# Patient Record
Sex: FEMALE | Race: BLACK OR AFRICAN AMERICAN | NOT HISPANIC OR LATINO | Employment: OTHER | URBAN - METROPOLITAN AREA
[De-identification: names, ages, dates, MRNs, and addresses within clinical notes are randomized per-mention and may not be internally consistent; named-entity substitution may affect disease eponyms.]

---

## 2017-01-03 ENCOUNTER — OFFICE VISIT (OUTPATIENT)
Dept: FAMILY MEDICINE | Facility: CLINIC | Age: 72
End: 2017-01-03
Payer: MEDICARE

## 2017-01-03 VITALS
TEMPERATURE: 99 F | HEART RATE: 96 BPM | SYSTOLIC BLOOD PRESSURE: 138 MMHG | WEIGHT: 213.88 LBS | HEIGHT: 60 IN | BODY MASS INDEX: 41.99 KG/M2 | DIASTOLIC BLOOD PRESSURE: 74 MMHG

## 2017-01-03 DIAGNOSIS — I10 ESSENTIAL HYPERTENSION: ICD-10-CM

## 2017-01-03 DIAGNOSIS — G89.29 CHRONIC BILATERAL BACK PAIN, UNSPECIFIED BACK LOCATION: ICD-10-CM

## 2017-01-03 DIAGNOSIS — E66.01 MORBID OBESITY WITH BMI OF 40.0-44.9, ADULT: ICD-10-CM

## 2017-01-03 DIAGNOSIS — Z76.0 MEDICATION REFILL: ICD-10-CM

## 2017-01-03 DIAGNOSIS — L30.9 DERMATITIS: Primary | ICD-10-CM

## 2017-01-03 DIAGNOSIS — M54.9 CHRONIC BILATERAL BACK PAIN, UNSPECIFIED BACK LOCATION: ICD-10-CM

## 2017-01-03 PROCEDURE — 99214 OFFICE O/P EST MOD 30 MIN: CPT | Mod: S$GLB,,, | Performed by: NURSE PRACTITIONER

## 2017-01-03 PROCEDURE — 99999 PR PBB SHADOW E&M-EST. PATIENT-LVL V: CPT | Mod: PBBFAC,,, | Performed by: NURSE PRACTITIONER

## 2017-01-03 PROCEDURE — 1125F AMNT PAIN NOTED PAIN PRSNT: CPT | Mod: S$GLB,,, | Performed by: NURSE PRACTITIONER

## 2017-01-03 PROCEDURE — 1159F MED LIST DOCD IN RCRD: CPT | Mod: S$GLB,,, | Performed by: NURSE PRACTITIONER

## 2017-01-03 PROCEDURE — 3078F DIAST BP <80 MM HG: CPT | Mod: S$GLB,,, | Performed by: NURSE PRACTITIONER

## 2017-01-03 PROCEDURE — 1157F ADVNC CARE PLAN IN RCRD: CPT | Mod: S$GLB,,, | Performed by: NURSE PRACTITIONER

## 2017-01-03 PROCEDURE — 99499 UNLISTED E&M SERVICE: CPT | Mod: S$GLB,,, | Performed by: NURSE PRACTITIONER

## 2017-01-03 PROCEDURE — 3075F SYST BP GE 130 - 139MM HG: CPT | Mod: S$GLB,,, | Performed by: NURSE PRACTITIONER

## 2017-01-03 PROCEDURE — 1160F RVW MEDS BY RX/DR IN RCRD: CPT | Mod: S$GLB,,, | Performed by: NURSE PRACTITIONER

## 2017-01-03 RX ORDER — HYDROCHLOROTHIAZIDE 25 MG/1
25 TABLET ORAL DAILY
Qty: 90 TABLET | Refills: 3 | Status: SHIPPED | OUTPATIENT
Start: 2017-01-03 | End: 2017-03-13 | Stop reason: SDUPTHER

## 2017-01-03 RX ORDER — CLOBETASOL PROPIONATE 0.5 MG/G
OINTMENT TOPICAL 2 TIMES DAILY
Qty: 60 G | Refills: 1 | Status: SHIPPED | OUTPATIENT
Start: 2017-01-03 | End: 2017-03-13 | Stop reason: SDUPTHER

## 2017-01-03 RX ORDER — AMLODIPINE BESYLATE 10 MG/1
TABLET ORAL
Qty: 90 TABLET | Refills: 1 | Status: SHIPPED | OUTPATIENT
Start: 2017-01-03 | End: 2017-03-13 | Stop reason: SDUPTHER

## 2017-01-03 RX ORDER — LOSARTAN POTASSIUM 100 MG/1
TABLET ORAL
Qty: 90 TABLET | Refills: 1 | Status: SHIPPED | OUTPATIENT
Start: 2017-01-03 | End: 2017-03-13 | Stop reason: SDUPTHER

## 2017-01-03 NOTE — PROGRESS NOTES
Subjective:       Patient ID: Nilsa Curiel is a 71 y.o. female.    Chief Complaint: Rash (hands and chest)    HPI Comments: Also still has chronic low back pain, does not radiate.  Ok with sitting 0/10 pain but can get as bad as 8/10 with prolonged standing/ bending.  No b/b issues.  No paraesthesia      Rash   This is a chronic problem. The affected locations include the chest, right arm, left arm, left elbow, right elbow, right hand and left hand. The rash is characterized by dryness, itchiness and scaling. She was exposed to nothing. Pertinent negatives include no anorexia, congestion, cough, diarrhea, eye pain, facial edema, fatigue, fever, joint pain, nail changes, rhinorrhea, shortness of breath, sore throat or vomiting. Past treatments include topical steroids. The treatment provided mild relief. Her past medical history is significant for allergies. There is no history of asthma, eczema or varicella.     Past Medical History   Diagnosis Date    Atopic dermatitis     GERD (gastroesophageal reflux disease)     Hyperlipidemia     Hypertension     Morbid obesity with BMI of 40.0-44.9, adult 2016     Past Surgical History   Procedure Laterality Date     section       x3    Tubal ligation       Social History     Social History Narrative    Walking some     Family History   Problem Relation Age of Onset    Hypertension Mother     Dementia Mother     Diabetes Mother     Other Father      sepsis    Diabetes Sister     Hypertension Sister     Hyperlipidemia Sister     Diabetes Brother     Heart disease Brother     Cataracts Brother     No Known Problems Daughter     Hypertension Son     Hypertension Sister     Cancer Sister      pancreatic     Kidney disease Sister     Gout Sister     Heart disease Sister      premature    No Known Problems Daughter     Glaucoma Neg Hx     Retinal detachment Neg Hx     Macular degeneration Neg Hx     Strabismus Neg Hx     Amblyopia Neg Hx   "   Blindness Neg Hx      Vitals:    01/03/17 0900 01/03/17 0914   BP: (!) 156/82 138/74   Pulse: 96    Temp: 98.5 °F (36.9 °C)    Weight: 97 kg (213 lb 13.5 oz)    Height: 4' 11.5" (1.511 m)    PainSc:   2      Outpatient Encounter Prescriptions as of 1/3/2017   Medication Sig Dispense Refill    amlodipine (NORVASC) 10 MG tablet TAKE 1 TABLET(10 MG) BY MOUTH EVERY DAY 90 tablet 1    ascorbic acid (VITAMIN C) 100 MG tablet Take 100 mg by mouth once daily.      fexofenadine (ALLEGRA) 180 MG tablet Take 1 tablet (180 mg total) by mouth once daily. 90 tablet 3    fluticasone (FLONASE) 50 mcg/actuation nasal spray SHAKE LIQUID AND USE 1 SPRAY IN EACH NOSTRIL TWICE DAILY 3 Bottle 3    hydrochlorothiazide (HYDRODIURIL) 25 MG tablet Take 1 tablet (25 mg total) by mouth once daily. 90 tablet 3    KRILL OIL ORAL Take by mouth.      LACTOBACILLUS COMBO NO.6 (PROBIOTIC COMPLEX ORAL) Take by mouth.      losartan (COZAAR) 100 MG tablet TAKE 1 TABLET(100 MG) BY MOUTH EVERY DAY 90 tablet 1    MULTIVIT-MIN/FA/CA CARB/VIT K (WOMEN'S 50+ DAILY FORMULA ORAL) Take by mouth.      omega-3 fatty acids 300 mg Cap Take by mouth.      omeprazole (PRILOSEC) 20 MG capsule Take 1 capsule (20 mg total) by mouth once daily. (Patient taking differently: Take 20 mg by mouth as needed. ) 90 capsule 3    pravastatin (PRAVACHOL) 40 MG tablet TAKE 1 TABLET BY MOUTH ONCE DAILY 90 tablet 2    ranitidine (ZANTAC) 150 MG tablet Take 1 tablet (150 mg total) by mouth 2 (two) times daily. (Patient taking differently: Take 150 mg by mouth as needed. ) 180 tablet 3    tizanidine (ZANAFLEX) 4 MG tablet TAKE 1 TABLET BY MOUTH EVERY 6 HOURS AS NEEDED 60 tablet 1    [DISCONTINUED] amlodipine (NORVASC) 10 MG tablet TAKE 1 TABLET(10 MG) BY MOUTH EVERY DAY 90 tablet 1    [DISCONTINUED] betamethasone valerate 0.1% (VALISONE) 0.1 % Crea Apply topically 2 (two) times daily. 45 g 6    [DISCONTINUED] hydrochlorothiazide (HYDRODIURIL) 25 MG tablet TAKE 1 " "TABLET BY MOUTH ONCE DAILY (Patient taking differently: TAKE 1 TABLET every other day) 90 tablet 5    [DISCONTINUED] losartan (COZAAR) 100 MG tablet TAKE 1 TABLET(100 MG) BY MOUTH EVERY DAY 90 tablet 1    clobetasol 0.05% (TEMOVATE) 0.05 % Oint Apply topically 2 (two) times daily. Thin layer twice a day for 2 weeks to rash then only as needed if your rash flairs up. 60 g 1    naproxen (NAPROSYN) 375 MG tablet Take 1 tablet (375 mg total) by mouth 2 (two) times daily as needed. Take with meals. 90 tablet 3     No facility-administered encounter medications on file as of 1/3/2017.      Wt Readings from Last 3 Encounters:   01/03/17 97 kg (213 lb 13.5 oz)   12/01/16 96 kg (211 lb 10.3 oz)   08/16/16 97.7 kg (215 lb 6.2 oz)     Last 3 sets of Vitals    Vitals - 1 value per visit 12/1/2016 12/15/2016 1/3/2017   SYSTOLIC 144 - 138   DIASTOLIC 90 - 74   PULSE 96 - 96   TEMPERATURE 98.7 - 98.5   Weight (lb) 211.64 - 213.85   HEIGHT 5' 0" - 4' 11.5"   BODY MASS INDEX 41.33 - 42.47   VISIT REPORT - - -   Pain Score  2 0 2     No results found for: CBC  Review of Systems   Constitutional: Negative for chills, fatigue, fever and unexpected weight change.   HENT: Negative for congestion, postnasal drip, rhinorrhea, sore throat, tinnitus, trouble swallowing and voice change.    Eyes: Negative for pain and redness.   Respiratory: Negative for cough and shortness of breath.    Cardiovascular: Negative for chest pain.   Gastrointestinal: Negative for anorexia, diarrhea and vomiting.   Musculoskeletal: Positive for back pain. Negative for joint pain, joint swelling, myalgias, neck pain and neck stiffness.   Skin: Positive for rash. Negative for nail changes.   Neurological: Negative for dizziness, light-headedness and headaches.   Hematological: Negative for adenopathy. Does not bruise/bleed easily.   Psychiatric/Behavioral: Negative for sleep disturbance.       Objective:      Physical Exam   Constitutional: She appears " well-developed and well-nourished. No distress.   HENT:   Head: Normocephalic and atraumatic.   Eyes: Conjunctivae are normal. No scleral icterus.   Pulmonary/Chest: Effort normal.   Abdominal: Soft.   Musculoskeletal:        Lumbar back: Normal. She exhibits normal range of motion, no tenderness, no bony tenderness and no swelling.   Pt able to heel/ toe walk  Pt able to bend to touch toes with no increased pain. Pt able to hyperextend back with minimal pain  Pt able to bend left/ right with minimal pain  5/5 motor strength BLE  Patellar DTR 2+ bilaterally     Skin: Skin is warm, dry and intact. Rash noted. Rash is maculopapular. She is not diaphoretic. No pallor.        Psychiatric: She has a normal mood and affect. Her behavior is normal. Judgment and thought content normal.       Assessment:       1. Dermatitis    2. Chronic bilateral back pain, unspecified back location    3. Essential hypertension    4. Medication refill        Plan:         Pt to call if rash not improving and will send a referral to dermatology  Pt to reschedule for an annual, D/W pt that her annual is a wellness visit.    BP meds refilled  Referral to [facility] program as pt still having back pain, discussed using Tylenol for back pain before using Naproxen    Nilsa was seen today for rash.    Diagnoses and all orders for this visit:    Dermatitis  -     clobetasol 0.05% (TEMOVATE) 0.05 % Oint; Apply topically 2 (two) times daily. Thin layer twice a day for 2 weeks to rash then only as needed if your rash flairs up.    Chronic bilateral back pain, unspecified back location  -     Ambulatory consult to [facility]    Essential hypertension  -     losartan (COZAAR) 100 MG tablet; TAKE 1 TABLET(100 MG) BY MOUTH EVERY DAY  -     amlodipine (NORVASC) 10 MG tablet; TAKE 1 TABLET(10 MG) BY MOUTH EVERY DAY  -     hydrochlorothiazide (HYDRODIURIL) 25 MG tablet; Take 1 tablet (25 mg total) by mouth once daily.    Medication refill  -      losartan (COZAAR) 100 MG tablet; TAKE 1 TABLET(100 MG) BY MOUTH EVERY DAY  -     amlodipine (NORVASC) 10 MG tablet; TAKE 1 TABLET(10 MG) BY MOUTH EVERY DAY  -     hydrochlorothiazide (HYDRODIURIL) 25 MG tablet; Take 1 tablet (25 mg total) by mouth once daily.      Patient Instructions   Use Ceravae CREAM to skin after shower.  Keep skin wet after shower and apply the cream    Start using Temovate ointment as directed.  If no improvement in your rash just call and we can send you to the Dermatologist    Call with any questions    Increase your water intake as discussed

## 2017-01-03 NOTE — PATIENT INSTRUCTIONS
Use Ceravae CREAM to skin after shower.  Keep skin wet after shower and apply the cream    Start using Temovate ointment as directed.  If no improvement in your rash just call and we can send you to the Dermatologist    Call with any questions    Increase your water intake as discussed

## 2017-01-03 NOTE — MR AVS SNAPSHOT
[facility]  [address] [facility], [address]  [facility] [address]  Phone: [phone]  Fax: [phone]                  Nilsa Curiel   1/3/2017 9:00 AM   Office Visit    Description:  Female : 1945   Provider:  PATRICK Ngo   Department:  [facility]           Reason for Visit     Rash           Diagnoses this Visit        Comments    Dermatitis    -  Primary     Chronic bilateral back pain, unspecified back location                To Do List           Goals (5 Years of Data)     None      Follow-Up and Disposition     Return if symptoms worsen or fail to improve.       These Medications        Disp Refills Start End    clobetasol 0.05% (TEMOVATE) 0.05 % Oint 60 g 1 1/3/2017 2017    Apply topically 2 (two) times daily. Thin layer twice a day for 2 weeks to rash then only as needed if your rash flairs up. - Topical    Pharmacy: [facility] [address] [place] [address] VIVIAN GILMORE AT [facility] Vivian Garcia Ph #: [phone]         [facility] On Call     [facility] On Call Nurse Care Line -  Assistance  Registered nurses in the [facility] On Call Center provide clinical advisement, health education, appointment booking, and other advisory services.  Call for this free service at [phone].             Medications           Message regarding Medications     Verify the changes and/or additions to your medication regime listed below are the same as discussed with your clinician today.  If any of these changes or additions are incorrect, please notify your healthcare provider.        START taking these NEW medications        Refills    clobetasol 0.05% (TEMOVATE) 0.05 % Oint 1    Sig: Apply topically 2 (two) times daily. Thin layer twice a day for 2 weeks to rash then only as needed if your rash flairs up.    Class: Normal    Route: Topical      STOP taking these medications     betamethasone valerate 0.1% (VALISONE) 0.1 % Crea  Apply topically 2 (two) times daily.           Verify that the below list of medications is an accurate representation of the medications you are currently taking.  If none reported, the list may be blank. If incorrect, please contact your healthcare provider. Carry this list with you in case of emergency.           Current Medications     amlodipine (NORVASC) 10 MG tablet TAKE 1 TABLET(10 MG) BY MOUTH EVERY DAY    ascorbic acid (VITAMIN C) 100 MG tablet Take 100 mg by mouth once daily.    fexofenadine (ALLEGRA) 180 MG tablet Take 1 tablet (180 mg total) by mouth once daily.    fluticasone (FLONASE) 50 mcg/actuation nasal spray SHAKE LIQUID AND USE 1 SPRAY IN EACH NOSTRIL TWICE DAILY    hydrochlorothiazide (HYDRODIURIL) 25 MG tablet TAKE 1 TABLET BY MOUTH ONCE DAILY    KRILL OIL ORAL Take by mouth.    LACTOBACILLUS COMBO NO.6 (PROBIOTIC COMPLEX ORAL) Take by mouth.    losartan (COZAAR) 100 MG tablet TAKE 1 TABLET(100 MG) BY MOUTH EVERY DAY    MULTIVIT-MIN/FA/CA CARB/VIT K (WOMEN'S 50+ DAILY FORMULA ORAL) Take by mouth.    omega-3 fatty acids 300 mg Cap Take by mouth.    omeprazole (PRILOSEC) 20 MG capsule Take 1 capsule (20 mg total) by mouth once daily.    pravastatin (PRAVACHOL) 40 MG tablet TAKE 1 TABLET BY MOUTH ONCE DAILY    ranitidine (ZANTAC) 150 MG tablet Take 1 tablet (150 mg total) by mouth 2 (two) times daily.    tizanidine (ZANAFLEX) 4 MG tablet TAKE 1 TABLET BY MOUTH EVERY 6 HOURS AS NEEDED    clobetasol 0.05% (TEMOVATE) 0.05 % Oint Apply topically 2 (two) times daily. Thin layer twice a day for 2 weeks to rash then only as needed if your rash flairs up.    naproxen (NAPROSYN) 375 MG tablet Take 1 tablet (375 mg total) by mouth 2 (two) times daily as needed. Take with meals.           Clinical Reference Information           Vital Signs - Last Recorded  Most recent update: 1/3/2017  9:17 AM by PATRICK Ngo    BP Pulse Temp Ht Wt BMI    138/74 (BP Location: Left arm, Patient Position:  "Sitting, BP Method: Manual) 96 98.5 °F (36.9 °C) 4' 11.5" (1.511 m) 97 kg (213 lb 13.5 oz) 42.47 kg/m2      Blood Pressure          Most Recent Value    BP  138/74      Allergies as of 1/3/2017     No Known Allergies      Immunizations Administered on Date of Encounter - 1/3/2017     None      Orders Placed During Today's Visit      Normal Orders This Visit    Ambulatory consult to [facility]       Instructions    Use Ceravae CREAM to skin after shower.  Keep skin wet after shower and apply the cream    Start using Temovate ointment as directed.  If no improvement in your rash just call and we can send you to the Dermatologist    Call with any questions    Increase your water intake as discussed         "

## 2017-02-22 ENCOUNTER — OFFICE VISIT (OUTPATIENT)
Dept: FAMILY MEDICINE | Facility: CLINIC | Age: 72
End: 2017-02-22
Payer: MEDICARE

## 2017-02-22 ENCOUNTER — LAB VISIT (OUTPATIENT)
Dept: LAB | Facility: HOSPITAL | Age: 72
End: 2017-02-22
Attending: FAMILY MEDICINE
Payer: MEDICARE

## 2017-02-22 VITALS
HEART RATE: 78 BPM | DIASTOLIC BLOOD PRESSURE: 72 MMHG | BODY MASS INDEX: 41.99 KG/M2 | HEIGHT: 60 IN | SYSTOLIC BLOOD PRESSURE: 128 MMHG | TEMPERATURE: 98 F | WEIGHT: 213.88 LBS

## 2017-02-22 DIAGNOSIS — M54.9 BACK PAIN, UNSPECIFIED BACK LOCATION, UNSPECIFIED BACK PAIN LATERALITY, UNSPECIFIED CHRONICITY: ICD-10-CM

## 2017-02-22 DIAGNOSIS — R20.2 PARESTHESIA OF SKIN: ICD-10-CM

## 2017-02-22 DIAGNOSIS — L30.9 DERMATITIS: Primary | ICD-10-CM

## 2017-02-22 DIAGNOSIS — D64.9 ANEMIA, UNSPECIFIED TYPE: ICD-10-CM

## 2017-02-22 DIAGNOSIS — L30.9 DERMATITIS: ICD-10-CM

## 2017-02-22 LAB
ALBUMIN SERPL BCP-MCNC: 3.8 G/DL
ALP SERPL-CCNC: 106 U/L
ALT SERPL W/O P-5'-P-CCNC: 16 U/L
ANION GAP SERPL CALC-SCNC: 11 MMOL/L
AST SERPL-CCNC: 19 U/L
BASOPHILS # BLD AUTO: 0.03 K/UL
BASOPHILS NFR BLD: 0.5 %
BILIRUB SERPL-MCNC: 0.4 MG/DL
BUN SERPL-MCNC: 12 MG/DL
C3 SERPL-MCNC: 179 MG/DL
C4 SERPL-MCNC: 56 MG/DL
CALCIUM SERPL-MCNC: 10.7 MG/DL
CHLORIDE SERPL-SCNC: 106 MMOL/L
CO2 SERPL-SCNC: 25 MMOL/L
CREAT SERPL-MCNC: 0.9 MG/DL
DIFFERENTIAL METHOD: ABNORMAL
EOSINOPHIL # BLD AUTO: 0.3 K/UL
EOSINOPHIL NFR BLD: 4.4 %
ERYTHROCYTE [DISTWIDTH] IN BLOOD BY AUTOMATED COUNT: 13.8 %
EST. GFR  (AFRICAN AMERICAN): >60 ML/MIN/1.73 M^2
EST. GFR  (NON AFRICAN AMERICAN): >60 ML/MIN/1.73 M^2
GLUCOSE SERPL-MCNC: 96 MG/DL
HCT VFR BLD AUTO: 36.3 %
HGB BLD-MCNC: 11.2 G/DL
IRON SERPL-MCNC: 67 UG/DL
LYMPHOCYTES # BLD AUTO: 2.4 K/UL
LYMPHOCYTES NFR BLD: 41.3 %
MCH RBC QN AUTO: 24.5 PG
MCHC RBC AUTO-ENTMCNC: 30.9 %
MCV RBC AUTO: 79 FL
MONOCYTES # BLD AUTO: 0.4 K/UL
MONOCYTES NFR BLD: 6.7 %
NEUTROPHILS # BLD AUTO: 2.7 K/UL
NEUTROPHILS NFR BLD: 46.9 %
PATH REV BLD -IMP: NORMAL
PATH REV BLD -IMP: NORMAL
PLATELET # BLD AUTO: 361 K/UL
PMV BLD AUTO: 9.9 FL
POTASSIUM SERPL-SCNC: 4 MMOL/L
PROT SERPL-MCNC: 7.8 G/DL
RBC # BLD AUTO: 4.58 M/UL
RETICS/RBC NFR AUTO: 1.9 %
SATURATED IRON: 17 %
SODIUM SERPL-SCNC: 142 MMOL/L
TOTAL IRON BINDING CAPACITY: 401 UG/DL
TRANSFERRIN SERPL-MCNC: 271 MG/DL
VIT B12 SERPL-MCNC: 1370 PG/ML
WBC # BLD AUTO: 5.69 K/UL

## 2017-02-22 PROCEDURE — 86160 COMPLEMENT ANTIGEN: CPT | Mod: 59

## 2017-02-22 PROCEDURE — 83540 ASSAY OF IRON: CPT

## 2017-02-22 PROCEDURE — 1157F ADVNC CARE PLAN IN RCRD: CPT | Mod: S$GLB,,, | Performed by: NURSE PRACTITIONER

## 2017-02-22 PROCEDURE — 1125F AMNT PAIN NOTED PAIN PRSNT: CPT | Mod: S$GLB,,, | Performed by: NURSE PRACTITIONER

## 2017-02-22 PROCEDURE — 86160 COMPLEMENT ANTIGEN: CPT

## 2017-02-22 PROCEDURE — 85025 COMPLETE CBC W/AUTO DIFF WBC: CPT

## 2017-02-22 PROCEDURE — 99213 OFFICE O/P EST LOW 20 MIN: CPT | Mod: S$GLB,,, | Performed by: NURSE PRACTITIONER

## 2017-02-22 PROCEDURE — 82607 VITAMIN B-12: CPT

## 2017-02-22 PROCEDURE — 85045 AUTOMATED RETICULOCYTE COUNT: CPT

## 2017-02-22 PROCEDURE — 3078F DIAST BP <80 MM HG: CPT | Mod: S$GLB,,, | Performed by: NURSE PRACTITIONER

## 2017-02-22 PROCEDURE — 99999 PR PBB SHADOW E&M-EST. PATIENT-LVL V: CPT | Mod: PBBFAC,,, | Performed by: NURSE PRACTITIONER

## 2017-02-22 PROCEDURE — 84207 ASSAY OF VITAMIN B-6: CPT

## 2017-02-22 PROCEDURE — 3074F SYST BP LT 130 MM HG: CPT | Mod: S$GLB,,, | Performed by: NURSE PRACTITIONER

## 2017-02-22 PROCEDURE — 36415 COLL VENOUS BLD VENIPUNCTURE: CPT | Mod: PO

## 2017-02-22 PROCEDURE — 86038 ANTINUCLEAR ANTIBODIES: CPT

## 2017-02-22 PROCEDURE — 80053 COMPREHEN METABOLIC PANEL: CPT

## 2017-02-22 PROCEDURE — 1159F MED LIST DOCD IN RCRD: CPT | Mod: S$GLB,,, | Performed by: NURSE PRACTITIONER

## 2017-02-22 PROCEDURE — 85060 BLOOD SMEAR INTERPRETATION: CPT | Mod: ,,, | Performed by: PATHOLOGY

## 2017-02-22 PROCEDURE — 1160F RVW MEDS BY RX/DR IN RCRD: CPT | Mod: S$GLB,,, | Performed by: NURSE PRACTITIONER

## 2017-02-22 NOTE — PROGRESS NOTES
Subjective:       Patient ID: Nilsa Curiel is a 71 y.o. female.    Chief Complaint: Rash (bilateral hands and chest)    HPI Comments: Pt also c/o her chronic back pain, always there no b/b issues.  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 The current episode started in the past 7 days. The problem has been waxing and waning since onset. The affected locations include the chest, left hand and right hand. Rash characteristics: plaques. She was exposed to nothing. Associated symptoms include joint pain. Pertinent negatives include no anorexia, congestion, cough, diarrhea, eye pain, facial edema, fatigue, fever, nail changes, rhinorrhea, shortness of breath, sore throat or vomiting. Past treatments include topical steroids. The treatment provided significant relief. Her past medical history is significant for eczema.     Past Medical History   Diagnosis Date    Atopic dermatitis     GERD (gastroesophageal reflux disease)     Hyperlipidemia     Hypertension     Morbid obesity with BMI of 40.0-44.9, adult 2016     Past Surgical History   Procedure Laterality Date     section       x3    Tubal ligation       Social History     Social History Narrative    Walking some     Family History   Problem Relation Age of Onset    Hypertension Mother     Dementia Mother     Diabetes Mother     Other Father      sepsis    Diabetes Sister     Hypertension Sister     Hyperlipidemia Sister     Diabetes Brother     Heart disease Brother     Cataracts Brother     No Known Problems Daughter     Hypertension Son     Hypertension Sister     Cancer Sister      pancreatic     Kidney disease Sister     Gout Sister     Heart disease Sister      premature    No Known Problems Daughter     Glaucoma Neg Hx     Retinal detachment Neg Hx     Macular degeneration Neg Hx     Strabismus Neg Hx     Amblyopia Neg Hx     Blindness Neg Hx      Vitals:    17  "0842   BP: 128/72   Pulse: 78   Temp: 97.9 °F (36.6 °C)   Weight: 97 kg (213 lb 13.5 oz)   Height: 4' 11.5" (1.511 m)   PainSc:   2     Outpatient Encounter Prescriptions as of 2/22/2017   Medication Sig Dispense Refill    amlodipine (NORVASC) 10 MG tablet TAKE 1 TABLET(10 MG) BY MOUTH EVERY DAY 90 tablet 1    ascorbic acid (VITAMIN C) 100 MG tablet Take 100 mg by mouth once daily.      clobetasol 0.05% (TEMOVATE) 0.05 % Oint Apply topically 2 (two) times daily. Thin layer twice a day for 2 weeks to rash then only as needed if your rash flairs up. 60 g 1    fexofenadine (ALLEGRA) 180 MG tablet Take 1 tablet (180 mg total) by mouth once daily. 90 tablet 3    fluticasone (FLONASE) 50 mcg/actuation nasal spray SHAKE LIQUID AND USE 1 SPRAY IN EACH NOSTRIL TWICE DAILY 3 Bottle 3    hydrochlorothiazide (HYDRODIURIL) 25 MG tablet Take 1 tablet (25 mg total) by mouth once daily. 90 tablet 3    KRILL OIL ORAL Take by mouth.      LACTOBACILLUS COMBO NO.6 (PROBIOTIC COMPLEX ORAL) Take by mouth.      losartan (COZAAR) 100 MG tablet TAKE 1 TABLET(100 MG) BY MOUTH EVERY DAY 90 tablet 1    MULTIVIT-MIN/FA/CA CARB/VIT K (WOMEN'S 50+ DAILY FORMULA ORAL) Take by mouth.      omega-3 fatty acids 300 mg Cap Take by mouth.      omeprazole (PRILOSEC) 20 MG capsule Take 1 capsule (20 mg total) by mouth once daily. (Patient taking differently: Take 20 mg by mouth as needed. ) 90 capsule 3    pravastatin (PRAVACHOL) 40 MG tablet TAKE 1 TABLET BY MOUTH ONCE DAILY 90 tablet 2    ranitidine (ZANTAC) 150 MG tablet Take 1 tablet (150 mg total) by mouth 2 (two) times daily. (Patient taking differently: Take 150 mg by mouth as needed. ) 180 tablet 3    naproxen (NAPROSYN) 375 MG tablet Take 1 tablet (375 mg total) by mouth 2 (two) times daily as needed. Take with meals. 90 tablet 3    [DISCONTINUED] tizanidine (ZANAFLEX) 4 MG tablet TAKE 1 TABLET BY MOUTH EVERY 6 HOURS AS NEEDED 60 tablet 1     No facility-administered encounter " "medications on file as of 2/22/2017.      Wt Readings from Last 3 Encounters:   02/22/17 97 kg (213 lb 13.5 oz)   01/03/17 97 kg (213 lb 13.5 oz)   12/01/16 96 kg (211 lb 10.3 oz)     Last 3 sets of Vitals    Vitals - 1 value per visit 12/15/2016 1/3/2017 2/22/2017   SYSTOLIC - 138 128   DIASTOLIC - 74 72   PULSE - 96 78   TEMPERATURE - 98.5 97.9   Weight (lb) - 213.85 213.85   HEIGHT - 4' 11.5" 4' 11.5"   BODY MASS INDEX - 42.47 42.47   VISIT REPORT - - -   Pain Score  0 2 2     No results found for: CBC  Review of Systems   Constitutional: Negative for fatigue and fever.   HENT: Negative for congestion, rhinorrhea and sore throat.    Eyes: Negative for pain.   Respiratory: Negative for cough and shortness of breath.    Gastrointestinal: Negative for anorexia, diarrhea and vomiting.   Musculoskeletal: Positive for back pain and joint pain.   Skin: Positive for rash. Negative for nail changes.       Objective:      Physical Exam   Constitutional: She is oriented to person, place, and time. She appears well-developed and well-nourished. No distress.   Eyes: Pupils are equal, round, and reactive to light.   Pulmonary/Chest: Effort normal.   Musculoskeletal:   Ambulatory with steady gait, no gross abnormalities to back     Neurological: She is alert and oriented to person, place, and time.   Skin: Rash noted. She is not diaphoretic.   Raised plaques noted to dorsal surfaces of hands and on chest     Psychiatric: She has a normal mood and affect. Her behavior is normal. Judgment and thought content normal.   Nursing note and vitals reviewed.      Assessment:       1. Dermatitis    2. Anemia, unspecified type    3. Paresthesia of skin     4. Back pain, unspecified back location, unspecified back pain laterality, unspecified chronicity        Plan:       Will check labs , referral to healthy [facility]por pt's chronic back pain  Discussed weight management, pt has lost some weight, eating salads 3 days a week.  Has lost 4 lbs " past 2 weeks.    Nilsa was seen today for rash.    Diagnoses and all orders for this visit:    Dermatitis  -     Reticulocytes; Future  -     Iron and TIBC; Future  -     DARNELL; Future  -     C3 complement; Future  -     C4 complement; Future  -     Vitamin B12; Future  -     VITAMIN B6; Future  -     Pathologist Interpretation Differential; Future  -     CBC auto differential; Future  -     Comprehensive metabolic panel; Future    Anemia, unspecified type  -     Reticulocytes; Future  -     Iron and TIBC; Future  -     DARNELL; Future  -     C3 complement; Future  -     C4 complement; Future  -     Vitamin B12; Future  -     VITAMIN B6; Future  -     Pathologist Interpretation Differential; Future  -     CBC auto differential; Future  -     Comprehensive metabolic panel; Future    Paresthesia of skin   -     Vitamin B12; Future  -     Pathologist Interpretation Differential; Future  -     CBC auto differential; Future  -     Comprehensive metabolic panel; Future    Back pain, unspecified back location, unspecified back pain laterality, unspecified chronicity  -     Ambulatory consult to [facility]  -     Pathologist Interpretation Differential; Future      Patient Instructions   Labs today, I will call you about your results    Resume your steroid cream/ ointment to rash    [facility] for your back pain

## 2017-02-22 NOTE — PATIENT INSTRUCTIONS
Labs today, I will call you about your results    Resume your steroid cream/ ointment to rash    [facility] for your back pain

## 2017-02-22 NOTE — MR AVS SNAPSHOT
[facility]  [address] [facility], [address]  [facility] [address]  Phone: [phone]  Fax: [phone]                  Nilsa Curiel   2017 8:40 AM   Office Visit    Description:  Female : 1945   Provider:  MICHELINE NgoC   Department:  [facility]           Reason for Visit     Rash           Diagnoses this Visit        Comments    Dermatitis    -  Primary     Anemia, unspecified type         Paresthesia of skin         Back pain, unspecified back location, unspecified back pain laterality, unspecified chronicity                To Do List           Goals (5 Years of Data)     None      [facility]     [facility] Care [facility] -  Assistance  Registered nurses in the [facility] provide clinical advisement, health education, appointment booking, and other advisory services.  Call for this free service at [phone].             Medications           Message regarding Medications     Verify the changes and/or additions to your medication regime listed below are the same as discussed with your clinician today.  If any of these changes or additions are incorrect, please notify your healthcare provider.        STOP taking these medications     tizanidine (ZANAFLEX) 4 MG tablet TAKE 1 TABLET BY MOUTH EVERY 6 HOURS AS NEEDED           Verify that the below list of medications is an accurate representation of the medications you are currently taking.  If none reported, the list may be blank. If incorrect, please contact your healthcare provider. Carry this list with you in case of emergency.           Current Medications     amlodipine (NORVASC) 10 MG tablet TAKE 1 TABLET(10 MG) BY MOUTH EVERY DAY    ascorbic acid (VITAMIN C) 100 MG tablet Take 100 mg by mouth once daily.    clobetasol 0.05% (TEMOVATE) 0.05 % Oint Apply topically 2 (two) times daily. Thin layer twice a day for 2 weeks to rash then only as needed if your  "rash flairs up.    fexofenadine (ALLEGRA) 180 MG tablet Take 1 tablet (180 mg total) by mouth once daily.    fluticasone (FLONASE) 50 mcg/actuation nasal spray SHAKE LIQUID AND USE 1 SPRAY IN EACH NOSTRIL TWICE DAILY    hydrochlorothiazide (HYDRODIURIL) 25 MG tablet Take 1 tablet (25 mg total) by mouth once daily.    KRILL OIL ORAL Take by mouth.    LACTOBACILLUS COMBO NO.6 (PROBIOTIC COMPLEX ORAL) Take by mouth.    losartan (COZAAR) 100 MG tablet TAKE 1 TABLET(100 MG) BY MOUTH EVERY DAY    MULTIVIT-MIN/FA/CA CARB/VIT K (WOMEN'S 50+ DAILY FORMULA ORAL) Take by mouth.    omega-3 fatty acids 300 mg Cap Take by mouth.    omeprazole (PRILOSEC) 20 MG capsule Take 1 capsule (20 mg total) by mouth once daily.    pravastatin (PRAVACHOL) 40 MG tablet TAKE 1 TABLET BY MOUTH ONCE DAILY    ranitidine (ZANTAC) 150 MG tablet Take 1 tablet (150 mg total) by mouth 2 (two) times daily.    naproxen (NAPROSYN) 375 MG tablet Take 1 tablet (375 mg total) by mouth 2 (two) times daily as needed. Take with meals.           Clinical Reference Information           Your Vitals Were     BP Pulse Temp Height Weight BMI    128/72 78 97.9 °F (36.6 °C) 4' 11.5" (1.511 m) 97 kg (213 lb 13.5 oz) 42.47 kg/m2      Blood Pressure          Most Recent Value    BP  128/72      Allergies as of 2/22/2017     No Known Allergies      Immunizations Administered on Date of Encounter - 2/22/2017     None      Orders Placed During Today's Visit      Normal Orders This Visit    Ambulatory consult to [facility]     Future Labs/Procedures Expected by Expires    DARNELL  2/22/2017 4/23/2018    C3 complement  2/22/2017 4/23/2018    C4 complement  2/22/2017 4/23/2018    CBC auto differential  2/22/2017 4/23/2018    Comprehensive metabolic panel  2/22/2017 4/23/2018    Iron and TIBC  2/22/2017 4/23/2018    Pathologist Interpretation Differential  2/22/2017 4/23/2018    Reticulocytes  2/22/2017 4/23/2018    Vitamin B12  2/22/2017 4/23/2018    VITAMIN B6  " 2/22/2017 4/23/2018      Instructions    Labs today, I will call you about your results    Resume your steroid cream/ ointment to rash    [facility] Back Program for your back pain           Language Assistance Services     ATTENTION: Language assistance services are available, free of charge. Please call [phone].      ATENCIÓN: Si habla corie, tiene a blanc disposición servicios gratuitos de asistencia lingüística. Llame al [phone].     CHÚ Ý: N?u b?n nói Ti?ng Vi?t, có các d?ch v? h? tr? ngôn ng? mi?n phí dành cho b?n. G?i s? [phone].         [facility] complies with applicable Federal civil rights laws and does not discriminate on the basis of race, color, national origin, age, disability, or sex.

## 2017-02-23 LAB — ANA SER QL IF: NORMAL

## 2017-02-24 ENCOUNTER — TELEPHONE (OUTPATIENT)
Dept: FAMILY MEDICINE | Facility: CLINIC | Age: 72
End: 2017-02-24

## 2017-02-24 DIAGNOSIS — M25.50 ARTHRALGIA, UNSPECIFIED JOINT: ICD-10-CM

## 2017-02-24 DIAGNOSIS — R79.89 ELEVATED PLATELET COUNT: Primary | ICD-10-CM

## 2017-02-27 LAB — PYRIDOXAL SERPL-MCNC: 16 UG/L (ref 5–50)

## 2017-03-07 ENCOUNTER — TELEPHONE (OUTPATIENT)
Dept: HEMATOLOGY/ONCOLOGY | Facility: CLINIC | Age: 72
End: 2017-03-07

## 2017-03-13 ENCOUNTER — OFFICE VISIT (OUTPATIENT)
Dept: FAMILY MEDICINE | Facility: CLINIC | Age: 72
End: 2017-03-13
Payer: MEDICARE

## 2017-03-13 VITALS
TEMPERATURE: 99 F | HEIGHT: 60 IN | BODY MASS INDEX: 41.72 KG/M2 | DIASTOLIC BLOOD PRESSURE: 64 MMHG | SYSTOLIC BLOOD PRESSURE: 128 MMHG | WEIGHT: 212.5 LBS

## 2017-03-13 DIAGNOSIS — I10 ESSENTIAL HYPERTENSION: ICD-10-CM

## 2017-03-13 DIAGNOSIS — L30.9 DERMATITIS: Primary | ICD-10-CM

## 2017-03-13 PROCEDURE — 3078F DIAST BP <80 MM HG: CPT | Mod: S$GLB,,, | Performed by: INTERNAL MEDICINE

## 2017-03-13 PROCEDURE — 99214 OFFICE O/P EST MOD 30 MIN: CPT | Mod: S$GLB,,, | Performed by: INTERNAL MEDICINE

## 2017-03-13 PROCEDURE — 1160F RVW MEDS BY RX/DR IN RCRD: CPT | Mod: S$GLB,,, | Performed by: INTERNAL MEDICINE

## 2017-03-13 PROCEDURE — 99999 PR PBB SHADOW E&M-EST. PATIENT-LVL III: CPT | Mod: PBBFAC,,, | Performed by: INTERNAL MEDICINE

## 2017-03-13 PROCEDURE — 3074F SYST BP LT 130 MM HG: CPT | Mod: S$GLB,,, | Performed by: INTERNAL MEDICINE

## 2017-03-13 PROCEDURE — 99499 UNLISTED E&M SERVICE: CPT | Mod: S$GLB,,, | Performed by: INTERNAL MEDICINE

## 2017-03-13 PROCEDURE — 1157F ADVNC CARE PLAN IN RCRD: CPT | Mod: S$GLB,,, | Performed by: INTERNAL MEDICINE

## 2017-03-13 PROCEDURE — 1125F AMNT PAIN NOTED PAIN PRSNT: CPT | Mod: S$GLB,,, | Performed by: INTERNAL MEDICINE

## 2017-03-13 PROCEDURE — 1159F MED LIST DOCD IN RCRD: CPT | Mod: S$GLB,,, | Performed by: INTERNAL MEDICINE

## 2017-03-13 RX ORDER — HYDROCHLOROTHIAZIDE 25 MG/1
25 TABLET ORAL DAILY
Qty: 90 TABLET | Refills: 0 | Status: SHIPPED | OUTPATIENT
Start: 2017-03-13 | End: 2018-07-25 | Stop reason: SDUPTHER

## 2017-03-13 RX ORDER — BETAMETHASONE VALERATE 1.2 MG/G
CREAM TOPICAL 2 TIMES DAILY
COMMUNITY
End: 2017-03-13 | Stop reason: SDUPTHER

## 2017-03-13 RX ORDER — AMLODIPINE BESYLATE 10 MG/1
TABLET ORAL
Qty: 90 TABLET | Refills: 0 | Status: SHIPPED | OUTPATIENT
Start: 2017-03-13 | End: 2017-06-18 | Stop reason: SDUPTHER

## 2017-03-13 RX ORDER — LOSARTAN POTASSIUM 100 MG/1
TABLET ORAL
Qty: 90 TABLET | Refills: 0 | Status: SHIPPED | OUTPATIENT
Start: 2017-03-13 | End: 2017-06-18 | Stop reason: SDUPTHER

## 2017-03-13 RX ORDER — BETAMETHASONE VALERATE 1.2 MG/G
CREAM TOPICAL
Qty: 45 G | Refills: 1 | Status: SHIPPED | OUTPATIENT
Start: 2017-03-13 | End: 2017-04-10 | Stop reason: SDUPTHER

## 2017-03-13 NOTE — MR AVS SNAPSHOT
[facility]  [address], [address]  [facility] [address]  Phone: [phone]  Fax: [phone]                  Nilsa Curiel   3/13/2017 8:40 AM   Office Visit    Description:  Female : 1945   Provider:  Alexa Harvey MD   Department:  [facility]           Reason for Visit     Rash           Diagnoses this Visit        Comments    Dermatitis    -  Primary     Essential hypertension                To Do List           Future Appointments        Provider Department Dept Phone    3/16/2017 9:20 AM Gallito Hardin MD [facility] [phone]      Goals (5 Years of Data)     None      Follow-Up and Disposition     Return for annual exam or sooner as needed.       These Medications        Disp Refills Start End    betamethasone valerate 0.1% (VALISONE) 0.1 % Crea 45 g 1 3/13/2017     Apply to affected areas of skin twice daily as needed.  Avoid face and groin.    Pharmacy: [facility] [place] [id number] AT [facility] Ph #: [phone]       amlodipine (NORVASC) 10 MG tablet 90 tablet 0 3/13/2017     TAKE 1 TABLET(10 MG) BY MOUTH EVERY DAY    Pharmacy: [place] [id number] AT [facility] Ph #: [phone]       hydrochlorothiazide (HYDRODIURIL) 25 MG tablet 90 tablet 0 3/13/2017     Take 1 tablet (25 mg total) by mouth once daily. - Oral    Pharmacy: [place] [id number] AT [facility] Ph #: [phone]       Notes to Pharmacy: **Patient requests 90 days supply**    losartan (COZAAR) 100 MG tablet 90 tablet 0 3/13/2017     TAKE 1 TABLET(100 MG) BY MOUTH EVERY DAY    Pharmacy: [place] [id number] AT [facility] Ph #: [phone]         [facility]     [facility] Care [facility] -  Assistance  Registered nurses in the  [facility] provide clinical advisement, health education, appointment booking, and other advisory services.  Call for this free service at [phone].             Medications           Message regarding Medications     Verify the changes and/or additions to your medication regime listed below are the same as discussed with your clinician today.  If any of these changes or additions are incorrect, please notify your healthcare provider.        START taking these NEW medications        Refills    betamethasone valerate 0.1% (VALISONE) 0.1 % Crea 1    Sig: Apply to affected areas of skin twice daily as needed.  Avoid face and groin.    Class: Normal      STOP taking these medications     clobetasol 0.05% (TEMOVATE) 0.05 % Oint Apply topically 2 (two) times daily. Thin layer twice a day for 2 weeks to rash then only as needed if your rash flairs up.           Verify that the below list of medications is an accurate representation of the medications you are currently taking.  If none reported, the list may be blank. If incorrect, please contact your healthcare provider. Carry this list with you in case of emergency.           Current Medications     amlodipine (NORVASC) 10 MG tablet TAKE 1 TABLET(10 MG) BY MOUTH EVERY DAY    ascorbic acid (VITAMIN C) 100 MG tablet Take 100 mg by mouth once daily.    betamethasone valerate 0.1% (VALISONE) 0.1 % Crea Apply to affected areas of skin twice daily as needed.  Avoid face and groin.    fexofenadine (ALLEGRA) 180 MG tablet Take 1 tablet (180 mg total) by mouth once daily.    fluticasone (FLONASE) 50 mcg/actuation nasal spray SHAKE LIQUID AND USE 1 SPRAY IN EACH NOSTRIL TWICE DAILY    hydrochlorothiazide (HYDRODIURIL) 25 MG tablet Take 1 tablet (25 mg total) by mouth once daily.    KRILL OIL ORAL Take by mouth.    LACTOBACILLUS COMBO NO.6 (PROBIOTIC COMPLEX ORAL) Take by mouth.    losartan (COZAAR) 100 MG tablet TAKE 1 TABLET(100 MG) BY MOUTH EVERY DAY     "MULTIVIT-MIN/FA/CA CARB/VIT K (WOMEN'S 50+ DAILY FORMULA ORAL) Take by mouth.    naproxen (NAPROSYN) 375 MG tablet Take 1 tablet (375 mg total) by mouth 2 (two) times daily as needed. Take with meals.    omega-3 fatty acids 300 mg Cap Take by mouth.    omeprazole (PRILOSEC) 20 MG capsule Take 1 capsule (20 mg total) by mouth once daily.    pravastatin (PRAVACHOL) 40 MG tablet TAKE 1 TABLET BY MOUTH ONCE DAILY    ranitidine (ZANTAC) 150 MG tablet Take 1 tablet (150 mg total) by mouth 2 (two) times daily.           Clinical Reference Information           Your Vitals Were     BP Temp Height Weight BMI    128/64 (BP Location: Right arm, Patient Position: Sitting, BP Method: Manual) 98.8 °F (37.1 °C) (Oral) 4' 11.75" (1.518 m) 96.4 kg (212 lb 8.4 oz) 41.85 kg/m2      Blood Pressure          Most Recent Value    BP  128/64      Allergies as of 3/13/2017     No Known Allergies      Immunizations Administered on Date of Encounter - 3/13/2017     None      Orders Placed During Today's Visit      Normal Orders This Visit    Ambulatory referral to Dermatology       Language Assistance Services     ATTENTION: Language assistance services are available, free of charge. Please call [phone].      ATENCIÓN: Si tory fontenot, tiene a blanc disposición servicios gratuitos de asistencia lingüística. Llame al [phone].     [facility] Ý: N?u b?n nói Ti?ng Vi?t, có các d?ch v? h? tr? ngôn ng? mi?n phí dành cho b?n. G?i s? [phone].         [facility] complies with applicable Federal civil rights laws and does not discriminate on the basis of race, color, national origin, age, disability, or sex.        "

## 2017-03-30 DIAGNOSIS — K21.9 GASTROESOPHAGEAL REFLUX DISEASE WITHOUT ESOPHAGITIS: ICD-10-CM

## 2017-03-30 RX ORDER — OMEPRAZOLE 20 MG/1
CAPSULE, DELAYED RELEASE ORAL
Qty: 90 CAPSULE | Refills: 0 | OUTPATIENT
Start: 2017-03-30

## 2017-04-10 ENCOUNTER — LAB VISIT (OUTPATIENT)
Dept: LAB | Facility: HOSPITAL | Age: 72
End: 2017-04-10
Attending: NURSE PRACTITIONER
Payer: MEDICARE

## 2017-04-10 ENCOUNTER — OFFICE VISIT (OUTPATIENT)
Dept: FAMILY MEDICINE | Facility: CLINIC | Age: 72
End: 2017-04-10
Payer: MEDICARE

## 2017-04-10 VITALS
TEMPERATURE: 99 F | HEIGHT: 59 IN | WEIGHT: 213.63 LBS | BODY MASS INDEX: 43.07 KG/M2 | SYSTOLIC BLOOD PRESSURE: 120 MMHG | DIASTOLIC BLOOD PRESSURE: 60 MMHG | HEART RATE: 84 BPM

## 2017-04-10 DIAGNOSIS — R73.01 IMPAIRED FASTING GLUCOSE: ICD-10-CM

## 2017-04-10 DIAGNOSIS — E66.01 MORBID OBESITY, UNSPECIFIED OBESITY TYPE: ICD-10-CM

## 2017-04-10 DIAGNOSIS — I10 ESSENTIAL HYPERTENSION: ICD-10-CM

## 2017-04-10 DIAGNOSIS — Z12.11 SCREENING FOR COLON CANCER: ICD-10-CM

## 2017-04-10 DIAGNOSIS — Z00.00 ANNUAL PHYSICAL EXAM: Primary | ICD-10-CM

## 2017-04-10 DIAGNOSIS — Z12.31 ENCOUNTER FOR SCREENING MAMMOGRAM FOR BREAST CANCER: ICD-10-CM

## 2017-04-10 DIAGNOSIS — L30.9 DERMATITIS: ICD-10-CM

## 2017-04-10 DIAGNOSIS — Z23 NEED FOR 23-POLYVALENT PNEUMOCOCCAL POLYSACCHARIDE VACCINE: ICD-10-CM

## 2017-04-10 LAB
ALBUMIN SERPL BCP-MCNC: 3.7 G/DL
ALP SERPL-CCNC: 103 U/L
ALT SERPL W/O P-5'-P-CCNC: 17 U/L
ANION GAP SERPL CALC-SCNC: 10 MMOL/L
AST SERPL-CCNC: 19 U/L
BILIRUB SERPL-MCNC: 0.4 MG/DL
BUN SERPL-MCNC: 15 MG/DL
CALCIUM SERPL-MCNC: 10.7 MG/DL
CHLORIDE SERPL-SCNC: 107 MMOL/L
CHOLEST/HDLC SERPL: 3.9 {RATIO}
CO2 SERPL-SCNC: 25 MMOL/L
CREAT SERPL-MCNC: 1 MG/DL
EST. GFR  (AFRICAN AMERICAN): >60 ML/MIN/1.73 M^2
EST. GFR  (NON AFRICAN AMERICAN): 57.6 ML/MIN/1.73 M^2
GLUCOSE SERPL-MCNC: 92 MG/DL
HDL/CHOLESTEROL RATIO: 25.7 %
HDLC SERPL-MCNC: 175 MG/DL
HDLC SERPL-MCNC: 45 MG/DL
LDLC SERPL CALC-MCNC: 104.2 MG/DL
NONHDLC SERPL-MCNC: 130 MG/DL
POTASSIUM SERPL-SCNC: 4 MMOL/L
PROT SERPL-MCNC: 7.8 G/DL
SODIUM SERPL-SCNC: 142 MMOL/L
TRIGL SERPL-MCNC: 129 MG/DL

## 2017-04-10 PROCEDURE — 1157F ADVNC CARE PLAN IN RCRD: CPT | Mod: S$GLB,,, | Performed by: NURSE PRACTITIONER

## 2017-04-10 PROCEDURE — 83036 HEMOGLOBIN GLYCOSYLATED A1C: CPT

## 2017-04-10 PROCEDURE — 80053 COMPREHEN METABOLIC PANEL: CPT

## 2017-04-10 PROCEDURE — 99214 OFFICE O/P EST MOD 30 MIN: CPT | Mod: S$GLB,,, | Performed by: NURSE PRACTITIONER

## 2017-04-10 PROCEDURE — 99499 UNLISTED E&M SERVICE: CPT | Mod: S$GLB,,, | Performed by: NURSE PRACTITIONER

## 2017-04-10 PROCEDURE — G0009 ADMIN PNEUMOCOCCAL VACCINE: HCPCS | Mod: S$GLB,,, | Performed by: NURSE PRACTITIONER

## 2017-04-10 PROCEDURE — 80061 LIPID PANEL: CPT

## 2017-04-10 PROCEDURE — 99999 PR PBB SHADOW E&M-EST. PATIENT-LVL IV: CPT | Mod: PBBFAC,,, | Performed by: NURSE PRACTITIONER

## 2017-04-10 PROCEDURE — 36415 COLL VENOUS BLD VENIPUNCTURE: CPT | Mod: PO

## 2017-04-10 PROCEDURE — 1159F MED LIST DOCD IN RCRD: CPT | Mod: S$GLB,,, | Performed by: NURSE PRACTITIONER

## 2017-04-10 PROCEDURE — 3078F DIAST BP <80 MM HG: CPT | Mod: S$GLB,,, | Performed by: NURSE PRACTITIONER

## 2017-04-10 PROCEDURE — 90732 PPSV23 VACC 2 YRS+ SUBQ/IM: CPT | Mod: S$GLB,,, | Performed by: NURSE PRACTITIONER

## 2017-04-10 PROCEDURE — 3074F SYST BP LT 130 MM HG: CPT | Mod: S$GLB,,, | Performed by: NURSE PRACTITIONER

## 2017-04-10 PROCEDURE — 1160F RVW MEDS BY RX/DR IN RCRD: CPT | Mod: S$GLB,,, | Performed by: NURSE PRACTITIONER

## 2017-04-10 RX ORDER — BETAMETHASONE VALERATE 1.2 MG/G
CREAM TOPICAL
Qty: 45 G | Refills: 1 | Status: SHIPPED | OUTPATIENT
Start: 2017-04-10 | End: 2017-05-08 | Stop reason: ALTCHOICE

## 2017-04-10 NOTE — PROGRESS NOTES
"Subjective:       Patient ID: Nilsa Curiel is a 71 y.o. female.    Chief Complaint: Annual Exam    HPI Comments: Pt here for annual exam and medical management of her chronic conditions  Pt has not seen Rheumatology or Heme yet  Pt just returned from [place] to help her sister who had surgery  Pt c/o body odor under right arm only for a while  No drainage or skin rash      Past Medical History:   Diagnosis Date    Atopic dermatitis     GERD (gastroesophageal reflux disease)     Hyperlipidemia     Hypertension     Morbid obesity with BMI of 40.0-44.9, adult 2016     Past Surgical History:   Procedure Laterality Date     SECTION      x3    TUBAL LIGATION       Social History     Social History Narrative    Walking some     Family History   Problem Relation Age of Onset    Hypertension Mother     Dementia Mother     Diabetes Mother     Other Father      sepsis    Diabetes Sister     Hypertension Sister     Hyperlipidemia Sister     Diabetes Brother     Heart disease Brother     Cataracts Brother     No Known Problems Daughter     Hypertension Son     Hypertension Sister     Cancer Sister      pancreatic     Kidney disease Sister     Gout Sister     Heart disease Sister      premature    No Known Problems Daughter     Glaucoma Neg Hx     Retinal detachment Neg Hx     Macular degeneration Neg Hx     Strabismus Neg Hx     Amblyopia Neg Hx     Blindness Neg Hx      Vitals:    04/10/17 0854   BP: 120/60   Pulse: 84   Temp: 99.2 °F (37.3 °C)   Weight: 96.9 kg (213 lb 10 oz)   Height: 4' 11" (1.499 m)     Outpatient Encounter Prescriptions as of 4/10/2017   Medication Sig Dispense Refill    amlodipine (NORVASC) 10 MG tablet TAKE 1 TABLET(10 MG) BY MOUTH EVERY DAY 90 tablet 0    ascorbic acid (VITAMIN C) 100 MG tablet Take 100 mg by mouth once daily.      betamethasone valerate 0.1% (VALISONE) 0.1 % Crea Apply to affected areas of skin twice daily as needed.  Avoid face and " "groin. 45 g 1    fexofenadine (ALLEGRA) 180 MG tablet Take 1 tablet (180 mg total) by mouth once daily. 90 tablet 3    fluticasone (FLONASE) 50 mcg/actuation nasal spray SHAKE LIQUID AND USE 1 SPRAY IN EACH NOSTRIL TWICE DAILY 3 Bottle 3    hydrochlorothiazide (HYDRODIURIL) 25 MG tablet Take 1 tablet (25 mg total) by mouth once daily. 90 tablet 0    KRILL OIL ORAL Take by mouth.      LACTOBACILLUS COMBO NO.6 (PROBIOTIC COMPLEX ORAL) Take by mouth.      losartan (COZAAR) 100 MG tablet TAKE 1 TABLET(100 MG) BY MOUTH EVERY DAY 90 tablet 0    MULTIVIT-MIN/FA/CA CARB/VIT K (WOMEN'S 50+ DAILY FORMULA ORAL) Take by mouth.      naproxen (NAPROSYN) 375 MG tablet Take 1 tablet (375 mg total) by mouth 2 (two) times daily as needed. Take with meals. 90 tablet 3    omega-3 fatty acids 300 mg Cap Take by mouth.      omeprazole (PRILOSEC) 20 MG capsule Take 1 capsule (20 mg total) by mouth once daily. (Patient taking differently: Take 20 mg by mouth as needed. ) 90 capsule 3    pravastatin (PRAVACHOL) 40 MG tablet TAKE 1 TABLET BY MOUTH ONCE DAILY 90 tablet 2    ranitidine (ZANTAC) 150 MG tablet Take 1 tablet (150 mg total) by mouth 2 (two) times daily. (Patient taking differently: Take 150 mg by mouth as needed. ) 180 tablet 3    [DISCONTINUED] betamethasone valerate 0.1% (VALISONE) 0.1 % Crea Apply to affected areas of skin twice daily as needed.  Avoid face and groin. 45 g 1     No facility-administered encounter medications on file as of 4/10/2017.      Wt Readings from Last 3 Encounters:   04/10/17 96.9 kg (213 lb 10 oz)   03/13/17 96.4 kg (212 lb 8.4 oz)   02/22/17 97 kg (213 lb 13.5 oz)     Last 3 sets of Vitals    Vitals - 1 value per visit 2/22/2017 3/13/2017 4/10/2017   SYSTOLIC 128 128 120   DIASTOLIC 72 64 60   PULSE 78 - 84   TEMPERATURE 97.9 98.8 99.2   Weight (lb) 213.85 212.52 213.63   Weight (kg) 97 96.4 96.9   HEIGHT 4' 11.5" 4' 11.75" 4' 11"   BODY MASS INDEX 42.47 41.85 43.15   VISIT REPORT - - - "   Pain Score  2 4 -     No results found for: CBC  Review of Systems   Constitutional: Negative for chills, diaphoresis, fatigue, fever and unexpected weight change.   Respiratory: Negative for cough and shortness of breath.    Cardiovascular: Negative for chest pain.   Hematological: Negative for adenopathy.   Psychiatric/Behavioral: Negative for sleep disturbance.       Objective:      Physical Exam   Constitutional: She is oriented to person, place, and time. She appears well-developed and well-nourished. No distress.   HENT:   Head: Normocephalic and atraumatic.   Right Ear: External ear normal.   Left Ear: External ear normal.   Nose: Nose normal.   Mouth/Throat: Oropharynx is clear and moist. No oropharyngeal exudate.   Eyes: Conjunctivae and EOM are normal. Pupils are equal, round, and reactive to light. Right eye exhibits no discharge. No scleral icterus.   rani allergic shiners noted     Neck: Normal range of motion. Neck supple. No JVD present. No thyromegaly present.   Cardiovascular: Normal rate, regular rhythm, normal heart sounds and intact distal pulses.    No murmur heard.  Pulmonary/Chest: Effort normal and breath sounds normal. No stridor. No respiratory distress. She has no wheezes.   Abdominal: Soft. Bowel sounds are normal. She exhibits no distension and no mass. There is no tenderness.   Musculoskeletal: Normal range of motion. She exhibits no edema or tenderness.   Lymphadenopathy:     She has no cervical adenopathy.   Neurological: She is alert and oriented to person, place, and time. She has normal reflexes. No cranial nerve deficit. She exhibits normal muscle tone.   Skin: Skin is warm and dry. Rash noted. She is not diaphoretic. No erythema.   Macular eruption noted to chest, hands this is not new     Psychiatric: She has a normal mood and affect. Her behavior is normal. Judgment and thought content normal.   Nursing note and vitals reviewed.        The 10-year ASCVD risk score ([place] MAGDALENO Jr,  et al., 2013) is: 8.2%    Values used to calculate the score:      Age: 71 years      Sex: Female      Is Non- : Yes      Diabetic: No      Tobacco smoker: No      Systolic Blood Pressure: 120 mmHg      Is BP treated: Yes      HDL Cholesterol: 43 mg/dL      Total Cholesterol: 162 mg/dL     Assessment:       1. Annual physical exam    2. Encounter for screening mammogram for breast cancer    3. Need for 23-polyvalent pneumococcal polysaccharide vaccine    4. Dermatitis    5. Essential hypertension    6. Morbid obesity, unspecified obesity type    7. Screening for colon cancer    8. Impaired fasting glucose         Plan:       Pt to get fasting labs today, will call with results    Advanced directives packet given  Patient Counseling:  --Nutrition: Stressed importance of moderation in sodium/caffeine intake, saturated fat and cholesterol, caloric balance, sufficient intake of fresh fruits, vegetables, fiber, calcium, iron, and 1 mg of folate supplement per day (for females capable of pregnancy).  --Exercise: Stressed the importance of regular exercise.   --Substance Abuse: Discussed cessation/primary prevention of tobacco, alcohol, or other drug use; driving or other dangerous activities under the influence; availability of treatment for abuse.   --Sexuality: Discussed sexually transmitted diseases, partner selection, use of condoms, avoidance of unintended pregnancy and contraceptive alternatives.   --Injury prevention: Discussed safety belts, safety helmets, smoke detector.  --Dental health: Discussed importance of regular tooth brushing, flossing, and dental visits.  --Immunizations reviewed.    Pt due for Mammo in August Doris was seen today for annual exam.    Diagnoses and all orders for this visit:    Annual physical exam    Encounter for screening mammogram for breast cancer  -     Mammo Digital Screening Bilateral With CAD; Future    Need for 23-polyvalent pneumococcal polysaccharide  vaccine  -     Pneumococcal Polysaccharide Vaccine (23 Valent) (SQ/IM)    Dermatitis  -     betamethasone valerate 0.1% (VALISONE) 0.1 % Crea; Apply to affected areas of skin twice daily as needed.  Avoid face and groin.    Essential hypertension  -     MICROALBUMIN / CREATININE RATIO URINE  -     Lipid panel; Future  -     Comprehensive metabolic panel; Future    Morbid obesity, unspecified obesity type  -     Lipid panel; Future  -     Hemoglobin A1c; Future  -     Comprehensive metabolic panel; Future    Screening for colon cancer  -     Occult Blood Stool, CA Screen; Future    Impaired fasting glucose   -     Hemoglobin A1c; Future      Patient Instructions       Prevention Guidelines, Women Ages 65 and Older  Screening tests and vaccines are an important part of managing your health. Health counseling is essential, too. Below are guidelines for these, for women ages 65 and older. Talk with your healthcare provider to make sure youre up to date on what you need.  Screening Who needs it How often   Type 2 diabetes or prediabetes All adults beginning at age 45 and adults without symptoms at any age who are overweight or obese and have 1 or more additional risk factors for diabetes At least every 3 years   Alcohol misuse All women in this age group At routine exams   Blood pressure All women in this age group Every 2 years if your blood pressure is less than 120/80 mm Hg; yearly if your systolic blood pressure is 120 to 139 mm Hg, or your diastolic blood pressure reading is 80 to 89 mm Hg   Breast cancer All women in this age group Yearly mammogram and clinical breast exam1   Cervical cancer Only women who had abnormal screening results before age 65 Talk with your healthcare provider   Chlamydia Women at increased risk for infection At routine exams   Colorectal cancer All women in this age group1 Flexible sigmoidoscopy every 5 years, or colonoscopy every 10 years, or double-contrast barium enema every 5 years;  yearly fecal occult blood test or fecal immunochemical test; or a stool DNA test as often as your healthcare provider advises; talk with your healthcare provider about which tests are best for you   Depression All women in this age group At routine exams   Gonorrhea Sexually active women at increased risk for infection At routine exams   Hepatitis C Anyone at increased risk; 1 time for those born between 1945 and 1965 At routine exams   High cholesterol or triglycerides All women in this age group who are at risk for coronary artery disease At least every 5 years   HIV Women at increased risk for infection - talk with your healthcare provider At routine exams   Lung cancer Adults age 55 to 80 who have smoked Yearly screening in smokers with 30 pack-year history of smoking or who quit within 15 years   Obesity All women in this age group At routine exams   Osteoporosis All women in this age group Bone density test at age 65, then follow-up as advised by your healthcare provider   Syphilis Women at increased risk for infection - talk with your healthcare provider At routine exams   Thyroid-Stimulating Hormone (TSH) All women in this age group Every 5 years   Tuberculosis Women at increased risk for infection - talk with your healthcare provider Ask your healthcare provider   Vision All women in this age group Every 1 to 2 years; if you have a chronic health condition, ask your healthcare provider if you need exams more often   Vaccine Who needs it How often   Chickenpox (varicella) All women in this age group who have no record of this infection or vaccine 2 doses; second dose should be given at least 4 weeks after the first dose   Hepatitis A Women at increased risk for infection - talk with your healthcare provider 2 doses given 6 months apart   Hepatitis B Women at increased risk for infection - talk with your healthcare provider 3 doses over 6 months; second dose should be given 1 month after the first dose; the  third dose should be given at least 2 months after the second dose and at least 4 months after the first dose   Haemophilus influenza Type B (HIB) Women at increased risk for infection - talk with your healthcare provider 1 to 3 doses   Influenza (flu) All women in this age group Once a year   Pneumococcal conjugate vaccine (PCV13) and pneumococcal polysaccharide vaccine (PPSV23) All women in this age group 1 dose of each vaccine   Tetanus/diphtheria/pertussis (Td/Tdap) booster All women in this age group Td every 10 years, or a one-time dose of Tdap instead of a Td booster after age 18, then Td every 10 years   Zoster All women in this age group 1 dose   Counseling Who needs it How often   Diet and exercise Women who are overweight or obese When diagnosed, and then at routine exams   Fall prevention (exercise and vitamin D supplements) All women in this age group At routine exams   Sexually transmitted infection prevention Women at increased risk for infection - talk with your healthcare provider At routine exams   Use of daily aspirin Women ages 55 and up in this age group who are at risk for cardiovascular health problems such as stroke When your risk is known   Use of tobacco and the health effects it can cause All women in this age group Every exam   1[employer]  Date Last Reviewed: 8/9/2015  © 4525-3132 [employer] [address] [place] [address], [place] [address]. All rights reserved. This information is not intended as a substitute for professional medical care. Always follow your healthcare professional's instructions.      Follow up with Rheumatology and Hematology

## 2017-04-10 NOTE — PATIENT INSTRUCTIONS
Prevention Guidelines, Women Ages 65 and Older  Screening tests and vaccines are an important part of managing your health. Health counseling is essential, too. Below are guidelines for these, for women ages 65 and older. Talk with your healthcare provider to make sure youre up to date on what you need.  Screening Who needs it How often   Type 2 diabetes or prediabetes All adults beginning at age 45 and adults without symptoms at any age who are overweight or obese and have 1 or more additional risk factors for diabetes At least every 3 years   Alcohol misuse All women in this age group At routine exams   Blood pressure All women in this age group Every 2 years if your blood pressure is less than 120/80 mm Hg; yearly if your systolic blood pressure is 120 to 139 mm Hg, or your diastolic blood pressure reading is 80 to 89 mm Hg   Breast cancer All women in this age group Yearly mammogram and clinical breast exam1   Cervical cancer Only women who had abnormal screening results before age 65 Talk with your healthcare provider   Chlamydia Women at increased risk for infection At routine exams   Colorectal cancer All women in this age group1 Flexible sigmoidoscopy every 5 years, or colonoscopy every 10 years, or double-contrast barium enema every 5 years; yearly fecal occult blood test or fecal immunochemical test; or a stool DNA test as often as your healthcare provider advises; talk with your healthcare provider about which tests are best for you   Depression All women in this age group At routine exams   Gonorrhea Sexually active women at increased risk for infection At routine exams   Hepatitis C Anyone at increased risk; 1 time for those born between 1945 and 1965 At routine exams   High cholesterol or triglycerides All women in this age group who are at risk for coronary artery disease At least every 5 years   HIV Women at increased risk for infection - talk with your healthcare provider At routine exams   Lung  cancer Adults age 55 to 80 who have smoked Yearly screening in smokers with 30 pack-year history of smoking or who quit within 15 years   Obesity All women in this age group At routine exams   Osteoporosis All women in this age group Bone density test at age 65, then follow-up as advised by your healthcare provider   Syphilis Women at increased risk for infection - talk with your healthcare provider At routine exams   Thyroid-Stimulating Hormone (TSH) All women in this age group Every 5 years   Tuberculosis Women at increased risk for infection - talk with your healthcare provider Ask your healthcare provider   Vision All women in this age group Every 1 to 2 years; if you have a chronic health condition, ask your healthcare provider if you need exams more often   Vaccine Who needs it How often   Chickenpox (varicella) All women in this age group who have no record of this infection or vaccine 2 doses; second dose should be given at least 4 weeks after the first dose   Hepatitis A Women at increased risk for infection - talk with your healthcare provider 2 doses given 6 months apart   Hepatitis B Women at increased risk for infection - talk with your healthcare provider 3 doses over 6 months; second dose should be given 1 month after the first dose; the third dose should be given at least 2 months after the second dose and at least 4 months after the first dose   Haemophilus influenza Type B (HIB) Women at increased risk for infection - talk with your healthcare provider 1 to 3 doses   Influenza (flu) All women in this age group Once a year   Pneumococcal conjugate vaccine (PCV13) and pneumococcal polysaccharide vaccine (PPSV23) All women in this age group 1 dose of each vaccine   Tetanus/diphtheria/pertussis (Td/Tdap) booster All women in this age group Td every 10 years, or a one-time dose of Tdap instead of a Td booster after age 18, then Td every 10 years   Zoster All women in this age group 1 dose   Counseling  Who needs it How often   Diet and exercise Women who are overweight or obese When diagnosed, and then at routine exams   Fall prevention (exercise and vitamin D supplements) All women in this age group At routine exams   Sexually transmitted infection prevention Women at increased risk for infection - talk with your healthcare provider At routine exams   Use of daily aspirin Women ages 55 and up in this age group who are at risk for cardiovascular health problems such as stroke When your risk is known   Use of tobacco and the health effects it can cause All women in this age group Every exam   1American Cancer Society  Date Last Reviewed: 8/9/2015  © 6333-7300 [employer]. [address] [place] [address]. All rights reserved. This information is not intended as a substitute for professional medical care. Always follow your healthcare professional's instructions.      Follow up with Rheumatology and Hematology

## 2017-04-10 NOTE — MR AVS SNAPSHOT
[facility]  [address] [facility], [address]  [facility] [address]  Phone: [phone]  Fax: [phone]                  Nilas Curiel   4/10/2017 9:00 AM   Office Visit    Description:  Female : 1945   Provider:  PATRICK Ngo   Department:  [facility]           Reason for Visit     Annual Exam           Diagnoses this Visit        Comments    Encounter for screening mammogram for breast cancer    -  Primary     Need for 23-polyvalent pneumococcal polysaccharide vaccine         Dermatitis         Essential hypertension         Morbid obesity, unspecified obesity type         Screening for colon cancer                To Do List           Future Appointments        Provider Department Dept Phone    2017 10:00 AM Annemarie Palafox MD [facility] - Dermatology [phone]      Goals (5 Years of Data)     None       These Medications        Disp Refills Start End    betamethasone valerate 0.1% (VALISONE) 0.1 % Crea 45 g 1 4/10/2017     Apply to affected areas of skin twice daily as needed.  Avoid face and groin.    Pharmacy: [facility] "[employer] [address] [place] [address] AT [facility] Addy Monteson Ph #: [phone]         [facility]     [facility] Nurse Care Line -  Assistance  Unless otherwise directed by your provider, please contact [facility], our nurse care line that is available for  assistance.     Registered nurses in the [facility] On Call Center provide: appointment scheduling, clinical advisement, health education, and other advisory services.  Call: [phone] (toll free)               Medications           Message regarding Medications     Verify the changes and/or additions to your medication regime listed below are the same as discussed with your clinician today.  If any of these changes or additions are incorrect, please notify your healthcare provider.             Verify that the below list  "of medications is an accurate representation of the medications you are currently taking.  If none reported, the list may be blank. If incorrect, please contact your healthcare provider. Carry this list with you in case of emergency.           Current Medications     amlodipine (NORVASC) 10 MG tablet TAKE 1 TABLET(10 MG) BY MOUTH EVERY DAY    ascorbic acid (VITAMIN C) 100 MG tablet Take 100 mg by mouth once daily.    betamethasone valerate 0.1% (VALISONE) 0.1 % Crea Apply to affected areas of skin twice daily as needed.  Avoid face and groin.    fexofenadine (ALLEGRA) 180 MG tablet Take 1 tablet (180 mg total) by mouth once daily.    fluticasone (FLONASE) 50 mcg/actuation nasal spray SHAKE LIQUID AND USE 1 SPRAY IN EACH NOSTRIL TWICE DAILY    hydrochlorothiazide (HYDRODIURIL) 25 MG tablet Take 1 tablet (25 mg total) by mouth once daily.    KRILL OIL ORAL Take by mouth.    LACTOBACILLUS COMBO NO.6 (PROBIOTIC COMPLEX ORAL) Take by mouth.    losartan (COZAAR) 100 MG tablet TAKE 1 TABLET(100 MG) BY MOUTH EVERY DAY    MULTIVIT-MIN/FA/CA CARB/VIT K (WOMEN'S 50+ DAILY FORMULA ORAL) Take by mouth.    naproxen (NAPROSYN) 375 MG tablet Take 1 tablet (375 mg total) by mouth 2 (two) times daily as needed. Take with meals.    omega-3 fatty acids 300 mg Cap Take by mouth.    omeprazole (PRILOSEC) 20 MG capsule Take 1 capsule (20 mg total) by mouth once daily.    pravastatin (PRAVACHOL) 40 MG tablet TAKE 1 TABLET BY MOUTH ONCE DAILY    ranitidine (ZANTAC) 150 MG tablet Take 1 tablet (150 mg total) by mouth 2 (two) times daily.           Clinical Reference Information           Your Vitals Were     BP Pulse Temp Height Weight BMI    120/60 (BP Location: Right arm) 84 99.2 °F (37.3 °C) 4' 11" (1.499 m) 96.9 kg (213 lb 10 oz) 43.15 kg/m2      Blood Pressure          Most Recent Value    BP  120/60      Allergies as of 4/10/2017     No Known Allergies      Immunizations Administered on Date of Encounter - 4/10/2017     Name Date Dose " VIS Date Route    Pneumococcal Polysaccharide - 23 Valent  Incomplete 0.5 mL 4/24/2015 Intramuscular      Orders Placed During Today's Visit      Normal Orders This Visit    MICROALBUMIN / CREATININE RATIO URINE     Pneumococcal Polysaccharide Vaccine (23 Valent) (SQ/IM)     Future Labs/Procedures Expected by Expires    Mammo Digital Screening Bilateral With CAD  4/10/2017 6/10/2018    Occult Blood Stool, CA Screen  4/10/2017 4/10/2018      Instructions      Prevention Guidelines, Women Ages 65 and Older  Screening tests and vaccines are an important part of managing your health. Health counseling is essential, too. Below are guidelines for these, for women ages 65 and older. Talk with your healthcare provider to make sure youre up to date on what you need.  Screening Who needs it How often   Type 2 diabetes or prediabetes All adults beginning at age 45 and adults without symptoms at any age who are overweight or obese and have 1 or more additional risk factors for diabetes At least every 3 years   Alcohol misuse All women in this age group At routine exams   Blood pressure All women in this age group Every 2 years if your blood pressure is less than 120/80 mm Hg; yearly if your systolic blood pressure is 120 to 139 mm Hg, or your diastolic blood pressure reading is 80 to 89 mm Hg   Breast cancer All women in this age group Yearly mammogram and clinical breast exam1   Cervical cancer Only women who had abnormal screening results before age 65 Talk with your healthcare provider   Chlamydia Women at increased risk for infection At routine exams   Colorectal cancer All women in this age group1 Flexible sigmoidoscopy every 5 years, or colonoscopy every 10 years, or double-contrast barium enema every 5 years; yearly fecal occult blood test or fecal immunochemical test; or a stool DNA test as often as your healthcare provider advises; talk with your healthcare provider about which tests are best for you   Depression All  women in this age group At routine exams   Gonorrhea Sexually active women at increased risk for infection At routine exams   Hepatitis C Anyone at increased risk; 1 time for those born between 1945 and 1965 At routine exams   High cholesterol or triglycerides All women in this age group who are at risk for coronary artery disease At least every 5 years   HIV Women at increased risk for infection - talk with your healthcare provider At routine exams   Lung cancer Adults age 55 to 80 who have smoked Yearly screening in smokers with 30 pack-year history of smoking or who quit within 15 years   Obesity All women in this age group At routine exams   Osteoporosis All women in this age group Bone density test at age 65, then follow-up as advised by your healthcare provider   Syphilis Women at increased risk for infection - talk with your healthcare provider At routine exams   Thyroid-Stimulating Hormone (TSH) All women in this age group Every 5 years   Tuberculosis Women at increased risk for infection - talk with your healthcare provider Ask your healthcare provider   Vision All women in this age group Every 1 to 2 years; if you have a chronic health condition, ask your healthcare provider if you need exams more often   Vaccine Who needs it How often   Chickenpox (varicella) All women in this age group who have no record of this infection or vaccine 2 doses; second dose should be given at least 4 weeks after the first dose   Hepatitis A Women at increased risk for infection - talk with your healthcare provider 2 doses given 6 months apart   Hepatitis B Women at increased risk for infection - talk with your healthcare provider 3 doses over 6 months; second dose should be given 1 month after the first dose; the third dose should be given at least 2 months after the second dose and at least 4 months after the first dose   Haemophilus influenza Type B (HIB) Women at increased risk for infection - talk with your healthcare  provider 1 to 3 doses   Influenza (flu) All women in this age group Once a year   Pneumococcal conjugate vaccine (PCV13) and pneumococcal polysaccharide vaccine (PPSV23) All women in this age group 1 dose of each vaccine   Tetanus/diphtheria/pertussis (Td/Tdap) booster All women in this age group Td every 10 years, or a one-time dose of Tdap instead of a Td booster after age 18, then Td every 10 years   Zoster All women in this age group 1 dose   Counseling Who needs it How often   Diet and exercise Women who are overweight or obese When diagnosed, and then at routine exams   Fall prevention (exercise and vitamin D supplements) All women in this age group At routine exams   Sexually transmitted infection prevention Women at increased risk for infection - talk with your healthcare provider At routine exams   Use of daily aspirin Women ages 55 and up in this age group who are at risk for cardiovascular health problems such as stroke When your risk is known   Use of tobacco and the health effects it can cause All women in this age group Every exam   1American Cancer Society  Date Last Reviewed: 8/9/2015  © 8934-7929 [employer]. [address] [place] [address]. All rights reserved. This information is not intended as a substitute for professional medical care. Always follow your healthcare professional's instructions.      Follow up with Rheumatology and Hematology           Language Assistance Services     ATTENTION: Language assistance services are available, free of charge. Please call [phone].      ATENCIÓN: Si habla jorgeañol, tiene a blanc disposición servicios gratuitos de asistencia lingüística. Llame al [phone].     CHÚ Ý: N?u b?n nói Ti?ng Vi?t, có các d?ch v? h? tr? ngôn ng? mi?n phí dành cho b?n. G?i s? [phone].         [facility] complies with applicable Federal civil rights laws and does not discriminate on the basis of race, color, national origin,  age, disability, or sex.

## 2017-04-10 NOTE — PROGRESS NOTES
Pneumococcal 23 vaccine given as ordered to right deltoid. Two patient identifiers used, allergies were reviewed,  & vaccine verified.  Pt tolerated injection well; no swelling, redness, or bruising noted at injection site. Pt advised to remain in clinic 15 mins following injection for observation, verbalized understanding.

## 2017-04-11 LAB
ESTIMATED AVG GLUCOSE: 114 MG/DL
HBA1C MFR BLD HPLC: 5.6 %

## 2017-05-08 ENCOUNTER — INITIAL CONSULT (OUTPATIENT)
Dept: DERMATOLOGY | Facility: CLINIC | Age: 72
End: 2017-05-08
Payer: MEDICARE

## 2017-05-08 DIAGNOSIS — D18.01 CHERRY ANGIOMA: ICD-10-CM

## 2017-05-08 DIAGNOSIS — L30.4 INTERTRIGO: ICD-10-CM

## 2017-05-08 DIAGNOSIS — L20.9 ATOPIC DERMATITIS, UNSPECIFIED TYPE: Primary | ICD-10-CM

## 2017-05-08 PROCEDURE — 1159F MED LIST DOCD IN RCRD: CPT | Mod: S$GLB,,, | Performed by: DERMATOLOGY

## 2017-05-08 PROCEDURE — 99202 OFFICE O/P NEW SF 15 MIN: CPT | Mod: S$GLB,,, | Performed by: DERMATOLOGY

## 2017-05-08 PROCEDURE — 1160F RVW MEDS BY RX/DR IN RCRD: CPT | Mod: S$GLB,,, | Performed by: DERMATOLOGY

## 2017-05-08 PROCEDURE — 99999 PR PBB SHADOW E&M-EST. PATIENT-LVL III: CPT | Mod: PBBFAC,,, | Performed by: DERMATOLOGY

## 2017-05-08 RX ORDER — KETOCONAZOLE 20 MG/G
CREAM TOPICAL 2 TIMES DAILY
Qty: 30 G | Refills: 3 | Status: SHIPPED | OUTPATIENT
Start: 2017-05-08 | End: 2018-01-30 | Stop reason: SDUPTHER

## 2017-05-08 RX ORDER — CLOBETASOL PROPIONATE 0.5 MG/G
CREAM TOPICAL 2 TIMES DAILY
Qty: 60 G | Refills: 3 | Status: SHIPPED | OUTPATIENT
Start: 2017-05-08 | End: 2017-05-18

## 2017-05-08 NOTE — PROGRESS NOTES
Subjective:       Patient ID:  Nilsa Curiel is a 71 y.o. female who presents for   Chief Complaint   Patient presents with    Rash     hands & arms, x 2 yrs, itchy & burns, tx betamethasone cream     Rash  - Initial  Affected locations: left arm, right arm, left hand and right hand  Duration: 2 years  Signs / symptoms: itching and burning  Exacerbated by: soap or detergent.  Treatments tried: betamethasone cream.  Improvement on treatment: mild    Patient with history of atopic dermatitis since childhood presenting with 2 years of chronic dry skin and itch affecting the arms and dorsal hands. She has been using betamethasone 0.1% cream on a daily basis, with some help. She moisturizes with aveeno lotion daily. Wears gloves when washing dishes. Patient also complaining of pruritus and soreness of the right axilla and lower abdomen. She has been sweating more in these area recently. Is not applying any topicals to axilla or abdomen.     Past Medical History:   Diagnosis Date    Atopic dermatitis     GERD (gastroesophageal reflux disease)     Hyperlipidemia     Hypertension     Morbid obesity with BMI of 40.0-44.9, adult 4/1/2016     Review of Systems   Constitutional: Negative for fever, chills, fatigue and malaise.   Skin: Positive for rash and activity-related sunscreen use. Negative for daily sunscreen use and recent sunburn.   Hematologic/Lymphatic: Does not bruise/bleed easily.        Objective:    Physical Exam   Constitutional: She appears well-developed and well-nourished. No distress.   Neurological: She is alert and oriented to person, place, and time. She is not disoriented.   Psychiatric: She has a normal mood and affect.   Skin:   Areas Examined (abnormalities noted in diagram):   Head / Face Inspection Performed  Neck Inspection Performed  Chest / Axilla Inspection Performed  Abdomen Inspection Performed  Back Inspection Performed  RUE Inspected  LUE Inspection Performed                  Diagram  Legend     Erythematous scaling macule/papule c/w actinic keratosis       Vascular papule c/w angioma      Pigmented verrucoid papule/plaque c/w seborrheic keratosis      Yellow umbilicated papule c/w sebaceous hyperplasia      Irregularly shaped tan macule c/w lentigo     1-2 mm smooth white papules consistent with Milia      Movable subcutaneous cyst with punctum c/w epidermal inclusion cyst      Subcutaneous movable cyst c/w pilar cyst      Firm pink to brown papule c/w dermatofibroma      Pedunculated fleshy papule(s) c/w skin tag(s)      Evenly pigmented macule c/w junctional nevus     Mildly variegated pigmented, slightly irregular-bordered macule c/w mildly atypical nevus      Flesh colored to evenly pigmented papule c/w intradermal nevus       Pink pearly papule/plaque c/w basal cell carcinoma      Erythematous hyperkeratotic cursted plaque c/w SCC      Surgical scar with no sign of skin cancer recurrence      Open and closed comedones      Inflammatory papules and pustules      Verrucoid papule consistent consistent with wart     Erythematous eczematous patches and plaques     Dystrophic onycholytic nail with subungual debris c/w onychomycosis     Umbilicated papule    Erythematous-base heme-crusted tan verrucoid plaque consistent with inflamed seborrheic keratosis     Erythematous Silvery Scaling Plaque c/w Psoriasis     See annotation      Assessment / Plan:        Atopic dermatitis, unspecified type  -     clobetasol (TEMOVATE) 0.05 % cream; Apply topically 2 (two) times daily.  Dispense: 60 g; Refill: 3  -recommend frequent use of moisturizer     Intertrigo  -     ketoconazole (NIZORAL) 2 % cream; Apply topically 2 (two) times daily. Apply to axilla and abdominal creases  Dispense: 30 g; Refill: 3  -recommend Zeasorb powder daily to axilla and pannus     Cherry angioma  -reassure benign nature            Return in about 2 months (around 7/8/2017).

## 2017-05-08 NOTE — LETTER
May 8, 2017      Alexa Harvey MD  [address] [facility] [facility] [address]           [facility] - Dermatology  [address]  [place] [address]  Phone: [phone]  Fax: [phone]          Patient: Nilsa Curiel   MR Number: [id number]   YOB: 1945   Date of Visit: 5/8/2017       Dear Dr. Alexa Harvey:    Thank you for referring Nilsa Curiel to me for evaluation. Attached you will find relevant portions of my assessment and plan of care.    If you have questions, please do not hesitate to call me. I look forward to following Nilsa Curiel along with you.    Sincerely,    Annemarie Palafox MD    Enclosure  CC:  No Recipients    If you would like to receive this communication electronically, please contact [email or web address] or [phone] to request more information on [employer] access.    For providers and/or their staff who would like to refer a patient to [facility], please contact us through our one-stop-shop provider referral line, [facility], at [phone].    If you feel you have received this communication in error or would no longer like to receive these types of communications, please e-mail [email or web address]

## 2017-05-08 NOTE — MR AVS SNAPSHOT
[facility] - Dermatology  [address] [address]  Phone: [phone]  Fax: [phone]                  Nilsa Curiel   2017 10:00 AM   Initial consult    Description:  Female : 1945   Provider:  Annemarie Palafox MD   Department:  [facility] - Dermatology           Reason for Visit     Rash           Diagnoses this Visit        Comments    Atopic dermatitis, unspecified type    -  Primary     Intertrigo         Cherry angioma                To Do List           Future Appointments        Provider Department Dept Phone    2017 10:10 AM MD [facility] Dermatology [phone]    2017 9:15 AM [facility] MAMMO2 SCREEN [facility] [phone]      Goals (5 Years of Data)     None      Follow-Up and Disposition     Return in about 2 months (around 2017).      [facility]     [facility] Nurse Care Line -  Assistance  Unless otherwise directed by your provider, please contact [facility], our nurse care line that is available for  assistance.     Registered nurses in the [facility] provide: appointment scheduling, clinical advisement, health education, and other advisory services.  Call: [phone] (toll free)               Medications           Message regarding Medications     Verify the changes and/or additions to your medication regime listed below are the same as discussed with your clinician today.  If any of these changes or additions are incorrect, please notify your healthcare provider.             Verify that the below list of medications is an accurate representation of the medications you are currently taking.  If none reported, the list may be blank. If incorrect, please contact your healthcare provider. Carry this list with you in case of emergency.           Current Medications     amlodipine (NORVASC) 10 MG tablet TAKE 1 TABLET(10 MG) BY MOUTH EVERY DAY    ascorbic acid (VITAMIN C) 100 MG  tablet Take 100 mg by mouth once daily.    betamethasone valerate 0.1% (VALISONE) 0.1 % Crea Apply to affected areas of skin twice daily as needed.  Avoid face and groin.    fexofenadine (ALLEGRA) 180 MG tablet Take 1 tablet (180 mg total) by mouth once daily.    fluticasone (FLONASE) 50 mcg/actuation nasal spray SHAKE LIQUID AND USE 1 SPRAY IN EACH NOSTRIL TWICE DAILY    hydrochlorothiazide (HYDRODIURIL) 25 MG tablet Take 1 tablet (25 mg total) by mouth once daily.    KRILL OIL ORAL Take by mouth.    LACTOBACILLUS COMBO NO.6 (PROBIOTIC COMPLEX ORAL) Take by mouth.    losartan (COZAAR) 100 MG tablet TAKE 1 TABLET(100 MG) BY MOUTH EVERY DAY    MULTIVIT-MIN/FA/CA CARB/VIT K (WOMEN'S 50+ DAILY FORMULA ORAL) Take by mouth.    naproxen (NAPROSYN) 375 MG tablet Take 1 tablet (375 mg total) by mouth 2 (two) times daily as needed. Take with meals.    omega-3 fatty acids 300 mg Cap Take by mouth.    omeprazole (PRILOSEC) 20 MG capsule Take 1 capsule (20 mg total) by mouth once daily.    pravastatin (PRAVACHOL) 40 MG tablet TAKE 1 TABLET BY MOUTH ONCE DAILY    ranitidine (ZANTAC) 150 MG tablet Take 1 tablet (150 mg total) by mouth 2 (two) times daily.           Clinical Reference Information           Allergies as of 5/8/2017     No Known Allergies      Immunizations Administered on Date of Encounter - 5/8/2017     None      Language Assistance Services     ATTENTION: Language assistance services are available, free of charge. Please call [phone].      ATENCIÓN: Si habla corie, tiene a blanc disposición servicios gratuitos de asistencia lingüística. Llame al [phone].     [facility] Ý: N?u b?n nói Ti?ng Vi?t, có các d?ch v? h? tr? ngôn ng? mi?n phí dành cho b?n. G?i s? [phone].         [facility] complies with applicable Federal civil rights laws and does not discriminate on the basis of race, color, national origin, age, disability, or sex.

## 2017-05-16 ENCOUNTER — LAB VISIT (OUTPATIENT)
Dept: LAB | Facility: HOSPITAL | Age: 72
End: 2017-05-16
Attending: FAMILY MEDICINE
Payer: MEDICARE

## 2017-05-16 DIAGNOSIS — I10 ESSENTIAL HYPERTENSION: ICD-10-CM

## 2017-05-16 LAB
CREAT UR-MCNC: 87 MG/DL
MICROALBUMIN UR DL<=1MG/L-MCNC: 11 UG/ML
MICROALBUMIN/CREATININE RATIO: 12.6 UG/MG

## 2017-05-16 PROCEDURE — 82570 ASSAY OF URINE CREATININE: CPT

## 2017-06-18 DIAGNOSIS — I10 ESSENTIAL HYPERTENSION: ICD-10-CM

## 2017-06-19 RX ORDER — AMLODIPINE BESYLATE 10 MG/1
TABLET ORAL
Qty: 90 TABLET | Refills: 1 | Status: SHIPPED | OUTPATIENT
Start: 2017-06-19 | End: 2018-07-25 | Stop reason: SDUPTHER

## 2017-06-19 RX ORDER — LOSARTAN POTASSIUM 100 MG/1
TABLET ORAL
Qty: 90 TABLET | Refills: 1 | Status: SHIPPED | OUTPATIENT
Start: 2017-06-19 | End: 2018-07-25 | Stop reason: SDUPTHER

## 2017-08-07 ENCOUNTER — OFFICE VISIT (OUTPATIENT)
Dept: RHEUMATOLOGY | Facility: CLINIC | Age: 72
End: 2017-08-07
Payer: MEDICARE

## 2017-08-07 ENCOUNTER — PATIENT MESSAGE (OUTPATIENT)
Dept: RHEUMATOLOGY | Facility: CLINIC | Age: 72
End: 2017-08-07

## 2017-08-07 VITALS
RESPIRATION RATE: 18 BRPM | DIASTOLIC BLOOD PRESSURE: 88 MMHG | BODY MASS INDEX: 42.35 KG/M2 | SYSTOLIC BLOOD PRESSURE: 152 MMHG | HEART RATE: 103 BPM | WEIGHT: 215.69 LBS | HEIGHT: 60 IN

## 2017-08-07 DIAGNOSIS — R21 RASH: Primary | ICD-10-CM

## 2017-08-07 PROCEDURE — 1159F MED LIST DOCD IN RCRD: CPT | Mod: S$GLB,,, | Performed by: INTERNAL MEDICINE

## 2017-08-07 PROCEDURE — 99203 OFFICE O/P NEW LOW 30 MIN: CPT | Mod: S$GLB,,, | Performed by: INTERNAL MEDICINE

## 2017-08-07 PROCEDURE — 1125F AMNT PAIN NOTED PAIN PRSNT: CPT | Mod: S$GLB,,, | Performed by: INTERNAL MEDICINE

## 2017-08-07 PROCEDURE — 99499 UNLISTED E&M SERVICE: CPT | Mod: S$GLB,,, | Performed by: INTERNAL MEDICINE

## 2017-08-07 PROCEDURE — 99999 PR PBB SHADOW E&M-EST. PATIENT-LVL IV: CPT | Mod: PBBFAC,,, | Performed by: INTERNAL MEDICINE

## 2017-08-07 PROCEDURE — 99214 OFFICE O/P EST MOD 30 MIN: CPT | Mod: PBBFAC | Performed by: INTERNAL MEDICINE

## 2017-08-07 RX ORDER — CLOBETASOL PROPIONATE 0.5 MG/G
CREAM TOPICAL
Refills: 3 | COMMUNITY
Start: 2017-06-30 | End: 2017-09-01 | Stop reason: SDUPTHER

## 2017-08-07 RX ORDER — CLOBETASOL PROPIONATE 0.5 MG/G
CREAM TOPICAL
COMMUNITY
Start: 2017-06-30 | End: 2018-01-30 | Stop reason: SDUPTHER

## 2017-08-07 RX ORDER — BETAMETHASONE VALERATE 1.2 MG/G
OINTMENT TOPICAL
COMMUNITY
Start: 2017-06-29 | End: 2018-07-25 | Stop reason: SDUPTHER

## 2017-08-09 ENCOUNTER — TELEPHONE (OUTPATIENT)
Dept: RHEUMATOLOGY | Facility: CLINIC | Age: 72
End: 2017-08-09

## 2017-08-09 DIAGNOSIS — M25.50 POLYARTHRALGIA: Primary | ICD-10-CM

## 2017-08-10 ENCOUNTER — TELEPHONE (OUTPATIENT)
Dept: RHEUMATOLOGY | Facility: CLINIC | Age: 72
End: 2017-08-10

## 2017-08-21 ENCOUNTER — HOSPITAL ENCOUNTER (OUTPATIENT)
Dept: RADIOLOGY | Facility: HOSPITAL | Age: 72
Discharge: HOME | End: 2017-08-21
Attending: NURSE PRACTITIONER
Payer: MEDICARE

## 2017-08-21 VITALS — WEIGHT: 152 LBS | HEIGHT: 60 IN | BODY MASS INDEX: 29.84 KG/M2

## 2017-08-21 DIAGNOSIS — Z12.31 ENCOUNTER FOR SCREENING MAMMOGRAM FOR BREAST CANCER: ICD-10-CM

## 2017-08-21 PROCEDURE — 77063 BREAST TOMOSYNTHESIS BI: CPT | Mod: 26,,, | Performed by: RADIOLOGY

## 2017-08-21 PROCEDURE — 77067 SCR MAMMO BI INCL CAD: CPT | Mod: TC

## 2017-08-21 PROCEDURE — 77067 SCR MAMMO BI INCL CAD: CPT | Mod: 26,,, | Performed by: RADIOLOGY

## 2017-08-24 ENCOUNTER — LAB VISIT (OUTPATIENT)
Dept: LAB | Facility: HOSPITAL | Age: 72
End: 2017-08-24
Attending: INTERNAL MEDICINE
Payer: MEDICARE

## 2017-08-24 ENCOUNTER — PATIENT MESSAGE (OUTPATIENT)
Dept: RHEUMATOLOGY | Facility: CLINIC | Age: 72
End: 2017-08-24

## 2017-08-24 DIAGNOSIS — M25.50 POLYARTHRALGIA: ICD-10-CM

## 2017-08-24 LAB
CRP SERPL-MCNC: 12.4 MG/L
ERYTHROCYTE [SEDIMENTATION RATE] IN BLOOD BY WESTERGREN METHOD: 26 MM/HR

## 2017-08-24 PROCEDURE — 86140 C-REACTIVE PROTEIN: CPT

## 2017-08-24 PROCEDURE — 85651 RBC SED RATE NONAUTOMATED: CPT

## 2017-08-24 PROCEDURE — 36415 COLL VENOUS BLD VENIPUNCTURE: CPT | Mod: PO

## 2017-09-01 ENCOUNTER — OFFICE VISIT (OUTPATIENT)
Dept: FAMILY MEDICINE | Facility: CLINIC | Age: 72
End: 2017-09-01
Payer: MEDICARE

## 2017-09-01 VITALS
BODY MASS INDEX: 41.55 KG/M2 | HEART RATE: 84 BPM | DIASTOLIC BLOOD PRESSURE: 72 MMHG | SYSTOLIC BLOOD PRESSURE: 146 MMHG | WEIGHT: 211.63 LBS | TEMPERATURE: 99 F | HEIGHT: 60 IN

## 2017-09-01 DIAGNOSIS — D64.9 ANEMIA, UNSPECIFIED TYPE: ICD-10-CM

## 2017-09-01 DIAGNOSIS — R10.11 RUQ ABDOMINAL PAIN: Primary | ICD-10-CM

## 2017-09-01 PROCEDURE — 1126F AMNT PAIN NOTED NONE PRSNT: CPT | Mod: S$GLB,,, | Performed by: NURSE PRACTITIONER

## 2017-09-01 PROCEDURE — 99214 OFFICE O/P EST MOD 30 MIN: CPT | Mod: S$GLB,,, | Performed by: NURSE PRACTITIONER

## 2017-09-01 PROCEDURE — 3078F DIAST BP <80 MM HG: CPT | Mod: S$GLB,,, | Performed by: NURSE PRACTITIONER

## 2017-09-01 PROCEDURE — 1159F MED LIST DOCD IN RCRD: CPT | Mod: S$GLB,,, | Performed by: NURSE PRACTITIONER

## 2017-09-01 PROCEDURE — 3008F BODY MASS INDEX DOCD: CPT | Mod: S$GLB,,, | Performed by: NURSE PRACTITIONER

## 2017-09-01 PROCEDURE — 3077F SYST BP >= 140 MM HG: CPT | Mod: S$GLB,,, | Performed by: NURSE PRACTITIONER

## 2017-09-01 PROCEDURE — 99999 PR PBB SHADOW E&M-EST. PATIENT-LVL V: CPT | Mod: PBBFAC,,, | Performed by: NURSE PRACTITIONER

## 2017-09-01 NOTE — PATIENT INSTRUCTIONS
See Mindy up front to schedule ultrasound and appointment with  blood specialist    Follow up with the Dermatologist

## 2017-09-01 NOTE — PROGRESS NOTES
Subjective:       Patient ID: Nilsa Curiel is a 71 y.o. female.    Chief Complaint: Rash (bilateral hands and chest)    Pt here with RUQ abd pain and her rash is still present.  Pt has seen Derm and Rheumatology.  Missed her Heme appt   RUQ pt states that she has a ' fat lump' diagnosed years ago but it is getting bigger.  PT states 2 days ago she was lifting items and had sharp pain so she made the appointment  Pain not present at this time        Rash   This is a recurrent problem. The current episode started more than 1 year ago. The problem has been waxing and waning since onset. The affected locations include theleft hand. The rash is characterized by itchiness. She was exposed to nothing. Associated symptoms include joint pain. Pertinent negatives include no cough, fatigue, fever or shortness of breath. Past treatments include analgesics, anti-itch cream, antibiotic cream and topical steroids. The treatment provided mild relief. There is no history of allergies, asthma, eczema or varicella.     Past Medical History:   Diagnosis Date    Atopic dermatitis     GERD (gastroesophageal reflux disease)     Hyperlipidemia     Hypertension     Morbid obesity with BMI of 40.0-44.9, adult 2016     Past Surgical History:   Procedure Laterality Date     SECTION      x3    TUBAL LIGATION       Social History     Social History Narrative    Walking some     Family History   Problem Relation Age of Onset    Hypertension Mother     Dementia Mother     Diabetes Mother     Other Father      sepsis    Diabetes Sister     Hypertension Sister     Hyperlipidemia Sister     Diabetes Brother     Heart disease Brother     Cataracts Brother     No Known Problems Daughter     Hypertension Son     Hypertension Sister     Cancer Sister      pancreatic     Kidney disease Sister     Gout Sister     Heart disease Sister      premature    No Known Problems Daughter     Glaucoma Neg Hx     Retinal  detachment Neg Hx     Macular degeneration Neg Hx     Strabismus Neg Hx     Amblyopia Neg Hx     Blindness Neg Hx      Vitals:    09/01/17 0821   BP: (!) 146/72   Pulse: 84   Temp: 98.8 °F (37.1 °C)   Weight: 96 kg (211 lb 10.3 oz)   Height: 5' (1.524 m)   PainSc: 0-No pain     Outpatient Encounter Prescriptions as of 9/1/2017   Medication Sig Dispense Refill    amlodipine (NORVASC) 10 MG tablet TAKE 1 TABLET(10 MG) BY MOUTH EVERY DAY 90 tablet 1    ascorbic acid (VITAMIN C) 100 MG tablet Take 100 mg by mouth once daily.      betamethasone valerate 0.1% (VALISONE) 0.1 % Oint       clobetasol (TEMOVATE) 0.05 % cream       fexofenadine (ALLEGRA) 180 MG tablet Take 1 tablet (180 mg total) by mouth once daily. 90 tablet 3    fluticasone (FLONASE) 50 mcg/actuation nasal spray SHAKE LIQUID AND USE 1 SPRAY IN EACH NOSTRIL TWICE DAILY 3 Bottle 3    hydrochlorothiazide (HYDRODIURIL) 25 MG tablet Take 1 tablet (25 mg total) by mouth once daily. 90 tablet 0    ketoconazole (NIZORAL) 2 % cream Apply topically 2 (two) times daily. Apply to axilla and abdominal creases 30 g 3    KRILL OIL ORAL Take by mouth.      LACTOBACILLUS COMBO NO.6 (PROBIOTIC COMPLEX ORAL) Take by mouth.      losartan (COZAAR) 100 MG tablet TAKE 1 TABLET(100 MG) BY MOUTH EVERY DAY 90 tablet 1    MULTIVIT-MIN/FA/CA CARB/VIT K (WOMEN'S 50+ DAILY FORMULA ORAL) Take by mouth.      naproxen (NAPROSYN) 375 MG tablet Take 1 tablet (375 mg total) by mouth 2 (two) times daily as needed. Take with meals. 90 tablet 3    omega-3 fatty acids 300 mg Cap Take by mouth.      omeprazole (PRILOSEC) 20 MG capsule Take 1 capsule (20 mg total) by mouth once daily. (Patient taking differently: Take 20 mg by mouth as needed. ) 90 capsule 3    pravastatin (PRAVACHOL) 40 MG tablet TAKE 1 TABLET BY MOUTH ONCE DAILY 90 tablet 2    ranitidine (ZANTAC) 150 MG tablet Take 1 tablet (150 mg total) by mouth 2 (two) times daily. (Patient taking differently: Take 150 mg  "by mouth as needed. ) 180 tablet 3    [DISCONTINUED] clobetasol (TEMOVATE) 0.05 % cream APPLY TOPICALLY BID  3     No facility-administered encounter medications on file as of 9/1/2017.      Wt Readings from Last 3 Encounters:   09/01/17 96 kg (211 lb 10.3 oz)   08/21/17 68.9 kg (152 lb)   08/07/17 97.8 kg (215 lb 11.2 oz)     Last 3 sets of Vitals    Vitals - 1 value per visit 8/7/2017 8/21/2017 9/1/2017   SYSTOLIC 152 - 146   DIASTOLIC 88 - 72   PULSE 103 - 84   TEMPERATURE - - 98.8   RESPIRATIONS 18 - -   Weight (lb) 215.7 152 211.64   Weight (kg) 97.841 68.947 96   HEIGHT 5' 0" 5' 0" 5' 0"   BODY MASS INDEX 42.13 29.69 41.33   VISIT REPORT - - -   Pain Score  5 - 0   Some recent data might be hidden     No results found for: CBC  Review of Systems   Constitutional: Negative for appetite change, chills, diaphoresis, fatigue and fever.   HENT: Negative for trouble swallowing.    Respiratory: Negative for cough and shortness of breath.    Cardiovascular: Negative for chest pain.   Gastrointestinal: Positive for abdominal pain.   Musculoskeletal: Positive for joint pain. Negative for arthralgias, myalgias, neck pain and neck stiffness.   Skin: Positive for rash.   Psychiatric/Behavioral: Negative for sleep disturbance.       Objective:      Physical Exam   Constitutional: She is oriented to person, place, and time. She appears well-developed and well-nourished. No distress.   HENT:   Head: Normocephalic and atraumatic.   Eyes: Conjunctivae are normal. Pupils are equal, round, and reactive to light. No scleral icterus.   Neck: Normal range of motion. No JVD present.   Cardiovascular: Normal rate, regular rhythm and normal heart sounds.    Pulmonary/Chest: Effort normal and breath sounds normal. No stridor. No respiratory distress.   Abdominal: Soft. Bowel sounds are normal. There is no rebound and no CVA tenderness.       Neurological: She is alert and oriented to person, place, and time.   Skin: Skin is warm and " dry. Capillary refill takes less than 2 seconds. Rash noted. She is not diaphoretic.   Area of plaques noted to hands   Psychiatric: She has a normal mood and affect. Her behavior is normal. Judgment and thought content normal.   Nursing note and vitals reviewed.      Assessment:       1. RUQ abdominal pain    2. Anemia, unspecified type        Plan:       Rash somewhat better.  Pt has not followed up with Derm yet.  Didn't show for Heme appt .  She had gone out of town and forgotten    Nilsa was seen today for rash.    Diagnoses and all orders for this visit:    RUQ abdominal pain  -     US Abdomen Limited; Future    Anemia, unspecified type  -     Ambulatory referral to Hematology / Oncology      Patient Instructions   See Mindy up front to schedule ultrasound and appointment with  blood specialist    Follow up with the Dermatologist

## 2017-09-13 ENCOUNTER — TELEPHONE (OUTPATIENT)
Dept: HEMATOLOGY/ONCOLOGY | Facility: CLINIC | Age: 72
End: 2017-09-13

## 2017-09-14 NOTE — PROGRESS NOTES
CC:  anemia    HPI:  Ms. Curiel is a 70 yo woman with HTN, HLD, GERD, hyperparathyroidism who presents today for further evaluation of anemia. On review of her records, her Hb has been ranging between 103 and 11.4 since , with an MCV of 78-80. On 17, vitamin B12 was 1370, iron 67, TIBC 401, iron saturation 17%, retic 0.9%. On 17, stool occult blood was negative. On 17, WBC 6.54, Hb 11.2, Hct 35.9, MCV 78, plt count 384. On 17, ESR 26, CRP elevated at 12.4, SPEP negative for M protein, Hep B/C serologies are negative. CMP normal. She presents today for further workup. Ms. Curiel feels well. She does have chronic back pain and left hip pain, but denies other complaints. No fever, chills, malaise, nausea, vomiting, epistaxis, hematemesis, blood in stool, melena. No signs of bleeding. No weight loss or night sweats. Family history negative for hematologic disorders.     Past Medical History:   Diagnosis Date    Atopic dermatitis     GERD (gastroesophageal reflux disease)     Hyperlipidemia     Hypertension     Morbid obesity with BMI of 40.0-44.9, adult 2016       Family History   Problem Relation Age of Onset    Hypertension Mother     Dementia Mother     Diabetes Mother     Other Father      sepsis    Diabetes Sister     Hypertension Sister     Hyperlipidemia Sister     Diabetes Brother     Heart disease Brother     Cataracts Brother     No Known Problems Daughter     Hypertension Son     Hypertension Sister     Cancer Sister      pancreatic     Kidney disease Sister     Gout Sister     Heart disease Sister      premature    No Known Problems Daughter     Glaucoma Neg Hx     Retinal detachment Neg Hx     Macular degeneration Neg Hx     Strabismus Neg Hx     Amblyopia Neg Hx     Blindness Neg Hx        Past Surgical History:   Procedure Laterality Date     SECTION      x3    TUBAL LIGATION         Social History     Social History    Marital status:       Spouse name: N/A    Number of children: N/A    Years of education: N/A     Occupational History    retired medical records       Social History Main Topics    Smoking status: Never Smoker    Smokeless tobacco: Never Used    Alcohol use No    Drug use: No    Sexual activity: Not Currently     Partners: Male     Other Topics Concern    Not on file     Social History Narrative    Walking some       Review of Systems   Constitutional: Negative for activity change, appetite change, chills, diaphoresis, fatigue, fever and unexpected weight change.   HENT: Negative for facial swelling, hearing loss, mouth sores, tinnitus, trouble swallowing and voice change.    Eyes: Negative for pain and visual disturbance.   Respiratory: Negative for cough, choking, chest tightness, shortness of breath, wheezing and stridor.    Cardiovascular: Negative for chest pain, palpitations and leg swelling.   Gastrointestinal: Negative for abdominal distention, abdominal pain, anal bleeding, blood in stool, constipation, diarrhea, nausea and vomiting.   Endocrine: Negative for polydipsia, polyphagia and polyuria.   Genitourinary: Negative for difficulty urinating, dysuria, flank pain, frequency, hematuria and vaginal bleeding.   Musculoskeletal: Positive for arthralgias and back pain. Negative for joint swelling, myalgias, neck pain and neck stiffness.   Skin: Positive for rash. Negative for color change and pallor.   Neurological: Negative for dizziness, tremors, seizures, syncope, facial asymmetry, speech difficulty, weakness, numbness and headaches.   Hematological: Negative for adenopathy. Does not bruise/bleed easily.   Psychiatric/Behavioral: Negative for agitation, confusion and dysphoric mood. The patient is not nervous/anxious.        Objective:  Physical Exam   Constitutional: She is oriented to person, place, and time. She appears well-developed and well-nourished. No distress.   HENT:   Head: Normocephalic and  atraumatic.   Mouth/Throat: Oropharynx is clear and moist. No oropharyngeal exudate.   Eyes: Conjunctivae and EOM are normal. Pupils are equal, round, and reactive to light. No scleral icterus.   Neck: Normal range of motion. Neck supple. No JVD present. No thyromegaly present.   Cardiovascular: Normal rate, regular rhythm, normal heart sounds and intact distal pulses.  Exam reveals no gallop and no friction rub.    No murmur heard.  Pulmonary/Chest: Effort normal and breath sounds normal. No respiratory distress. She has no wheezes. She has no rales.   Abdominal: Soft. Bowel sounds are normal. She exhibits no distension and no mass. There is no hepatosplenomegaly. There is no tenderness. No hernia.   Musculoskeletal: Normal range of motion. She exhibits no edema, tenderness or deformity.   Lymphadenopathy:     She has no cervical adenopathy.   Neurological: She is alert and oriented to person, place, and time. No cranial nerve deficit. She exhibits normal muscle tone.   Skin: Skin is warm and dry. No rash noted. She is not diaphoretic. No erythema. No pallor.   Psychiatric: She has a normal mood and affect. Her behavior is normal. Judgment and thought content normal.       Labs:  I reviewed all the labs from previous. Hb has been ranging between 103 and 11.4 since 2015, with an MCV of 78-80. On 2/2/17, vitamin B12 was 1370, iron 67, TIBC 401, iron saturation 17%, retic 0.9%. On 5/16/17, stool occult blood was negative. On 8/7/17, WBC 6.54, Hb 11.2, Hct 35.9, MCV 78, plt count 384. On 8/24/17, ESR 26, CRP elevated at 12.4, SPEP negative for M protein, Hep B/C serologies are negative. CMP normal.       Assessment and Plan:  1. Anemia, unspecified type    2. Essential hypertension    3. Hyperparathyroidism    4. Morbid obesity with BMI of 40.0-44.9, adult    5. Nutritional anemia         Ms. Curiel is a 72 yo  woman with HTN, HLD, GERD, hyperparathyroidism, chronic anemia, who presents today for further  evaluation. She has a mild anemia which is mostly microcytic.  Her ESR/CRP is mildly elevated. SPEP negative for M protein. Will check CBC, Hb analysis, alpha globulin gene, retic, LDH, hapto, iron/TIBC, ferritin, vit B12, folic acid. Will look at the peripheral smear. She had a negative stool occult blood back in May. I discussed with her that anemia can be from underlying stress (anemia of chronic inflammation), nutrient deficiency, or destruction (hemolytic anemia). Some patients with thalassemia trait can have a mild anemia without any symptoms. Severe hyperparathyroidism can be associated with anemia, but her calcium level is normal and she has never had a bone fracture before. Doubt this is the cause. If the lab results are unrevealing, she may have anemia from underlying inflammation, and no intervention (transfusion) is needed for now since her anemia is very mild, and the treatment is to treat the underlying disease. WBC and plt counts are normal. No need for bone marrow biopsy at this point. Return to clinic in 6 months for monitoring. All questions were answered in the clinic. Ms. Curiel understands and agrees with the plan.

## 2017-09-15 ENCOUNTER — INITIAL CONSULT (OUTPATIENT)
Dept: HEMATOLOGY/ONCOLOGY | Facility: CLINIC | Age: 72
End: 2017-09-15
Payer: MEDICARE

## 2017-09-15 ENCOUNTER — HOSPITAL ENCOUNTER (OUTPATIENT)
Dept: RADIOLOGY | Facility: HOSPITAL | Age: 72
Discharge: HOME | End: 2017-09-15
Attending: NURSE PRACTITIONER
Payer: MEDICARE

## 2017-09-15 ENCOUNTER — TELEPHONE (OUTPATIENT)
Dept: HEMATOLOGY/ONCOLOGY | Facility: CLINIC | Age: 72
End: 2017-09-15

## 2017-09-15 VITALS
BODY MASS INDEX: 40.38 KG/M2 | HEIGHT: 61 IN | TEMPERATURE: 99 F | SYSTOLIC BLOOD PRESSURE: 146 MMHG | DIASTOLIC BLOOD PRESSURE: 85 MMHG | RESPIRATION RATE: 20 BRPM | WEIGHT: 213.88 LBS | OXYGEN SATURATION: 95 % | HEART RATE: 96 BPM

## 2017-09-15 DIAGNOSIS — D53.9 NUTRITIONAL ANEMIA: ICD-10-CM

## 2017-09-15 DIAGNOSIS — R10.11 RUQ ABDOMINAL PAIN: ICD-10-CM

## 2017-09-15 DIAGNOSIS — D64.9 ANEMIA, UNSPECIFIED TYPE: Primary | ICD-10-CM

## 2017-09-15 DIAGNOSIS — E21.3 HYPERPARATHYROIDISM: ICD-10-CM

## 2017-09-15 DIAGNOSIS — E66.01 MORBID OBESITY WITH BMI OF 40.0-44.9, ADULT: ICD-10-CM

## 2017-09-15 DIAGNOSIS — I10 ESSENTIAL HYPERTENSION: ICD-10-CM

## 2017-09-15 PROCEDURE — 99204 OFFICE O/P NEW MOD 45 MIN: CPT | Mod: S$GLB,,, | Performed by: INTERNAL MEDICINE

## 2017-09-15 PROCEDURE — 1125F AMNT PAIN NOTED PAIN PRSNT: CPT | Mod: S$GLB,,, | Performed by: INTERNAL MEDICINE

## 2017-09-15 PROCEDURE — 76705 ECHO EXAM OF ABDOMEN: CPT | Mod: 26,,, | Performed by: RADIOLOGY

## 2017-09-15 PROCEDURE — 1159F MED LIST DOCD IN RCRD: CPT | Mod: S$GLB,,, | Performed by: INTERNAL MEDICINE

## 2017-09-15 PROCEDURE — 99499 UNLISTED E&M SERVICE: CPT | Mod: S$GLB,,, | Performed by: INTERNAL MEDICINE

## 2017-09-15 PROCEDURE — 3008F BODY MASS INDEX DOCD: CPT | Mod: S$GLB,,, | Performed by: INTERNAL MEDICINE

## 2017-09-15 PROCEDURE — 3077F SYST BP >= 140 MM HG: CPT | Mod: S$GLB,,, | Performed by: INTERNAL MEDICINE

## 2017-09-15 PROCEDURE — 76705 ECHO EXAM OF ABDOMEN: CPT | Mod: TC

## 2017-09-15 PROCEDURE — 3079F DIAST BP 80-89 MM HG: CPT | Mod: S$GLB,,, | Performed by: INTERNAL MEDICINE

## 2017-09-15 PROCEDURE — 99999 PR PBB SHADOW E&M-EST. PATIENT-LVL III: CPT | Mod: PBBFAC,,, | Performed by: INTERNAL MEDICINE

## 2017-09-15 NOTE — TELEPHONE ENCOUNTER
Called pt.   Pt unavailable.   Left VM---callback number provided.       ----- Message from Mateo Rader sent at 9/15/2017 12:15 PM CDT -----  Dr. Carter Canada ask that you bring Mrs. Curiel back today because all the labs was not link to the appt. Please

## 2017-09-15 NOTE — LETTER
September 15, 2017      Mindy Broussard, FNP-C  [address] [facility]  [address]  [facility] [address]           [facility]  [address]  [place] [address]  Phone: [phone]          Patient: Nilsa Curiel   MR Number: [id number]   YOB: 1945   Date of Visit: 9/15/2017       Dear Mindy Broussard:    Thank you for referring Nilsa Curiel to me for evaluation. Attached you will find relevant portions of my assessment and plan of care.    If you have questions, please do not hesitate to call me. I look forward to following Nilsa Curiel along with you.    Sincerely,    Katherine Machado MD    Enclosure  CC:  No Recipients    If you would like to receive this communication electronically, please contact [email or web address] or [phone] to request more information on [facility] Link access.    For providers and/or their staff who would like to refer a patient to [facility], please contact us through our one-stop-shop provider referral line, [facility] , at [phone].    If you feel you have received this communication in error or would no longer like to receive these types of communications, please e-mail [email or web address]

## 2017-09-18 ENCOUNTER — TELEPHONE (OUTPATIENT)
Dept: HEMATOLOGY/ONCOLOGY | Facility: CLINIC | Age: 72
End: 2017-09-18

## 2017-09-18 NOTE — TELEPHONE ENCOUNTER
Called and spoke with Ms Veena. Patient seen by Dr Machado on 9/15/17, lab work ordered. Not all requested testing done. Patient rescheduled to have labs redrawn at the [place] lab on 9/26/17. Patient scheduled for an appt on that day and patient requested her lab appt to be also on that day. Ok'ed by Dr Machado . Patient scheduled.

## 2017-09-18 NOTE — TELEPHONE ENCOUNTER
----- Message from Barron Robert MA sent at 9/18/2017 10:50 AM CDT -----  Dr Machado informed me this morning, not all labs were done. I called and spoke with Ms Curiel this morning and we schedule her to have labs drawn on the 25th at [facility].  ----- Message -----  From: Marilynn Leigh  Sent: 9/18/2017   8:19 AM  To: Barron Robert MA    Labs were linked to appt on Friday.   Left  for pt to callback to get her re-drawn as somehow labs didn't process.   FYI in case pt calls back.   Thanks     ----- Message -----  From: Katherine Machado MD  Sent: 9/15/2017   6:39 PM  To: Marilynn Subramanian,     All of the lab orders I put in on this patient got cancelled. Can you help me find out what happened? She needs to come back for the labwork. Thanks.

## 2017-09-26 ENCOUNTER — TELEPHONE (OUTPATIENT)
Dept: FAMILY MEDICINE | Facility: CLINIC | Age: 72
End: 2017-09-26

## 2017-09-26 ENCOUNTER — HOSPITAL ENCOUNTER (OUTPATIENT)
Dept: RADIOLOGY | Facility: HOSPITAL | Age: 72
Discharge: HOME | End: 2017-09-26
Attending: NURSE PRACTITIONER
Payer: MEDICARE

## 2017-09-26 ENCOUNTER — OFFICE VISIT (OUTPATIENT)
Dept: FAMILY MEDICINE | Facility: CLINIC | Age: 72
End: 2017-09-26
Payer: MEDICARE

## 2017-09-26 VITALS
TEMPERATURE: 99 F | WEIGHT: 213.88 LBS | HEIGHT: 60 IN | DIASTOLIC BLOOD PRESSURE: 68 MMHG | HEART RATE: 96 BPM | SYSTOLIC BLOOD PRESSURE: 132 MMHG | BODY MASS INDEX: 41.99 KG/M2

## 2017-09-26 DIAGNOSIS — M25.552 PAIN OF BOTH HIP JOINTS: ICD-10-CM

## 2017-09-26 DIAGNOSIS — M54.50 CHRONIC BILATERAL LOW BACK PAIN WITHOUT SCIATICA: ICD-10-CM

## 2017-09-26 DIAGNOSIS — Z23 NEED FOR PROPHYLACTIC VACCINATION AND INOCULATION AGAINST INFLUENZA: Primary | ICD-10-CM

## 2017-09-26 DIAGNOSIS — M25.551 PAIN OF BOTH HIP JOINTS: ICD-10-CM

## 2017-09-26 DIAGNOSIS — K21.9 GASTROESOPHAGEAL REFLUX DISEASE WITHOUT ESOPHAGITIS: ICD-10-CM

## 2017-09-26 DIAGNOSIS — M15.9 OSTEOARTHRITIS OF MULTIPLE JOINTS, UNSPECIFIED OSTEOARTHRITIS TYPE: Primary | ICD-10-CM

## 2017-09-26 DIAGNOSIS — G89.29 CHRONIC BILATERAL LOW BACK PAIN WITHOUT SCIATICA: ICD-10-CM

## 2017-09-26 DIAGNOSIS — M15.9 OSTEOARTHRITIS OF MULTIPLE JOINTS, UNSPECIFIED OSTEOARTHRITIS TYPE: ICD-10-CM

## 2017-09-26 DIAGNOSIS — Z13.5 SCREENING FOR EYE CONDITION: ICD-10-CM

## 2017-09-26 DIAGNOSIS — R82.90 ABNORMAL FINDING IN URINE: ICD-10-CM

## 2017-09-26 DIAGNOSIS — E66.01 MORBID OBESITY DUE TO EXCESS CALORIES: ICD-10-CM

## 2017-09-26 LAB
BILIRUB UR QL STRIP: NEGATIVE
CLARITY UR REFRACT.AUTO: CLEAR
COLOR UR AUTO: NORMAL
GLUCOSE UR QL STRIP: NEGATIVE
HGB UR QL STRIP: NEGATIVE
KETONES UR QL STRIP: NEGATIVE
LEUKOCYTE ESTERASE UR QL STRIP: NEGATIVE
NITRITE UR QL STRIP: NEGATIVE
PH UR STRIP: 8 [PH] (ref 5–8)
PROT UR QL STRIP: NEGATIVE
SP GR UR STRIP: 1.01 (ref 1–1.03)
URN SPEC COLLECT METH UR: NORMAL
UROBILINOGEN UR STRIP-ACNC: NEGATIVE EU/DL

## 2017-09-26 PROCEDURE — 1159F MED LIST DOCD IN RCRD: CPT | Mod: S$GLB,,, | Performed by: NURSE PRACTITIONER

## 2017-09-26 PROCEDURE — 99214 OFFICE O/P EST MOD 30 MIN: CPT | Mod: S$GLB,,, | Performed by: NURSE PRACTITIONER

## 2017-09-26 PROCEDURE — 3078F DIAST BP <80 MM HG: CPT | Mod: S$GLB,,, | Performed by: NURSE PRACTITIONER

## 2017-09-26 PROCEDURE — 81003 URINALYSIS AUTO W/O SCOPE: CPT

## 2017-09-26 PROCEDURE — 73521 X-RAY EXAM HIPS BI 2 VIEWS: CPT | Mod: 26,,, | Performed by: RADIOLOGY

## 2017-09-26 PROCEDURE — 3075F SYST BP GE 130 - 139MM HG: CPT | Mod: S$GLB,,, | Performed by: NURSE PRACTITIONER

## 2017-09-26 PROCEDURE — 3008F BODY MASS INDEX DOCD: CPT | Mod: S$GLB,,, | Performed by: NURSE PRACTITIONER

## 2017-09-26 PROCEDURE — 90662 IIV NO PRSV INCREASED AG IM: CPT | Mod: S$GLB,,, | Performed by: NURSE PRACTITIONER

## 2017-09-26 PROCEDURE — 87086 URINE CULTURE/COLONY COUNT: CPT

## 2017-09-26 PROCEDURE — 72100 X-RAY EXAM L-S SPINE 2/3 VWS: CPT | Mod: TC,PO

## 2017-09-26 PROCEDURE — 99499 UNLISTED E&M SERVICE: CPT | Mod: S$GLB,,, | Performed by: NURSE PRACTITIONER

## 2017-09-26 PROCEDURE — 99999 PR PBB SHADOW E&M-EST. PATIENT-LVL V: CPT | Mod: PBBFAC,,, | Performed by: NURSE PRACTITIONER

## 2017-09-26 PROCEDURE — 1125F AMNT PAIN NOTED PAIN PRSNT: CPT | Mod: S$GLB,,, | Performed by: NURSE PRACTITIONER

## 2017-09-26 PROCEDURE — 72100 X-RAY EXAM L-S SPINE 2/3 VWS: CPT | Mod: 26,,, | Performed by: RADIOLOGY

## 2017-09-26 PROCEDURE — G0008 ADMIN INFLUENZA VIRUS VAC: HCPCS | Mod: S$GLB,,, | Performed by: NURSE PRACTITIONER

## 2017-09-26 PROCEDURE — 73521 X-RAY EXAM HIPS BI 2 VIEWS: CPT | Mod: TC,PO

## 2017-09-26 RX ORDER — MELOXICAM 7.5 MG/1
7.5 TABLET ORAL DAILY
Qty: 30 TABLET | Refills: 0 | Status: SHIPPED | OUTPATIENT
Start: 2017-09-26 | End: 2017-09-26 | Stop reason: SDUPTHER

## 2017-09-26 RX ORDER — MELOXICAM 7.5 MG/1
TABLET ORAL
Qty: 90 TABLET | Refills: 0 | Status: SHIPPED | OUTPATIENT
Start: 2017-09-26 | End: 2018-01-30

## 2017-09-26 NOTE — PROGRESS NOTES
Two patient identifiers verified.  Allergies reviewed. Flu vaccine  IM administered to right deltoid per NP order.  Patient tolerated injection well; no redness, bleeding, or bruising noted to injection site.  Patient instructed to remain in clinic setting for 15 minutes.  Verbalizes understanding.

## 2017-09-26 NOTE — PROGRESS NOTES
Subjective:       Patient ID: Nilsa Curiel is a 71 y.o. female.    Chief Complaint: Rash and Back Pain    Pt here with low back pain and  hip pain bilaterally.  Sx started 2 weeks ago after doing some house cleaning.  No b/b incontinence, no numbness or tingling down legs.  Pt states pain low backa nd radiates around to both hips.  Some pain down lateral left leg  Needs RF on meds for heartburn  Asking for referral to dentist and optometrist  Pt states that her rash is actually better so she has no concerns about it today.        Rash   Pertinent negatives include no congestion, cough, fatigue, fever or shortness of breath.   Back Pain   Pertinent negatives include no chest pain or fever.     Past Medical History:   Diagnosis Date    Atopic dermatitis     GERD (gastroesophageal reflux disease)     Hyperlipidemia     Hypertension     Morbid obesity with BMI of 40.0-44.9, adult 2016     Past Surgical History:   Procedure Laterality Date     SECTION      x3    TUBAL LIGATION       Social History     Social History Narrative    Walking some     Family History   Problem Relation Age of Onset    Hypertension Mother     Dementia Mother     Diabetes Mother     Other Father      sepsis    Diabetes Sister     Hypertension Sister     Hyperlipidemia Sister     Diabetes Brother     Cataracts Brother     Stroke Brother     No Known Problems Daughter     Hypertension Son     Hypertension Sister     Cancer Sister      pancreatic     Kidney disease Sister     Heart disease Sister      premature    Gout Sister     No Known Problems Daughter     No Known Problems Brother     No Known Problems Brother     Glaucoma Neg Hx     Retinal detachment Neg Hx     Macular degeneration Neg Hx     Strabismus Neg Hx     Amblyopia Neg Hx     Blindness Neg Hx      Vitals:    17 0923   BP: 132/68   Pulse: 96   Temp: 98.6 °F (37 °C)   Weight: 97 kg (213 lb 13.5 oz)   Height: 5' (1.524 m)   PainSc:   5      Outpatient Encounter Prescriptions as of 9/26/2017   Medication Sig Dispense Refill    amlodipine (NORVASC) 10 MG tablet TAKE 1 TABLET(10 MG) BY MOUTH EVERY DAY 90 tablet 1    ascorbic acid (VITAMIN C) 100 MG tablet Take 100 mg by mouth once daily.      betamethasone valerate 0.1% (VALISONE) 0.1 % Oint       clobetasol (TEMOVATE) 0.05 % cream       fexofenadine (ALLEGRA) 180 MG tablet Take 1 tablet (180 mg total) by mouth once daily. 90 tablet 3    fluticasone (FLONASE) 50 mcg/actuation nasal spray SHAKE LIQUID AND USE 1 SPRAY IN EACH NOSTRIL TWICE DAILY 3 Bottle 3    hydrochlorothiazide (HYDRODIURIL) 25 MG tablet Take 1 tablet (25 mg total) by mouth once daily. 90 tablet 0    ketoconazole (NIZORAL) 2 % cream Apply topically 2 (two) times daily. Apply to axilla and abdominal creases 30 g 3    KRILL OIL ORAL Take by mouth.      lactobacillus combo no.6 (PROBIOTIC COMPLEX) 4 billion cell Tab Take 1 tablet by mouth once daily. 90 tablet 3    losartan (COZAAR) 100 MG tablet TAKE 1 TABLET(100 MG) BY MOUTH EVERY DAY 90 tablet 1    MULTIVIT-MIN/FA/CA CARB/VIT K (WOMEN'S 50+ DAILY FORMULA ORAL) Take by mouth.      omega-3 fatty acids 300 mg Cap Take by mouth.      omeprazole (PRILOSEC) 20 MG capsule Take 1 capsule (20 mg total) by mouth once daily. (Patient taking differently: Take 20 mg by mouth as needed. ) 90 capsule 3    pravastatin (PRAVACHOL) 40 MG tablet TAKE 1 TABLET BY MOUTH ONCE DAILY 90 tablet 2    ranitidine (ZANTAC) 150 MG tablet Take 1 tablet (150 mg total) by mouth as needed for Heartburn. 180 tablet 2    [DISCONTINUED] LACTOBACILLUS COMBO NO.6 (PROBIOTIC COMPLEX ORAL) Take by mouth.      [DISCONTINUED] ranitidine (ZANTAC) 150 MG tablet Take 1 tablet (150 mg total) by mouth 2 (two) times daily. (Patient taking differently: Take 150 mg by mouth as needed. ) 180 tablet 3    naproxen (NAPROSYN) 375 MG tablet Take 1 tablet (375 mg total) by mouth 2 (two) times daily as needed. Take with  "meals. 90 tablet 3     No facility-administered encounter medications on file as of 9/26/2017.      Wt Readings from Last 3 Encounters:   09/26/17 97 kg (213 lb 13.5 oz)   09/15/17 97 kg (213 lb 13.5 oz)   09/01/17 96 kg (211 lb 10.3 oz)     Last 3 sets of Vitals    Vitals - 1 value per visit 9/1/2017 9/15/2017 9/26/2017   SYSTOLIC 146 146 132   DIASTOLIC 72 85 68   PULSE 84 96 96   TEMPERATURE 98.8 99.1 98.6   RESPIRATIONS - 20 -   SPO2 - 95 -   Weight (lb) 211.64 213.85 213.85   Weight (kg) 96 97 97   HEIGHT 5' 0" 5' 1.024" 5' 0"   BODY MASS INDEX 41.33 40.37 41.76   VISIT REPORT - - -   Pain Score  0 4 5   Some recent data might be hidden     No results found for: CBC  Review of Systems   Constitutional: Negative for appetite change, chills, diaphoresis, fatigue and fever.   HENT: Negative for congestion.    Respiratory: Negative for cough and shortness of breath.    Cardiovascular: Negative for chest pain.   Musculoskeletal: Positive for arthralgias and back pain. Negative for gait problem and joint swelling.   Skin: Positive for rash.       Objective:      Physical Exam   Constitutional: She is oriented to person, place, and time. She appears well-developed and well-nourished. No distress.   HENT:   Head: Normocephalic and atraumatic.   Eyes: Conjunctivae are normal. Pupils are equal, round, and reactive to light.   Neck: Normal range of motion.   Cardiovascular: Normal rate and regular rhythm.    Murmur heard.   Systolic murmur is present with a grade of 2/6   Pulmonary/Chest: Effort normal and breath sounds normal. No stridor. No respiratory distress.   Abdominal: Soft.   Musculoskeletal:        Right hip: She exhibits tenderness. She exhibits no bony tenderness, no swelling and no crepitus.        Left hip: She exhibits tenderness. She exhibits no bony tenderness, no swelling and no crepitus.        Lumbar back: Normal. She exhibits normal range of motion, no tenderness and no spasm.   DTR 2+ patellar " bilateral  rani hip TTP lateral hip joint    Pt able to bend to touch shins, minimal pain increase with lateral flexion to right and left sides  Some increase with hyperext of back  No sensory deficits in lumbar dermatones     Neurological: She is alert and oriented to person, place, and time.   Skin: Skin is warm and dry. Capillary refill takes less than 2 seconds. No rash noted. She is not diaphoretic. No erythema. No pallor.   Psychiatric: She has a normal mood and affect. Her behavior is normal. Judgment and thought content normal.   Nursing note and vitals reviewed.      Assessment:       1. Need for prophylactic vaccination and inoculation against influenza    2. Pain of both hip joints    3. Chronic bilateral low back pain without sciatica    4. Gastroesophageal reflux disease without esophagitis    5. Screening for eye condition    6. Abnormal finding in urine         Plan:       Nilsa was seen today for rash and back pain.    Diagnoses and all orders for this visit:    Need for prophylactic vaccination and inoculation against influenza  -     Flu Vaccine - High Dose (PF) (65+)    Pain of both hip joints  -     X-Ray Hips Bilateral 2 View Incl AP Pelvis; Future  -     Ambulatory Referral to Physical/Occupational Therapy    Chronic bilateral low back pain without sciatica  -     X-Ray Lumbar Spine AP And Lateral; Future  -     Ambulatory Referral to Physical/Occupational Therapy  -     Urinalysis  -     Urine culture    Gastroesophageal reflux disease without esophagitis  -     ranitidine (ZANTAC) 150 MG tablet; Take 1 tablet (150 mg total) by mouth as needed for Heartburn.  -     lactobacillus combo no.6 (PROBIOTIC COMPLEX) 4 billion cell Tab; Take 1 tablet by mouth once daily.    Screening for eye condition  -     Ambulatory referral to Optometry    Abnormal finding in urine   -     Urine culture      Patient Instructions   xrays today    Take your Naproxen twice a day for 7-10 days for your pain, ice as  discussed    Physical therapy will call you to set up your appointment    I will let you know about your xrays and urine test          Schedule your eye exam with Mindy

## 2017-09-26 NOTE — TELEPHONE ENCOUNTER
Called pt informed of xray results  Mobic to try  Pt aware not to take Mobic and Naprosyn at the same time  PT pending

## 2017-09-26 NOTE — PATIENT INSTRUCTIONS
xrays today    Take your Naproxen twice a day for 7-10 days for your pain, ice as discussed    Physical therapy will call you to set up your appointment    I will let you know about your xrays and urine test

## 2017-09-27 DIAGNOSIS — K21.9 GASTROESOPHAGEAL REFLUX DISEASE WITHOUT ESOPHAGITIS: ICD-10-CM

## 2017-09-27 LAB — BACTERIA UR CULT: NO GROWTH

## 2017-09-27 RX ORDER — OMEPRAZOLE 20 MG/1
CAPSULE, DELAYED RELEASE ORAL
Qty: 90 CAPSULE | Refills: 0 | Status: SHIPPED | OUTPATIENT
Start: 2017-09-27 | End: 2017-12-26 | Stop reason: SDUPTHER

## 2017-09-27 NOTE — TELEPHONE ENCOUNTER
Spoke with patient, made her aware that as per NP Bobo her urine test was normal . She verbalized understanding.

## 2017-09-27 NOTE — TELEPHONE ENCOUNTER
----- Message from OUMOU Ngo-SUZI sent at 9/27/2017  7:55 AM CDT -----  Please call pt and tell her that her urine from the lab was normal  THANK YOU !!

## 2017-10-10 ENCOUNTER — CLINICAL SUPPORT (OUTPATIENT)
Dept: REHABILITATION | Facility: HOSPITAL | Age: 72
End: 2017-10-10
Attending: NURSE PRACTITIONER
Payer: MEDICARE

## 2017-10-10 DIAGNOSIS — M25.551 BILATERAL HIP PAIN: ICD-10-CM

## 2017-10-10 DIAGNOSIS — R53.1 DECREASED STRENGTH: ICD-10-CM

## 2017-10-10 DIAGNOSIS — M25.552 BILATERAL HIP PAIN: ICD-10-CM

## 2017-10-10 DIAGNOSIS — M54.50 ACUTE BILATERAL LOW BACK PAIN WITHOUT SCIATICA: Primary | ICD-10-CM

## 2017-10-10 DIAGNOSIS — Z74.09 IMPAIRED MOBILITY: ICD-10-CM

## 2017-10-10 PROCEDURE — 97162 PT EVAL MOD COMPLEX 30 MIN: CPT | Mod: PN

## 2017-10-10 PROCEDURE — 97110 THERAPEUTIC EXERCISES: CPT | Mod: PN

## 2017-10-10 PROCEDURE — G8979 MOBILITY GOAL STATUS: HCPCS | Mod: CJ,PN

## 2017-10-10 PROCEDURE — G8978 MOBILITY CURRENT STATUS: HCPCS | Mod: CK,PN

## 2017-10-10 NOTE — PLAN OF CARE
TIME RECORD    Date: 10/10/2017    Start Time: 1007  Stop Time:  1058    PROCEDURES:    TIMED  Procedure Min.   TE 12'                     UNTIMED  Procedure Min.   IE 39'         Total Timed Minutes:  51'  Total Timed Units:  1  Total Untimed Units:  1  Charges Billed/# of units:  2 (MCE-1, TE-1)    OUTPATIENT PHYSICAL THERAPY   PATIENT EVALUATION  Onset Date: ~4 months ago, worsened about ~1 month ago  Primary Diagnosis:   1. Acute bilateral low back pain without sciatica     2. Bilateral hip pain     3. Decreased strength     4. Impaired mobility       Treatment Diagnosis: Low back pain, hip pain, decreased strength, decreased lumbar ROM, decreased flexibility.   Past Medical History:   Diagnosis Date    Acute bilateral low back pain without sciatica 10/10/2017    Atopic dermatitis     GERD (gastroesophageal reflux disease)     Hyperlipidemia     Hypertension     Morbid obesity with BMI of 40.0-44.9, adult 4/1/2016     Precautions: Standard, Heart murmur reported by pt  Prior Therapy: Never  Medications: Nilsa Curiel has a current medication list which includes the following prescription(s): amlodipine, ascorbic acid (vitamin c), betamethasone valerate 0.1%, clobetasol, fexofenadine, fluticasone, hydrochlorothiazide, ketoconazole, krill oil, lactobacillus combo no.6, losartan, meloxicam, mv-mn/folic acid/calcium/vit k, naproxen, omega-3 fatty acids, omeprazole, pravastatin, and ranitidine.  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 One month ago pt reports feeling this more intense pain at the end of the day after doing several hours of general cleaning and mopping, and since has traveled from low back to back of hips to groin area B. Low back pain and B hip pain are equivalent and increase/decrease with the same activities. Pain increases with standing and walking. Bending and lifting may increase pain but pt reports avoiding these activities. Pt reports medication including Naproxen and Meloxicam. Sitting also decreases pain. X-Ray performed, pt reports arthritis in low back and hips. Pt also reports laying on L side increases pain since X-rays were performed. No issues of incontinence to report.    Pt also reports numbness in the thumb and index finger B in the mornings. After a couple of hours it goes away, but at times it returns towards the end of the say when its relaxed too long. When pt shakes her hands the numbness goes away.     Pain:  Location: B low back and hips  Description: Sticking sensation, nagging pain  Pain Scale: 2/10 at best 2/10 now  6/10 at worst    Objective     Posture: Sitting: slouched posture, rounded shoulders, forward head  Standing: increased lumbar lordosis, forward trunk   Palpation: TTP to B lumbar paraspinals; B QL, piriformis, glute med/min, TFL, L>R  Sensation: Intact B LE  Range of Motion/Strength:     Lumbar AROM: Pain/Dysfunction with Movement:   Flexion 80 deg, slight ache in L low back   Extension 15 deg, concordant pain in L low back radiating to groin; pt also reports stretching in arm making index finger and thumb tingle   Right side bending 20 deg, pulling sensation in L low back   Left side bending 15 deg, pressure sensation in L low back radiating to thigh   Right rotation 75% slight ache in L low back   Left rotation 75% slight ahce in L low back     LE MMTs  Hip flexion: 4+/5 R; 4/5 L, L lateral hip and adductor pain.  Hip extension:4-/5 B, pain in midline of low back B  Hip abudction:  4-/5 B  Knee flexion: 5/5 R; 4+/5 L, cramp in L calf   Knee extension: 5/5 R; 4+/5 L  Ankle DF: 5/5 B    Flexibility: 9090 HS length: -10 deg knee ext R, -25 deg knee ext L  Gait: no AD  Analysis: Increased stance width, forward trunk lean  Bed Mobility:Independent  Transfers: Independent    Special Tests: Slump (-) B for LBP  SLR (-) B for LBP  Scour (-) on R, (+) on L for slight groin pain  FADIR (+) on R for slight groin pain, (+) on L for increased groin pain and posterior hip pain/pulling  MARCELINA (-) on R, (+) on L for increased groin pain    Other: Poor TA contraction B    Functional Limitation Reports: G codes  Tool: FOTO Lumbar Spine SURVEY  Category: Mobility ()  Limitation: 53%  Current: CK = at least 40% but < 60% impaired, limited or restricted  Goal: CJ = at least 20% but < 40% impaired, limited or restricted     Functional Limitation Reports: G codes  Tool: FOTO Hip SURVEY  Category: Mobility ()  Limitation: 55%  Current: CK = at least 40% but < 60% impaired, limited or restricted  Goal: CK = at least 40% but < 60% impaired, limited or restricted    TREATMENT     Time In: 1007  Time Out: 1058    PT Evaluation Completed? Yes  Discussed Plan of Care with patient: Yes    Nilsa received 12' of therapeutic exercise & instruction including: HS stretch, TA activation, seated trunk flexion stretch, and scapular depression/retraction    Written Home Exercises Provided: see above  Nilsa demo good understanding of the education provided. Patient demo good return demo of skill of exercises.      Assessment       Initial Assessment (Pertinent finding, problem list and factors affecting outcome): Pt is a 70 yo female presenting to PT with pain, decreased lumbar ROM, decreased core LE strength, decreased flexibility, poor posture, and functional deficits with standing and walking. Impairments possibly related to a combination of lumbar joint dysfunction and possible hip joint dysfunction. Pt would benefit  "from skilled PT to address limitations and increase functional mobility.    History  Co-morbidities and personal factors that may impact the plan of care Examination  Body Structures and Functions, activity limitations and participation restrictions that may impact the plan of care Clinical Presentation   Decision Making/ Complexity Score   Co-morbidities:   HTN  Obesity  Hyperparathyroidism   Anemia      Personal Factors:     high Body Regions: trunk, back, LE    Body Systems: musculoskeletal - posture, ROM, strength; neuromuscular - balance, gait    Activity limitations: Standing and walking    Participation Restrictions: none    high     FOTO Lumbar Spine Survey: 55% limitation  FOTO Hip Survey: 53% limitation     moderate   moderate     Rehab Potiential: good  Barriers to Rehab: transportation  Short Term Goals (4 Weeks): 11/10/17  1. Pt will be compliant with HEP to supplement PT in decreasing pain with functional mobility.  2. Pt will perform and hold TA contraction for 10x10" in dynamic sitting activities to demonstrate improved core strength  3. Pt will improve impaired lumbar ROM by 10 deg in all planes to improve functional mobility.  4. Pt will improve impaired LE MMTs to >/=4/5 to improve strength for functional tasks.  5. Pt will improve L 9090 HS length by >/=10 deg to improve flexibility for normal movement patterns.   Long Term Goals (8 Weeks): 12/10/17  1. Pt will improve FOTO Lumbar Spine score to </= 37% limited to decrease perceived limitation with maintaining/changing body position  2. Pt will improve B 9090 HS length to </= 5 deg knee extension to improve flexibility for normal movement patterns.   3. Pt will perform and hold TA contraction for 10x10" in dynamic standing activities to demonstrate improved core strength  4. Pt will improve impaired LE MMTs to >/=4+/5 to improve strength for functional tasks.  5. Pt will report no pain during standing activities to promote functional QOL.  6. Pt " will report no pain during lumbar extension ROM to promote functional mobility.  7. Pt will improve FOTO Hip score to </= 47% limited to decrease perceived limitation with maintaining/changing body position    Plan     Certification Period: 10/10/17 to 12/10/17  Recommended Treatment Plan: 2 times per week for 8 weeks: Gait Training, Manual Therapy, Moist Heat/ Ice, Neuromuscular Re-ed, Patient Education, Therapeutic Activites and Therapeutic Exercise  Other Recommendations: modalities prn, ASTYM prn, kinesiotape prn, Functional Dry Needling prn     Therapist: Hali Orozco, PT    I CERTIFY THE NEED FOR THESE SERVICES FURNISHED UNDER THIS PLAN OF TREATMENT AND WHILE UNDER MY CARE    Physician's comments: ________________________________________________________________________________________________________________________________________________      Physician's Name: ___________________________________

## 2017-10-12 ENCOUNTER — OFFICE VISIT (OUTPATIENT)
Dept: OPTOMETRY | Facility: CLINIC | Age: 72
End: 2017-10-12
Payer: MEDICARE

## 2017-10-12 DIAGNOSIS — H25.13 NS (NUCLEAR SCLEROSIS), BILATERAL: ICD-10-CM

## 2017-10-12 DIAGNOSIS — H26.9 CORTICAL CATARACT OF BOTH EYES: ICD-10-CM

## 2017-10-12 DIAGNOSIS — I10 ESSENTIAL HYPERTENSION: Primary | ICD-10-CM

## 2017-10-12 DIAGNOSIS — H52.4 PRESBYOPIA: ICD-10-CM

## 2017-10-12 PROCEDURE — 99999 PR PBB SHADOW E&M-EST. PATIENT-LVL II: CPT | Mod: PBBFAC,,, | Performed by: OPTOMETRIST

## 2017-10-12 PROCEDURE — 92014 COMPRE OPH EXAM EST PT 1/>: CPT | Mod: S$GLB,,, | Performed by: OPTOMETRIST

## 2017-10-12 PROCEDURE — 99499 UNLISTED E&M SERVICE: CPT | Mod: S$GLB,,, | Performed by: OPTOMETRIST

## 2017-10-12 PROCEDURE — 92015 DETERMINE REFRACTIVE STATE: CPT | Mod: S$GLB,,, | Performed by: OPTOMETRIST

## 2017-10-12 NOTE — PROGRESS NOTES
HPI     Patient's age: 71 y.o.    Approximate date of last eye examination:  12/15/16  Name of last eye doctor seen: Dr. Morris    Pt states that she is here for yearly eye exam, vision is getting little   more dimmer now.    Wears glasses? Readers      Wears CLs?:  no            Headaches?  no  Eye pain/discomfort?  no                                                                                     Flashes?  no  Floaters?  yes  Diplopia/Double vision?  no    Patient's Ocular History:         Any eye surgeries? no         Family history of eye disease?  no    Significant patient medical history:         1. Diabetes?  no       If yes, IDDM or NIDDM? no   2. HBP?  yes                 ! OTC eyedrops currently using:  none   ! Prescription eye meds currently using:  none                                                      Last edited by Kely Hauser MA on 10/12/2017 10:48 AM. (History)            Assessment /Plan     For exam results, see Encounter Report.    Essential hypertension   No retinopathy, monitor yearly    Cortical cataract of both eyes  NS (nuclear sclerosis), bilateral   Visually significant, consult for cataract eval when pt ready    Presbyopia   Ok to continue using readers PRN    RTC 1 year

## 2017-10-12 NOTE — LETTER
October 12, 2017      Mindy Broussard, FNP-C  [address] [facility]  [address]  [facility] [address]           [facility]  [address]  [facility] [address]  Phone: [phone]  Fax: [phone]          Patient: Nilsa Curiel   MR Number: [id number]   YOB: 1945   Date of Visit: 10/12/2017       Dear Mindy Broussard:    Thank you for referring Nilsa Curiel to me for evaluation. Attached you will find relevant portions of my assessment and plan of care.    If you have questions, please do not hesitate to call me. I look forward to following Nilsa Curiel along with you.    Sincerely,    Mallika Staton, OD    Enclosure  CC:  No Recipients    If you would like to receive this communication electronically, please contact [email or web address] or [phone] to request more information on locr Link access.    For providers and/or their staff who would like to refer a patient to [facility], please contact us through our one-stop-shop provider referral line, [facility] [place], at [phone].    If you feel you have received this communication in error or would no longer like to receive these types of communications, please e-mail [email or web address]

## 2017-10-17 ENCOUNTER — CLINICAL SUPPORT (OUTPATIENT)
Dept: REHABILITATION | Facility: HOSPITAL | Age: 72
End: 2017-10-17
Attending: NURSE PRACTITIONER
Payer: MEDICARE

## 2017-10-17 DIAGNOSIS — Z74.09 IMPAIRED MOBILITY: ICD-10-CM

## 2017-10-17 DIAGNOSIS — M25.551 BILATERAL HIP PAIN: ICD-10-CM

## 2017-10-17 DIAGNOSIS — R53.1 DECREASED STRENGTH: ICD-10-CM

## 2017-10-17 DIAGNOSIS — M25.552 BILATERAL HIP PAIN: ICD-10-CM

## 2017-10-17 DIAGNOSIS — M54.50 ACUTE BILATERAL LOW BACK PAIN WITHOUT SCIATICA: ICD-10-CM

## 2017-10-17 PROCEDURE — 97140 MANUAL THERAPY 1/> REGIONS: CPT | Mod: PN

## 2017-10-17 PROCEDURE — 97110 THERAPEUTIC EXERCISES: CPT | Mod: PN

## 2017-10-17 NOTE — PROGRESS NOTES
"TIME RECORD    Date:  10/17/2017    Start Time:  1005  Stop Time:  1102    PROCEDURES:    TIMED  Procedure Min.   MT 24'   TE 33'                 UNTIMED  Procedure Min.             Total Timed Minutes:  57'  Total Timed Units:  4  Total Untimed Units:  0  Charges Billed/# of units:  4 (MT-2, TE-2)      Progress/Current Status    Subjective:     Patient ID: Nilsa Curiel is a 71 y.o. female.  Diagnosis:   1. Acute bilateral low back pain without sciatica     2. Bilateral hip pain     3. Decreased strength     4. Impaired mobility       Pain: 3 /10 midline low back, 0/10 pain after PT    Pt reports improvement in pain since initial eval. Continued pain with standing and walking--favors flexion. Pt reports not performing HEP as much as she should have.     Objective:     MT x 24' in prone including STM to B lumbar paraspinals, QL, glute med/min, piriformis, and ITB/TFL. TE 1:1 with PT x 33' per log to increase core and LE strength, improve posture, and increase LE flexibility.     *Grade 1 spondylolisthesis reported on X-ray: avoid extension based TE*    Date 10/17/17   Visit 2   POC 12/10/17  Exp  12/31/2017    Gcode 2/10   FOTO 2/5   Cap visit  Cap total 123.68  231.26   MHP    MT 24'   Stretches HS w/ strap 3x30"   Piriformis 3x30"  Next/soon  Gastroc on fitter  Seated trunk flexion  Lenny stretch   Supine:    TAs 5"x15   LTR 5"x10 B on SB   DKC x20 SB   March    Bug    Bridge    SLR    Prone:    Glute sets  5"x10   Butt winks 5"x10 B   Hip ext x10 B   Alt UE Quadruped next?   Alt LE    Alt LE/UE    Seated:    TAs March    LAQs    Lats Next?   Rows Next?   UBE    Leg Press      Initials CC     Assessment:     Improvement of pain at end of session. Difficulty isolating glute involvement during prone glute exercises, difficulty hip extension exercise in general.  Pt reported pulling sensation in front of hip with hip ext, possibly attempt hip flexor stretch next. Pt performed all other exercises well without " "increased low back pain.    Patient Education/Response:     Cont HEP including HS stretch, TA activation, seated trunk flexion stretch, and scapular depression/retraction.     Plans and Goals:     Cont POC, progress as tolerated. Avoid extension based exercise.     Short Term Goals (4 Weeks): 11/10/17  1. Pt will be compliant with HEP to supplement PT in decreasing pain with functional mobility.  2. Pt will perform and hold TA contraction for 10x10" in dynamic sitting activities to demonstrate improved core strength  3. Pt will improve impaired lumbar ROM by 10 deg in all planes to improve functional mobility.  4. Pt will improve impaired LE MMTs to >/=4/5 to improve strength for functional tasks.  5. Pt will improve L 9090 HS length by >/=10 deg to improve flexibility for normal movement patterns.   Long Term Goals (8 Weeks): 12/10/17  1. Pt will improve FOTO Lumbar Spine score to </= 37% limited to decrease perceived limitation with maintaining/changing body position  2. Pt will improve B 9090 HS length to </= 5 deg knee extension to improve flexibility for normal movement patterns.   3. Pt will perform and hold TA contraction for 10x10" in dynamic standing activities to demonstrate improved core strength  4. Pt will improve impaired LE MMTs to >/=4+/5 to improve strength for functional tasks.  5. Pt will report no pain during standing activities to promote functional QOL.  6. Pt will report no pain during lumbar extension ROM to promote functional mobility.  7. Pt will improve FOTO Hip score to </= 47% limited to decrease perceived limitation with maintaining/changing body position      "

## 2017-10-24 ENCOUNTER — OFFICE VISIT (OUTPATIENT)
Dept: INTERNAL MEDICINE | Facility: CLINIC | Age: 72
End: 2017-10-24
Payer: MEDICARE

## 2017-10-24 VITALS
HEART RATE: 60 BPM | WEIGHT: 218.69 LBS | BODY MASS INDEX: 42.94 KG/M2 | SYSTOLIC BLOOD PRESSURE: 116 MMHG | HEIGHT: 60 IN | DIASTOLIC BLOOD PRESSURE: 70 MMHG

## 2017-10-24 DIAGNOSIS — I77.9 BILATERAL CAROTID ARTERY DISEASE: ICD-10-CM

## 2017-10-24 DIAGNOSIS — E66.01 MORBID OBESITY WITH BMI OF 40.0-44.9, ADULT: ICD-10-CM

## 2017-10-24 DIAGNOSIS — Z00.00 ENCOUNTER FOR PREVENTIVE HEALTH EXAMINATION: Primary | ICD-10-CM

## 2017-10-24 DIAGNOSIS — L20.9 ATOPIC DERMATITIS, UNSPECIFIED TYPE: ICD-10-CM

## 2017-10-24 DIAGNOSIS — K21.9 GASTROESOPHAGEAL REFLUX DISEASE WITHOUT ESOPHAGITIS: ICD-10-CM

## 2017-10-24 DIAGNOSIS — I51.89 DIASTOLIC DYSFUNCTION WITHOUT HEART FAILURE: ICD-10-CM

## 2017-10-24 DIAGNOSIS — E21.3 HYPERPARATHYROIDISM: ICD-10-CM

## 2017-10-24 DIAGNOSIS — M54.50 ACUTE BILATERAL LOW BACK PAIN WITHOUT SCIATICA: ICD-10-CM

## 2017-10-24 DIAGNOSIS — M25.552 BILATERAL HIP PAIN: ICD-10-CM

## 2017-10-24 DIAGNOSIS — I10 ESSENTIAL HYPERTENSION: ICD-10-CM

## 2017-10-24 DIAGNOSIS — Z74.09 IMPAIRED MOBILITY: ICD-10-CM

## 2017-10-24 DIAGNOSIS — E78.5 HYPERLIPIDEMIA, UNSPECIFIED HYPERLIPIDEMIA TYPE: ICD-10-CM

## 2017-10-24 DIAGNOSIS — M25.551 BILATERAL HIP PAIN: ICD-10-CM

## 2017-10-24 PROCEDURE — 99999 PR PBB SHADOW E&M-EST. PATIENT-LVL V: CPT | Mod: PBBFAC,,, | Performed by: NURSE PRACTITIONER

## 2017-10-24 PROCEDURE — 99499 UNLISTED E&M SERVICE: CPT | Mod: S$GLB,,, | Performed by: NURSE PRACTITIONER

## 2017-10-24 PROCEDURE — G0439 PPPS, SUBSEQ VISIT: HCPCS | Mod: S$GLB,,, | Performed by: NURSE PRACTITIONER

## 2017-10-24 NOTE — PATIENT INSTRUCTIONS
Counseling and Referral of Other Preventative  (Italic type indicates deductible and co-insurance are waived)    Patient Name: Nilsa Curiel  Today's Date: 10/24/2017      SERVICE LIMITATIONS RECOMMENDATION    Vaccines    · Pneumococcal (once after 65)    · Influenza (annually)    · Hepatitis B (if medium/high risk)    · Prevnar 13      Hepatitis B medium/high risk factors:       - End-stage renal disease       - Hemophiliacs who received Factor VII or         IX concentrates       - Clients of institutions for the mentally             retarded       - Persons who live in the same house as          a HepB carrier       - Homosexual men       - Illicit injectable drug abusers     Pneumococcal: Done, no repeat necessary     Influenza: Done, repeat in one year     Hepatitis B: N/A     Prevnar 13: Done, no repeat necessary    Mammogram (biennial age 50-74)  Annually (age 40 or over)  Done this year, repeat every year    Pap (up to age 70 and after 70 if unknown history or abnormal study last 10 years)    N/A     The USPSTF recommends against screening for cervical cancer in women older than age 65 years who have had adequate prior screening and are not otherwise at high risk for cervical cancer.      Colorectal cancer screening (to age 75)    · Fecal occult blood test (annual)  · Flexible sigmoidoscopy (5y)  · Screening colonoscopy (10y)  · Barium enema   Last done 12/28/2015, recommend to repeat every 10  years    Diabetes self-management training (no USPSTF recommendations)  Requires referral by treating physician for patient with diabetes or renal disease. 10 hours of initial DSMT sessions of no less than 30 minutes each in a continuous 12-month period. 2 hours of follow-up DSMT in subsequent years.  N/A    Bone mass measurements (age 65 & older, biennial)  Requires diagnosis related to osteoporosis or estrogen deficiency. Biennial benefit unless patient has history of long-term glucocorticoid  Last done 7/16/2015,  recommend to repeat every 4  years    Glaucoma screening (no USPSTF recommendation)  Diabetes mellitus, family history   , age 50 or over    American, age 65 or over  Done this year, repeat every year    Medical nutrition therapy for diabetes or renal disease (no recommended schedule)  Requires referral by treating physician for patient with diabetes or renal disease or kidney transplant within the past 3 years.  Can be provided in same year as diabetes self-management training (DSMT), and CMS recommends medical nutrition therapy take place after DSMT. Up to 3 hours for initial year and 2 hours in subsequent years.  N/A    Cardiovascular screening blood tests (every 5 years)  · Fasting lipid panel  Order as a panel if possible  Done this year, repeat every year    Diabetes screening tests (at least every 3 years, Medicare covers annually or at 6-month intervals for prediabetic patients)  · Fasting blood sugar (FBS) or glucose tolerance test (GTT)  Patient must be diagnosed with one of the following:       - Hypertension       - Dyslipidemia       - Obesity (BMI 30kg/m2)       - Previous elevated impaired FBS or GTT       ... or any two of the following:       - Overweight (BMI 25 but <30)       - Family history of diabetes       - Age 65 or older       - History of gestational diabetes or birth of baby weighing more than 9 pounds  Done this year, repeat every year    HIV screening (annually for increased risk patients)  · HIV-1 and HIV-2 by EIA, or JOE, rapid antibody test or oral mucosa transudate  Patients must be at increased risk for HIV infection per USPSTF guidelines or pregnant. Tests covered annually for patient at increased risk or as requested by the patient. Pregnant patients may receive up to 3 tests during pregnancy.  Risks discussed, screening is not recommended    Smoking cessation counseling (up to 8 sessions per year)  Patients must be asymptomatic of tobacco-related  conditions to receive as a preventative service.  Non-smoker    Subsequent annual wellness visit  At least 12 months since last AWV  Return in one year     The following information is provided to all patients.  This information is to help you find resources for any of the problems found today that may be affecting your health:                Living healthy guide: www.[facility].[place].[facility]      Understanding Diabetes: www.diabetes.org      Eating healthy: www.cdc.gov/healthyweight      CDC home safety checklist: www.cdc.gov/steadi/patient.html      Agency on Aging: www.goea.[place].[facility]      Alcoholics anonymous (AA): www.aa.org      Physical Activity: www.mariela.nih.gov/um9lpxv      Tobacco use: www.quitwithusla.org

## 2017-10-24 NOTE — PROGRESS NOTES
Nilsa Curiel presented for a  Medicare AWV and comprehensive Health Risk Assessment today. The following components were reviewed and updated:    · Medical history  · Family History  · Social history  · Allergies and Current Medications  · Health Risk Assessment  · Health Maintenance  · Care Team     ** See Completed Assessments for Annual Wellness Visit within the encounter summary.**       The following assessments were completed:  · Living Situation  · CAGE  · Depression Screening  · Timed Get Up and Go  · Whisper Test  · Cognitive Function Screening  · Nutrition Screening  · ADL Screening  · PAQ Screening    Vitals:    10/24/17 0915   BP: 116/70   Pulse: 60   Weight: 99.2 kg (218 lb 11.1 oz)   Height: 5' (1.524 m)     Body mass index is 42.71 kg/m².  Physical Exam   Constitutional: She is oriented to person, place, and time. She appears well-developed.   obese   HENT:   Head: Normocephalic and atraumatic.   Nose: Nose normal.   Eyes: Conjunctivae and EOM are normal.   Neck: Normal range of motion. Neck supple.   Cardiovascular: Normal rate, regular rhythm and intact distal pulses.    Murmur heard.  Pulmonary/Chest: Effort normal and breath sounds normal.   Musculoskeletal: Normal range of motion.   Neurological: She is alert and oriented to person, place, and time.   Skin: Skin is warm and dry.   Atopic dermatitis   Psychiatric: She has a normal mood and affect. Her behavior is normal. Judgment and thought content normal.   Nursing note and vitals reviewed.        Diagnoses and health risks identified today and associated recommendations/orders:    1. Encounter for preventive health examination  Assessment performed. Health maintenance updated. Chart review completed.  -Patient reports having tetanus vaccine. Will check records.    2. Morbid obesity with BMI of 40.0-44.9, adult  Chronic. Discussed exercise. Currently active in PT. Followed by PCP.    3. Hyperparathyroidism  Chronic. Stable. Followed by PCP.    4.  Diastolic dysfunction without heart failure  Chronic. Stable with current regimen. Followed by Cardiology.    5. Hyperlipidemia, unspecified hyperlipidemia type  Chronic. Stable on current regimen. Followed by PCP.    6. Essential hypertension  Chronic. Stable on current regimen. Followed by PCP.    7. Acute bilateral low back pain without sciatica  Pain not at goal. Followed by Outpatient rehab.    8. Bilateral hip pain  Pain not at goal. Followed by Outpatient rehab.    9. Gastroesophageal reflux disease without esophagitis  Chronic. Stable on current regimen. Followed by PCP.    10. Impaired mobility  Pain not at goal. Followed by Outpatient rehab.    11. Atopic dermatitis, unspecified type  Chronic. Stable on current regimen. Followed by PCP.    12. Bilateral carotid artery disease  1.  1 - 39% stenosis of the right internal carotid artery.  2.  1 - 39% stenosis of the left internal carotid artery.  Chronic. Stable with blood pressure control and statin therapy. Followed by Cardiology.      Provided Nilsa with a 5-10 year written screening schedule and personal prevention plan. Recommendations were developed using the USPSTF age appropriate recommendations. Education, counseling, and referrals were provided as needed. After Visit Summary printed and given to patient which includes a list of additional screenings\tests needed.    Return for follow up with Primary Care Provider as instructed, ;sooner if problems, HRA in 1 year.    OUMOU Pitts

## 2017-10-31 ENCOUNTER — CLINICAL SUPPORT (OUTPATIENT)
Dept: REHABILITATION | Facility: HOSPITAL | Age: 72
End: 2017-10-31
Attending: NURSE PRACTITIONER
Payer: MEDICARE

## 2017-10-31 DIAGNOSIS — M54.50 ACUTE BILATERAL LOW BACK PAIN WITHOUT SCIATICA: ICD-10-CM

## 2017-10-31 DIAGNOSIS — M25.551 BILATERAL HIP PAIN: ICD-10-CM

## 2017-10-31 DIAGNOSIS — Z74.09 IMPAIRED MOBILITY: ICD-10-CM

## 2017-10-31 DIAGNOSIS — R53.1 DECREASED STRENGTH: ICD-10-CM

## 2017-10-31 DIAGNOSIS — M25.552 BILATERAL HIP PAIN: ICD-10-CM

## 2017-10-31 PROCEDURE — 97110 THERAPEUTIC EXERCISES: CPT | Mod: PN

## 2017-10-31 PROCEDURE — 97140 MANUAL THERAPY 1/> REGIONS: CPT | Mod: PN

## 2017-10-31 NOTE — PROGRESS NOTES
"TIME RECORD    Date:  10/31/2017    Start Time: 11***  Stop Time: ***    PROCEDURES:    TIMED  Procedure Min.   MT ***'   TE ***'                 UNTIMED  Procedure Min.             Total Timed Minutes:  ***'  Total Timed Units:  ***  Total Untimed Units:  ***  Charges Billed/# of units:  *** (MT-***, TE-***)      Progress/Current Status    Subjective:     Patient ID: Nilsa Curiel is a 71 y.o. female.  Diagnosis:   No diagnosis found.    Pain: ***/10 midline low back, ***/10 pain after PT    Pt reports improvement in pain since initial eval. Continued pain with standing and walking--favors flexion. Pt reports not performing HEP as much as she should have.     Objective:     MT x ***' in prone including STM to B lumbar paraspinals, QL, glute med/min, piriformis, and ITB/TFL. TE 1:1 with PT x ***' f/b TE supervised x ***' per log. Added *** to increase core and LE strength, improve posture, and increase LE flexibility.     *Grade 1 spondylolisthesis reported on X-ray: avoid extension based TE*    Date 10/31/17 10/17/17   Visit 3 2   POC 12/10/17  Exp  12/31/2017     Gcode 3/10 2/10   FOTO 3/5 2/5   Cap visit  Cap total *** 123.68  231.26   MHP     MT ***' 24'   Stretches  HS w/ strap 3x30"   Piriformis 3x30"  Next/soon  Gastroc on fitter  Seated trunk flexion  Lenny stretch   Supine:     TAs  5"x15   LTR  5"x10 B on SB   DKC  x20 SB   March     Bug     Bridge     SLR     Prone:     Glute sets   5"x10   Butt winks  5"x10 B   Hip ext  x10 B   Alt UE  Quadruped next?   Alt LE     Alt LE/UE     Seated:     TAs March     LAQs     Lats  Next?   Rows  Next?   UBE     Leg Press       Initials CC CC     Assessment:     Improvement of pain at end of session. Difficulty isolating glute involvement during prone glute exercises, difficulty hip extension exercise in general.  Pt reported pulling sensation in front of hip with hip ext, possibly attempt hip flexor stretch next. Pt performed all other exercises well without " "increased low back pain.    Patient Education/Response:     Cont HEP including HS stretch, TA activation, seated trunk flexion stretch, and scapular depression/retraction.     Plans and Goals:     Cont POC, progress as tolerated. Avoid extension based exercise.     Short Term Goals (4 Weeks): 11/10/17  1. Pt will be compliant with HEP to supplement PT in decreasing pain with functional mobility.  2. Pt will perform and hold TA contraction for 10x10" in dynamic sitting activities to demonstrate improved core strength  3. Pt will improve impaired lumbar ROM by 10 deg in all planes to improve functional mobility.  4. Pt will improve impaired LE MMTs to >/=4/5 to improve strength for functional tasks.  5. Pt will improve L 9090 HS length by >/=10 deg to improve flexibility for normal movement patterns.   Long Term Goals (8 Weeks): 12/10/17  1. Pt will improve FOTO Lumbar Spine score to </= 37% limited to decrease perceived limitation with maintaining/changing body position  2. Pt will improve B 9090 HS length to </= 5 deg knee extension to improve flexibility for normal movement patterns.   3. Pt will perform and hold TA contraction for 10x10" in dynamic standing activities to demonstrate improved core strength  4. Pt will improve impaired LE MMTs to >/=4+/5 to improve strength for functional tasks.  5. Pt will report no pain during standing activities to promote functional QOL.  6. Pt will report no pain during lumbar extension ROM to promote functional mobility.  7. Pt will improve FOTO Hip score to </= 47% limited to decrease perceived limitation with maintaining/changing body position      "

## 2017-10-31 NOTE — PROGRESS NOTES
"TIME RECORD    Date:  10/31/2017    Start Time:  1008  Stop Time:  1108    PROCEDURES:    TIMED  Procedure Min.   TE 40   MT 20                 UNTIMED  Procedure Min.             Total Timed Minutes: 60  Total Timed Units:  4  Total Untimed Units:  0  Charges Billed/# of units:  4 (MT-1, TE-3)      Progress/Current Status    Subjective:     Patient ID: Nilsa Curiel is a 71 y.o. female.  Diagnosis:   1. Acute bilateral low back pain without sciatica     2. Bilateral hip pain     3. Decreased strength     4. Impaired mobility       Pain: 4-5 /10 midline low back      Objective:     Patient performed therex with review as outlined per log x 40 minutes.   MT x 20' in prone over 2 pillows including STM to B lumbar paraspinals, QL, glute med/min, piriformis, and ITB/TFL.     *Grade 1 spondylolisthesis reported on X-ray: avoid extension based TE*    Date 10/31/17 10/17/17   Visit 3 2   POC 12/10/17  Exp  12/31/2017     Gcode 3/10 2/10   FOTO 3/5 2/5   Cap visit  Cap total 125.80  357.06 123.68  231.26   MHP     MT 20 24'   Stretches HS w/ strap 3x30"   Piriformis 3x30"  Lenny stretch 30"x2   gastroc str fitter  Next HS w/ strap 3x30"   Piriformis 3x30"  Next/soon  Gastroc on fitter  Seated trunk flexion  Lenny stretch   Supine:     TAs 5"x15 5"x15   LTR 5"x10 on SB 5"x10 B on SB   DKC x20 SB x20 SB   March     Bug     Bridge     SLR     Prone:     Glute sets  5"x15 5"x10   Butt winks 5"x10 B 5"x10 B   Hip ext 2x10 B  x10 B   Alt UE Quadruped next Quadruped next?   Alt LE     Alt LE/UE     Seated:     Trunk flex W/SB  2'  fwd    TAs     March     LAQs     Lats Next? Next?   Rows Next? Next?   UBE     Leg Press       Initials CS  1/6 CC     Assessment:   Patient reported no pain at end of session.    Patient Education/Response:     Re printed HEP of HS stretch, TA activation, seated trunk flexion stretch, and scapular depression/retraction and added piriformis stretch and glut sets with instruction to perform on " "non-therapy days.     Plans and Goals:     Cont POC, progress as tolerated. Avoid extension based exercise.     Short Term Goals (4 Weeks): 11/10/17  1. Pt will be compliant with HEP to supplement PT in decreasing pain with functional mobility.  2. Pt will perform and hold TA contraction for 10x10" in dynamic sitting activities to demonstrate improved core strength  3. Pt will improve impaired lumbar ROM by 10 deg in all planes to improve functional mobility.  4. Pt will improve impaired LE MMTs to >/=4/5 to improve strength for functional tasks.  5. Pt will improve L 9090 HS length by >/=10 deg to improve flexibility for normal movement patterns.   Long Term Goals (8 Weeks): 12/10/17  1. Pt will improve FOTO Lumbar Spine score to </= 37% limited to decrease perceived limitation with maintaining/changing body position  2. Pt will improve B 9090 HS length to </= 5 deg knee extension to improve flexibility for normal movement patterns.   3. Pt will perform and hold TA contraction for 10x10" in dynamic standing activities to demonstrate improved core strength  4. Pt will improve impaired LE MMTs to >/=4+/5 to improve strength for functional tasks.  5. Pt will report no pain during standing activities to promote functional QOL.  6. Pt will report no pain during lumbar extension ROM to promote functional mobility.  7. Pt will improve FOTO Hip score to </= 47% limited to decrease perceived limitation with maintaining/changing body position      "

## 2017-11-07 ENCOUNTER — CLINICAL SUPPORT (OUTPATIENT)
Dept: REHABILITATION | Facility: HOSPITAL | Age: 72
End: 2017-11-07
Attending: NURSE PRACTITIONER
Payer: MEDICARE

## 2017-11-07 DIAGNOSIS — Z74.09 IMPAIRED MOBILITY: ICD-10-CM

## 2017-11-07 DIAGNOSIS — R53.1 DECREASED STRENGTH: ICD-10-CM

## 2017-11-07 DIAGNOSIS — M25.552 BILATERAL HIP PAIN: ICD-10-CM

## 2017-11-07 DIAGNOSIS — M54.50 ACUTE BILATERAL LOW BACK PAIN WITHOUT SCIATICA: ICD-10-CM

## 2017-11-07 DIAGNOSIS — M25.551 BILATERAL HIP PAIN: ICD-10-CM

## 2017-11-07 PROCEDURE — 97110 THERAPEUTIC EXERCISES: CPT | Mod: PN

## 2017-11-07 NOTE — PROGRESS NOTES
"TIME RECORD    Date:  11/07/2017    Start Time:  11:10  Stop Time:  12: 10    PROCEDURES:    TIMED  Procedure Min.   TE 30   MT    TE sup 30             UNTIMED  Procedure Min.             Total Timed Minutes: 60  Total Timed Units:  4  Total Untimed Units:  0  Charges Billed/# of units:  2 , (TE-2)      Progress/Current Status    Subjective:     Patient ID: Nilsa Curiel is a 71 y.o. female.  Diagnosis:   1. Acute bilateral low back pain without sciatica     2. Bilateral hip pain     3. Decreased strength     4. Impaired mobility       Pain: 0 /10 midline low back; Reports HEP compliance.      Objective:   Arrived one hour late but was accommodated  Patient performed therex with review as outlined per log x 30 minutes.   MT x 20' in prone over 2 pillows including STM to B lumbar paraspinals, QL, glute med/min, piriformis, and ITB/TFL. B (deferred) Additional 30 minutes supervised    *Grade 1 spondylolisthesis reported on X-ray: avoid extension based TE*    Date 11/7/17 10/31/17 10/17/17   Visit 4 3 2   POC 12/10/17  Exp  12/31/2017      Gcode 4/10 3/10 2/10   FOTO 4/5 3/5 2/5   Cap visit  Cap total 63.96  421.76 125.80  357.06 123.68  231.26   MHP      MT  20 24'   Stretches: HSS w/strap  Piriformis  Lenny quad  Gastroc on fitter 3 x 30" B  3 x 30" B  3 x 30" B  3 x 30" B  3 x 30" B HS w/ strap 3x30"   Piriformis 3x30"  Lenny stretch 30"x2   gastroc str fitter  Next HS w/ strap 3x30"   Piriformis 3x30"  Next/soon  Gastroc on fitter  Seated trunk flexion  Lenny stretch   Supine:      TAs/pelvic floor 5" x15 5"x15 5"x15   LTR 2" on SB 5"x10 on SB 5"x10 B on SB   DKC 2" on SB x20 SB x20 SB   March      Bug      Bridge      SLR      Prone:      Glute sets  5"x15 5"x15 5"x10   Butt winks 5" x15 5"x10 B 5"x10 B   Hip ext 2x10 B 2x10 B  x10 B   Alt UE  Quadruped next Quadruped next?   Alt LE      Alt LE/UE      Seated:      Trunk flex oot W/SB  2'  fwd    TAs      March      LAQs      Lats oot Next? Next?   Rows oot " "Next? Next?   UBE      Leg Press        Initials MB 2/6 CS  1/6 CC     Assessment:   Patient reported no pain at end of session. Tolerated progression of treatment well. MT deferred due to no pain.    Patient Education/Response:     Re printed HEP of HS stretch, TA activation, seated trunk flexion stretch, and scapular depression/retraction and added piriformis stretch and glut sets with instruction to perform on non-therapy days.     Plans and Goals:     Cont POC, progress as tolerated. Avoid extension based exercise.     Short Term Goals (4 Weeks): 11/10/17  1. Pt will be compliant with HEP to supplement PT in decreasing pain with functional mobility.  2. Pt will perform and hold TA contraction for 10x10" in dynamic sitting activities to demonstrate improved core strength  3. Pt will improve impaired lumbar ROM by 10 deg in all planes to improve functional mobility.  4. Pt will improve impaired LE MMTs to >/=4/5 to improve strength for functional tasks.  5. Pt will improve L 9090 HS length by >/=10 deg to improve flexibility for normal movement patterns.   Long Term Goals (8 Weeks): 12/10/17  1. Pt will improve FOTO Lumbar Spine score to </= 37% limited to decrease perceived limitation with maintaining/changing body position  2. Pt will improve B 9090 HS length to </= 5 deg knee extension to improve flexibility for normal movement patterns.   3. Pt will perform and hold TA contraction for 10x10" in dynamic standing activities to demonstrate improved core strength  4. Pt will improve impaired LE MMTs to >/=4+/5 to improve strength for functional tasks.  5. Pt will report no pain during standing activities to promote functional QOL.  6. Pt will report no pain during lumbar extension ROM to promote functional mobility.  7. Pt will improve FOTO Hip score to </= 47% limited to decrease perceived limitation with maintaining/changing body position      "

## 2017-11-09 ENCOUNTER — TELEPHONE (OUTPATIENT)
Dept: REHABILITATION | Facility: HOSPITAL | Age: 72
End: 2017-11-09

## 2017-11-20 ENCOUNTER — OFFICE VISIT (OUTPATIENT)
Dept: FAMILY MEDICINE | Facility: CLINIC | Age: 72
End: 2017-11-20
Payer: MEDICARE

## 2017-11-20 VITALS
TEMPERATURE: 99 F | BODY MASS INDEX: 41.21 KG/M2 | HEART RATE: 92 BPM | HEIGHT: 61 IN | WEIGHT: 218.25 LBS | DIASTOLIC BLOOD PRESSURE: 80 MMHG | SYSTOLIC BLOOD PRESSURE: 122 MMHG

## 2017-11-20 DIAGNOSIS — L20.9 ATOPIC DERMATITIS, UNSPECIFIED TYPE: ICD-10-CM

## 2017-11-20 DIAGNOSIS — K52.9 GASTROENTERITIS: Primary | ICD-10-CM

## 2017-11-20 DIAGNOSIS — R82.90 CLOUDY URINE: ICD-10-CM

## 2017-11-20 LAB
BILIRUB SERPL-MCNC: NORMAL MG/DL
BLOOD URINE, POC: NORMAL
COLOR, POC UA: YELLOW
GLUCOSE UR QL STRIP: NORMAL
KETONES UR QL STRIP: NORMAL
LEUKOCYTE ESTERASE URINE, POC: NORMAL
NITRITE, POC UA: NORMAL
PH, POC UA: 7
PROTEIN, POC: NORMAL
SPECIFIC GRAVITY, POC UA: 1.01
UROBILINOGEN, POC UA: NORMAL

## 2017-11-20 PROCEDURE — 99214 OFFICE O/P EST MOD 30 MIN: CPT | Mod: 25,S$GLB,, | Performed by: INTERNAL MEDICINE

## 2017-11-20 PROCEDURE — 99999 PR PBB SHADOW E&M-EST. PATIENT-LVL III: CPT | Mod: PBBFAC,,, | Performed by: INTERNAL MEDICINE

## 2017-11-20 PROCEDURE — 81002 URINALYSIS NONAUTO W/O SCOPE: CPT | Mod: S$GLB,,, | Performed by: INTERNAL MEDICINE

## 2017-11-20 PROCEDURE — 99499 UNLISTED E&M SERVICE: CPT | Mod: S$GLB,,, | Performed by: INTERNAL MEDICINE

## 2017-11-20 NOTE — PATIENT INSTRUCTIONS
Drink plenty of water and fluids  Avoid dairy and high fiber meals until your stomach symptoms get better  Increase Omeprazole to twice daily until your stomach symptoms get better then go back to taking it once daily      Viral Gastroenteritis (Adult)    Gastroenteritis is commonly called the stomach flu. It is most often caused by a virus that affects the stomach and intestinal tract and usually lasts from 2 to 7 days. Common viruses causing gastroenteritis include norovirus, rotavirus, and hepatitis A. Non-viral causes of gastroenteritis include bacteria, parasites, and toxins.  The danger from repeated vomiting or diarrhea is dehydration. This is the loss of too much fluid from the body. When this occurs, body fluids must be replaced. Antibiotics do not help with this illness because it is usually viral.Simple home treatment will be helpful.  Symptoms of viral gastroenteritis may include:  · Watery, loose stools  · Stomach pain or abdominal cramps  · Fever and chills  · Nausea and vomiting  · Loss of bowel control  · Headache  Home care  Gastroenteritis is transmitted by contact with the stool or vomit of an infected person. This can occur from person to person or from contact with a contaminated surface.  Follow these guidelines when caring for yourself at home:  · If symptoms are severe, rest at home for the next 24 hours or until you are feeling better.  · Wash your hands with soap and water or use alcohol-based  to prevent the spread of infection. Wash your hands after touching anyone who is sick.  · Wash your hands or use alcohol-based  after using the toilet and before meals. Clean the toilet after each use.  Remember these tips when preparing food:  · People with diarrhea should not prepare or serve food to others. When preparing foods, wash your hands before and after.  · Wash your hands after using cutting boards, countertops, knives, or utensils that have been in contact with raw  food.  · Keep uncooked meats away from cooked and ready-to-eat foods.  Medicine  You may use acetaminophen or NSAID medicines like ibuprofen or naproxen to control fever unless another medicine was given. If you have chronic liver or kidney disease, talk with your healthcare provider before using these medicines. Also talk with your provider if you've had a stomach ulcer or gastrointestinal bleeding. Don't give aspirin to anyone under 18 years of age who is ill with a fever. It may cause severe liver damage. Don't use NSAIDS is you are already taking one for another condition (like arthritis) or are on aspirin (such as for heart disease or after a stroke).  If medicine for vomiting or diarrhea are prescribed, take these only as directed. Do not take over-the-counter medicines for vomiting or diarrhea unless instructed by your healthcare provider.  Diet  Follow these guidelines for food:  · Water and liquids are important so you don't get dehydrated. Drink a small amount at a time or suck on ice chips if you are vomiting.  · If you eat, avoid fatty, greasy, spicy, or fried foods.  · Don't eat dairy if you have diarrhea. This can make diarrhea worse.  · Avoid tobacco, alcohol, and caffeine which may worsen symptoms.  During the first 24 hours (the first full day), follow the diet below:  · Beverages. Sports drinks, soft drinks without caffeine, ginger ale, mineral water (plain or flavored), decaffeinated tea and coffee. If you are very dehydrated, sports drinks aren't a good choice. They have too much sugar and not enough electrolytes. In this case, commercially available products called oral rehydration solutions, are best.  · Soups. Eat clear broth, consommé, and bouillon.  · Desserts. Eat gelatin, popsicles, and fruit juice bars.  During the next 24 hours (the second day), you may add the following to the above:  · Hot cereal, plain toast, bread, rolls, and crackers  · Plain noodles, rice, mashed potatoes, chicken  noodle or rice soup  · Unsweetened canned fruit (avoid pineapple), bananas  · Limit fat intake to less than 15 grams per day. Do this by avoiding margarine, butter, oils, mayonnaise, sauces, gravies, fried foods, peanut butter, meat, poultry, and fish.  · Limit fiber and avoid raw or cooked vegetables, fresh fruits (except bananas), and bran cereals.  · Limit caffeine and chocolate. Don't use spices or seasonings other than salt.  · Limit dairy products.  · Avoid alcohol.  During the next 24 hours:  · Gradually resume a normal diet as you feel better and your symptoms improve.  · If at any time it starts getting worse again, go back to clear liquids until you feel better.  Follow-up care  Follow up with your healthcare provider, or as advised. Call your provider if you don't get better within 24 hours or if diarrhea lasts more than a week. Also follow up if you are unable to keep down liquids and get dehydrated. If a stool (diarrhea) sample was taken, call as directed for the results.  Call 911  Call 911 if any of these occur:  · Trouble breathing  · Chest pain  · Confused  · Severe drowsiness or trouble awakening  · Fainting or loss of consciousness  · Rapid heart rate  · Seizure  · Stiff neck  When to seek medical advice  Call your healthcare provider right away if any of these occur:  · Abdominal pain that gets worse  · Continued vomiting (unable to keep liquids down)  · Frequent diarrhea (more than 5 times a day)  · Blood in vomit or stool (black or red color)  · Dark urine, reduced urine output, or extreme thirst  · Weakness or dizziness  · Drowsiness  · Fever of 100.4°F (38°C) oral or higher that does not get better with fever medicine  · New rash  Date Last Reviewed: 1/3/2016  © 7017-0881 [employer]. [address] [place] [address], [place] [address]. All rights reserved. This information is not intended as a substitute for professional medical care. Always follow your healthcare professional's  instructions.

## 2017-12-19 ENCOUNTER — DOCUMENTATION ONLY (OUTPATIENT)
Dept: REHABILITATION | Facility: HOSPITAL | Age: 72
End: 2017-12-19

## 2017-12-19 DIAGNOSIS — M54.50 ACUTE BILATERAL LOW BACK PAIN WITHOUT SCIATICA: ICD-10-CM

## 2017-12-19 DIAGNOSIS — R53.1 DECREASED STRENGTH: ICD-10-CM

## 2017-12-19 DIAGNOSIS — M25.552 BILATERAL HIP PAIN: ICD-10-CM

## 2017-12-19 DIAGNOSIS — M25.551 BILATERAL HIP PAIN: ICD-10-CM

## 2017-12-19 DIAGNOSIS — Z74.09 IMPAIRED MOBILITY: ICD-10-CM

## 2017-12-19 NOTE — PROGRESS NOTES
Pt was evaluated on 10/10 and was seen 4 times for PT. Pt has not attended PT since 11/7/17. Pt was given HEP. Current status is unknown. Pt to be d/c'd at this time.

## 2017-12-26 DIAGNOSIS — K21.9 GASTROESOPHAGEAL REFLUX DISEASE WITHOUT ESOPHAGITIS: ICD-10-CM

## 2017-12-26 RX ORDER — OMEPRAZOLE 20 MG/1
CAPSULE, DELAYED RELEASE ORAL
Qty: 90 CAPSULE | Refills: 0 | Status: SHIPPED | OUTPATIENT
Start: 2017-12-26 | End: 2018-03-31 | Stop reason: SDUPTHER

## 2018-01-30 ENCOUNTER — OFFICE VISIT (OUTPATIENT)
Dept: FAMILY MEDICINE | Facility: CLINIC | Age: 73
End: 2018-01-30
Payer: MEDICARE

## 2018-01-30 VITALS
HEART RATE: 92 BPM | HEIGHT: 61 IN | TEMPERATURE: 99 F | BODY MASS INDEX: 41.96 KG/M2 | WEIGHT: 222.25 LBS | DIASTOLIC BLOOD PRESSURE: 62 MMHG | SYSTOLIC BLOOD PRESSURE: 118 MMHG

## 2018-01-30 DIAGNOSIS — E78.5 HYPERLIPIDEMIA, UNSPECIFIED HYPERLIPIDEMIA TYPE: ICD-10-CM

## 2018-01-30 DIAGNOSIS — L30.4 INTERTRIGO: ICD-10-CM

## 2018-01-30 DIAGNOSIS — L20.84 INTRINSIC ATOPIC DERMATITIS: Primary | ICD-10-CM

## 2018-01-30 DIAGNOSIS — G62.9 NEUROPATHY: ICD-10-CM

## 2018-01-30 PROCEDURE — 1159F MED LIST DOCD IN RCRD: CPT | Mod: S$GLB,,, | Performed by: FAMILY MEDICINE

## 2018-01-30 PROCEDURE — 99214 OFFICE O/P EST MOD 30 MIN: CPT | Mod: S$GLB,,, | Performed by: FAMILY MEDICINE

## 2018-01-30 PROCEDURE — 1125F AMNT PAIN NOTED PAIN PRSNT: CPT | Mod: S$GLB,,, | Performed by: FAMILY MEDICINE

## 2018-01-30 PROCEDURE — 99999 PR PBB SHADOW E&M-EST. PATIENT-LVL IV: CPT | Mod: PBBFAC,,, | Performed by: FAMILY MEDICINE

## 2018-01-30 PROCEDURE — 3008F BODY MASS INDEX DOCD: CPT | Mod: S$GLB,,, | Performed by: FAMILY MEDICINE

## 2018-01-30 PROCEDURE — 99499 UNLISTED E&M SERVICE: CPT | Mod: S$GLB,,, | Performed by: FAMILY MEDICINE

## 2018-01-30 RX ORDER — BETAMETHASONE VALERATE 1.2 MG/G
OINTMENT TOPICAL
Status: CANCELLED | OUTPATIENT
Start: 2018-01-30

## 2018-01-30 RX ORDER — PREDNISONE 10 MG/1
TABLET ORAL
Qty: 48 TABLET | Refills: 0 | Status: SHIPPED | OUTPATIENT
Start: 2018-01-30 | End: 2018-03-08 | Stop reason: SDUPTHER

## 2018-01-30 RX ORDER — PRAVASTATIN SODIUM 40 MG/1
40 TABLET ORAL DAILY
Qty: 90 TABLET | Refills: 2 | Status: SHIPPED | OUTPATIENT
Start: 2018-01-30 | End: 2018-07-25 | Stop reason: SDUPTHER

## 2018-01-30 RX ORDER — KETOCONAZOLE 20 MG/G
CREAM TOPICAL 2 TIMES DAILY
Qty: 30 G | Refills: 3 | Status: SHIPPED | OUTPATIENT
Start: 2018-01-30 | End: 2019-03-29 | Stop reason: SDUPTHER

## 2018-01-30 RX ORDER — CLOBETASOL PROPIONATE 0.5 MG/G
CREAM TOPICAL 2 TIMES DAILY
Qty: 60 G | Refills: 1 | Status: SHIPPED | OUTPATIENT
Start: 2018-01-30 | End: 2018-11-06

## 2018-01-30 NOTE — PROGRESS NOTES
Subjective:     Patient ID: Nilsa Curiel is a 72 y.o. female.    Chief Complaint: Rash (Hands / Fingers / Arms / Back of Neck - x1 week spreading Severe Itching )    Rash   This is a recurrent problem. The current episode started more than 1 year ago. The problem has been waxing and waning since onset. The affected locations include theleft arm. The rash is characterized by itchiness. She was exposed to nothing. Associated symptoms include fatigue. Pertinent negatives include no anorexia, congestion, cough, diarrhea, eye pain, facial edema, fever, joint pain, nail changes, rhinorrhea, shortness of breath, sore throat or vomiting. Past treatments include analgesics, anti-itch cream, antibiotic cream, cold compress and topical steroids. The treatment provided mild relief. There is no history of allergies, asthma, eczema or varicella.    rash for years off and on but worse in the last 10 days- she has been using her creams   Her skin is very itchy    Also complaining of intermittent numbness of the index and thumb fingers off and on for months   Review of Systems   Constitutional: Positive for fatigue. Negative for fever.   HENT: Negative for congestion, rhinorrhea and sore throat.    Eyes: Negative for pain.   Respiratory: Negative for cough and shortness of breath.    Gastrointestinal: Negative for anorexia, diarrhea and vomiting.   Musculoskeletal: Negative for joint pain.   Skin: Positive for rash. Negative for nail changes.       Objective:      Physical Exam   Constitutional: She is oriented to person, place, and time. She appears well-developed and well-nourished.   Neurological: She is alert and oriented to person, place, and time.   Negative tinnels or phalens of wrists.   Nursing note and vitals reviewed.    severe dry excoriated skin on arms , neck, chest and dry, lichenified skin of face as well  Assessment:     Nilsa was seen today for rash.    Diagnoses and all orders for this visit:    Intrinsic atopic  dermatitis  -     clobetasol (TEMOVATE) 0.05 % cream; Apply topically 2 (two) times daily.  -     predniSONE (DELTASONE) 10 MG tablet; 6  tablets for 4 days, then 4 tablets for 4  days, 2 tablets for 4 days   follow with dermatology  Hyperlipidemia, unspecified hyperlipidemia type  -     pravastatin (PRAVACHOL) 40 MG tablet; Take 1 tablet (40 mg total) by mouth once daily.    Intertrigo  -     ketoconazole (NIZORAL) 2 % cream; Apply topically 2 (two) times daily. Apply to axilla and abdominal creases    Neuropathy    Other orders  -     Cancel: betamethasone valerate 0.1% (VALISONE) 0.1 % Oint;     rtc for pe. If neuropathy gets worse call and I will order an emg

## 2018-03-08 DIAGNOSIS — L20.84 INTRINSIC ATOPIC DERMATITIS: ICD-10-CM

## 2018-03-08 RX ORDER — PREDNISONE 10 MG/1
TABLET ORAL
Qty: 48 TABLET | Refills: 0 | Status: SHIPPED | OUTPATIENT
Start: 2018-03-08 | End: 2018-03-27 | Stop reason: SDUPTHER

## 2018-03-22 ENCOUNTER — PATIENT MESSAGE (OUTPATIENT)
Dept: FAMILY MEDICINE | Facility: CLINIC | Age: 73
End: 2018-03-22

## 2018-03-22 ENCOUNTER — OFFICE VISIT (OUTPATIENT)
Dept: URGENT CARE | Facility: CLINIC | Age: 73
End: 2018-03-22
Payer: MEDICARE

## 2018-03-22 VITALS
TEMPERATURE: 98 F | HEIGHT: 60 IN | SYSTOLIC BLOOD PRESSURE: 176 MMHG | WEIGHT: 222 LBS | HEART RATE: 106 BPM | OXYGEN SATURATION: 97 % | DIASTOLIC BLOOD PRESSURE: 94 MMHG | BODY MASS INDEX: 43.59 KG/M2 | RESPIRATION RATE: 18 BRPM

## 2018-03-22 DIAGNOSIS — S46.911A MUSCLE STRAIN OF RIGHT UPPER EXTREMITY, INITIAL ENCOUNTER: ICD-10-CM

## 2018-03-22 DIAGNOSIS — S16.1XXA STRAIN OF CERVICAL PORTION OF RIGHT TRAPEZIUS MUSCLE: Primary | ICD-10-CM

## 2018-03-22 DIAGNOSIS — H92.01 OTALGIA, RIGHT EAR: ICD-10-CM

## 2018-03-22 PROCEDURE — 3080F DIAST BP >= 90 MM HG: CPT | Mod: CPTII,S$GLB,, | Performed by: EMERGENCY MEDICINE

## 2018-03-22 PROCEDURE — 99214 OFFICE O/P EST MOD 30 MIN: CPT | Mod: 25,S$GLB,, | Performed by: EMERGENCY MEDICINE

## 2018-03-22 PROCEDURE — 96372 THER/PROPH/DIAG INJ SC/IM: CPT | Mod: S$GLB,,, | Performed by: EMERGENCY MEDICINE

## 2018-03-22 PROCEDURE — 3077F SYST BP >= 140 MM HG: CPT | Mod: CPTII,S$GLB,, | Performed by: EMERGENCY MEDICINE

## 2018-03-22 RX ORDER — METHOCARBAMOL 500 MG/1
1000 TABLET, FILM COATED ORAL 3 TIMES DAILY
Qty: 60 TABLET | Refills: 0 | Status: SHIPPED | OUTPATIENT
Start: 2018-03-22 | End: 2018-04-01

## 2018-03-22 RX ORDER — NAPROXEN 500 MG/1
500 TABLET ORAL 2 TIMES DAILY WITH MEALS
Qty: 20 TABLET | Refills: 0 | Status: SHIPPED | OUTPATIENT
Start: 2018-03-22 | End: 2018-04-24

## 2018-03-22 RX ORDER — BETAMETHASONE SODIUM PHOSPHATE AND BETAMETHASONE ACETATE 3; 3 MG/ML; MG/ML
6 INJECTION, SUSPENSION INTRA-ARTICULAR; INTRALESIONAL; INTRAMUSCULAR; SOFT TISSUE
Status: COMPLETED | OUTPATIENT
Start: 2018-03-22 | End: 2018-03-22

## 2018-03-22 RX ADMIN — BETAMETHASONE SODIUM PHOSPHATE AND BETAMETHASONE ACETATE 6 MG: 3; 3 INJECTION, SUSPENSION INTRA-ARTICULAR; INTRALESIONAL; INTRAMUSCULAR; SOFT TISSUE at 02:03

## 2018-03-22 NOTE — PATIENT INSTRUCTIONS
Neck Sprain or Strain  A sudden force that causes turning or bending of the neck can cause sprain or strain. An example would be the force from a car accident. This can stretch or tear muscles called a strain. It can also stretch or tear ligaments called a sprain. Either of these can cause neck pain. Sometimes neck pain occurs after a simple awkward movement. In either case, muscle spasm is commonly present and contributes to the pain.     Unless you had a forceful physical injury (for example, a car accident or fall), X-rays are usually not ordered for the initial evaluation of neck pain. If pain continues and dose not respond to medical treatment, X-rays and other tests may be performed at a later time.  Home care  · You may feel more soreness and spasm the first few days after the injury. Rest until symptoms begin to improve.  · When lying down, use a comfortable pillow or a rolled towel that supports the head and keeps the spine in a neutral position. The position of the head should not be tilted forward or backward.  · Apply an ice pack over the injured area for 15 to 20 minutes every 3 to 6 hours. You should do this for the first 24 to 48 hours. You can make an ice pack by filling a plastic bag that seals at the top with ice cubes and then wrapping it with a thin towel. After 48 hours, apply heat (warm shower or warm bath) for 15 to 20 minutes several times a day, or alternate ice and heat.  · You may use over-the-counter pain medicine to control pain, unless another pain medicine was prescribed. If you have chronic liver or kidney disease or ever had a stomach ulcer or GI bleeding, talk with your healthcare provider before using these medicines.  · If a soft cervical collar was prescribed, it should be worn only for periods of increased pain. It should not be worn for more than 3 hours a day, or for a period longer than 1 to 2 weeks.  Follow-up care  Follow up with your healthcare provider as directed.  Physical therapy may be needed.  Sometimes fractures dont show up on the first X-ray. Bruises and sprains can sometimes hurt as much as a fracture. These injuries can take time to heal completely. If your symptoms dont improve or they get worse, talk with your healthcare provider. You may need a repeat X-ray or other tests. If X-rays were taken, you will be told of any new findings that may affect your care.  Call 911  Call 911 if you have:  · Neck swelling, difficulty or painful swallowing  · Difficulty breathing  · Chest pain  When to seek medical advice  Call your healthcare provider right away if any of these occur:  · Pain becomes worse or spreads into your arms  · Weakness or numbness in one or both arms  Date Last Reviewed: 11/19/2015 © 2000-2017 [employer]. [address] [place] [address], [place] [address]. All rights reserved. This information is not intended as a substitute for professional medical care. Always follow your healthcare professional's instructions.        Treating Strains and Sprains  Strains and sprains happen when muscles or other soft tissues near your bones stretch or tear. These injuries can cause bruising, swelling, and pain. To ease your discomfort and speed the healing of your strain or sprain, follow the tips below. Remember, a strain or sprain can take 6 to 8 weeks to heal.     Important Note: Do not give aspirin to children or teens without discussing it with your healthcare provider first.        Ice first, heat later  · Use ice for the first 24 to 48 hours after injury. Ice helps prevent swelling and reduce pain. Ice the injury for no more than 20 minutes at a time and allow at least  20 minutes between icing sessions.  · Apply heat after the first 72 hours, once the swelling has gone down. Heat relaxes muscles and increases blood flow. Soak the injured area in warm water or use a heating pad set on low for no more than 15 minutes at a time.  Wrap and elevate  · Wrap an  injured limb firmly with an elastic bandage. This provides support and helps prevent swelling. Dont wear an elastic bandage overnight. Watch for tingling, numbness, or increased pain, and remove the bandage immediately if any of these occurs.  · Elevate the injured area to help reduce swelling and throbbing. Its best to raise an injured limb above the level of your heart.     Medicines  · Over-the-counter medicines such as acetaminophen or ibuprofen can help reduce pain. Some also help reduce swelling.  · Take medicine only as directed.  · Rest the area even if medicines are controlling the pain.  Rest  · Rest the injured area by not using it for 24 hours.  · When youre ready, return slowly to your normal activities. Rest the injured area often.  · Dont use or walk on an injured limb if it hurts.  Date Last Reviewed: 9/3/2015  © 0123-8090 [employer]. [address] [place] [address], [place] [address]. All rights reserved. This information is not intended as a substitute for professional medical care. Always follow your healthcare professional's instructions.      REST AND ICE SORE AREAS  OK TO ALSO ALTERNATE HEAT AS WELL  1 CC CELESTONE GIVEN IN CLINIC  NAPROXEN RX TWICE DAILY FOR PAIN/INFLAMMATION  ROBAXIN RX FOR MUSCLE RELAXANT  FOLLOW UP WITH ORTHOPEDIST OR YOUR PCP IF NOT RELIEVED OR MUCH IMPROVED IN 1-2 WEEKS

## 2018-03-22 NOTE — PROGRESS NOTES
Subjective:       Patient ID: Nilsa Curiel is a 72 y.o. female.    Vitals:    03/22/18 1400   BP: (!) 176/94   Pulse: 106   Resp: 18   Temp: 97.7 °F (36.5 °C)       Chief Complaint: Otalgia (2 days RT ) and Back Pain (UPPER RT )    PT USED A HAIR PIN TO DIG IN HER EAR ON Monday, THIS HAS NOT BEEN DRAINING, NO FEVERS, NO SINUS NOR URI SYMPTOMS. NO RASH OF THE FACE NOR EAR NOR NECK NOR SHOULDER. SHE IS HERE FOR THAT BUT MORE SO THE ACHES AND SORE MUSCLE LIKE PAIN TO THE RIGHT LATERAL NECK, RIGHT TRAPEZIUS AREA AND ACTUALLY REPORTS TO ARM AS WELL. SHE STATES THEY ARE TAKING UP THE CARPET AND REPLACING IT WITH TILES AND SHE HAS BEEN LIFTING LOTS OF FURNOTRE AND SUPPLIES AND SHE HAS BEEN RIGHT THERE WITH THEM LIFTING FURNITURE,/ETC AND AFTERWARDS THAT IS WHEN HER SYMPTOMS BEGAN. NO DIRECT TRAUMA. NO WEAKNESS, NO NUMBNESS, NO TINGLING.      Otalgia    There is pain in the right ear. This is a new problem. The current episode started today. The problem has been gradually worsening. There has been no fever. The pain is at a severity of 5/10. The pain is moderate. Associated symptoms include headaches and neck pain (RT SIDE). Pertinent negatives include no abdominal pain, coughing or sore throat. She has tried nothing for the symptoms. The treatment provided moderate relief. There is no history of hearing loss or a tympanostomy tube.     Review of Systems   Constitution: Negative for chills, fever and malaise/fatigue.   HENT: Negative for congestion, ear pain, hoarse voice and sore throat.    Eyes: Negative for discharge and redness.   Cardiovascular: Negative for chest pain, dyspnea on exertion and leg swelling.   Respiratory: Negative for cough, shortness of breath, sputum production and wheezing.    Musculoskeletal: Positive for back pain and neck pain (RT SIDE). Negative for myalgias.   Gastrointestinal: Negative for abdominal pain and nausea.   Neurological: Positive for headaches.       Objective:      Physical Exam    Constitutional: She is oriented to person, place, and time. She appears well-developed and well-nourished. She is cooperative.  Non-toxic appearance. She does not appear ill. No distress.   HENT:   Head: Normocephalic and atraumatic.   Right Ear: Hearing, tympanic membrane and ear canal normal.   Left Ear: Hearing, tympanic membrane, external ear and ear canal normal.   Nose: Nose normal. No mucosal edema, rhinorrhea or nasal deformity. No epistaxis. Right sinus exhibits no maxillary sinus tenderness and no frontal sinus tenderness. Left sinus exhibits no maxillary sinus tenderness and no frontal sinus tenderness.   Mouth/Throat: Uvula is midline, oropharynx is clear and moist and mucous membranes are normal. No trismus in the jaw. Normal dentition. No uvula swelling. No posterior oropharyngeal erythema.   RIGHT EAC, MINIMAL IRRITATION WITHOUT EXUDATE, NO EDEMA, MINIMAL ERYTHEMA, NO FLUID NO DRAINAGE, NO FOREIGN BODY   Eyes: Conjunctivae and lids are normal. Right eye exhibits no discharge. Left eye exhibits no discharge. No scleral icterus.   Sclera clear bilat   Neck: Trachea normal, normal range of motion, full passive range of motion without pain and phonation normal. Neck supple.   Cardiovascular: Normal rate, regular rhythm, normal heart sounds, intact distal pulses and normal pulses.    Pulmonary/Chest: Effort normal and breath sounds normal. No respiratory distress.   Abdominal: Soft. Normal appearance and bowel sounds are normal. She exhibits no pulsatile midline mass and no mass. There is no tenderness.   Musculoskeletal: Normal range of motion. She exhibits tenderness (TTP OF THE RIGHT UPPER BACK AND RIGHT CERVICAL PARASPINOUS MUSCLES, AND POSTERIOR DELTOIDS. ROM NORMAL HOWEVER MILD MUSCULAR SPASM. NO RASH NOR VESICLES NOTED LIKE SHINGLES.). She exhibits no edema or deformity.   Neurological: She is alert and oriented to person, place, and time. She exhibits normal muscle tone.   Skin: Skin is warm, dry  and intact. She is not diaphoretic. No pallor.   Psychiatric: She has a normal mood and affect. Her speech is normal and behavior is normal. Cognition and memory are normal.   Nursing note and vitals reviewed.      Assessment:       1. Strain of cervical portion of right trapezius muscle    2. Muscle strain of right upper extremity, initial encounter    3. Otalgia, right ear        Plan:       Nilsa was seen today for otalgia and back pain.    Diagnoses and all orders for this visit:    Strain of cervical portion of right trapezius muscle    Muscle strain of right upper extremity, initial encounter    Otalgia, right ear  Comments:  irritant from dameon pin, no infection    Other orders  -     betamethasone acetate-betamethasone sodium phosphate injection 6 mg; Inject 1 mL (6 mg total) into the muscle one time.  -     naproxen (NAPROSYN) 500 MG tablet; Take 1 tablet (500 mg total) by mouth 2 (two) times daily with meals.  -     methocarbamol (ROBAXIN) 500 MG Tab; Take 2 tablets (1,000 mg total) by mouth 3 (three) times daily.          Patient Instructions       Neck Sprain or Strain  A sudden force that causes turning or bending of the neck can cause sprain or strain. An example would be the force from a car accident. This can stretch or tear muscles called a strain. It can also stretch or tear ligaments called a sprain. Either of these can cause neck pain. Sometimes neck pain occurs after a simple awkward movement. In either case, muscle spasm is commonly present and contributes to the pain.     Unless you had a forceful physical injury (for example, a car accident or fall), X-rays are usually not ordered for the initial evaluation of neck pain. If pain continues and dose not respond to medical treatment, X-rays and other tests may be performed at a later time.  Home care  · You may feel more soreness and spasm the first few days after the injury. Rest until symptoms begin to improve.  · When lying down, use a  comfortable pillow or a rolled towel that supports the head and keeps the spine in a neutral position. The position of the head should not be tilted forward or backward.  · Apply an ice pack over the injured area for 15 to 20 minutes every 3 to 6 hours. You should do this for the first 24 to 48 hours. You can make an ice pack by filling a plastic bag that seals at the top with ice cubes and then wrapping it with a thin towel. After 48 hours, apply heat (warm shower or warm bath) for 15 to 20 minutes several times a day, or alternate ice and heat.  · You may use over-the-counter pain medicine to control pain, unless another pain medicine was prescribed. If you have chronic liver or kidney disease or ever had a stomach ulcer or GI bleeding, talk with your healthcare provider before using these medicines.  · If a soft cervical collar was prescribed, it should be worn only for periods of increased pain. It should not be worn for more than 3 hours a day, or for a period longer than 1 to 2 weeks.  Follow-up care  Follow up with your healthcare provider as directed. Physical therapy may be needed.  Sometimes fractures dont show up on the first X-ray. Bruises and sprains can sometimes hurt as much as a fracture. These injuries can take time to heal completely. If your symptoms dont improve or they get worse, talk with your healthcare provider. You may need a repeat X-ray or other tests. If X-rays were taken, you will be told of any new findings that may affect your care.  Call 911  Call 911 if you have:  · Neck swelling, difficulty or painful swallowing  · Difficulty breathing  · Chest pain  When to seek medical advice  Call your healthcare provider right away if any of these occur:  · Pain becomes worse or spreads into your arms  · Weakness or numbness in one or both arms  Date Last Reviewed: 11/19/2015  © 7192-6949 [employer]. [address] [place] [address], [place] [address]. All rights reserved. This  information is not intended as a substitute for professional medical care. Always follow your healthcare professional's instructions.        Treating Strains and Sprains  Strains and sprains happen when muscles or other soft tissues near your bones stretch or tear. These injuries can cause bruising, swelling, and pain. To ease your discomfort and speed the healing of your strain or sprain, follow the tips below. Remember, a strain or sprain can take 6 to 8 weeks to heal.     Important Note: Do not give aspirin to children or teens without discussing it with your healthcare provider first.        Ice first, heat later  · Use ice for the first 24 to 48 hours after injury. Ice helps prevent swelling and reduce pain. Ice the injury for no more than 20 minutes at a time and allow at least  20 minutes between icing sessions.  · Apply heat after the first 72 hours, once the swelling has gone down. Heat relaxes muscles and increases blood flow. Soak the injured area in warm water or use a heating pad set on low for no more than 15 minutes at a time.  Wrap and elevate  · Wrap an injured limb firmly with an elastic bandage. This provides support and helps prevent swelling. Dont wear an elastic bandage overnight. Watch for tingling, numbness, or increased pain, and remove the bandage immediately if any of these occurs.  · Elevate the injured area to help reduce swelling and throbbing. Its best to raise an injured limb above the level of your heart.     Medicines  · Over-the-counter medicines such as acetaminophen or ibuprofen can help reduce pain. Some also help reduce swelling.  · Take medicine only as directed.  · Rest the area even if medicines are controlling the pain.  Rest  · Rest the injured area by not using it for 24 hours.  · When youre ready, return slowly to your normal activities. Rest the injured area often.  · Dont use or walk on an injured limb if it hurts.  Date Last Reviewed: 9/3/2015  © 2261-8676 [employer]. [address] [place] [address], [place] [address]. All rights reserved. This information is not intended as a substitute for professional medical care. Always follow your healthcare professional's instructions.      REST AND ICE SORE AREAS  OK TO ALSO ALTERNATE HEAT AS WELL  1 CC CELESTONE GIVEN IN CLINIC  NAPROXEN RX TWICE DAILY FOR PAIN/INFLAMMATION  ROBAXIN RX FOR MUSCLE RELAXANT  FOLLOW UP WITH ORTHOPEDIST OR YOUR PCP IF NOT RELIEVED OR MUCH IMPROVED IN 1-2 WEEKS

## 2018-03-27 ENCOUNTER — HOSPITAL ENCOUNTER (OUTPATIENT)
Dept: RADIOLOGY | Facility: HOSPITAL | Age: 73
Discharge: HOME | End: 2018-03-27
Attending: FAMILY MEDICINE
Payer: MEDICARE

## 2018-03-27 ENCOUNTER — OFFICE VISIT (OUTPATIENT)
Dept: FAMILY MEDICINE | Facility: CLINIC | Age: 73
End: 2018-03-27
Payer: MEDICARE

## 2018-03-27 VITALS
HEART RATE: 88 BPM | WEIGHT: 220 LBS | HEIGHT: 61 IN | DIASTOLIC BLOOD PRESSURE: 78 MMHG | BODY MASS INDEX: 41.54 KG/M2 | TEMPERATURE: 98 F | SYSTOLIC BLOOD PRESSURE: 148 MMHG

## 2018-03-27 DIAGNOSIS — I10 HYPERTENSION, UNSPECIFIED TYPE: ICD-10-CM

## 2018-03-27 DIAGNOSIS — M54.2 NECK PAIN, CHRONIC: ICD-10-CM

## 2018-03-27 DIAGNOSIS — M25.511 CHRONIC RIGHT SHOULDER PAIN: ICD-10-CM

## 2018-03-27 DIAGNOSIS — G89.29 NECK PAIN, CHRONIC: Primary | ICD-10-CM

## 2018-03-27 DIAGNOSIS — G89.29 NECK PAIN, CHRONIC: ICD-10-CM

## 2018-03-27 DIAGNOSIS — E78.5 HYPERLIPIDEMIA, UNSPECIFIED HYPERLIPIDEMIA TYPE: ICD-10-CM

## 2018-03-27 DIAGNOSIS — G89.29 CHRONIC RIGHT SHOULDER PAIN: ICD-10-CM

## 2018-03-27 DIAGNOSIS — M54.2 NECK PAIN, CHRONIC: Primary | ICD-10-CM

## 2018-03-27 DIAGNOSIS — R73.09 ELEVATED GLUCOSE: ICD-10-CM

## 2018-03-27 PROBLEM — M54.50 ACUTE BILATERAL LOW BACK PAIN WITHOUT SCIATICA: Status: RESOLVED | Noted: 2017-10-10 | Resolved: 2018-03-27

## 2018-03-27 PROBLEM — M25.552 BILATERAL HIP PAIN: Status: RESOLVED | Noted: 2017-10-10 | Resolved: 2018-03-27

## 2018-03-27 PROBLEM — M25.551 BILATERAL HIP PAIN: Status: RESOLVED | Noted: 2017-10-10 | Resolved: 2018-03-27

## 2018-03-27 PROCEDURE — 99214 OFFICE O/P EST MOD 30 MIN: CPT | Mod: S$GLB,,, | Performed by: FAMILY MEDICINE

## 2018-03-27 PROCEDURE — 99999 PR PBB SHADOW E&M-EST. PATIENT-LVL III: CPT | Mod: PBBFAC,,, | Performed by: FAMILY MEDICINE

## 2018-03-27 PROCEDURE — 73030 X-RAY EXAM OF SHOULDER: CPT | Mod: 26,RT,, | Performed by: RADIOLOGY

## 2018-03-27 PROCEDURE — 72040 X-RAY EXAM NECK SPINE 2-3 VW: CPT | Mod: TC,PO

## 2018-03-27 PROCEDURE — 73030 X-RAY EXAM OF SHOULDER: CPT | Mod: TC,PO,RT

## 2018-03-27 PROCEDURE — 99499 UNLISTED E&M SERVICE: CPT | Mod: S$GLB,,, | Performed by: FAMILY MEDICINE

## 2018-03-27 PROCEDURE — 72040 X-RAY EXAM NECK SPINE 2-3 VW: CPT | Mod: 26,,, | Performed by: RADIOLOGY

## 2018-03-27 PROCEDURE — 3078F DIAST BP <80 MM HG: CPT | Mod: CPTII,S$GLB,, | Performed by: FAMILY MEDICINE

## 2018-03-27 PROCEDURE — 3077F SYST BP >= 140 MM HG: CPT | Mod: CPTII,S$GLB,, | Performed by: FAMILY MEDICINE

## 2018-03-27 RX ORDER — PREDNISONE 10 MG/1
TABLET ORAL
Qty: 40 TABLET | Refills: 0 | Status: SHIPPED | OUTPATIENT
Start: 2018-03-27 | End: 2018-04-24

## 2018-03-27 NOTE — PROGRESS NOTES
Subjective:     Patient ID: Nilsa Curiel is a 72 y.o. female.    Chief Complaint: Neck Pain (after moving boxes in home ) and Back Pain (upper back pain after moving boxes in home )    HPI she was lifting some heavy objects last week and strained her right side of her neck- the pain is going the right side of her neck and is radiating down the right  Arm to the middle of the upper arm and also down the middle of her upper back. She was seen un UC last week. And was diagnosed with a strain and given a steroid shot and some naproxen and robaxin. She didn't find the naproxen was helping much -she instead was taking the muscle relaxers. She felt it was muscle relaxer was helping more in the beginning.    she is having some numbness in the thumb and index fingers of both hands for months and that's not new. Mostly in the day times.    Review of Systems  per HPI  Objective:      Physical Exam   Constitutional: She is oriented to person, place, and time. She appears well-developed and well-nourished.   Musculoskeletal: She exhibits edema, tenderness and deformity.   Acute spasm and tension and tenderness of right c spine from C2 -C7 with acute tenderness of paravertebral muscles on rt to shoulder and down to rt mid humerus.   Positive empty can test on right. And pain with shoulder abduction and internal rotation and adduction. Also pain on palpation of ac joint     Neurological: She is alert and oriented to person, place, and time. She displays abnormal reflex. A sensory deficit (decreased sensation to light touch of both thumb and index fingers bilaterally) is present. No cranial nerve deficit. She exhibits abnormal muscle tone (spasm of rt sided c-spine). Coordination normal.   Nursing note reviewed.      Assessment:     Nilsa was seen today for neck pain and back pain.    Diagnoses and all orders for this visit:    Neck pain, chronic  -     predniSONE (DELTASONE) 10 MG tablet; 4 tablets for 4 days, 3 tablets for 4  days, 2 tablets for 4 days, then 1 tablet for 4 days.  -     X-Ray Cervical Spine AP And Lateral; Future    Chronic right shoulder pain  -     predniSONE (DELTASONE) 10 MG tablet; 4 tablets for 4 days, 3 tablets for 4 days, 2 tablets for 4 days, then 1 tablet for 4 days.  -     X-ray Shoulder 2 or More Views Right; Future    Hypertension, unspecified type  -     CBC auto differential; Future  -     TSH; Future    Hyperlipidemia, unspecified hyperlipidemia type  -     Comprehensive metabolic panel; Future  -     Lipid panel; Future    Elevated glucose  -     Hemoglobin A1c; Future    if pain or neuropathy in fingers not better win a week or so with prednisone and naproxen  Then she is to let me know and I will order an emg and then  MRI of c spine

## 2018-03-28 ENCOUNTER — PATIENT MESSAGE (OUTPATIENT)
Dept: FAMILY MEDICINE | Facility: CLINIC | Age: 73
End: 2018-03-28

## 2018-03-28 DIAGNOSIS — E83.52 HYPERCALCEMIA: Primary | ICD-10-CM

## 2018-03-31 DIAGNOSIS — K21.9 GASTROESOPHAGEAL REFLUX DISEASE WITHOUT ESOPHAGITIS: ICD-10-CM

## 2018-04-02 RX ORDER — OMEPRAZOLE 20 MG/1
CAPSULE, DELAYED RELEASE ORAL
Qty: 90 CAPSULE | Refills: 1 | Status: SHIPPED | OUTPATIENT
Start: 2018-04-02 | End: 2018-04-24

## 2018-04-03 ENCOUNTER — PATIENT MESSAGE (OUTPATIENT)
Dept: FAMILY MEDICINE | Facility: CLINIC | Age: 73
End: 2018-04-03

## 2018-04-11 ENCOUNTER — PATIENT MESSAGE (OUTPATIENT)
Dept: FAMILY MEDICINE | Facility: CLINIC | Age: 73
End: 2018-04-11

## 2018-04-11 ENCOUNTER — LAB VISIT (OUTPATIENT)
Dept: LAB | Facility: HOSPITAL | Age: 73
End: 2018-04-11
Attending: FAMILY MEDICINE
Payer: MEDICARE

## 2018-04-11 DIAGNOSIS — E83.52 HYPERCALCEMIA: ICD-10-CM

## 2018-04-11 LAB
CALCIUM SERPL-MCNC: 11 MG/DL
PTH-INTACT SERPL-MCNC: 81 PG/ML

## 2018-04-11 PROCEDURE — 36415 COLL VENOUS BLD VENIPUNCTURE: CPT | Mod: PO

## 2018-04-11 PROCEDURE — 82310 ASSAY OF CALCIUM: CPT

## 2018-04-11 PROCEDURE — 83970 ASSAY OF PARATHORMONE: CPT

## 2018-04-24 ENCOUNTER — OFFICE VISIT (OUTPATIENT)
Dept: FAMILY MEDICINE | Facility: CLINIC | Age: 73
End: 2018-04-24
Payer: MEDICARE

## 2018-04-24 VITALS
BODY MASS INDEX: 42.16 KG/M2 | HEIGHT: 61 IN | WEIGHT: 223.31 LBS | DIASTOLIC BLOOD PRESSURE: 60 MMHG | TEMPERATURE: 98 F | SYSTOLIC BLOOD PRESSURE: 138 MMHG | HEART RATE: 104 BPM

## 2018-04-24 DIAGNOSIS — G62.9 NEUROPATHY: ICD-10-CM

## 2018-04-24 DIAGNOSIS — K21.9 GASTROESOPHAGEAL REFLUX DISEASE WITHOUT ESOPHAGITIS: ICD-10-CM

## 2018-04-24 DIAGNOSIS — I77.9 BILATERAL CAROTID ARTERY DISEASE: ICD-10-CM

## 2018-04-24 DIAGNOSIS — G56.03 BILATERAL CARPAL TUNNEL SYNDROME: ICD-10-CM

## 2018-04-24 DIAGNOSIS — Z12.11 SPECIAL SCREENING FOR MALIGNANT NEOPLASMS, COLON: ICD-10-CM

## 2018-04-24 DIAGNOSIS — Z00.00 ROUTINE GENERAL MEDICAL EXAMINATION AT A HEALTH CARE FACILITY: Primary | ICD-10-CM

## 2018-04-24 DIAGNOSIS — R73.01 ABNORMAL FASTING GLUCOSE: ICD-10-CM

## 2018-04-24 DIAGNOSIS — Z74.09 IMPAIRED MOBILITY: ICD-10-CM

## 2018-04-24 DIAGNOSIS — I51.89 DIASTOLIC DYSFUNCTION WITHOUT HEART FAILURE: ICD-10-CM

## 2018-04-24 DIAGNOSIS — R53.1 DECREASED STRENGTH: ICD-10-CM

## 2018-04-24 DIAGNOSIS — E78.5 HYPERLIPIDEMIA, UNSPECIFIED HYPERLIPIDEMIA TYPE: ICD-10-CM

## 2018-04-24 DIAGNOSIS — I10 ESSENTIAL HYPERTENSION: ICD-10-CM

## 2018-04-24 DIAGNOSIS — D64.9 ANEMIA, UNSPECIFIED TYPE: ICD-10-CM

## 2018-04-24 DIAGNOSIS — E66.01 MORBID OBESITY WITH BMI OF 40.0-44.9, ADULT: ICD-10-CM

## 2018-04-24 DIAGNOSIS — I51.7 LEFT VENTRICULAR HYPERTROPHY: ICD-10-CM

## 2018-04-24 DIAGNOSIS — E21.3 HYPERPARATHYROIDISM: ICD-10-CM

## 2018-04-24 LAB
CREAT UR-MCNC: 29 MG/DL
MICROALBUMIN UR DL<=1MG/L-MCNC: <2.5 UG/ML
MICROALBUMIN/CREATININE RATIO: NORMAL UG/MG

## 2018-04-24 PROCEDURE — 3075F SYST BP GE 130 - 139MM HG: CPT | Mod: CPTII,S$GLB,, | Performed by: FAMILY MEDICINE

## 2018-04-24 PROCEDURE — 3078F DIAST BP <80 MM HG: CPT | Mod: CPTII,S$GLB,, | Performed by: FAMILY MEDICINE

## 2018-04-24 PROCEDURE — 82043 UR ALBUMIN QUANTITATIVE: CPT

## 2018-04-24 PROCEDURE — 99397 PER PM REEVAL EST PAT 65+ YR: CPT | Mod: S$GLB,,, | Performed by: FAMILY MEDICINE

## 2018-04-24 PROCEDURE — 99499 UNLISTED E&M SERVICE: CPT | Mod: S$GLB,,, | Performed by: FAMILY MEDICINE

## 2018-04-24 PROCEDURE — 99999 PR PBB SHADOW E&M-EST. PATIENT-LVL IV: CPT | Mod: PBBFAC,,, | Performed by: FAMILY MEDICINE

## 2018-04-24 NOTE — PROGRESS NOTES
Subjective:     Patient ID: Nilsa Curiel is a 72 y.o. female.    Chief Complaint: Annual Exam    HPI  Review of Systems   Constitutional: Negative for appetite change, fatigue and fever.   HENT: Negative for hearing loss and sore throat.    Eyes: Negative.    Respiratory: Negative for cough, chest tightness, shortness of breath and wheezing.    Cardiovascular: Negative for chest pain, palpitations and leg swelling.   Gastrointestinal: Negative for abdominal pain, blood in stool, constipation, diarrhea, nausea and vomiting.   Endocrine: Negative.    Genitourinary: Negative for difficulty urinating, dysuria, frequency, hematuria, menstrual problem, pelvic pain and urgency.   Musculoskeletal: Positive for back pain (low back-chronic- not into legs) and neck pain (intermittent).        Occasional leg cramps   Skin: Negative for pallor and rash.   Allergic/Immunologic: Negative.    Neurological: Negative for dizziness, syncope, light-headedness and headaches.        Numbness in fingers bilaterally     Hematological: Negative for adenopathy.   Psychiatric/Behavioral: Negative.  Negative for dysphoric mood and sleep disturbance.       Objective:      Physical Exam   Constitutional: She is oriented to person, place, and time. She appears well-developed and well-nourished.   HENT:   Head: Normocephalic and atraumatic.   Right Ear: External ear normal.   Left Ear: External ear normal.   Nose: Nose normal.   Mouth/Throat: Oropharynx is clear and moist.   Eyes: Conjunctivae and EOM are normal. Pupils are equal, round, and reactive to light.   Neck: Normal range of motion. Neck supple. No JVD present. No thyromegaly present.   Cardiovascular: Normal rate, regular rhythm and intact distal pulses.    No murmur heard.  Pulses:       Dorsalis pedis pulses are 2+ on the right side, and 2+ on the left side.        Posterior tibial pulses are 2+ on the right side, and 2+ on the left side.   Gr 3/6 ELDER at left superior sternal border    Pulmonary/Chest: Effort normal and breath sounds normal.   Abdominal: Soft. Bowel sounds are normal. She exhibits no mass. There is no tenderness.   Musculoskeletal: Normal range of motion. She exhibits no edema.        Right foot: There is normal range of motion and no deformity.        Left foot: There is normal range of motion and no deformity.   Feet:   Right Foot:   Protective Sensation: 10 sites tested. 10 sites sensed.   Skin Integrity: Negative for skin breakdown or erythema.   Left Foot:   Protective Sensation: 10 sites tested. 10 sites sensed.   Skin Integrity: Negative for skin breakdown or erythema.   Lymphadenopathy:     She has no cervical adenopathy.   Neurological: She is alert and oriented to person, place, and time. She has normal reflexes.   Skin: Skin is warm and dry.   Heavy acanthosis nigricans and dry skin   Psychiatric: She has a normal mood and affect. Her behavior is normal. Judgment and thought content normal.   Vitals reviewed.      Assessment:     Nilsa was seen today for annual exam.    Diagnoses and all orders for this visit:    Routine general medical examination at a health care facility    Essential hypertension  -     Microalbumin/creatinine urine ratio    Hyperlipidemia, unspecified hyperlipidemia type    Left ventricular hypertrophy    Diastolic dysfunction without heart failure  -     2D Echo w/ Color Flow Doppler; Future    Bilateral carotid artery disease    Morbid obesity with BMI of 40.0-44.9, adult  Discussed gastric sleeve. She is going to attend the info lectures at [facility]     Hyperparathyroidism  -     Ambulatory referral to Endocrinology    Abnormal fasting glucose  Continuing current management.  Encourage low carb diet and regular exercise    Neuropathy  -     EMG W/ ULTRASOUND AND NERVE CONDUCTION TEST 2 Extremities; Future    Bilateral carpal tunnel syndrome   -     EMG W/ ULTRASOUND AND NERVE CONDUCTION TEST 2 Extremities; Future    Special screening for  malignant neoplasms, colon  -     Fecal Immunochemical Test (iFOBT); Future    Anemia, unspecified type  Reviewed hematology consult in chart-probably musltifactoral    Mild aortic sclerosis  -     2D Echo w/ Color Flow Doppler; Future    Decreased strength    Gastroesophageal reflux disease without esophagitis    Impaired mobility  Encourage exercise  recheck 6 months

## 2018-04-25 ENCOUNTER — PATIENT MESSAGE (OUTPATIENT)
Dept: ENDOCRINOLOGY | Facility: CLINIC | Age: 73
End: 2018-04-25

## 2018-04-25 ENCOUNTER — OFFICE VISIT (OUTPATIENT)
Dept: ENDOCRINOLOGY | Facility: CLINIC | Age: 73
End: 2018-04-25
Payer: MEDICARE

## 2018-04-25 VITALS
BODY MASS INDEX: 42.21 KG/M2 | DIASTOLIC BLOOD PRESSURE: 80 MMHG | HEART RATE: 100 BPM | HEIGHT: 61 IN | RESPIRATION RATE: 20 BRPM | SYSTOLIC BLOOD PRESSURE: 162 MMHG | WEIGHT: 223.56 LBS

## 2018-04-25 DIAGNOSIS — E21.3 HYPERPARATHYROIDISM: ICD-10-CM

## 2018-04-25 DIAGNOSIS — E83.52 HYPERCALCEMIA: Primary | ICD-10-CM

## 2018-04-25 PROCEDURE — 3077F SYST BP >= 140 MM HG: CPT | Mod: CPTII,S$GLB,, | Performed by: INTERNAL MEDICINE

## 2018-04-25 PROCEDURE — 99204 OFFICE O/P NEW MOD 45 MIN: CPT | Mod: S$GLB,,, | Performed by: INTERNAL MEDICINE

## 2018-04-25 PROCEDURE — 99999 PR PBB SHADOW E&M-EST. PATIENT-LVL III: CPT | Mod: PBBFAC,,, | Performed by: INTERNAL MEDICINE

## 2018-04-25 PROCEDURE — 99499 UNLISTED E&M SERVICE: CPT | Mod: S$GLB,,, | Performed by: INTERNAL MEDICINE

## 2018-04-25 PROCEDURE — 3079F DIAST BP 80-89 MM HG: CPT | Mod: CPTII,S$GLB,, | Performed by: INTERNAL MEDICINE

## 2018-04-25 NOTE — PATIENT INSTRUCTIONS
Today we are checking your vitamin D, parathyroid hormone and calcium levels. If your vitamin D levels are normal, we will proceed with a urine test. It is a 24 hr collection, we will give you instructions.

## 2018-04-25 NOTE — PROGRESS NOTES
Subjective:     Patient ID: Nilsa Curiel is a 72 y.o. female.    Chief Complaint: Consult    HPI:   Ms. Curiel is a 72 y.o. female who is here for a consult visit for evaluation hypercalcemia and hyperparathyroidism.   Review of labs demonstrates hypercalcemia dating back to 2015.   Previous care at [facility], she is unsure of hypercalcemia prior to 2015. Denies family history of hypercalcemia.     Reports fatigue. Taking prednisone 5 mg for rash over her hand, last dose was one week ago. Takes intermittently for rash, this is for atopic dermatitis. (over the past year, started taking one daily for a few weeks, but had not taken it in several years).     Denies falls or fractures. Denies kidney stones.   Bone density was normal three years ago.     Supplements:  MVI  Stopped calcium supplements  Denies vitamin D supplementation.     Denies known family history of calcium disorders.     Review of Systems   Constitutional: Negative for chills and fever.   HENT: Negative for congestion and sinus pressure.    Eyes: Negative for visual disturbance.   Respiratory: Negative for chest tightness and shortness of breath.    Cardiovascular: Negative for chest pain, palpitations and leg swelling.   Gastrointestinal: Negative for abdominal pain and vomiting.   Genitourinary: Negative for dysuria.   Musculoskeletal: Negative for arthralgias.   Skin: Negative for rash.   Neurological: Negative for weakness.   Hematological: Does not bruise/bleed easily.   Psychiatric/Behavioral: Negative for sleep disturbance.     Objective:     Physical Exam   Constitutional: She is oriented to person, place, and time. She appears well-developed and well-nourished. No distress.   Eyes: Conjunctivae and EOM are normal. Pupils are equal, round, and reactive to light. No scleral icterus.   Neck: Normal range of motion. Neck supple. No thyromegaly present.   Cardiovascular: Normal rate and intact distal pulses.    Pulmonary/Chest: Effort  "normal and breath sounds normal.   Neurological: She is alert and oriented to person, place, and time. She has normal reflexes.   No tremor.   Skin: Skin is warm and dry.   Psychiatric: She has a normal mood and affect.       Vitals:    04/25/18 1010   BP: (!) 162/80   Pulse: 100   Resp: 20   Weight: 101.4 kg (223 lb 8.7 oz)   Height: 5' 1" (1.549 m)     Results for CB CURIEL (MRN [id number]) as of 4/25/2018 10:57   Ref. Range 4/10/2017 09:59 8/7/2017 11:10 8/24/2017 07:58 3/27/2018 12:00 4/11/2018 10:56   eGFR if non African American Latest Ref Range: >60 mL/min/1.73 m^2 57.6 (A) >60.0  57.2 (A)    eGFR if African American Latest Ref Range: >60 mL/min/1.73 m^2 >60.0 >60.0  >60.0    Glucose Latest Ref Range: 70 - 110 mg/dL 92 100  96    Calcium Latest Ref Range: 8.7 - 10.5 mg/dL 10.7 (H) 10.4  11.3 (H) 11.0 (H)   Results for CB CURIEL (MRN [id number]) as of 4/25/2018 10:57   Ref. Range 12/29/2016 08:10 4/10/2017 09:59 3/27/2018 12:00 4/11/2018 10:56   PTH Latest Ref Range: 9.0 - 77.0 pg/mL 107.0 (H)   81.0 (H)   7/2015:  Normal BMD of lumbar spine and hip  RECOMMENDATIONS:  1. Adequate Calcium and Vitamin D.  2. Repeat BMD in 4 years.  Assessment/Plan:       Ms. Curiel is a 72 year old woman with a history of intermittent steroid use who comes to clinic for hypercalcemia in the setting of elevated parathyroid hormone and normal kidney function. Unknown vitamin D status.   Normal BMD in 2015, consider repeat in 2019.   No more steroids for now, although has a chronic condition which necessitates intermittent use of steroids.     1. Hypercalcemia    - Renal function panel; Future  - PTH, intact; Future  - VITAMIN D; Future  - Calcium, Timed Urine; Future  - Creatinine, urine, timed 24 Hours; Future    2. Hyperparathyroidism    - Renal function panel; Future  - PTH, intact; Future  - VITAMIN D; Future  - Calcium, Timed Urine; Future  - Creatinine, urine, timed 24 Hours; Future        "

## 2018-04-25 NOTE — LETTER
April 25, 2018      Aleshai Gimenez, DO  [address] [facility]  [address]  [facility] [address]           [facility] - Endo/Diab/Metab  [address]  [facility] [address]  Phone: [phone]  Fax: [phone]          Patient: Nilsa Curiel   MR Number: [id number]   YOB: 1945   Date of Visit: 4/25/2018       Dear Dr. Aleshia Gimenez:    Thank you for referring Nilsa Curiel to me for evaluation. Attached you will find relevant portions of my assessment and plan of care.    If you have questions, please do not hesitate to call me. I look forward to following Nilsa Curiel along with you.    Sincerely,    Roberta Rodriguez MD    Enclosure  CC:  No Recipients    If you would like to receive this communication electronically, please contact [email or web address] or [phone] to request more information on [employer]clip Link access.    For providers and/or their staff who would like to refer a patient to [facility], please contact us through our one-stop-shop provider referral line, [facility], at [phone].    If you feel you have received this communication in error or would no longer like to receive these types of communications, please e-mail [email or web address]

## 2018-04-25 NOTE — PROGRESS NOTES
FitKit was given to patient on 04/24/2018 10:30 AM     Instructions reviewed on the FitKit & patient verbalized understanding.

## 2018-04-25 NOTE — TELEPHONE ENCOUNTER
Results for orders placed or performed in visit on 04/25/18   Renal function panel   Result Value Ref Range    Glucose 102 70 - 110 mg/dL    Sodium 144 136 - 145 mmol/L    Potassium 4.1 3.5 - 5.1 mmol/L    Chloride 109 95 - 110 mmol/L    CO2 26 23 - 29 mmol/L    BUN, Bld 13 8 - 23 mg/dL    Calcium 11.1 (H) 8.7 - 10.5 mg/dL    Creatinine 0.8 0.5 - 1.4 mg/dL    Albumin 3.7 3.5 - 5.2 g/dL    Phosphorus 2.5 (L) 2.7 - 4.5 mg/dL    eGFR if African American >60.0 >60 mL/min/1.73 m^2    eGFR if non African American >60.0 >60 mL/min/1.73 m^2    Anion Gap 9 8 - 16 mmol/L   PTH, intact   Result Value Ref Range    PTH, Intact 121.0 (H) 9.0 - 77.0 pg/mL   VITAMIN D   Result Value Ref Range    Vit D, 25-Hydroxy 37 30 - 96 ng/mL       biochemically suspicious for primary hyperparathyroidism.   Needs 24 hr urine   Consideration for surgery given calcium levels.

## 2018-05-08 ENCOUNTER — CLINICAL SUPPORT (OUTPATIENT)
Dept: CARDIOLOGY | Facility: CLINIC | Age: 73
End: 2018-05-08
Attending: FAMILY MEDICINE
Payer: MEDICARE

## 2018-05-08 DIAGNOSIS — I51.89 DIASTOLIC DYSFUNCTION WITHOUT HEART FAILURE: ICD-10-CM

## 2018-05-08 DIAGNOSIS — I51.89 DEPRESSED RIGHT VENTRICULAR SYSTOLIC FUNCTION: ICD-10-CM

## 2018-05-08 DIAGNOSIS — R93.1 ABNORMAL ECHOCARDIOGRAM: Primary | ICD-10-CM

## 2018-05-08 DIAGNOSIS — I51.89 RIGHT VENTRICULAR DIASTOLIC DYSFUNCTION: ICD-10-CM

## 2018-05-08 LAB
DIASTOLIC DYSFUNCTION: YES
ESTIMATED PA SYSTOLIC PRESSURE: 35.95
RETIRED EF AND QEF - SEE NOTES: 73 (ref 55–65)
TRICUSPID VALVE REGURGITATION: ABNORMAL

## 2018-05-08 PROCEDURE — 93306 TTE W/DOPPLER COMPLETE: CPT | Mod: S$GLB,,, | Performed by: INTERNAL MEDICINE

## 2018-05-11 ENCOUNTER — LAB VISIT (OUTPATIENT)
Dept: LAB | Facility: HOSPITAL | Age: 73
End: 2018-05-11
Attending: INTERNAL MEDICINE
Payer: MEDICARE

## 2018-05-11 DIAGNOSIS — E83.52 HYPERCALCEMIA: ICD-10-CM

## 2018-05-11 DIAGNOSIS — E21.3 HYPERPARATHYROIDISM: ICD-10-CM

## 2018-05-11 LAB
CALCIUM 24H UR-MRATE: 6 MG/HR
CALCIUM UR-MCNC: 5.7 MG/DL
CALCIUM URINE (MG/SPEC): 137 MG/SPEC
CREAT 24H UR-MRATE: 42 MG/HR
CREAT UR-MCNC: 42 MG/DL
CREATININE, URINE (MG/SPEC): 1008 MG/SPEC
URINE COLLECTION DURATION: 24 HR
URINE COLLECTION DURATION: 24 HR
URINE VOLUME: 2400 ML
URINE VOLUME: 2400 ML

## 2018-05-11 PROCEDURE — 82340 ASSAY OF CALCIUM IN URINE: CPT

## 2018-05-11 PROCEDURE — 82570 ASSAY OF URINE CREATININE: CPT

## 2018-05-20 DIAGNOSIS — I10 ESSENTIAL HYPERTENSION: ICD-10-CM

## 2018-05-20 DIAGNOSIS — Z76.0 MEDICATION REFILL: ICD-10-CM

## 2018-05-21 RX ORDER — AMLODIPINE BESYLATE 10 MG/1
TABLET ORAL
Qty: 90 TABLET | Refills: 0 | Status: SHIPPED | OUTPATIENT
Start: 2018-05-21 | End: 2018-06-29 | Stop reason: SDUPTHER

## 2018-05-21 RX ORDER — LOSARTAN POTASSIUM 100 MG/1
TABLET ORAL
Qty: 90 TABLET | Refills: 0 | Status: SHIPPED | OUTPATIENT
Start: 2018-05-21 | End: 2018-06-29 | Stop reason: SDUPTHER

## 2018-05-22 ENCOUNTER — PES CALL (OUTPATIENT)
Dept: ADMINISTRATIVE | Facility: CLINIC | Age: 73
End: 2018-05-22

## 2018-05-29 ENCOUNTER — PROCEDURE VISIT (OUTPATIENT)
Dept: NEUROLOGY | Facility: CLINIC | Age: 73
End: 2018-05-29
Payer: MEDICARE

## 2018-05-29 DIAGNOSIS — G56.03 BILATERAL CARPAL TUNNEL SYNDROME: ICD-10-CM

## 2018-05-29 DIAGNOSIS — G62.9 NEUROPATHY: ICD-10-CM

## 2018-05-29 PROCEDURE — 95886 MUSC TEST DONE W/N TEST COMP: CPT | Mod: S$GLB,,, | Performed by: NEUROMUSCULOSKELETAL MEDICINE & OMM

## 2018-05-29 PROCEDURE — 95910 NRV CNDJ TEST 7-8 STUDIES: CPT | Mod: S$GLB,,, | Performed by: NEUROMUSCULOSKELETAL MEDICINE & OMM

## 2018-05-29 NOTE — PROCEDURES
Procedures     EMG Needle exam performed by me.  Nerve conduction studies were also performed by me without the assistance of the technician

## 2018-06-29 ENCOUNTER — OFFICE VISIT (OUTPATIENT)
Dept: FAMILY MEDICINE | Facility: CLINIC | Age: 73
End: 2018-06-29
Payer: MEDICARE

## 2018-06-29 VITALS
SYSTOLIC BLOOD PRESSURE: 134 MMHG | BODY MASS INDEX: 40.75 KG/M2 | HEART RATE: 101 BPM | DIASTOLIC BLOOD PRESSURE: 68 MMHG | TEMPERATURE: 98 F | HEIGHT: 61 IN | WEIGHT: 215.81 LBS

## 2018-06-29 DIAGNOSIS — E21.3 HYPERPARATHYROIDISM: ICD-10-CM

## 2018-06-29 DIAGNOSIS — Z74.09 IMPAIRED MOBILITY: ICD-10-CM

## 2018-06-29 DIAGNOSIS — E66.01 MORBID OBESITY WITH BMI OF 40.0-44.9, ADULT: Primary | ICD-10-CM

## 2018-06-29 DIAGNOSIS — M47.812 CERVICAL ARTHRITIS: ICD-10-CM

## 2018-06-29 DIAGNOSIS — M16.10 HIP ARTHRITIS: ICD-10-CM

## 2018-06-29 DIAGNOSIS — I51.89 DIASTOLIC DYSFUNCTION WITHOUT HEART FAILURE: ICD-10-CM

## 2018-06-29 DIAGNOSIS — I10 ESSENTIAL HYPERTENSION: ICD-10-CM

## 2018-06-29 DIAGNOSIS — G56.03 BILATERAL CARPAL TUNNEL SYNDROME: ICD-10-CM

## 2018-06-29 DIAGNOSIS — R53.1 DECREASED STRENGTH: ICD-10-CM

## 2018-06-29 DIAGNOSIS — M51.36 DEGENERATIVE DISC DISEASE, LUMBAR: ICD-10-CM

## 2018-06-29 DIAGNOSIS — E78.5 HYPERLIPIDEMIA, UNSPECIFIED HYPERLIPIDEMIA TYPE: ICD-10-CM

## 2018-06-29 DIAGNOSIS — D64.9 ANEMIA, UNSPECIFIED TYPE: ICD-10-CM

## 2018-06-29 PROCEDURE — 3075F SYST BP GE 130 - 139MM HG: CPT | Mod: CPTII,S$GLB,, | Performed by: FAMILY MEDICINE

## 2018-06-29 PROCEDURE — 3078F DIAST BP <80 MM HG: CPT | Mod: CPTII,S$GLB,, | Performed by: FAMILY MEDICINE

## 2018-06-29 PROCEDURE — 99999 PR PBB SHADOW E&M-EST. PATIENT-LVL IV: CPT | Mod: PBBFAC,,, | Performed by: FAMILY MEDICINE

## 2018-06-29 PROCEDURE — 99214 OFFICE O/P EST MOD 30 MIN: CPT | Mod: S$GLB,,, | Performed by: FAMILY MEDICINE

## 2018-06-29 PROCEDURE — 99499 UNLISTED E&M SERVICE: CPT | Mod: HCNC,S$GLB,, | Performed by: FAMILY MEDICINE

## 2018-06-29 RX ORDER — GABAPENTIN 300 MG/1
CAPSULE ORAL
Qty: 60 CAPSULE | Refills: 5 | Status: SHIPPED | OUTPATIENT
Start: 2018-06-29 | End: 2020-06-17 | Stop reason: SDUPTHER

## 2018-06-29 NOTE — PROGRESS NOTES
"Subjective:     Patient ID: Nilsa Curiel is a 72 y.o. female.    Chief Complaint: Results (follow up) and Groin Pain rt side    HPI   This pleasant 72 y.o. Black or  female  presents for management  of their medical problems including   Patient Active Problem List   Diagnosis    Hypertension    Hyperlipidemia    GERD (gastroesophageal reflux disease)    Atopic dermatitis    Abnormal fasting glucose    Morbid obesity with BMI of 40.0-44.9, adult    Left ventricular hypertrophy    Diastolic dysfunction without heart failure    Hyperparathyroidism    Decreased strength    Impaired mobility    Bilateral carotid artery disease    Anemia    Hypercalcemia   she didn't hear back about her EMG results that she had back in May. She has known neck pains and also presented with pain and  tinglin in both her hands and so we sent her for emg studies. Unfortunately we didnt get those back in the" in box" so I had to apologize for not call her with those results.    She is also bothered with low back pain and some occasional achey pains down her legs and. She did go for some PT in the past which seemed to be helping but she has transportation issues and want able to make those appointments regularly and so she stopped going. We discussed how important it is to continue to try and be physically active. We also discussed her obestiy and that getting her weight off would help all these things. She is interested in gastric sleeve surgery. She is going to try and attand the education class at the [facility] in a few weeks.   Review of Systems  low back pain , radicular pain in both hips -worse on rt.   Tingling and weakness in both hands - rt worse than left.   No marnie pain or SOB  Objective:      Physical Exam   Constitutional: She appears well-developed and well-nourished.   HENT:   Head: Normocephalic and atraumatic.   Nose: Nose normal.   Mouth/Throat: Oropharynx is clear and moist.   Crowded airway-  "   Nursing note and vitals reviewed.      Assessment:     Nilsa was seen today for results and groin pain.    Diagnoses and all orders for this visit:    Morbid obesity with BMI of 40.0-44.9, adult  -     Ambulatory Referral to Bariatric Surgery ([facility])    Bilateral carpal tunnel syndrome  -     gabapentin (NEURONTIN) 300 MG capsule; Take 1 by mouth daily for 3 days, then twice daily for 3 days,  Literature on CTS - what it is , how to treat, ice, stretch, splinting, rest. and injection, surgery if need be, If not better we can refer to orthopedics.   Degenerative disc disease, lumbar  Try and do exercises to maintain strength    Cervical arthritis  Try and do exercises to maintain strength    Hip arthritis  Try and do exercises to maintain strength    Essential hypertension  Continuing current management.  Hyperlipidemia, unspecified hyperlipidemia type  Continuing current management.  Diastolic dysfunction without heart failure  Continuing current management.  Anemia, unspecified type  Continuing current management.  Hyperparathyroidism  Continuing current management.  Decreased strength  Try and do exercises to maintain strength    Impaired mobility  Try and do exercises to maintain strength

## 2018-06-29 NOTE — PATIENT INSTRUCTIONS
Understanding Carpal Tunnel Syndrome    The carpal tunnel is a narrow space inside the wrist. It is ringed by bone and a band of tough tissue called the transverse carpal ligament. A major nerve called the median nerve runs from the forearm into the hand through the carpal tunnel. Tendons also run through the carpal tunnel.  With carpal tunnel syndrome, the tendons or nearby tissues within the carpal tunnel may swell or thicken. Or the transverse carpal ligament may harden and shorten. This narrows the space in the carpal tunnel and puts pressure on the median nerve. This pressure leads to tingling and numbness of the hand and wrist. In time, the condition can make even simple tasks hard to do.  What causes carpal tunnel syndrome?  Doctors arent entirely clear why the condition occurs. Certain things may make a person more likely to have it. These include:  · Being female  · Being pregnant  · Being overweight  · Having diabetes or rheumatoid arthritis  Symptoms of carpal tunnel syndrome  Symptoms often come and go. At first, symptoms may occur mainly at night. Later, they may be noticed during the day as well. They may get worse with activities such as driving, reading, typing, or holding a phone. Symptoms can include:  · Tingling and numbness in the hand or wrist  · Sharp pain that shoots up the arm or down to the fingers  · Hand stiffness or cramping, especially in the morning  · Trouble making a fist  · Hand weakness and clumsiness  Treatment for carpal tunnel syndrome  Certain treatments help reduce the pressure on the median nerve and relieve symptoms. Choices for treatment may include one or more of the following:  · Wrist splint. This involves wearing a special brace on the wrist and hand. The splint holds the wrist straight, in a neutral position. This helps keep the carpal tunnel as open as possible.  · Cortisone shots. Cortisone is a medicine that helps reduce swelling. It is injected directly into the  wrist. It helps shrink tissues inside the carpal tunnel. This relieves symptoms for a time.  · Pain medicines. You may take over-the-counter or prescription medicines to help reduce swelling and relieve symptoms.  · Surgery. If the condition doesnt respond to other treatments and doesnt go away on its own, you may need surgery. During surgery, the surgeon cuts the transverse carpal ligament to relieve pressure on the median nerve.     When to call your healthcare provider  Call your healthcare provider right away if you have any of these:  · Fever of 100.4°F (38°C) or higher, or as directed  · Symptoms that dont get better, or get worse  · New symptoms   Date Last Reviewed: 3/10/2016  © 6778-9113 [employer]. [address] [place] [address], [place] [address]. All rights reserved. This information is not intended as a substitute for professional medical care. Always follow your healthcare professional's instructions.        Carpal Tunnel Syndrome Prevention Tips  Some repetitive hand activities put you at higher risk for carpal tunnel syndrome (CTS). But you can reduce your risk. Learn how to change the way you use your hands. Below are tips for at home and on the job. Be sure to also follow the hand and wrist safety policies at your workplace.      Keep your wrist in a neutral (straight) position when exercising.      Keep your wrist in neutral  Keep a neutral (straight) wrist position as often as you can. Dont use your wrist in a bent (flexed) position for long periods of time. This includes extended or twisted positions.  Watch your   Dont just use your thumb and index finger to grasp or lift. This can put stress on your wrist. When you can, use your whole hand and all its fingers to grasp an object.  Minimize repetition  Dont move your arms or hands or hold an object in the same way for long periods of time. Even simple, light tasks can cause injury this way. Instead, alternate tasks or switch  hands.  Rest your hands  Give your hands a break from time to time with a rest. Even a few minutes once an hour can help.  Reduce speed and force  Slow down the speed in which you do a forceful, repetitive motion. This gives your wrist time to recover from the effort. Use power tools to help reduce the force.  Strengthen the muscles  Weak muscles may lead to a poor wrist or arm position. Exercises will make your hand and arm muscles stronger. This can help you keep a better position.  Date Last Reviewed: 9/11/2015 © 2000-2017 [employer]. [address] [place] [address]. All rights reserved. This information is not intended as a substitute for professional medical care. Always follow your healthcare professional's instructions.        ¿Qué es el síndrome del túnel carpiano?    El túnel carpiano es un espacio estrecho en el interior de la jacob. Está rodeado por hueso y jorge guillaume de tejido resistente llamado ligamento carpiano transverso. Un nervio importante llamado nervio mediano va del antebrazo a la mano a través del túnel carpiano. También pasan tendones por el túnel carpiano.  Cuando hay síndrome del túnel carpiano, los tendones o los tejidos cercanos dentro del túnel carpiano pueden inflamarse o engrosarse. O puede que el ligamento carpiano transverso se endurezca y se acorte. [place] reduce el espacio dentro del túnel carpiano y ejerce presión sobre el nervio mediano. Esta presión produce cosquilleo y entumecimiento de la mano y la jacob. Con el tiempo, esta afección puede dificultarle realizar incluso las tareas simples.  ¿Cuáles son las causas del síndrome del túnel carpiano?  Los médicos no saben con certeza por qué se produce esta afección. Hay ciertas cosas que pueden aumentar las probabilidades de que jorge persona tenga ashley síndrome. Incluyen:  · Ser inés  · Estar embarazada  · Tener sobrepeso  · Tener diabetes o artritis reumatoide  Síntomas del síndrome del riddhi da silva  Los  síntomas suelen aparecer y desaparecer. Al principio, los síntomas pueden aparecer principalmente por la noche. Luego, puede que los note también kamilah el día. Pueden empeorar con actividades tales bekah conducir, tipear o sostener un teléfono. Los síntomas pueden incluir lo siguiente:  · Cosquilleo y entumecimiento en la mano o la jacob  · Dolor bibi que se dispara hacia el brazo o hacia los dedos  · Rigidez o calambres en la mano, en especial por la mañana  · Dificultad para cerrar la mano en un puño  · Debilidad y torpeza en la mano  Tratamiento del síndrome del túnel rekha  Ciertos tratamientos pueden ayudar a reducir la presión sobre el nervio mediano y aliviar los síntomas. Las opciones de tratamiento pueden incluir jorge o varias de las siguientes cosas:  · Férula en la jacob. Implica usar jorge muñequera especial que cubre la jacob y la mano. La férula sostiene la jacob en forma recta, en posición neutra. Eso ayuda a que el túnel carpiano se mantenga lo más abierto posible.  · Inyecciones de cortisona. La cortisona es un medicamento que ayuda a reducir la inflamación. Se inyecta directamente en la jacob. Ayuda a contraer los tejidos dentro del túnel carpiano. Widener neymar los síntomas por algún tiempo.  · Medicamentos analgésicos (calmantes). Puede german medicamentos recetados o de venta juarez para ayudarle a reducir la inflamación y aliviar los síntomas.  · Cirugía. Si la afección no responde maryjane a otros tratamientos y no se va por sí guzman, puede necesitar cirugía. Kamilah la cirugía, el cirujano corta el ligamento carpiano transverso para aliviar la presión sobre el nervio mediano.  Cuándo llamar a blanc proveedor de atención médica  Llame a blanc proveedor de atención médica de inmediato si nota alguno de los siguientes síntomas:  · Fiebre de 100.4º F (38º C) o superior, o según le indiquen  · Los síntomas no mejoran, o empeoran  · Síntomas nuevos   Date Last Reviewed: 3/10/2016  © 0025-2663 [employer]. [address] [place] [address], [place] [address]. Todos los derechos reservados. Esta información no pretende sustituir la atención médica profesional. Sólo blanc médico puede diagnosticar y tratar un problema de vinny.        Carpal Tunnel Release Surgery  Surgery may be done if your carpal tunnel syndrome (CTS) symptoms become severe. Or, you may have surgery if no other treatment brings relief. There are 2 types of CTS procedures. You will be told about the one you will have. Youll also be instructed how to prepare for it.      The goals of surgery  Two types of surgery--open and endoscopic--are used to treat CTS.  · With open surgery, your surgeon makes one incision in your palm. Standard surgical tools are used.  · With endoscopic surgery, one or two small incisions may be made in your hand. A scope (with a very small camera attached) and tools are inserted under the carpal ligament. The surgeon then operates while watching images on a video screen. No matter which one you have, the goal remains the same: Your surgeon will relieve pressure on the median nerve. To do this, the transverse carpal ligament is cut (released).  After surgery  If youve had carpal tunnel surgery, you will spend a few hours resting before you go home. The nerve sensation and circulation in your hand will be checked at this time. For the safest healing, keep the following in mind.  · Keep your hand raised above heart level. This will help reduce swelling.  · Limit hand and wrist use as instructed. A wrist brace may be required.  · Take any pain medication as directed.  · Do hand exercises as directed by your surgeon or therapist.  When to call the surgeon  Call your surgeon if you notice any of the following:  · White or pale-blue hand or nails (If you pinch your skin or nail and the color doesnt return)  · Pain that is not relieved by prescribed medicine  · Loss of sensation or excess swelling in hand or fingers  · Fever over  100.4°F (38°C)   Date Last Reviewed: 9/11/2015  © 3107-3135 [employer]. [address] [place] [address], [place] [address]. All rights reserved. This information is not intended as a substitute for professional medical care. Always follow your healthcare professional's instructions.      Bariatric class [id number]- [id number] Tuesday July 17th [facility]  Second floor  [place] [facility] [address] [address]

## 2018-07-17 ENCOUNTER — PATIENT MESSAGE (OUTPATIENT)
Dept: ENDOCRINOLOGY | Facility: CLINIC | Age: 73
End: 2018-07-17

## 2018-07-17 NOTE — TELEPHONE ENCOUNTER
Hypercalcemia (11), hyperparathyroidism and normal vitamin D  Normal kidney function  Urine calcium 137 mg/specimen  Normal bone density    PLAN:  1) Encourage fluid intake  2) discuss surgery versus medical treatment  3) msg sent

## 2018-07-25 ENCOUNTER — OFFICE VISIT (OUTPATIENT)
Dept: FAMILY MEDICINE | Facility: CLINIC | Age: 73
End: 2018-07-25
Payer: MEDICARE

## 2018-07-25 VITALS
TEMPERATURE: 99 F | BODY MASS INDEX: 40.29 KG/M2 | WEIGHT: 213.38 LBS | HEART RATE: 103 BPM | DIASTOLIC BLOOD PRESSURE: 68 MMHG | SYSTOLIC BLOOD PRESSURE: 130 MMHG | HEIGHT: 61 IN

## 2018-07-25 DIAGNOSIS — G47.33 OSA (OBSTRUCTIVE SLEEP APNEA): ICD-10-CM

## 2018-07-25 DIAGNOSIS — M43.17 SPONDYLOLISTHESIS OF LUMBOSACRAL REGION: Primary | ICD-10-CM

## 2018-07-25 DIAGNOSIS — I10 ESSENTIAL HYPERTENSION: ICD-10-CM

## 2018-07-25 DIAGNOSIS — L20.84 INTRINSIC ATOPIC DERMATITIS: ICD-10-CM

## 2018-07-25 DIAGNOSIS — E78.5 HYPERLIPIDEMIA, UNSPECIFIED HYPERLIPIDEMIA TYPE: ICD-10-CM

## 2018-07-25 DIAGNOSIS — M48.062 NEUROGENIC CLAUDICATION DUE TO LUMBAR SPINAL STENOSIS: ICD-10-CM

## 2018-07-25 PROCEDURE — 99999 PR PBB SHADOW E&M-EST. PATIENT-LVL III: CPT | Mod: PBBFAC,,, | Performed by: FAMILY MEDICINE

## 2018-07-25 PROCEDURE — 99214 OFFICE O/P EST MOD 30 MIN: CPT | Mod: S$GLB,,, | Performed by: FAMILY MEDICINE

## 2018-07-25 PROCEDURE — 3075F SYST BP GE 130 - 139MM HG: CPT | Mod: CPTII,S$GLB,, | Performed by: FAMILY MEDICINE

## 2018-07-25 PROCEDURE — 3078F DIAST BP <80 MM HG: CPT | Mod: CPTII,S$GLB,, | Performed by: FAMILY MEDICINE

## 2018-07-25 PROCEDURE — 99499 UNLISTED E&M SERVICE: CPT | Mod: S$GLB,,, | Performed by: FAMILY MEDICINE

## 2018-07-25 RX ORDER — BETAMETHASONE VALERATE 1.2 MG/G
OINTMENT TOPICAL 2 TIMES DAILY PRN
Qty: 45 G | Refills: 2 | Status: SHIPPED | OUTPATIENT
Start: 2018-07-25 | End: 2018-11-06

## 2018-07-25 RX ORDER — AMLODIPINE BESYLATE 10 MG/1
TABLET ORAL
Qty: 90 TABLET | Refills: 2 | Status: SHIPPED | OUTPATIENT
Start: 2018-07-25 | End: 2019-07-14 | Stop reason: SDUPTHER

## 2018-07-25 RX ORDER — PRAVASTATIN SODIUM 40 MG/1
40 TABLET ORAL DAILY
Qty: 90 TABLET | Refills: 2 | Status: SHIPPED | OUTPATIENT
Start: 2018-07-25 | End: 2021-12-29

## 2018-07-25 RX ORDER — LOSARTAN POTASSIUM 100 MG/1
50 TABLET ORAL DAILY
Qty: 90 TABLET | Refills: 2 | Status: SHIPPED | OUTPATIENT
Start: 2018-07-25 | End: 2018-11-01

## 2018-07-25 RX ORDER — HYDROCHLOROTHIAZIDE 25 MG/1
25 TABLET ORAL DAILY PRN
Qty: 90 TABLET | Refills: 2 | Status: SHIPPED | OUTPATIENT
Start: 2018-07-25 | End: 2018-11-06

## 2018-07-25 NOTE — PROGRESS NOTES
Subjective:     Patient ID: Nilsa Curiel is a 72 y.o. female.    Chief Complaint: Rash (hands); Back Pain; and Leg Pain    HPI she has severe atopic dermatitis and she has been using those and still she is having severe itching of the back of her hands, and also her arms. -for the past 2 weeks     She is also complaining of low back pain- if she is leaning on a basket she is ok but if not she gets pain -nagging low back pain - sometimes on right and sometimes on left. The pain often radiates down into her legs  She awakes at times gasping for breath  Review of Systems   Constitutional: Positive for fatigue.        Snoring   Skin: Positive for rash (long hx of AD -flair recently -off steroids).       Objective:      Physical Exam   Constitutional: She is oriented to person, place, and time. She appears well-developed and well-nourished.   HENT:   Head: Normocephalic and atraumatic.   Right Ear: External ear normal.   Left Ear: External ear normal.   Nose: Nose normal.   Crowded airway with high riding tongue   Eyes: Conjunctivae and EOM are normal. Pupils are equal, round, and reactive to light.   Neck: Normal range of motion. Neck supple. No JVD present. No thyromegaly present.   Cardiovascular: Normal rate, regular rhythm, normal heart sounds and intact distal pulses.    No murmur heard.  Pulmonary/Chest: Effort normal and breath sounds normal.   Abdominal: Soft. Bowel sounds are normal. She exhibits no mass. There is no tenderness.   Musculoskeletal: She exhibits no edema (trace).   Lymphadenopathy:     She has no cervical adenopathy.   Neurological: She is alert and oriented to person, place, and time. She has normal reflexes.   Positive SLR - worse on left than rt   Skin: Skin is warm and dry.   Psychiatric: She has a normal mood and affect. Her behavior is normal. Judgment and thought content normal.   Vitals reviewed.      Assessment:     Nilsa was seen today for rash, back pain and leg pain.    Diagnoses and  all orders for this visit:    Spondylolisthesis of lumbosacral region  -     MRI Lumbar Spine Without Contrast; Future    Neurogenic claudication due to lumbar spinal stenosis  -     MRI Lumbar Spine Without Contrast; Future    Essential hypertension  -     amLODIPine (NORVASC) 10 MG tablet; TAKE 1 TABLET(10 MG) BY MOUTH EVERY DAY  -     hydroCHLOROthiazide (HYDRODIURIL) 25 MG tablet; Take 1 tablet (25 mg total) by mouth daily as needed.  -     losartan (COZAAR) 100 MG tablet; Take 0.5 tablets (50 mg total) by mouth once daily.    Hyperlipidemia, unspecified hyperlipidemia type  -     pravastatin (PRAVACHOL) 40 MG tablet; Take 1 tablet (40 mg total) by mouth once daily.    MONE (obstructive sleep apnea)  Obstructive sleep apnea (MONE),    with persistent symptoms of disruptive snoring, history of witnessed apneic pauses, un-refreshing frequent disrupted sleep, morning headaches,  and excessive daytime sleepiness, with exam findings of a crowded oral airway, with medical comorbidities of HTN, DM2.     Intrinsic atopic dermatitis  -     betamethasone valerate 0.1% (VALISONE) 0.1 % Oint; Apply topically 2 (two) times daily as needed.

## 2018-08-07 ENCOUNTER — TELEPHONE (OUTPATIENT)
Dept: SLEEP MEDICINE | Facility: OTHER | Age: 73
End: 2018-08-07

## 2018-08-08 ENCOUNTER — HOSPITAL ENCOUNTER (OUTPATIENT)
Dept: RADIOLOGY | Facility: HOSPITAL | Age: 73
Discharge: HOME | End: 2018-08-08
Attending: FAMILY MEDICINE
Payer: MEDICARE

## 2018-08-08 DIAGNOSIS — M48.062 NEUROGENIC CLAUDICATION DUE TO LUMBAR SPINAL STENOSIS: ICD-10-CM

## 2018-08-08 DIAGNOSIS — M43.17 SPONDYLOLISTHESIS OF LUMBOSACRAL REGION: ICD-10-CM

## 2018-08-08 PROCEDURE — 72148 MRI LUMBAR SPINE W/O DYE: CPT | Mod: TC

## 2018-08-08 PROCEDURE — 72148 MRI LUMBAR SPINE W/O DYE: CPT | Mod: 26,,, | Performed by: RADIOLOGY

## 2018-08-09 DIAGNOSIS — M48.07 SPINAL STENOSIS OF LUMBOSACRAL REGION: Primary | ICD-10-CM

## 2018-08-24 DIAGNOSIS — Z76.0 MEDICATION REFILL: ICD-10-CM

## 2018-08-24 DIAGNOSIS — I10 ESSENTIAL HYPERTENSION: ICD-10-CM

## 2018-08-24 RX ORDER — LOSARTAN POTASSIUM 100 MG/1
TABLET ORAL
Qty: 90 TABLET | Refills: 0 | Status: CANCELLED | OUTPATIENT
Start: 2018-08-24

## 2018-08-27 RX ORDER — LOSARTAN POTASSIUM 100 MG/1
TABLET ORAL
Qty: 90 TABLET | Refills: 0 | OUTPATIENT
Start: 2018-08-27

## 2018-08-31 ENCOUNTER — OFFICE VISIT (OUTPATIENT)
Dept: DERMATOLOGY | Facility: CLINIC | Age: 73
End: 2018-08-31
Payer: MEDICARE

## 2018-08-31 DIAGNOSIS — L20.9 ATOPIC DERMATITIS, UNSPECIFIED TYPE: Primary | ICD-10-CM

## 2018-08-31 PROCEDURE — 99214 OFFICE O/P EST MOD 30 MIN: CPT | Mod: S$GLB,,, | Performed by: DERMATOLOGY

## 2018-08-31 PROCEDURE — 99999 PR PBB SHADOW E&M-EST. PATIENT-LVL II: CPT | Mod: PBBFAC,,, | Performed by: DERMATOLOGY

## 2018-08-31 RX ORDER — TRIAMCINOLONE ACETONIDE 1 MG/G
CREAM TOPICAL
Qty: 454 G | Refills: 3 | Status: SHIPPED | OUTPATIENT
Start: 2018-08-31 | End: 2019-10-16 | Stop reason: SDUPTHER

## 2018-08-31 NOTE — PATIENT INSTRUCTIONS
Discussed good skin care and a brochure was reviewed and provided.  Recommend once daily bath using Cetaphil soap    Recommend rock salt baths daily and discussed protocol -- add 2 cups of rock salt to tub of hot water then add cold water to make temperature of tub water lukewarm. Soak for 10 - 15 minutes.    Apply cerave cream after bathing and 2 times per day.   Apply O'Nelson's hand cream every hour and every hand washing

## 2018-08-31 NOTE — PROGRESS NOTES
Subjective:       Patient ID:  Nilsa Curiel is a 72 y.o. female who presents for   Chief Complaint   Patient presents with    Rash     Uses aveeno lotion to skin qday and showers lukewarm with dove soap qoday      Rash  - Follow-up  Diagnosis: atopic dermatitis (since childhood)  Symptom course: worsening  Currently using: last seen by МАРИНА 5/17 - using various steroid creams and oints.  Signs / symptoms: itching and dryness        Review of Systems   Constitutional: Negative for fever.   HENT: Positive for postnasal drip (in distant past). Negative for congestion and rhinorrhea.    Eyes: Positive for eye watering (occ). Negative for itching.   Skin: Positive for itching, rash and dry skin.   Allergic/Immunologic: Negative for environmental allergies.        Objective:    Physical Exam   Constitutional: She appears well-developed and well-nourished. She is obese.  No distress.   Neurological: She is alert and oriented to person, place, and time. She is not disoriented.   Psychiatric: She has a normal mood and affect.   Skin:   Areas Examined (abnormalities noted in diagram):   Scalp / Hair Palpated and Inspected  Head / Face Inspection Performed  Neck Inspection Performed  Chest / Axilla Inspection Performed  Abdomen Inspection Performed  Genitals / Buttocks / Groin Inspection Performed  Back Inspection Performed  RUE Inspected  LUE Inspection Performed  RLE Inspected  LLE Inspection Performed  Nails and Digits Inspection Performed                  Diagram Legend     Erythematous scaling macule/papule c/w actinic keratosis       Vascular papule c/w angioma      Pigmented verrucoid papule/plaque c/w seborrheic keratosis      Yellow umbilicated papule c/w sebaceous hyperplasia      Irregularly shaped tan macule c/w lentigo     1-2 mm smooth white papules consistent with Milia      Movable subcutaneous cyst with punctum c/w epidermal inclusion cyst      Subcutaneous movable cyst c/w pilar cyst      Firm pink to  brown papule c/w dermatofibroma      Pedunculated fleshy papule(s) c/w skin tag(s)      Evenly pigmented macule c/w junctional nevus     Mildly variegated pigmented, slightly irregular-bordered macule c/w mildly atypical nevus      Flesh colored to evenly pigmented papule c/w intradermal nevus       Pink pearly papule/plaque c/w basal cell carcinoma      Erythematous hyperkeratotic cursted plaque c/w SCC      Surgical scar with no sign of skin cancer recurrence      Open and closed comedones      Inflammatory papules and pustules      Verrucoid papule consistent consistent with wart     Erythematous eczematous patches and plaques     Dystrophic onycholytic nail with subungual debris c/w onychomycosis     Umbilicated papule    Erythematous-base heme-crusted tan verrucoid plaque consistent with inflamed seborrheic keratosis     Erythematous Silvery Scaling Plaque c/w Psoriasis     See annotation      Assessment / Plan:        Atopic dermatitis, unspecified type  -     triamcinolone acetonide 0.1% (KENALOG) 0.1 % cream; AAA chest and hands bid  Dispense: 454 g; Refill: 3    Discussed good skin care and a brochure was reviewed and provided.  Recommend once daily bath using Cetaphil soap    Recommend rock salt baths daily and discussed protocol -- add 2 cups of rock salt to tub of hot water then add cold water to make temperature of tub water lukewarm. Soak for 10 - 15 minutes.    Apply cerave cream after bathing and 2 times per day.   Apply O'Nelson's hand cream every hour and every hand washing                   Follow-up in about 2 months (around 10/31/2018) for with Derm Nurse Practioner (GP).

## 2018-09-04 DIAGNOSIS — I10 ESSENTIAL HYPERTENSION: ICD-10-CM

## 2018-09-04 RX ORDER — LOSARTAN POTASSIUM 100 MG/1
50 TABLET ORAL DAILY
Qty: 90 TABLET | Refills: 2 | Status: CANCELLED | OUTPATIENT
Start: 2018-09-04

## 2018-09-13 ENCOUNTER — TELEPHONE (OUTPATIENT)
Dept: SLEEP MEDICINE | Facility: OTHER | Age: 73
End: 2018-09-13

## 2018-09-14 ENCOUNTER — TELEPHONE (OUTPATIENT)
Dept: OPHTHALMOLOGY | Facility: CLINIC | Age: 73
End: 2018-09-14

## 2018-09-17 ENCOUNTER — TELEPHONE (OUTPATIENT)
Dept: PAIN MEDICINE | Facility: CLINIC | Age: 73
End: 2018-09-17

## 2018-09-17 NOTE — TELEPHONE ENCOUNTER
Placed call to patient about upcoming appointment with Dr. Russo. Discussed the role of IPM,Patient indicated understanding of what to expect at appointment. No further discussion had.

## 2018-09-18 ENCOUNTER — TELEPHONE (OUTPATIENT)
Dept: SLEEP MEDICINE | Facility: OTHER | Age: 73
End: 2018-09-18

## 2018-09-19 ENCOUNTER — HOSPITAL ENCOUNTER (OUTPATIENT)
Dept: SLEEP MEDICINE | Facility: OTHER | Age: 73
Discharge: HOME | End: 2018-09-19
Attending: FAMILY MEDICINE
Payer: MEDICARE

## 2018-09-19 DIAGNOSIS — G47.33 OSA (OBSTRUCTIVE SLEEP APNEA): ICD-10-CM

## 2018-09-19 PROCEDURE — 95800 SLP STDY UNATTENDED: CPT

## 2018-09-19 PROCEDURE — 95806 SLEEP STUDY UNATT&RESP EFFT: CPT | Mod: 26,,, | Performed by: PSYCHIATRY & NEUROLOGY

## 2018-09-20 ENCOUNTER — OFFICE VISIT (OUTPATIENT)
Dept: PAIN MEDICINE | Facility: CLINIC | Age: 73
End: 2018-09-20
Attending: ANESTHESIOLOGY
Payer: MEDICARE

## 2018-09-20 VITALS
WEIGHT: 212.38 LBS | SYSTOLIC BLOOD PRESSURE: 136 MMHG | DIASTOLIC BLOOD PRESSURE: 72 MMHG | HEART RATE: 88 BPM | HEIGHT: 61 IN | BODY MASS INDEX: 40.1 KG/M2

## 2018-09-20 DIAGNOSIS — M43.16 SPONDYLOLISTHESIS OF LUMBAR REGION: ICD-10-CM

## 2018-09-20 DIAGNOSIS — M48.061 DEGENERATIVE LUMBAR SPINAL STENOSIS: ICD-10-CM

## 2018-09-20 DIAGNOSIS — M51.36 DDD (DEGENERATIVE DISC DISEASE), LUMBAR: Primary | ICD-10-CM

## 2018-09-20 DIAGNOSIS — M47.816 LUMBAR SPONDYLOSIS: ICD-10-CM

## 2018-09-20 DIAGNOSIS — E66.9 OBESITY WITH SLEEP APNEA: ICD-10-CM

## 2018-09-20 DIAGNOSIS — G47.30 OBESITY WITH SLEEP APNEA: ICD-10-CM

## 2018-09-20 PROCEDURE — 99499 UNLISTED E&M SERVICE: CPT | Mod: HCNC,S$GLB,, | Performed by: ANESTHESIOLOGY

## 2018-09-20 PROCEDURE — 99999 PR PBB SHADOW E&M-EST. PATIENT-LVL III: CPT | Mod: PBBFAC,,, | Performed by: ANESTHESIOLOGY

## 2018-09-20 PROCEDURE — 1101F PT FALLS ASSESS-DOCD LE1/YR: CPT | Mod: CPTII,,, | Performed by: ANESTHESIOLOGY

## 2018-09-20 PROCEDURE — 3075F SYST BP GE 130 - 139MM HG: CPT | Mod: CPTII,,, | Performed by: ANESTHESIOLOGY

## 2018-09-20 PROCEDURE — 99213 OFFICE O/P EST LOW 20 MIN: CPT | Mod: PBBFAC,PN | Performed by: ANESTHESIOLOGY

## 2018-09-20 PROCEDURE — 99204 OFFICE O/P NEW MOD 45 MIN: CPT | Mod: S$PBB,,, | Performed by: ANESTHESIOLOGY

## 2018-09-20 PROCEDURE — 3078F DIAST BP <80 MM HG: CPT | Mod: CPTII,,, | Performed by: ANESTHESIOLOGY

## 2018-09-20 NOTE — LETTER
September 20, 2018      Aleshia Gimenez, DO  [address] [facility]  [address]  [place] [address]           [facility]  [address], [facility] [address]  [facility] [address]  Phone: [phone]  Fax: [phone]          Patient: Nilsa Curiel   MR Number: [id number]   YOB: 1945   Date of Visit: 9/20/2018       Dear Dr. Aleshia Gimenez:    Thank you for referring Nilsa Curiel to me for evaluation. Attached you will find relevant portions of my assessment and plan of care.    If you have questions, please do not hesitate to call me. I look forward to following Nilsa Curiel along with you.    Sincerely,    Fabian Russo III, MD    Enclosure  CC:  No Recipients    If you would like to receive this communication electronically, please contact [email or web address] or [phone] to request more information on [employer] access.    For providers and/or their staff who would like to refer a patient to [facility], please contact us through our one-stop-shop provider referral line, [facility], at [phone].    If you feel you have received this communication in error or would no longer like to receive these types of communications, please e-mail [email or web address]

## 2018-09-20 NOTE — PROGRESS NOTES
Chronic Pain - New Consult    Referring Physician: Aleshia Gimenez DO      Chief Complaint   Patient presents with    Back Pain     lower back        SUBJECTIVE:    Nilsa Curiel presents to the clinic for the evaluation of low back pain. The pain started >1 year ago and symptoms have been worsening. The pain is located in the bilateral low back area and radiates into the buttocks. She denies radicular pain at this time although the pain has radiated down the legs in the past. The back pain is described as aching, shooting, stabbing, throbbing and tight band and is rated as 9/10. The pain is rated with a score of  2/10 on the BEST day and a score of 9/10 on the WORST day.  Symptoms interfere with daily activity. The pain is exacerbated by standing, walking and getting out of bed/chair.  The pain is mitigated by medication (Tylenol and Gabapentin). She reports improvement of symptoms when bending forward at the waist. She tried physical therapy last year with no improvement.  She reports spending 1 hours per day reclining. The patient reports 8 hours of uninterrupted sleep per night.    Patient denies bowel/bladder incontinence, significant motor weakness and loss of sensation.    Physical Therapy/Home Exercise: yes      Pain Disability Index Review:  Last 3 PDI Scores 9/20/2018   Pain Disability Index (PDI) 31       Pain Medications:    - Tylenol 325 mg QHS as needed  - Adjuvant Medications: Neurontin (Gabapentin) 300 mg as needed     report: Reviewed    Pain Procedures: None    Imaging:     MRI LUMBAR SPINE WITHOUT CONTRAST (8/8/2018):    FINDINGS:  Alignment: Grade 1 spondylolisthesis of L4 on L5.    Vertebrae: Relative preservation of vertebral body height with mild degenerative signal changes.    Discs: Disc height loss and degenerative signal changes most prominent at L3-L4 and L4-L5.    Cord: Normal.  Conus terminates at L2-L3.    Degenerative findings:    T12-L1: Mild facet arthropathy without significant  spinal canal or neural foraminal stenosis.    L1-L2: Broad-based disc bulge with superimposed right paracentral/foraminal disc protrusion, ligamentum flavum thickening, and facet arthropathy resulting in mild spinal canal stenosis, moderate right neural foraminal stenosis, and mild left neural foraminal stenosis.    L2-L3: Broad-based disc bulge, ligamentum flavum thickening, and facet arthropathy resulting and mild spinal canal stenosis and mild bilateral neural foraminal stenosis.    L3-L4: Broad-based disc bulge, ligamentum flavum thickening, and facet arthropathy resulting in severe spinal canal stenosis and mild bilateral neural foraminal stenosis.    L4-L5: Broad-based disc bulge, ligamentum flavum thickening, and facet arthropathy resulting in moderate spinal canal stenosis and moderate bilateral neural foraminal stenosis.    L5-S1: Broad-based disc bulge, ligamentum flavum thickening, and facet arthropathy without significant spinal canal stenosis. Mild bilateral neural foraminal stenosis.    Paraspinal muscles & soft tissues: Unremarkable.      Lumbar X-ray (2017):    Mild DJD.  Grade one L4/L5 spondylolisthesis.  The disc spaces are narrowed between L3 and S1 vertebral segments.  No fracture or dislocation.  No bone destruction identified    Past Medical History:   Diagnosis Date    Acute bilateral low back pain without sciatica 10/10/2017    Anemia 2018    Atopic dermatitis     GERD (gastroesophageal reflux disease)     Hyperlipidemia     Hypertension     Morbid obesity with BMI of 40.0-44.9, adult 2016    MONE (obstructive sleep apnea) 2018     Past Surgical History:   Procedure Laterality Date     SECTION      x3    TUBAL LIGATION       Social History     Socioeconomic History    Marital status: Other     Spouse name: Not on file    Number of children: Not on file    Years of education: Not on file    Highest education level: Not on file   Social Needs     Financial resource strain: Not on file    Food insecurity - worry: Not on file    Food insecurity - inability: Not on file    Transportation needs - medical: Not on file    Transportation needs - non-medical: Not on file   Occupational History    Occupation: retired medical records    Tobacco Use    Smoking status: Never Smoker    Smokeless tobacco: Never Used   Substance and Sexual Activity    Alcohol use: No     Alcohol/week: 0.0 oz    Drug use: No    Sexual activity: No     Partners: Male   Other Topics Concern    Not on file   Social History Narrative    , lives with 1 daughter(Toreal), Walking some     Family History   Problem Relation Age of Onset    Hypertension Mother     Dementia Mother     Diabetes Mother     Other Father         sepsis    Diabetes Sister     Hypertension Sister     Hyperlipidemia Sister     Diabetes Brother     Cataracts Brother     Stroke Brother     No Known Problems Daughter     Hypertension Son     Hypertension Sister     Cancer Sister         pancreatic     Kidney disease Sister     Heart disease Sister         premature    Gout Sister     Kidney disease Sister     No Known Problems Daughter     No Known Problems Brother     Other Brother         murdered    Glaucoma Neg Hx     Retinal detachment Neg Hx     Macular degeneration Neg Hx     Strabismus Neg Hx     Amblyopia Neg Hx     Blindness Neg Hx        Review of patient's allergies indicates:  No Known Allergies    Current Outpatient Medications   Medication Sig    amLODIPine (NORVASC) 10 MG tablet TAKE 1 TABLET(10 MG) BY MOUTH EVERY DAY    betamethasone valerate 0.1% (VALISONE) 0.1 % Oint Apply topically 2 (two) times daily as needed.    clobetasol (TEMOVATE) 0.05 % cream Apply topically 2 (two) times daily.    fluticasone (FLONASE) 50 mcg/actuation nasal spray SHAKE LIQUID AND USE 1 SPRAY IN EACH NOSTRIL TWICE DAILY (Patient taking differently: SHAKE LIQUID AND USE 1 SPRAY IN  EACH NOSTRIL TWICE DAILY PRN)    gabapentin (NEURONTIN) 300 MG capsule Take 1 by mouth daily for 3 days, then twice daily for 3 days,    hydroCHLOROthiazide (HYDRODIURIL) 25 MG tablet Take 1 tablet (25 mg total) by mouth daily as needed.    ketoconazole (NIZORAL) 2 % cream Apply topically 2 (two) times daily. Apply to axilla and abdominal creases    KRILL OIL ORAL Take by mouth.    lactobacillus combo no.6 (PROBIOTIC COMPLEX) 4 billion cell Tab Take 1 tablet by mouth once daily.    losartan (COZAAR) 100 MG tablet Take 0.5 tablets (50 mg total) by mouth once daily.    MULTIVIT-MIN/FA/CA CARB/VIT K (WOMEN'S 50+ DAILY FORMULA ORAL) Take by mouth.    omega-3 fatty acids 300 mg Cap Take by mouth.    pravastatin (PRAVACHOL) 40 MG tablet Take 1 tablet (40 mg total) by mouth once daily.    ranitidine (ZANTAC) 150 MG tablet Take 1 tablet (150 mg total) by mouth as needed for Heartburn.    triamcinolone acetonide 0.1% (KENALOG) 0.1 % cream AAA chest and hands bid    fexofenadine (ALLEGRA) 180 MG tablet Take 1 tablet (180 mg total) by mouth once daily. (Patient taking differently: Take 180 mg by mouth daily as needed. )     No current facility-administered medications for this visit.        REVIEW OF SYSTEMS:    GENERAL:  No weight loss, malaise or fevers.  HEENT:  Negative for frequent or significant headaches.  NECK:  Negative for pain and significant neck swelling.  RESPIRATORY:  Negative for wheezing or shortness of breath.  CARDIOVASCULAR:  Negative for chest pain or palpitations.  GI:  Negative for abdominal discomfort, blood in stools or black stools or change in bowel habits.  MUSCULOSKELETAL:  See HPI.  SKIN:  Negative for lesions, rash, and itching.  PSYCH:  Negative for sleep disturbance, mood disorder and recent psychosocial stressors.  HEMATOLOGY/LYMPHOLOGY:  Negative for prolonged bleeding, bruising easily or swollen nodes.  NEURO:   No history of seizures or tremors.  All other reviewed and negative  "other than HPI.    OBJECTIVE:    /72 (BP Location: Left arm, Patient Position: Sitting, BP Method: Large (Automatic))   Pulse 88   Ht 5' 0.5" (1.537 m)   Wt 96.3 kg (212 lb 6.4 oz)   BMI 40.80 kg/m²     PHYSICAL EXAMINATION:    General appearance: Well appearing, in no acute distress, alert and oriented x3. Obese.  Psych:  Mood and affect appropriate.  Skin: Skin color, texture, turgor normal, no rashes or lesions, in both upper and lower body.  Head/face:  Normocephalic, atraumatic.  Neck: No pain to palpation over the cervical paraspinous muscles. No pain with neck flexion, extension, or lateral flexion.   Cor: RRR  Pulm: Breathing unlabored.  GI:  Soft and non-tender.  Back: Straight leg raising in the sitting and supine positions is negative to radicular pain. No pain to palpation over the lumbar spine or costovertebral angles. + pain with back extension.  Extremities: Peripheral joint ROM is full and pain free without obvious instability or laxity in all four extremities. No deformities, edema, or skin discoloration.   Musculoskeletal: Hip, sacroiliac and knee provocative maneuvers are negative. No atrophy or tone abnormalities are noted.  Neuro:  No loss of sensation is noted. Patellar reflexes are diminished bilaterally.  Strength testing:    Right hip flexion: 5/5  Left hip flexion: 5/5  Right knee extension: 5/5  Left knee extension: 5/5  Right knee flexion: 5/5  Left knee flexion: 5/5  Right ankle dorsiflexion: 5/5  Left ankle dorsiflexion: 5/5    Gait: normal.    ASSESSMENT: 72 y.o.  female with chronic low back and buttock pain, consistent with lumbar spinal stenosis. Also a likely component of facet arthropathy.     1. DDD (degenerative disc disease), lumbar    2. Degenerative lumbar spinal stenosis    3. Spondylolisthesis of lumbar region    4. Lumbar spondylosis    5. Obesity with sleep apnea          PLAN:     - I have stressed the importance of physical activity and a home exercise plan to " help with pain and improve health.  - Start Neurontin 300mg QHS to help with pain.  - Schedule for a Bilateral Lumbar Transforaminal epidural steroid injection at L4/5 to help with pain and progress with a home exercise plan.  - Counseled patient regarding the importance of activity modification.  - RTC after procedure.    The above plan and management options were discussed at length with patient. Patient is in agreement with the above and verbalized understanding. It will be communicated with the referring physician via electronic record, fax, or mail.    Fabian Russo III  09/20/2018

## 2018-09-27 ENCOUNTER — TELEPHONE (OUTPATIENT)
Dept: FAMILY MEDICINE | Facility: CLINIC | Age: 73
End: 2018-09-27

## 2018-09-27 DIAGNOSIS — G47.33 OSA (OBSTRUCTIVE SLEEP APNEA): Primary | ICD-10-CM

## 2018-10-29 ENCOUNTER — OFFICE VISIT (OUTPATIENT)
Dept: OPTOMETRY | Facility: CLINIC | Age: 73
End: 2018-10-29
Payer: MEDICARE

## 2018-10-29 DIAGNOSIS — H26.9 CORTICAL CATARACT OF BOTH EYES: ICD-10-CM

## 2018-10-29 DIAGNOSIS — Z13.5 SCREENING FOR GLAUCOMA: ICD-10-CM

## 2018-10-29 DIAGNOSIS — H04.123 DRY EYE SYNDROME, BILATERAL: ICD-10-CM

## 2018-10-29 DIAGNOSIS — H52.4 PRESBYOPIA: ICD-10-CM

## 2018-10-29 DIAGNOSIS — H52.4 ASTIGMATISM WITH PRESBYOPIA, BILATERAL: ICD-10-CM

## 2018-10-29 DIAGNOSIS — H52.203 ASTIGMATISM WITH PRESBYOPIA, BILATERAL: ICD-10-CM

## 2018-10-29 DIAGNOSIS — H25.13 NS (NUCLEAR SCLEROSIS), BILATERAL: ICD-10-CM

## 2018-10-29 DIAGNOSIS — H10.13 CONJUNCTIVITIS, ALLERGIC, BILATERAL: ICD-10-CM

## 2018-10-29 DIAGNOSIS — I10 ESSENTIAL HYPERTENSION: Primary | ICD-10-CM

## 2018-10-29 PROCEDURE — 99999 PR PBB SHADOW E&M-EST. PATIENT-LVL III: CPT | Mod: PBBFAC,,, | Performed by: OPTOMETRIST

## 2018-10-29 PROCEDURE — 92014 COMPRE OPH EXAM EST PT 1/>: CPT | Mod: S$PBB,,, | Performed by: OPTOMETRIST

## 2018-10-29 PROCEDURE — 92015 DETERMINE REFRACTIVE STATE: CPT | Mod: ,,, | Performed by: OPTOMETRIST

## 2018-10-29 PROCEDURE — 99213 OFFICE O/P EST LOW 20 MIN: CPT | Mod: PBBFAC | Performed by: OPTOMETRIST

## 2018-10-30 ENCOUNTER — OFFICE VISIT (OUTPATIENT)
Dept: DERMATOLOGY | Facility: CLINIC | Age: 73
End: 2018-10-30
Payer: MEDICARE

## 2018-10-30 DIAGNOSIS — L20.9 ATOPIC DERMATITIS, UNSPECIFIED TYPE: Primary | ICD-10-CM

## 2018-10-30 PROCEDURE — 1101F PT FALLS ASSESS-DOCD LE1/YR: CPT | Mod: CPTII,,, | Performed by: NURSE PRACTITIONER

## 2018-10-30 PROCEDURE — 99999 PR PBB SHADOW E&M-EST. PATIENT-LVL II: CPT | Mod: PBBFAC,,, | Performed by: NURSE PRACTITIONER

## 2018-10-30 PROCEDURE — 99212 OFFICE O/P EST SF 10 MIN: CPT | Mod: PBBFAC | Performed by: NURSE PRACTITIONER

## 2018-10-30 PROCEDURE — 99212 OFFICE O/P EST SF 10 MIN: CPT | Mod: S$PBB,,, | Performed by: NURSE PRACTITIONER

## 2018-10-31 ENCOUNTER — PATIENT MESSAGE (OUTPATIENT)
Dept: FAMILY MEDICINE | Facility: CLINIC | Age: 73
End: 2018-10-31

## 2018-10-31 DIAGNOSIS — I10 ESSENTIAL HYPERTENSION: ICD-10-CM

## 2018-11-01 RX ORDER — LOSARTAN POTASSIUM 100 MG/1
100 TABLET ORAL DAILY
Qty: 90 TABLET | Refills: 2 | Status: SHIPPED | OUTPATIENT
Start: 2018-11-01 | End: 2019-09-27

## 2018-11-01 NOTE — PROGRESS NOTES
HPI     72yr old female present for Annual DFE. Patient states everything look   blurry OU at distance and near x 1yr. Patient wearing OTC Readers to help   with small print. Patient seeing black dots on occasion OU, with no   flashes. Pt using AT's PRN-OU that help with dryness. Pt having headaches   that she feel non eye related.     Last edited by Enzo Seaman MA on 10/29/2018 10:40 AM. (History)            Assessment /Plan     For exam results, see Encounter Report.    Essential hypertension   Monitor yearly, no retinopathy    Cortical cataract of both eyes OS>OD  NS (nuclear sclerosis), bilateral   Borderline VS , consult for cat eval when pt is ready    Screening for glaucoma    Astigmatism with presbyopia, bilateral  Presbyopia   Rx specs    Dry eye syndrome, bilateral   Use systane BID    Conjunctivitis, allergic, bilateral   Use alaway BID OU    RTC 1 year, sooner PRN

## 2018-11-06 ENCOUNTER — OFFICE VISIT (OUTPATIENT)
Dept: FAMILY MEDICINE | Facility: CLINIC | Age: 73
End: 2018-11-06
Payer: MEDICARE

## 2018-11-06 ENCOUNTER — LAB VISIT (OUTPATIENT)
Dept: LAB | Facility: HOSPITAL | Age: 73
End: 2018-11-06
Attending: FAMILY MEDICINE
Payer: MEDICARE

## 2018-11-06 VITALS
DIASTOLIC BLOOD PRESSURE: 68 MMHG | TEMPERATURE: 99 F | HEART RATE: 94 BPM | SYSTOLIC BLOOD PRESSURE: 138 MMHG | BODY MASS INDEX: 38.46 KG/M2 | HEIGHT: 61 IN | WEIGHT: 203.69 LBS

## 2018-11-06 DIAGNOSIS — K21.9 GASTROESOPHAGEAL REFLUX DISEASE WITHOUT ESOPHAGITIS: ICD-10-CM

## 2018-11-06 DIAGNOSIS — R73.01 ABNORMAL FASTING GLUCOSE: ICD-10-CM

## 2018-11-06 DIAGNOSIS — E78.5 HYPERLIPIDEMIA, UNSPECIFIED HYPERLIPIDEMIA TYPE: ICD-10-CM

## 2018-11-06 DIAGNOSIS — E21.3 HYPERPARATHYROIDISM: ICD-10-CM

## 2018-11-06 DIAGNOSIS — D64.9 ANEMIA, UNSPECIFIED TYPE: ICD-10-CM

## 2018-11-06 DIAGNOSIS — L20.84 INTRINSIC ATOPIC DERMATITIS: ICD-10-CM

## 2018-11-06 DIAGNOSIS — E83.52 HYPERCALCEMIA: ICD-10-CM

## 2018-11-06 DIAGNOSIS — G47.33 OSA (OBSTRUCTIVE SLEEP APNEA): ICD-10-CM

## 2018-11-06 DIAGNOSIS — I51.89 DIASTOLIC DYSFUNCTION WITHOUT HEART FAILURE: ICD-10-CM

## 2018-11-06 DIAGNOSIS — Z23 NEED FOR PROPHYLACTIC VACCINATION AND INOCULATION AGAINST INFLUENZA: ICD-10-CM

## 2018-11-06 DIAGNOSIS — I10 HYPERTENSION, UNSPECIFIED TYPE: ICD-10-CM

## 2018-11-06 DIAGNOSIS — E66.01 MORBID OBESITY WITH BMI OF 40.0-44.9, ADULT: ICD-10-CM

## 2018-11-06 DIAGNOSIS — I51.7 LEFT VENTRICULAR HYPERTROPHY: ICD-10-CM

## 2018-11-06 DIAGNOSIS — I10 HYPERTENSION, UNSPECIFIED TYPE: Primary | ICD-10-CM

## 2018-11-06 LAB
ALBUMIN SERPL BCP-MCNC: 3.8 G/DL
ALP SERPL-CCNC: 117 U/L
ALT SERPL W/O P-5'-P-CCNC: 18 U/L
ANION GAP SERPL CALC-SCNC: 8 MMOL/L
AST SERPL-CCNC: 19 U/L
BILIRUB SERPL-MCNC: 0.4 MG/DL
BUN SERPL-MCNC: 13 MG/DL
CALCIUM SERPL-MCNC: 10.8 MG/DL
CHLORIDE SERPL-SCNC: 110 MMOL/L
CO2 SERPL-SCNC: 24 MMOL/L
CREAT SERPL-MCNC: 1 MG/DL
EST. GFR  (AFRICAN AMERICAN): >60 ML/MIN/1.73 M^2
EST. GFR  (NON AFRICAN AMERICAN): 57.2 ML/MIN/1.73 M^2
GLUCOSE SERPL-MCNC: 101 MG/DL
POTASSIUM SERPL-SCNC: 4.3 MMOL/L
PROT SERPL-MCNC: 8 G/DL
SODIUM SERPL-SCNC: 142 MMOL/L

## 2018-11-06 PROCEDURE — 90662 IIV NO PRSV INCREASED AG IM: CPT | Mod: S$GLB,,, | Performed by: FAMILY MEDICINE

## 2018-11-06 PROCEDURE — 99214 OFFICE O/P EST MOD 30 MIN: CPT | Mod: 25,S$GLB,, | Performed by: FAMILY MEDICINE

## 2018-11-06 PROCEDURE — 3078F DIAST BP <80 MM HG: CPT | Mod: CPTII,S$GLB,, | Performed by: FAMILY MEDICINE

## 2018-11-06 PROCEDURE — G0008 ADMIN INFLUENZA VIRUS VAC: HCPCS | Mod: S$GLB,,, | Performed by: FAMILY MEDICINE

## 2018-11-06 PROCEDURE — 3075F SYST BP GE 130 - 139MM HG: CPT | Mod: CPTII,S$GLB,, | Performed by: FAMILY MEDICINE

## 2018-11-06 PROCEDURE — 36415 COLL VENOUS BLD VENIPUNCTURE: CPT | Mod: PO

## 2018-11-06 PROCEDURE — 80053 COMPREHEN METABOLIC PANEL: CPT

## 2018-11-06 PROCEDURE — 99999 PR PBB SHADOW E&M-EST. PATIENT-LVL III: CPT | Mod: PBBFAC,,, | Performed by: FAMILY MEDICINE

## 2018-11-06 PROCEDURE — 1101F PT FALLS ASSESS-DOCD LE1/YR: CPT | Mod: CPTII,S$GLB,, | Performed by: FAMILY MEDICINE

## 2018-11-06 RX ORDER — CHLORTHALIDONE 25 MG/1
25 TABLET ORAL DAILY
Qty: 90 TABLET | Refills: 3 | Status: SHIPPED | OUTPATIENT
Start: 2018-11-06 | End: 2019-09-27

## 2018-11-06 NOTE — PROGRESS NOTES
Subjective:     Patient ID: Nilsa Curiel is a 72 y.o. female.    Chief Complaint: Headache and Flu Vaccine    HPI pt presents for trouble with headaches over the past few weeks -better since Sunday. There was some mix up with her BP med as well but now its straightened out.   Review of Systems   Constitutional: Negative for appetite change, fatigue and fever.   HENT: Negative for hearing loss and sore throat.    Eyes: Negative.    Respiratory: Negative for cough, chest tightness, shortness of breath and wheezing.    Cardiovascular: Negative for chest pain, palpitations and leg swelling.   Gastrointestinal: Positive for constipation. Negative for abdominal pain, blood in stool, diarrhea, nausea and vomiting.   Endocrine: Negative.    Genitourinary: Negative for difficulty urinating, dysuria, frequency, hematuria, menstrual problem, pelvic pain and urgency.   Musculoskeletal: Negative for back pain.   Skin: Negative for pallor and rash.   Allergic/Immunologic: Negative.    Neurological: Negative for dizziness, syncope, light-headedness and headaches.   Hematological: Negative for adenopathy.   Psychiatric/Behavioral: Negative.  Negative for dysphoric mood and sleep disturbance.       Objective:      Physical Exam   Constitutional: She is oriented to person, place, and time. She appears well-developed and well-nourished.   HENT:   Head: Normocephalic.   Eyes: EOM are normal. Pupils are equal, round, and reactive to light.   Neck: Normal range of motion. Neck supple. No thyromegaly present.   Cardiovascular: Normal rate and regular rhythm.   RRR with gr 2/6 yanira   Pulmonary/Chest: Effort normal and breath sounds normal.   Abdominal: Soft. Bowel sounds are normal.   Lymphadenopathy:     She has no cervical adenopathy.   Neurological: She is alert and oriented to person, place, and time.   Skin: Skin is warm and dry.   Eczema is marked improved   Psychiatric: She has a normal mood and affect. Her behavior is normal.  Judgment and thought content normal.   Nursing note and vitals reviewed.      Assessment:     Nilsa was seen today for headache and flu vaccine.    Diagnoses and all orders for this visit:    Hypertension, unspecified type  -     Comprehensive metabolic panel; Future    Hyperlipidemia, unspecified hyperlipidemia type    Diastolic dysfunction without heart failure    Intrinsic atopic dermatitis    Hyperparathyroidism    Abnormal fasting glucose    Morbid obesity with BMI of 40.0-44.9, adult    Gastroesophageal reflux disease without esophagitis    MONE (obstructive sleep apnea)    Anemia, unspecified type    Left ventricular hypertrophy    Hypercalcemia    Other orders  -     chlorthalidone (HYGROTEN) 25 MG Tab; Take 1 tablet (25 mg total) by mouth once daily.

## 2018-11-27 ENCOUNTER — OFFICE VISIT (OUTPATIENT)
Dept: FAMILY MEDICINE | Facility: CLINIC | Age: 73
End: 2018-11-27
Payer: MEDICARE

## 2018-11-27 VITALS
DIASTOLIC BLOOD PRESSURE: 60 MMHG | SYSTOLIC BLOOD PRESSURE: 122 MMHG | BODY MASS INDEX: 38.71 KG/M2 | HEART RATE: 89 BPM | WEIGHT: 205 LBS | TEMPERATURE: 98 F | HEIGHT: 61 IN

## 2018-11-27 DIAGNOSIS — G47.33 OSA (OBSTRUCTIVE SLEEP APNEA): Primary | ICD-10-CM

## 2018-11-27 DIAGNOSIS — K62.5 BRIGHT RED RECTAL BLEEDING: ICD-10-CM

## 2018-11-27 DIAGNOSIS — K59.01 SLOW TRANSIT CONSTIPATION: ICD-10-CM

## 2018-11-27 PROCEDURE — 1101F PT FALLS ASSESS-DOCD LE1/YR: CPT | Mod: CPTII,S$GLB,, | Performed by: FAMILY MEDICINE

## 2018-11-27 PROCEDURE — 99213 OFFICE O/P EST LOW 20 MIN: CPT | Mod: 25,S$GLB,, | Performed by: FAMILY MEDICINE

## 2018-11-27 PROCEDURE — 99999 PR PBB SHADOW E&M-EST. PATIENT-LVL III: CPT | Mod: PBBFAC,,, | Performed by: FAMILY MEDICINE

## 2018-11-27 PROCEDURE — 46600 DIAGNOSTIC ANOSCOPY SPX: CPT | Mod: S$GLB,,, | Performed by: FAMILY MEDICINE

## 2018-11-27 PROCEDURE — 3078F DIAST BP <80 MM HG: CPT | Mod: CPTII,S$GLB,, | Performed by: FAMILY MEDICINE

## 2018-11-27 PROCEDURE — 3074F SYST BP LT 130 MM HG: CPT | Mod: CPTII,S$GLB,, | Performed by: FAMILY MEDICINE

## 2018-11-27 RX ORDER — POLYETHYLENE GLYCOL 3350 17 G/17G
17 POWDER, FOR SOLUTION ORAL 2 TIMES DAILY PRN
Qty: 200 PACKET | Refills: 3 | Status: SHIPPED | OUTPATIENT
Start: 2018-11-27 | End: 2019-06-04

## 2018-11-27 NOTE — PROGRESS NOTES
Subjective:     Patient ID: Nilsa Curiel is a 73 y.o. female.    Chief Complaint: In addition to the cpap follow up pt presents with Constipation and Rectal Bleeding      HPI trouble with constipation and rectal bleeding since last Wednesday after passing a hard large BM, the blood bright red rectal bleeding. Then this past weekend she had another somewhat large BM and a little BRRB again. She hasnt had any trouble since then. She hasnt had trouble with constipation before. She doesn't know if it might be the new fluid pill. She thinks she has been running about and not eating like she normally does(not as many salads instead eating more fast foods). She doesn't take any fiber supplements or miralax,  Etc.   She denies abdominal pain.   She is having cramps in feet and legs since starting on new BP med.  She also started with her new cpap treatment and is doing well with that. She is feeling more refreshed and is adjusting well. She has been very compliant she states.   Review of Systems   Constitutional: Negative for activity change and unexpected weight change.   HENT: Negative for hearing loss, rhinorrhea and trouble swallowing.    Eyes: Negative for discharge and visual disturbance.   Respiratory: Negative for chest tightness and wheezing.    Cardiovascular: Negative for chest pain and palpitations.   Gastrointestinal: Positive for constipation. Negative for diarrhea and vomiting.   Endocrine: Negative for polydipsia and polyuria.   Genitourinary: Negative for difficulty urinating, dysuria, hematuria and menstrual problem.   Musculoskeletal: Positive for arthralgias. Negative for joint swelling and neck pain.   Neurological: Negative for weakness and headaches.   Psychiatric/Behavioral: Negative for confusion and dysphoric mood.        Objective:      Physical Exam   Constitutional: She appears well-developed and well-nourished.   Cardiovascular: Normal rate, regular rhythm, normal heart sounds and intact  distal pulses.   Pulmonary/Chest: Effort normal and breath sounds normal.   Abdominal: Soft. Bowel sounds are normal.   Genitourinary:   Genitourinary Comments: Anoscopy:    with my MA Thais Allred in attendance an analscope was gently inserted into pt rectum and the lower rectum was examined carefully.  There was a small area of erythema /minimal abrasion that appeared to have bled recently but there were no major fissures or hemorrhoids or other lesions noted. Pt tolerated the procedure well.    Nursing note and vitals reviewed.      Assessment:     Nilsa was seen today for constipation and rectal bleeding.    Diagnoses and all orders for this visit:    MONE (obstructive sleep apnea)    Bright red rectal bleeding  -     polyethylene glycol (GLYCOLAX) 17 gram PwPk; Take 17 g by mouth 2 (two) times daily as needed.    Slow transit constipation  -     polyethylene glycol (GLYCOLAX) 17 gram PwPk; Take 17 g by mouth 2 (two) times daily as needed.

## 2018-11-27 NOTE — PROGRESS NOTES
Subjective:     Patient ID: Nilsa Curiel is a 73 y.o. female.    Chief Complaint: cpap follow up  HPI  Review of Systems   Constitutional: Negative for activity change and unexpected weight change.   HENT: Negative for hearing loss, rhinorrhea and trouble swallowing.    Eyes: Negative for discharge and visual disturbance.   Respiratory: Negative for chest tightness and wheezing.    Cardiovascular: Negative for chest pain and palpitations.   Gastrointestinal: Positive for constipation. Negative for diarrhea and vomiting.   Endocrine: Negative for polydipsia and polyuria.   Genitourinary: Negative for difficulty urinating, dysuria, hematuria and menstrual problem.   Musculoskeletal: Positive for arthralgias. Negative for joint swelling and neck pain.   Neurological: Negative for weakness and headaches.   Psychiatric/Behavioral: Negative for confusion and dysphoric mood.       Objective:      Physical Exam   Constitutional: She is oriented to person, place, and time. She appears well-developed and well-nourished.   HENT:   Head: Normocephalic and atraumatic.   Right Ear: External ear normal.   Left Ear: External ear normal.   Nose: Nose normal.   Mouth/Throat: Oropharynx is clear and moist.   Eyes: Conjunctivae and EOM are normal. Pupils are equal, round, and reactive to light.   Neck: Normal range of motion. Neck supple. No JVD present. No thyromegaly present.   Cardiovascular: Normal rate, regular rhythm, normal heart sounds and intact distal pulses.   No murmur heard.  Pulmonary/Chest: Effort normal and breath sounds normal.   Abdominal: Soft. Bowel sounds are normal. She exhibits no mass. There is no tenderness.   Musculoskeletal: Normal range of motion. She exhibits no edema.   Lymphadenopathy:     She has no cervical adenopathy.   Neurological: She is alert and oriented to person, place, and time. She has normal reflexes.   Skin: Skin is warm and dry.   Acanthosis nigricans   Psychiatric: She has a normal mood  and affect. Her behavior is normal. Judgment and thought content normal.   Vitals reviewed.      Assessment:   Compliance report shows pt is using her machine regularly she is averaging 7 hrs and 20 min a day and is using 100% of the days    She feels much better.   Nilsa was seen today for constipation and rectal bleeding.    Diagnoses and all orders for this visit:    MONE (obstructive sleep apnea)    Bright red rectal bleeding  -     polyethylene glycol (GLYCOLAX) 17 gram PwPk; Take 17 g by mouth 2 (two) times daily as needed.    Slow transit constipation  -     polyethylene glycol (GLYCOLAX) 17 gram PwPk; Take 17 g by mouth 2 (two) times daily as needed.          Plan: continue cpap  Nilsa was seen today for constipation and rectal bleeding.    Diagnoses and all orders for this visit:    MONE (obstructive sleep apnea)    Bright red rectal bleeding  -     polyethylene glycol (GLYCOLAX) 17 gram PwPk; Take 17 g by mouth 2 (two) times daily as needed.    Slow transit constipation  -     polyethylene glycol (GLYCOLAX) 17 gram PwPk; Take 17 g by mouth 2 (two) times daily as needed.

## 2018-12-04 ENCOUNTER — PATIENT MESSAGE (OUTPATIENT)
Dept: FAMILY MEDICINE | Facility: CLINIC | Age: 73
End: 2018-12-04

## 2019-02-12 ENCOUNTER — TELEPHONE (OUTPATIENT)
Dept: INTERNAL MEDICINE | Facility: CLINIC | Age: 74
End: 2019-02-12

## 2019-03-11 ENCOUNTER — OFFICE VISIT (OUTPATIENT)
Dept: PRIMARY CARE CLINIC | Facility: CLINIC | Age: 74
End: 2019-03-11
Attending: NURSE PRACTITIONER
Payer: MEDICARE

## 2019-03-11 VITALS
HEART RATE: 85 BPM | OXYGEN SATURATION: 98 % | HEIGHT: 61 IN | WEIGHT: 204.81 LBS | TEMPERATURE: 98 F | RESPIRATION RATE: 16 BRPM | BODY MASS INDEX: 38.67 KG/M2

## 2019-03-11 DIAGNOSIS — I67.2 CEREBRAL ATHEROSCLEROSIS: ICD-10-CM

## 2019-03-11 DIAGNOSIS — Z00.00 ANNUAL PHYSICAL EXAM: Primary | ICD-10-CM

## 2019-03-11 DIAGNOSIS — Z12.11 COLON CANCER SCREENING: ICD-10-CM

## 2019-03-11 DIAGNOSIS — I10 ESSENTIAL HYPERTENSION: ICD-10-CM

## 2019-03-11 DIAGNOSIS — Z12.31 BREAST CANCER SCREENING BY MAMMOGRAM: ICD-10-CM

## 2019-03-11 DIAGNOSIS — E21.3 HYPERPARATHYROIDISM: ICD-10-CM

## 2019-03-11 DIAGNOSIS — E78.2 MIXED HYPERLIPIDEMIA: ICD-10-CM

## 2019-03-11 DIAGNOSIS — I77.9 BILATERAL CAROTID ARTERY DISEASE, UNSPECIFIED TYPE: ICD-10-CM

## 2019-03-11 DIAGNOSIS — E83.52 HYPERCALCEMIA: ICD-10-CM

## 2019-03-11 DIAGNOSIS — R73.01 ABNORMAL FASTING GLUCOSE: ICD-10-CM

## 2019-03-11 DIAGNOSIS — I51.89 DIASTOLIC DYSFUNCTION WITHOUT HEART FAILURE: ICD-10-CM

## 2019-03-11 DIAGNOSIS — D64.9 ANEMIA, UNSPECIFIED TYPE: ICD-10-CM

## 2019-03-11 DIAGNOSIS — D53.9 NUTRITIONAL ANEMIA: ICD-10-CM

## 2019-03-11 PROCEDURE — 1101F PT FALLS ASSESS-DOCD LE1/YR: CPT | Mod: HCNC,CPTII,S$GLB, | Performed by: NURSE PRACTITIONER

## 2019-03-11 PROCEDURE — 99215 OFFICE O/P EST HI 40 MIN: CPT | Mod: HCNC,S$GLB,, | Performed by: NURSE PRACTITIONER

## 2019-03-11 PROCEDURE — 99215 PR OFFICE/OUTPT VISIT, EST, LEVL V, 40-54 MIN: ICD-10-PCS | Mod: HCNC,S$GLB,, | Performed by: NURSE PRACTITIONER

## 2019-03-11 PROCEDURE — 99999 PR PBB SHADOW E&M-EST. PATIENT-LVL V: ICD-10-PCS | Mod: PBBFAC,HCNC,, | Performed by: NURSE PRACTITIONER

## 2019-03-11 PROCEDURE — 99999 PR PBB SHADOW E&M-EST. PATIENT-LVL V: CPT | Mod: PBBFAC,HCNC,, | Performed by: NURSE PRACTITIONER

## 2019-03-11 PROCEDURE — 1101F PR PT FALLS ASSESS DOC 0-1 FALLS W/OUT INJ PAST YR: ICD-10-PCS | Mod: HCNC,CPTII,S$GLB, | Performed by: NURSE PRACTITIONER

## 2019-03-11 RX ORDER — AZELASTINE 1 MG/ML
1 SPRAY, METERED NASAL 2 TIMES DAILY
Qty: 30 ML | Refills: 2 | Status: SHIPPED | OUTPATIENT
Start: 2019-03-11 | End: 2019-12-27

## 2019-03-11 NOTE — PROGRESS NOTES
Subjective:       Patient ID: Nilsa Curiel is a 73 y.o. female.    Chief Complaint: Establish Care    Patient is a 73-year-old female presents to clinic today requesting to establishes a primary care patient to undergo her annual physical exam.  She reports that she was previously followed by Dr. Gimenez who has recently retired.  She is due for annual labs, bone density, and mammogram.  She also has a history of bilateral carotid artery disease with last ultrasound in 2016 with no recent ultrasound results.  She is due for dental exam.  She was seen by her ophthalmologist in October 2018.  Her last Pap was 3 years ago and she reports she is no longer undergoing Pap smears.  Her immunizations are up-to-date.  She does have a history of known obstructive sleep apnea and sleeps with a CPAP machine.  She lives with her daughter and has recently been .    She is followed by Dr. Jaqui sam Endocrinology for hypercalcemia and hyperparathyroidism  She is followed by CHRISTOPHER Muñoz for atopic dermatitis in Dermatology  She is followed by Dr. Sosa ophthalmology for cataracts and presbyopia and allergic conjunctivitis  She was seen in September 2018 for chronic [facility] by Dr. Gibbons   She was evaluated in May of 2018 by Cardiology and underwent echo which revealed diastolic dysfunction with an EF of 70% and PA pressure of 36 as well as mild aortic sclerosis.  She does have a known heart murmur and has not followed up with Cardiology.    She continues to experience chronic low back pain and joint pain. She reports that she has recently implemented diet and exercise modifications and has decreased her carbohydrate intake.  Her BMI has dropped from 41 to 38.  She has chronic allergies and reports that she has stopped using her Flonase due to nasal irritation.          Review of Systems   Constitutional: Negative.    HENT: Positive for congestion and rhinorrhea.    Eyes: Negative.     Respiratory: Negative.    Cardiovascular: Negative.    Gastrointestinal: Negative.    Endocrine: Negative.    Genitourinary: Negative.    Musculoskeletal: Positive for arthralgias, back pain and gait problem.   Skin: Positive for rash.   Allergic/Immunologic: Positive for environmental allergies.   Hematological: Negative.    Psychiatric/Behavioral: Negative.    All other systems reviewed and are negative.      Objective:      Physical Exam   Constitutional: She is oriented to person, place, and time. She appears well-developed and well-nourished. No distress.   HENT:   Head: Normocephalic and atraumatic.   Right Ear: External ear normal.   Left Ear: External ear normal.   Nose: Nose normal.   Mouth/Throat: Oropharynx is clear and moist. No oropharyngeal exudate.   Eyes: Conjunctivae and EOM are normal. Pupils are equal, round, and reactive to light. Right eye exhibits no discharge. Left eye exhibits no discharge. No scleral icterus.   Neck: Normal range of motion. Neck supple. No thyromegaly present.   Cardiovascular: Normal rate, regular rhythm and intact distal pulses. Exam reveals no gallop and no friction rub.   Murmur (2/6 systolic murmur ) heard.  Pulmonary/Chest: Effort normal and breath sounds normal. No respiratory distress.   Abdominal: Soft. Bowel sounds are normal. She exhibits no distension. There is no tenderness.   Musculoskeletal: Normal range of motion. She exhibits no edema.   Lymphadenopathy:     She has no cervical adenopathy.   Neurological: She is alert and oriented to person, place, and time. No cranial nerve deficit.   Mildly ataxic gait   Skin: Skin is warm and dry. Capillary refill takes less than 2 seconds. No rash noted. She is not diaphoretic. No erythema.   Psychiatric: She has a normal mood and affect. Her behavior is normal. Thought content normal.   Nursing note and vitals reviewed.      Assessment:       1. Annual physical exam    2. Hypercalcemia    3. Bilateral carotid artery  disease, unspecified type    4. Essential hypertension    5. Diastolic dysfunction without heart failure    6. Mixed hyperlipidemia    7. Anemia, unspecified type    8. Abnormal fasting glucose    9. Hyperparathyroidism    10. Breast cancer screening by mammogram    11. Colon cancer screening    12. Nutritional anemia     13. Cerebral atherosclerosis     14. Mild aortic sclerosis        Plan:       Annual physical exam  -     CBC auto differential; Future; Expected date: 03/11/2019  -     Comprehensive metabolic panel; Future; Expected date: 03/11/2019  -     Lipid panel; Future; Expected date: 03/11/2019  -     Hemoglobin A1c; Future; Expected date: 03/11/2019  -     Microalbumin/creatinine urine ratio; Future; Expected date: 03/11/2019  -     TSH; Future; Expected date: 03/11/2019  -     T4, free; Future; Expected date: 03/11/2019  -     T3; Future; Expected date: 03/11/2019  -     Vitamin D; Future; Expected date: 03/11/2019  -     Ferritin; Future; Expected date: 03/11/2019  -     URINALYSIS; Future; Expected date: 03/12/2019  -     Fecal Immunochemical Test (iFOBT); Future; Expected date: 03/11/2019  -     Mammo Digital Screening Bilateral With CAD; Future; Expected date: 03/11/2019  -     DXA Bone Density Spine And Hip; Future; Expected date: 03/11/2019   Patient's labs and previous diagnostics were reviewed in the clinic today and discussed with her.  We did order her annual labs and added an anemia panel.  She will follow back in 10-14 days after diagnostics to review them and to undergo further discussion on any changes in her treatment plan. For now she is to continue her current medication regimen with the exception of her Flonase which we will switch to Astelin spray.  Hypercalcemia  -     CBC auto differential; Future; Expected date: 03/11/2019  -     Comprehensive metabolic panel; Future; Expected date: 03/11/2019  -     Lipid panel; Future; Expected date: 03/11/2019  -     Hemoglobin A1c; Future;  Expected date: 03/11/2019  -     Microalbumin/creatinine urine ratio; Future; Expected date: 03/11/2019  -     TSH; Future; Expected date: 03/11/2019  -     T4, free; Future; Expected date: 03/11/2019  -     T3; Future; Expected date: 03/11/2019  -     Vitamin D; Future; Expected date: 03/11/2019  -     Ferritin; Future; Expected date: 03/11/2019  -     URINALYSIS; Future; Expected date: 03/12/2019  -     DXA Bone Density Spine And Hip; Future; Expected date: 03/11/2019  -     PTH, intact; Future; Expected date: 03/11/2019  We will check labs and add a PTH.  Patient is to follow up with Dr. sam as scheduled.  Bilateral carotid artery disease, unspecified type  -     CBC auto differential; Future; Expected date: 03/11/2019  -     Comprehensive metabolic panel; Future; Expected date: 03/11/2019  -     Lipid panel; Future; Expected date: 03/11/2019  -     Hemoglobin A1c; Future; Expected date: 03/11/2019  -     Microalbumin/creatinine urine ratio; Future; Expected date: 03/11/2019  -     TSH; Future; Expected date: 03/11/2019  -     T4, free; Future; Expected date: 03/11/2019  -     T3; Future; Expected date: 03/11/2019  -     Ferritin; Future; Expected date: 03/11/2019  -     URINALYSIS; Future; Expected date: 03/12/2019  -     US Carotid Bilateral; Future; Expected date: 03/11/2019  -     Ambulatory consult to Cardiology  Review of carotid ultrasound 2016 revealed bilateral carotid artery disease less than 39%.  Patient is on statin therapy and should continue this to stabilize any vulnerable plaque and to optimize her lipid management.  We have placed a referral to cardiology so that the patient can continue to be followed by a cardiovascular specialist.  Essential hypertension  -     CBC auto differential; Future; Expected date: 03/11/2019  -     Comprehensive metabolic panel; Future; Expected date: 03/11/2019  -     Lipid panel; Future; Expected date: 03/11/2019  -     Hemoglobin A1c; Future; Expected date:  03/11/2019  -     Microalbumin/creatinine urine ratio; Future; Expected date: 03/11/2019  -     TSH; Future; Expected date: 03/11/2019  -     T4, free; Future; Expected date: 03/11/2019  -     T3; Future; Expected date: 03/11/2019  -     Ferritin; Future; Expected date: 03/11/2019  -     URINALYSIS; Future; Expected date: 03/12/2019  -     US Carotid Bilateral; Future; Expected date: 03/11/2019  -     Ambulatory consult to Cardiology  Patient's blood pressure is reasonably controlled at this time.  Will continue to monitor.  She should continue her efforts to decrease her BMI including diet and exercise modifications.  I did encourage her to adhere to a low-sodium carbohydrate prudent low-fat low concentrated sweet diet.  Diastolic dysfunction without heart failure  -     US Carotid Bilateral; Future; Expected date: 03/11/2019  -     Ambulatory consult to Cardiology  Cardiology referral for continued management and surveillance  Mixed hyperlipidemia  -     CBC auto differential; Future; Expected date: 03/11/2019  -     Comprehensive metabolic panel; Future; Expected date: 03/11/2019  -     Lipid panel; Future; Expected date: 03/11/2019  -     Hemoglobin A1c; Future; Expected date: 03/11/2019  -     Microalbumin/creatinine urine ratio; Future; Expected date: 03/11/2019  -     TSH; Future; Expected date: 03/11/2019  -     T4, free; Future; Expected date: 03/11/2019  -     T3; Future; Expected date: 03/11/2019  -     Ferritin; Future; Expected date: 03/11/2019  -     URINALYSIS; Future; Expected date: 03/12/2019  -     US Carotid Bilateral; Future; Expected date: 03/11/2019  Continue statin therapy.  Check labs.  Anemia, unspecified type  -     CBC auto differential; Future; Expected date: 03/11/2019  -     Comprehensive metabolic panel; Future; Expected date: 03/11/2019  -     Lipid panel; Future; Expected date: 03/11/2019  -     Hemoglobin A1c; Future; Expected date: 03/11/2019  -     Microalbumin/creatinine urine  ratio; Future; Expected date: 03/11/2019  -     TSH; Future; Expected date: 03/11/2019  -     T4, free; Future; Expected date: 03/11/2019  -     T3; Future; Expected date: 03/11/2019  -     Ferritin; Future; Expected date: 03/11/2019  -     Iron and TIBC; Future; Expected date: 03/11/2019  -     Vitamin B12; Future; Expected date: 03/11/2019  -     Folate; Future; Expected date: 03/11/2019  -     URINALYSIS; Future; Expected date: 03/12/2019    Abnormal fasting glucose  -     CBC auto differential; Future; Expected date: 03/11/2019  -     Comprehensive metabolic panel; Future; Expected date: 03/11/2019  -     Lipid panel; Future; Expected date: 03/11/2019  -     Hemoglobin A1c; Future; Expected date: 03/11/2019  -     Microalbumin/creatinine urine ratio; Future; Expected date: 03/11/2019  -     TSH; Future; Expected date: 03/11/2019  -     T4, free; Future; Expected date: 03/11/2019  -     T3; Future; Expected date: 03/11/2019  -     Ferritin; Future; Expected date: 03/11/2019  -     Iron and TIBC; Future; Expected date: 03/11/2019  -     Vitamin B12; Future; Expected date: 03/11/2019  -     Folate; Future; Expected date: 03/11/2019  -     URINALYSIS; Future; Expected date: 03/12/2019    Hyperparathyroidism  -     CBC auto differential; Future; Expected date: 03/11/2019  -     Comprehensive metabolic panel; Future; Expected date: 03/11/2019  -     Lipid panel; Future; Expected date: 03/11/2019  -     Hemoglobin A1c; Future; Expected date: 03/11/2019  -     Microalbumin/creatinine urine ratio; Future; Expected date: 03/11/2019  -     TSH; Future; Expected date: 03/11/2019  -     T4, free; Future; Expected date: 03/11/2019  -     T3; Future; Expected date: 03/11/2019  -     Vitamin D; Future; Expected date: 03/11/2019  -     Ferritin; Future; Expected date: 03/11/2019  -     Iron and TIBC; Future; Expected date: 03/11/2019  -     Vitamin B12; Future; Expected date: 03/11/2019  -     Folate; Future; Expected date:  03/11/2019  -     URINALYSIS; Future; Expected date: 03/12/2019  -     DXA Bone Density Spine And Hip; Future; Expected date: 03/11/2019  -     PTH, intact; Future; Expected date: 03/11/2019    Breast cancer screening by mammogram  -     Mammo Digital Screening Bilateral With CAD; Future; Expected date: 03/11/2019    Colon cancer screening  -     Fecal Immunochemical Test (iFOBT); Future; Expected date: 03/11/2019    Nutritional anemia   -     Vitamin B12; Future; Expected date: 03/11/2019  -     Folate; Future; Expected date: 03/11/2019    Cerebral atherosclerosis   -     US Carotid Bilateral; Future; Expected date: 03/11/2019    Mild aortic sclerosis  Cardiology referral.  Echo was reviewed.  Other orders  -     azelastine (ASTELIN) 137 mcg (0.1 %) nasal spray; 1 spray (137 mcg total) by Nasal route 2 (two) times daily.  Dispense: 30 mL; Refill: 2      Medication List with Changes/Refills   New Medications    AZELASTINE (ASTELIN) 137 MCG (0.1 %) NASAL SPRAY    1 spray (137 mcg total) by Nasal route 2 (two) times daily.   Current Medications    AMLODIPINE (NORVASC) 10 MG TABLET    TAKE 1 TABLET(10 MG) BY MOUTH EVERY DAY    CHLORTHALIDONE (HYGROTEN) 25 MG TAB    Take 1 tablet (25 mg total) by mouth once daily.    FEXOFENADINE (ALLEGRA) 180 MG TABLET    Take 1 tablet (180 mg total) by mouth once daily.    GABAPENTIN (NEURONTIN) 300 MG CAPSULE    Take 1 by mouth daily for 3 days, then twice daily for 3 days,    KETOCONAZOLE (NIZORAL) 2 % CREAM    Apply topically 2 (two) times daily. Apply to axilla and abdominal creases    LACTOBACILLUS COMBO NO.6 (PROBIOTIC COMPLEX) 4 BILLION CELL TAB    Take 1 tablet by mouth once daily.    LOSARTAN (COZAAR) 100 MG TABLET    Take 1 tablet (100 mg total) by mouth once daily.    MULTIVIT-MIN/FA/CA CARB/VIT K (WOMEN'S 50+ DAILY FORMULA ORAL)    Take by mouth.    OMEGA-3 FATTY ACIDS 300 MG CAP    Take by mouth.    POLYETHYLENE GLYCOL (GLYCOLAX) 17 GRAM PWPK    Take 17 g by mouth 2 (two)  times daily as needed.    PRAVASTATIN (PRAVACHOL) 40 MG TABLET    Take 1 tablet (40 mg total) by mouth once daily.    RANITIDINE (ZANTAC) 150 MG TABLET    Take 1 tablet (150 mg total) by mouth as needed for Heartburn.    TRIAMCINOLONE ACETONIDE 0.1% (KENALOG) 0.1 % CREAM    AAA chest and hands bid    TURMERIC ORAL    Take by mouth.   Discontinued Medications    FLUTICASONE (FLONASE) 50 MCG/ACTUATION NASAL SPRAY    SHAKE LIQUID AND USE 1 SPRAY IN EACH NOSTRIL TWICE DAILY           I have reviewed the patient's past medical/surgical and social histories and updated as appropriate. Medications were reviewed and discussed as appropriate including side effects and risks versus benefit. Plan of care was reviewed and agreed upon with the patient.  An opportunity to ask questions was provided and explanation given. Patient verbalized understanding on all information reviewed and discussed.

## 2019-03-11 NOTE — PATIENT INSTRUCTIONS
Potassium-Rich Foods  The normal adult diet usually contains 2,000 mg to 4,000 mg of potassium per day. More potassium is needed when you lose too much potassium from your body. This can happen if you have diarrhea or vomiting. It can also happen if you take a medicine to make you urinate more (diuretic). To increase the amount of potassium in your diet, include these high-potassium foods.     [The (*) indicates foods highest in potassium.]  Vegetables  Artichokes. Cooked 1/2 cup, 200 mg to 300 mg*  Asparagus. Cooked 1/2 cup, 200 mg to 300 mg  Beans. White, red, gil cooked 1/2 cup, 300 mg to 500 mg*  Beets. Cooked 1/2 cup, 200 mg to 300 mg  Broccoli. Cooked or raw 1 cup, 200 mg to 500 mg*  [place] sprouts. Cooked 1/2 cup, 200 mg to 300 mg  Cabbage. Raw 1 cup, 100 mg to 200 mg  Carrots. Raw or cooked 1/2 cup, 100 mg to 200 mg  Celery. Raw 1 cup, 200 mg to 300 mg  Lima beans. Fresh or frozen 1/2 cup, 300 mg to 500 mg*   Mushrooms. Raw or cooked 1/2 cup, 100 mg to 300 mg  Peas. Cooked 1/2 cup, 150 mg to 250 mg   Potatoes. Baked 1 medium, 500 mg to 900 mg*   Spinach. Cooked 1 cup, 800 mg to 900 mg*   Spinach. Raw 2 cups, 300 mg to 400 mg *  Squash, winter. Fresh, frozen, or cooked 1/2 cup, 200 mg to 400 mg   Tomato. Fresh 1 medium, 200 mg to 300 mg   Tomato juice. Canned 1/2 cup, 200 mg to 300 mg   Fruits  Apple juice. Unsweetened 1 cup, 200 mg to 300 mg   Apricots. Canned 1/2 cup, 200 mg to 300 mg   Apricots. Dried 4 pieces, 100 mg to 200 mg   Avocado. Raw 1/2 cup, 300 mg to 400 mg*  Banana. Fresh 1 small, 300 mg to 400 mg*   Cantaloupe. Fresh 1 cup diced, 300 mg to 400 mg*   Grape juice. Unsweetened 1 cup, 200 mg to 300 mg   Honeydew melon. Fresh 1 cup diced, 300 mg to 400 mg*   Orange. Fresh 1 medium, 200 mg to 300 mg    Orange juice. Unsweetened, fresh or frozen 1/2 cup, 200 mg to 300 mg  Pineapple juice. Unsweetened 1 cup, 300 mg to 400 mg   Prune juice. Unsweetened 1/2 cup, 300 mg to 400 mg*   Prunes. Dried 5  pieces, 300 mg to 400 mg*   Strawberries. Fresh or frozen 1 cup, 200 mg to 300 mg  Meat  Red meat. Cooked 3 ounces, 100 mg to 300 mg   Seafood  Cod, flounder, halibut. Cooked 3 ounces, 100 mg to 300 mg*  [place]. Cooked, 3 ounces 300 mg to 400 mg*   Scallops. Cooked 3 ounces, 200 mg to 300 mg*  Shrimp. Cooked 3/4 cup, 100 mg to 200 mg   Tuna. Fresh or canned 3/4 cup, 200 mg to 500 mg   Date Last Reviewed: 10/1/2016  © 2983-9200 [employer]. [address] [place] [address], [place] [address]. All rights reserved. This information is not intended as a substitute for professional medical care. Always follow your healthcare professional's instructions.

## 2019-03-14 ENCOUNTER — LAB VISIT (OUTPATIENT)
Dept: LAB | Facility: HOSPITAL | Age: 74
End: 2019-03-14
Attending: NURSE PRACTITIONER
Payer: MEDICARE

## 2019-03-14 DIAGNOSIS — D64.9 ANEMIA, UNSPECIFIED TYPE: ICD-10-CM

## 2019-03-14 DIAGNOSIS — I10 ESSENTIAL HYPERTENSION: ICD-10-CM

## 2019-03-14 DIAGNOSIS — Z00.00 ANNUAL PHYSICAL EXAM: ICD-10-CM

## 2019-03-14 DIAGNOSIS — D53.9 NUTRITIONAL ANEMIA: ICD-10-CM

## 2019-03-14 DIAGNOSIS — E78.2 MIXED HYPERLIPIDEMIA: ICD-10-CM

## 2019-03-14 DIAGNOSIS — E21.3 HYPERPARATHYROIDISM: ICD-10-CM

## 2019-03-14 DIAGNOSIS — R73.01 ABNORMAL FASTING GLUCOSE: ICD-10-CM

## 2019-03-14 DIAGNOSIS — E83.52 HYPERCALCEMIA: ICD-10-CM

## 2019-03-14 DIAGNOSIS — I77.9 BILATERAL CAROTID ARTERY DISEASE, UNSPECIFIED TYPE: ICD-10-CM

## 2019-03-14 LAB
25(OH)D3+25(OH)D2 SERPL-MCNC: 40 NG/ML
ALBUMIN SERPL BCP-MCNC: 3.7 G/DL
ALP SERPL-CCNC: 115 U/L
ALT SERPL W/O P-5'-P-CCNC: 21 U/L
ANION GAP SERPL CALC-SCNC: 9 MMOL/L
AST SERPL-CCNC: 22 U/L
BASOPHILS # BLD AUTO: 0.05 K/UL
BASOPHILS NFR BLD: 0.8 %
BILIRUB SERPL-MCNC: 0.5 MG/DL
BUN SERPL-MCNC: 12 MG/DL
CALCIUM SERPL-MCNC: 10.8 MG/DL
CHLORIDE SERPL-SCNC: 106 MMOL/L
CHOLEST SERPL-MCNC: 135 MG/DL
CHOLEST/HDLC SERPL: 2.8 {RATIO}
CO2 SERPL-SCNC: 27 MMOL/L
CREAT SERPL-MCNC: 0.9 MG/DL
DIFFERENTIAL METHOD: ABNORMAL
EOSINOPHIL # BLD AUTO: 0.2 K/UL
EOSINOPHIL NFR BLD: 3.6 %
ERYTHROCYTE [DISTWIDTH] IN BLOOD BY AUTOMATED COUNT: 13.4 %
EST. GFR  (AFRICAN AMERICAN): >60 ML/MIN/1.73 M^2
EST. GFR  (NON AFRICAN AMERICAN): >60 ML/MIN/1.73 M^2
ESTIMATED AVG GLUCOSE: 100 MG/DL
FERRITIN SERPL-MCNC: 121 NG/ML
FOLATE SERPL-MCNC: 16 NG/ML
GLUCOSE SERPL-MCNC: 95 MG/DL
HBA1C MFR BLD HPLC: 5.1 %
HCT VFR BLD AUTO: 35.6 %
HDLC SERPL-MCNC: 48 MG/DL
HDLC SERPL: 35.6 %
HGB BLD-MCNC: 10.9 G/DL
IMM GRANULOCYTES # BLD AUTO: 0.02 K/UL
IMM GRANULOCYTES NFR BLD AUTO: 0.3 %
IRON SERPL-MCNC: 95 UG/DL
LDLC SERPL CALC-MCNC: 64.6 MG/DL
LYMPHOCYTES # BLD AUTO: 2.5 K/UL
LYMPHOCYTES NFR BLD: 37.5 %
MCH RBC QN AUTO: 24.4 PG
MCHC RBC AUTO-ENTMCNC: 30.6 G/DL
MCV RBC AUTO: 80 FL
MONOCYTES # BLD AUTO: 0.5 K/UL
MONOCYTES NFR BLD: 7.4 %
NEUTROPHILS # BLD AUTO: 3.3 K/UL
NEUTROPHILS NFR BLD: 50.4 %
NONHDLC SERPL-MCNC: 87 MG/DL
NRBC BLD-RTO: 0 /100 WBC
PLATELET # BLD AUTO: 368 K/UL
PMV BLD AUTO: 10.1 FL
POTASSIUM SERPL-SCNC: 4.1 MMOL/L
PROT SERPL-MCNC: 7.7 G/DL
PTH-INTACT SERPL-MCNC: 84 PG/ML
RBC # BLD AUTO: 4.46 M/UL
SATURATED IRON: 24 %
SODIUM SERPL-SCNC: 142 MMOL/L
T3 SERPL-MCNC: 94 NG/DL
T4 FREE SERPL-MCNC: 0.97 NG/DL
TOTAL IRON BINDING CAPACITY: 391 UG/DL
TRANSFERRIN SERPL-MCNC: 264 MG/DL
TRIGL SERPL-MCNC: 112 MG/DL
TSH SERPL DL<=0.005 MIU/L-ACNC: 2.31 UIU/ML
VIT B12 SERPL-MCNC: 1467 PG/ML
WBC # BLD AUTO: 6.59 K/UL

## 2019-03-14 PROCEDURE — 85025 COMPLETE CBC W/AUTO DIFF WBC: CPT | Mod: HCNC

## 2019-03-14 PROCEDURE — 82746 ASSAY OF FOLIC ACID SERUM: CPT | Mod: HCNC

## 2019-03-14 PROCEDURE — 36415 COLL VENOUS BLD VENIPUNCTURE: CPT | Mod: HCNC,PO

## 2019-03-14 PROCEDURE — 82728 ASSAY OF FERRITIN: CPT | Mod: HCNC

## 2019-03-14 PROCEDURE — 84480 ASSAY TRIIODOTHYRONINE (T3): CPT | Mod: HCNC

## 2019-03-14 PROCEDURE — 84439 ASSAY OF FREE THYROXINE: CPT | Mod: HCNC

## 2019-03-14 PROCEDURE — 82306 VITAMIN D 25 HYDROXY: CPT | Mod: HCNC

## 2019-03-14 PROCEDURE — 83036 HEMOGLOBIN GLYCOSYLATED A1C: CPT | Mod: HCNC

## 2019-03-14 PROCEDURE — 80053 COMPREHEN METABOLIC PANEL: CPT | Mod: HCNC

## 2019-03-14 PROCEDURE — 80061 LIPID PANEL: CPT | Mod: HCNC

## 2019-03-14 PROCEDURE — 83540 ASSAY OF IRON: CPT | Mod: HCNC

## 2019-03-14 PROCEDURE — 83970 ASSAY OF PARATHORMONE: CPT | Mod: HCNC

## 2019-03-14 PROCEDURE — 82607 VITAMIN B-12: CPT | Mod: HCNC

## 2019-03-14 PROCEDURE — 84443 ASSAY THYROID STIM HORMONE: CPT | Mod: HCNC

## 2019-03-19 ENCOUNTER — HOSPITAL ENCOUNTER (OUTPATIENT)
Dept: RADIOLOGY | Facility: HOSPITAL | Age: 74
Discharge: HOME | End: 2019-03-19
Attending: NURSE PRACTITIONER
Payer: MEDICARE

## 2019-03-19 ENCOUNTER — HOSPITAL ENCOUNTER (OUTPATIENT)
Dept: RADIOLOGY | Facility: CLINIC | Age: 74
Discharge: HOME | End: 2019-03-19
Attending: NURSE PRACTITIONER
Payer: MEDICARE

## 2019-03-19 DIAGNOSIS — E21.3 HYPERPARATHYROIDISM: ICD-10-CM

## 2019-03-19 DIAGNOSIS — Z12.31 BREAST CANCER SCREENING BY MAMMOGRAM: ICD-10-CM

## 2019-03-19 DIAGNOSIS — Z00.00 ANNUAL PHYSICAL EXAM: ICD-10-CM

## 2019-03-19 DIAGNOSIS — E83.52 HYPERCALCEMIA: ICD-10-CM

## 2019-03-19 PROCEDURE — 77080 DXA BONE DENSITY AXIAL: CPT | Mod: 26,HCNC,, | Performed by: INTERNAL MEDICINE

## 2019-03-19 PROCEDURE — 77063 MAMMO DIGITAL SCREENING BILAT WITH TOMOSYNTHESIS_CAD: ICD-10-PCS | Mod: 26,HCNC,, | Performed by: RADIOLOGY

## 2019-03-19 PROCEDURE — 77067 SCR MAMMO BI INCL CAD: CPT | Mod: TC,HCNC

## 2019-03-19 PROCEDURE — 77080 DEXA BONE DENSITY SPINE HIP: ICD-10-PCS | Mod: 26,HCNC,, | Performed by: INTERNAL MEDICINE

## 2019-03-19 PROCEDURE — 77063 BREAST TOMOSYNTHESIS BI: CPT | Mod: 26,HCNC,, | Performed by: RADIOLOGY

## 2019-03-19 PROCEDURE — 77080 DXA BONE DENSITY AXIAL: CPT | Mod: TC,HCNC

## 2019-03-19 PROCEDURE — 77067 MAMMO DIGITAL SCREENING BILAT WITH TOMOSYNTHESIS_CAD: ICD-10-PCS | Mod: 26,HCNC,, | Performed by: RADIOLOGY

## 2019-03-19 PROCEDURE — 77067 SCR MAMMO BI INCL CAD: CPT | Mod: 26,HCNC,, | Performed by: RADIOLOGY

## 2019-03-20 ENCOUNTER — HOSPITAL ENCOUNTER (OUTPATIENT)
Dept: RADIOLOGY | Facility: HOSPITAL | Age: 74
Discharge: HOME | End: 2019-03-20
Attending: NURSE PRACTITIONER
Payer: MEDICARE

## 2019-03-20 DIAGNOSIS — I77.9 BILATERAL CAROTID ARTERY DISEASE, UNSPECIFIED TYPE: ICD-10-CM

## 2019-03-20 DIAGNOSIS — I51.89 DIASTOLIC DYSFUNCTION WITHOUT HEART FAILURE: ICD-10-CM

## 2019-03-20 DIAGNOSIS — I10 ESSENTIAL HYPERTENSION: ICD-10-CM

## 2019-03-20 DIAGNOSIS — E78.2 MIXED HYPERLIPIDEMIA: ICD-10-CM

## 2019-03-20 DIAGNOSIS — I67.2 CEREBRAL ATHEROSCLEROSIS: ICD-10-CM

## 2019-03-20 PROCEDURE — 93880 US CAROTID BILATERAL: ICD-10-PCS | Mod: 26,HCNC,, | Performed by: RADIOLOGY

## 2019-03-20 PROCEDURE — 93880 EXTRACRANIAL BILAT STUDY: CPT | Mod: 26,HCNC,, | Performed by: RADIOLOGY

## 2019-03-20 PROCEDURE — 93880 EXTRACRANIAL BILAT STUDY: CPT | Mod: TC,HCNC

## 2019-03-25 ENCOUNTER — OFFICE VISIT (OUTPATIENT)
Dept: PRIMARY CARE CLINIC | Facility: CLINIC | Age: 74
End: 2019-03-25
Attending: NURSE PRACTITIONER
Payer: MEDICARE

## 2019-03-25 VITALS
DIASTOLIC BLOOD PRESSURE: 68 MMHG | RESPIRATION RATE: 16 BRPM | BODY MASS INDEX: 39.98 KG/M2 | SYSTOLIC BLOOD PRESSURE: 134 MMHG | HEART RATE: 84 BPM | TEMPERATURE: 99 F | WEIGHT: 203.63 LBS | OXYGEN SATURATION: 98 % | HEIGHT: 60 IN

## 2019-03-25 DIAGNOSIS — M43.17 SPONDYLOLISTHESIS OF LUMBOSACRAL REGION: ICD-10-CM

## 2019-03-25 DIAGNOSIS — Z74.09 IMPAIRED MOBILITY: ICD-10-CM

## 2019-03-25 DIAGNOSIS — I65.23 BILATERAL CAROTID ARTERY STENOSIS: ICD-10-CM

## 2019-03-25 DIAGNOSIS — I10 ESSENTIAL HYPERTENSION: ICD-10-CM

## 2019-03-25 DIAGNOSIS — R73.01 ABNORMAL FASTING GLUCOSE: ICD-10-CM

## 2019-03-25 DIAGNOSIS — M19.011 PRIMARY OSTEOARTHRITIS OF BOTH SHOULDERS: ICD-10-CM

## 2019-03-25 DIAGNOSIS — M19.012 PRIMARY OSTEOARTHRITIS OF BOTH SHOULDERS: ICD-10-CM

## 2019-03-25 DIAGNOSIS — D50.9 MICROCYTIC HYPOCHROMIC ANEMIA: Primary | ICD-10-CM

## 2019-03-25 DIAGNOSIS — E66.01 MORBID OBESITY: ICD-10-CM

## 2019-03-25 DIAGNOSIS — E78.2 MIXED HYPERLIPIDEMIA: ICD-10-CM

## 2019-03-25 DIAGNOSIS — I51.89 DIASTOLIC DYSFUNCTION WITHOUT HEART FAILURE: ICD-10-CM

## 2019-03-25 DIAGNOSIS — R53.1 DECREASED STRENGTH: ICD-10-CM

## 2019-03-25 DIAGNOSIS — E21.3 HYPERPARATHYROIDISM: ICD-10-CM

## 2019-03-25 PROCEDURE — 99999 PR PBB SHADOW E&M-EST. PATIENT-LVL V: ICD-10-PCS | Mod: PBBFAC,HCNC,, | Performed by: NURSE PRACTITIONER

## 2019-03-25 PROCEDURE — 3075F SYST BP GE 130 - 139MM HG: CPT | Mod: HCNC,CPTII,S$GLB, | Performed by: NURSE PRACTITIONER

## 2019-03-25 PROCEDURE — 3078F PR MOST RECENT DIASTOLIC BLOOD PRESSURE < 80 MM HG: ICD-10-PCS | Mod: HCNC,CPTII,S$GLB, | Performed by: NURSE PRACTITIONER

## 2019-03-25 PROCEDURE — 99214 PR OFFICE/OUTPT VISIT, EST, LEVL IV, 30-39 MIN: ICD-10-PCS | Mod: HCNC,S$GLB,, | Performed by: NURSE PRACTITIONER

## 2019-03-25 PROCEDURE — 3075F PR MOST RECENT SYSTOLIC BLOOD PRESS GE 130-139MM HG: ICD-10-PCS | Mod: HCNC,CPTII,S$GLB, | Performed by: NURSE PRACTITIONER

## 2019-03-25 PROCEDURE — 99999 PR PBB SHADOW E&M-EST. PATIENT-LVL V: CPT | Mod: PBBFAC,HCNC,, | Performed by: NURSE PRACTITIONER

## 2019-03-25 PROCEDURE — 1100F PR PT FALLS ASSESS DOC 2+ FALLS/FALL W/INJURY/YR: ICD-10-PCS | Mod: HCNC,CPTII,S$GLB, | Performed by: NURSE PRACTITIONER

## 2019-03-25 PROCEDURE — 1100F PTFALLS ASSESS-DOCD GE2>/YR: CPT | Mod: HCNC,CPTII,S$GLB, | Performed by: NURSE PRACTITIONER

## 2019-03-25 PROCEDURE — 3078F DIAST BP <80 MM HG: CPT | Mod: HCNC,CPTII,S$GLB, | Performed by: NURSE PRACTITIONER

## 2019-03-25 PROCEDURE — 99214 OFFICE O/P EST MOD 30 MIN: CPT | Mod: HCNC,S$GLB,, | Performed by: NURSE PRACTITIONER

## 2019-03-25 PROCEDURE — 3288F PR FALLS RISK ASSESSMENT DOCUMENTED: ICD-10-PCS | Mod: HCNC,CPTII,S$GLB, | Performed by: NURSE PRACTITIONER

## 2019-03-25 PROCEDURE — 3288F FALL RISK ASSESSMENT DOCD: CPT | Mod: HCNC,CPTII,S$GLB, | Performed by: NURSE PRACTITIONER

## 2019-03-25 NOTE — PATIENT INSTRUCTIONS
Anemia, Type Not Specified (Adult)  Red blood cells carry oxygen to the tissues of your body. Anemia is a condition in which you have too few red blood cells. You need iron to make red blood cells. The most common cause of anemia is not having enough iron. This may be because of:  · Loss of blood. This can be caused by heavy menstrual periods. It can also be caused by bleeding from the stomach or intestines.  · Poor diet. You may not be eating enough foods that contain iron.  Other causes of anemia include certain vitamin deficiencies, chronic kidney disease, and certain other chronic illnesses.  Anemia makes you to feel tired and run down. When anemia becomes severe, your skin becomes pale. You may feel short of breath after physical activity. Other symptoms include:  · Headaches  · Dizziness  · Leg cramps with physical activity  · Drowsiness  Home care  Follow these guidelines when caring for yourself at home:  · Dont overexert yourself.  · Talk with your healthcare provider before traveling by air or traveling to high altitudes.  Follow-up care  Follow up with your healthcare provider, or as advised. You may need other blood tests to find out the exact cause of your anemia. If you had testing done today, it may take several days to get all of the results. You can follow up with your own healthcare provider to get the results.  When to seek medical advice  Call your healthcare provider right away if any of these occur:  · Shortness of breath or chest pain  · Dizziness or fainting gets worse  · Vomiting blood or passing red or black-colored stool  Date Last Reviewed: 2/25/2016  © 3498-1455 [employer]. [address] [place] [address], [place] [address]. All rights reserved. This information is not intended as a substitute for professional medical care. Always follow your healthcare professional's instructions.

## 2019-03-25 NOTE — PROGRESS NOTES
Pt identified by name and date of birth, given Fit test and instructed on sample collection and mailing specimen to lab. Pt repeats instructions and verbalizes understanding of obtaining and mailing test to lab.

## 2019-03-25 NOTE — PROGRESS NOTES
Subjective:       Patient ID: Nilsa Curiel is a 73 y.o. female.    Chief Complaint: Follow-up      Patient is a 73 year old female who presents to clinic today for follow up obesity, HTN, anemia and review of diagnostics including labs and xrays.  She reports tried the Astelin spray as prescribed last visit but experienced nasal irritation so stopped using it. Her allergy symptoms are well controlled at this time.  She continues to experience chronic low back pain and joint pain without change.  At last visit she was referred to cardiology for continued follow up for multiple CV related comorbid conditions and reports she is scheduled to see a cardiologist in the next 2 weeks.  She is continuing to try to adhere to a low carb diet and has lost one pound since last visit.     She was seen by her ophthalmologist in October 2018.  Her last Pap was 3 years ago and she reports she is no longer undergoing Pap smears. She does have a history of known obstructive sleep apnea and sleeps with a CPAP machine.  She lives with her daughter and has recently been .     She is followed by Dr. Jaqui sam Endocrinology for hypercalcemia and hyperparathyroidism  She is followed by CHRISTOPHER Muñoz for atopic dermatitis in Dermatology  She is followed by Dr. Sosa ophthalmology for cataracts and presbyopia and allergic conjunctivitis  She was seen in September 2018 for chronic [facility] by Dr. Gibbons   She was evaluated in May of 2018 by Cardiology and underwent echo which revealed diastolic dysfunction with an EF of 70% and PA pressure of 36 as well as mild aortic sclerosis.  Review of carotid ultrasound 2016 revealed bilateral carotid artery disease less than 39%.     Review of Systems   HENT: Negative.    Eyes: Negative.    Respiratory: Negative.    Cardiovascular: Negative.    Gastrointestinal: Positive for constipation (intermittent).   Genitourinary: Negative.    Musculoskeletal: Positive for  arthralgias, back pain and gait problem.   Skin: Negative.    Allergic/Immunologic: Positive for environmental allergies.   Neurological: Positive for headaches (intermittent ).   Hematological: Negative.    Psychiatric/Behavioral: Negative.    All other systems reviewed and are negative.      Objective:      Physical Exam   Constitutional: She is oriented to person, place, and time. She appears well-developed and well-nourished. No distress.   obese   HENT:   Head: Normocephalic and atraumatic.   Eyes: Conjunctivae are normal. Right eye exhibits no discharge. Left eye exhibits no discharge. No scleral icterus.   Cardiovascular: Normal rate, regular rhythm and intact distal pulses.   Murmur heard.  Pulmonary/Chest: Effort normal and breath sounds normal. No respiratory distress.   Abdominal: Soft. Bowel sounds are normal. She exhibits no distension.   Musculoskeletal: She exhibits no edema.   Neurological: She is alert and oriented to person, place, and time.   Skin: Skin is warm and dry. Capillary refill takes less than 2 seconds. She is not diaphoretic.   Psychiatric: She has a normal mood and affect. Her behavior is normal. Thought content normal.   Vitals reviewed.      Medication List with Changes/Refills   Current Medications    AMLODIPINE (NORVASC) 10 MG TABLET    TAKE 1 TABLET(10 MG) BY MOUTH EVERY DAY    AZELASTINE (ASTELIN) 137 MCG (0.1 %) NASAL SPRAY    1 spray (137 mcg total) by Nasal route 2 (two) times daily.    CHLORTHALIDONE (HYGROTEN) 25 MG TAB    Take 1 tablet (25 mg total) by mouth once daily.    FEXOFENADINE (ALLEGRA) 180 MG TABLET    Take 1 tablet (180 mg total) by mouth once daily.    GABAPENTIN (NEURONTIN) 300 MG CAPSULE    Take 1 by mouth daily for 3 days, then twice daily for 3 days,    KETOCONAZOLE (NIZORAL) 2 % CREAM    Apply topically 2 (two) times daily. Apply to axilla and abdominal creases    LACTOBACILLUS COMBO NO.6 (PROBIOTIC COMPLEX) 4 BILLION CELL TAB    Take 1 tablet by mouth once  daily.    LOSARTAN (COZAAR) 100 MG TABLET    Take 1 tablet (100 mg total) by mouth once daily.    MULTIVIT-MIN/FA/CA CARB/VIT K (WOMEN'S 50+ DAILY FORMULA ORAL)    Take by mouth.    OMEGA-3 FATTY ACIDS 300 MG CAP    Take by mouth.    POLYETHYLENE GLYCOL (GLYCOLAX) 17 GRAM PWPK    Take 17 g by mouth 2 (two) times daily as needed.    PRAVASTATIN (PRAVACHOL) 40 MG TABLET    Take 1 tablet (40 mg total) by mouth once daily.    RANITIDINE (ZANTAC) 150 MG TABLET    Take 1 tablet (150 mg total) by mouth as needed for Heartburn.    TRIAMCINOLONE ACETONIDE 0.1% (KENALOG) 0.1 % CREAM    AAA chest and hands bid    TURMERIC ORAL    Take by mouth.     Results for orders placed or performed in visit on 03/14/19   Microalbumin/creatinine urine ratio   Result Value Ref Range    Microalbum.,U,Random 9.0 ug/mL    Creatinine, Random Ur 99.0 15.0 - 325.0 mg/dL    Microalb Creat Ratio 9.1 0.0 - 30.0 ug/mg   URINALYSIS   Result Value Ref Range    Specimen UA Urine, Unspecified     Color, UA Yellow Yellow, Straw, Imelda    Appearance, UA Clear Clear    pH, UA 7.0 5.0 - 8.0    Specific Gravity, UA 1.015 1.005 - 1.030    Protein, UA Negative Negative    Glucose, UA Negative Negative    Ketones, UA Negative Negative    Bilirubin (UA) Negative Negative    Occult Blood UA Negative Negative    Nitrite, UA Negative Negative    Leukocytes, UA Negative Negative   Urinalysis Microscopic   Result Value Ref Range    RBC, UA 1 0 - 4 /hpf    WBC, UA 0 0 - 5 /hpf    Squam Epithel, UA 0 /hpf    Microscopic Comment SEE COMMENT      US Carotid Bilateral   Order: [id number]   Status:  Final result   Visible to patient:  Yes (Patient Portal)   Next appt:  Today at 08:40 AM in Primary Care (Padmini Calhoun, SHERWIN, NP-C)   Dx:  Mixed hyperlipidemia; Cerebral athero...   Details     Reading Physician Reading Date Result Priority   Elliott Rivera MD 3/20/2019    Rohith Dumont MD 3/20/2019       Narrative     EXAMINATION:  US CAROTID BILATERAL    CLINICAL  HISTORY:  Cerebral atherosclerosis    TECHNIQUE:  Grayscale and color Doppler ultrasound examination of the carotid and vertebral artery systems bilaterally.  Stenosis estimates are per the NASCET measurement criteria.    COMPARISON:  Ultrasound carotid 08/15/2016    FINDINGS:  Right:    Internal Carotid Artery (ICA) peak systolic velocity 68 cm/sec    ICA/CCA peak systolic velocity ratio: 2.13    Plaque formation: Homogeneous    Vertebral artery: Antegrade flow and normal waveform.    Left:    Internal Carotid Artery (ICA)  peak systolic velocity 98 cm/sec    ICA/CCA peak systolic velocity ratio: 1.85    Plaque formation: Homogeneous    Vertebral artery: Antegrade flow and normal waveform.      Impression       >2x peak systolic ratio on the right suggesting approximately 50% stenosis of the right internal carotid artery.  No hemodynamically significant stenosis of the left internal carotid artery.    Electronically signed by resident: Rohith Dumont  Date: 03/20/2019  Time: 13:36    Electronically signed by: Elliott Rivera MD  Date: 03/20/2019  Time: 15:45            Last Resulted: 03/20/19 15:45           Reading Physician Reading Date Result Priority   Emerson Dhillon MD 3/20/2019 Routine   Bri Deluca MD 3/20/2019       Physician Responsible for MQSA Outcome Reason    Bri Deluca MD Signed       Narrative & Impression        Result:  Mammo Digital Screening Bilat w/ Marlon     History:  Patient is 73 y.o. and is seen for a screening mammogram.     Films Compared:  Compared to: 08/21/2017 Mammo Digital Screening Bilat with Tomosynthesis_CAD and 08/16/2016 Mammo Digital Screening Bilat with Tomosynthesis_CAD     Findings:  Computer-aided detection was utilized in the interpretation of this examination. This procedure was performed using tomosynthesis.       The breasts are almost entirely fatty. There is no evidence of suspicious masses, microcalcifications or architectural distortion.     Impression:  No  mammographic evidence of malignancy.     BI-RADS Category 1: Negative     Recommendation:  Routine screening mammogram in 1 year is recommended.     The patient's estimated lifetime risk of breast cancer (to age 85) based on Tyrer-Cuzick - 7 risk assessment model is: Tyrer-Cuzick: 4.6 %. According to the American Cancer Society,  patients with a lifetime breast cancer risk of 20% or higher might benefit from supplemental screening tests.      Assessment     Overall    1 - Negative    Breast Density      Overall   Breast Composition a - Almost entirely fatty          Last Resulted: 03/20/19 14:32 Order Details View Encounter Lab and Collection Details Routing Result History            External Result Report     External Result Report   Study Notes      Mirian Medina RT on 3/19/2019 10:41 AM   Screening/ No problems      Narrative & Impression        Result:  Mammo Digital Screening Bilat w/ Marlon     History:  Patient is 73 y.o. and is seen for a screening mammogram.     Films Compared:  Compared to: 08/21/2017 Mammo Digital Screening Bilat with Tomosynthesis_CAD and 08/16/2016 Mammo Digital Screening Bilat with Tomosynthesis_CAD     Findings:  Computer-aided detection was utilized in the interpretation of this examination. This procedure was performed using tomosynthesis.       The breasts are almost entirely fatty. There is no evidence of suspicious masses, microcalcifications or architectural distortion.     Impression:  No mammographic evidence of malignancy.     BI-RADS Category 1: Negative     Recommendation:  Routine screening mammogram in 1 year is recommended.     The patient's estimated lifetime risk of breast cancer (to age 85) based on Tyrer-Cuzick - 7 risk assessment model is: Tyrer-Cuzick: 4.6 %. According to the American Cancer Society,  patients with a lifetime breast cancer risk of 20% or higher might benefit from supplemental screening tests.    Encounter     View Encounter             Signed by  Resident      MD 3/20/2019   Emerson Dhillon MD     Attestation     As the supervising and teaching physician, I personally reviewed the images and resident interpretation and I agree with the findings.   Signed by     Signed Credentials Date/Time  Phone Pager   LITO BHANDARI MD 3/20/2019 14:35 [phone]          Assessment:       1. Microcytic hypochromic anemia    2. Bilateral carotid artery stenosis    3. Essential hypertension    4. Mixed hyperlipidemia    5. Morbid obesity    6. Hyperparathyroidism    7. Abnormal fasting glucose    8. Spondylolisthesis of lumbosacral region    9. Diastolic dysfunction without heart failure    10. Decreased strength    11. Primary osteoarthritis of both shoulders    12. Impaired mobility        Plan:       Microcytic hypochromic anemia  -     CBC auto differential; Future; Expected date: 03/25/2019  Patient is continuing to demonstrate a microcytic hypochromic anemia on labs.  She denies any known blood in her stool or urine.  Her anemia panel including folate B12 ferritin and iron are all within normal limits.  Patient will be given a fit kit to check stool for blood and declines referral to GI at this time for colonoscopy.  She has not been placed on anti-platelet therapy and was cautioned on use of nonsteroidal anti-inflammatories.  She is to recheck her lab in 10-12 weeks.  More recommendations based on those labs.  Bilateral carotid artery stenosis  It appears the patient's carotid disease is stable at this time.  She will continue her statin therapy to stabilize vulnerable plaque.  She is to follow up with Cardiology as scheduled.  Essential hypertension  Blood pressure is reasonably controlled at this time.  We will continue current home medication regimen.  Patient was encouraged to continue her efforts to decrease her BMI and weight.  She was also encouraged to try to increase her physical activity as she tolerates to at least 30 min daily  Mixed  hyperlipidemia  Lipids are well controlled at this time.  Continue current diet modifications and statin therapy.  Continue efforts to decrease weight and BMI.  Morbid obesity  Patient was counseled on the need to adhere to a low concentrated sweet low carb low sodium diet.  She has lost a lb since her last visit and we did set a goal for at least a 4th of a lb to 1 lb per week.  We will send her to physical therapy to assist her with increasing her mobility and strength.  Intern are hopeful that she will begin to increase her physical activity slowly.  Hyperparathyroidism  Parathyroid is stable at this time.  Patient is to follow up with Dr. sam as scheduled.  Abnormal fasting glucose  Hemoglobin A1c is stable.  Patient was encouraged to continue her therapeutic lifestyle changes.  Will continue to monitor.  Spondylolisthesis of lumbosacral region  -     Ambulatory consult to Physical Therapy    Diastolic dysfunction without heart failure  Follow-up with Cardiology as scheduled.  Decreased strength  -     Ambulatory consult to Physical Therapy    Primary osteoarthritis of both shoulders  -     Ambulatory consult to Physical Therapy    Impaired mobility  -     Ambulatory consult to Physical Therapy          Follow up in about 3 months (around 6/25/2019) for ANEMIA CHECK AND WT. CHECK - 6 POUNDS .        I have reviewed the patient's past medical/surgical and social histories and updated as appropriate. Medications were reviewed and discussed as appropriate including side effects and risks versus benefit. Plan of care was reviewed and agreed upon with the patient.  An opportunity to ask questions was provided and explanation given. Patient verbalized understanding on all information reviewed and discussed.

## 2019-03-29 DIAGNOSIS — L30.4 INTERTRIGO: ICD-10-CM

## 2019-03-29 DIAGNOSIS — L20.84 INTRINSIC ATOPIC DERMATITIS: ICD-10-CM

## 2019-03-29 RX ORDER — CLOBETASOL PROPIONATE 0.5 MG/G
CREAM TOPICAL
Qty: 30 G | Refills: 0 | Status: SHIPPED | OUTPATIENT
Start: 2019-03-29 | End: 2019-07-08 | Stop reason: SDUPTHER

## 2019-03-29 RX ORDER — KETOCONAZOLE 20 MG/G
CREAM TOPICAL
Qty: 15 G | Refills: 0 | Status: SHIPPED | OUTPATIENT
Start: 2019-03-29 | End: 2019-07-08 | Stop reason: SDUPTHER

## 2019-04-03 ENCOUNTER — LAB VISIT (OUTPATIENT)
Dept: LAB | Facility: HOSPITAL | Age: 74
End: 2019-04-03
Payer: MEDICARE

## 2019-04-03 DIAGNOSIS — Z00.00 ANNUAL PHYSICAL EXAM: ICD-10-CM

## 2019-04-03 DIAGNOSIS — Z12.11 COLON CANCER SCREENING: ICD-10-CM

## 2019-04-03 LAB — HEMOCCULT STL QL IA: NEGATIVE

## 2019-04-03 PROCEDURE — 82274 ASSAY TEST FOR BLOOD FECAL: CPT | Mod: HCNC

## 2019-04-05 ENCOUNTER — LAB VISIT (OUTPATIENT)
Dept: LAB | Facility: HOSPITAL | Age: 74
End: 2019-04-05
Attending: NURSE PRACTITIONER
Payer: MEDICARE

## 2019-04-05 ENCOUNTER — OFFICE VISIT (OUTPATIENT)
Dept: CARDIOLOGY | Facility: CLINIC | Age: 74
End: 2019-04-05
Payer: MEDICARE

## 2019-04-05 VITALS
DIASTOLIC BLOOD PRESSURE: 66 MMHG | BODY MASS INDEX: 38.14 KG/M2 | HEIGHT: 61 IN | WEIGHT: 202 LBS | OXYGEN SATURATION: 98 % | SYSTOLIC BLOOD PRESSURE: 134 MMHG | HEART RATE: 84 BPM

## 2019-04-05 DIAGNOSIS — I65.23 BILATERAL CAROTID ARTERY STENOSIS: ICD-10-CM

## 2019-04-05 DIAGNOSIS — D50.9 MICROCYTIC HYPOCHROMIC ANEMIA: ICD-10-CM

## 2019-04-05 DIAGNOSIS — E66.09 CLASS 2 OBESITY DUE TO EXCESS CALORIES WITHOUT SERIOUS COMORBIDITY WITH BODY MASS INDEX (BMI) OF 38.0 TO 38.9 IN ADULT: ICD-10-CM

## 2019-04-05 DIAGNOSIS — E78.2 MIXED HYPERLIPIDEMIA: ICD-10-CM

## 2019-04-05 DIAGNOSIS — I42.1 HYPERTROPHIC OBSTRUCTIVE CARDIOMYOPATHY (HOCM): Primary | ICD-10-CM

## 2019-04-05 DIAGNOSIS — I10 ESSENTIAL HYPERTENSION: ICD-10-CM

## 2019-04-05 DIAGNOSIS — I51.89 DIASTOLIC DYSFUNCTION WITHOUT HEART FAILURE: ICD-10-CM

## 2019-04-05 LAB
BASOPHILS # BLD AUTO: 0.03 K/UL (ref 0–0.2)
BASOPHILS NFR BLD: 0.5 % (ref 0–1.9)
DIFFERENTIAL METHOD: ABNORMAL
EOSINOPHIL # BLD AUTO: 0.1 K/UL (ref 0–0.5)
EOSINOPHIL NFR BLD: 1.8 % (ref 0–8)
ERYTHROCYTE [DISTWIDTH] IN BLOOD BY AUTOMATED COUNT: 13.5 % (ref 11.5–14.5)
HCT VFR BLD AUTO: 37.3 % (ref 37–48.5)
HGB BLD-MCNC: 11.1 G/DL (ref 12–16)
IMM GRANULOCYTES # BLD AUTO: 0.01 K/UL (ref 0–0.04)
IMM GRANULOCYTES NFR BLD AUTO: 0.2 % (ref 0–0.5)
LYMPHOCYTES # BLD AUTO: 1.8 K/UL (ref 1–4.8)
LYMPHOCYTES NFR BLD: 28.6 % (ref 18–48)
MCH RBC QN AUTO: 24.2 PG (ref 27–31)
MCHC RBC AUTO-ENTMCNC: 29.8 G/DL (ref 32–36)
MCV RBC AUTO: 81 FL (ref 82–98)
MONOCYTES # BLD AUTO: 0.5 K/UL (ref 0.3–1)
MONOCYTES NFR BLD: 8.3 % (ref 4–15)
NEUTROPHILS # BLD AUTO: 3.8 K/UL (ref 1.8–7.7)
NEUTROPHILS NFR BLD: 60.6 % (ref 38–73)
NRBC BLD-RTO: 0 /100 WBC
PLATELET # BLD AUTO: 363 K/UL (ref 150–350)
PMV BLD AUTO: 10.4 FL (ref 9.2–12.9)
RBC # BLD AUTO: 4.58 M/UL (ref 4–5.4)
WBC # BLD AUTO: 6.18 K/UL (ref 3.9–12.7)

## 2019-04-05 PROCEDURE — 99999 PR PBB SHADOW E&M-EST. PATIENT-LVL III: ICD-10-PCS | Mod: PBBFAC,HCNC,, | Performed by: INTERNAL MEDICINE

## 2019-04-05 PROCEDURE — 93000 ELECTROCARDIOGRAM COMPLETE: CPT | Mod: HCNC,S$GLB,, | Performed by: INTERNAL MEDICINE

## 2019-04-05 PROCEDURE — 85025 COMPLETE CBC W/AUTO DIFF WBC: CPT | Mod: HCNC

## 2019-04-05 PROCEDURE — 99499 UNLISTED E&M SERVICE: CPT | Mod: HCNC,S$GLB,, | Performed by: INTERNAL MEDICINE

## 2019-04-05 PROCEDURE — 99214 OFFICE O/P EST MOD 30 MIN: CPT | Mod: HCNC,S$GLB,, | Performed by: INTERNAL MEDICINE

## 2019-04-05 PROCEDURE — 99499 RISK ADDL DX/OHS AUDIT: ICD-10-PCS | Mod: HCNC,S$GLB,, | Performed by: INTERNAL MEDICINE

## 2019-04-05 PROCEDURE — 99999 PR PBB SHADOW E&M-EST. PATIENT-LVL III: CPT | Mod: PBBFAC,HCNC,, | Performed by: INTERNAL MEDICINE

## 2019-04-05 PROCEDURE — 1100F PTFALLS ASSESS-DOCD GE2>/YR: CPT | Mod: HCNC,CPTII,S$GLB, | Performed by: INTERNAL MEDICINE

## 2019-04-05 PROCEDURE — 3078F DIAST BP <80 MM HG: CPT | Mod: HCNC,CPTII,S$GLB, | Performed by: INTERNAL MEDICINE

## 2019-04-05 PROCEDURE — 93000 EKG 12-LEAD: ICD-10-PCS | Mod: HCNC,S$GLB,, | Performed by: INTERNAL MEDICINE

## 2019-04-05 PROCEDURE — 3288F PR FALLS RISK ASSESSMENT DOCUMENTED: ICD-10-PCS | Mod: HCNC,CPTII,S$GLB, | Performed by: INTERNAL MEDICINE

## 2019-04-05 PROCEDURE — 36415 COLL VENOUS BLD VENIPUNCTURE: CPT | Mod: HCNC,PO

## 2019-04-05 PROCEDURE — 99214 PR OFFICE/OUTPT VISIT, EST, LEVL IV, 30-39 MIN: ICD-10-PCS | Mod: HCNC,S$GLB,, | Performed by: INTERNAL MEDICINE

## 2019-04-05 PROCEDURE — 3288F FALL RISK ASSESSMENT DOCD: CPT | Mod: HCNC,CPTII,S$GLB, | Performed by: INTERNAL MEDICINE

## 2019-04-05 PROCEDURE — 3078F PR MOST RECENT DIASTOLIC BLOOD PRESSURE < 80 MM HG: ICD-10-PCS | Mod: HCNC,CPTII,S$GLB, | Performed by: INTERNAL MEDICINE

## 2019-04-05 PROCEDURE — 3075F SYST BP GE 130 - 139MM HG: CPT | Mod: HCNC,CPTII,S$GLB, | Performed by: INTERNAL MEDICINE

## 2019-04-05 PROCEDURE — 1100F PR PT FALLS ASSESS DOC 2+ FALLS/FALL W/INJURY/YR: ICD-10-PCS | Mod: HCNC,CPTII,S$GLB, | Performed by: INTERNAL MEDICINE

## 2019-04-05 PROCEDURE — 3075F PR MOST RECENT SYSTOLIC BLOOD PRESS GE 130-139MM HG: ICD-10-PCS | Mod: HCNC,CPTII,S$GLB, | Performed by: INTERNAL MEDICINE

## 2019-04-05 NOTE — PROGRESS NOTES
.foll  Subjective:   Patient ID:  Nilsa Curiel is a 73 y.o. female who presents for follow-up of Heart Murmur      HPI:   Nilsa Curiel presents for follow up of Hypertrophic Obstructive cardiomyopathy.The patient had a recent echo with the demonstration of a mild dynamic outflow tract gradient with mild increase with the valsalva maneuver.LV is hyperdynamic with diastolic dysfunction. The patient is limited due to back and hip pain but Nilsa Curiel denies chest pain, shortness of breath, palpitations, presyncope , or syncope. Nilsa Curiel has hypertension with borderline control. She has moderate right carotid stenosis. Nilsa Curiel has dyslipidemia  on moderate intensity statin therapy.  Review of Systems   Constitution: Negative for malaise/fatigue, weight gain and weight loss.   Eyes: Negative for blurred vision.   Cardiovascular: Negative for chest pain, claudication, cyanosis, dyspnea on exertion, irregular heartbeat, leg swelling, near-syncope, orthopnea, palpitations, paroxysmal nocturnal dyspnea and syncope.   Respiratory: Negative for cough, shortness of breath and wheezing.    Musculoskeletal: Positive for back pain and joint pain. Negative for falls and myalgias.   Gastrointestinal: Negative for abdominal pain, heartburn, nausea and vomiting.   Genitourinary: Negative for nocturia.   Neurological: Positive for headaches. Negative for brief paralysis, dizziness, focal weakness, numbness, paresthesias and weakness.   Psychiatric/Behavioral: Negative for altered mental status.       Current Outpatient Medications   Medication Sig    amLODIPine (NORVASC) 10 MG tablet TAKE 1 TABLET(10 MG) BY MOUTH EVERY DAY    azelastine (ASTELIN) 137 mcg (0.1 %) nasal spray 1 spray (137 mcg total) by Nasal route 2 (two) times daily.    chlorthalidone (HYGROTEN) 25 MG Tab Take 1 tablet (25 mg total) by mouth once daily.    clobetasol (TEMOVATE) 0.05 % cream APPLY EXTERNALLY TO THE AFFECTED AREA TWICE DAILY     "fexofenadine (ALLEGRA) 180 MG tablet Take 1 tablet (180 mg total) by mouth once daily. (Patient taking differently: Take 180 mg by mouth daily as needed. )    gabapentin (NEURONTIN) 300 MG capsule Take 1 by mouth daily for 3 days, then twice daily for 3 days,    ketoconazole (NIZORAL) 2 % cream APPLY EXTERNALLY TO THE AFFECTED AREA TWICE DAILY. APPLY TO AXILLA AND ABDOMINAL CREASES    lactobacillus combo no.6 (PROBIOTIC COMPLEX) 4 billion cell Tab Take 1 tablet by mouth once daily.    losartan (COZAAR) 100 MG tablet Take 1 tablet (100 mg total) by mouth once daily.    MULTIVIT-MIN/FA/CA CARB/VIT K (WOMEN'S 50+ DAILY FORMULA ORAL) Take by mouth.    omega-3 fatty acids 300 mg Cap Take by mouth.    polyethylene glycol (GLYCOLAX) 17 gram PwPk Take 17 g by mouth 2 (two) times daily as needed.    pravastatin (PRAVACHOL) 40 MG tablet Take 1 tablet (40 mg total) by mouth once daily.    ranitidine (ZANTAC) 150 MG tablet Take 1 tablet (150 mg total) by mouth as needed for Heartburn.    triamcinolone acetonide 0.1% (KENALOG) 0.1 % cream AAA chest and hands bid    TURMERIC ORAL Take by mouth.     No current facility-administered medications for this visit.      Objective:   Physical Exam   Constitutional: She is oriented to person, place, and time. She appears well-developed. No distress.   /66 (BP Location: Right arm, Patient Position: Sitting, BP Method: Large (Manual))   Pulse 84   Ht 5' 1" (1.549 m)   Wt 91.6 kg (202 lb)   SpO2 98%   BMI 38.17 kg/m²    HENT:   Head: Normocephalic.   Eyes: Pupils are equal, round, and reactive to light. Conjunctivae are normal. No scleral icterus.   Neck: Neck supple. No JVD present. No thyromegaly present.   Cardiovascular: Normal rate, regular rhythm and intact distal pulses. PMI is not displaced. Exam reveals no gallop and no friction rub.   Murmur heard.   Harsh midsystolic murmur is present with a grade of 2/6 at the upper right sternal border, upper left sternal " border and lower left sternal border. The intensity does not change with valsalva.  Pulmonary/Chest: Effort normal and breath sounds normal. No respiratory distress. She has no wheezes. She has no rales.   Abdominal: Soft. She exhibits no distension. There is no splenomegaly or hepatomegaly. There is no tenderness.   Musculoskeletal: She exhibits no edema or tenderness.   Gait normal   Neurological: She is alert and oriented to person, place, and time.   Skin: Skin is warm and dry. She is not diaphoretic.   Psychiatric: She has a normal mood and affect. Her behavior is normal.       Lab Results   Component Value Date     03/14/2019    K 4.1 03/14/2019     03/14/2019    CO2 27 03/14/2019    BUN 12 03/14/2019    CREATININE 0.9 03/14/2019    GLU 95 03/14/2019    HGBA1C 5.1 03/14/2019    AST 22 03/14/2019    ALT 21 03/14/2019    ALBUMIN 3.7 03/14/2019    PROT 7.7 03/14/2019    BILITOT 0.5 03/14/2019    WBC 6.59 03/14/2019    HGB 10.9 (L) 03/14/2019    HCT 35.6 (L) 03/14/2019    MCV 80 (L) 03/14/2019     (H) 03/14/2019    TSH 2.309 03/14/2019    CHOL 135 03/14/2019    HDL 48 03/14/2019    LDLCALC 64.6 03/14/2019    TRIG 112 03/14/2019       Assessment:     1. Hypertrophic obstructive cardiomyopathy (HOCM) : Mild mid ventricle obstruction   2. Diastolic dysfunction without heart failure: Compensated    3. Essential hypertension : Borderline control   4. Mixed hyperlipidemia  on moderate intensity statin therapy At LDL goal.   5. Bilateral carotid artery stenosis: 50% right ICA stenosis    6. Class 2 obesity due to excess calories without serious comorbidity with body mass index (BMI) of 38.0 to 38.9 in adult        Plan:     Nilsa was seen today for heart murmur.    Diagnoses and all orders for this visit:    Hypertrophic obstructive cardiomyopathy (HOCM)  -     EKG 12-lead; Future  Should the patient need additional antihypertensive therapy , would consider a beta blocker  Diastolic dysfunction without  heart failure    Essential hypertension  -     EKG 12-lead; Future  See above  Orders placed for the Digital Monitoring program  Mixed hyperlipidemia  Continue current regimen    Bilateral carotid artery stenosis  Periodic surveillance   Class 2 obesity due to excess calories without serious comorbidity with body mass index (BMI) of 38.0 to 38.9 in adult  PCP has been working with the patient on this

## 2019-04-05 NOTE — LETTER
April 5, 2019      Padmini Calhoun, DNP, NP-C  [address]  [address]  [facility] [address]           [facility] - Cardiology  [address], [address]  [place] [address]  Phone: [phone]          Patient: Nilsa Curiel   MR Number: [id number]   YOB: 1945   Date of Visit: 4/5/2019       Dear Padmini Calhoun:    Thank you for referring Nilsa Curiel to me for evaluation. Attached you will find relevant portions of my assessment and plan of care.    If you have questions, please do not hesitate to call me. I look forward to following Nilsa Curiel along with you.    Sincerely,    Janes Aj MD    Enclosure  CC:  No Recipients    If you would like to receive this communication electronically, please contact [email or web address] or [phone] to request more information on [facility] access.    For providers and/or their staff who would like to refer a patient to [facility], please contact us through our one-stop-shop provider referral line, [facility], at [phone].    If you feel you have received this communication in error or would no longer like to receive these types of communications, please e-mail [email or web address]

## 2019-04-10 ENCOUNTER — CLINICAL SUPPORT (OUTPATIENT)
Dept: REHABILITATION | Facility: HOSPITAL | Age: 74
End: 2019-04-10
Payer: MEDICARE

## 2019-04-10 DIAGNOSIS — M53.86 DECREASED ROM OF LUMBAR SPINE: ICD-10-CM

## 2019-04-10 DIAGNOSIS — M25.612 SHOULDER JOINT STIFFNESS, BILATERAL: ICD-10-CM

## 2019-04-10 DIAGNOSIS — M54.50 CHRONIC BILATERAL LOW BACK PAIN WITHOUT SCIATICA: ICD-10-CM

## 2019-04-10 DIAGNOSIS — M25.611 SHOULDER JOINT STIFFNESS, BILATERAL: ICD-10-CM

## 2019-04-10 DIAGNOSIS — G89.29 CHRONIC BILATERAL LOW BACK PAIN WITHOUT SCIATICA: ICD-10-CM

## 2019-04-10 PROCEDURE — 97161 PT EVAL LOW COMPLEX 20 MIN: CPT | Mod: HCNC,PO

## 2019-04-10 PROCEDURE — 97110 THERAPEUTIC EXERCISES: CPT | Mod: HCNC,PO

## 2019-04-11 NOTE — PLAN OF CARE
Physical Therapy Initial Evaluation     Name: Nilsa Curiel  [facility] Number: [id number]    Diagnosis:   Encounter Diagnoses   Name Primary?    Chronic bilateral low back pain without sciatica     Decreased ROM of lumbar spine     Shoulder joint stiffness, bilateral      Physician: Padmini Calhoun DNP,*  Treatment Orders: PT Eval and Treat  Past Medical History:   Diagnosis Date    Acute bilateral low back pain without sciatica 10/10/2017    Anemia 4/24/2018    Arthritis     Atopic dermatitis     GERD (gastroesophageal reflux disease)     Hyperlipidemia     Hypertension     Morbid obesity with BMI of 40.0-44.9, adult 4/1/2016    MONE (obstructive sleep apnea) 7/25/2018     Current Outpatient Medications   Medication Sig    amLODIPine (NORVASC) 10 MG tablet TAKE 1 TABLET(10 MG) BY MOUTH EVERY DAY    azelastine (ASTELIN) 137 mcg (0.1 %) nasal spray 1 spray (137 mcg total) by Nasal route 2 (two) times daily.    chlorthalidone (HYGROTEN) 25 MG Tab Take 1 tablet (25 mg total) by mouth once daily.    clobetasol (TEMOVATE) 0.05 % cream APPLY EXTERNALLY TO THE AFFECTED AREA TWICE DAILY    fexofenadine (ALLEGRA) 180 MG tablet Take 1 tablet (180 mg total) by mouth once daily. (Patient taking differently: Take 180 mg by mouth daily as needed. )    gabapentin (NEURONTIN) 300 MG capsule Take 1 by mouth daily for 3 days, then twice daily for 3 days,    ketoconazole (NIZORAL) 2 % cream APPLY EXTERNALLY TO THE AFFECTED AREA TWICE DAILY. APPLY TO AXILLA AND ABDOMINAL CREASES    lactobacillus combo no.6 (PROBIOTIC COMPLEX) 4 billion cell Tab Take 1 tablet by mouth once daily.    losartan (COZAAR) 100 MG tablet Take 1 tablet (100 mg total) by mouth once daily.    MULTIVIT-MIN/FA/CA CARB/VIT K (WOMEN'S 50+ DAILY FORMULA ORAL) Take by mouth.    omega-3 fatty acids 300 mg Cap Take by mouth.    polyethylene glycol (GLYCOLAX) 17 gram PwPk Take 17 g by mouth 2 (two)  times daily as needed.    pravastatin (PRAVACHOL) 40 MG tablet Take 1 tablet (40 mg total) by mouth once daily.    ranitidine (ZANTAC) 150 MG tablet Take 1 tablet (150 mg total) by mouth as needed for Heartburn.    triamcinolone acetonide 0.1% (KENALOG) 0.1 % cream AAA chest and hands bid    TURMERIC ORAL Take by mouth.     No current facility-administered medications for this visit.      Review of patient's allergies indicates:  No Known Allergies    Evaluation Date: 4/10/2019  Visit # authorized: 20  Authorization period: 12/31/2019  Plan of care Expiration: 6/29/2019  Evaluation Time in:9:40           Time Out: 10:08    Subjective     History of condition/Onset Date:  Reports onset of low back and hip pain approx 2 years ago, thinks it's related to when she fell down some stairs. Patient states she's fallen twice in the last year when she made a quick movement and lost her balance.     Primary complaint: C/o low back pain, usually worse on the L, described as achy pain.  Secondary c/o aching pain in B shoulder, L>R, and having difficulty reaching and lifting with L UE.     Significant Medical History:HTN  Red Flags:  · Bowel/bladder symptoms (urinary retention/fecal incontinence)? no  · Recent weight loss? no.  · Constant/Night pain that is unchanging with change of position? no.  · PMH of CA? no.   · Numbness or Tingling? no    Pain Scale 0-10:  Current:2/10      Best: 0/10      Worst: 8/10    Current Functional Limitations:    Walking: can tolerate no more than 15 minutes without rest due to pain and weakness; does not use AD  Sitting: no limitation  Housework/cooking: has to lean on counters while cooking, can tolerate no more than 30 minutes  Bending/lifting: increased pain  Drive:  Hasn't driven in the last year  Stairs:denies difficulty with curbs, no stairs at home      Prior Functional Status: able to walk further without pain   Easing factors:  rest  Prior Treatment: previous PT for LBP with some  relief, had to stop due to lack of transportation  Home Environment (Steps/Adaptations): no stairs at home  Occupation:  N/A                       Pts goals:  Be able to walk without stumbling    Objective     Observation/Posture: Mild forward trunk lean during ambulation, equal step length and stance time with ambulation    Lumbar AROM:    Percent Tolerance   Flexion 100    Extension 25    Left Side Bending 25 Pain R   Right Side Bending 25 Pain R   Left rotation 75 Pain L   Right Rotation 50 Pain L     LOWER EXTREMITY STRENGTH:   RIGHT LEFT   Quadriceps 4/5 4/5   Hamstrings 4/5 4/5     Hip Flexion 4/5 4/5   Hip Abduction 4-/5 3+/5   Hip Ext 3+/5 3+/5     Flexibility: HS length WNL B, B hip PROM WFL, pain-free at all end ranges    Special Tests:   Left Right   Slump - -   SLR - -       Active Range of Motion:   Shoulder RIGHT  LEFT   Flexion 137 88   Abduction 135 95   IR Thumb to T10 Thumb to T12      Passive Range of Motion:   Shoulder RIGHT LEFT   Flexion 160 160   Abduction 160 160   ER at 90 90 90   IR 70 70     Upper Extremity Strength   RIGHT  LEFT   Shoulder flexion: 4/5 Shoulder flexion: 4-/5   Shoulder Abduction: 4/5 Shoulder abduction: 4-/5   Shoulder ER 4/5 Shoulder ER 4/5   Shoulder IR 4+/5 Shoulder IR 4+/5   Lower Trap 3+/5 Lower Trap 3+/5   Middle Trap 3+/5 Middle Trap 3+/5         Pt/family was provided educational information, including: role of PT, goals for PT, scheduling - pt verbalized understanding. Discussed insurance limitations with pt.     FOTO score: 56% impairment      History  Co-morbidities and personal factors that may impact the plan of care Co-morbidities:   See PMH listed above    Personal Factors:   age     low   Examination  Body Structures and Functions, activity limitations and participation restrictions that may impact the plan of care Body Regions:   back  lower extremities  upper extremities  trunk    Body Systems:    gross symmetry  ROM  strength  gross coordinated  "movement  balance  gait    Participation Restrictions:   none    Activity limitations:   Learning and applying knowledge  no deficits    General Tasks and Commands  no deficits    Communication  no deficits    Mobility  lifting and carrying objects  walking    Self care  dressing     Domestic Life  no deficits    Interactions/Relationships  no deficits    Life Areas  no deficits    Community and Social Life  no deficits         moderate   Clinical Presentation stable and uncomplicated low   Decision Making/ Complexity Score: low         TREATMENT     Time In: 10:08  Time Out: 10:29    PT Evaluation Completed? Yes  Educated pt on treatment goals and plan of care. Pt demo good understanding along with good return demonstration of home exercise program.      Nilsa received 10 minutes of therapeutic exercise & instruction including:  LTR x 2 minutes  Pelvic tilt 20 x 5"  Supine cane flexion x 10  Seated scap retraction 5" x 15    Written Home Exercises Provided: Yes, see Patient Instructions  Nilsa demo good understanding of the education provided. Patient demo good return demo of skill of exercises.    Assessment     Patient presents with decreased tolerance to functional activity secondary to lumbar and B shoulder pain, core and postural weakness, postural tightness, UE weakness, and mild balance impairment.  She will benefit from progressive therex to improve postural/core strength, postural and lumbopelvic mobility, and balance training in order to improve tolerance and safety with functional activity. Pt reyna initial therex well, demo good understanding of treatment goals and plan of care.  Pt prognosis is Good.  Pt will benefit from skilled outpatient physical therapy to address the above stated deficits, provide pt/family education and to maximize pt's level of independence.     Medical necessity is demonstrated by the following IMPAIRMENTS/PROBLEMS:  - Increased Pain  - Decreased Segmental Mobility & Decreased " ROM  - Decreased Core & BLE strength  - Decreased Flexibility BLE  - Decreased Tolerance to Functional Activities  - Fall Risk  - Continued inability to participate in vocational pursuits  - Pain limits function of effected part for some activities  - Requires skilled supervision to complete and progress HEP  - Weakness        Pt's spiritual, cultural and educational needs considered and pt agreeable to plan of care and goals as stated below:     Anticipated Barriers for physical therapy: none    Short Term GOALS:  1. Pt independent with HEP within 1 week for consistent stretching.  2. Increase L shoulder AROM flexion/abduction to at least 90 deg pain-free within 4 weeks to improve functional reach  3. Patient able to tolerate 60 minutes mild to moderate therex in PT within 4 weeks demonstrating improved endurance      Long Term GOALS:   1. Increase B UE strength to at least 4/5 throughout within 10 weeks to increase tolerance to reaching/lifting activity  2. Increase B lumbar SB to at least 50% to improve functional mobilty within 8 weeks  3. Increase B hip strength to at least 4/5 throughout within 10 weeks in order to improve static and dynamic stability and decrease risk of falls  4. Patient able to tolerate at least 45 minutes sustained stand/walk with LBP less than 4/10 within 10 weeks  5. Decrease FOTO score impairment to 40% or less within 10 weeks to indicate improved functional strength and mobiltiy      PLAN     Outpatient physical therapy 2 times week x 10 weeks to include: patient eduaction, therapeutic exercises, neuromuscular re-education/ balance exercises, joint mobilizations, modalities prn, and regular update of pt's home exercise program.  Pt may be seen by PTA as part of the rehabilitation team.     Therapist: Promise Dodson, PT  4/10/2019

## 2019-04-16 ENCOUNTER — CLINICAL SUPPORT (OUTPATIENT)
Dept: REHABILITATION | Facility: HOSPITAL | Age: 74
End: 2019-04-16
Payer: MEDICARE

## 2019-04-16 DIAGNOSIS — M54.50 CHRONIC BILATERAL LOW BACK PAIN WITHOUT SCIATICA: ICD-10-CM

## 2019-04-16 DIAGNOSIS — M25.612 SHOULDER JOINT STIFFNESS, BILATERAL: ICD-10-CM

## 2019-04-16 DIAGNOSIS — G89.29 CHRONIC BILATERAL LOW BACK PAIN WITHOUT SCIATICA: ICD-10-CM

## 2019-04-16 DIAGNOSIS — M53.86 DECREASED ROM OF LUMBAR SPINE: ICD-10-CM

## 2019-04-16 DIAGNOSIS — M25.611 SHOULDER JOINT STIFFNESS, BILATERAL: ICD-10-CM

## 2019-04-16 PROCEDURE — 97110 THERAPEUTIC EXERCISES: CPT | Mod: HCNC,PO

## 2019-04-16 NOTE — PROGRESS NOTES
"  Physical Therapy Daily Treatment Note     Name: Nilsa Curiel  Clinic Number: [id number]    Therapy Diagnosis:   Encounter Diagnoses   Name Primary?    Chronic bilateral low back pain without sciatica     Decreased ROM of lumbar spine     Shoulder joint stiffness, bilateral      Physician: Padmini Calhoun DNP,*    Visit Date: 4/16/2019    Evaluation Date: 4/10/2019  Visit # authorized: 20  Authorization period: 12/31/2019  Plan of care Expiration: 6/29/2019  Visit #/Visits authorized: 2/ 20     Time In: 9:00  Time Out: 9:59  Total Billable Time: 59 minutes    Precautions: Standard    Subjective     Pt reports: she's been feeling okay, has been doing her ex's at home  States she can move her L arm a little better.    Objective     Nilsa received therapeutic exercises to develop strength, endurance, ROM, flexibility, posture and core stabilization for 54 minutes including:    LTR x 2 minutes  Pelvic tilt 5" hold x 2 minutes  Supine march with pelvic tilt 3 x 5  SL clams 3 x 10  SL shoulder hor abd 2 x 10  Supine ball rolls x 2 mintues  Supine cane flexion with 1# dowel 2 x 10  Seated ball rolls x 2 minutes  Supine pec stretch with towel roll x 2 minutes  Seated scap retraction 5" hold x 2 minutes    Reviewed current HEP, no changes made    Assessment     Patient initially demo difficulty maintaining pelvic tilt with supine march, yet with minimal cueing, able to perform with good technique. Pt demo good motivation with exercise progression, reps kept minimal this date to ensure tolerance.  Nilsa is progressing well towards her goals.   Pt prognosis is Good.     Pt will continue to benefit from skilled outpatient physical therapy to address the deficits listed in the problem list box on initial evaluation, provide pt/family education and to maximize pt's level of independence in the home and community environment.     Pt's spiritual, cultural and educational needs considered and pt agreeable to plan of care and " goals.    Anticipated barriers to physical therapy: none    Goals:   Short Term GOALS:  1. Pt independent with HEP within 1 week for consistent stretching.  2. Increase L shoulder AROM flexion/abduction to at least 90 deg pain-free within 4 weeks to improve functional reach  3. Patient able to tolerate 60 minutes mild to moderate therex in PT within 4 weeks demonstrating improved endurance        Long Term GOALS:   1. Increase B UE strength to at least 4/5 throughout within 10 weeks to increase tolerance to reaching/lifting activity  2. Increase B lumbar SB to at least 50% to improve functional mobilty within 8 weeks  3. Increase B hip strength to at least 4/5 throughout within 10 weeks in order to improve static and dynamic stability and decrease risk of falls  4. Patient able to tolerate at least 45 minutes sustained stand/walk with LBP less than 4/10 within 10 weeks  5. Decrease FOTO score impairment to 40% or less within 10 weeks to indicate improved functional strength and mobiltiy      Plan     Outpatient physical therapy 2 times week x 10 weeks to include: patient eduaction, therapeutic exercises, neuromuscular re-education/ balance exercises, joint mobilizations, modalities prn, and regular update of pt's home exercise program.       Promise Dodson, PT

## 2019-04-23 ENCOUNTER — CLINICAL SUPPORT (OUTPATIENT)
Dept: REHABILITATION | Facility: HOSPITAL | Age: 74
End: 2019-04-23
Payer: MEDICARE

## 2019-04-23 DIAGNOSIS — M25.612 SHOULDER JOINT STIFFNESS, BILATERAL: ICD-10-CM

## 2019-04-23 DIAGNOSIS — G89.29 CHRONIC BILATERAL LOW BACK PAIN WITHOUT SCIATICA: ICD-10-CM

## 2019-04-23 DIAGNOSIS — M54.50 CHRONIC BILATERAL LOW BACK PAIN WITHOUT SCIATICA: ICD-10-CM

## 2019-04-23 DIAGNOSIS — M53.86 DECREASED ROM OF LUMBAR SPINE: ICD-10-CM

## 2019-04-23 DIAGNOSIS — M25.611 SHOULDER JOINT STIFFNESS, BILATERAL: ICD-10-CM

## 2019-04-23 PROCEDURE — 97110 THERAPEUTIC EXERCISES: CPT | Mod: HCNC,PO

## 2019-04-23 NOTE — PROGRESS NOTES
"  Physical Therapy Daily Treatment Note     Name: Nilsa Curiel  Clinic Number: [id number]    Therapy Diagnosis:   Encounter Diagnoses   Name Primary?    Chronic bilateral low back pain without sciatica     Decreased ROM of lumbar spine     Shoulder joint stiffness, bilateral      Physician: Padmini Calhoun DNP,*    Visit Date: 4/23/2019    Evaluation Date: 4/10/2019  Visit # authorized: 20  Authorization period: 12/31/2019  Plan of care Expiration: 6/29/2019  Visit #/Visits authorized: 3/ 20     Time In: 9:00  Time Out: 9:59  Total Billable Time: 59 minutes    Precautions: Standard    Subjective     Pt reports: she was busy over the weekend, did a lot of lifting and her L arm is hurting a little more.    Objective     Nilsa received therapeutic exercises to develop strength, endurance, ROM, flexibility, posture and core stabilization for 54 minutes including:    LTR x 2 minutes  Pelvic tilt 5" hold x 2 minutes  Supine march with pelvic tilt 2 x 10  SL clams 3 x 10  SL shoulder hor abd 2 x 10  Supine ball rolls x 2 mintues  Supine cane flexion with 1# dowel 2 x 10  Seated ball rolls x 2 minutes  Supine pec stretch with towel roll x 2 minutes  scap rows GTB 3 x 10  Supine ab iso 5" hold x 2 minutes  B shld extension OTB  x 10      Reviewed current HEP, no changes made    Assessment     Demo weakness and pain in L UE with active elevation against gravity along with painful tightness in L low back and will benefit from continued skilled PT.    Nilsa is progressing well towards her goals.   Pt prognosis is Good.     Pt will continue to benefit from skilled outpatient physical therapy to address the deficits listed in the problem list box on initial evaluation, provide pt/family education and to maximize pt's level of independence in the home and community environment.     Pt's spiritual, cultural and educational needs considered and pt agreeable to plan of care and goals.    Anticipated barriers to physical therapy: " none    Goals:   Short Term GOALS:  1. Pt independent with HEP within 1 week for consistent stretching.  2. Increase L shoulder AROM flexion/abduction to at least 90 deg pain-free within 4 weeks to improve functional reach  3. Patient able to tolerate 60 minutes mild to moderate therex in PT within 4 weeks demonstrating improved endurance        Long Term GOALS:   1. Increase B UE strength to at least 4/5 throughout within 10 weeks to increase tolerance to reaching/lifting activity  2. Increase B lumbar SB to at least 50% to improve functional mobilty within 8 weeks  3. Increase B hip strength to at least 4/5 throughout within 10 weeks in order to improve static and dynamic stability and decrease risk of falls  4. Patient able to tolerate at least 45 minutes sustained stand/walk with LBP less than 4/10 within 10 weeks  5. Decrease FOTO score impairment to 40% or less within 10 weeks to indicate improved functional strength and mobiltiy      Plan     Outpatient physical therapy 2 times week x 10 weeks to include: patient eduaction, therapeutic exercises, neuromuscular re-education/ balance exercises, joint mobilizations, modalities prn, and regular update of pt's home exercise program.       Promise Dodson, PT

## 2019-04-30 ENCOUNTER — CLINICAL SUPPORT (OUTPATIENT)
Dept: REHABILITATION | Facility: HOSPITAL | Age: 74
End: 2019-04-30
Payer: MEDICARE

## 2019-04-30 DIAGNOSIS — M25.611 SHOULDER JOINT STIFFNESS, BILATERAL: ICD-10-CM

## 2019-04-30 DIAGNOSIS — M25.612 SHOULDER JOINT STIFFNESS, BILATERAL: ICD-10-CM

## 2019-04-30 DIAGNOSIS — M53.86 DECREASED ROM OF LUMBAR SPINE: ICD-10-CM

## 2019-04-30 DIAGNOSIS — M54.50 CHRONIC BILATERAL LOW BACK PAIN WITHOUT SCIATICA: ICD-10-CM

## 2019-04-30 DIAGNOSIS — G89.29 CHRONIC BILATERAL LOW BACK PAIN WITHOUT SCIATICA: ICD-10-CM

## 2019-04-30 PROCEDURE — 97110 THERAPEUTIC EXERCISES: CPT | Mod: HCNC,PO

## 2019-04-30 NOTE — PROGRESS NOTES
"  Physical Therapy Daily Treatment Note     Name: Nilsa Curiel  Clinic Number: [id number]    Therapy Diagnosis:   Encounter Diagnoses   Name Primary?    Chronic bilateral low back pain without sciatica     Decreased ROM of lumbar spine     Shoulder joint stiffness, bilateral      Physician: Padmini Calhoun DNP,*    Visit Date: 4/30/2019    Evaluation Date: 4/10/2019  Visit # authorized: 20  Authorization period: 12/31/2019  Plan of care Expiration: 6/29/2019  Visit #/Visits authorized: 4/ 20     Time In: 9:00  Time Out: 9:59  Total Billable Time: 58 minutes    Precautions: Standard    Subjective     Pt reports: she went out of town last week, 5 hour drive caused increase LBP and stiffness throughout her body.  Did some of her ex's, not all.  States her L arm has been feeling better.    Objective     Nilsa received therapeutic exercises to develop strength, endurance, ROM, flexibility, posture and core stabilization for 58 minutes including:    LTR x 2 minutes  Pelvic tilt 5" hold x 2 minutes  Supine march with pelvic tilt 2 x 10  SL clams 3 x 10 YTB  SL shoulder hor abd 2 x 10 1#  Supine ball rolls x 2 mintues  Supine cane flexion with 1# dowel 2 x 10  Seated ball rolls x 2 minutes  Supine pec stretch with towel roll x 2 minutes  scap rows GTB 3 x 10-NP  Supine ab iso 5" hold x 2 minutes  B shld extension OTB  3 x 10  Shuttle B 25# 3 x 10      Reviewed current HEP, issued YTB for SL clams    Assessment     Patient reports decreased stiffness at end of session, dem improved scapular and RC strength with increased resistance with SL abduction for L UE.    Nilsa is progressing well towards her goals.   Pt prognosis is Good.     Pt will continue to benefit from skilled outpatient physical therapy to address the deficits listed in the problem list box on initial evaluation, provide pt/family education and to maximize pt's level of independence in the home and community environment.     Pt's spiritual, cultural and " educational needs considered and pt agreeable to plan of care and goals.    Anticipated barriers to physical therapy: none    Goals:   Short Term GOALS:  1. Pt independent with HEP within 1 week for consistent stretching.  2. Increase L shoulder AROM flexion/abduction to at least 90 deg pain-free within 4 weeks to improve functional reach  3. Patient able to tolerate 60 minutes mild to moderate therex in PT within 4 weeks demonstrating improved endurance        Long Term GOALS:   1. Increase B UE strength to at least 4/5 throughout within 10 weeks to increase tolerance to reaching/lifting activity  2. Increase B lumbar SB to at least 50% to improve functional mobilty within 8 weeks  3. Increase B hip strength to at least 4/5 throughout within 10 weeks in order to improve static and dynamic stability and decrease risk of falls  4. Patient able to tolerate at least 45 minutes sustained stand/walk with LBP less than 4/10 within 10 weeks  5. Decrease FOTO score impairment to 40% or less within 10 weeks to indicate improved functional strength and mobiltiy      Plan     Outpatient physical therapy 2 times week x 10 weeks to include: patient eduaction, therapeutic exercises, neuromuscular re-education/ balance exercises, joint mobilizations, modalities prn, and regular update of pt's home exercise program.       Promise Dodson, PT

## 2019-05-02 ENCOUNTER — CLINICAL SUPPORT (OUTPATIENT)
Dept: REHABILITATION | Facility: HOSPITAL | Age: 74
End: 2019-05-02
Payer: MEDICARE

## 2019-05-02 DIAGNOSIS — M25.611 SHOULDER JOINT STIFFNESS, BILATERAL: ICD-10-CM

## 2019-05-02 DIAGNOSIS — M25.612 SHOULDER JOINT STIFFNESS, BILATERAL: ICD-10-CM

## 2019-05-02 DIAGNOSIS — M53.86 DECREASED ROM OF LUMBAR SPINE: ICD-10-CM

## 2019-05-02 DIAGNOSIS — M54.50 CHRONIC BILATERAL LOW BACK PAIN WITHOUT SCIATICA: ICD-10-CM

## 2019-05-02 DIAGNOSIS — G89.29 CHRONIC BILATERAL LOW BACK PAIN WITHOUT SCIATICA: ICD-10-CM

## 2019-05-02 PROCEDURE — 97110 THERAPEUTIC EXERCISES: CPT | Mod: HCNC,PO

## 2019-05-02 NOTE — PROGRESS NOTES
"  Physical Therapy Daily Treatment Note     Name: Nilsa Curiel  Clinic Number: [id number]    Therapy Diagnosis:   Encounter Diagnoses   Name Primary?    Chronic bilateral low back pain without sciatica     Decreased ROM of lumbar spine     Shoulder joint stiffness, bilateral      Physician: Padmini Calhoun DNP,*    Visit Date: 5/2/2019    Evaluation Date: 4/10/2019  Visit # authorized: 20  Authorization period: 12/31/2019  Plan of care Expiration: 6/29/2019  Visit #/Visits authorized: 5/ 20     Time In: 9:00  Time Out: 9:59  Total Billable Time: 59 minutes    Precautions: Standard    Subjective     Pt reports: she feels steadier when she walks and her L arm isn't hurting her as much.    Objective     Nilsa received therapeutic exercises to develop strength, endurance, ROM, flexibility, posture and core stabilization for 59 minutes including:    LTR x 2 minutes  Supine march with pelvic tilt 2 x 10  SL clams 3 x 10 YTB  SL shoulder hor abd 2 x 10 1#  Supine ball rolls x 2 mintues  Supine cane flexion with 1# dowel 2 x 10  Seated ball rolls x 2 minutes  Supine pec stretch with towel roll x 2 minutes  scap rows GTB 3 x 10-NP  Supine ab iso 5" hold x 2 minutes  B shld extension OTB  3 x 10  Shuttle B 25# 3 x 10  Piriformis stretch 2 x 30"  Mini bridge 2 x 10      Assessment     Patient continues to reyna PRE's well and demonstrates increased strength and mobility in L UE.    Nilsa is progressing well towards her goals.   Pt prognosis is Good.     Pt will continue to benefit from skilled outpatient physical therapy to address the deficits listed in the problem list box on initial evaluation, provide pt/family education and to maximize pt's level of independence in the home and community environment.     Pt's spiritual, cultural and educational needs considered and pt agreeable to plan of care and goals.    Anticipated barriers to physical therapy: none    Goals:   Short Term GOALS:  1. Pt independent with HEP within 1 " week for consistent stretching.  2. Increase L shoulder AROM flexion/abduction to at least 90 deg pain-free within 4 weeks to improve functional reach  3. Patient able to tolerate 60 minutes mild to moderate therex in PT within 4 weeks demonstrating improved endurance        Long Term GOALS:   1. Increase B UE strength to at least 4/5 throughout within 10 weeks to increase tolerance to reaching/lifting activity  2. Increase B lumbar SB to at least 50% to improve functional mobilty within 8 weeks  3. Increase B hip strength to at least 4/5 throughout within 10 weeks in order to improve static and dynamic stability and decrease risk of falls  4. Patient able to tolerate at least 45 minutes sustained stand/walk with LBP less than 4/10 within 10 weeks  5. Decrease FOTO score impairment to 40% or less within 10 weeks to indicate improved functional strength and mobiltiy      Plan     Outpatient physical therapy 2 times week x 10 weeks to include: patient eduaction, therapeutic exercises, neuromuscular re-education/ balance exercises, joint mobilizations, modalities prn, and regular update of pt's home exercise program.       Promise Dodson, PT

## 2019-05-10 ENCOUNTER — CLINICAL SUPPORT (OUTPATIENT)
Dept: REHABILITATION | Facility: HOSPITAL | Age: 74
End: 2019-05-10
Payer: MEDICARE

## 2019-05-10 DIAGNOSIS — M25.612 SHOULDER JOINT STIFFNESS, BILATERAL: ICD-10-CM

## 2019-05-10 DIAGNOSIS — M25.611 SHOULDER JOINT STIFFNESS, BILATERAL: ICD-10-CM

## 2019-05-10 DIAGNOSIS — M53.86 DECREASED ROM OF LUMBAR SPINE: ICD-10-CM

## 2019-05-10 DIAGNOSIS — M54.50 CHRONIC BILATERAL LOW BACK PAIN WITHOUT SCIATICA: ICD-10-CM

## 2019-05-10 DIAGNOSIS — G89.29 CHRONIC BILATERAL LOW BACK PAIN WITHOUT SCIATICA: ICD-10-CM

## 2019-05-10 PROCEDURE — 97110 THERAPEUTIC EXERCISES: CPT | Mod: HCNC,PO

## 2019-05-10 NOTE — PROGRESS NOTES
"  Physical Therapy Daily Treatment Note     Name: Nilsa Curiel  Clinic Number: [id number]    Therapy Diagnosis:   Encounter Diagnoses   Name Primary?    Chronic bilateral low back pain without sciatica     Decreased ROM of lumbar spine     Shoulder joint stiffness, bilateral      Physician: Padmini Calhoun DNP,*    Visit Date: 5/10/2019    Evaluation Date: 4/10/2019  Visit # authorized: 20  Authorization period: 12/31/2019  Plan of care Expiration: 6/29/2019  Visit #/Visits authorized: 6/ 20     Time In: 9:00  Time Out: 9:59  Total Billable Time: 59 minutes    Precautions: Standard    Subjective     Pt reports: continued improvement in use of L arm, hasn't noticed it limiting her with reaching activity.  Has been doing her ex's.    Objective     Nilsa received therapeutic exercises to develop strength, endurance, ROM, flexibility, posture and core stabilization for 59 minutes including:    LTR x 2 minutes  Supine march with pelvic tilt 2 x 10  SL clams 3 x 10 YTB  SL shoulder hor abd 2 x 10 1#  Supine ball rolls x 2 mintues  Supine cane flexion with 1# dowel 2 x 10  Seated ball rolls x 2 minutes  Supine pec stretch with towel roll x 2 minutes  scap rows GTB 3 x 10-NP  Supine ab iso 5" hold x 2 minutes  B shld extension OTB  3 x 10  Shuttle B 25# 3 x 10  Piriformis stretch 2 x 30"  Mini bridge 2 x 10      Assessment     Patient demo decreased difficulty with mat transfers and with sit to stand.      Nilsa is progressing well towards her goals.   Pt prognosis is Good.     Pt will continue to benefit from skilled outpatient physical therapy to address the deficits listed in the problem list box on initial evaluation, provide pt/family education and to maximize pt's level of independence in the home and community environment.     Pt's spiritual, cultural and educational needs considered and pt agreeable to plan of care and goals.    Anticipated barriers to physical therapy: none    Goals:   Short Term GOALS:  1. Pt " independent with HEP within 1 week for consistent stretching.  2. Increase L shoulder AROM flexion/abduction to at least 90 deg pain-free within 4 weeks to improve functional reach  3. Patient able to tolerate 60 minutes mild to moderate therex in PT within 4 weeks demonstrating improved endurance        Long Term GOALS:   1. Increase B UE strength to at least 4/5 throughout within 10 weeks to increase tolerance to reaching/lifting activity  2. Increase B lumbar SB to at least 50% to improve functional mobilty within 8 weeks  3. Increase B hip strength to at least 4/5 throughout within 10 weeks in order to improve static and dynamic stability and decrease risk of falls  4. Patient able to tolerate at least 45 minutes sustained stand/walk with LBP less than 4/10 within 10 weeks  5. Decrease FOTO score impairment to 40% or less within 10 weeks to indicate improved functional strength and mobiltiy      Plan     Outpatient physical therapy 2 times week x 10 weeks to include: patient eduaction, therapeutic exercises, neuromuscular re-education/ balance exercises, joint mobilizations, modalities prn, and regular update of pt's home exercise program.       Promise Dodson, PT

## 2019-05-14 ENCOUNTER — CLINICAL SUPPORT (OUTPATIENT)
Dept: REHABILITATION | Facility: HOSPITAL | Age: 74
End: 2019-05-14
Payer: MEDICARE

## 2019-05-14 DIAGNOSIS — M53.86 DECREASED ROM OF LUMBAR SPINE: ICD-10-CM

## 2019-05-14 DIAGNOSIS — G89.29 CHRONIC BILATERAL LOW BACK PAIN WITHOUT SCIATICA: ICD-10-CM

## 2019-05-14 DIAGNOSIS — M25.611 SHOULDER JOINT STIFFNESS, BILATERAL: ICD-10-CM

## 2019-05-14 DIAGNOSIS — M54.50 CHRONIC BILATERAL LOW BACK PAIN WITHOUT SCIATICA: ICD-10-CM

## 2019-05-14 DIAGNOSIS — M25.612 SHOULDER JOINT STIFFNESS, BILATERAL: ICD-10-CM

## 2019-05-14 PROCEDURE — 97110 THERAPEUTIC EXERCISES: CPT | Mod: HCNC,PO

## 2019-05-14 NOTE — PROGRESS NOTES
"  Physical Therapy Daily Treatment Note     Name: Nilsa Curiel  Clinic Number: [id number]    Therapy Diagnosis:   Encounter Diagnoses   Name Primary?    Chronic bilateral low back pain without sciatica     Decreased ROM of lumbar spine     Shoulder joint stiffness, bilateral      Physician: Padmini Calhoun DNP,*    Visit Date: 5/14/2019    Evaluation Date: 4/10/2019  Visit # authorized: 20  Authorization period: 12/31/2019  Plan of care Expiration: 6/29/2019  Visit #/Visits authorized: 7/ 20     Time In: 10:05  Time Out: 11:00  Total Billable Time: 35 minutes    Precautions: Standard    Subjective     Pt reports: worst LBP in the last week 6/10 with house cleaning and sustained standing/walking.  Continues to feel mild painful pull in L shoulder with reaching overhead.     Objective     Nilsa received therapeutic exercises to develop strength, endurance, ROM, flexibility, posture and core stabilization for 59 minutes including:    LTR x 2 minutes  Supine march with pelvic tilt 3 x 10  SL clams 3 x 10 YTB  SL shoulder hor abd 2 x 10 1#  Supine ball rolls x 2 mintues  Supine active shoulder flexion 1# 2 x 10  Seated ball rolls x 2 minutes  Supine pec stretch with towel roll x 2 minutes  scap rows GTB 3 x 10-NP  Supine ab iso 5" hold x 2 minutes  B shld extension OTB  3 x 10  Shuttle B 25# 3 x 10  Piriformis stretch 2 x 30"  Mini bridge 3 x 10  Wall push up 2 x 10  Stabilization march on ball x 2 minutes      Assessment     Patient tolerating PRE's well, mild difficulty with stabilization ex's on ball and will benefit from continued progressive core strengthening to decrease pain and improve functional mobility.    Nilsa is progressing well towards her goals.   Pt prognosis is Good.     Pt will continue to benefit from skilled outpatient physical therapy to address the deficits listed in the problem list box on initial evaluation, provide pt/family education and to maximize pt's level of independence in the home " and community environment.     Pt's spiritual, cultural and educational needs considered and pt agreeable to plan of care and goals.    Anticipated barriers to physical therapy: none    Goals:   Short Term GOALS:  1. Pt independent with HEP within 1 week for consistent stretching.  2. Increase L shoulder AROM flexion/abduction to at least 90 deg pain-free within 4 weeks to improve functional reach  3. Patient able to tolerate 60 minutes mild to moderate therex in PT within 4 weeks demonstrating improved endurance        Long Term GOALS:   1. Increase B UE strength to at least 4/5 throughout within 10 weeks to increase tolerance to reaching/lifting activity  2. Increase B lumbar SB to at least 50% to improve functional mobilty within 8 weeks  3. Increase B hip strength to at least 4/5 throughout within 10 weeks in order to improve static and dynamic stability and decrease risk of falls  4. Patient able to tolerate at least 45 minutes sustained stand/walk with LBP less than 4/10 within 10 weeks  5. Decrease FOTO score impairment to 40% or less within 10 weeks to indicate improved functional strength and mobiltiy      Plan     Outpatient physical therapy 2 times week x 10 weeks to include: patient eduaction, therapeutic exercises, neuromuscular re-education/ balance exercises, joint mobilizations, modalities prn, and regular update of pt's home exercise program.       Promise Dodson, PT

## 2019-05-21 ENCOUNTER — CLINICAL SUPPORT (OUTPATIENT)
Dept: REHABILITATION | Facility: HOSPITAL | Age: 74
End: 2019-05-21
Payer: MEDICARE

## 2019-05-21 DIAGNOSIS — M25.612 SHOULDER JOINT STIFFNESS, BILATERAL: ICD-10-CM

## 2019-05-21 DIAGNOSIS — M53.86 DECREASED ROM OF LUMBAR SPINE: ICD-10-CM

## 2019-05-21 DIAGNOSIS — G89.29 CHRONIC BILATERAL LOW BACK PAIN WITHOUT SCIATICA: ICD-10-CM

## 2019-05-21 DIAGNOSIS — M54.50 CHRONIC BILATERAL LOW BACK PAIN WITHOUT SCIATICA: ICD-10-CM

## 2019-05-21 DIAGNOSIS — M25.611 SHOULDER JOINT STIFFNESS, BILATERAL: ICD-10-CM

## 2019-05-21 PROCEDURE — 97110 THERAPEUTIC EXERCISES: CPT | Mod: HCNC,PO

## 2019-05-21 NOTE — PLAN OF CARE
"  Physical Therapy Daily Treatment Note     Name: Nilsa Curiel  Clinic Number: [id number]    Therapy Diagnosis:   Encounter Diagnoses   Name Primary?    Chronic bilateral low back pain without sciatica     Decreased ROM of lumbar spine     Shoulder joint stiffness, bilateral      Physician: Padmini Calhoun DNP,*    Visit Date: 5/21/2019    Evaluation Date: 4/10/2019  Visit # authorized: 20  Authorization period: 12/31/2019  Plan of care Expiration: 6/29/2019  Visit #/Visits authorized: 8/ 20     Time In: 10:01  Time Out: 11:00  Total Billable Time: 35 minutes    Precautions: Standard    Subjective     Pt reports: she's feeling "much better" since starting treatment, states she rarely has L shoulder pain, states she is walking better, and states "I don't feel my back much".    Walking: can tolerate at least 20 minutes, can tolerate approx 2 hours using buggy  Sitting: no limitation  Housework/cooking: was able to stand for 45 minutes without increased pain  Bending/lifting: no limitation for light weight  Drive:  Hasn't driven in the last year  Stairs:denies difficulty with curbs, no stairs at home  Reaching/lifting: able to reach into upper cabinets without increased pain    Objective      Lumbar AROM:     Percent Tolerance   Flexion 100     Extension 25     Left Side Bending 50 Pain L   Right Side Bending 75 Pull   L   Left rotation 75 Pull L   Right Rotation 50 Pain L thigh      LOWER EXTREMITY STRENGTH:    RIGHT LEFT   Quadriceps 4+/5 4+/5   Hamstrings 4+/5 4+/5      Hip Flexion 4+/5 4+/5   Hip Abduction 4-/5 4-/5   Hip Ext 3+/5 3+/5      Flexibility: HS length WNL B, B hip PROM WFL, pain-free at all end ranges     Special Tests:    Left Right   Slump - -   SLR - -         Active Range of Motion:   Shoulder RIGHT  LEFT   Flexion 158 155   Abduction 158 155   IR Thumb to T10 Thumb to T10         Upper Extremity Strength    RIGHT   LEFT   Shoulder flexion: 4/5 Shoulder flexion: 4/5   Shoulder Abduction: 4/5 " "Shoulder abduction: 4/5   Shoulder ER 4+/5 Shoulder ER 4+/5   Shoulder IR 4+/5 Shoulder IR 4+/5   Lower Trap 4-/5 Lower Trap 4-/5   Middle Trap 4-/5 Middle Trap 4-/5           Nilsa received therapeutic exercises to develop strength, endurance, ROM, flexibility, posture and core stabilization for 59 minutes including:    LTR x 2 minutes  Supine march with pelvic tilt 3 x 10  SL clams 3 x 10 YTB  SL shoulder hor abd 2 x 10 1#  Supine ball rolls x 2 mintues  Supine active shoulder flexion 1# 2 x 10  Seated ball rolls x 2 minutes  Supine pec stretch with towel roll x 2 minutes  scap rows GTB 3 x 10-NP  Supine ab iso 5" hold x 2 minutes  B shld extension OTB  3 x 10  Shuttle B 25# 3 x 10  Piriformis stretch 2 x 30"  Mini bridge 3 x 10  Wall push up 2 x 10  Stabilization march on ball x 2 minutes    FOTO score: 48% impairment    Assessment     Patient has attended 8 sessions of physical therapy for treatment of and LBP along with L shoulder pain and weakness. Patient has responded well to treatment with improved mobility, increased strength, and decreased pain reported with functional activity. Pt continues with moderate weakness in core musculature, especially abdominals and gluteals, and will benefit from completing 3 remaining sessions for progressive strengthening.    Nilsa is progressing well towards her goals.   Pt prognosis is Good.     Pt will continue to benefit from skilled outpatient physical therapy to address the deficits listed in the problem list box on initial evaluation, provide pt/family education and to maximize pt's level of independence in the home and community environment.     Pt's spiritual, cultural and educational needs considered and pt agreeable to plan of care and goals.    Anticipated barriers to physical therapy: none    Goals:   Short Term GOALS:  1. Pt independent with HEP within 1 week for consistent stretching. MET  2. Increase L shoulder AROM flexion/abduction to at least 90 deg " pain-free within 4 weeks to improve functional reach MET  3. Patient able to tolerate 60 minutes mild to moderate therex in PT within 4 weeks demonstrating improved endurance MET        Long Term GOALS:   1. Increase B UE strength to at least 4/5 throughout within 10 weeks to increase tolerance to reaching/lifting activity IN PROGRESS  2. Increase B lumbar SB to at least 50% to improve functional mobilty within 8 weeks MET  3. Increase B hip strength to at least 4/5 throughout within 10 weeks in order to improve static and dynamic stability and decrease risk of falls IN PROGRESS  4. Patient able to tolerate at least 45 minutes sustained stand/walk with LBP less than 4/10 within 10 weeks IN PROGRESS  5. Decrease FOTO score impairment to 40% or less within 10 weeks to indicate improved functional strength and mobiltiy IN PROGRESS      Plan     Continue x 3 more sessions for progressive strengthening then will plan to d/c to I HEP at that time.      Promise Dodson, PT

## 2019-05-24 ENCOUNTER — CLINICAL SUPPORT (OUTPATIENT)
Dept: REHABILITATION | Facility: HOSPITAL | Age: 74
End: 2019-05-24
Payer: MEDICARE

## 2019-05-24 DIAGNOSIS — M25.612 SHOULDER JOINT STIFFNESS, BILATERAL: ICD-10-CM

## 2019-05-24 DIAGNOSIS — M54.50 CHRONIC BILATERAL LOW BACK PAIN WITHOUT SCIATICA: ICD-10-CM

## 2019-05-24 DIAGNOSIS — M25.611 SHOULDER JOINT STIFFNESS, BILATERAL: ICD-10-CM

## 2019-05-24 DIAGNOSIS — G89.29 CHRONIC BILATERAL LOW BACK PAIN WITHOUT SCIATICA: ICD-10-CM

## 2019-05-24 DIAGNOSIS — M53.86 DECREASED ROM OF LUMBAR SPINE: ICD-10-CM

## 2019-05-24 PROCEDURE — 97110 THERAPEUTIC EXERCISES: CPT | Mod: HCNC,PO

## 2019-05-24 NOTE — PROGRESS NOTES
"  Physical Therapy Daily Treatment Note     Name: Nilsa Curiel  Clinic Number: [id number]    Therapy Diagnosis:   Encounter Diagnoses   Name Primary?    Chronic bilateral low back pain without sciatica     Decreased ROM of lumbar spine     Shoulder joint stiffness, bilateral      Physician: Padmini Calhoun DNP,*    Visit Date: 5/24/2019    Evaluation Date: 4/10/2019  Visit # authorized: 20  Authorization period: 12/31/2019  Plan of care Expiration: 6/29/2019  Visit #/Visits authorized: 9/ 20     Time In: 9:04  Time Out: 10:00  Total Billable Time: 29 minutes    Precautions: Standard    Subjective     Pt reports: she had increased L shoulder pain 3 nights ago, rubbed a cream on it, and by the next night the pain was better.     Objective     Nilsa received therapeutic exercises to develop strength, endurance, ROM, flexibility, posture and core stabilization for 59 minutes including:    LTR x 2 minutes  Supine march with pelvic tilt 3 x 10  SL clams 3 x 12 YTB  SL shoulder hor abd 3 x 10  Supine ball rolls x 2 mintues  Supine active shoulder flexion 1# 2 x 10-not performed  Seated ball rolls x 2 minutes  Supine pec stretch with towel roll x 2 minutes  Supine ab iso 5" hold x 2 minutes with alternating UE flexion 4 x 5  B shld extension OTB  3 x 10  Shuttle B 25# 3 x 15  Piriformis stretch 2 x 30"  Mini bridge 3 x 10 with GTB  Wall push up 2 x 10  Stabilization march on ball x 2 minutes    Assessment     Abdominal strength improving, pt able to progress with core strengthening this date.  Despite c/o increased L shoulder pain 3 days ago, tolerates L UE low level strengthening well this date.     Nilsa is progressing well towards her goals.   Pt prognosis is Good.     Pt will continue to benefit from skilled outpatient physical therapy to address the deficits listed in the problem list box on initial evaluation, provide pt/family education and to maximize pt's level of independence in the home and community " environment.     Pt's spiritual, cultural and educational needs considered and pt agreeable to plan of care and goals.    Anticipated barriers to physical therapy: none    Goals:   Short Term GOALS:  1. Pt independent with HEP within 1 week for consistent stretching.  2. Increase L shoulder AROM flexion/abduction to at least 90 deg pain-free within 4 weeks to improve functional reach  3. Patient able to tolerate 60 minutes mild to moderate therex in PT within 4 weeks demonstrating improved endurance        Long Term GOALS:   1. Increase B UE strength to at least 4/5 throughout within 10 weeks to increase tolerance to reaching/lifting activity  2. Increase B lumbar SB to at least 50% to improve functional mobilty within 8 weeks  3. Increase B hip strength to at least 4/5 throughout within 10 weeks in order to improve static and dynamic stability and decrease risk of falls  4. Patient able to tolerate at least 45 minutes sustained stand/walk with LBP less than 4/10 within 10 weeks  5. Decrease FOTO score impairment to 40% or less within 10 weeks to indicate improved functional strength and mobiltiy      Plan     Outpatient physical therapy 2 times week x 10 weeks to include: patient eduaction, therapeutic exercises, neuromuscular re-education/ balance exercises, joint mobilizations, modalities prn, and regular update of pt's home exercise program.       Promise Dodson, PT

## 2019-05-28 ENCOUNTER — CLINICAL SUPPORT (OUTPATIENT)
Dept: REHABILITATION | Facility: HOSPITAL | Age: 74
End: 2019-05-28
Payer: MEDICARE

## 2019-05-28 DIAGNOSIS — M53.86 DECREASED ROM OF LUMBAR SPINE: ICD-10-CM

## 2019-05-28 DIAGNOSIS — M25.612 SHOULDER JOINT STIFFNESS, BILATERAL: ICD-10-CM

## 2019-05-28 DIAGNOSIS — G89.29 CHRONIC BILATERAL LOW BACK PAIN WITHOUT SCIATICA: ICD-10-CM

## 2019-05-28 DIAGNOSIS — M54.50 CHRONIC BILATERAL LOW BACK PAIN WITHOUT SCIATICA: ICD-10-CM

## 2019-05-28 DIAGNOSIS — M25.611 SHOULDER JOINT STIFFNESS, BILATERAL: ICD-10-CM

## 2019-05-28 PROCEDURE — 97110 THERAPEUTIC EXERCISES: CPT | Mod: HCNC,PO

## 2019-05-28 NOTE — PROGRESS NOTES
"  Physical Therapy Daily Treatment Note     Name: Nilsa Curiel  Clinic Number: [id number]    Therapy Diagnosis:   Encounter Diagnoses   Name Primary?    Chronic bilateral low back pain without sciatica     Decreased ROM of lumbar spine     Shoulder joint stiffness, bilateral      Physician: Padmini Calhoun DNP,*    Visit Date: 5/28/2019    Evaluation Date: 4/10/2019  Visit # authorized: 20  Authorization period: 12/31/2019  Plan of care Expiration: 6/29/2019  Visit #/Visits authorized: 10/ 20     Time In: 9:08  Time Out: 10:00  Total Billable Time: 28 minutes    Precautions: Standard    Subjective     Pt reports: she continues to feel good, is moving around easier.     Objective     Nilsa received therapeutic exercises to develop strength, endurance, ROM, flexibility, posture and core stabilization for 59 minutes including:    LTR x 2 minutes  Supine march with pelvic tilt 3 x 10  SL clams 2 minutes YTB  SL shoulder hor abd 3 x 10  supine active shoulder flexion 1# 2 x 10  Seated ball rolls x 2 minutes  Supine pec stretch with towel roll x 2 minutes-not performed  Supine ab iso 5" hold x 2 minutes with alternating UE flexion 4 x 5  B shld extension OTB  3 x 10  Shuttle B 25# 3 x 15  Piriformis stretch 2 x 30"  Mini bridge 3 x 10 with GTB  Wall push up 2 x 10  Stabilization march on ball x 2 minutes    Assessment     Patient continues to show improved functional mobility with increased bruna with ambulation and decreased difficulty with supine to sit.    Nilsa is progressing well towards her goals.   Pt prognosis is Good.     Pt will continue to benefit from skilled outpatient physical therapy to address the deficits listed in the problem list box on initial evaluation, provide pt/family education and to maximize pt's level of independence in the home and community environment.     Pt's spiritual, cultural and educational needs considered and pt agreeable to plan of care and goals.    Anticipated barriers to " physical therapy: none    Goals:   Short Term GOALS:  1. Pt independent with HEP within 1 week for consistent stretching.  2. Increase L shoulder AROM flexion/abduction to at least 90 deg pain-free within 4 weeks to improve functional reach  3. Patient able to tolerate 60 minutes mild to moderate therex in PT within 4 weeks demonstrating improved endurance        Long Term GOALS:   1. Increase B UE strength to at least 4/5 throughout within 10 weeks to increase tolerance to reaching/lifting activity  2. Increase B lumbar SB to at least 50% to improve functional mobilty within 8 weeks  3. Increase B hip strength to at least 4/5 throughout within 10 weeks in order to improve static and dynamic stability and decrease risk of falls  4. Patient able to tolerate at least 45 minutes sustained stand/walk with LBP less than 4/10 within 10 weeks  5. Decrease FOTO score impairment to 40% or less within 10 weeks to indicate improved functional strength and mobiltiy      Plan     Discharge to independent HEP next session.      Promise Dodson, PT

## 2019-05-30 ENCOUNTER — CLINICAL SUPPORT (OUTPATIENT)
Dept: REHABILITATION | Facility: HOSPITAL | Age: 74
End: 2019-05-30
Payer: MEDICARE

## 2019-05-30 DIAGNOSIS — M53.86 DECREASED ROM OF LUMBAR SPINE: ICD-10-CM

## 2019-05-30 DIAGNOSIS — M54.50 CHRONIC BILATERAL LOW BACK PAIN WITHOUT SCIATICA: ICD-10-CM

## 2019-05-30 DIAGNOSIS — M25.611 SHOULDER JOINT STIFFNESS, BILATERAL: ICD-10-CM

## 2019-05-30 DIAGNOSIS — M25.612 SHOULDER JOINT STIFFNESS, BILATERAL: ICD-10-CM

## 2019-05-30 DIAGNOSIS — G89.29 CHRONIC BILATERAL LOW BACK PAIN WITHOUT SCIATICA: ICD-10-CM

## 2019-05-30 PROCEDURE — 97110 THERAPEUTIC EXERCISES: CPT | Mod: HCNC,PO

## 2019-05-30 NOTE — PROGRESS NOTES
Physical Therapy Daily Treatment Note     Name: Nilsa Curiel  Clinic Number: [id number]    Therapy Diagnosis:   Encounter Diagnoses   Name Primary?    Chronic bilateral low back pain without sciatica     Decreased ROM of lumbar spine     Shoulder joint stiffness, bilateral      Physician: Padmini Calhoun DNP,*    Visit Date: 5/30/2019    Evaluation Date: 4/10/2019  Visit # authorized: 20  Authorization period: 12/31/2019  Plan of care Expiration: 6/29/2019  Visit #/Visits authorized: 11/ 20     Time In: 9:01  Time Out: 10:00  Total Billable Time: 31 minutes    Precautions: Standard    Subjective     Pt reports: she continues to feel good, is moving around easier.     Walking: can tolerate 30 minutes, could tolerate several hours if she was holding onto something (I.e. Grocery cart)  Sitting: no limitation  Housework/cooking: takes breaks as needed, but easier and less painful than it used to be  Bending/lifting: minimal difficulty, avoids heavy lifting  Drive:  Hasn't driven in the last year  Stairs:denies difficulty with curbs, no stairs at home      Objective     Lumbar AROM:     Percent Tolerance   Flexion 100     Extension 25     Left Side Bending 25 Pain R   Right Side Bending 25 Pain R   Left rotation 75 Pain L   Right Rotation 50 Pain L      LOWER EXTREMITY STRENGTH:    RIGHT LEFT   Quadriceps 4/5 4/5   Hamstrings 4/5 4/5      Hip Flexion 4/5 4/5   Hip Abduction 4-/5 3+/5   Hip Ext 3+/5 3+/5      Flexibility: HS length WNL B, B hip PROM WFL, pain-free at all end ranges     Special Tests:    Left Right   Slump - -   SLR - -         Active Range of Motion:   Shoulder RIGHT  LEFT   Flexion 137 88   Abduction 135 95   IR Thumb to T10 Thumb to T12      Passive Range of Motion:   Shoulder RIGHT LEFT   Flexion 160 160   Abduction 160 160   ER at 90 90 90   IR 70 70      Upper Extremity Strength    RIGHT   LEFT   Shoulder flexion: 4/5 Shoulder flexion: 4-/5   Shoulder Abduction: 4/5 Shoulder abduction: 4-/5  "  Shoulder ER 4/5 Shoulder ER 4/5   Shoulder IR 4+/5 Shoulder IR 4+/5   Lower Trap 3+/5 Lower Trap 3+/5   Middle Trap 3+/5 Middle Trap 3+/5             Nilsa received therapeutic exercises to develop strength, endurance, ROM, flexibility, posture and core stabilization for 59 minutes including:    LTR x 2 minutes  Supine march with pelvic tilt 3 x 10  SL clams 2 minutes YTB  SL shoulder hor abd 3 x 10  supine active shoulder flexion 1# 2 x 10  Seated ball rolls x 2 minutes  Supine pec stretch with towel roll x 2 minutes-not performed  Supine ab iso 5" hold x 2 minutes with alternating UE flexion 3 x 10  B shld extension OTB  3 x 15  Shuttle B 25# 3 x 15  Piriformis stretch 2 x 30"  Mini bridge 3 x 10 with GTB  Wall push up 2 x 10  Stabilization march on ball x 2 minutes    Reviewed HEP and educated pt on importance of continued stretching and strengthening to maintain current mobility   FOTO score: 46% impairment    Assessment     Patient has attended 11 sessions of physical therapy for treatment of L shoulder and low back pain.  Patient has responded well to treatment with decreased pain, increased strength and mobility, and improved tolerance to functional activity. Patient continues with mild strength and mobility deficits and will benefit from continued stretching and strengthening as part of an independent home exercise program.  Patient demo good understanding of HEP and appears motivated to continue independently.    Goals:   Short Term GOALS:  1. Pt independent with HEP within 1 week for consistent stretching. MET  2. Increase L shoulder AROM flexion/abduction to at least 90 deg pain-free within 4 weeks to improve functional reach MET  3. Patient able to tolerate 60 minutes mild to moderate therex in PT within 4 weeks demonstrating improved endurance MET        Long Term GOALS:   1. Increase B UE strength to at least 4/5 throughout within 10 weeks to increase tolerance to reaching/lifting activity MET  2. " Increase B lumbar SB to at least 50% to improve functional mobilty within 8 weeks MET  3. Increase B hip strength to at least 4/5 throughout within 10 weeks in order to improve static and dynamic stability and decrease risk of falls MET  4. Patient able to tolerate at least 45 minutes sustained stand/walk with LBP less than 4/10 within 10 weeks MET  5. Decrease FOTO score impairment to 40% or less within 10 weeks to indicate improved functional strength and mobility IN PROGRESS      Plan     Discharge to independent [facility] this date..      Promise Dodson, PT

## 2019-06-03 ENCOUNTER — PATIENT OUTREACH (OUTPATIENT)
Dept: ADMINISTRATIVE | Facility: HOSPITAL | Age: 74
End: 2019-06-03

## 2019-06-04 ENCOUNTER — OFFICE VISIT (OUTPATIENT)
Dept: PRIMARY CARE CLINIC | Facility: CLINIC | Age: 74
End: 2019-06-04
Payer: MEDICARE

## 2019-06-04 VITALS
SYSTOLIC BLOOD PRESSURE: 134 MMHG | DIASTOLIC BLOOD PRESSURE: 72 MMHG | WEIGHT: 205.25 LBS | HEART RATE: 72 BPM | BODY MASS INDEX: 38.75 KG/M2 | HEIGHT: 61 IN

## 2019-06-04 DIAGNOSIS — E78.2 MIXED HYPERLIPIDEMIA: ICD-10-CM

## 2019-06-04 DIAGNOSIS — I10 ESSENTIAL HYPERTENSION: ICD-10-CM

## 2019-06-04 DIAGNOSIS — E66.01 SEVERE OBESITY WITH BODY MASS INDEX (BMI) OF 35.0 TO 39.9 WITH COMORBIDITY: ICD-10-CM

## 2019-06-04 DIAGNOSIS — I65.23 BILATERAL CAROTID ARTERY STENOSIS: ICD-10-CM

## 2019-06-04 DIAGNOSIS — E21.3 HYPERPARATHYROIDISM: ICD-10-CM

## 2019-06-04 DIAGNOSIS — Z00.00 ENCOUNTER FOR PREVENTIVE HEALTH EXAMINATION: Primary | ICD-10-CM

## 2019-06-04 DIAGNOSIS — I42.1 HYPERTROPHIC OBSTRUCTIVE CARDIOMYOPATHY (HOCM): ICD-10-CM

## 2019-06-04 PROCEDURE — 3078F DIAST BP <80 MM HG: CPT | Mod: HCNC,CPTII,S$GLB, | Performed by: NURSE PRACTITIONER

## 2019-06-04 PROCEDURE — 99499 RISK ADDL DX/OHS AUDIT: ICD-10-PCS | Mod: HCNC,S$GLB,, | Performed by: NURSE PRACTITIONER

## 2019-06-04 PROCEDURE — 99499 UNLISTED E&M SERVICE: CPT | Mod: HCNC,S$GLB,, | Performed by: NURSE PRACTITIONER

## 2019-06-04 PROCEDURE — 99999 PR PBB SHADOW E&M-EST. PATIENT-LVL IV: ICD-10-PCS | Mod: PBBFAC,HCNC,, | Performed by: NURSE PRACTITIONER

## 2019-06-04 PROCEDURE — 3078F PR MOST RECENT DIASTOLIC BLOOD PRESSURE < 80 MM HG: ICD-10-PCS | Mod: HCNC,CPTII,S$GLB, | Performed by: NURSE PRACTITIONER

## 2019-06-04 PROCEDURE — G0439 PR MEDICARE ANNUAL WELLNESS SUBSEQUENT VISIT: ICD-10-PCS | Mod: HCNC,S$GLB,, | Performed by: NURSE PRACTITIONER

## 2019-06-04 PROCEDURE — 99999 PR PBB SHADOW E&M-EST. PATIENT-LVL IV: CPT | Mod: PBBFAC,HCNC,, | Performed by: NURSE PRACTITIONER

## 2019-06-04 PROCEDURE — G0439 PPPS, SUBSEQ VISIT: HCPCS | Mod: HCNC,S$GLB,, | Performed by: NURSE PRACTITIONER

## 2019-06-04 PROCEDURE — 3075F SYST BP GE 130 - 139MM HG: CPT | Mod: HCNC,CPTII,S$GLB, | Performed by: NURSE PRACTITIONER

## 2019-06-04 PROCEDURE — 3075F PR MOST RECENT SYSTOLIC BLOOD PRESS GE 130-139MM HG: ICD-10-PCS | Mod: HCNC,CPTII,S$GLB, | Performed by: NURSE PRACTITIONER

## 2019-06-04 NOTE — PROGRESS NOTES
"Nilsa Curiel presented for a  Medicare AWV and comprehensive Health Risk Assessment today. The following components were reviewed and updated:    · Medical history  · Family History  · Social history  · Allergies and Current Medications  · Health Risk Assessment  · Health Maintenance  · Care Team     ** See Completed Assessments for Annual Wellness Visit within the encounter summary.**       The following assessments were completed:  · Living Situation  · CAGE  · Depression Screening  · Timed Get Up and Go  · Whisper Test  · Cognitive Function Screening  · Nutrition Screening  · ADL Screening  · PAQ Screening    I offered to discuss end of life issues, including information on how to make advance directives that the patient could use to name someone who would make medical decisions on their behalf if they became too ill to make themselves.    ___Patient declined  _X_Patient has advanced directives on file and does not wish to make changes at this time.     Vitals:    06/04/19 0751   BP: 134/72   Pulse: 72   Weight: 93.1 kg (205 lb 4 oz)   Height: 5' 1" (1.549 m)     Body mass index is 38.78 kg/m².  Physical Exam   Constitutional: She is oriented to person, place, and time. She appears well-developed. No distress.   obese   HENT:   Head: Normocephalic and atraumatic.   Eyes: No scleral icterus.   Neck: Neck supple.   Cardiovascular: Normal rate, regular rhythm and intact distal pulses. Exam reveals no gallop and no friction rub.   Murmur heard.  Pulmonary/Chest: Effort normal and breath sounds normal. No stridor. No respiratory distress. She has no wheezes. She has no rales.   Abdominal: Soft. Bowel sounds are normal. She exhibits no distension.   Musculoskeletal: She exhibits no edema.   Neurological: She is alert and oriented to person, place, and time.   Skin: Skin is warm and dry. She is not diaphoretic.   Psychiatric: She has a normal mood and affect. Her behavior is normal.   Nursing note and vitals reviewed.    "     Diagnoses and health risks identified today and associated recommendations/orders:    1. Encounter for preventive health examination  - Screenings performed, as noted above. Personal preventative testing needs reviewed.     2. Severe obesity with body mass index (BMI) of 35.0 to 39.9 with comorbidity  - 3# weight gain since last visit  - Increased exercise discussed. [employer] membership card provided.    3. Hypertrophic obstructive cardiomyopathy (HOCM)  - Stable followed by cardiology    4. Essential hypertension  - Controlled, followed by PCP and cardiology    5. Mixed hyperlipidemia  - Controlled, followed by PCP and cardiology    6. Mild aortic sclerosis  - Stable chronic issue. BP and lipids are controlled.     7. Bilateral carotid artery stenosis  - Stable, R>L. Followed by cardiology and PCP    8. Hyperparathyroidism  - Stable, followed by PCP      Provided Nilsa with a 5-10 year written screening schedule and personal prevention plan. Recommendations were developed using the USPSTF age appropriate recommendations. Education, counseling, and referrals were provided as needed. After Visit Summary printed and given to patient which includes a list of additional screenings\tests needed.    Follow up in about 1 year (around 6/4/2020) for your next annual wellness visit.    Aleshia Deluca NP

## 2019-06-04 NOTE — PATIENT INSTRUCTIONS
Counseling and Referral of Other Preventative  (Italic type indicates deductible and co-insurance are waived)    Patient Name: Nilsa Curiel  Today's Date: 6/4/2019    Health Maintenance       Date Due Completion Date    Aspirin/Antiplatelet Therapy 11/[birth year]/1965 ---    Influenza Vaccine 08/01/2019 11/6/2018    Override on 12/28/2015: Done    Mammogram 03/19/2020 3/19/2019    Fecal Occult Blood Test (FOBT)/FitKit 04/01/2020 4/1/2019    High Dose Statin 06/04/2020 6/4/2019    TETANUS VACCINE 01/01/2022 1/1/2012    Lipid Panel 03/14/2024 3/14/2019    DEXA SCAN 03/19/2024 3/19/2019        No orders of the defined types were placed in this encounter.    The following information is provided to all patients.  This information is to help you find resources for any of the problems found today that may be affecting your health:                Living healthy guide: www.[facility].[place].[facility]      Understanding Diabetes: www.diabetes.org      Eating healthy: www.cdc.gov/healthyweight      CDC home safety checklist: www.cdc.gov/steadi/patient.html      Agency on Aging: www.goea.[place].[facility]      Alcoholics anonymous (AA): www.aa.org      Physical Activity: www.mariela.nih.gov/up6kcoh      Tobacco use: www.quitwithusla.org

## 2019-06-17 DIAGNOSIS — D50.9 MICROCYTIC HYPOCHROMIC ANEMIA: Primary | ICD-10-CM

## 2019-06-20 ENCOUNTER — LAB VISIT (OUTPATIENT)
Dept: LAB | Facility: HOSPITAL | Age: 74
End: 2019-06-20
Attending: NURSE PRACTITIONER
Payer: MEDICARE

## 2019-06-20 DIAGNOSIS — D50.9 MICROCYTIC HYPOCHROMIC ANEMIA: ICD-10-CM

## 2019-06-20 LAB
BASOPHILS # BLD AUTO: 0.06 K/UL (ref 0–0.2)
BASOPHILS NFR BLD: 0.7 % (ref 0–1.9)
DIFFERENTIAL METHOD: ABNORMAL
EOSINOPHIL # BLD AUTO: 0.3 K/UL (ref 0–0.5)
EOSINOPHIL NFR BLD: 4 % (ref 0–8)
ERYTHROCYTE [DISTWIDTH] IN BLOOD BY AUTOMATED COUNT: 13.3 % (ref 11.5–14.5)
HCT VFR BLD AUTO: 36 % (ref 37–48.5)
HGB BLD-MCNC: 10.8 G/DL (ref 12–16)
IMM GRANULOCYTES # BLD AUTO: 0.02 K/UL (ref 0–0.04)
IMM GRANULOCYTES NFR BLD AUTO: 0.2 % (ref 0–0.5)
LYMPHOCYTES # BLD AUTO: 3.1 K/UL (ref 1–4.8)
LYMPHOCYTES NFR BLD: 38.7 % (ref 18–48)
MCH RBC QN AUTO: 24.5 PG (ref 27–31)
MCHC RBC AUTO-ENTMCNC: 30 G/DL (ref 32–36)
MCV RBC AUTO: 82 FL (ref 82–98)
MONOCYTES # BLD AUTO: 0.7 K/UL (ref 0.3–1)
MONOCYTES NFR BLD: 8.1 % (ref 4–15)
NEUTROPHILS # BLD AUTO: 3.9 K/UL (ref 1.8–7.7)
NEUTROPHILS NFR BLD: 48.3 % (ref 38–73)
NRBC BLD-RTO: 0 /100 WBC
PLATELET # BLD AUTO: 356 K/UL (ref 150–350)
PMV BLD AUTO: 10.8 FL (ref 9.2–12.9)
RBC # BLD AUTO: 4.4 M/UL (ref 4–5.4)
WBC # BLD AUTO: 8.07 K/UL (ref 3.9–12.7)

## 2019-06-20 PROCEDURE — 85025 COMPLETE CBC W/AUTO DIFF WBC: CPT | Mod: HCNC

## 2019-06-20 PROCEDURE — 36415 COLL VENOUS BLD VENIPUNCTURE: CPT | Mod: HCNC,PN

## 2019-06-25 NOTE — PROGRESS NOTES
Subjective:       Patient ID: Nilsa Curiel is a 73 y.o. female.    Chief Complaint: Follow-up    Patient is a 73-year-old female presents to clinic today for follow-up hypertension, obesity, anemia, back pain, and cardiomyopathy.  Patient was last seen in clinic March 25th and referral was placed for physical therapy due to ongoing complaints of low back pain and shoulder pain and need for strengthening and gait stability.  She underwent 11 sessions of physical therapy and was released May 30th from physical therapy with a home exercise program.  PT notes were reviewed today and discussed with patient.  She did show improvement and is feeling somewhat better but states her back pain has not completely resolved.    She was referred to Cardiology for continued surveillance of her multiple cardiovascular comorbidities including but not limited to bilateral carotid artery disease, aortic sclerosis, hypertrophic obstructive cardiomyopathy, diastolic heart failure, and hypertension.  She was seen by Dr. Aj on April 5th and will follow up with him in 6 months.  At her last visit in March her labs did demonstrate a microcytic anemia and patient was provided a fit kit which was negative and offered a GI referral however she declined.  She denies any signs and symptoms of GI bleeding.  She is in need of anti-platelet therapy.  I did send message to her cardiologist to consult with him regarding his opinion about starting low-dose therapy.  We will discuss this today.   She did undergo CBC prior to this visit we will review.  She continues to experience low back pain, shoulder pain and intermittent headaches as well as constipation.  She continues to experience intermittent allergy symptoms with postnasal drip, cough and rhinorrhea.  Patient's blood pressure is elevated in clinic today and she reports she has not taken her blood pressure medication.     She is followed by Dr. Aj, Cardiology   She is followed by Dr. Nails  dip Endocrinology for hypercalcemia and hyperparathyroidism  She is followed by CHRISTOPHER Muñoz for atopic dermatitis in Dermatology  She is followed by Dr. Sosa ophthalmology for cataracts and presbyopia and allergic conjunctivitis  She was seen in September 2018 for [facility] Dr. Gibbons     Review of Systems   Constitutional: Negative.    HENT: Positive for postnasal drip and rhinorrhea.    Eyes: Negative for photophobia, discharge, redness and itching.   Respiratory: Positive for cough. Negative for chest tightness, shortness of breath and wheezing.    Cardiovascular: Negative.    Gastrointestinal: Positive for constipation. Negative for abdominal distention, abdominal pain, blood in stool, diarrhea, nausea and vomiting.   Musculoskeletal: Positive for arthralgias, back pain and gait problem.   Allergic/Immunologic: Negative.    Neurological: Positive for headaches. Negative for dizziness, speech difficulty, weakness and light-headedness.   Hematological: Negative.    All other systems reviewed and are negative.      Objective:      Physical Exam   Constitutional: She is oriented to person, place, and time. She appears well-developed and well-nourished. No distress.   obese   HENT:   Head: Normocephalic and atraumatic.   Right Ear: Tympanic membrane, external ear and ear canal normal.   Left Ear: Tympanic membrane, external ear and ear canal normal.   Nose: Rhinorrhea present.   Mouth/Throat: Oropharynx is clear and moist and mucous membranes are normal. No oropharyngeal exudate.   Small amount of postnasal drip and cobblestoning posterior pharynx   Eyes: Conjunctivae are normal. Right eye exhibits no discharge. Left eye exhibits no discharge. No scleral icterus.   Cardiovascular: Normal rate, regular rhythm and intact distal pulses.   Murmur heard.  Pulmonary/Chest: Effort normal and breath sounds normal. No respiratory distress. She has no wheezes. She has no rales.    Abdominal: Soft. Bowel sounds are normal. She exhibits no distension. There is no tenderness.   Musculoskeletal: She exhibits no edema or tenderness.   Neurological: She is alert and oriented to person, place, and time.   Mildly ataxic gait   Skin: Skin is warm and dry. She is not diaphoretic. No erythema.   Nursing note and vitals reviewed.         5d ago 2mo ago    WBC 3.90 - 12.70 K/uL 8.07  6.18    RBC 4.00 - 5.40 M/uL 4.40  4.58    Hemoglobin 12.0 - 16.0 g/dL 10.8Low   11.1Low     Hematocrit 37.0 - 48.5 % 36.0Low   37.3    Mean Corpuscular Volume 82 - 98 fL 82  81Low     Mean Corpuscular Hemoglobin 27.0 - 31.0 pg 24.5Low   24.2Low     Mean Corpuscular Hemoglobin Conc 32.0 - 36.0 g/dL 30.0Low   29.8Low     RDW 11.5 - 14.5 % 13.3  13.5    Platelets 150 - 350 K/uL 356High   363High     MPV 9.2 - 12.9 fL 10.8  10.4    Immature Granulocytes 0.0 - 0.5 % 0.2  0.2    Gran # (ANC) 1.8 - 7.7 K/uL 3.9  3.8    Immature Grans (Abs) 0.00 - 0.04 K/uL 0.02  0.01 CM   Comment: Mild elevation in immature granulocytes is non specific and   can be seen in a variety of conditions including stress response,   acute inflammation, trauma and pregnancy. Correlation with other   laboratory and clinical findings is essential.    Lymph # 1.0 - 4.8 K/uL 3.1  1.8    Mono # 0.3 - 1.0 K/uL 0.7  0.5    Eos # 0.0 - 0.5 K/uL 0.3  0.1    Baso # 0.00 - 0.20 K/uL 0.06  0.03    nRBC 0 /100 WBC 0  0    Gran% 38.0 - 73.0 % 48.3  60.6    Lymph% 18.0 - 48.0 % 38.7  28.6    Mono% 4.0 - 15.0 % 8.1  8.3    Eosinophil% 0.0 - 8.0 % 4.0  1.8    Basophil% 0.0 - 1.9 % 0.7  0.5    Differential Method  Automated  Automated    Resulting Agency  [facility]         Specimen Collected: 06/20/19 08:47 Last Resulted: 06/20/19 13:57            Medication List with Changes/Refills   New Medications    ASPIRIN (ECOTRIN) 81 MG EC TABLET    Take 1 tablet (81 mg total) by mouth once daily.    VARICELLA-ZOSTER GE-AS01B, PF, (SHINGRIX, PF,) 50 MCG/0.5 ML INJECTION    Inject  0.5 mLs into the muscle once. for 1 dose   Current Medications    AMLODIPINE (NORVASC) 10 MG TABLET    TAKE 1 TABLET(10 MG) BY MOUTH EVERY DAY    AZELASTINE (ASTELIN) 137 MCG (0.1 %) NASAL SPRAY    1 spray (137 mcg total) by Nasal route 2 (two) times daily.    BETAMETHASONE VALERATE 0.1% (VALISONE) 0.1 % OINT    RL EXT AA BID PRN    CHLORTHALIDONE (HYGROTEN) 25 MG TAB    Take 1 tablet (25 mg total) by mouth once daily.    CLOBETASOL (TEMOVATE) 0.05 % CREAM    APPLY EXTERNALLY TO THE AFFECTED AREA TWICE DAILY    FEXOFENADINE (ALLEGRA) 180 MG TABLET    Take 1 tablet (180 mg total) by mouth once daily.    GABAPENTIN (NEURONTIN) 300 MG CAPSULE    Take 1 by mouth daily for 3 days, then twice daily for 3 days,    KETOCONAZOLE (NIZORAL) 2 % CREAM    APPLY EXTERNALLY TO THE AFFECTED AREA TWICE DAILY. APPLY TO AXILLA AND ABDOMINAL CREASES    LACTOBACILLUS COMBO NO.6 (PROBIOTIC COMPLEX) 4 BILLION CELL TAB    Take 1 tablet by mouth once daily.    LOSARTAN (COZAAR) 100 MG TABLET    Take 1 tablet (100 mg total) by mouth once daily.    MULTIVIT-MIN/FA/CA CARB/VIT K (WOMEN'S 50+ DAILY FORMULA ORAL)    Take by mouth.    OMEGA-3 FATTY ACIDS 300 MG CAP    Take by mouth.    PRAVASTATIN (PRAVACHOL) 40 MG TABLET    Take 1 tablet (40 mg total) by mouth once daily.    RANITIDINE (ZANTAC) 150 MG TABLET    Take 1 tablet (150 mg total) by mouth as needed for Heartburn.    TRIAMCINOLONE ACETONIDE 0.1% (KENALOG) 0.1 % CREAM    AAA chest and hands bid    TURMERIC ORAL    Take by mouth.       Assessment:       1. Essential hypertension    2. Microcytic hypochromic anemia    3. Hypertrophic obstructive cardiomyopathy (HOCM)    4. Bilateral carotid artery stenosis    5. Severe obesity with body mass index (BMI) of 35.0 to 39.9 with comorbidity    6. Mild aortic sclerosis    7. Spondylolisthesis of lumbosacral region    8. Primary osteoarthritis of both shoulders    9. Decreased strength    10. Hyperparathyroidism    11. Need for shingles vaccine         Plan:           Follow up in about 3 months (around 6/25/2019) for ANEMIA CHECK AND WT. CHECK - 6 POUNDS .    Essential hypertension  Patient's blood pressure is above goal of less than 130/80.  I discussed with her today the importance of making sure she does not miss her blood pressure medication.  We did review pictures of what a heart looks like with cardiomyopathy and what heart looks like 1 affected by uncontrolled high blood pressure.  Patient was encouraged to increase her physical activity as she tolerates.  She was instructed she should adhere to a low-fat carbohydrate proved low concentrated sweet low-sodium diet Rich and fresh fruits and vegetables.  She verbalized understanding.  Microcytic hypochromic anemia  Patient is continuing to demonstrate a microcytic hypochromic anemia on labs but it is stable.   She denies any known blood in her stool or urine.  Her anemia panel including folate B12 ferritin and iron are all within normal limits.  For continue to monitor.  She will continue her multivitamin daily.  I I did discuss with the patient that it is likely her anemia related to chronic disease.    Hypertrophic obstructive cardiomyopathy (HOCM)  -     aspirin (ECOTRIN) 81 MG EC tablet; Take 1 tablet (81 mg total) by mouth once daily.; Refill: 0  Stable.  Cardiology following.  Bilateral carotid artery stenosis  -     aspirin (ECOTRIN) 81 MG EC tablet; Take 1 tablet (81 mg total) by mouth once daily.; Refill: 0  It appears the patient's carotid disease is stable at this time.  She will continue her statin therapy to stabilize vulnerable plaque.  She is to follow up with Cardiology as scheduled.  We will start her on low dose anti-platelet therapy.  She was instructed she must take her aspirin with food and ensured is enteric-coated.    Severe obesity with body mass index (BMI) of 35.0 to 39.9 with comorbidity  Patient has gained 2 lb since her last visit.  I again counseled her on the risks  associated with obesity effects it has on her comorbid conditions.  She was again encouraged to increase her physical activity as she tolerates and to increase her efforts to decrease her weight and BMI to a goal of less than 25.  Mild aortic sclerosis  -     aspirin (ECOTRIN) 81 MG EC tablet; Take 1 tablet (81 mg total) by mouth once daily.; Refill: 0  Stable follow up with Cardiology as scheduled.  Need for shingles vaccine  -     varicella-zoster gE-AS01B, PF, (SHINGRIX, PF,) 50 mcg/0.5 mL injection; Inject 0.5 mLs into the muscle once. for 1 dose  Dispense: 0.5 mL; Refill: 1    Spondylolisthesis of lumbosacral region    Primary osteoarthritis of both shoulders    Decreased strength    Encouraged to adhere to her prescribed home exercise plan provided to her by Physical therapy.  Her symptoms have improved we will continue to monitor.     Hyperparathyroidism    Stable.  Follow-up with Endocrinology as scheduled.      Follow up in about 3 months (around 9/28/2019) for BP,  weight, (5 pounds).      I have reviewed the patient's past medical/surgical and social histories and updated as appropriate. Medications were reviewed and discussed as appropriate including side effects and risks versus benefit. Plan of care was reviewed and agreed upon with the patient.  An opportunity to ask questions was provided and explanation given. Patient verbalized understanding on all information reviewed and discussed.

## 2019-06-28 ENCOUNTER — OFFICE VISIT (OUTPATIENT)
Dept: PRIMARY CARE CLINIC | Facility: CLINIC | Age: 74
End: 2019-06-28
Attending: NURSE PRACTITIONER
Payer: MEDICARE

## 2019-06-28 VITALS
SYSTOLIC BLOOD PRESSURE: 144 MMHG | DIASTOLIC BLOOD PRESSURE: 60 MMHG | WEIGHT: 205.69 LBS | HEART RATE: 84 BPM | OXYGEN SATURATION: 95 % | HEIGHT: 61 IN | RESPIRATION RATE: 16 BRPM | BODY MASS INDEX: 38.83 KG/M2 | TEMPERATURE: 99 F

## 2019-06-28 DIAGNOSIS — M19.011 PRIMARY OSTEOARTHRITIS OF BOTH SHOULDERS: ICD-10-CM

## 2019-06-28 DIAGNOSIS — I10 ESSENTIAL HYPERTENSION: Primary | ICD-10-CM

## 2019-06-28 DIAGNOSIS — R53.1 DECREASED STRENGTH: ICD-10-CM

## 2019-06-28 DIAGNOSIS — E21.3 HYPERPARATHYROIDISM: ICD-10-CM

## 2019-06-28 DIAGNOSIS — Z23 NEED FOR SHINGLES VACCINE: ICD-10-CM

## 2019-06-28 DIAGNOSIS — M19.012 PRIMARY OSTEOARTHRITIS OF BOTH SHOULDERS: ICD-10-CM

## 2019-06-28 DIAGNOSIS — D50.9 MICROCYTIC HYPOCHROMIC ANEMIA: ICD-10-CM

## 2019-06-28 DIAGNOSIS — I65.23 BILATERAL CAROTID ARTERY STENOSIS: ICD-10-CM

## 2019-06-28 DIAGNOSIS — M43.17 SPONDYLOLISTHESIS OF LUMBOSACRAL REGION: ICD-10-CM

## 2019-06-28 DIAGNOSIS — E66.01 SEVERE OBESITY WITH BODY MASS INDEX (BMI) OF 35.0 TO 39.9 WITH COMORBIDITY: ICD-10-CM

## 2019-06-28 DIAGNOSIS — I42.1 HYPERTROPHIC OBSTRUCTIVE CARDIOMYOPATHY (HOCM): ICD-10-CM

## 2019-06-28 PROCEDURE — 1101F PT FALLS ASSESS-DOCD LE1/YR: CPT | Mod: HCNC,CPTII,S$GLB, | Performed by: NURSE PRACTITIONER

## 2019-06-28 PROCEDURE — 3078F DIAST BP <80 MM HG: CPT | Mod: HCNC,CPTII,S$GLB, | Performed by: NURSE PRACTITIONER

## 2019-06-28 PROCEDURE — 99999 PR PBB SHADOW E&M-EST. PATIENT-LVL IV: CPT | Mod: PBBFAC,HCNC,, | Performed by: NURSE PRACTITIONER

## 2019-06-28 PROCEDURE — 3078F PR MOST RECENT DIASTOLIC BLOOD PRESSURE < 80 MM HG: ICD-10-PCS | Mod: HCNC,CPTII,S$GLB, | Performed by: NURSE PRACTITIONER

## 2019-06-28 PROCEDURE — 99214 OFFICE O/P EST MOD 30 MIN: CPT | Mod: HCNC,S$GLB,, | Performed by: NURSE PRACTITIONER

## 2019-06-28 PROCEDURE — 3077F PR MOST RECENT SYSTOLIC BLOOD PRESSURE >= 140 MM HG: ICD-10-PCS | Mod: HCNC,CPTII,S$GLB, | Performed by: NURSE PRACTITIONER

## 2019-06-28 PROCEDURE — 99214 PR OFFICE/OUTPT VISIT, EST, LEVL IV, 30-39 MIN: ICD-10-PCS | Mod: HCNC,S$GLB,, | Performed by: NURSE PRACTITIONER

## 2019-06-28 PROCEDURE — 99999 PR PBB SHADOW E&M-EST. PATIENT-LVL IV: ICD-10-PCS | Mod: PBBFAC,HCNC,, | Performed by: NURSE PRACTITIONER

## 2019-06-28 PROCEDURE — 3077F SYST BP >= 140 MM HG: CPT | Mod: HCNC,CPTII,S$GLB, | Performed by: NURSE PRACTITIONER

## 2019-06-28 PROCEDURE — 1101F PR PT FALLS ASSESS DOC 0-1 FALLS W/OUT INJ PAST YR: ICD-10-PCS | Mod: HCNC,CPTII,S$GLB, | Performed by: NURSE PRACTITIONER

## 2019-06-28 RX ORDER — ASPIRIN 81 MG/1
81 TABLET ORAL DAILY
Refills: 0 | COMMUNITY
Start: 2019-06-28 | End: 2020-06-17

## 2019-06-28 NOTE — PATIENT INSTRUCTIONS
Heart Valve Problems  Your hearts job is to pump blood through your body. That job starts with pumping blood through the heart itself. Inside your heart, blood passes through a series of one-way horvath called valves. If a valve works poorly, not enough blood moves forward. A problem heart valve may not open wide enough, not close tightly enough, or both. In any case, not enough blood is sent to the heart muscle or out to the body.  Symptoms of heart valve problems  You can have a problem valve for decades yet have no symptoms. If you do have symptoms, they may come on so slowly that you barely notice them. In other cases, though, symptoms appear suddenly. You might have one or more of these symptoms:  · Problems breathing when you lie down, exert yourself, or get stressed emotionally  · Pain, pressure, tightness, or numbness in your chest, neck, back, or arms (angina)  · Feeling dizzy, faint, or lightheaded  · Tiredness, especially with activity or as the day goes on  · Waking up at night coughing or short of breath  · A fast, pounding, or irregular heartbeat  · A fluttering feeling in your chest  · Swollen ankles or feet  · Fainting, especially upon standing up or with exertion  Problems opening (stenosis)    When a valve doesnt open all the way, the problem is called stenosis. The leaflets may be stuck together or too stiff to open fully. When the valve doesnt open fully, blood has to flow through a smaller opening. So the heart muscle has to work harder to push the blood through the valve.  Problems closing (regurgitation)    When a valve doesnt close tightly enough and blood leaks backward through the valve, the problem is called regurgitation or insufficiency. The valve itself may be described as leaky. Leaflets may fit together poorly. Or the structures that support them may be torn. Some blood leaks through the valve back into the chamber it just left. So the heart has to move that blood twice. This can  result in heart muscle damage.  Common causes of valve problems  People of any age can have heart valve trouble. You may have been born with a problem valve. Or a valve may have worn out as youve aged. It may not be possible to pinpoint what caused your valve problem. But common causes include:  · Buildup of calcium or scar tissue on a valve  · Rheumatic fever and certain other infections and diseases  · High blood pressure  · Other heart problems, such as coronary artery disease  · Congenital defects of the heart valves      Date Last Reviewed: 2016 [employer]. [address] [place] [id number]. All rights reserved. This information is not intended as a substitute for professional medical care. Always follow your healthcare professional's instructions.        Hypertrophic Cardiomyopathy    Hypertrophic cardiomyopathy is a condition that affects the way the heart muscle works. It causes the heart muscle to grow thicker and stiffer than normal in certain areas, especially in the walls of the left ventricle and septum. This makes it hard for the heart to pump blood properly. This makes it hard to do things that were once easy for you. Hypertrophic cardiomyopathy is most often caused by a genetic disorder, although it often doesn't show up until later in life. It can also develop due to aging or changes from chronic high blood pressure. In some people, the condition carries a risk for sudden cardiac death. If you have any passing out spells, tell your healthcare provider about them right away. If anyone in your family has  suddenly, especially at an unexpected age, tell your healthcare provider. The condition can be managed, but there is no cure. Talk to your healthcare provider about treatment.  What are the symptoms of hypertrophic cardiomyopathy?  You may have no symptoms with hypertrophic cardiomyopathy. If symptoms do occur, they most likely appear when you exert  yourself. Symptoms may include:  · Problems catching your breath  · Unexplained tiredness  · Lightheadedness, dizzy spells, or fainting  · Rapid, pounding heartbeat  · Chest tightness or pressure  · Fluid retention resulting in swollen feet or ankles or unexplained weight gain  What happens in your heart?  As the walls of the heart muscle thicken, it becomes harder for the heart to hold as much blood as possible. Thick walls may also block blood flow out to the rest of the body and damage heart valves. A stiff heart muscle cant relax between pumps the way it should, so less blood moves with each pump. Also, the heart may sometimes beat irregularly (too fast and out of rhythm).  Your treatment plan  Treatment can help keep cardiomyopathy from getting worse, and can reduce your symptoms. Your healthcare provider will work with you to develop a treatment plan to help you feel better now and prevent problems in the future. It is very important to follow the treatment plan exactly as directed. If you have questions or problems, talk to your healthcare provider.  Date Last Reviewed: 1/1/2017 © 2000-2017 [employer]. [address] [place] [id number]. All rights reserved. This information is not intended as a substitute for professional medical care. Always follow your healthcare professional's instructions.

## 2019-07-08 DIAGNOSIS — L20.84 INTRINSIC ATOPIC DERMATITIS: ICD-10-CM

## 2019-07-08 DIAGNOSIS — L30.4 INTERTRIGO: ICD-10-CM

## 2019-07-08 RX ORDER — CLOBETASOL PROPIONATE 0.5 MG/G
CREAM TOPICAL
Qty: 30 G | Refills: 0 | Status: SHIPPED | OUTPATIENT
Start: 2019-07-08 | End: 2023-03-09

## 2019-07-08 RX ORDER — KETOCONAZOLE 20 MG/G
CREAM TOPICAL
Qty: 15 G | Refills: 0 | Status: SHIPPED | OUTPATIENT
Start: 2019-07-08 | End: 2019-07-12 | Stop reason: SDUPTHER

## 2019-07-08 NOTE — TELEPHONE ENCOUNTER
Attempted to contact patient to schedule nurse visit for BP recheck. Left message on machine for return call. Message sent to patient via portal.

## 2019-07-11 DIAGNOSIS — Z76.0 MEDICATION REFILL: ICD-10-CM

## 2019-07-11 DIAGNOSIS — I10 ESSENTIAL HYPERTENSION: ICD-10-CM

## 2019-07-11 RX ORDER — AMLODIPINE BESYLATE 10 MG/1
TABLET ORAL
Qty: 90 TABLET | Refills: 0 | OUTPATIENT
Start: 2019-07-11

## 2019-07-12 DIAGNOSIS — L30.4 INTERTRIGO: ICD-10-CM

## 2019-07-12 RX ORDER — KETOCONAZOLE 20 MG/G
CREAM TOPICAL
Qty: 15 G | Refills: 0 | Status: SHIPPED | OUTPATIENT
Start: 2019-07-12 | End: 2022-12-08 | Stop reason: SDUPTHER

## 2019-07-14 DIAGNOSIS — I10 ESSENTIAL HYPERTENSION: ICD-10-CM

## 2019-07-18 RX ORDER — AMLODIPINE BESYLATE 10 MG/1
TABLET ORAL
Qty: 90 TABLET | Refills: 0 | Status: SHIPPED | OUTPATIENT
Start: 2019-07-18 | End: 2019-09-27

## 2019-08-03 ENCOUNTER — OFFICE VISIT (OUTPATIENT)
Dept: URGENT CARE | Facility: CLINIC | Age: 74
End: 2019-08-03
Payer: MEDICARE

## 2019-08-03 VITALS
BODY MASS INDEX: 37.6 KG/M2 | DIASTOLIC BLOOD PRESSURE: 78 MMHG | HEART RATE: 102 BPM | RESPIRATION RATE: 18 BRPM | SYSTOLIC BLOOD PRESSURE: 158 MMHG | TEMPERATURE: 98 F | OXYGEN SATURATION: 100 % | WEIGHT: 199 LBS

## 2019-08-03 DIAGNOSIS — R51.9 NONINTRACTABLE HEADACHE, UNSPECIFIED CHRONICITY PATTERN, UNSPECIFIED HEADACHE TYPE: Primary | ICD-10-CM

## 2019-08-03 DIAGNOSIS — S00.211D: ICD-10-CM

## 2019-08-03 PROCEDURE — 96372 PR INJECTION,THERAP/PROPH/DIAG2ST, IM OR SUBCUT: ICD-10-PCS | Mod: S$GLB,,, | Performed by: EMERGENCY MEDICINE

## 2019-08-03 PROCEDURE — 1101F PR PT FALLS ASSESS DOC 0-1 FALLS W/OUT INJ PAST YR: ICD-10-PCS | Mod: CPTII,S$GLB,, | Performed by: EMERGENCY MEDICINE

## 2019-08-03 PROCEDURE — 99214 OFFICE O/P EST MOD 30 MIN: CPT | Mod: 25,S$GLB,, | Performed by: EMERGENCY MEDICINE

## 2019-08-03 PROCEDURE — 99214 PR OFFICE/OUTPT VISIT, EST, LEVL IV, 30-39 MIN: ICD-10-PCS | Mod: 25,S$GLB,, | Performed by: EMERGENCY MEDICINE

## 2019-08-03 PROCEDURE — 3078F PR MOST RECENT DIASTOLIC BLOOD PRESSURE < 80 MM HG: ICD-10-PCS | Mod: CPTII,S$GLB,, | Performed by: EMERGENCY MEDICINE

## 2019-08-03 PROCEDURE — 3078F DIAST BP <80 MM HG: CPT | Mod: CPTII,S$GLB,, | Performed by: EMERGENCY MEDICINE

## 2019-08-03 PROCEDURE — 96372 THER/PROPH/DIAG INJ SC/IM: CPT | Mod: S$GLB,,, | Performed by: EMERGENCY MEDICINE

## 2019-08-03 PROCEDURE — 3077F PR MOST RECENT SYSTOLIC BLOOD PRESSURE >= 140 MM HG: ICD-10-PCS | Mod: CPTII,S$GLB,, | Performed by: EMERGENCY MEDICINE

## 2019-08-03 PROCEDURE — 3077F SYST BP >= 140 MM HG: CPT | Mod: CPTII,S$GLB,, | Performed by: EMERGENCY MEDICINE

## 2019-08-03 PROCEDURE — 1101F PT FALLS ASSESS-DOCD LE1/YR: CPT | Mod: CPTII,S$GLB,, | Performed by: EMERGENCY MEDICINE

## 2019-08-03 RX ORDER — KETOROLAC TROMETHAMINE 30 MG/ML
30 INJECTION, SOLUTION INTRAMUSCULAR; INTRAVENOUS
Status: COMPLETED | OUTPATIENT
Start: 2019-08-03 | End: 2019-08-03

## 2019-08-03 RX ORDER — ONDANSETRON 8 MG/1
8 TABLET, ORALLY DISINTEGRATING ORAL
Status: COMPLETED | OUTPATIENT
Start: 2019-08-03 | End: 2019-08-03

## 2019-08-03 RX ORDER — TOPIRAMATE 50 MG/1
50 TABLET, FILM COATED ORAL 2 TIMES DAILY
Qty: 20 TABLET | Refills: 2 | Status: SHIPPED | OUTPATIENT
Start: 2019-08-03 | End: 2020-04-29

## 2019-08-03 RX ADMIN — KETOROLAC TROMETHAMINE 30 MG: 30 INJECTION, SOLUTION INTRAMUSCULAR; INTRAVENOUS at 02:08

## 2019-08-03 RX ADMIN — ONDANSETRON 8 MG: 8 TABLET, ORALLY DISINTEGRATING ORAL at 02:08

## 2019-08-03 NOTE — PATIENT INSTRUCTIONS
"OKAY TO RESUME HER NORMAL ACTIVITIES AND ALSO OKAY TO TAKE YOUR HEADACHE MEDICINE AS YOU HAVE HAD GOOD SUCCESS WITH TOPAMAX IN THE PAST.    THIS APPEARS TO BE UNRELATED TO THE TRAUMA FROM 5 DAYS AGO HOWEVER IF HEADACHE IS WORSE OR ASSOCIATED WITH NAUSEA OR VOMITING OR WEAKNESS IN THE ARMS OR LEGS OR ALTERED MENTAL STATUS OR EXCESSIVE SLEEPINESS OR IRRITABILITY, PLEASE GO TO THE ER FOR CT SCAN OR CT SCAN.    30 MG TORADOL SHOT GIVEN IN CLINIC  ZOFRAN 8 MG GIVEN IN CLINIC AS IT WORKS SYNERGISTIC HECTOR WITH THE TORADOL FOR HEADACHES    RESUME HER NORMAL HEADACHE REGIMEN.    REVIEW HEAD INJURY SHEET AND HEADACHE SHEET.      Headache, Unspecified    A number of things can cause headaches. The cause of your headache isnt clear. But it doesnt seem to be a sign of any serious illness.  You could have a tension headache or a migraine headache.  Stress can cause a tension headache. This can happen if you tense the muscles of your shoulders, neck, and scalp without knowing it. If this stress lasts long enough, you may develop a tension headache.  It is not clear why migraines occur, but certain things called" triggers" can raise the risk of having a migraine attack. Migraine triggers may include emotional stress or depression, or by hormone changes during the menstrual cycle. Other triggers include birth control pills and other medicines, alcohol or caffeine, foods with tyramine (such as aged cheese, wine), eyestrain, weather changes, missed meals, and lack of sleep or oversleeping.  Other causes of headache include:  · Viral illness with high fever  · Head injury with concussion  · Sinus, ear, or throat infection  · Dental pain and jaw joint (TMJ) pain  More serious but less common causes of headache include stroke, brain hemorrhage, brain tumor, meningitis, and encephalitis.  Home care  Follow these tips when taking care of yourself at home:  · Dont drive yourself home if you were given pain medicine for your headache. " Instead, have someone else drive you home. Try to sleep when you get home. You should feel much better when you wake up.  · Apply heat to the back of your neck to ease a neck muscle spasm. Take care of a migraine headache by putting an ice pack on your forehead or at the base of your skull.  · If you have nausea or vomiting, eat a light diet until your headache eases.  · If you have a migraine headache, use sunglasses when in the daylight or around bright indoor lighting until your symptoms get better. Bright glaring light can make this type of headache worse.  Follow-up care  Follow up with your healthcare provider, or as advised. Talk with your provider if you have frequent headaches. He or she can help figure out a treatment plan. By knowing the earliest signs of headache, and starting treatment right away, you may be able to stop the pain yourself.  When to seek medical advice  Call your healthcare provider right away if any of these occur:  · Your head pain suddenly gets worse after sexual intercourse or strenuous activity  · Your head pain doesnt get better within 24 hours  · You arent able to keep liquids down (repeated vomiting)  · Fever of 100.4ºF (38ºC) or higher, or as directed by your healthcare provider  · Stiff neck  · Extreme drowsiness, confusion, or fainting  · Dizziness or dizziness with spinning sensation (vertigo)  · Weakness in an arm or leg or one side of your face  · You have trouble talking or seeing  Date Last Reviewed: 8/1/2016  © 6943-1979 [employer]. [address] [place] [address], [place] [address]. All rights reserved. This information is not intended as a substitute for professional medical care. Always follow your healthcare professional's instructions.        Head Injury (Adult)    You have a head injury. It does not appear serious at this time. But symptoms of a more serious problem, such as a mild brain injury (concussion) or bruising or bleeding in the brain, may appear  later. For this reason, you or someone caring for you will need to watch for the symptoms listed below. Once youre home, also be sure to follow any care instructions youre given.  Home care  Watch for the following symptoms  Seek emergency medical care if you have any of these symptoms over the next hours to days:   · Headache  · Nausea or vomiting  · Dizziness  · Sensitivity to light or noise  · Unusual sleepiness or grogginess  · Trouble falling asleep  · Personality changes  · Vision changes  · Memory loss  · Confusion  · Trouble walking or clumsiness  · Loss of consciousness (even for a short time)  · Inability to be awakened  · Stiff neck  · Weakness or numbness in any part of the body  · Seizures  General care  · If you were prescribed medicines for pain, use them as directed. Note: Dont take other medicines for pain without talking to your provider first.  · To help reduce swelling and pain, apply a cold source to the injured area for up to 20 minutes at a time. Do this as often as directed. Use a cold pack or bag of ice wrapped in a thin towel. Never apply a cold source directly to the skin.  · If you have cuts or scrapes as a result of your head injury, care for them as directed.  · For the next 24 hours (or longer, if instructed):  ¨ Dont drink alcohol or use sedatives or other medicines that make you sleepy.  ¨ Dont drive or operate machinery.  ¨ Dont do anything strenuous, such as heavy lifting or straining.  ¨ Limit tasks that require concentration. This includes reading, using a smartphone or computer, watching TV, and playing video games.  ¨ Dont return to sports or other activities that could result in another head injury.  Follow-up care  Follow up with your healthcare provider, or as directed. If imaging tests were done, they will be reviewed by a doctor. You will be told the results and any new findings that may affect your care.  When to seek medical advice  Call your healthcare provider  right away if any of these occur:  · Pain doesnt get better or worsens  · New or increased swelling or bruising  · Fever of 100.4°F (38°C) or higher, or as directed by your provider  · Increased redness, warmth, drainage, or bleeding from the injured area  · Fluid drainage or bleeding from the nose or ears  · Any depression or bony abnormality in the injured area  Date Last Reviewed: 9/26/2015  © 9133-2191 [employer]. [address] [place] [address]. All rights reserved. This information is not intended as a substitute for professional medical care. Always follow your healthcare professional's instructions.

## 2019-08-03 NOTE — PROGRESS NOTES
Subjective:       Patient ID: Nilsa Curiel is a 73 y.o. female.    Vitals:  weight is 90.3 kg (199 lb). Her temperature is 97.9 °F (36.6 °C). Her blood pressure is 158/78 (abnormal) and her pulse is 102. Her respiration is 18 and oxygen saturation is 100%.     Chief Complaint: Head Injury    Patient  Fell Tuesday morning, injury to head above right eye, slight headache this morning.  THE IMPACT WAS MILD WITH A 1 CM ABRASION TO THE RIGHT MEDIAL EYELID.  THERE WAS NO ECCHYMOSIS OR BLACK EYE AND CERTAINLY NO RED FLAG HEAD INJURY SYMPTOMS AS FAR AS NAUSEA AND VOMITING, WEAKNESS IN THE ARMS OR LEGS, LOSS OF CONSCIOUSNESS, USE OF ANTICOAGULANT, ALTERED MENTAL STATUS, DISTRACTING INJURY.  SHE HAS NOT HAD ANY SYMPTOMS UNTIL THIS MORNING WHEN SHE HAD 1 OF HER NORMAL HEADACHES.  HOWEVER A FRIEND OF HERS TOLD HER THAT SINCE SHE HAD THE MILD TRAUMA 5 DAYS AGO THAT SHE SHOULD GET CHECKED OUT IN THE EMERGENCY DEPARTMENT BECAUSE OF THE INJURY.  SHE HAD 0 SYMPTOMS FROM TUESDAY UNTIL THIS MORNING.  THE HEADACHE IS MILD AND SHE STATES THAT SHE JUST DID NOT WANT TO TAKE ANYTHING SPECIFICALLY HER [facility] WHICH USUALLY HELPS QUICKLY.  NO OTHER TRAUMA NO OTHER INJURY NO OTHER NECK PAIN OR SHORTNESS OF BREATH OR CHEST PAIN.    Head Injury    The incident occurred 3 to 5 days ago. The injury mechanism was a fall. Associated symptoms include headaches. Pertinent negatives include no blurred vision or weakness. Treatments tried: ice. The treatment provided no relief.       Constitution: Negative for fatigue.   HENT: Positive for ear pain. Negative for facial swelling and facial trauma.    Neck: Negative for neck stiffness.   Cardiovascular: Negative for chest trauma.   Eyes: Negative for eye trauma, double vision and blurred vision.   Gastrointestinal: Negative for abdominal trauma, abdominal pain and rectal bleeding.   Genitourinary: Negative for hematuria, missed menses, genital trauma and pelvic pain.    Musculoskeletal: Negative for pain, trauma, joint swelling and abnormal ROM of joint.   Skin: Negative for color change, wound, abrasion, laceration and bruising.   Neurological: Positive for headaches. Negative for dizziness, history of vertigo, light-headedness, coordination disturbances, altered mental status and loss of consciousness.   Hematologic/Lymphatic: Negative for history of bleeding disorder.   Psychiatric/Behavioral: Negative for altered mental status.       Objective:      Physical Exam   Constitutional: She is oriented to person, place, and time. She appears well-developed and well-nourished. She is cooperative.  Non-toxic appearance. She does not appear ill. No distress.   HENT:   Head: Normocephalic and atraumatic.   Right Ear: Hearing, tympanic membrane, external ear and ear canal normal.   Left Ear: Hearing, tympanic membrane, external ear and ear canal normal.   Nose: Nose normal. No mucosal edema, rhinorrhea or nasal deformity. No epistaxis. Right sinus exhibits no maxillary sinus tenderness and no frontal sinus tenderness. Left sinus exhibits no maxillary sinus tenderness and no frontal sinus tenderness.   Mouth/Throat: Uvula is midline, oropharynx is clear and moist and mucous membranes are normal. No trismus in the jaw. Normal dentition. No uvula swelling. No posterior oropharyngeal erythema.   No temporal TTP  0.75 CM RIGHT MEDIAL EYELID HEALING ABRASION  NO PERIORBITAL EDEMA, NO ECCHYMOSIS, NO TTP OF THE ORBITAL RIN OR FACE, NOSE, SCALP, SKULL   Eyes: Pupils are equal, round, and reactive to light. Conjunctivae, EOM and lids are normal. No scleral icterus.   Sclera clear bilat   Neck: Trachea normal, normal range of motion, full passive range of motion without pain and phonation normal. Neck supple. No neck rigidity.   Cardiovascular: Normal rate, regular rhythm, normal heart sounds, intact distal pulses and normal pulses.   Pulmonary/Chest: Effort normal and breath sounds normal. No  respiratory distress.   Abdominal: Soft. Normal appearance and bowel sounds are normal. She exhibits no distension. There is no tenderness.   Musculoskeletal: Normal range of motion. She exhibits no edema or deformity.   Neurological: She is alert and oriented to person, place, and time. No cranial nerve deficit or sensory deficit. She exhibits normal muscle tone. Coordination normal.   Skin: Skin is warm, dry and intact. She is not diaphoretic. No pallor.   Psychiatric: She has a normal mood and affect. Her speech is normal and behavior is normal. Judgment and thought content normal. Cognition and memory are normal.   Nursing note and vitals reviewed.        PATIENT HAS A VERY MILD HEADACHE AFTER MINOR HEAD TRAUMA 5 DAYS AGO THAT SHE HAS TAKEN NOTHING FOR.  SHE MEETS NO INDICATION FOR CT SCAN AT THIS TIME AND GIVEN HEAD INJURY PRECAUTIONS SHEET.  SHE KNOWS TO GO TO THE ER FOR CT SCAN IF THINGS WORSEN DESPITE TREATMENT REGIMEN.  SHE ALSO IS ASKING FOR A REFILL OF HER TOPAMAX PRESCRIPTION.    Assessment:       1. Nonintractable headache, unspecified chronicity pattern, unspecified headache type    2. Abrasion of right eyelid, subsequent encounter        Plan:         Nonintractable headache, unspecified chronicity pattern, unspecified headache type    Abrasion of right eyelid, subsequent encounter    Other orders  -     ketorolac injection 30 mg  -     ondansetron disintegrating tablet 8 mg          Patient Instructions   OKAY TO RESUME HER NORMAL ACTIVITIES AND ALSO OKAY TO TAKE YOUR HEADACHE MEDICINE AS YOU HAVE HAD GOOD SUCCESS WITH TOPAMAX IN THE PAST.    THIS APPEARS TO BE UNRELATED TO THE TRAUMA FROM 5 DAYS AGO HOWEVER IF HEADACHE IS WORSE OR ASSOCIATED WITH NAUSEA OR VOMITING OR WEAKNESS IN THE ARMS OR LEGS OR ALTERED MENTAL STATUS OR EXCESSIVE SLEEPINESS OR IRRITABILITY, PLEASE GO TO THE ER FOR CT SCAN OR CT SCAN.    30 MG TORADOL SHOT GIVEN IN CLINIC  ZOFRAN 8 MG GIVEN IN CLINIC AS IT WORKS SYNERGISTIC HECTOR  "WITH THE TORADOL FOR HEADACHES    RESUME HER NORMAL HEADACHE REGIMEN.    REVIEW HEAD INJURY SHEET AND HEADACHE SHEET.      Headache, Unspecified    A number of things can cause headaches. The cause of your headache isnt clear. But it doesnt seem to be a sign of any serious illness.  You could have a tension headache or a migraine headache.  Stress can cause a tension headache. This can happen if you tense the muscles of your shoulders, neck, and scalp without knowing it. If this stress lasts long enough, you may develop a tension headache.  It is not clear why migraines occur, but certain things called" triggers" can raise the risk of having a migraine attack. Migraine triggers may include emotional stress or depression, or by hormone changes during the menstrual cycle. Other triggers include birth control pills and other medicines, alcohol or caffeine, foods with tyramine (such as aged cheese, wine), eyestrain, weather changes, missed meals, and lack of sleep or oversleeping.  Other causes of headache include:  · Viral illness with high fever  · Head injury with concussion  · Sinus, ear, or throat infection  · Dental pain and jaw joint (TMJ) pain  More serious but less common causes of headache include stroke, brain hemorrhage, brain tumor, meningitis, and encephalitis.  Home care  Follow these tips when taking care of yourself at home:  · Dont drive yourself home if you were given pain medicine for your headache. Instead, have someone else drive you home. Try to sleep when you get home. You should feel much better when you wake up.  · Apply heat to the back of your neck to ease a neck muscle spasm. Take care of a migraine headache by putting an ice pack on your forehead or at the base of your skull.  · If you have nausea or vomiting, eat a light diet until your headache eases.  · If you have a migraine headache, use sunglasses when in the daylight or around bright indoor lighting until your symptoms get better. " Bright glaring light can make this type of headache worse.  Follow-up care  Follow up with your healthcare provider, or as advised. Talk with your provider if you have frequent headaches. He or she can help figure out a treatment plan. By knowing the earliest signs of headache, and starting treatment right away, you may be able to stop the pain yourself.  When to seek medical advice  Call your healthcare provider right away if any of these occur:  · Your head pain suddenly gets worse after sexual intercourse or strenuous activity  · Your head pain doesnt get better within 24 hours  · You arent able to keep liquids down (repeated vomiting)  · Fever of 100.4ºF (38ºC) or higher, or as directed by your healthcare provider  · Stiff neck  · Extreme drowsiness, confusion, or fainting  · Dizziness or dizziness with spinning sensation (vertigo)  · Weakness in an arm or leg or one side of your face  · You have trouble talking or seeing  Date Last Reviewed: 8/1/2016  © 2728-2277 [employer]. [address] [place] [address]. All rights reserved. This information is not intended as a substitute for professional medical care. Always follow your healthcare professional's instructions.        Head Injury (Adult)    You have a head injury. It does not appear serious at this time. But symptoms of a more serious problem, such as a mild brain injury (concussion) or bruising or bleeding in the brain, may appear later. For this reason, you or someone caring for you will need to watch for the symptoms listed below. Once youre home, also be sure to follow any care instructions youre given.  Home care  Watch for the following symptoms  Seek emergency medical care if you have any of these symptoms over the next hours to days:   · Headache  · Nausea or vomiting  · Dizziness  · Sensitivity to light or noise  · Unusual sleepiness or grogginess  · Trouble falling asleep  · Personality changes  · Vision  changes  · Memory loss  · Confusion  · Trouble walking or clumsiness  · Loss of consciousness (even for a short time)  · Inability to be awakened  · Stiff neck  · Weakness or numbness in any part of the body  · Seizures  General care  · If you were prescribed medicines for pain, use them as directed. Note: Dont take other medicines for pain without talking to your provider first.  · To help reduce swelling and pain, apply a cold source to the injured area for up to 20 minutes at a time. Do this as often as directed. Use a cold pack or bag of ice wrapped in a thin towel. Never apply a cold source directly to the skin.  · If you have cuts or scrapes as a result of your head injury, care for them as directed.  · For the next 24 hours (or longer, if instructed):  ¨ Dont drink alcohol or use sedatives or other medicines that make you sleepy.  ¨ Dont drive or operate machinery.  ¨ Dont do anything strenuous, such as heavy lifting or straining.  ¨ Limit tasks that require concentration. This includes reading, using a smartphone or computer, watching TV, and playing video games.  ¨ Dont return to sports or other activities that could result in another head injury.  Follow-up care  Follow up with your healthcare provider, or as directed. If imaging tests were done, they will be reviewed by a doctor. You will be told the results and any new findings that may affect your care.  When to seek medical advice  Call your healthcare provider right away if any of these occur:  · Pain doesnt get better or worsens  · New or increased swelling or bruising  · Fever of 100.4°F (38°C) or higher, or as directed by your provider  · Increased redness, warmth, drainage, or bleeding from the injured area  · Fluid drainage or bleeding from the nose or ears  · Any depression or bony abnormality in the injured area  Date Last Reviewed: 9/26/2015  © 7058-0737 [employer]. [address] [place] [address], [place] [address]. All rights  reserved. This information is not intended as a substitute for professional medical care. Always follow your healthcare professional's instructions.

## 2019-09-05 ENCOUNTER — PATIENT MESSAGE (OUTPATIENT)
Dept: PRIMARY CARE CLINIC | Facility: CLINIC | Age: 74
End: 2019-09-05

## 2019-09-27 ENCOUNTER — OFFICE VISIT (OUTPATIENT)
Dept: PRIMARY CARE CLINIC | Facility: CLINIC | Age: 74
End: 2019-09-27
Attending: NURSE PRACTITIONER
Payer: MEDICARE

## 2019-09-27 VITALS
DIASTOLIC BLOOD PRESSURE: 70 MMHG | BODY MASS INDEX: 37.56 KG/M2 | SYSTOLIC BLOOD PRESSURE: 140 MMHG | TEMPERATURE: 99 F | OXYGEN SATURATION: 97 % | HEIGHT: 61 IN | WEIGHT: 198.94 LBS | HEART RATE: 100 BPM | RESPIRATION RATE: 18 BRPM

## 2019-09-27 DIAGNOSIS — E83.52 HYPERCALCEMIA: ICD-10-CM

## 2019-09-27 DIAGNOSIS — E66.01 SEVERE OBESITY WITH BODY MASS INDEX (BMI) OF 35.0 TO 39.9 WITH COMORBIDITY: ICD-10-CM

## 2019-09-27 DIAGNOSIS — I10 ESSENTIAL HYPERTENSION: Primary | ICD-10-CM

## 2019-09-27 DIAGNOSIS — Z23 NEED FOR IMMUNIZATION AGAINST INFLUENZA: ICD-10-CM

## 2019-09-27 DIAGNOSIS — K21.9 GASTROESOPHAGEAL REFLUX DISEASE WITHOUT ESOPHAGITIS: ICD-10-CM

## 2019-09-27 DIAGNOSIS — I42.1 HYPERTROPHIC OBSTRUCTIVE CARDIOMYOPATHY (HOCM): ICD-10-CM

## 2019-09-27 DIAGNOSIS — D50.9 MICROCYTIC HYPOCHROMIC ANEMIA: ICD-10-CM

## 2019-09-27 PROCEDURE — 90686 IIV4 VACC NO PRSV 0.5 ML IM: CPT | Mod: HCNC,S$GLB,, | Performed by: NURSE PRACTITIONER

## 2019-09-27 PROCEDURE — 99999 PR PBB SHADOW E&M-EST. PATIENT-LVL IV: CPT | Mod: PBBFAC,HCNC,, | Performed by: NURSE PRACTITIONER

## 2019-09-27 PROCEDURE — 1101F PR PT FALLS ASSESS DOC 0-1 FALLS W/OUT INJ PAST YR: ICD-10-PCS | Mod: HCNC,CPTII,S$GLB, | Performed by: NURSE PRACTITIONER

## 2019-09-27 PROCEDURE — G0008 ADMIN INFLUENZA VIRUS VAC: HCPCS | Mod: HCNC,S$GLB,, | Performed by: NURSE PRACTITIONER

## 2019-09-27 PROCEDURE — G0008 FLU VACCINE (QUAD) GREATER THAN OR EQUAL TO 3YO PRESERVATIVE FREE IM: ICD-10-PCS | Mod: HCNC,S$GLB,, | Performed by: NURSE PRACTITIONER

## 2019-09-27 PROCEDURE — 3078F DIAST BP <80 MM HG: CPT | Mod: HCNC,CPTII,S$GLB, | Performed by: NURSE PRACTITIONER

## 2019-09-27 PROCEDURE — 99214 PR OFFICE/OUTPT VISIT, EST, LEVL IV, 30-39 MIN: ICD-10-PCS | Mod: 25,HCNC,S$GLB, | Performed by: NURSE PRACTITIONER

## 2019-09-27 PROCEDURE — 1101F PT FALLS ASSESS-DOCD LE1/YR: CPT | Mod: HCNC,CPTII,S$GLB, | Performed by: NURSE PRACTITIONER

## 2019-09-27 PROCEDURE — 99999 PR PBB SHADOW E&M-EST. PATIENT-LVL IV: ICD-10-PCS | Mod: PBBFAC,HCNC,, | Performed by: NURSE PRACTITIONER

## 2019-09-27 PROCEDURE — 3077F SYST BP >= 140 MM HG: CPT | Mod: HCNC,CPTII,S$GLB, | Performed by: NURSE PRACTITIONER

## 2019-09-27 PROCEDURE — 3077F PR MOST RECENT SYSTOLIC BLOOD PRESSURE >= 140 MM HG: ICD-10-PCS | Mod: HCNC,CPTII,S$GLB, | Performed by: NURSE PRACTITIONER

## 2019-09-27 PROCEDURE — 90686 FLU VACCINE (QUAD) GREATER THAN OR EQUAL TO 3YO PRESERVATIVE FREE IM: ICD-10-PCS | Mod: HCNC,S$GLB,, | Performed by: NURSE PRACTITIONER

## 2019-09-27 PROCEDURE — 3078F PR MOST RECENT DIASTOLIC BLOOD PRESSURE < 80 MM HG: ICD-10-PCS | Mod: HCNC,CPTII,S$GLB, | Performed by: NURSE PRACTITIONER

## 2019-09-27 PROCEDURE — 99214 OFFICE O/P EST MOD 30 MIN: CPT | Mod: 25,HCNC,S$GLB, | Performed by: NURSE PRACTITIONER

## 2019-09-27 RX ORDER — PANTOPRAZOLE SODIUM 40 MG/1
40 TABLET, DELAYED RELEASE ORAL DAILY PRN
Qty: 90 TABLET | Refills: 1 | Status: SHIPPED | OUTPATIENT
Start: 2019-09-27 | End: 2020-05-25 | Stop reason: SDUPTHER

## 2019-09-27 RX ORDER — OLMESARTAN MEDOXOMIL / AMLODIPINE BESYLATE / HYDROCHLOROTHIAZIDE 40; 10; 12.5 MG/1; MG/1; MG/1
1 TABLET, FILM COATED ORAL DAILY
Qty: 90 TABLET | Refills: 1 | Status: SHIPPED | OUTPATIENT
Start: 2019-09-27 | End: 2020-03-23 | Stop reason: SDUPTHER

## 2019-09-27 NOTE — PROGRESS NOTES
Patient identified by name and date of birth. Denies any allergies. Injection administered by aseptic technique, tolerated well by pt.

## 2019-09-27 NOTE — PROGRESS NOTES
Subjective:       Patient ID: Nilsa Curiel is a 73 y.o. female.    Chief Complaint: Follow-up (6month)    Patient is a 73-year-old female presents to clinic today for follow-up hypertension, low back pain, and obesity. Has lost 7 pounds in 3 months.  Reports is trying to do home exercises and watching diet.      Pt. Underwent physical therapy due to ongoing complaints of low back pain and shoulder pain and need for strengthening and gait stability.  She attended 11 sessions of physical therapy and was released May 30th from physical therapy with a home exercise program. She did show improvement her back pain has not completely resolved. The pain has improved and not taking Neurontin often.      She was referred to Cardiology for continued surveillance of her multiple cardiovascular comorbidities including but not limited to bilateral carotid artery disease, aortic sclerosis, hypertrophic obstructive cardiomyopathy, diastolic heart failure, and hypertension.  She was seen by Dr. Aj and is continuing to be followed by him.  Patient's labs demonstrate a chronic microcytic anemia.  A FIT kit was negative.  Pt. was offered a GI referral however she declines. Pt. Was started on antiplatelet therapy at last visit.  This was completed after consultation with her cardiologist.  She continues to experience low back pain, shoulder pain and intermittent headaches as well as constipation.  She continues to experience intermittent allergy symptoms with postnasal drip, cough and rhinorrhea.  Patient does have a history of forgetting to take medications.  Reports she did take her blood pressure medicine right before she left the house for this appointment.     She is followed by Dr. Aj, Cardiology   She is followed by Dr. Jaqui sam Endocrinology for hypercalcemia and hyperparathyroidism  She is followed by CHRISTOPHER Muñoz for atopic dermatitis in Dermatology  She is followed by Dr. Sosa ophthalmology for cataracts and  presbyopia and allergic conjunctivitis  She was seen in September 2018 for [facility] by Dr. Gibbons     Review of Systems   Constitutional: Negative.    HENT: Negative.    Respiratory: Negative.    Cardiovascular: Negative.    Gastrointestinal: Negative for abdominal pain, constipation, diarrhea, nausea and vomiting.        Intermittent reflux   Musculoskeletal: Positive for arthralgias, back pain, gait problem and joint swelling.   Hematological: Negative.    Psychiatric/Behavioral: Negative.    All other systems reviewed and are negative.      Objective:      Physical Exam   Constitutional: She is oriented to person, place, and time. She appears well-developed and well-nourished. No distress.   Obese   HENT:   Head: Normocephalic and atraumatic.   Eyes: Right eye exhibits no discharge. Left eye exhibits no discharge. No scleral icterus.   Cardiovascular: Normal rate, regular rhythm and intact distal pulses.   Murmur heard.  Pulmonary/Chest: Effort normal and breath sounds normal. No respiratory distress.   Abdominal: Soft. She exhibits no distension.   Musculoskeletal: She exhibits no edema or tenderness.   Neurological: She is alert and oriented to person, place, and time.   Skin: Skin is warm and dry. She is not diaphoretic.   Psychiatric: She has a normal mood and affect. Her behavior is normal. Thought content normal.   Nursing note and vitals reviewed.      Medication List with Changes/Refills   New Medications    OLMESARTAN-AMLODIPIN-HCTHIAZID 40-10-12.5 MG TAB    Take 1 tablet by mouth Daily.    PANTOPRAZOLE (PROTONIX) 40 MG TABLET    Take 1 tablet (40 mg total) by mouth daily as needed.   Current Medications    ASPIRIN (ECOTRIN) 81 MG EC TABLET    Take 1 tablet (81 mg total) by mouth once daily.    AZELASTINE (ASTELIN) 137 MCG (0.1 %) NASAL SPRAY    1 spray (137 mcg total) by Nasal route 2 (two) times daily.    BETAMETHASONE VALERATE 0.1% (VALISONE) 0.1 % OINT    RL  EXT AA BID PRN    CLOBETASOL (TEMOVATE) 0.05 % CREAM    APPLY TO THE AFFECTED AREA TWICE DAILY    FEXOFENADINE (ALLEGRA) 180 MG TABLET    Take 1 tablet (180 mg total) by mouth once daily.    GABAPENTIN (NEURONTIN) 300 MG CAPSULE    Take 1 by mouth daily for 3 days, then twice daily for 3 days,    KETOCONAZOLE (NIZORAL) 2 % CREAM    AOO TO THE AFFECTED AREA TWICE DAILY, APPLY TO AXILLA AND ABDOMINAL CREASES    LACTOBACILLUS COMBO NO.6 (PROBIOTIC COMPLEX) 4 BILLION CELL TAB    Take 1 tablet by mouth once daily.    MULTIVIT-MIN/FA/CA CARB/VIT K (WOMEN'S 50+ DAILY FORMULA ORAL)    Take by mouth.    OMEGA-3 FATTY ACIDS 300 MG CAP    Take by mouth.    PRAVASTATIN (PRAVACHOL) 40 MG TABLET    Take 1 tablet (40 mg total) by mouth once daily.    TOPIRAMATE (TOPAMAX) 50 MG TABLET    Take 1 tablet (50 mg total) by mouth 2 (two) times daily.    TRIAMCINOLONE ACETONIDE 0.1% (KENALOG) 0.1 % CREAM    AAA chest and hands bid    TURMERIC ORAL    Take by mouth.   Discontinued Medications    AMLODIPINE (NORVASC) 10 MG TABLET    TAKE 1 TABLET(10 MG) BY MOUTH EVERY DAY    CHLORTHALIDONE (HYGROTEN) 25 MG TAB    Take 1 tablet (25 mg total) by mouth once daily.    LOSARTAN (COZAAR) 100 MG TABLET    Take 1 tablet (100 mg total) by mouth once daily.    RANITIDINE (ZANTAC) 150 MG TABLET    Take 1 tablet (150 mg total) by mouth as needed for Heartburn.       Assessment:       1. Essential hypertension    2. Severe obesity with body mass index (BMI) of 35.0 to 39.9 with comorbidity    3. Gastroesophageal reflux disease without esophagitis    4. Microcytic hypochromic anemia    5. Hypertrophic obstructive cardiomyopathy (HOCM)    6. Hypercalcemia    7. Need for immunization against influenza    8. Mild aortic sclerosis        Plan:       Essential hypertension  -     Comprehensive metabolic panel; Future; Expected date: 12/27/2019  -     Basic metabolic panel; Future; Expected date: 12/27/2019  -     olmesartan-amLODIPin-hcthiazid 40-10-12.5 mg  Tab; Take 1 tablet by mouth Daily.  Dispense: 90 tablet; Refill: 1  Blood pressure is not optimized at this time.  Patient is on 3 separate medications and does find this a chance to take all therefore we will have her stop her losartan, Norvasc, and hydrochlorothiazide and place her on Tribenzor.  She will undergo blood pressure recheck in 2 weeks.  She was encouraged to continue her efforts to lose weight and be as active as she tolerates.  She is to avoid high sodium foods and try to adhere to a dash diet low in sugar and carbs.  Severe obesity with body mass index (BMI) of 35.0 to 39.9 with comorbidity  -     Comprehensive metabolic panel; Future; Expected date: 12/27/2019  -     Basic metabolic panel; Future; Expected date: 12/27/2019  BMI remains above goal of less than 25.  Patient is doing well and has lost 7 lb since her last visit.  She was encouraged to continue her TLCs.  Gastroesophageal reflux disease without esophagitis  -     pantoprazole (PROTONIX) 40 MG tablet; Take 1 tablet (40 mg total) by mouth daily as needed.  Dispense: 90 tablet; Refill: 1  Patient reports her Zantac is not providing relief.  She also does not want to take it due to the recall.  We will send her an as needed prescription for Protonix.  She does not utilize this medication daily only for exacerbations.  She was encouraged to avoid any known food triggers.  Do not lay down or eat for at least 2-3 hours before.  Microcytic hypochromic anemia  -     Comprehensive metabolic panel; Future; Expected date: 12/27/2019  -     Basic metabolic panel; Future; Expected date: 12/27/2019  Anemia has been stable she is due to have it rechecked at next visit.  She is to continue her multivitamin daily.  Hypertrophic obstructive cardiomyopathy (HOCM)  -     Comprehensive metabolic panel; Future; Expected date: 12/27/2019  -     Basic metabolic panel; Future; Expected date: 12/27/2019  -     olmesartan-amLODIPin-hcthiazid 40-10-12.5 mg Tab; Take  1 tablet by mouth Daily.  Dispense: 90 tablet; Refill: 1  Cardiology following.  Patient does deny any symptoms of excessive fatigue or dyspnea on exertion.  Hypercalcemia  -     Comprehensive metabolic panel; Future; Expected date: 12/27/2019  -     Basic metabolic panel; Future; Expected date: 12/27/2019  Endocrinology following.  Will continue to monitor as well.  Need for immunization against influenza  -     Influenza - Quadrivalent (PF)    Mild aortic sclerosis  Cardiology is following.  Continue current home medication regimen and attend appointments as scheduled.        Follow up in about 3 months (around 12/27/2019) for with labs check anemia and blood pressure.  Call us with blood pressure reading in 2 weeks. .    I have reviewed the patient's past medical/surgical and social histories and updated as appropriate. Medications were reviewed and discussed as appropriate including side effects and risks versus benefit. Plan of care was reviewed and agreed upon with the patient.  An opportunity to ask questions was provided and explanation given. Patient verbalized understanding on all information reviewed and discussed.

## 2019-09-27 NOTE — PATIENT INSTRUCTIONS
Amlodipine; Hydrochlorothiazide; Olmesartan oral tablets  What is this medicine?  AMLODIPINE; HYDROCHLOROTHIAZIDE, HCTZ; OLMESARTAN (am ORQUIDEA villa; leonela droe klor oh ESTEFANÍA rubio; all mi JOSE ANGEL tan) is a combination of a calcium channel blocker, a diuretic, and an angiotensin II antagonist. This medicine is used to treat high blood pressure.  How should I use this medicine?  Take this medicine by mouth with a glass of water. Follow the directions on the prescription label. You can take it with or without food. If it upsets your stomach, take it with food. Take your medicine at regular intervals. Do not take it more often than directed. Do not stop taking except on your doctor's advice.  Talk to your pediatrician regarding the use of this medicine in children. Special care may be needed.  Patients over 65 years old may have a stronger reaction and need a smaller dose.  What side effects may I notice from receiving this medicine?  Side effects that you should report to your doctor or health care professional as soon as possible:  · allergic reactions like skin rash, itching or hives, swelling of the face, lips, or tongue  · breathing problems  · changes in vision  · chest pain  · confusion  · dark urine  · diarrhea  · eye pain  · fast, irregular heartbeat  · feeling faint or lightheaded, falls  · low blood pressure  · muscle cramps  · redness, blistering, peeling or loosening of the skin, including inside the mouth  · stomach pain  · swelling of the hands, ankles, or feet  · trouble passing urine or change in the amount of urine  · vomiting  · weight loss  · worsened gout pain  · yellowing of the eyes or skin  Side effects that usually do not require medical attention (Report these to your doctor or health care professional if they continue or are bothersome.):  · change in sex drive or performance  · dizziness  · headache  · muscle twitching  · nausea  · stuffy or runny nose and sore throat  · swelling of the  joints  · weak or tired  What may interact with this medicine?  · alcohol  · barbiturates like phenobarbital  · diuretics like triamterene, spironolactone, or amiloride  · lithium  · medicines for blood pressure  · medicines for diabetes  · norepinephrine  · NSAIDS, medicines for pain and inflammation, like ibuprofen or naproxen  · potassium salts or potassium supplements  · prescription pain medicines  · skeletal muscle relaxants like tubocurarine  · some cholesterol lowering medicines like cholestyramine or colestipol  · steroid medicines like prednisone or cortisone  What if I miss a dose?  If you miss a dose, take it as soon as you can. If it is almost time for your next dose, take only that dose. Do not take double or extra doses.  Where should I keep my medicine?  Keep out of the reach of children.  Store at room temperature between 15 and 30 degrees C (59 and 86 degrees F). Protect from moisture. Throw away any unused medicine after the expiration date.  What should I tell my health care provider before I take this medicine?  They need to know if you have any of these conditions:  · decreased urine  · heart failure, recent heart attack, or other heart problems  · if you are on a special diet, like a low salt diet  · immune system problems, like lupus  · kidney disease  · liver disease  · vomiting or diarrhea as this may cause dehydration  · an unusual or allergic reaction to amlodipine, hydrochlorothiazide, sulfa drugs, olmesartan, other medicines, foods, dyes, or preservatives  · pregnant or trying to get pregnant  · breast-feeding  What should I watch for while using this medicine?  Visit your doctor or health care professional for regular checks on your progress. Check your blood pressure as directed. Ask your doctor or health care professional what your blood pressure should be and when you should contact him or her.  You must not get dehydrated. Ask your doctor or health care professional how much fluid  you need to drink a day. Check with him or her if you get an attack of severe diarrhea, nausea and vomiting, or if you sweat a lot. The loss of too much body fluid can make it dangerous for you to take this medicine.  Women should inform their doctor if they wish to become pregnant or think they might be pregnant. There is a potential for serious side effects to an unborn child. Talk to your health care professional or pharmacist for more information.  You may get drowsy or dizzy. Do not drive, use machinery, or do anything that needs mental alertness until you know how this medicine affects you. Do not stand or sit up quickly, especially if you are an older patient. This reduces the risk of dizzy or fainting spells. Alcohol may interfere with the effect of this medicine. Avoid alcoholic drinks.  This medicine may affect your blood sugar level. If you have diabetes, check with your doctor or health care professional before changing the dose of your diabetic medicine.  Avoid salt substitutes unless you are told otherwise by your doctor or health care professional.  This medicine can make you more sensitive to the sun. Keep out of the sun. If you cannot avoid being in the sun, wear protective clothing and use sunscreen. Do not use sun lamps or tanning beds/booths.  Do not treat yourself for coughs, colds, or pain while you are taking this medicine without asking your doctor or health care professional for advice. Some ingredients may increase your blood pressure.  If you are going to have surgery or dialysis, tell your doctor or health care professional that you are taking this medicine.  NOTE:This sheet is a summary. It may not cover all possible information. If you have questions about this medicine, talk to your doctor, pharmacist, or health care provider. Copyright© 2017 [employer]

## 2019-10-01 DIAGNOSIS — E78.5 HYPERLIPIDEMIA, UNSPECIFIED HYPERLIPIDEMIA TYPE: ICD-10-CM

## 2019-10-01 DIAGNOSIS — I10 ESSENTIAL HYPERTENSION: ICD-10-CM

## 2019-10-01 RX ORDER — PRAVASTATIN SODIUM 40 MG/1
TABLET ORAL
Qty: 90 TABLET | Refills: 0 | OUTPATIENT
Start: 2019-10-01

## 2019-10-01 RX ORDER — LOSARTAN POTASSIUM 100 MG/1
TABLET ORAL
Qty: 90 TABLET | Refills: 0 | OUTPATIENT
Start: 2019-10-01

## 2019-10-01 RX ORDER — AMLODIPINE BESYLATE 10 MG/1
TABLET ORAL
Qty: 90 TABLET | Refills: 0 | OUTPATIENT
Start: 2019-10-01

## 2019-10-16 DIAGNOSIS — G56.03 BILATERAL CARPAL TUNNEL SYNDROME: ICD-10-CM

## 2019-10-16 DIAGNOSIS — L20.9 ATOPIC DERMATITIS, UNSPECIFIED TYPE: ICD-10-CM

## 2019-10-16 RX ORDER — TRIAMCINOLONE ACETONIDE 1 MG/G
CREAM TOPICAL
Qty: 454 G | Refills: 0 | Status: SHIPPED | OUTPATIENT
Start: 2019-10-16 | End: 2019-12-10 | Stop reason: SDUPTHER

## 2019-10-17 RX ORDER — GABAPENTIN 300 MG/1
CAPSULE ORAL
Qty: 60 CAPSULE | Refills: 0 | OUTPATIENT
Start: 2019-10-17

## 2019-11-06 ENCOUNTER — OFFICE VISIT (OUTPATIENT)
Dept: PRIMARY CARE CLINIC | Facility: CLINIC | Age: 74
End: 2019-11-06
Payer: MEDICARE

## 2019-11-06 VITALS
WEIGHT: 198.5 LBS | HEIGHT: 61 IN | HEART RATE: 86 BPM | TEMPERATURE: 99 F | OXYGEN SATURATION: 98 % | DIASTOLIC BLOOD PRESSURE: 78 MMHG | RESPIRATION RATE: 18 BRPM | SYSTOLIC BLOOD PRESSURE: 138 MMHG | BODY MASS INDEX: 37.48 KG/M2

## 2019-11-06 DIAGNOSIS — E66.01 SEVERE OBESITY WITH BODY MASS INDEX (BMI) OF 35.0 TO 39.9 WITH COMORBIDITY: ICD-10-CM

## 2019-11-06 DIAGNOSIS — I10 ESSENTIAL HYPERTENSION: ICD-10-CM

## 2019-11-06 DIAGNOSIS — R51.9 NONINTRACTABLE EPISODIC HEADACHE, UNSPECIFIED HEADACHE TYPE: Primary | ICD-10-CM

## 2019-11-06 PROCEDURE — 1101F PR PT FALLS ASSESS DOC 0-1 FALLS W/OUT INJ PAST YR: ICD-10-PCS | Mod: HCNC,CPTII,S$GLB, | Performed by: NURSE PRACTITIONER

## 2019-11-06 PROCEDURE — 99213 OFFICE O/P EST LOW 20 MIN: CPT | Mod: HCNC,S$GLB,, | Performed by: NURSE PRACTITIONER

## 2019-11-06 PROCEDURE — 3075F PR MOST RECENT SYSTOLIC BLOOD PRESS GE 130-139MM HG: ICD-10-PCS | Mod: HCNC,CPTII,S$GLB, | Performed by: NURSE PRACTITIONER

## 2019-11-06 PROCEDURE — 99999 PR PBB SHADOW E&M-EST. PATIENT-LVL IV: CPT | Mod: PBBFAC,HCNC,, | Performed by: NURSE PRACTITIONER

## 2019-11-06 PROCEDURE — 3078F DIAST BP <80 MM HG: CPT | Mod: HCNC,CPTII,S$GLB, | Performed by: NURSE PRACTITIONER

## 2019-11-06 PROCEDURE — 3078F PR MOST RECENT DIASTOLIC BLOOD PRESSURE < 80 MM HG: ICD-10-PCS | Mod: HCNC,CPTII,S$GLB, | Performed by: NURSE PRACTITIONER

## 2019-11-06 PROCEDURE — 99213 PR OFFICE/OUTPT VISIT, EST, LEVL III, 20-29 MIN: ICD-10-PCS | Mod: HCNC,S$GLB,, | Performed by: NURSE PRACTITIONER

## 2019-11-06 PROCEDURE — 99999 PR PBB SHADOW E&M-EST. PATIENT-LVL IV: ICD-10-PCS | Mod: PBBFAC,HCNC,, | Performed by: NURSE PRACTITIONER

## 2019-11-06 PROCEDURE — 3075F SYST BP GE 130 - 139MM HG: CPT | Mod: HCNC,CPTII,S$GLB, | Performed by: NURSE PRACTITIONER

## 2019-11-06 PROCEDURE — 1101F PT FALLS ASSESS-DOCD LE1/YR: CPT | Mod: HCNC,CPTII,S$GLB, | Performed by: NURSE PRACTITIONER

## 2019-11-06 NOTE — PROGRESS NOTES
Subjective:       Patient ID: Nilsa Curiel is a 73 y.o. female.    Chief Complaint: Headache (frequent headaches)    Headache    This is a recurrent (she states she began experiencing headaches in May 2019 and since that time she has experienced two headaches.  ) problem. The current episode started more than 1 month ago. The problem occurs intermittently. The problem has been waxing and waning. Pain location: started over left eye but experiences it randomly in various areas including over her eyes, back of her head and on both sides of her head.   The pain radiates to the left neck and right neck. The pain quality is similar to prior headaches. The quality of the pain is described as aching. The pain is at a severity of 0/10 (none at this time. ). The patient is experiencing no pain. Associated symptoms include back pain, neck pain and numbness (intermittent feet and legs without change ). Pertinent negatives include no dizziness, seizures or weakness. Associated symptoms comments: Denies any associated symptoms.  No photophobia, nausea, vision changes, limb weakness, difficulty swallowing or speech.  . Nothing aggravates the symptoms. Treatments tried: Topamax but, has only been taking it intermittently when has a headache.   The treatment provided mild relief. Her past medical history is significant for hypertension, obesity and sinus disease.     Review of Systems   Constitutional: Negative.    HENT: Negative.    Eyes: Negative.    Respiratory: Negative.    Cardiovascular: Negative.    Gastrointestinal: Negative.    Musculoskeletal: Positive for arthralgias, back pain, joint swelling and neck pain. Negative for neck stiffness.   Skin: Negative.    Neurological: Positive for numbness (intermittent feet and legs without change ) and headaches. Negative for dizziness, seizures, syncope, facial asymmetry, speech difficulty, weakness and light-headedness.   All other systems reviewed and are negative.       Objective:      Physical Exam   Constitutional: She is oriented to person, place, and time. She appears well-developed and well-nourished. No distress.   Obese   HENT:   Head: Normocephalic and atraumatic.   Eyes: Pupils are equal, round, and reactive to light. Conjunctivae and EOM are normal. Right eye exhibits no discharge. Left eye exhibits no discharge. No scleral icterus.   Neck: Normal range of motion. Neck supple.   Cardiovascular: Normal rate, regular rhythm and intact distal pulses.   Murmur heard.  Pulmonary/Chest: Effort normal and breath sounds normal. No respiratory distress.   Abdominal: Soft. She exhibits no distension.   Neurological: She is alert and oriented to person, place, and time. She has normal strength. No cranial nerve deficit. She displays a negative Romberg sign. Coordination normal. GCS eye subscore is 4. GCS verbal subscore is 5. GCS motor subscore is 6.   Skin: Skin is warm and dry. She is not diaphoretic.   Psychiatric: She has a normal mood and affect. Her behavior is normal.   Nursing note and vitals reviewed.      Medication List with Changes/Refills   Current Medications    ASPIRIN (ECOTRIN) 81 MG EC TABLET    Take 1 tablet (81 mg total) by mouth once daily.    AZELASTINE (ASTELIN) 137 MCG (0.1 %) NASAL SPRAY    1 spray (137 mcg total) by Nasal route 2 (two) times daily.    BETAMETHASONE VALERATE 0.1% (VALISONE) 0.1 % OINT    RL EXT AA BID PRN    BIOTIN 300 MCG TAB    Take 1 tablet by mouth once daily.    CLOBETASOL (TEMOVATE) 0.05 % CREAM    APPLY TO THE AFFECTED AREA TWICE DAILY    FEXOFENADINE (ALLEGRA) 180 MG TABLET    Take 1 tablet (180 mg total) by mouth once daily.    GABAPENTIN (NEURONTIN) 300 MG CAPSULE    Take 1 by mouth daily for 3 days, then twice daily for 3 days,    KETOCONAZOLE (NIZORAL) 2 % CREAM    AOO TO THE AFFECTED AREA TWICE DAILY, APPLY TO AXILLA AND ABDOMINAL CREASES    LACTOBACILLUS COMBO NO.6 (PROBIOTIC COMPLEX) 4 BILLION CELL TAB    Take 1 tablet by  mouth once daily.    MULTIVIT-MIN/FA/CA CARB/VIT K (WOMEN'S 50+ DAILY FORMULA ORAL)    Take by mouth.    OLMESARTAN-AMLODIPIN-HCTHIAZID 40-10-12.5 MG TAB    Take 1 tablet by mouth Daily.    OMEGA-3 FATTY ACIDS 300 MG CAP    Take by mouth.    PANTOPRAZOLE (PROTONIX) 40 MG TABLET    Take 1 tablet (40 mg total) by mouth daily as needed.    POTASSIUM GLUCONATE 595 MG (99 MG) TAB        PRAVASTATIN (PRAVACHOL) 40 MG TABLET    Take 1 tablet (40 mg total) by mouth once daily.    TOPIRAMATE (TOPAMAX) 50 MG TABLET    Take 1 tablet (50 mg total) by mouth 2 (two) times daily.    TRIAMCINOLONE ACETONIDE 0.1% (KENALOG) 0.1 % CREAM    APPLY TO THE AFFECTED AREA ON CHEST AND HANDS TWICE DAILY    TURMERIC ORAL    Take by mouth.    UBIDECARENONE (COENZYME Q10) 100 MG TAB           Assessment:       1. Nonintractable episodic headache, unspecified headache type    2. Essential hypertension    3. Severe obesity with body mass index (BMI) of 35.0 to 39.9 with comorbidity        Plan:       Nonintractable episodic headache, unspecified headache type  -     Ambulatory Referral to Neurology  HA is intermittent.  Pt. Is to continue Topamax and will follow up with neurology headache clinic.  Red flag signs of headache were reviewed with patient and if she develops any change in her symptoms she is to be taken to the ED immediately or call 911.   Essential hypertension  Well controlled.  Patient to continue to adhere to current home medication regimen and plan of care.     Severe obesity with body mass index (BMI) of 35.0 to 39.9 with comorbidity  Briefly counseled to continue her TLCs to decrease her BMI to <25 including diet and exercise modifications.         Follow up for f/u as scheduled .    If symptoms worsen patient may call for ASAP appointment or report to the emergency department for further evaluation.       I have reviewed the patient's past medical/surgical and social histories and updated as appropriate. Medications were  reviewed and discussed as appropriate including side effects and risks versus benefit. Plan of care was reviewed and agreed upon with the patient.  An opportunity to ask questions was provided and explanation given. Patient verbalized understanding on all information reviewed and discussed.

## 2019-11-06 NOTE — PATIENT INSTRUCTIONS
"  Headache, Unspecified    A number of things can cause headaches. The cause of your headache isnt clear. But it doesnt seem to be a sign of any serious illness.  You could have a tension headache or a migraine headache.  Stress can cause a tension headache. This can happen if you tense the muscles of your shoulders, neck, and scalp without knowing it. If this stress lasts long enough, you may develop a tension headache.  It is not clear why migraines occur, but certain things called" triggers" can raise the risk of having a migraine attack. Migraine triggers may include emotional stress or depression, or by hormone changes during the menstrual cycle. Other triggers include birth control pills and other medicines, alcohol or caffeine, foods with tyramine (such as aged cheese, wine), eyestrain, weather changes, missed meals, and lack of sleep or oversleeping.  Other causes of headache include:  · Viral illness with high fever  · Head injury with concussion  · Sinus, ear, or throat infection  · Dental pain and jaw joint (TMJ) pain  More serious but less common causes of headache include stroke, brain hemorrhage, brain tumor, meningitis, and encephalitis.  Home care  Follow these tips when taking care of yourself at home:  · Dont drive yourself home if you were given pain medicine for your headache. Instead, have someone else drive you home. Try to sleep when you get home. You should feel much better when you wake up.  · Apply heat to the back of your neck to ease a neck muscle spasm. Take care of a migraine headache by putting an ice pack on your forehead or at the base of your skull.  · If you have nausea or vomiting, eat a light diet until your headache eases.  · If you have a migraine headache, use sunglasses when in the daylight or around bright indoor lighting until your symptoms get better. Bright glaring light can make this type of headache worse.  Follow-up care  Follow up with your healthcare provider, or " as advised. Talk with your provider if you have frequent headaches. He or she can help figure out a treatment plan. By knowing the earliest signs of headache, and starting treatment right away, you may be able to stop the pain yourself.  When to seek medical advice  Call your healthcare provider right away if any of these occur:  · Your head pain suddenly gets worse after sexual intercourse or strenuous activity  · Your head pain doesnt get better within 24 hours  · You arent able to keep liquids down (repeated vomiting)  · Fever of 100.4ºF (38ºC) or higher, or as directed by your healthcare provider  · Stiff neck  · Extreme drowsiness, confusion, or fainting  · Dizziness or dizziness with spinning sensation (vertigo)  · Weakness in an arm or leg or one side of your face  · You have trouble talking or seeing  Date Last Reviewed: 8/1/2016  © 5029-2043 [employer]. [address] [place] [address], [place] [address]. All rights reserved. This information is not intended as a substitute for professional medical care. Always follow your healthcare professional's instructions.

## 2019-11-07 ENCOUNTER — TELEPHONE (OUTPATIENT)
Dept: NEUROLOGY | Facility: CLINIC | Age: 74
End: 2019-11-07

## 2019-11-15 ENCOUNTER — PATIENT MESSAGE (OUTPATIENT)
Dept: PRIMARY CARE CLINIC | Facility: CLINIC | Age: 74
End: 2019-11-15

## 2019-11-27 ENCOUNTER — OFFICE VISIT (OUTPATIENT)
Dept: NEUROLOGY | Facility: CLINIC | Age: 74
End: 2019-11-27
Payer: MEDICARE

## 2019-11-27 ENCOUNTER — LAB VISIT (OUTPATIENT)
Dept: LAB | Facility: OTHER | Age: 74
End: 2019-11-27
Attending: PSYCHIATRY & NEUROLOGY
Payer: MEDICARE

## 2019-11-27 VITALS
HEART RATE: 96 BPM | DIASTOLIC BLOOD PRESSURE: 65 MMHG | HEIGHT: 61 IN | BODY MASS INDEX: 37.42 KG/M2 | WEIGHT: 198.19 LBS | TEMPERATURE: 99 F | SYSTOLIC BLOOD PRESSURE: 140 MMHG

## 2019-11-27 DIAGNOSIS — G44.89 CHRONIC MIXED HEADACHE SYNDROME: ICD-10-CM

## 2019-11-27 DIAGNOSIS — G57.32 PERONEAL NEUROPATHY, LEFT: Primary | ICD-10-CM

## 2019-11-27 LAB
CRP SERPL-MCNC: 6 MG/L (ref 0–8.2)
ERYTHROCYTE [SEDIMENTATION RATE] IN BLOOD: 38 MM/HR (ref 0–20)

## 2019-11-27 PROCEDURE — 99999 PR PBB SHADOW E&M-EST. PATIENT-LVL IV: CPT | Mod: PBBFAC,HCNC,, | Performed by: PSYCHIATRY & NEUROLOGY

## 2019-11-27 PROCEDURE — 36415 COLL VENOUS BLD VENIPUNCTURE: CPT | Mod: HCNC

## 2019-11-27 PROCEDURE — 1159F PR MEDICATION LIST DOCUMENTED IN MEDICAL RECORD: ICD-10-PCS | Mod: HCNC,S$GLB,, | Performed by: PSYCHIATRY & NEUROLOGY

## 2019-11-27 PROCEDURE — 3078F DIAST BP <80 MM HG: CPT | Mod: HCNC,CPTII,S$GLB, | Performed by: PSYCHIATRY & NEUROLOGY

## 2019-11-27 PROCEDURE — 1101F PR PT FALLS ASSESS DOC 0-1 FALLS W/OUT INJ PAST YR: ICD-10-PCS | Mod: HCNC,CPTII,S$GLB, | Performed by: PSYCHIATRY & NEUROLOGY

## 2019-11-27 PROCEDURE — 1101F PT FALLS ASSESS-DOCD LE1/YR: CPT | Mod: HCNC,CPTII,S$GLB, | Performed by: PSYCHIATRY & NEUROLOGY

## 2019-11-27 PROCEDURE — 1125F AMNT PAIN NOTED PAIN PRSNT: CPT | Mod: HCNC,S$GLB,, | Performed by: PSYCHIATRY & NEUROLOGY

## 2019-11-27 PROCEDURE — 99999 PR PBB SHADOW E&M-EST. PATIENT-LVL IV: ICD-10-PCS | Mod: PBBFAC,HCNC,, | Performed by: PSYCHIATRY & NEUROLOGY

## 2019-11-27 PROCEDURE — 3077F SYST BP >= 140 MM HG: CPT | Mod: HCNC,CPTII,S$GLB, | Performed by: PSYCHIATRY & NEUROLOGY

## 2019-11-27 PROCEDURE — 1159F MED LIST DOCD IN RCRD: CPT | Mod: HCNC,S$GLB,, | Performed by: PSYCHIATRY & NEUROLOGY

## 2019-11-27 PROCEDURE — 99203 OFFICE O/P NEW LOW 30 MIN: CPT | Mod: HCNC,S$GLB,, | Performed by: PSYCHIATRY & NEUROLOGY

## 2019-11-27 PROCEDURE — 99203 PR OFFICE/OUTPT VISIT, NEW, LEVL III, 30-44 MIN: ICD-10-PCS | Mod: HCNC,S$GLB,, | Performed by: PSYCHIATRY & NEUROLOGY

## 2019-11-27 PROCEDURE — 3077F PR MOST RECENT SYSTOLIC BLOOD PRESSURE >= 140 MM HG: ICD-10-PCS | Mod: HCNC,CPTII,S$GLB, | Performed by: PSYCHIATRY & NEUROLOGY

## 2019-11-27 PROCEDURE — 3078F PR MOST RECENT DIASTOLIC BLOOD PRESSURE < 80 MM HG: ICD-10-PCS | Mod: HCNC,CPTII,S$GLB, | Performed by: PSYCHIATRY & NEUROLOGY

## 2019-11-27 PROCEDURE — 85651 RBC SED RATE NONAUTOMATED: CPT | Mod: HCNC

## 2019-11-27 PROCEDURE — 86140 C-REACTIVE PROTEIN: CPT | Mod: HCNC

## 2019-11-27 PROCEDURE — 1125F PR PAIN SEVERITY QUANTIFIED, PAIN PRESENT: ICD-10-PCS | Mod: HCNC,S$GLB,, | Performed by: PSYCHIATRY & NEUROLOGY

## 2019-11-27 RX ORDER — IBUPROFEN 800 MG/1
800 TABLET ORAL EVERY 8 HOURS PRN
Qty: 30 TABLET | Refills: 0 | Status: SHIPPED | OUTPATIENT
Start: 2019-11-27 | End: 2020-04-29

## 2019-11-27 NOTE — PATIENT INSTRUCTIONS
Thank you for coming to the Neurology clinic.  - take tylenol 2 tablets 500mg at headache onset or ibuprofen 800mg at headache onset  - Limit use of Ibuprofen  - get blood work  - Get MRI brain   -Avoid  High salt foods  - Keep a headache diary and let me know how many headaches you have in a month, triggers, what makes them better etc and we can discuss starting other medications if needed

## 2019-11-27 NOTE — PROGRESS NOTES
NEUROLOGY  Outpatient Follow Up    [address] [address]  [phone] (office) / [phone] ( (fax)    Patient Name:  Nilsa Curiel  :  1945  MR #:  [id number]  [id number]    Date of Neurology Visit: 2019  Name of Neurologist: Alessandra Presley MD    Other Physicians:  Padmini Calhoun DNP, NP-C (Primary Care Physician); Padmini Calhoun DNP,* (Referring)      Chief Complaint: Headache      History of Present Illness (HPI):  Nilsa Curiel is a 74 y.o. female with pmh of arthritis, hypertension presents to the neurology clinic for evaluation of headaches that have been bothering her since February.     First half of this year developed off and on headaches. This happened 1-2 months after her  passed away. She tripped on a rug in 2019 and developed a bump on top of her head. No loss of conciousness.  5 days after the fall she developed a headache. She has noted that sometimes she develops a headache 2-3 hours after eating outside. She can go 1-2 months without a headache. She had a headache 3-4 weeks ago and one this mornining. High salt intake can trigger a headache.    Prior to this year she had extremely rare infrequent headaches. She remembers trying imitrex once in the past and it relieved her headache. The headaches have changed since they first began because now they happen over different regions as opposed to bitemporal region in the past.    Headache:  - right frontotemporal region, sometimes in the back of her head, sometimes in the frontal region on the right ( tends to move since 2019)  - 7-8/10   - No change in headache with position  - When she has a headache she puts a towel over her head, tylenol/ topamax, tries to rest  - Denies photophobia, phonophobia, nausea or vomiting  -Denies autonomic symptoms  - Dull, pressure like most of the time but sometimes throbbbing   - without medication can last 6-7 hours,  with medication - 1-3 hours    -Frequency- 1 headache/ month     In February - 1 headache  Since the fall in August , has had one headache in August, one in September, one on November 8th and today     Left leg pain  She also reports burning pain involving the lateral apsect of her left leg which comes on mainly at night when she is sleeping and she feels it burning. She turns to the other side and it eases up when she has it. This has been going on for more than a year.     Duration: 1 year  Location: head- temporal vs frontal vs occipital region  Intensity: 7-8/10  Aggravating factors: high salt diet, eating at some restaurants   Relieving factors: tylenol, cold towel   Frequency: 1-2 times/ month   Timing: during the day   Associated symptoms: none  Denies autonomic symptoms     Daughter and son have headaches       Treatment to date:   Was given 20 pills of topiramate at Urgent care and was taking this on a prn as needed basis, did not help her much  Gabapentin --> takes this prn for back pain not for headaches  Tylenol prn for headache  Motrin for headache in the past but was asked by her PCP to stop for unclear reasons     Review of Systems:      General: Weight gain: No, Weight Loss: Yes, Fatigue: No,   Fever: No, Chills: No, Night Sweats: No, Insomnia: Yes, Excessive sleeping: No   Respiratory:  Cough: No, Shortness of Breath: No,   Endocrine: Heat Intolerance: Yes, Cold Intolerance: No,  Eyes:  Blurred Vision: No, Double Vision: No,   Light Sensitivity: No, Eye pain: No  Musculoskeletal: Muscle Aches/Pain: Yes, Joint Pain/Swelling: Yes, Muscle Cramps: No, Muscle Weakness: No, Neck Pain: Yes, Back Pain: Yes   Neurological: Difficulty Walking/Falls: No, Headache Migraine: Yes, Dizziness/Vertigo: No, Fainting: No, Difficulty with Speech: No, Weakness: No, Tingling/Numbness: No, Tremors: No, Memory Problems: No, Seizures: No, Difficulty Swallowing: No, Altered Taste: No.  Cardiovascular: Chest Pain: No,  Shortness of Breath: No,   Palpitations: Yes,  Gastrointestinal: Nausea/Vomiting: No, Constipation: No, Diarrhea: No, Bloody Stools: No   Psych/Cog:  Depression: No, Anxiety: No, Hallucinations: No, Problems Concentrating: No  : Frequent Urination: No, Incontinence: No, Blood of Urine: No, Urinary Infections: No  ENT:Hearing Loss: No, Earache: No, Ringing in Ears: No,   Facial Pain: No, Chronic Congestion: No   Immune: Seasonal Allergies: No, Hives and/or Rashes: No  The remainder of the review of twelve body systems was reviewed and normal.    Past Medical, Surgical, Family & Social History:   Reviewed and updated.    Home Medications:     Current Outpatient Medications:     aspirin (ECOTRIN) 81 MG EC tablet, Take 1 tablet (81 mg total) by mouth once daily., Disp: , Rfl: 0    azelastine (ASTELIN) 137 mcg (0.1 %) nasal spray, 1 spray (137 mcg total) by Nasal route 2 (two) times daily., Disp: 30 mL, Rfl: 2    betamethasone valerate 0.1% (VALISONE) 0.1 % Oint, RL EXT AA BID PRN, Disp: , Rfl: 2    biotin 300 mcg Tab, Take 1 tablet by mouth once daily., Disp: , Rfl:     clobetasol (TEMOVATE) 0.05 % cream, APPLY TO THE AFFECTED AREA TWICE DAILY, Disp: 30 g, Rfl: 0    gabapentin (NEURONTIN) 300 MG capsule, Take 1 by mouth daily for 3 days, then twice daily for 3 days, (Patient taking differently: 300 mg 2 (two) times daily. Take 1 by mouth daily for 3 days, then twice daily for 3 days,), Disp: 60 capsule, Rfl: 5    ketoconazole (NIZORAL) 2 % cream, AOO TO THE AFFECTED AREA TWICE DAILY, APPLY TO AXILLA AND ABDOMINAL CREASES, Disp: 15 g, Rfl: 0    lactobacillus combo no.6 (PROBIOTIC COMPLEX) 4 billion cell Tab, Take 1 tablet by mouth once daily., Disp: 90 tablet, Rfl: 3    MULTIVIT-MIN/FA/CA CARB/VIT K (WOMEN'S 50+ DAILY FORMULA ORAL), Take by mouth., Disp: , Rfl:     olmesartan-amLODIPin-hcthiazid 40-10-12.5 mg Tab, Take 1 tablet by mouth Daily., Disp: 90 tablet, Rfl: 1    omega-3 fatty acids 300 mg Cap, Take  "by mouth., Disp: , Rfl:     pantoprazole (PROTONIX) 40 MG tablet, Take 1 tablet (40 mg total) by mouth daily as needed., Disp: 90 tablet, Rfl: 1    potassium gluconate 595 mg (99 mg) Tab, , Disp: , Rfl:     pravastatin (PRAVACHOL) 40 MG tablet, Take 1 tablet (40 mg total) by mouth once daily., Disp: 90 tablet, Rfl: 2    topiramate (TOPAMAX) 50 MG tablet, Take 1 tablet (50 mg total) by mouth 2 (two) times daily., Disp: 20 tablet, Rfl: 2    triamcinolone acetonide 0.1% (KENALOG) 0.1 % cream, APPLY TO THE AFFECTED AREA ON CHEST AND HANDS TWICE DAILY, Disp: 454 g, Rfl: 0    TURMERIC ORAL, Take by mouth., Disp: , Rfl:     ubidecarenone (COENZYME Q10) 100 mg Tab, , Disp: , Rfl:     fexofenadine (ALLEGRA) 180 MG tablet, Take 1 tablet (180 mg total) by mouth once daily. (Patient taking differently: Take 180 mg by mouth daily as needed. ), Disp: 90 tablet, Rfl: 3    Physical Examination:  BP (!) 140/65   Pulse 96   Temp 98.5 °F (36.9 °C)   Ht 5' 1" (1.549 m)   Wt 89.9 kg (198 lb 3.1 oz)   BMI 37.45 kg/m²     GENERAL:  General appearance: Well, non-toxic appearing.  No apparent distress.  Fundi exam:limited visualization within normal limits   Neck: supple.  Carotid auscultation: normal.  Heart auscultation: normal.  Peripheral pulses: normal.  Extremities: normal.    MENTAL STATUS:  Alertness, attention span & concentration: normal.  Language: normal.  Orientation to self, place & time:  normal.  Memory, recent & remote: normal.  Fund of knowledge: normal.      SPEECH:  Clear and fluent.  Follows complex commands.    CRANIAL NERVES:  Cranial Nerves II-XII were examined.  II - Visual fields: normal.  III, IV, VI: PERRL, EOMI, No ptosis, No nystagmus.  V - Facial sensation: normal.  VII - Face symmetry & mobility: normal.  VIII - Hearing: normal.  IX, X - Palate: mobile & midline.  XI - Shoulder shrug: normal.  XII - Tongue protrusion: normal.    GROSS MOTOR:  Abnormal movements: none.  Finger-nose & " Heel-knee-shin: normal.  Rapid alternating movements & drift: normal.  Romberg: absent    MUSCLE STRENGTH:     Fascics Atrophy RIGHT    LEFT Atrophy Fascics                       5 Deltoids 5                         5 Biceps 5       5 Triceps 5                5 Wrist Ext. 5       5 Wrist Flex 5       5 Finger Ext. 5       5 Finger Flex 5                5 Inteross. 5                         5 Iliopsoas 4                         5 Quads 5       5 Hams 5       5 Dorsiflex 5       5 Plantar Flex 5                         5 Toe Ext. 5       5 Toe Flex 5                         REFLEXES:    RIGHT Reflex   LEFT   1+ Biceps 1+   0 Brachiorad. 0   0 Triceps 0        1+ Patellar 1+   0 Ankle 0        Down PLANTAR Down     SENSORY:  Light touch: Normal throughout.  Sharp touch: Normal throughout.  Vibration: 10 seconds at the toes bilaterally   Temperature: Normal throughout.  Joint Position: Normal throughout.    Diagnostic Data Reviewed:     3/14/2019--> Folate- 16  Vitamin B12--> 1467  TSH ---> 2.309  Hemoglobin A1c- --> 5.1        Assessment and Plan:    Nilsa Curiel is a 74 y.o. female with pmh of arthritis, hypertension presents to the neurology clinic for evaluation of headaches that have been bothering her since February.     The patient states that she developed headaches 1-2 months after her 's death in February this year. She had a fall in August 2019 and since then the headaches have changed. She states she now has the headaches over different regions over the head as opposed to the bitemporal region. The headaches are infrequent and happen 1-2 times/ month. She denies nausea/ vomiting/ phonophobia/ photophobia, limitation of activity during the headache.    She has infrequent tension headaches which have changed since the trauma to her head but continue to remain infrequent.     # Frequent tension headaches without cranial tenderness:  - continue tylenol/ motrin to be taken at headache onset, asked to  limit intake of motrin and if taking more than 2 days a week to let us know   - patient asked to maintain a headache diary   - no prophylactic medications for now since she has one headache per month on average.If headaches increase in frequency will consider addition of nortryptiline  - given change in headache would check ESR, CRP, MRI brain without contrast      #Left leg pain:  - Patient c/o burning pain over the superficial peroneal nerve distribution when she lies down on that side, negative Tinel's sign at left fibula  - obtain EMG/NCS    Follow up in 2 months       Patient Active Problem List   Diagnosis    Hypertension    Hyperlipidemia    GERD (gastroesophageal reflux disease)    Atopic dermatitis    Abnormal fasting glucose    Severe obesity with body mass index (BMI) of 35.0 to 39.9 with comorbidity    Diastolic dysfunction without heart failure    Hyperparathyroidism    Decreased strength    Impaired mobility    Bilateral carotid artery disease    Microcytic hypochromic anemia    Hypercalcemia    Neurogenic claudication due to lumbar spinal stenosis    Spondylolisthesis of lumbosacral region    MONE (obstructive sleep apnea)    Mild aortic sclerosis    Primary osteoarthritis of both shoulders    Hypertrophic obstructive cardiomyopathy (HOCM)    Nonintractable episodic headache          The patient will return to clinic in 2 months.    Important to note, also  has a past medical history of Acute bilateral low back pain without sciatica (10/10/2017), Anemia (4/24/2018), Arthritis, Atopic dermatitis, GERD (gastroesophageal reflux disease), Hyperlipidemia, Hypertension, Left ventricular hypertrophy (8/16/2016), Morbid obesity with BMI of 40.0-44.9, adult (4/1/2016), and MONE (obstructive sleep apnea) (7/25/2018).    Time Spent:  25 minutes spent face-to-face, >50% spent advising about: headache evaluation, maintaing a headache dairy , need for further testing and treatment options     This  note was created with voice recognition software.  Grammatical, syntax and spelling errors may be inevitable.

## 2019-11-27 NOTE — LETTER
November 27, 2019      Padmini Calhoun, DNP, NP-C  [address]  [address]  [place] [address]           [facility] [place] [address]  [address]  [place] [address]  Phone: [phone]  Fax: [phone]          Patient: Nilsa Curiel   MR Number: [id number]   YOB: 1945   Date of Visit: 11/27/2019       Dear Padmini Calhoun:    Thank you for referring Nilsa Curiel to me for evaluation. Attached you will find relevant portions of my assessment and plan of care.    If you have questions, please do not hesitate to call me. I look forward to following Nilsa Curiel along with you.    Sincerely,    Alessandra Presley MD    Enclosure  CC:  No Recipients    If you would like to receive this communication electronically, please contact [email or web address] or [phone] to request more information on [employer] access.    For providers and/or their staff who would like to refer a patient to [facility], please contact us through our one-stop-shop provider referral line, [facility], at [phone].    If you feel you have received this communication in error or would no longer like to receive these types of communications, please e-mail [email or web address]

## 2019-12-04 ENCOUNTER — HOSPITAL ENCOUNTER (OUTPATIENT)
Dept: RADIOLOGY | Facility: HOSPITAL | Age: 74
Discharge: HOME | End: 2019-12-04
Attending: PSYCHIATRY & NEUROLOGY
Payer: MEDICARE

## 2019-12-04 DIAGNOSIS — G44.89 CHRONIC MIXED HEADACHE SYNDROME: ICD-10-CM

## 2019-12-04 PROCEDURE — 70551 MRI BRAIN WITHOUT CONTRAST: ICD-10-PCS | Mod: 26,HCNC,, | Performed by: RADIOLOGY

## 2019-12-04 PROCEDURE — 70551 MRI BRAIN STEM W/O DYE: CPT | Mod: TC,HCNC

## 2019-12-04 PROCEDURE — 70551 MRI BRAIN STEM W/O DYE: CPT | Mod: 26,HCNC,, | Performed by: RADIOLOGY

## 2019-12-09 ENCOUNTER — OFFICE VISIT (OUTPATIENT)
Dept: OPTOMETRY | Facility: CLINIC | Age: 74
End: 2019-12-09
Payer: MEDICARE

## 2019-12-09 DIAGNOSIS — H25.13 NS (NUCLEAR SCLEROSIS), BILATERAL: ICD-10-CM

## 2019-12-09 DIAGNOSIS — H43.392 VITREOUS FLOATER, LEFT: Primary | ICD-10-CM

## 2019-12-09 DIAGNOSIS — H26.9 CORTICAL CATARACT OF BOTH EYES: ICD-10-CM

## 2019-12-09 DIAGNOSIS — I10 ESSENTIAL HYPERTENSION: ICD-10-CM

## 2019-12-09 DIAGNOSIS — H52.203 ASTIGMATISM WITH PRESBYOPIA, BILATERAL: ICD-10-CM

## 2019-12-09 DIAGNOSIS — H52.4 ASTIGMATISM WITH PRESBYOPIA, BILATERAL: ICD-10-CM

## 2019-12-09 DIAGNOSIS — H52.4 PRESBYOPIA: ICD-10-CM

## 2019-12-09 PROCEDURE — 92014 COMPRE OPH EXAM EST PT 1/>: CPT | Mod: HCNC,S$GLB,, | Performed by: OPTOMETRIST

## 2019-12-09 PROCEDURE — 99999 PR PBB SHADOW E&M-EST. PATIENT-LVL II: ICD-10-PCS | Mod: PBBFAC,HCNC,, | Performed by: OPTOMETRIST

## 2019-12-09 PROCEDURE — 99999 PR PBB SHADOW E&M-EST. PATIENT-LVL II: CPT | Mod: PBBFAC,HCNC,, | Performed by: OPTOMETRIST

## 2019-12-09 PROCEDURE — 92014 PR EYE EXAM, EST PATIENT,COMPREHESV: ICD-10-PCS | Mod: HCNC,S$GLB,, | Performed by: OPTOMETRIST

## 2019-12-09 PROCEDURE — 99499 RISK ADDL DX/OHS AUDIT: ICD-10-PCS | Mod: HCNC,S$GLB,, | Performed by: OPTOMETRIST

## 2019-12-09 PROCEDURE — 99499 UNLISTED E&M SERVICE: CPT | Mod: HCNC,S$GLB,, | Performed by: OPTOMETRIST

## 2019-12-09 NOTE — PROGRESS NOTES
HPI     Pt here for annual  No complaints about va  Patient denies diplopia, headaches, flashes/floaters, pain, and   itching/burning/tearing.    Pt does not use any eye drops.  Has floaters once in a while    Last edited by Zenia Meier on 12/9/2019 10:40 AM. (History)            Assessment /Plan     For exam results, see Encounter Report.    Vitreous floater, left  Essential hypertension              Monitor yearly, no retinopathy     Cortical cataract of both eyes OS>OD  NS (nuclear sclerosis), bilateral              Borderline VS , consult for cat eval when pt is ready     Screening for glaucoma     Astigmatism with presbyopia, bilateral  Presbyopia              Rx specs        RTC 1 year, sooner PRN

## 2019-12-10 DIAGNOSIS — L20.9 ATOPIC DERMATITIS, UNSPECIFIED TYPE: ICD-10-CM

## 2019-12-10 RX ORDER — TRIAMCINOLONE ACETONIDE 1 MG/G
CREAM TOPICAL
Qty: 454 G | Refills: 0 | Status: SHIPPED | OUTPATIENT
Start: 2019-12-10 | End: 2020-05-25 | Stop reason: SDUPTHER

## 2019-12-11 ENCOUNTER — LAB VISIT (OUTPATIENT)
Dept: LAB | Facility: HOSPITAL | Age: 74
End: 2019-12-11
Attending: NURSE PRACTITIONER
Payer: MEDICARE

## 2019-12-11 DIAGNOSIS — I42.1 HYPERTROPHIC OBSTRUCTIVE CARDIOMYOPATHY (HOCM): ICD-10-CM

## 2019-12-11 DIAGNOSIS — E66.01 SEVERE OBESITY WITH BODY MASS INDEX (BMI) OF 35.0 TO 39.9 WITH COMORBIDITY: ICD-10-CM

## 2019-12-11 DIAGNOSIS — E83.52 HYPERCALCEMIA: ICD-10-CM

## 2019-12-11 DIAGNOSIS — D50.9 MICROCYTIC HYPOCHROMIC ANEMIA: ICD-10-CM

## 2019-12-11 DIAGNOSIS — I10 ESSENTIAL HYPERTENSION: ICD-10-CM

## 2019-12-11 LAB
ALBUMIN SERPL BCP-MCNC: 3.6 G/DL (ref 3.5–5.2)
ALP SERPL-CCNC: 111 U/L (ref 55–135)
ALT SERPL W/O P-5'-P-CCNC: 17 U/L (ref 10–44)
ANION GAP SERPL CALC-SCNC: 9 MMOL/L (ref 8–16)
ANION GAP SERPL CALC-SCNC: 9 MMOL/L (ref 8–16)
AST SERPL-CCNC: 17 U/L (ref 10–40)
BILIRUB SERPL-MCNC: 0.3 MG/DL (ref 0.1–1)
BUN SERPL-MCNC: 18 MG/DL (ref 8–23)
BUN SERPL-MCNC: 18 MG/DL (ref 8–23)
CALCIUM SERPL-MCNC: 10.2 MG/DL (ref 8.7–10.5)
CALCIUM SERPL-MCNC: 10.2 MG/DL (ref 8.7–10.5)
CHLORIDE SERPL-SCNC: 110 MMOL/L (ref 95–110)
CHLORIDE SERPL-SCNC: 110 MMOL/L (ref 95–110)
CO2 SERPL-SCNC: 24 MMOL/L (ref 23–29)
CO2 SERPL-SCNC: 24 MMOL/L (ref 23–29)
CREAT SERPL-MCNC: 1.1 MG/DL (ref 0.5–1.4)
CREAT SERPL-MCNC: 1.1 MG/DL (ref 0.5–1.4)
EST. GFR  (AFRICAN AMERICAN): 58 ML/MIN/1.73 M^2
EST. GFR  (AFRICAN AMERICAN): 58 ML/MIN/1.73 M^2
EST. GFR  (NON AFRICAN AMERICAN): 50.3 ML/MIN/1.73 M^2
EST. GFR  (NON AFRICAN AMERICAN): 50.3 ML/MIN/1.73 M^2
GLUCOSE SERPL-MCNC: 102 MG/DL (ref 70–110)
GLUCOSE SERPL-MCNC: 102 MG/DL (ref 70–110)
POTASSIUM SERPL-SCNC: 4.2 MMOL/L (ref 3.5–5.1)
POTASSIUM SERPL-SCNC: 4.2 MMOL/L (ref 3.5–5.1)
PROT SERPL-MCNC: 7.5 G/DL (ref 6–8.4)
SODIUM SERPL-SCNC: 143 MMOL/L (ref 136–145)
SODIUM SERPL-SCNC: 143 MMOL/L (ref 136–145)

## 2019-12-11 PROCEDURE — 80053 COMPREHEN METABOLIC PANEL: CPT | Mod: HCNC

## 2019-12-11 PROCEDURE — 36415 COLL VENOUS BLD VENIPUNCTURE: CPT | Mod: HCNC,PN

## 2019-12-27 ENCOUNTER — OFFICE VISIT (OUTPATIENT)
Dept: PRIMARY CARE CLINIC | Facility: CLINIC | Age: 74
End: 2019-12-27
Attending: NURSE PRACTITIONER
Payer: MEDICARE

## 2019-12-27 VITALS
WEIGHT: 204.38 LBS | TEMPERATURE: 99 F | DIASTOLIC BLOOD PRESSURE: 60 MMHG | HEIGHT: 61 IN | BODY MASS INDEX: 38.59 KG/M2 | OXYGEN SATURATION: 99 % | HEART RATE: 100 BPM | SYSTOLIC BLOOD PRESSURE: 120 MMHG

## 2019-12-27 DIAGNOSIS — E78.2 MIXED HYPERLIPIDEMIA: ICD-10-CM

## 2019-12-27 DIAGNOSIS — I42.1 HYPERTROPHIC OBSTRUCTIVE CARDIOMYOPATHY (HOCM): ICD-10-CM

## 2019-12-27 DIAGNOSIS — Z12.31 ENCOUNTER FOR SCREENING MAMMOGRAM FOR MALIGNANT NEOPLASM OF BREAST: ICD-10-CM

## 2019-12-27 DIAGNOSIS — Z80.3 FH: BREAST CANCER IN FIRST DEGREE RELATIVE: ICD-10-CM

## 2019-12-27 DIAGNOSIS — I10 ESSENTIAL HYPERTENSION: Primary | ICD-10-CM

## 2019-12-27 PROCEDURE — 1159F MED LIST DOCD IN RCRD: CPT | Mod: HCNC,S$GLB,, | Performed by: NURSE PRACTITIONER

## 2019-12-27 PROCEDURE — 1125F PR PAIN SEVERITY QUANTIFIED, PAIN PRESENT: ICD-10-PCS | Mod: HCNC,S$GLB,, | Performed by: NURSE PRACTITIONER

## 2019-12-27 PROCEDURE — 3074F SYST BP LT 130 MM HG: CPT | Mod: HCNC,CPTII,S$GLB, | Performed by: NURSE PRACTITIONER

## 2019-12-27 PROCEDURE — 1101F PT FALLS ASSESS-DOCD LE1/YR: CPT | Mod: HCNC,CPTII,S$GLB, | Performed by: NURSE PRACTITIONER

## 2019-12-27 PROCEDURE — 3078F DIAST BP <80 MM HG: CPT | Mod: HCNC,CPTII,S$GLB, | Performed by: NURSE PRACTITIONER

## 2019-12-27 PROCEDURE — 99213 PR OFFICE/OUTPT VISIT, EST, LEVL III, 20-29 MIN: ICD-10-PCS | Mod: HCNC,S$GLB,, | Performed by: NURSE PRACTITIONER

## 2019-12-27 PROCEDURE — 1159F PR MEDICATION LIST DOCUMENTED IN MEDICAL RECORD: ICD-10-PCS | Mod: HCNC,S$GLB,, | Performed by: NURSE PRACTITIONER

## 2019-12-27 PROCEDURE — 3078F PR MOST RECENT DIASTOLIC BLOOD PRESSURE < 80 MM HG: ICD-10-PCS | Mod: HCNC,CPTII,S$GLB, | Performed by: NURSE PRACTITIONER

## 2019-12-27 PROCEDURE — 99999 PR PBB SHADOW E&M-EST. PATIENT-LVL V: ICD-10-PCS | Mod: PBBFAC,HCNC,, | Performed by: NURSE PRACTITIONER

## 2019-12-27 PROCEDURE — 99213 OFFICE O/P EST LOW 20 MIN: CPT | Mod: HCNC,S$GLB,, | Performed by: NURSE PRACTITIONER

## 2019-12-27 PROCEDURE — 3074F PR MOST RECENT SYSTOLIC BLOOD PRESSURE < 130 MM HG: ICD-10-PCS | Mod: HCNC,CPTII,S$GLB, | Performed by: NURSE PRACTITIONER

## 2019-12-27 PROCEDURE — 99999 PR PBB SHADOW E&M-EST. PATIENT-LVL V: CPT | Mod: PBBFAC,HCNC,, | Performed by: NURSE PRACTITIONER

## 2019-12-27 PROCEDURE — 1125F AMNT PAIN NOTED PAIN PRSNT: CPT | Mod: HCNC,S$GLB,, | Performed by: NURSE PRACTITIONER

## 2019-12-27 PROCEDURE — 1101F PR PT FALLS ASSESS DOC 0-1 FALLS W/OUT INJ PAST YR: ICD-10-PCS | Mod: HCNC,CPTII,S$GLB, | Performed by: NURSE PRACTITIONER

## 2019-12-27 NOTE — PROGRESS NOTES
Subjective:       Patient ID: Nilsa Curiel is a 74 y.o. female.    Chief Complaint: Hypertension (follow up)    Ms Curiel presents today to review her recent lab work and to establish care at this clinic since her previous provider left [facility].     Review of Systems   Constitutional: Negative for activity change and unexpected weight change.   HENT: Negative for hearing loss, rhinorrhea and trouble swallowing.    Eyes: Negative for discharge and visual disturbance.   Respiratory: Negative for chest tightness and wheezing.    Cardiovascular: Negative for chest pain and palpitations.   Gastrointestinal: Positive for constipation. Negative for blood in stool, diarrhea and vomiting.   Endocrine: Negative for polydipsia and polyuria.   Genitourinary: Negative for difficulty urinating, dysuria, hematuria and menstrual problem.   Musculoskeletal: Negative for arthralgias, joint swelling and neck pain.   Skin: Negative for rash.   Neurological: Negative for weakness and headaches.   Psychiatric/Behavioral: Negative for confusion and dysphoric mood.       Objective:      Physical Exam   Constitutional: She is oriented to person, place, and time. She appears well-developed. No distress.   obese   HENT:   Head: Normocephalic and atraumatic.   Eyes: No scleral icterus.   Cardiovascular: Normal rate and regular rhythm.   Murmur heard.  Pulmonary/Chest: Effort normal and breath sounds normal. No stridor. No respiratory distress. She has no wheezes. She has no rales.   Abdominal: Bowel sounds are normal.   Musculoskeletal: She exhibits no edema.   Neurological: She is alert and oriented to person, place, and time.   Skin: Skin is warm and dry. She is not diaphoretic.   Psychiatric: She has a normal mood and affect. Her behavior is normal.   Nursing note and vitals reviewed.      Assessment:       1. Essential hypertension    2. Mixed hyperlipidemia    3. Hypertrophic obstructive cardiomyopathy (HOCM)    4. FH: breast cancer in  first degree relative    5. Encounter for screening mammogram for malignant neoplasm of breast         Plan:   1. Essential hypertension  - Continue current regimen    2. Mixed hyperlipidemia  - Continue current regimen    3. Hypertrophic obstructive cardiomyopathy (HOCM)  - Continue current regimen    4. FH: breast cancer in first degree relative  - Mammo Digital Screening Bilat w/ Marlon; Future    5. Encounter for screening mammogram for malignant neoplasm of breast   - Mammo Digital Screening Bilat w/ Marlon; Future      Pt has been given instructions populated from [employer] database and has verbalized understanding of the after visit summary and information contained wherein.    Follow up with a primary care provider. May go to ER for acute shortness of breath, lightheadedness, fever, or any other emergent complaints or changes in condition.

## 2020-01-28 ENCOUNTER — PROCEDURE VISIT (OUTPATIENT)
Dept: NEUROLOGY | Facility: CLINIC | Age: 75
End: 2020-01-28
Payer: MEDICARE

## 2020-01-28 DIAGNOSIS — G57.32 PERONEAL NEUROPATHY, LEFT: ICD-10-CM

## 2020-01-28 PROCEDURE — 95886 PR EMG COMPLETE, W/ NERVE CONDUCTION STUDIES, 5+ MUSCLES: ICD-10-PCS | Mod: HCNC,S$GLB,, | Performed by: PSYCHIATRY & NEUROLOGY

## 2020-01-28 PROCEDURE — 95886 MUSC TEST DONE W/N TEST COMP: CPT | Mod: HCNC,S$GLB,, | Performed by: PSYCHIATRY & NEUROLOGY

## 2020-01-28 PROCEDURE — 95909 PR NERVE CONDUCTION STUDY; 5-6 STUDIES: ICD-10-PCS | Mod: HCNC,S$GLB,, | Performed by: PSYCHIATRY & NEUROLOGY

## 2020-01-28 PROCEDURE — 95909 NRV CNDJ TST 5-6 STUDIES: CPT | Mod: HCNC,S$GLB,, | Performed by: PSYCHIATRY & NEUROLOGY

## 2020-02-01 NOTE — PROCEDURES
EMG was performed in clinic on 1/28/2020. The patient tolerated the procedure and there were no post procedure complications.          Alessandra Presley MD

## 2020-02-05 ENCOUNTER — PES CALL (OUTPATIENT)
Dept: ADMINISTRATIVE | Facility: CLINIC | Age: 75
End: 2020-02-05

## 2020-03-01 DIAGNOSIS — M48.02 SPINAL STENOSIS OF CERVICAL REGION: ICD-10-CM

## 2020-03-20 ENCOUNTER — TELEPHONE (OUTPATIENT)
Dept: PRIMARY CARE CLINIC | Facility: CLINIC | Age: 75
End: 2020-03-20

## 2020-03-20 NOTE — TELEPHONE ENCOUNTER
----- Message from Marla Dale sent at 3/20/2020  2:10 PM CDT -----  Contact: [phone]  Would like to get medical advice.  Symptoms (please be specific):  Sinus problems, congested eyes and nose( swollen)  How long has patient had these symptoms:  4-5 days  Pharmacy name and phone #:  [facility] #[id number] - [place], [place] - [address] AT [facility]  Any drug allergies (copy from chart):      Would the patient rather a call back or a response via [facility]?:  Call back  Comments: PATIENT IS UNSURE IF KEEPING Monday APPOINTMENT OR NOT, SHE IS KEEPING IT FOR NOW, BUT STILL WANT TO SPEAK TO THE NURSE BEFORE MAKING DECISION, PLEASE CALL AND ADVISE    Requesting an RX refill or new RX.  Is this a refill or new RX:  new  RX name and strength: olmesartan-amLODIPin-hcthiazid 40-10-12.5 mg Tab  Directions (copy/paste from chart):    Is this a 30 day or 90 day RX:  90  Local pharmacy or mail order pharmacy:  local  Pharmacy name and phone # (copy/paste from chart):   [id number] [address] AT [facility]  Comments:

## 2020-03-23 ENCOUNTER — PATIENT MESSAGE (OUTPATIENT)
Dept: PRIMARY CARE CLINIC | Facility: CLINIC | Age: 75
End: 2020-03-23

## 2020-03-23 DIAGNOSIS — I10 ESSENTIAL HYPERTENSION: ICD-10-CM

## 2020-03-23 DIAGNOSIS — I42.1 HYPERTROPHIC OBSTRUCTIVE CARDIOMYOPATHY (HOCM): ICD-10-CM

## 2020-03-23 RX ORDER — OLMESARTAN MEDOXOMIL / AMLODIPINE BESYLATE / HYDROCHLOROTHIAZIDE 40; 10; 12.5 MG/1; MG/1; MG/1
1 TABLET, FILM COATED ORAL DAILY
Qty: 90 TABLET | Refills: 0 | Status: SHIPPED | OUTPATIENT
Start: 2020-03-23 | End: 2020-04-06

## 2020-03-23 RX ORDER — AMOXICILLIN AND CLAVULANATE POTASSIUM 875; 125 MG/1; MG/1
1 TABLET, FILM COATED ORAL 2 TIMES DAILY
Qty: 14 TABLET | Refills: 0 | Status: SHIPPED | OUTPATIENT
Start: 2020-03-23 | End: 2020-03-30

## 2020-03-23 NOTE — TELEPHONE ENCOUNTER
----- Message from Masha Ferguson sent at 3/23/2020  3:12 PM CDT -----  Contact: self/[phone]  Patient called in regards needing to f/u on medication that Dr Leigh is suppose to sent to the pharmacy [id number] - [facility] [address] AT [facility] & [place] [phone] (Phone)  [phone] (Fax). Please call and advise. Thank you!

## 2020-03-23 NOTE — TELEPHONE ENCOUNTER
----- Message from Masha Ferguson sent at 3/23/2020  3:12 PM CDT -----  Contact: self/[phone]  Patient called in regards needing to f/u on medication that Dr Leigh is suppose to sent to the pharmacy [facility] #[id number] - [facility] [address] AT [facility] & [place] [phone] (Phone)  [phone] (Fax). Please call and advise. Thank you!

## 2020-03-27 ENCOUNTER — TELEPHONE (OUTPATIENT)
Dept: PRIMARY CARE CLINIC | Facility: CLINIC | Age: 75
End: 2020-03-27

## 2020-03-27 NOTE — TELEPHONE ENCOUNTER
I have never seen or examined this patient.  She was unable to do a virtual visit (issues logging on). I spoke to patient via telephone. I am happy to assist with refilling her medications and I will provide a 90 day supply on whatever medication she is currently taking.    NB: patient stated that she was on these medication and that's why I sent them over.

## 2020-03-27 NOTE — TELEPHONE ENCOUNTER
----- Message from Vilma Cardozo sent at 3/27/2020  1:43 PM CDT -----  Contact: [phone]  Patient is requesting a call from the office regarding he medication olmesartan-amLODIPin-hcthiazid 40-10-12.5 mg Tab.  Patient stated the medication is $99, is there something else the doctor can prescribe.    Please advise, thank you.

## 2020-03-30 ENCOUNTER — OFFICE VISIT (OUTPATIENT)
Dept: CARDIOLOGY | Facility: CLINIC | Age: 75
End: 2020-03-30
Payer: MEDICARE

## 2020-03-30 DIAGNOSIS — I42.1 HYPERTROPHIC OBSTRUCTIVE CARDIOMYOPATHY (HOCM): Primary | ICD-10-CM

## 2020-03-30 DIAGNOSIS — I65.21 STENOSIS OF RIGHT CAROTID ARTERY: ICD-10-CM

## 2020-03-30 DIAGNOSIS — I51.89 DIASTOLIC DYSFUNCTION WITHOUT HEART FAILURE: ICD-10-CM

## 2020-03-30 DIAGNOSIS — I10 ESSENTIAL HYPERTENSION: ICD-10-CM

## 2020-03-30 DIAGNOSIS — E78.2 MIXED HYPERLIPIDEMIA: ICD-10-CM

## 2020-03-30 PROCEDURE — 99999 PR PBB SHADOW E&M-EST. PATIENT-LVL II: CPT | Mod: PBBFAC,HCNC,, | Performed by: INTERNAL MEDICINE

## 2020-03-30 PROCEDURE — 1159F MED LIST DOCD IN RCRD: CPT | Mod: HCNC,S$GLB,, | Performed by: INTERNAL MEDICINE

## 2020-03-30 PROCEDURE — 99214 PR OFFICE/OUTPT VISIT, EST, LEVL IV, 30-39 MIN: ICD-10-PCS | Mod: HCNC,S$GLB,, | Performed by: INTERNAL MEDICINE

## 2020-03-30 PROCEDURE — 99499 RISK ADDL DX/OHS AUDIT: ICD-10-PCS | Mod: HCNC,S$GLB,, | Performed by: INTERNAL MEDICINE

## 2020-03-30 PROCEDURE — 99499 UNLISTED E&M SERVICE: CPT | Mod: HCNC,S$GLB,, | Performed by: INTERNAL MEDICINE

## 2020-03-30 PROCEDURE — 1159F PR MEDICATION LIST DOCUMENTED IN MEDICAL RECORD: ICD-10-PCS | Mod: HCNC,S$GLB,, | Performed by: INTERNAL MEDICINE

## 2020-03-30 PROCEDURE — 1101F PR PT FALLS ASSESS DOC 0-1 FALLS W/OUT INJ PAST YR: ICD-10-PCS | Mod: HCNC,CPTII,S$GLB, | Performed by: INTERNAL MEDICINE

## 2020-03-30 PROCEDURE — 99214 OFFICE O/P EST MOD 30 MIN: CPT | Mod: HCNC,S$GLB,, | Performed by: INTERNAL MEDICINE

## 2020-03-30 PROCEDURE — 99999 PR PBB SHADOW E&M-EST. PATIENT-LVL II: ICD-10-PCS | Mod: PBBFAC,HCNC,, | Performed by: INTERNAL MEDICINE

## 2020-03-30 PROCEDURE — 1101F PT FALLS ASSESS-DOCD LE1/YR: CPT | Mod: HCNC,CPTII,S$GLB, | Performed by: INTERNAL MEDICINE

## 2020-03-30 NOTE — LETTER
March 30, 2020      Aleshia Gimenez,   [address] [facility] [address]           [facility]  [address]  [place] [address]  Phone: [phone]          Patient: Nilsa Curiel   MR Number: [id number]   YOB: 1945   Date of Visit: 3/30/2020       Dear Dr. Aleshia Gimenez:    Thank you for referring Nilsa Curiel to me for evaluation. Attached you will find relevant portions of my assessment and plan of care.    If you have questions, please do not hesitate to call me. I look forward to following Nilsa Curiel along with you.    Sincerely,    Janes Aj MD    Enclosure  CC:  No Recipients    If you would like to receive this communication electronically, please contact [email or web address] or [phone] to request more information on [employer] access.    For providers and/or their staff who would like to refer a patient to [facility], please contact us through our one-stop-shop provider referral line, [facility], at [phone].    If you feel you have received this communication in error or would no longer like to receive these types of communications, please e-mail [email or web address]

## 2020-03-30 NOTE — PROGRESS NOTES
Subjective:   Patient ID:  Nilsa Curiel is a 74 y.o. female who presents for follow-up of Cardiomyopathy      HPI:   Nilsa Curiel presents for Phone follow up of Hypertrophic cardiomyopathy. The patient's last echo nearly 2 years ago demonstrated a mild dynamic outflow tract gradient with mild increase with the valsalva maneuver. She is limited to activities in her house but Nilsa Curiel denies chest pain, shortness of breath, palpitations, presyncope , or syncope. Nilsa Curiel has hypertension with good control per last PCP visit. She is not taking her BP at home with her monitor broken but she has ordered a new one. She has moderate right carotid stenosis.Veena has dyslipidemia  on moderate intensity statin therapy At LDL goal last check.  Review of Systems   Constitution: Negative for malaise/fatigue, weight gain and weight loss.   Eyes: Negative for blurred vision.   Cardiovascular: Negative for chest pain, claudication, cyanosis, dyspnea on exertion, irregular heartbeat, leg swelling, near-syncope, orthopnea, palpitations, paroxysmal nocturnal dyspnea and syncope.   Respiratory: Positive for cough. Negative for shortness of breath and wheezing.    Musculoskeletal: Positive for back pain. Negative for falls and myalgias.   Gastrointestinal: Positive for diarrhea. Negative for abdominal pain, heartburn, nausea and vomiting.   Genitourinary: Negative for nocturia.   Neurological: Negative for brief paralysis, dizziness, focal weakness, headaches, numbness, paresthesias and weakness.   Psychiatric/Behavioral: Negative for altered mental status.       Current Outpatient Medications   Medication Sig    amoxicillin-clavulanate 875-125mg (AUGMENTIN) 875-125 mg per tablet Take 1 tablet by mouth 2 (two) times daily. for 7 days    aspirin (ECOTRIN) 81 MG EC tablet Take 1 tablet (81 mg total) by mouth once daily. (Patient taking differently: Take 81 mg by mouth once daily. Takes every 2-3 days)    betamethasone  valerate 0.1% (VALISONE) 0.1 % Oint RL EXT AA BID PRN    biotin 300 mcg Tab Take 1 tablet by mouth once daily.    clobetasol (TEMOVATE) 0.05 % cream APPLY TO THE AFFECTED AREA TWICE DAILY    gabapentin (NEURONTIN) 300 MG capsule Take 1 by mouth daily for 3 days, then twice daily for 3 days, (Patient taking differently: 300 mg 2 (two) times daily. Take 1 by mouth daily for 3 days, then twice daily for 3 days,)    ibuprofen (ADVIL,MOTRIN) 800 MG tablet Take 1 tablet (800 mg total) by mouth every 8 (eight) hours as needed for Pain.    ketoconazole (NIZORAL) 2 % cream AOO TO THE AFFECTED AREA TWICE DAILY, APPLY TO AXILLA AND ABDOMINAL CREASES    lactobacillus combo no.6 (PROBIOTIC COMPLEX) 4 billion cell Tab Take 1 tablet by mouth once daily.    MULTIVIT-MIN/FA/CA CARB/VIT K (WOMEN'S 50+ DAILY FORMULA ORAL) Take by mouth.    olmesartan-amLODIPin-hcthiazid 40-10-12.5 mg Tab Take 1 tablet by mouth Daily.    omega-3 fatty acids 300 mg Cap Take by mouth.    pantoprazole (PROTONIX) 40 MG tablet Take 1 tablet (40 mg total) by mouth daily as needed.    potassium gluconate 595 mg (99 mg) Tab     pravastatin (PRAVACHOL) 40 MG tablet Take 1 tablet (40 mg total) by mouth once daily.    topiramate (TOPAMAX) 50 MG tablet Take 1 tablet (50 mg total) by mouth 2 (two) times daily.    triamcinolone acetonide 0.1% (KENALOG) 0.1 % cream APPLY TO THE AFFECTED AREA ON CHEST AND HANDS TWICE DAILY    TURMERIC ORAL Take by mouth.    ubidecarenone (COENZYME Q10) 100 mg Tab      No current facility-administered medications for this visit.      Objective:   Physical Exam    Lab Results   Component Value Date     12/11/2019     12/11/2019    K 4.2 12/11/2019    K 4.2 12/11/2019     12/11/2019     12/11/2019    CO2 24 12/11/2019    CO2 24 12/11/2019    BUN 18 12/11/2019    BUN 18 12/11/2019    CREATININE 1.1 12/11/2019    CREATININE 1.1 12/11/2019     12/11/2019     12/11/2019    HGBA1C 5.1  03/14/2019    AST 17 12/11/2019    ALT 17 12/11/2019    ALBUMIN 3.6 12/11/2019    PROT 7.5 12/11/2019    BILITOT 0.3 12/11/2019    WBC 8.07 06/20/2019    HGB 10.8 (L) 06/20/2019    HCT 36.0 (L) 06/20/2019    MCV 82 06/20/2019     (H) 06/20/2019    TSH 2.309 03/14/2019    CHOL 135 03/14/2019    HDL 48 03/14/2019    LDLCALC 64.6 03/14/2019    TRIG 112 03/14/2019       Assessment:     1. Hypertrophic obstructive cardiomyopathy (HOCM) : Mild obstruction last check 2 years ago   2. Diastolic dysfunction without heart failure : symptomatically compensated   3. Essential hypertension; Good control.    4. Mixed hyperlipidemia: At LDL goal  on moderate intensity statin therapy.    5. Stenosis of right carotid artery : 50% last check       Plan:     Nilsa was seen today for cardiomyopathy.    Diagnoses and all orders for this visit:    Hypertrophic obstructive cardiomyopathy (HOCM)  Repeat echo in  3 months  Diastolic dysfunction without heart failure    Essential hypertension  Continue current regimen    Mixed hyperlipidemia  Continue current regimen      25 minutes spent  Including chart review, conversation, and note

## 2020-03-31 ENCOUNTER — TELEPHONE (OUTPATIENT)
Dept: PRIMARY CARE CLINIC | Facility: CLINIC | Age: 75
End: 2020-03-31

## 2020-03-31 NOTE — TELEPHONE ENCOUNTER
----- Message from Dana JUANITA Castillo sent at 3/31/2020  3:01 PM CDT -----  Contact: Pt self Mobile/Home [phone]   Patient is calling in regards to her saying that she cannot afford the script for olmesartan-amLODIPin-hcthiazid 40-10-12.5 mg Tab that you have prescribed for her and she would like for you to prescribe a different script for her in the place of the amlodipine medication, she also said that her script   amoxicillin-clavulanate 875-125mg (AUGMENTIN) 875-125 mg per tablet () is giving her diarrhea.     Patient Pharmacy:  [id number] [place] [address] [address] [facility] AT [employer]  Phone# [phone] fax# [phone]   Comment: Patient said that she called last week trying to get this script filled. Ms. Hauser also said that she's been having a cough for a week now and she would like for you to send her something in for that.

## 2020-03-31 NOTE — TELEPHONE ENCOUNTER
Patient has been informed per Dr. Bryant's msg, and verbalized understanding.    Pt advised that the msg is being sent on to Dr. Aj her cardiologist to determine if her BP medication (olmesartan-amLODIPin-hcthiazid 40-10-12.5 mg tablets) can be changed due to cost.

## 2020-03-31 NOTE — TELEPHONE ENCOUNTER
I have never seen or examined this patient.  She was unable to do a virtual visit (issues logging on). I spoke to patient via telephone. I am happy to assist with refilling her medications and I will provide a 90 day supply on whatever medication she is currently taking.     NB: patient stated that she was on these medication and that's why I sent them over.      Electronically signed by Velma Bryant MD at 3/27/2020  1:54 PM       Telephone on 3/27/2020       Patient saw cardiology yesterday and for hypertension it was noted that she should 'continue current regimen'.    RE: Augmentin prescribed for infection that patient reported on the telephone call.   Diarrhea is a common side effect. She can take the medication with yogurt to complete the course, but if she is having intolerable diarrhea despite taking yogurt and food with Augmentin, she should discontinue the medication.

## 2020-03-31 NOTE — TELEPHONE ENCOUNTER
Cough takes the longest to go away. The coricidin has a cough suppressant in it. Teas with honey and cough precautions. Should she have worsening symptoms, please schedule an appointment.

## 2020-04-05 ENCOUNTER — PATIENT MESSAGE (OUTPATIENT)
Dept: CARDIOLOGY | Facility: CLINIC | Age: 75
End: 2020-04-05

## 2020-04-06 ENCOUNTER — TELEPHONE (OUTPATIENT)
Dept: CARDIOLOGY | Facility: CLINIC | Age: 75
End: 2020-04-06

## 2020-04-06 RX ORDER — AMLODIPINE BESYLATE 10 MG/1
10 TABLET ORAL DAILY
Qty: 30 TABLET | Refills: 11 | Status: SHIPPED | OUTPATIENT
Start: 2020-04-06 | End: 2021-07-15

## 2020-04-06 RX ORDER — HYDROCHLOROTHIAZIDE 12.5 MG/1
12.5 TABLET ORAL DAILY
Qty: 90 TABLET | Refills: 3 | Status: SHIPPED | OUTPATIENT
Start: 2020-04-06 | End: 2020-06-17

## 2020-04-06 RX ORDER — OLMESARTAN MEDOXOMIL 40 MG/1
40 TABLET ORAL DAILY
Qty: 90 TABLET | Refills: 3 | Status: SHIPPED | OUTPATIENT
Start: 2020-04-06 | End: 2020-05-25 | Stop reason: SDUPTHER

## 2020-04-07 ENCOUNTER — OFFICE VISIT (OUTPATIENT)
Dept: URGENT CARE | Facility: CLINIC | Age: 75
End: 2020-04-07
Payer: MEDICARE

## 2020-04-07 VITALS
SYSTOLIC BLOOD PRESSURE: 147 MMHG | HEART RATE: 94 BPM | DIASTOLIC BLOOD PRESSURE: 67 MMHG | HEIGHT: 61 IN | RESPIRATION RATE: 17 BRPM | BODY MASS INDEX: 38.51 KG/M2 | OXYGEN SATURATION: 98 % | TEMPERATURE: 99 F | WEIGHT: 204 LBS

## 2020-04-07 DIAGNOSIS — R05.9 COUGH: Primary | ICD-10-CM

## 2020-04-07 PROCEDURE — 99214 OFFICE O/P EST MOD 30 MIN: CPT | Mod: S$GLB,,, | Performed by: EMERGENCY MEDICINE

## 2020-04-07 PROCEDURE — 71046 X-RAY EXAM CHEST 2 VIEWS: CPT | Mod: S$GLB,,, | Performed by: INTERNAL MEDICINE

## 2020-04-07 PROCEDURE — 71046 XR CHEST PA AND LATERAL: ICD-10-PCS | Mod: S$GLB,,, | Performed by: INTERNAL MEDICINE

## 2020-04-07 PROCEDURE — 99214 PR OFFICE/OUTPT VISIT, EST, LEVL IV, 30-39 MIN: ICD-10-PCS | Mod: S$GLB,,, | Performed by: EMERGENCY MEDICINE

## 2020-04-07 RX ORDER — AZITHROMYCIN 250 MG/1
TABLET, FILM COATED ORAL
Qty: 6 TABLET | Refills: 0 | Status: SHIPPED | OUTPATIENT
Start: 2020-04-07 | End: 2020-06-17 | Stop reason: ALTCHOICE

## 2020-04-07 RX ORDER — CODEINE PHOSPHATE AND GUAIFENESIN 10; 100 MG/5ML; MG/5ML
5 SOLUTION ORAL EVERY 6 HOURS PRN
Qty: 150 ML | Refills: 0 | Status: SHIPPED | OUTPATIENT
Start: 2020-04-07 | End: 2020-04-17

## 2020-04-07 RX ORDER — ALBUTEROL SULFATE 90 UG/1
2 AEROSOL, METERED RESPIRATORY (INHALATION) EVERY 6 HOURS PRN
Qty: 18 G | Refills: 0 | Status: SHIPPED | OUTPATIENT
Start: 2020-04-07 | End: 2021-12-29

## 2020-04-07 NOTE — PATIENT INSTRUCTIONS
Go to the Emergency Room if symptoms or condition worsens in any way    GO tomorrow to [facility] on [address] for COVID 19 drive through testing [phone]    What Is Acute Bronchitis?  Acute bronchitis is when the airways in your lungs (bronchial tubes) become red and swollen (inflamed). It is usually caused by a viral infection. But it can also occur because of a bacteria or allergen. Symptoms include a cough that produces yellow or greenish mucus and can last for days or sometimes weeks.  Inside healthy lungs    Air travels in and out of the lungs through the airways. The linings of these airways produce sticky mucus. This mucus traps particles that enter the lungs. Tiny structures called cilia then sweep the particles out of the airways.     Healthy airway: Airways are normally open. Air moves in and out easily.      Healthy cilia: Tiny, hairlike cilia sweep mucus and particles up and out of the airways.   Lungs with bronchitis  Bronchitis often occurs with a cold or the flu virus. The airways become inflamed (red and swollen). There is a deep hacking cough from the extra mucus. Other symptoms may include:  · Wheezing  · Chest discomfort  · Shortness of breath  · Mild fever  A second infection, this time due to bacteria, may then occur. And airways irritated by allergens or smoke are more likely to get infected.        Inflamed airway: Inflammation and extra mucus narrow the airway, causing shortness of breath.      Impaired cilia: Extra mucus impairs cilia, causing congestion and wheezing. Smoking makes the problem worse.   Making a diagnosis  A physical exam, health history, and certain tests help your healthcare provider make the diagnosis.  Health history  Your healthcare provider will ask you about your symptoms.  The exam  Your provider listens to your chest for signs of congestion. He or she may also check your ears, nose, and throat.  Possible tests  · A sputum test for bacteria. This  requires a sample of mucus from your lungs.  · A nasal or throat swab. This tests to see if you have a bacterial infection.  · A chest X-ray. This is done if your healthcare provider thinks you have pneumonia.  · Tests to check for an underlying condition. Other tests may be done to check for things such as allergies, asthma, or COPD (chronic obstructive pulmonary disease). You may need to see a specialist for more lung function testing.  Treating a cough  The main treatment for bronchitis is easing symptoms. Avoiding smoke, allergens, and other things that trigger coughing can often help. If the infection is bacterial, you may be given antibiotics. During the illness, it's important to get plenty of sleep. To ease symptoms:  · Dont smoke. Also avoid secondhand smoke.  · Use a humidifier. Or try breathing in steam from a hot shower. This may help loosen mucus.  · Drink a lot of water and juice. They can soothe the throat and may help thin mucus.  · Sit up or use extra pillows when in bed. This helps to lessen coughing and congestion.  · Ask your provider about using medicine. Ask about using cough medicine, pain and fever medicine, or a decongestant.  Antibiotics  Most cases of bronchitis are caused by cold or flu viruses. They dont need antibiotics to treat them, even if your mucus is thick and green or yellow. Antibiotics dont treat viral illness and antibiotics have not been shown to have any benefit in cases of acute bronchitis. Taking antibiotics when they are not needed increases your risk of getting an infection later that is antibiotic-resistant. Antibiotics can also cause severe cases of diarrhea that require other antibiotics to treat.  It is important that you accept your healthcare provider's opinion to not use antibiotics. Your provider will prescribe antibiotics if the infection is caused by bacteria. If they are prescribed:  · Take all of the medicine. Take the medicine until it is used up, even if  symptoms have improved. If you dont, the bronchitis may come back.  · Take the medicines as directed. For instance, some medicines should be taken with food.  · Ask about side effects. Ask your provider or pharmacist what side effects are common, and what to do about them.  Follow-up care  You should see your provider again in 2 to 3 weeks. By this time, symptoms should have improved. An infection that lasts longer may mean you have a more serious problem.  Prevention  · Avoid tobacco smoke. If you smoke, quit. Stay away from smoky places. Ask friends and family not to smoke around you, or in your home or car.  · Get checked for allergies.  · Ask your provider about getting a yearly flu shot. Also ask about pneumococcal or pneumonia shots.  · Wash your hands often. This helps reduce the chance of picking up viruses that cause colds and flu.  Call your healthcare provider if:  · Symptoms worsen, or you have new symptoms  · Breathing problems worsen or  become severe  · Symptoms dont get better within a week, or within 3 days of taking antibiotics   Date Last Reviewed: 2/1/2017 © 2000-2017 [employer] [address] [place] [address]. All rights reserved. This information is not intended as a substitute for professional medical care. Always follow your healthcare professional's instructions.      Instructions for Patients Awaiting COVID-19 Test Results    You will either be called with your test result or it will be released to the patient portal.  If you have any questions about your test, please visit [email or web address] or call our COVID-19 information line at [phone].    Prevention steps for patients with confirmed or suspected COVID-19       Stay home and stay away from family members and friends. The CDC says, you can leave home after these three things have happened: 1) You have had no fever for at least 72 hours (that is three full days of no fever without the use of  medicine that reduces fevers) 2) AND other symptoms have improved (for example, when your cough or shortness of breath have improved) 3) AND at least 7 days have passed since your symptoms first appeared.   Separate yourself from other people and animals in your home.   Call ahead before visiting your doctor.   Wear a facemask.   Cover your coughs and sneezes.   Wash your hands often with soap and water; hand  can be used, too.   Avoid sharing personal household items.   Wipe down surfaces used daily.   Monitor your symptoms. Seek prompt medical attention if your illness is worsening (e.g., difficulty breathing).    Before seeking care, call your healthcare provider.   If you have a medical emergency and need to call 911, notify the dispatch personnel that you have, or are being evaluated for COVID-19. If possible, put on a facemask before emergency medical services arrive.        Recommended precautions for household members, intimate partners, and caregivers in a home setting of a patient with symptomatic laboratory-confirmed COVID-19 or a patient under investigation.  Household members, intimate partners, and caregivers in the home setting awaiting tests results have close contact with a person with symptomatic, laboratory-confirmed COVID-19 or a person under investigation. Close contacts should monitor their health; they should call their provider right away if they develop symptoms suggestive of COVID-19 (e.g., fever, cough, shortness of breath).    Close contacts should also follow these recommendations:   Make sure that you understand and can help the patient follow their provider's instructions for medication(s) and care. You should help the patient with basic needs in the home and provide support for getting groceries, prescriptions, and other personal needs.   Monitor the patient's symptoms. If the patient is getting sicker, call his or her healthcare provider and tell them that the  patient has laboratory-confirmed COVID-19. If the patient has a medical emergency and you need to call 911, notify the dispatch personnel that the patient has, or is being evaluated for COVID-19.   Household members should stay in another room or be  from the patient. Household members should use a separate bedroom and bathroom, if available.   Prohibit visitors.   Household members should care for any pets in the home.   Make sure that shared spaces in the home have good air flow, such as by an air conditioner or an opened window, weather permitting.   Perform hand hygiene frequently. Wash your hands often with soap and water for at least 20 seconds or use an alcohol-based hand  (that contains > 60% alcohol) covering all surfaces of your hands and rubbing them together until they feel dry. Soap and water should be used preferentially.   Avoid touching your eyes, nose, and mouth.   The patient should wear a facemask. If the patient is not able to wear a facemask (for example, because it causes trouble breathing), caregivers should wear a mask when they are in the same room as the patient.   Wear a disposable facemask and gloves when you touch or have contact with the patient's blood, stool, or body fluids, such as saliva, sputum, nasal mucus, vomit, urine.  o Throw out disposable facemasks and gloves after using them. Do not reuse.  o When removing personal protective equipment, first remove and dispose of gloves. Then, immediately clean your hands with soap and water or alcohol-based hand . Next, remove and dispose of facemask, and immediately clean your hands again with soap and water or alcohol-based hand .   You should not share dishes, drinking glasses, cups, eating utensils, towels, bedding, or other items with the patient. After the patient uses these items, you should wash them thoroughly (see below Wash laundry thoroughly).   Clean all high-touch surfaces,  such as counters, tabletops, doorknobs, bathroom fixtures, toilets, phones, keyboards, tablets, and bedside tables, every day. Also, clean any surfaces that may have blood, stool, or body fluids on them.   Use a household cleaning spray or wipe, according to the label instructions. Labels contain instructions for safe and effective use of the cleaning product including precautions you should take when applying the product, such as wearing gloves and making sure you have good ventilation during use of the product.   Wash laundry thoroughly.  o Immediately remove and wash clothes or bedding that have blood, stool, or body fluids on them.  o Wear disposable gloves while handling soiled items and keep soiled items away from your body. Clean your hands (with soap and water or an alcohol-based hand ) immediately after removing your gloves.  o Read and follow directions on labels of laundry or clothing items and detergent. In general, using a normal laundry detergent according to washing machine instructions and dry thoroughly using the warmest temperatures recommended on the clothing label.   Place all used disposable gloves, facemasks, and other contaminated items in a lined container before disposing of them with other household waste. Clean your hands (with soap and water or an alcohol-based hand ) immediately after handling these items. Soap and water should be used preferentially if hands are visibly dirty.   Discuss any additional questions with your state or local health department or healthcare provider. Check available hours when contacting your local health department.    For more information see CDC link below.      https://www.cdc.gov/coronavirus/2019-ncov/hcp/guidance-prevent-spread.html#precautions        Sources:  [facility], [place] Department [facility] and [facility]

## 2020-04-07 NOTE — PROGRESS NOTES
"Subjective:       Patient ID: Nilsa Curiel is a 74 y.o. female.    Vitals:  height is 5' 1" (1.549 m) and weight is 92.5 kg (204 lb). Her tympanic temperature is 98.7 °F (37.1 °C). Her blood pressure is 147/67 (abnormal) and her pulse is 94. Her respiration is 17 and oxygen saturation is 98%.     Chief Complaint: Cough    Pt states resolving cough x 3 weeks. Pt spoke with PCP on 3/3/120 regarding cough.  Pt states intermittent headache since yesterday. Pt took sumatriptan for headache.    Sinus Problem   This is a new problem. The current episode started 1 to 4 weeks ago. The problem has been gradually improving since onset. There has been no fever. Associated symptoms include coughing and headaches. Pertinent negatives include no chills, congestion, shortness of breath or sore throat. Treatments tried: antibiotic. The treatment provided moderate relief.       Constitution: Negative for chills, fatigue and fever.   HENT: Negative for congestion and sore throat.    Neck: Negative for painful lymph nodes.   Cardiovascular: Negative for chest pain and leg swelling.   Eyes: Negative for double vision and blurred vision.   Respiratory: Positive for cough. Negative for shortness of breath.    Gastrointestinal: Negative for nausea, vomiting and diarrhea.   Genitourinary: Negative for dysuria, frequency, urgency and history of kidney stones.   Musculoskeletal: Negative for joint pain, joint swelling, muscle cramps and muscle ache.   Skin: Negative for color change, pale, rash and bruising.   Allergic/Immunologic: Negative for seasonal allergies.   Neurological: Positive for headaches. Negative for dizziness, history of vertigo, light-headedness and passing out.   Hematologic/Lymphatic: Negative for swollen lymph nodes.   Psychiatric/Behavioral: Negative for nervous/anxious, sleep disturbance and depression. The patient is not nervous/anxious.        Objective:      Physical Exam   Constitutional: She is oriented to person, " place, and time. She appears well-developed and well-nourished. She is cooperative.  Non-toxic appearance. She does not have a sickly appearance. She does not appear ill. No distress.   HENT:   Head: Normocephalic and atraumatic.   Right Ear: Hearing, tympanic membrane, external ear and ear canal normal.   Left Ear: Hearing, tympanic membrane, external ear and ear canal normal.   Oropharyngeal exam not performed due to risk of viral transmission during global pandemic-- risks outweigh benefits of exam     Eyes: Conjunctivae and lids are normal. No scleral icterus.   Neck: Trachea normal, full passive range of motion without pain and phonation normal. Neck supple. No neck rigidity. No edema and no erythema present.   Cardiovascular: Normal rate, regular rhythm, normal heart sounds, intact distal pulses and normal pulses.   Pulmonary/Chest: Effort normal and breath sounds normal. No respiratory distress. She has no decreased breath sounds. She has no wheezes. She has no rhonchi. She has no rales.   Abdominal: Normal appearance.   Musculoskeletal: Normal range of motion. She exhibits no edema or deformity.   Neurological: She is alert and oriented to person, place, and time. She exhibits normal muscle tone. Coordination normal.   Skin: Skin is warm, dry, intact, not diaphoretic and not pale.   Psychiatric: She has a normal mood and affect. Her speech is normal and behavior is normal. Judgment and thought content normal. Cognition and memory are normal.   Nursing note and vitals reviewed.        Assessment:       1. Cough        Plan:         Cough  -     SARS- CoV-2 (COVID-19) QUALITATIVE PCR; Future; Expected date: 04/07/2020  -     X-Ray Chest PA And Lateral; Future; Expected date: 04/07/2020    Other orders  -     azithromycin (ZITHROMAX Z-MEGHNA) 250 MG tablet; Take 2 tablets (500 mg) on  Day 1,  followed by 1 tablet (250 mg) once daily on Days 2 through 5.  Dispense: 6 tablet; Refill: 0  -     guaifenesin-codeine  100-10 mg/5 ml (GUAIFENESIN AC)  mg/5 mL syrup; Take 5 mLs by mouth every 6 (six) hours as needed for Cough.  Dispense: 150 mL; Refill: 0  -     albuterol (PROVENTIL/VENTOLIN HFA) 90 mcg/actuation inhaler; Inhale 2 puffs into the lungs every 6 (six) hours as needed for Wheezing. Rescue PLEASE INCLUDE SPACER  Dispense: 18 g; Refill: 0  -     COVID-19 Home Symptom Monitoring  - Duration (days): 14     X-ray Chest Pa And Lateral    Result Date: 4/7/2020  EXAMINATION: XR CHEST PA AND LATERAL CLINICAL HISTORY: Cough TECHNIQUE: PA and lateral views of the chest were performed. COMPARISON: None FINDINGS: The heart size is normal.  There is no increase in pulmonary vascularity or pleural effusion.  Mild degenerative changes present in the spine.  Lungs demonstrate no focal consolidation.  There is calcification along the wall of the aorta.     As above Electronically signed by: Annemarie Ames MD Date:    04/07/2020 Time:    14:37    Patient Instructions       Go to the Emergency Room if symptoms or condition worsens in any way    GO tomorrow to [facility] on [facility] for COVID 19 drive through testing [phone]    What Is Acute Bronchitis?  Acute bronchitis is when the airways in your lungs (bronchial tubes) become red and swollen (inflamed). It is usually caused by a viral infection. But it can also occur because of a bacteria or allergen. Symptoms include a cough that produces yellow or greenish mucus and can last for days or sometimes weeks.  Inside healthy lungs    Air travels in and out of the lungs through the airways. The linings of these airways produce sticky mucus. This mucus traps particles that enter the lungs. Tiny structures called cilia then sweep the particles out of the airways.     Healthy airway: Airways are normally open. Air moves in and out easily.      Healthy cilia: Tiny, hairlike cilia sweep mucus and particles up and out of the airways.   Lungs with bronchitis  Bronchitis  often occurs with a cold or the flu virus. The airways become inflamed (red and swollen). There is a deep hacking cough from the extra mucus. Other symptoms may include:  · Wheezing  · Chest discomfort  · Shortness of breath  · Mild fever  A second infection, this time due to bacteria, may then occur. And airways irritated by allergens or smoke are more likely to get infected.        Inflamed airway: Inflammation and extra mucus narrow the airway, causing shortness of breath.      Impaired cilia: Extra mucus impairs cilia, causing congestion and wheezing. Smoking makes the problem worse.   Making a diagnosis  A physical exam, health history, and certain tests help your healthcare provider make the diagnosis.  Health history  Your healthcare provider will ask you about your symptoms.  The exam  Your provider listens to your chest for signs of congestion. He or she may also check your ears, nose, and throat.  Possible tests  · A sputum test for bacteria. This requires a sample of mucus from your lungs.  · A nasal or throat swab. This tests to see if you have a bacterial infection.  · A chest X-ray. This is done if your healthcare provider thinks you have pneumonia.  · Tests to check for an underlying condition. Other tests may be done to check for things such as allergies, asthma, or COPD (chronic obstructive pulmonary disease). You may need to see a specialist for more lung function testing.  Treating a cough  The main treatment for bronchitis is easing symptoms. Avoiding smoke, allergens, and other things that trigger coughing can often help. If the infection is bacterial, you may be given antibiotics. During the illness, it's important to get plenty of sleep. To ease symptoms:  · Dont smoke. Also avoid secondhand smoke.  · Use a humidifier. Or try breathing in steam from a hot shower. This may help loosen mucus.  · Drink a lot of water and juice. They can soothe the throat and may help thin mucus.  · Sit up or use  extra pillows when in bed. This helps to lessen coughing and congestion.  · Ask your provider about using medicine. Ask about using cough medicine, pain and fever medicine, or a decongestant.  Antibiotics  Most cases of bronchitis are caused by cold or flu viruses. They dont need antibiotics to treat them, even if your mucus is thick and green or yellow. Antibiotics dont treat viral illness and antibiotics have not been shown to have any benefit in cases of acute bronchitis. Taking antibiotics when they are not needed increases your risk of getting an infection later that is antibiotic-resistant. Antibiotics can also cause severe cases of diarrhea that require other antibiotics to treat.  It is important that you accept your healthcare provider's opinion to not use antibiotics. Your provider will prescribe antibiotics if the infection is caused by bacteria. If they are prescribed:  · Take all of the medicine. Take the medicine until it is used up, even if symptoms have improved. If you dont, the bronchitis may come back.  · Take the medicines as directed. For instance, some medicines should be taken with food.  · Ask about side effects. Ask your provider or pharmacist what side effects are common, and what to do about them.  Follow-up care  You should see your provider again in 2 to 3 weeks. By this time, symptoms should have improved. An infection that lasts longer may mean you have a more serious problem.  Prevention  · Avoid tobacco smoke. If you smoke, quit. Stay away from smoky places. Ask friends and family not to smoke around you, or in your home or car.  · Get checked for allergies.  · Ask your provider about getting a yearly flu shot. Also ask about pneumococcal or pneumonia shots.  · Wash your hands often. This helps reduce the chance of picking up viruses that cause colds and flu.  Call your healthcare provider if:  · Symptoms worsen, or you have new symptoms  · Breathing problems worsen or  become  severe  · Symptoms dont get better within a week, or within 3 days of taking antibiotics   Date Last Reviewed: 2/1/2017 © 2000-2017 [employer], [employer]. [address] [place] [address], [place] [address]. All rights reserved. This information is not intended as a substitute for professional medical care. Always follow your healthcare professional's instructions.      Instructions for Patients Awaiting COVID-19 Test Results    You will either be called with your test result or it will be released to the patient portal.  If you have any questions about your test, please visit [email or web address] or call our COVID-19 information line at [phone].    Prevention steps for patients with confirmed or suspected COVID-19       Stay home and stay away from family members and friends. The CDC says, you can leave home after these three things have happened: 1) You have had no fever for at least 72 hours (that is three full days of no fever without the use of medicine that reduces fevers) 2) AND other symptoms have improved (for example, when your cough or shortness of breath have improved) 3) AND at least 7 days have passed since your symptoms first appeared.   Separate yourself from other people and animals in your home.   Call ahead before visiting your doctor.   Wear a facemask.   Cover your coughs and sneezes.   Wash your hands often with soap and water; hand  can be used, too.   Avoid sharing personal household items.   Wipe down surfaces used daily.   Monitor your symptoms. Seek prompt medical attention if your illness is worsening (e.g., difficulty breathing).    Before seeking care, call your healthcare provider.   If you have a medical emergency and need to call 911, notify the dispatch personnel that you have, or are being evaluated for COVID-19. If possible, put on a facemask before emergency medical services arrive.        Recommended precautions for household members, intimate  partners, and caregivers in a home setting of a patient with symptomatic laboratory-confirmed COVID-19 or a patient under investigation.  Household members, intimate partners, and caregivers in the home setting awaiting tests results have close contact with a person with symptomatic, laboratory-confirmed COVID-19 or a person under investigation. Close contacts should monitor their health; they should call their provider right away if they develop symptoms suggestive of COVID-19 (e.g., fever, cough, shortness of breath).    Close contacts should also follow these recommendations:   Make sure that you understand and can help the patient follow their provider's instructions for medication(s) and care. You should help the patient with basic needs in the home and provide support for getting groceries, prescriptions, and other personal needs.   Monitor the patient's symptoms. If the patient is getting sicker, call his or her healthcare provider and tell them that the patient has laboratory-confirmed COVID-19. If the patient has a medical emergency and you need to call 911, notify the dispatch personnel that the patient has, or is being evaluated for COVID-19.   Household members should stay in another room or be  from the patient. Household members should use a separate bedroom and bathroom, if available.   Prohibit visitors.   Household members should care for any pets in the home.   Make sure that shared spaces in the home have good air flow, such as by an air conditioner or an opened window, weather permitting.   Perform hand hygiene frequently. Wash your hands often with soap and water for at least 20 seconds or use an alcohol-based hand  (that contains > 60% alcohol) covering all surfaces of your hands and rubbing them together until they feel dry. Soap and water should be used preferentially.   Avoid touching your eyes, nose, and mouth.   The patient should wear a facemask. If the patient  is not able to wear a facemask (for example, because it causes trouble breathing), caregivers should wear a mask when they are in the same room as the patient.   Wear a disposable facemask and gloves when you touch or have contact with the patient's blood, stool, or body fluids, such as saliva, sputum, nasal mucus, vomit, urine.  o Throw out disposable facemasks and gloves after using them. Do not reuse.  o When removing personal protective equipment, first remove and dispose of gloves. Then, immediately clean your hands with soap and water or alcohol-based hand . Next, remove and dispose of facemask, and immediately clean your hands again with soap and water or alcohol-based hand .   You should not share dishes, drinking glasses, cups, eating utensils, towels, bedding, or other items with the patient. After the patient uses these items, you should wash them thoroughly (see below Wash laundry thoroughly).   Clean all high-touch surfaces, such as counters, tabletops, doorknobs, bathroom fixtures, toilets, phones, keyboards, tablets, and bedside tables, every day. Also, clean any surfaces that may have blood, stool, or body fluids on them.   Use a household cleaning spray or wipe, according to the label instructions. Labels contain instructions for safe and effective use of the cleaning product including precautions you should take when applying the product, such as wearing gloves and making sure you have good ventilation during use of the product.   Wash laundry thoroughly.  o Immediately remove and wash clothes or bedding that have blood, stool, or body fluids on them.  o Wear disposable gloves while handling soiled items and keep soiled items away from your body. Clean your hands (with soap and water or an alcohol-based hand ) immediately after removing your gloves.  o Read and follow directions on labels of laundry or clothing items and detergent. In general, using a normal  laundry detergent according to washing machine instructions and dry thoroughly using the warmest temperatures recommended on the clothing label.   Place all used disposable gloves, facemasks, and other contaminated items in a lined container before disposing of them with other household waste. Clean your hands (with soap and water or an alcohol-based hand ) immediately after handling these items. Soap and water should be used preferentially if hands are visibly dirty.   Discuss any additional questions with your state or local health department or healthcare provider. Check available hours when contacting your local health department.    For more information see CDC link below.      https://www.cdc.gov/coronavirus/2019-ncov/hcp/guidance-prevent-spread.html#precautions        Sources:  [facility], [facility] and [facility]

## 2020-04-21 ENCOUNTER — NURSE TRIAGE (OUTPATIENT)
Dept: ADMINISTRATIVE | Facility: CLINIC | Age: 75
End: 2020-04-21

## 2020-04-21 NOTE — TELEPHONE ENCOUNTER
Pt calling today because she is almost out of HA med that she was given at an  last week. Asking for refill as mild HA persist. Only Covid related complaint is a mild cough but that has improved vastly since onset of symptoms.     Reason for Disposition   [1] COVID-19 infection diagnosed or suspected AND [2] mild symptoms (fever, cough) AND [3] no trouble breathing or other complications     Pt to be upgraded to call within 24 hours- requesting med refill--Transferred to Katiana Mae to assist scheduling telephone visit.    Additional Information   Negative: SEVERE difficulty breathing (e.g., struggling for each breath, speaks in single words)   Negative: Difficult to awaken or acting confused (e.g., disoriented, slurred speech)   Negative: Bluish (or gray) lips or face now   Negative: Shock suspected (e.g., cold/pale/clammy skin, too weak to stand, low BP, rapid pulse)   Negative: Sounds like a life-threatening emergency to the triager   Negative: SEVERE or constant chest pain (Exception: mild central chest pain, present only when coughing)   Negative: MODERATE difficulty breathing (e.g., speaks in phrases, SOB even at rest, pulse 100-120)   Negative: MILD difficulty breathing (e.g., minimal/no SOB at rest, SOB with walking, pulse <100)   Negative: Chest pain   Negative: Patient sounds very sick or weak to the triager   Negative: Fever > 103 F (39.4 C)   Negative: [1] Fever > 101 F (38.3 C) AND [2] age > 60   Negative: [1] Fever > 100.0 F (37.8 C) AND [2] bedridden (e.g., nursing home patient, CVA, chronic illness, recovering from surgery)   Negative: HIGH RISK patient (e.g., age > 64 years, diabetes, heart or lung disease, weak immune system)   Negative: Fever present > 3 days (72 hours)   Negative: [1] Fever returns after gone for over 24 hours AND [2] symptoms worse or not improved   Negative: [1] Continuous (nonstop) coughing interferes with work or school AND [2] no improvement using cough  treatment per protocol   Negative: Cough present > 3 weeks    Protocols used: CORONAVIRUS (COVID-19) DIAGNOSED OR [id number]

## 2020-04-27 RX ORDER — BETAMETHASONE VALERATE 1.2 MG/G
OINTMENT TOPICAL
Qty: 45 G | OUTPATIENT
Start: 2020-04-27

## 2020-04-28 ENCOUNTER — TELEPHONE (OUTPATIENT)
Dept: NEUROLOGY | Facility: CLINIC | Age: 75
End: 2020-04-28

## 2020-04-28 NOTE — TELEPHONE ENCOUNTER
----- Message from Leila Nam sent at 4/28/2020  1:46 PM CDT -----  Contact: pt  Name of Who is Calling: CB JOSE [[id number]]    What is the request in detail: Patient is requesting a call back regards to her virtual appt she needs a telephone call the portal isn't working .... Please contact to further discuss and advise      Can the clinic reply by [facility]: No    What Number to Call Back if not in [facility]:  [phone] (home)   :

## 2020-04-29 ENCOUNTER — OFFICE VISIT (OUTPATIENT)
Dept: NEUROLOGY | Facility: CLINIC | Age: 75
End: 2020-04-29
Payer: MEDICARE

## 2020-04-29 DIAGNOSIS — E55.9 VITAMIN D DEFICIENCY: Primary | ICD-10-CM

## 2020-04-29 DIAGNOSIS — R51.9 HEADACHE DISORDER: ICD-10-CM

## 2020-04-29 DIAGNOSIS — E66.01 SEVERE OBESITY WITH BODY MASS INDEX (BMI) OF 35.0 TO 39.9 WITH COMORBIDITY: ICD-10-CM

## 2020-04-29 PROCEDURE — 99443 PR PHYSICIAN TELEPHONE EVALUATION 21-30 MIN: ICD-10-PCS | Mod: 95,HCNC,S$GLB, | Performed by: PSYCHIATRY & NEUROLOGY

## 2020-04-29 PROCEDURE — 1159F PR MEDICATION LIST DOCUMENTED IN MEDICAL RECORD: ICD-10-PCS | Mod: 95,HCNC,S$GLB, | Performed by: PSYCHIATRY & NEUROLOGY

## 2020-04-29 PROCEDURE — 99999 PR PBB SHADOW E&M-EST. PATIENT-LVL II: ICD-10-PCS | Mod: PBBFAC,HCNC,, | Performed by: PSYCHIATRY & NEUROLOGY

## 2020-04-29 PROCEDURE — 99999 PR PBB SHADOW E&M-EST. PATIENT-LVL II: CPT | Mod: PBBFAC,HCNC,, | Performed by: PSYCHIATRY & NEUROLOGY

## 2020-04-29 PROCEDURE — 1101F PT FALLS ASSESS-DOCD LE1/YR: CPT | Mod: 95,HCNC,CPTII,S$GLB | Performed by: PSYCHIATRY & NEUROLOGY

## 2020-04-29 PROCEDURE — 1159F MED LIST DOCD IN RCRD: CPT | Mod: 95,HCNC,S$GLB, | Performed by: PSYCHIATRY & NEUROLOGY

## 2020-04-29 PROCEDURE — 1101F PR PT FALLS ASSESS DOC 0-1 FALLS W/OUT INJ PAST YR: ICD-10-PCS | Mod: 95,HCNC,CPTII,S$GLB | Performed by: PSYCHIATRY & NEUROLOGY

## 2020-04-29 PROCEDURE — 99443 PR PHYSICIAN TELEPHONE EVALUATION 21-30 MIN: CPT | Mod: 95,HCNC,S$GLB, | Performed by: PSYCHIATRY & NEUROLOGY

## 2020-04-29 RX ORDER — SUMATRIPTAN SUCCINATE 100 MG/1
100 TABLET ORAL
Qty: 9 TABLET | Refills: 0 | Status: SHIPPED | OUTPATIENT
Start: 2020-04-29 | End: 2020-06-17 | Stop reason: SDUPTHER

## 2020-04-29 NOTE — PROGRESS NOTES
NEUROLOGY  Outpatient Follow Up    [address] [address]  [phone] (office) / [phone] ( (fax)    Patient Name:  Nilsa Curiel  :  1945  MR #:  [id number]  Acct #:  [id number]    Date of Neurology Visit: 2020  Name of Neurologist: Alessandra Presley MD    Other Physicians:  Padmini Calhoun, SHERWIN, NP-C (Inactive) (Primary Care Physician); No ref. provider found (Referring)    The patient location is: Home  The chief complaint leading to consultation is: Headache  Visit type: Audio consultation  Total time spent with patient: 27 minutes   Each patient to whom he or she provides medical services by telemedicine is:  (1) informed of the relationship between the physician and patient and the respective role of any other health care provider with respect to management of the patient; and (2) notified that he or she may decline to receive medical services by telemedicine and may withdraw from such care at any time.    Notes:       Chief Complaint: Headache follow up    History of Present Illness (HPI):  Nilsa Curiel is a 74 y.o. female with pmh of arthritis, hypertension presents via telephone visit for follow-up of headaches.     The patient was last seen on 2019 at which time she reported new onset frequent headaches and positional left leg pain.   She was recommended Motrin for headache, MRI brain, ESR, CRP and EMG of the left leg. EMG/ NCS revealed abnormal spontaneous activity in the lower paraspinals of unclear etiology and did not reveal evidence of peroneal nerve compression across the fibular head.     Today she reports-  She is having on and off headaches. Went to urgent care one time few weeks ago for sinus congestion and was prescribed antibiotics which caused diarrhea.     Headache:  Type: nagging , throbs only when she has some soda   Location: back of head, sometimes on the sides changes sides, never in same area, typically  unilateral  Frequency:  once a week , also wakes up with headache, uses CPAP at night  Duration: few hours  Aura: none   Photophobia: none  Phonophobia: none  Nausea/ vomiting: none   Aggravating factors: stress  Relieving factors: chewing gum helps relieve headache sometimes . She lays down and tries to sleep the headache off   Sumatriptan has helped - she got some at a health fair  Previously failed therapies:  Ibuprofen- helped but caused gum bleeding  Tylenol helps after several hours  Has not tried Naproxen   Autonomic symptoms: none  Activity during headache: lays in dark room   Smoking: none   1 son , 2 daughters  Sleep: 5 hours, has CPAP machine for sleep apnea  Coffee: none  Tea: De-caffeinated tea              Previous history from initial visit:  First half of this year developed off and on headaches. This happened 1-2 months after her  passed away. She tripped on a rug in August 2019 and developed a bump on top of her head. No loss of conciousness.  5 days after the fall she developed a headache. She has noted that sometimes she develops a headache 2-3 hours after eating outside. She can go 1-2 months without a headache. She had a headache 3-4 weeks ago and one this mornining. High salt intake can trigger a headache.    Prior to this year she had extremely rare infrequent headaches. She remembers trying imitrex once in the past and it relieved her headache. The headaches have changed since they first began because now they happen over different regions as opposed to bitemporal region in the past.        Left leg pain  Has resolved completely    Treatment to date:   Was given 20 pills of topiramate at Urgent care and was taking this on a prn as needed basis, did not help her much  Gabapentin --> takes this prn for back pain not for headaches  Tylenol prn for headache  Motrin for headache in the past but was asked by her PCP to stop for unclear reasons     Review of Systems:      General: Weight  gain: No, Weight Loss: Yes  Respiratory:  Cough: had a recent sinus infection, Shortness of Breath: No  Endocrine: Heat Intolerance: No, Cold Intolerance: No  Eyes:  Blurred Vision: No, Double Vision: No   Light Sensitivity: No, Eye pain: No  Musculoskeletal: Muscle Aches/Pain: Yes- left arm +, Joint Pain/Swelling: Yes, Muscle Cramps: No, Muscle Weakness: No, Neck Pain: No, Back Pain: Yes   Neurological: Difficulty Walking/Falls: No, Headache Migraine: Yes  Cardiovascular: Chest Pain: No, Shortness of Breath: No,   Palpitations: Yes  Gastrointestinal: Nausea/Vomiting: No, Constipation: No, Diarrhea: Yes when she was given antibiotics for sinus infection, Bloody Stools: No   Psych/Cog:  Depression: No, Anxiety: No  : Frequent Urination: No, Incontinence: No  ENT:Hearing Loss: No, Earache: No, Ringing in Ears: No  Facial Pain: No, Chronic Congestion: No   Immune: Seasonal Allergies: No, Hives and/or Rashes: Rash on hand - has atopic dermatitis   The remainder of the review of twelve body systems was reviewed and normal.    Past Medical, Surgical, Family & Social History:   Reviewed and updated.    Home Medications:     Current Outpatient Medications:     albuterol (PROVENTIL/VENTOLIN HFA) 90 mcg/actuation inhaler, Inhale 2 puffs into the lungs every 6 (six) hours as needed for Wheezing. Rescue PLEASE INCLUDE SPACER, Disp: 18 g, Rfl: 0    amLODIPine (NORVASC) 10 MG tablet, Take 1 tablet (10 mg total) by mouth once daily., Disp: 30 tablet, Rfl: 11    aspirin (ECOTRIN) 81 MG EC tablet, Take 1 tablet (81 mg total) by mouth once daily. (Patient taking differently: Take 81 mg by mouth once daily. Takes every 2-3 days), Disp: , Rfl: 0    azithromycin (ZITHROMAX Z-MEGHNA) 250 MG tablet, Take 2 tablets (500 mg) on  Day 1,  followed by 1 tablet (250 mg) once daily on Days 2 through 5., Disp: 6 tablet, Rfl: 0    betamethasone valerate 0.1% (VALISONE) 0.1 % Oint, RL EXT AA BID PRN, Disp: , Rfl: 2    biotin 300 mcg Tab, Take  1 tablet by mouth once daily., Disp: , Rfl:     clobetasol (TEMOVATE) 0.05 % cream, APPLY TO THE AFFECTED AREA TWICE DAILY (Patient not taking: Reported on 4/7/2020), Disp: 30 g, Rfl: 0    gabapentin (NEURONTIN) 300 MG capsule, Take 1 by mouth daily for 3 days, then twice daily for 3 days, (Patient taking differently: 300 mg 2 (two) times daily. Take 1 by mouth daily for 3 days, then twice daily for 3 days,), Disp: 60 capsule, Rfl: 5    hydroCHLOROthiazide (HYDRODIURIL) 12.5 MG Tab, Take 1 tablet (12.5 mg total) by mouth once daily., Disp: 90 tablet, Rfl: 3    ketoconazole (NIZORAL) 2 % cream, AOO TO THE AFFECTED AREA TWICE DAILY, APPLY TO AXILLA AND ABDOMINAL CREASES (Patient not taking: Reported on 4/7/2020), Disp: 15 g, Rfl: 0    lactobacillus combo no.6 (PROBIOTIC COMPLEX) 4 billion cell Tab, Take 1 tablet by mouth once daily. (Patient not taking: Reported on 4/7/2020), Disp: 90 tablet, Rfl: 3    MULTIVIT-MIN/FA/CA CARB/VIT K (WOMEN'S 50+ DAILY FORMULA ORAL), Take by mouth., Disp: , Rfl:     olmesartan (BENICAR) 40 MG tablet, Take 1 tablet (40 mg total) by mouth once daily., Disp: 90 tablet, Rfl: 3    omega-3 fatty acids 300 mg Cap, Take by mouth., Disp: , Rfl:     pantoprazole (PROTONIX) 40 MG tablet, Take 1 tablet (40 mg total) by mouth daily as needed. (Patient not taking: Reported on 4/7/2020), Disp: 90 tablet, Rfl: 1    potassium gluconate 595 mg (99 mg) Tab, , Disp: , Rfl:     pravastatin (PRAVACHOL) 40 MG tablet, Take 1 tablet (40 mg total) by mouth once daily. (Patient not taking: Reported on 4/7/2020), Disp: 90 tablet, Rfl: 2    sumatriptan (IMITREX) 100 MG tablet, Take 1 tablet (100 mg total) by mouth every 2 (two) hours as needed for Migraine. No more than 2 doses in 24 hours, Disp: 9 tablet, Rfl: 0    topiramate (TOPAMAX) 50 MG tablet, Take 1 tablet (50 mg total) by mouth 2 (two) times daily., Disp: 20 tablet, Rfl: 2    triamcinolone acetonide 0.1% (KENALOG) 0.1 % cream, APPLY TO THE  AFFECTED AREA ON CHEST AND HANDS TWICE DAILY (Patient not taking: Reported on 4/7/2020), Disp: 454 g, Rfl: 0    TURMERIC ORAL, Take by mouth., Disp: , Rfl:     ubidecarenone (COENZYME Q10) 100 mg Tab, , Disp: , Rfl:     Physical Examination:  There were no vitals taken for this visit.    Patient was not examined during this telephone visit    Diagnostic Data Reviewed:     3/14/2019--> Folate- 16  Vitamin B12--> 1467  TSH ---> 2.309  Hemoglobin A1c- --> 5.1    11/27/2019: MRI brain without contrast:          Impression       Examination mildly degraded by patient motion artifact.    No evidence of acute intracranial pathology.    Moderate degenerative change in the partially visualized cervical spine with suspected spinal stenosis at the C3-4 and C4-5 levels.  Dedicated cervical spine MR suggested if warranted clinically.         Assessment and Plan:    Nilsa Curiel is a 74 y.o. female with pmh of arthritis, hypertension presents via telephone visit for follow up during the COVID-19 pandemic.    Patient    # Headache with migranous features:  Although patient denies nausea/ vomiting/ photophobia, she does endorse lying down in a dark room , throbbing type of pain and improvement with Sumatriptan  - Imitrex 100 mg pills prescribed,patient was counseled on side effects, need to take medication at headache onset and limit use to 2 pills in 24 hours    -Patient reported taking Topiramate on a prn basis , recommended that she stop the medication  - continue to monitor headaches if >4 headaches/ month would consider addition of nortryptiline    #Left leg pain:  Resolved   EMG with nonspecific finding of mild abnormal spontaneous activity in the lower paraspinals, no diffuse changes noted. Denies muscle weakness.   -Could consider addition of CK during next visit      Time Spent:  27 minutes spent face-to-face, >50% spent advising about: headache evaluation, maintaing a headache dairy , need for further testing and  treatment options     This note was created with voice recognition software.  Grammatical, syntax and spelling errors may be inevitable.

## 2020-04-30 NOTE — PATIENT INSTRUCTIONS
- Maintain headache diary  -Start Imitrex for headache with some migraine features  - Follow up in 3 months

## 2020-05-22 DIAGNOSIS — G56.03 BILATERAL CARPAL TUNNEL SYNDROME: ICD-10-CM

## 2020-05-22 DIAGNOSIS — E78.5 HYPERLIPIDEMIA, UNSPECIFIED HYPERLIPIDEMIA TYPE: ICD-10-CM

## 2020-05-22 RX ORDER — AMLODIPINE BESYLATE 10 MG/1
10 TABLET ORAL DAILY
Qty: 90 TABLET | Refills: 0 | OUTPATIENT
Start: 2020-05-22

## 2020-05-22 RX ORDER — PRAVASTATIN SODIUM 40 MG/1
40 TABLET ORAL DAILY
Qty: 90 TABLET | Refills: 0 | OUTPATIENT
Start: 2020-05-22

## 2020-05-22 RX ORDER — GABAPENTIN 300 MG/1
300 CAPSULE ORAL 2 TIMES DAILY
Qty: 180 CAPSULE | Refills: 0 | OUTPATIENT
Start: 2020-05-22

## 2020-05-22 RX ORDER — SUMATRIPTAN SUCCINATE 100 MG/1
100 TABLET ORAL
Qty: 9 TABLET | Refills: 0 | OUTPATIENT
Start: 2020-05-22 | End: 2020-06-21

## 2020-05-22 RX ORDER — HYDROCHLOROTHIAZIDE 12.5 MG/1
12.5 TABLET ORAL DAILY
Qty: 90 TABLET | Refills: 0 | OUTPATIENT
Start: 2020-05-22

## 2020-05-25 DIAGNOSIS — L20.9 ATOPIC DERMATITIS, UNSPECIFIED TYPE: ICD-10-CM

## 2020-05-25 DIAGNOSIS — K21.9 GASTROESOPHAGEAL REFLUX DISEASE WITHOUT ESOPHAGITIS: ICD-10-CM

## 2020-05-25 DIAGNOSIS — I10 ESSENTIAL HYPERTENSION: Primary | ICD-10-CM

## 2020-05-25 RX ORDER — PANTOPRAZOLE SODIUM 40 MG/1
40 TABLET, DELAYED RELEASE ORAL DAILY
Qty: 90 TABLET | Refills: 1 | Status: SHIPPED | OUTPATIENT
Start: 2020-05-25 | End: 2020-06-17

## 2020-05-25 RX ORDER — TRIAMCINOLONE ACETONIDE 1 MG/G
CREAM TOPICAL 2 TIMES DAILY
Qty: 454 G | Refills: 0 | Status: SHIPPED | OUTPATIENT
Start: 2020-05-25 | End: 2020-06-17

## 2020-05-25 RX ORDER — OLMESARTAN MEDOXOMIL 40 MG/1
40 TABLET ORAL DAILY
Qty: 90 TABLET | Refills: 1 | Status: SHIPPED | OUTPATIENT
Start: 2020-05-25 | End: 2020-11-18 | Stop reason: SDUPTHER

## 2020-05-25 NOTE — TELEPHONE ENCOUNTER
LOV 12/27/2019 (CHRISTOPHER Deluca)    Last visit with Dr. Calhoun 11/06/2019    Refills being changed to mail order.

## 2020-06-01 ENCOUNTER — TELEPHONE (OUTPATIENT)
Dept: SLEEP MEDICINE | Facility: CLINIC | Age: 75
End: 2020-06-01

## 2020-06-01 DIAGNOSIS — E55.9 VITAMIN D DEFICIENCY: Primary | ICD-10-CM

## 2020-06-01 NOTE — TELEPHONE ENCOUNTER
----- Message from Alessandra Presley MD sent at 6/1/2020  2:07 PM CDT -----  Done  ----- Message -----  From: Mateo Holt MA  Sent: 6/1/2020   2:04 PM CDT  To: Alessandra Presley MD    Can you put in another order? That way I can go ahead and schedule the patient for lab work.  -MAIKEL Galindo  ----- Message -----  From: Alessandra Presley MD  Sent: 6/1/2020   2:00 PM CDT  To: Mateo Holt MA    Sure. It needs to be redrawn. Can you let the patient know? It is not urgent. It can be done when she has a visit to the doctor. Do you need me to put in another order? Let me know    Thanks,  Dr. Presley  ----- Message -----  From: Mateo Holt MA  Sent: 6/1/2020   6:52 AM CDT  To: Alessandra Presley MD    I'm not sure. It came up and I thought it was something you may need to see.  ----- Message -----  From: Alessandra Presley MD  Sent: 5/29/2020   2:43 PM CDT  To: Mateo Holt MA    Is this cancelled? Does she need to have it re-drawn  ----- Message -----  From: Mateo Holt MA  Sent: 5/29/2020  12:49 PM CDT  To: Alessandra Presley MD        ----- Message -----  From: SYSTEM  Sent: 5/29/2020  12:27 AM CDT  To: [facility] Neurology Clinical Support Staff

## 2020-06-03 ENCOUNTER — PATIENT OUTREACH (OUTPATIENT)
Dept: ADMINISTRATIVE | Facility: HOSPITAL | Age: 75
End: 2020-06-03

## 2020-06-03 NOTE — PROGRESS NOTES
Pre-visit chart review completed.  Immunizations reviewed and updated.    Patient due for the following    Mammogram     Fecal Occult Blood Test (FOBT)/FitKit       Patient new to provider.

## 2020-06-17 ENCOUNTER — LAB VISIT (OUTPATIENT)
Dept: LAB | Facility: HOSPITAL | Age: 75
End: 2020-06-17
Attending: FAMILY MEDICINE
Payer: MEDICARE

## 2020-06-17 ENCOUNTER — TELEPHONE (OUTPATIENT)
Dept: PRIMARY CARE CLINIC | Facility: CLINIC | Age: 75
End: 2020-06-17

## 2020-06-17 ENCOUNTER — OFFICE VISIT (OUTPATIENT)
Dept: PRIMARY CARE CLINIC | Facility: CLINIC | Age: 75
End: 2020-06-17
Payer: MEDICARE

## 2020-06-17 VITALS
HEIGHT: 60 IN | SYSTOLIC BLOOD PRESSURE: 132 MMHG | BODY MASS INDEX: 39.73 KG/M2 | HEART RATE: 86 BPM | RESPIRATION RATE: 20 BRPM | TEMPERATURE: 98 F | WEIGHT: 202.38 LBS | DIASTOLIC BLOOD PRESSURE: 62 MMHG

## 2020-06-17 DIAGNOSIS — J34.9 SINUS DISORDER: ICD-10-CM

## 2020-06-17 DIAGNOSIS — M19.011 PRIMARY OSTEOARTHRITIS OF BOTH SHOULDERS: ICD-10-CM

## 2020-06-17 DIAGNOSIS — G56.03 BILATERAL CARPAL TUNNEL SYNDROME: ICD-10-CM

## 2020-06-17 DIAGNOSIS — Z12.11 SCREENING FOR MALIGNANT NEOPLASM OF COLON: ICD-10-CM

## 2020-06-17 DIAGNOSIS — Z01.89 ENCOUNTER FOR ROUTINE LABORATORY TESTING: ICD-10-CM

## 2020-06-17 DIAGNOSIS — M19.012 PRIMARY OSTEOARTHRITIS OF BOTH SHOULDERS: ICD-10-CM

## 2020-06-17 DIAGNOSIS — Z13.220 ENCOUNTER FOR LIPID SCREENING FOR CARDIOVASCULAR DISEASE: ICD-10-CM

## 2020-06-17 DIAGNOSIS — Z01.84 IMMUNITY STATUS TESTING: ICD-10-CM

## 2020-06-17 DIAGNOSIS — L20.89 FLEXURAL ATOPIC DERMATITIS: ICD-10-CM

## 2020-06-17 DIAGNOSIS — Z12.39 SCREENING FOR MALIGNANT NEOPLASM OF BREAST: ICD-10-CM

## 2020-06-17 DIAGNOSIS — M43.17 SPONDYLOLISTHESIS OF LUMBOSACRAL REGION: ICD-10-CM

## 2020-06-17 DIAGNOSIS — G47.33 OSA (OBSTRUCTIVE SLEEP APNEA): ICD-10-CM

## 2020-06-17 DIAGNOSIS — E78.2 MIXED HYPERLIPIDEMIA: ICD-10-CM

## 2020-06-17 DIAGNOSIS — R51.9 HEADACHE DISORDER: ICD-10-CM

## 2020-06-17 DIAGNOSIS — Z00.00 ANNUAL PHYSICAL EXAM: Primary | ICD-10-CM

## 2020-06-17 DIAGNOSIS — I10 ESSENTIAL HYPERTENSION: ICD-10-CM

## 2020-06-17 DIAGNOSIS — I51.89 DIASTOLIC DYSFUNCTION WITHOUT HEART FAILURE: ICD-10-CM

## 2020-06-17 DIAGNOSIS — Z76.89 ENCOUNTER TO ESTABLISH CARE: ICD-10-CM

## 2020-06-17 DIAGNOSIS — E83.52 HYPERCALCEMIA: ICD-10-CM

## 2020-06-17 DIAGNOSIS — Z13.6 ENCOUNTER FOR LIPID SCREENING FOR CARDIOVASCULAR DISEASE: ICD-10-CM

## 2020-06-17 DIAGNOSIS — E66.01 CLASS 3 SEVERE OBESITY DUE TO EXCESS CALORIES WITH SERIOUS COMORBIDITY AND BODY MASS INDEX (BMI) OF 40.0 TO 44.9 IN ADULT: ICD-10-CM

## 2020-06-17 DIAGNOSIS — Z13.21 ENCOUNTER FOR VITAMIN DEFICIENCY SCREENING: ICD-10-CM

## 2020-06-17 DIAGNOSIS — I65.21 STENOSIS OF RIGHT CAROTID ARTERY: ICD-10-CM

## 2020-06-17 DIAGNOSIS — G47.33 OSA (OBSTRUCTIVE SLEEP APNEA): Primary | ICD-10-CM

## 2020-06-17 DIAGNOSIS — K21.9 GASTROESOPHAGEAL REFLUX DISEASE WITHOUT ESOPHAGITIS: ICD-10-CM

## 2020-06-17 DIAGNOSIS — E21.3 HYPERPARATHYROIDISM: ICD-10-CM

## 2020-06-17 DIAGNOSIS — M48.062 NEUROGENIC CLAUDICATION DUE TO LUMBAR SPINAL STENOSIS: ICD-10-CM

## 2020-06-17 PROCEDURE — 86769 SARS-COV-2 COVID-19 ANTIBODY: CPT | Mod: HCNC

## 2020-06-17 PROCEDURE — 99397 PR PREVENTIVE VISIT,EST,65 & OVER: ICD-10-PCS | Mod: HCNC,S$GLB,, | Performed by: FAMILY MEDICINE

## 2020-06-17 PROCEDURE — 99499 RISK ADDL DX/OHS AUDIT: ICD-10-PCS | Mod: HCNC,S$GLB,, | Performed by: FAMILY MEDICINE

## 2020-06-17 PROCEDURE — 3075F PR MOST RECENT SYSTOLIC BLOOD PRESS GE 130-139MM HG: ICD-10-PCS | Mod: HCNC,CPTII,S$GLB, | Performed by: FAMILY MEDICINE

## 2020-06-17 PROCEDURE — 99999 PR PBB SHADOW E&M-EST. PATIENT-LVL V: ICD-10-PCS | Mod: PBBFAC,HCNC,, | Performed by: FAMILY MEDICINE

## 2020-06-17 PROCEDURE — 82043 UR ALBUMIN QUANTITATIVE: CPT | Mod: HCNC

## 2020-06-17 PROCEDURE — 80061 LIPID PANEL: CPT | Mod: HCNC

## 2020-06-17 PROCEDURE — 3075F SYST BP GE 130 - 139MM HG: CPT | Mod: HCNC,CPTII,S$GLB, | Performed by: FAMILY MEDICINE

## 2020-06-17 PROCEDURE — 3078F PR MOST RECENT DIASTOLIC BLOOD PRESSURE < 80 MM HG: ICD-10-PCS | Mod: HCNC,CPTII,S$GLB, | Performed by: FAMILY MEDICINE

## 2020-06-17 PROCEDURE — 80053 COMPREHEN METABOLIC PANEL: CPT | Mod: HCNC

## 2020-06-17 PROCEDURE — 36415 COLL VENOUS BLD VENIPUNCTURE: CPT | Mod: HCNC,PN

## 2020-06-17 PROCEDURE — 99397 PER PM REEVAL EST PAT 65+ YR: CPT | Mod: HCNC,S$GLB,, | Performed by: FAMILY MEDICINE

## 2020-06-17 PROCEDURE — 85025 COMPLETE CBC W/AUTO DIFF WBC: CPT | Mod: HCNC

## 2020-06-17 PROCEDURE — 99499 UNLISTED E&M SERVICE: CPT | Mod: HCNC,S$GLB,, | Performed by: FAMILY MEDICINE

## 2020-06-17 PROCEDURE — 99999 PR PBB SHADOW E&M-EST. PATIENT-LVL V: CPT | Mod: PBBFAC,HCNC,, | Performed by: FAMILY MEDICINE

## 2020-06-17 PROCEDURE — 3078F DIAST BP <80 MM HG: CPT | Mod: HCNC,CPTII,S$GLB, | Performed by: FAMILY MEDICINE

## 2020-06-17 RX ORDER — PANTOPRAZOLE SODIUM 40 MG/1
40 TABLET, DELAYED RELEASE ORAL DAILY PRN
Qty: 90 TABLET | Refills: 3
Start: 2020-06-17 | End: 2022-06-08

## 2020-06-17 RX ORDER — MAGNESIUM HYDROXIDE 400 MG/5ML
1 SUSPENSION, ORAL (FINAL DOSE FORM) ORAL DAILY
Qty: 90 TABLET | Refills: 3 | Status: SHIPPED | OUTPATIENT
Start: 2020-06-17 | End: 2022-06-08

## 2020-06-17 RX ORDER — GABAPENTIN 300 MG/1
300 CAPSULE ORAL 2 TIMES DAILY PRN
Qty: 180 CAPSULE | Refills: 3 | Status: SHIPPED | OUTPATIENT
Start: 2020-06-17 | End: 2021-01-26

## 2020-06-17 RX ORDER — CETIRIZINE HYDROCHLORIDE 10 MG/1
10 TABLET ORAL DAILY
Qty: 90 TABLET | Refills: 3 | Status: SHIPPED | OUTPATIENT
Start: 2020-06-17 | End: 2022-12-08 | Stop reason: SDUPTHER

## 2020-06-17 RX ORDER — TRIAMCINOLONE ACETONIDE 1 MG/G
CREAM TOPICAL 2 TIMES DAILY PRN
Start: 2020-06-17 | End: 2021-06-09

## 2020-06-17 RX ORDER — FLUTICASONE PROPIONATE 50 MCG
1 SPRAY, SUSPENSION (ML) NASAL DAILY
Qty: 16 G | Refills: 3 | Status: SHIPPED | OUTPATIENT
Start: 2020-06-17 | End: 2020-11-17

## 2020-06-17 RX ORDER — SUMATRIPTAN SUCCINATE 100 MG/1
100 TABLET ORAL DAILY PRN
Qty: 27 TABLET | Refills: 3 | Status: SHIPPED | OUTPATIENT
Start: 2020-06-17 | End: 2022-06-08 | Stop reason: SDUPTHER

## 2020-06-17 RX ORDER — BETAMETHASONE VALERATE 1.2 MG/G
OINTMENT TOPICAL DAILY
Qty: 45 G | Refills: 3 | Status: SHIPPED | OUTPATIENT
Start: 2020-06-17 | End: 2023-09-05

## 2020-06-17 RX ORDER — HYDROCHLOROTHIAZIDE 12.5 MG/1
TABLET ORAL
Qty: 45 TABLET | Refills: 3
Start: 2020-06-17 | End: 2022-04-06 | Stop reason: SDUPTHER

## 2020-06-17 RX ORDER — ASPIRIN 81 MG/1
81 TABLET ORAL
Start: 2020-06-17 | End: 2022-06-08

## 2020-06-17 NOTE — TELEPHONE ENCOUNTER
----- Message from Velma Bryant MD sent at 6/17/2020  1:04 PM CDT -----  Please assist in finding patient's CPAP prescription.  I need to ensure that she has humidifier on board. Thanks.

## 2020-06-17 NOTE — TELEPHONE ENCOUNTER
Please let patient know that her CPAP supply was sent in with humidification.  As a result, the next step is to discuss with the dentist about oral appliances since she is unable to tolerate the CPAP machine due to dryness of the airways.  An alternative will be to discuss with the ENT regarding surgical measures for sleep apnea.

## 2020-06-17 NOTE — TELEPHONE ENCOUNTER
Auto CPAP supplies pended per [facility] original order. Confirmed with the pt that the order should go to [facility].

## 2020-06-17 NOTE — PROGRESS NOTES
Subjective:       Patient ID: Nilsa Curiel is a 74 y.o. female.    Chief Complaint: Annual physical, Establish Care and Hypertension    75 yo female presents for annual physical exam.    She reports no adverse events with her medication.  She is in need of some refills.    She states that she gets sinus infections and has residual non productive cough for months afterward.  She is not using any nasal sprays or antihistamines.  She uses Protonix on occasion.  Denies fever, chills, unintentional weight loss, nasal congestion.  She currently has no sinus pressure.  No known exposure to COVID-19.    The following portions of the patient's history were reviewed and updated as appropriate: allergies, current medications, past family history, past medical history, past social history, past surgical history and problem list.        Review of Systems   Constitutional: Negative for activity change, appetite change, chills, diaphoresis, fatigue, fever and unexpected weight change.   HENT: Positive for postnasal drip and rhinorrhea. Negative for nasal congestion, dental problem, facial swelling, hearing loss, nosebleeds, sore throat, tinnitus and trouble swallowing.    Eyes: Negative for pain, discharge, itching and visual disturbance.   Respiratory: Positive for cough. Negative for apnea, chest tightness, shortness of breath, wheezing and stridor.    Cardiovascular: Negative for chest pain, palpitations and leg swelling.   Gastrointestinal: Negative for abdominal distention, abdominal pain, blood in stool, constipation, diarrhea, nausea, rectal pain and vomiting.   Endocrine: Negative for cold intolerance, heat intolerance, polydipsia and polyuria.   Genitourinary: Negative for difficulty urinating, dysuria, frequency, hematuria, menstrual problem and urgency.   Musculoskeletal: Positive for arthralgias. Negative for gait problem, joint swelling, myalgias, neck pain and neck stiffness.   Integumentary:  Negative for color  "change and rash.   Neurological: Positive for numbness and headaches. Negative for dizziness, tremors, seizures, syncope, facial asymmetry and weakness.   Hematological: Negative for adenopathy. Does not bruise/bleed easily.   Psychiatric/Behavioral: Negative for agitation, confusion, dysphoric mood, hallucinations, self-injury and suicidal ideas. The patient is not hyperactive.          Objective:       Vitals:    06/17/20 0905   BP: 132/62   BP Location: Right arm   Patient Position: Sitting   BP Method: Large (Manual)   Pulse: 86   Resp: 20   Temp: 98.1 °F (36.7 °C)   TempSrc: Oral   Weight: 91.8 kg (202 lb 6.1 oz)   Height: 4' 11.5" (1.511 m)     Physical Exam  Constitutional:       General: She is not in acute distress.     Appearance: She is well-developed. She is not diaphoretic.   HENT:      Head: Normocephalic and atraumatic.      Comments: No sinus tenderness     Right Ear: External ear normal.      Left Ear: External ear normal.      Nose: No congestion.   Eyes:      General: No scleral icterus.        Right eye: No discharge.         Left eye: No discharge.      Conjunctiva/sclera: Conjunctivae normal.      Pupils: Pupils are equal, round, and reactive to light.   Neck:      Musculoskeletal: Normal range of motion and neck supple.      Thyroid: No thyromegaly.   Cardiovascular:      Rate and Rhythm: Normal rate and regular rhythm.      Heart sounds: Normal heart sounds. No murmur. No friction rub. No gallop.    Pulmonary:      Effort: Pulmonary effort is normal. No respiratory distress.      Breath sounds: Normal breath sounds.   Chest:      Chest wall: No tenderness.   Abdominal:      General: Bowel sounds are normal. There is distension (obese).      Palpations: Abdomen is soft. There is no mass.      Tenderness: There is no abdominal tenderness. There is no guarding or rebound.   Musculoskeletal: Normal range of motion.   Lymphadenopathy:      Cervical: No cervical adenopathy.   Skin:     General: " Skin is warm.      Capillary Refill: Capillary refill takes less than 2 seconds.      Findings: No rash.   Neurological:      Mental Status: She is alert and oriented to person, place, and time.      Cranial Nerves: No cranial nerve deficit.      Motor: No abnormal muscle tone.      Coordination: Coordination normal.      Deep Tendon Reflexes: Reflexes normal.   Psychiatric:         Thought Content: Thought content normal.         Judgment: Judgment normal.         Assessment:       1. Annual physical exam    2. Encounter to establish care    3. Sinus disorder    4. Flexural atopic dermatitis    5. Headache disorder    6. Stenosis of right carotid artery    7. Mild aortic sclerosis    8. Diastolic dysfunction without heart failure    9. Essential hypertension    10. MONE (obstructive sleep apnea)    11. Mixed hyperlipidemia    12. Gastroesophageal reflux disease without esophagitis    13. Class 3 severe obesity due to excess calories with serious comorbidity and body mass index (BMI) of 40.0 to 44.9 in adult    14. Neurogenic claudication due to lumbar spinal stenosis    15. Spondylolisthesis of lumbosacral region    16. Primary osteoarthritis of both shoulders    17. Bilateral carpal tunnel syndrome    18. Immunity status testing    19. Hypercalcemia    20. Hyperparathyroidism    21. Encounter for lipid screening for cardiovascular disease    22. Encounter for routine laboratory testing    23. Encounter for vitamin deficiency screening    24. Screening for malignant neoplasm of breast    25. Screening for malignant neoplasm of colon        Plan:       1. Annual physical exam  2. Encounter to establish care  Age appropriate prevention guidelines implemented, unless patient refused  Encourage Advanced directives  Labs ordered   Immunizations reviewed. Encourage yearly influenza vaccine.  Patient Counseling:  --Nutrition: Encourage healthy food choices, adequate water intake, moderate sodium/caffeine intake, diet low  in saturated fat and cholesterol. Importance of caloric balance and sufficient intake of fresh fruits, vegetables, fiber, calcium, iron, and 1 mg of folate supplement per day (for females capable of pregnancy).  --Exercise: Stressed the importance of regular exercise.   --Substance Abuse: Abstinence from tobacco products. No more than 2 alcoholic drinks per day in men or 1 alcoholic drink per day in women. Avoid illicit drugs, driving or other dangerous activities under the influence. In the event of abuse, treatment offered.   --Sexuality: Safe sex practices to avoid sexually transmitted diseases; careful partner selection, use of condoms and contraceptive alternatives, avoidance of unintended pregnancy in fertile women of child-bearing age  --Injury prevention: encourage safety belts, safety helmets, smoke detector.  --Dental health: Discussed importance of regular tooth brushing X2 daily, flossing X1 daily, and routine dental visits.  --Encourage use of sun screen, wear sun protective clothing  --Encourage routine eye exams    3. Sinus disorder  -     fluticasone propionate (FLONASE) 50 mcg/actuation nasal spray; 1 spray (50 mcg total) by Each Nostril route once daily.  Dispense: 16 g; Refill: 3  -     Ambulatory referral/consult to ENT; Future; Expected date: 06/24/2020  -     cetirizine (ZYRTEC) 10 MG tablet; Take 1 tablet (10 mg total) by mouth once daily.  Dispense: 90 tablet; Refill: 3    4. Flexural atopic dermatitis  -     triamcinolone acetonide 0.1% (KENALOG) 0.1 % cream; Apply topically 2 (two) times daily as needed.  or  -     betamethasone valerate 0.1% (VALISONE) 0.1 % Oint; Apply topically once daily.  Dispense: 45 g; Refill: 3    5. Headache disorder  -     sumatriptan (IMITREX) 100 MG tablet; Take 1 tablet (100 mg total) by mouth daily as needed for Migraine. No more than 2 doses in 24 hours  Dispense: 27 tablet; Refill: 3  Follows with neurology    6. Stenosis of right carotid artery  Serial  repeat of carotid Doppler in future.  Optimize reversible cardiovascular risk factors.    7. Mild aortic sclerosis  -     aspirin (ECOTRIN) 81 MG EC tablet; Take 1 tablet (81 mg total) by mouth 3 (three) times a week.    8. Diastolic dysfunction without heart failure  Ensure adequate blood pressure control.  No signs or symptoms of heart failure.    9. Essential hypertension  -     potassium gluconate 595 mg (99 mg) Tab; Take 1 tablet by mouth once daily.  Dispense: 90 tablet; Refill: 3, drug-induced hypokalemia on diuretic  -     hydroCHLOROthiazide (HYDRODIURIL) 12.5 MG Tab; 1/2 tablet daily  Dispense: 45 tablet; Refill: 3  -     CBC auto differential; Future; Expected date: 06/17/2020  -     Comprehensive metabolic panel; Future; Expected date: 06/17/2020  -     Microalbumin/creatinine urine ratio    10. MONE (obstructive sleep apnea)  Reports intolerance to CPAP due to dryness of mouth/nasal passages.  This can also worsen her cough symptoms.  Will check her CPAP prescription to ensure that she is on humidification; she is on humidifier but continues to have symptoms then following with a dentist for oral appliances will be indicated.    11. Mixed hyperlipidemia  On pravastatin    12. Gastroesophageal reflux disease without esophagitis  -     pantoprazole (PROTONIX) 40 MG tablet; Take 1 tablet (40 mg total) by mouth daily as needed.  Dispense: 90 tablet; Refill: 3    13. Class 3 severe obesity due to excess calories with serious comorbidity and body mass index (BMI) of 40.0 to 44.9 in adult  The importance of optimum diet & exercise discussed. The goals for weight management is 5-10 % of total body weight reduction in 3 months.     The consumption of a reduced calorie diet, frequent self-weighing, and participation in a lifestyle intervention program are strategies to help maintain weight loss.     14. Neurogenic claudication due to lumbar spinal stenosis  -     gabapentin (NEURONTIN) 300 MG capsule; Take 1  capsule (300 mg total) by mouth 2 (two) times daily as needed.  Dispense: 180 capsule; Refill: 3    15. Spondylolisthesis of lumbosacral region  16. Primary osteoarthritis of both shoulders  Patient will benefit from physical therapy whether supervised or in home program.  Continue conservative management.    17. Bilateral carpal tunnel syndrome  -     gabapentin (NEURONTIN) 300 MG capsule; Take 1 capsule (300 mg total) by mouth 2 (two) times daily as needed.  Dispense: 180 capsule; Refill: 3    18. Immunity status testing  -     COVID-19 (SARS CoV-2) IgG Antibody; Future; Expected date: 06/17/2020    19. Hypercalcemia  20. Hyperparathyroidism  Saw Roberta Rodriguez MD lost to follow up with endocrinology in 2018: counseled on surgery versus medical treatment    21. Encounter for lipid screening for cardiovascular disease  -     Lipid Panel; Future; Expected date: 06/17/2020    22. Encounter for routine laboratory testing  As ordered    23. Encounter for vitamin deficiency screening  Vitamin D recheck in patient with history of hypercalcemia    24. Screening for malignant neoplasm of breast  Schedule mammogram which was ordered    25. Screening for malignant neoplasm of colon  -     Fecal Immunochemical Test (iFOBT); Future  Risks, benefits and alternatives discussed    Disclaimer: This note has been generated using voice-recognition software. There may be typographical errors that have been missed during proof-reading

## 2020-06-17 NOTE — TELEPHONE ENCOUNTER
Patient has been informed, and verbalized understanding.    She will get a dentist to discuss options.

## 2020-06-18 LAB
ALBUMIN SERPL BCP-MCNC: 4.1 G/DL (ref 3.5–5.2)
ALBUMIN/CREAT UR: NORMAL UG/MG (ref 0–30)
ALP SERPL-CCNC: 122 U/L (ref 55–135)
ALT SERPL W/O P-5'-P-CCNC: 16 U/L (ref 10–44)
ANION GAP SERPL CALC-SCNC: 12 MMOL/L (ref 8–16)
AST SERPL-CCNC: 24 U/L (ref 10–40)
BASOPHILS # BLD AUTO: 0.06 K/UL (ref 0–0.2)
BASOPHILS NFR BLD: 0.9 % (ref 0–1.9)
BILIRUB SERPL-MCNC: 0.4 MG/DL (ref 0.1–1)
BUN SERPL-MCNC: 14 MG/DL (ref 8–23)
CALCIUM SERPL-MCNC: 10.9 MG/DL (ref 8.7–10.5)
CHLORIDE SERPL-SCNC: 108 MMOL/L (ref 95–110)
CHOLEST SERPL-MCNC: 194 MG/DL (ref 120–199)
CHOLEST/HDLC SERPL: 4 {RATIO} (ref 2–5)
CO2 SERPL-SCNC: 20 MMOL/L (ref 23–29)
CREAT SERPL-MCNC: 1.2 MG/DL (ref 0.5–1.4)
CREAT UR-MCNC: 31 MG/DL (ref 15–325)
DIFFERENTIAL METHOD: ABNORMAL
EOSINOPHIL # BLD AUTO: 0.2 K/UL (ref 0–0.5)
EOSINOPHIL NFR BLD: 3.6 % (ref 0–8)
ERYTHROCYTE [DISTWIDTH] IN BLOOD BY AUTOMATED COUNT: 14.4 % (ref 11.5–14.5)
EST. GFR  (AFRICAN AMERICAN): 52.2 ML/MIN/1.73 M^2
EST. GFR  (NON AFRICAN AMERICAN): 45.3 ML/MIN/1.73 M^2
GLUCOSE SERPL-MCNC: 91 MG/DL (ref 70–110)
HCT VFR BLD AUTO: 37.6 % (ref 37–48.5)
HDLC SERPL-MCNC: 48 MG/DL (ref 40–75)
HDLC SERPL: 24.7 % (ref 20–50)
HGB BLD-MCNC: 10.8 G/DL (ref 12–16)
IMM GRANULOCYTES # BLD AUTO: 0.01 K/UL (ref 0–0.04)
IMM GRANULOCYTES NFR BLD AUTO: 0.1 % (ref 0–0.5)
LDLC SERPL CALC-MCNC: 109.8 MG/DL (ref 63–159)
LYMPHOCYTES # BLD AUTO: 2.8 K/UL (ref 1–4.8)
LYMPHOCYTES NFR BLD: 41.1 % (ref 18–48)
MCH RBC QN AUTO: 24.2 PG (ref 27–31)
MCHC RBC AUTO-ENTMCNC: 28.7 G/DL (ref 32–36)
MCV RBC AUTO: 84 FL (ref 82–98)
MICROALBUMIN UR DL<=1MG/L-MCNC: <2.5 UG/ML
MONOCYTES # BLD AUTO: 0.6 K/UL (ref 0.3–1)
MONOCYTES NFR BLD: 8.3 % (ref 4–15)
NEUTROPHILS # BLD AUTO: 3.1 K/UL (ref 1.8–7.7)
NEUTROPHILS NFR BLD: 46 % (ref 38–73)
NONHDLC SERPL-MCNC: 146 MG/DL
NRBC BLD-RTO: 0 /100 WBC
PLATELET # BLD AUTO: 364 K/UL (ref 150–350)
PMV BLD AUTO: 10.7 FL (ref 9.2–12.9)
POTASSIUM SERPL-SCNC: 4 MMOL/L (ref 3.5–5.1)
PROT SERPL-MCNC: 8.5 G/DL (ref 6–8.4)
RBC # BLD AUTO: 4.46 M/UL (ref 4–5.4)
SARS-COV-2 IGG SERPLBLD QL IA.RAPID: POSITIVE
SODIUM SERPL-SCNC: 140 MMOL/L (ref 136–145)
TRIGL SERPL-MCNC: 181 MG/DL (ref 30–150)
WBC # BLD AUTO: 6.74 K/UL (ref 3.9–12.7)

## 2020-06-19 ENCOUNTER — TELEPHONE (OUTPATIENT)
Dept: PRIMARY CARE CLINIC | Facility: CLINIC | Age: 75
End: 2020-06-19

## 2020-06-19 NOTE — TELEPHONE ENCOUNTER
Patient has been informed, and verbalized understanding.  She plans to bring the FIT Kit test in Monday. Call transferred to referral for endocrinology appointment.

## 2020-06-19 NOTE — TELEPHONE ENCOUNTER
----- Message from Velma Bryant MD sent at 6/19/2020  1:00 PM CDT -----  Please let me know if patient is interested in seeing endocrinology again regarding hyperparathyroidism.  She has been exposed to COVID-19 and has developed antibodies. She needs to proceed with FIT test ordered re: anemia.    NB: Last saw Roberta Rodriguez MD lost to follow up with endocrinology in 2018: counseled on surgery versus medical treatment for hyperparathyroidism.    Message sent to portal and letter sent    No diabetes    Calcium is slightly elevated. You were seen by endocrinology. Let me know if you would like to see endocrinology again to discuss treatment options.    Impaired kidney functions.  Avoid nonsteroidal anti-inflammatories.  Encouraged adequate hydration.    Triglycerides (fats) are elevated.  Encourage healthy food choices and increase physical activity.    Anemia stable.    You have developed antibodies to COVID-19.  Encourage adequate hand washing, physical distancing and use of face mask.

## 2020-06-22 ENCOUNTER — HOSPITAL ENCOUNTER (OUTPATIENT)
Dept: RADIOLOGY | Facility: HOSPITAL | Age: 75
Discharge: HOME | End: 2020-06-22
Attending: NURSE PRACTITIONER
Payer: MEDICARE

## 2020-06-22 DIAGNOSIS — Z12.31 ENCOUNTER FOR SCREENING MAMMOGRAM FOR MALIGNANT NEOPLASM OF BREAST: ICD-10-CM

## 2020-06-22 DIAGNOSIS — Z80.3 FH: BREAST CANCER IN FIRST DEGREE RELATIVE: ICD-10-CM

## 2020-06-22 PROCEDURE — 77067 SCR MAMMO BI INCL CAD: CPT | Mod: TC,HCNC,PN

## 2020-06-22 PROCEDURE — 77063 BREAST TOMOSYNTHESIS BI: CPT | Mod: 26,HCNC,, | Performed by: RADIOLOGY

## 2020-06-22 PROCEDURE — 77067 MAMMO DIGITAL SCREENING BILAT WITH TOMOSYNTHESIS_CAD: ICD-10-PCS | Mod: 26,HCNC,, | Performed by: RADIOLOGY

## 2020-06-22 PROCEDURE — 77063 MAMMO DIGITAL SCREENING BILAT WITH TOMOSYNTHESIS_CAD: ICD-10-PCS | Mod: 26,HCNC,, | Performed by: RADIOLOGY

## 2020-06-22 PROCEDURE — 77067 SCR MAMMO BI INCL CAD: CPT | Mod: 26,HCNC,, | Performed by: RADIOLOGY

## 2020-08-18 ENCOUNTER — OFFICE VISIT (OUTPATIENT)
Dept: INTERNAL MEDICINE | Facility: CLINIC | Age: 75
End: 2020-08-18
Payer: MEDICARE

## 2020-08-18 VITALS
HEIGHT: 60 IN | BODY MASS INDEX: 40.98 KG/M2 | HEART RATE: 103 BPM | SYSTOLIC BLOOD PRESSURE: 138 MMHG | DIASTOLIC BLOOD PRESSURE: 76 MMHG | WEIGHT: 208.75 LBS | OXYGEN SATURATION: 97 %

## 2020-08-18 DIAGNOSIS — Z01.419 WELL WOMAN EXAM WITH ROUTINE GYNECOLOGICAL EXAM: ICD-10-CM

## 2020-08-18 DIAGNOSIS — I65.21 STENOSIS OF RIGHT CAROTID ARTERY: ICD-10-CM

## 2020-08-18 DIAGNOSIS — E66.01 CLASS 3 SEVERE OBESITY DUE TO EXCESS CALORIES WITH SERIOUS COMORBIDITY AND BODY MASS INDEX (BMI) OF 40.0 TO 44.9 IN ADULT: ICD-10-CM

## 2020-08-18 DIAGNOSIS — K21.9 GASTROESOPHAGEAL REFLUX DISEASE WITHOUT ESOPHAGITIS: ICD-10-CM

## 2020-08-18 DIAGNOSIS — R51.9 HEADACHE DISORDER: ICD-10-CM

## 2020-08-18 DIAGNOSIS — I10 ESSENTIAL HYPERTENSION: ICD-10-CM

## 2020-08-18 DIAGNOSIS — E78.2 MIXED HYPERLIPIDEMIA: ICD-10-CM

## 2020-08-18 DIAGNOSIS — E21.3 HYPERPARATHYROIDISM: ICD-10-CM

## 2020-08-18 DIAGNOSIS — G47.33 OSA (OBSTRUCTIVE SLEEP APNEA): ICD-10-CM

## 2020-08-18 DIAGNOSIS — I42.1 HYPERTROPHIC OBSTRUCTIVE CARDIOMYOPATHY (HOCM): ICD-10-CM

## 2020-08-18 DIAGNOSIS — Z00.00 ENCOUNTER FOR PREVENTIVE HEALTH EXAMINATION: Primary | ICD-10-CM

## 2020-08-18 DIAGNOSIS — N18.30 CHRONIC KIDNEY DISEASE, STAGE III (MODERATE): ICD-10-CM

## 2020-08-18 PROCEDURE — 3075F SYST BP GE 130 - 139MM HG: CPT | Mod: HCNC,CPTII,S$GLB, | Performed by: NURSE PRACTITIONER

## 2020-08-18 PROCEDURE — 3078F PR MOST RECENT DIASTOLIC BLOOD PRESSURE < 80 MM HG: ICD-10-PCS | Mod: HCNC,CPTII,S$GLB, | Performed by: NURSE PRACTITIONER

## 2020-08-18 PROCEDURE — 3078F DIAST BP <80 MM HG: CPT | Mod: HCNC,CPTII,S$GLB, | Performed by: NURSE PRACTITIONER

## 2020-08-18 PROCEDURE — 99499 RISK ADDL DX/OHS AUDIT: ICD-10-PCS | Mod: HCNC,S$GLB,, | Performed by: NURSE PRACTITIONER

## 2020-08-18 PROCEDURE — G0439 PPPS, SUBSEQ VISIT: HCPCS | Mod: HCNC,S$GLB,, | Performed by: NURSE PRACTITIONER

## 2020-08-18 PROCEDURE — G0439 PR MEDICARE ANNUAL WELLNESS SUBSEQUENT VISIT: ICD-10-PCS | Mod: HCNC,S$GLB,, | Performed by: NURSE PRACTITIONER

## 2020-08-18 PROCEDURE — 3075F PR MOST RECENT SYSTOLIC BLOOD PRESS GE 130-139MM HG: ICD-10-PCS | Mod: HCNC,CPTII,S$GLB, | Performed by: NURSE PRACTITIONER

## 2020-08-18 PROCEDURE — 99999 PR PBB SHADOW E&M-EST. PATIENT-LVL V: ICD-10-PCS | Mod: PBBFAC,HCNC,, | Performed by: NURSE PRACTITIONER

## 2020-08-18 PROCEDURE — 99999 PR PBB SHADOW E&M-EST. PATIENT-LVL V: CPT | Mod: PBBFAC,HCNC,, | Performed by: NURSE PRACTITIONER

## 2020-08-18 PROCEDURE — 99499 UNLISTED E&M SERVICE: CPT | Mod: HCNC,S$GLB,, | Performed by: NURSE PRACTITIONER

## 2020-08-18 NOTE — PATIENT INSTRUCTIONS
Counseling and Referral of Other Preventative  (Italic type indicates deductible and co-insurance are waived)    Patient Name: Nilsa Curiel  Today's Date: 8/18/2020    Health Maintenance       Date Due Completion Date    Shingles Vaccine (1 of 2) 11/08/1997 ---    Influenza Vaccine (1) 09/01/2020 9/27/2019    Colorectal Cancer Screening 06/22/2021 6/22/2020    Mammogram 06/22/2021 6/22/2020    High Dose Statin 08/18/2021 8/18/2020    Aspirin/Antiplatelet Therapy 08/18/2021 8/18/2020    Override on 6/28/2019: Done    TETANUS VACCINE 01/01/2022 1/1/2012    DEXA SCAN 03/19/2024 3/19/2019    Lipid Panel 06/17/2025 6/17/2020        Orders Placed This Encounter   Procedures    Ambulatory referral/consult to Gynecology    Ambulatory referral/consult to Gastroenterology     The following information is provided to all patients.  This information is to help you find resources for any of the problems found today that may be affecting your health:                Living healthy guide: www.[facility].[place].gov      Understanding Diabetes: www.diabetes.org      Eating healthy: www.cdc.gov/healthyweight      CDC home safety checklist: www.cdc.gov/steadi/patient.html      Agency on Aging: www.goea.[place].gov      Alcoholics anonymous (AA): www.aa.org      Physical Activity: www.mariela.nih.gov/oa9wlmg      Tobacco use: www.quitwithusla.org

## 2020-08-18 NOTE — PROGRESS NOTES
"  Nilsa Curiel presented for a  Medicare AWV and comprehensive Health Risk Assessment today. The following components were reviewed and updated:    · Medical history  · Family History  · Social history  · Allergies and Current Medications  · Health Risk Assessment  · Health Maintenance  · Care Team     ** See Completed Assessments for Annual Wellness Visit within the encounter summary.**         The following assessments were completed:  · Living Situation  · CAGE  · Depression Screening  · Timed Get Up and Go  · Whisper Test  Cognitive Function Screening    ·   · Nutrition Screening  · ADL Screening  · PAQ Screening        Vitals:    08/18/20 0956   BP: 138/76   Pulse: 103   SpO2: 97%   Weight: 94.7 kg (208 lb 12.4 oz)   Height: 4' 11.5" (1.511 m)     Body mass index is 41.46 kg/m².  Physical Exam  Vitals signs and nursing note reviewed.   Constitutional:       General: She is not in acute distress.     Appearance: She is well-developed. She is obese. She is not ill-appearing, toxic-appearing or diaphoretic.   HENT:      Head: Normocephalic and atraumatic.      Nose: Nose normal.   Eyes:      Conjunctiva/sclera: Conjunctivae normal.   Cardiovascular:      Rate and Rhythm: Normal rate and regular rhythm.      Heart sounds: Murmur present.   Pulmonary:      Effort: Pulmonary effort is normal. No respiratory distress.      Breath sounds: Normal breath sounds. No wheezing.   Musculoskeletal:      Right lower leg: No edema.      Left lower leg: No edema.      Comments: Left hip discomfort with changing positions   Skin:     General: Skin is warm and dry.   Neurological:      Mental Status: She is alert and oriented to person, place, and time.   Psychiatric:         Behavior: Behavior normal.         Thought Content: Thought content normal.         Judgment: Judgment normal.               Diagnoses and health risks identified today and associated recommendations/orders:    1. Encounter for preventive health " examination  Assessment performed. Health maintenance updated. Chart review completed.    2. Mixed hyperlipidemia  Chronic.Stable. Followed by PCP    3. Essential hypertension  Chronic.Stable. Followed by PCP    4. Stenosis of right carotid artery  Chronic. Continue current regimen as instructed. Followed by Cardiology    5. Mild aortic sclerosis  Chronic. Continue current regimen as instructed. Followed by Cardiology    6. Hypertrophic obstructive cardiomyopathy (HOCM)  Chronic. Continue current regimen as instructed. Followed by Cardiology    7. Chronic kidney disease, stage III (moderate)  Chronic.Stable. Followed by PCP    8. Hyperparathyroidism  Chronic. Continue current regimen as instructed. Followed by PCP    9. Gastroesophageal reflux disease without esophagitis  Chronic. Followed by PCP  Would like to be evaluated by GI  - Ambulatory referral/consult to Gastroenterology; Future    10. Class 3 severe obesity due to excess calories with serious comorbidity and body mass index (BMI) of 40.0 to 44.9 in adult  Chronic. Continue current activity level.  Continue walking daily.    11. MONE (obstructive sleep apnea)  Chronic. Stable. Followed by CPAP.  Discussed using lozenges throughout day for dry mouth    12. Headache disorder  Chronic. Stable. Continue current regimen. Followed by Neurology    13. Well woman exam with routine gynecological exam  - Ambulatory referral/consult to Gynecology; Future      Provided Nilsa with a 5-10 year written screening schedule and personal prevention plan. Recommendations were developed using the USPSTF age appropriate recommendations. Education, counseling, and referrals were provided as needed. After Visit Summary printed and given to patient which includes a list of additional screenings\tests needed.    Follow up for Annual Wellness Visit in 1 year, F/U with PCP as instructed, ;sooner if problems.    OUMOU Pitts       I offered to discuss end of life issues,  including information on how to make advance directives that the patient could use to name someone who would make medical decisions on their behalf if they became too ill to make themselves.    ___Patient declined  _X_Patient has advanced directives on file.

## 2020-11-09 ENCOUNTER — PATIENT OUTREACH (OUTPATIENT)
Dept: ADMINISTRATIVE | Facility: OTHER | Age: 75
End: 2020-11-09

## 2020-11-09 NOTE — PROGRESS NOTES
Health Maintenance Due   Topic Date Due    Shingles Vaccine (1 of 2) 11/08/1997    Influenza Vaccine (1) 08/01/2020     Updates were requested from care everywhere.  Chart was reviewed for overdue Proactive [facility] Encounters (MARI) topics (CRS, Breast Cancer Screening, Eye exam)  Health Maintenance has been updated.  LINKS immunization registry triggered.  LINKS not responding.

## 2020-11-11 ENCOUNTER — TELEPHONE (OUTPATIENT)
Dept: OBSTETRICS AND GYNECOLOGY | Facility: CLINIC | Age: 75
End: 2020-11-11

## 2020-11-11 ENCOUNTER — OFFICE VISIT (OUTPATIENT)
Dept: OBSTETRICS AND GYNECOLOGY | Facility: CLINIC | Age: 75
End: 2020-11-11
Payer: MEDICARE

## 2020-11-11 VITALS
SYSTOLIC BLOOD PRESSURE: 136 MMHG | DIASTOLIC BLOOD PRESSURE: 76 MMHG | WEIGHT: 218.94 LBS | BODY MASS INDEX: 43.48 KG/M2

## 2020-11-11 DIAGNOSIS — R10.2 PELVIC PAIN: Primary | ICD-10-CM

## 2020-11-11 PROCEDURE — 1125F PR PAIN SEVERITY QUANTIFIED, PAIN PRESENT: ICD-10-PCS | Mod: HCNC,S$GLB,, | Performed by: NURSE PRACTITIONER

## 2020-11-11 PROCEDURE — 1125F AMNT PAIN NOTED PAIN PRSNT: CPT | Mod: HCNC,S$GLB,, | Performed by: NURSE PRACTITIONER

## 2020-11-11 PROCEDURE — 1101F PR PT FALLS ASSESS DOC 0-1 FALLS W/OUT INJ PAST YR: ICD-10-PCS | Mod: HCNC,CPTII,S$GLB, | Performed by: NURSE PRACTITIONER

## 2020-11-11 PROCEDURE — 99999 PR PBB SHADOW E&M-EST. PATIENT-LVL IV: ICD-10-PCS | Mod: PBBFAC,HCNC,, | Performed by: NURSE PRACTITIONER

## 2020-11-11 PROCEDURE — 3078F DIAST BP <80 MM HG: CPT | Mod: HCNC,CPTII,S$GLB, | Performed by: NURSE PRACTITIONER

## 2020-11-11 PROCEDURE — 1159F PR MEDICATION LIST DOCUMENTED IN MEDICAL RECORD: ICD-10-PCS | Mod: HCNC,S$GLB,, | Performed by: NURSE PRACTITIONER

## 2020-11-11 PROCEDURE — 3075F SYST BP GE 130 - 139MM HG: CPT | Mod: HCNC,CPTII,S$GLB, | Performed by: NURSE PRACTITIONER

## 2020-11-11 PROCEDURE — 3008F BODY MASS INDEX DOCD: CPT | Mod: HCNC,CPTII,S$GLB, | Performed by: NURSE PRACTITIONER

## 2020-11-11 PROCEDURE — 1101F PT FALLS ASSESS-DOCD LE1/YR: CPT | Mod: HCNC,CPTII,S$GLB, | Performed by: NURSE PRACTITIONER

## 2020-11-11 PROCEDURE — 99999 PR PBB SHADOW E&M-EST. PATIENT-LVL IV: CPT | Mod: PBBFAC,HCNC,, | Performed by: NURSE PRACTITIONER

## 2020-11-11 PROCEDURE — 3008F PR BODY MASS INDEX (BMI) DOCUMENTED: ICD-10-PCS | Mod: HCNC,CPTII,S$GLB, | Performed by: NURSE PRACTITIONER

## 2020-11-11 PROCEDURE — 1159F MED LIST DOCD IN RCRD: CPT | Mod: HCNC,S$GLB,, | Performed by: NURSE PRACTITIONER

## 2020-11-11 PROCEDURE — 3075F PR MOST RECENT SYSTOLIC BLOOD PRESS GE 130-139MM HG: ICD-10-PCS | Mod: HCNC,CPTII,S$GLB, | Performed by: NURSE PRACTITIONER

## 2020-11-11 PROCEDURE — 99203 OFFICE O/P NEW LOW 30 MIN: CPT | Mod: HCNC,S$GLB,, | Performed by: NURSE PRACTITIONER

## 2020-11-11 PROCEDURE — 99203 PR OFFICE/OUTPT VISIT, NEW, LEVL III, 30-44 MIN: ICD-10-PCS | Mod: HCNC,S$GLB,, | Performed by: NURSE PRACTITIONER

## 2020-11-11 PROCEDURE — 3078F PR MOST RECENT DIASTOLIC BLOOD PRESSURE < 80 MM HG: ICD-10-PCS | Mod: HCNC,CPTII,S$GLB, | Performed by: NURSE PRACTITIONER

## 2020-11-11 NOTE — TELEPHONE ENCOUNTER
----- Message from Codi Richardson NP sent at 11/11/2020  3:42 PM CST -----  Please call pt and inform her that I would like to do a urine culture due to her pelvic pain. Tell her she can drop off a urine sample at her convenience to any of our locations. Apologize for the inconvenience. Thanks!

## 2020-11-11 NOTE — PROGRESS NOTES
"History & Physical  Gynecology      SUBJECTIVE:     Chief Complaint: Pelvic Pain       History of Present Illness:  75 year old  female presents to discuss pelvic pain. Pt reports that for the past month or two she has had intermittent suprapubic pain which she describes as dull and crampy and rates as a 1-2 /10. She denies fever/chills/nausea/vomiting/diarrhea/constipation/vaginal bleeding or vaginal discharge. Has not tried any alleviating factors. Denies dysuria, frequency, or flank pain. Also reports that she recently noticed "a piece of tissue hanging from her vagina when wiping after urination". She denies hematuria, difficulty voiding, difficulty stooling.       Review of patient's allergies indicates:  No Known Allergies    Past Medical History:   Diagnosis Date    Acute bilateral low back pain without sciatica 10/10/2017    Anemia 2018    Arthritis     Atopic dermatitis     Chronic kidney disease, stage III (moderate) 2020    GERD (gastroesophageal reflux disease)     Hyperlipidemia     Hypertension     Left ventricular hypertrophy 2016    Morbid obesity with BMI of 40.0-44.9, adult 2016    MONE (obstructive sleep apnea) 2018     Past Surgical History:   Procedure Laterality Date     SECTION      x3    TUBAL LIGATION       OB History        3    Para   3    Term   3            AB        Living   3       SAB        TAB        Ectopic        Multiple        Live Births                   Family History   Problem Relation Age of Onset    Hypertension Mother     Dementia Mother     Diabetes Mother     Cancer Mother 91        breast    Breast cancer Mother 91    Other Father         sepsis    Diabetes Sister     Hypertension Sister     Hyperlipidemia Sister     Diabetes Brother     Cataracts Brother     Stroke Brother     Multiple sclerosis Daughter     Hypertension Son     Hypertension Sister     Cancer Sister         pancreatic  "    Kidney disease Sister     Heart disease Sister         premature    Gout Sister     Kidney disease Sister     No Known Problems Daughter     No Known Problems Brother     Other Brother         murdered    Glaucoma Neg Hx     Retinal detachment Neg Hx     Macular degeneration Neg Hx     Strabismus Neg Hx     Amblyopia Neg Hx     Blindness Neg Hx     Ovarian cancer Neg Hx      Social History     Tobacco Use    Smoking status: Never Smoker    Smokeless tobacco: Never Used   Substance Use Topics    Alcohol use: No     Alcohol/week: 0.0 standard drinks     Frequency: Never     Binge frequency: Never    Drug use: No       Current Outpatient Medications   Medication Sig    amLODIPine (NORVASC) 10 MG tablet Take 1 tablet (10 mg total) by mouth once daily.    aspirin (ECOTRIN) 81 MG EC tablet Take 1 tablet (81 mg total) by mouth 3 (three) times a week.    betamethasone valerate 0.1% (VALISONE) 0.1 % Oint Apply topically once daily.    biotin 300 mcg Tab Take 1 tablet by mouth once daily.    clobetasol (TEMOVATE) 0.05 % cream APPLY TO THE AFFECTED AREA TWICE DAILY    fluticasone propionate (FLONASE) 50 mcg/actuation nasal spray 1 spray (50 mcg total) by Each Nostril route once daily.    gabapentin (NEURONTIN) 300 MG capsule Take 1 capsule (300 mg total) by mouth 2 (two) times daily as needed.    hydroCHLOROthiazide (HYDRODIURIL) 12.5 MG Tab 1/2 tablet daily    ketoconazole (NIZORAL) 2 % cream AOO TO THE AFFECTED AREA TWICE DAILY, APPLY TO AXILLA AND ABDOMINAL CREASES    lactobacillus combo no.6 (PROBIOTIC COMPLEX) 4 billion cell Tab Take 1 tablet by mouth once daily.    MULTIVIT-MIN/FA/CA CARB/VIT K (WOMEN'S 50+ DAILY FORMULA ORAL) Take by mouth.    olmesartan (BENICAR) 40 MG tablet Take 1 tablet (40 mg total) by mouth once daily.    omega-3 fatty acids 300 mg Cap Take by mouth.    pantoprazole (PROTONIX) 40 MG tablet Take 1 tablet (40 mg total) by mouth daily as needed.    potassium  gluconate 595 mg (99 mg) Tab Take 1 tablet by mouth once daily.    pravastatin (PRAVACHOL) 40 MG tablet Take 1 tablet (40 mg total) by mouth once daily.    riboflavin, vitamin B2, (VITAMIN B-2 ORAL) Take 1 capsule by mouth once daily.    sumatriptan (IMITREX) 100 MG tablet Take 1 tablet (100 mg total) by mouth daily as needed for Migraine. No more than 2 doses in 24 hours    triamcinolone acetonide 0.1% (KENALOG) 0.1 % cream Apply topically 2 (two) times daily as needed.    TURMERIC ORAL Take by mouth.    ubidecarenone (COENZYME Q10) 100 mg Tab Take 1 tablet by mouth once daily.     albuterol (PROVENTIL/VENTOLIN HFA) 90 mcg/actuation inhaler Inhale 2 puffs into the lungs every 6 (six) hours as needed for Wheezing. Rescue PLEASE INCLUDE SPACER    cetirizine (ZYRTEC) 10 MG tablet Take 1 tablet (10 mg total) by mouth once daily. (Patient not taking: Reported on 8/18/2020)     No current facility-administered medications for this visit.          Review of Systems:  Review of Systems   Constitutional: Negative for chills, fatigue and fever.   Eyes: Negative for visual disturbance.   Respiratory: Negative for shortness of breath.    Cardiovascular: Negative for leg swelling.   Gastrointestinal: Negative for abdominal pain, bloating, constipation, diarrhea, nausea and vomiting.   Endocrine: Negative for hair loss and hot flashes.   Genitourinary: Positive for pelvic pain. Negative for dyspareunia, dysuria, flank pain, frequency, genital sores, hematuria, menorrhagia, urgency, vaginal bleeding, vaginal discharge, vaginal pain, urinary incontinence, vaginal dryness and vaginal odor.   Musculoskeletal: Negative for back pain.   Integumentary:  Negative for breast mass.   Neurological: Negative for headaches.   Psychiatric/Behavioral: Negative for depression.   Breast: Negative for asymmetry, lump and mass       OBJECTIVE:     Physical Exam:  Physical Exam  Vitals signs reviewed.   Constitutional:       Appearance: She  is well-developed.   HENT:      Head: Normocephalic and atraumatic.   Eyes:      Pupils: Pupils are equal, round, and reactive to light.   Neck:      Musculoskeletal: Normal range of motion.   Pulmonary:      Effort: Pulmonary effort is normal.   Abdominal:      Palpations: Abdomen is soft.   Genitourinary:     Labia:         Right: No rash, tenderness or lesion.         Left: No rash, tenderness or lesion.       Vagina: Normal. No vaginal discharge, tenderness or bleeding.      Cervix: No cervical motion tenderness, discharge or friability.      Adnexa:         Right: No mass or tenderness.          Left: No mass or tenderness.     Musculoskeletal: Normal range of motion.   Lymphadenopathy:      Lower Body: No right inguinal adenopathy. No left inguinal adenopathy.   Skin:     General: Skin is warm and dry.   Neurological:      Mental Status: She is alert and oriented to person, place, and time.   Psychiatric:         Behavior: Behavior normal.           ASSESSMENT:       ICD-10-CM ICD-9-CM    1. Pelvic pain  R10.2 [id number] Urine culture      US Pelvis Complete Non OB          Plan:      No abnormality noted on exam. No prolapse noted. No tenderness to palpation noted. Pelvic US and urine culture ordered. Pt will hold off for now on pelvic US and schedule if pain worsens. Precautions discussed.     F/u PRN     Codi Richardson

## 2020-11-11 NOTE — LETTER
November 11, 2020      Velma Bryant MD  [address]  [facility] [address]           [facility] - OB GYN  [address]  [facility] [address]  Phone: [phone]  Fax: [phone]          Patient: Nilsa Curiel   MR Number: [id number]   YOB: 1945   Date of Visit: 11/11/2020       Dear Dr. Velma Bryant:    Thank you for referring Nilsa Curiel to me for evaluation. Attached you will find relevant portions of my assessment and plan of care.    If you have questions, please do not hesitate to call me. I look forward to following Nilsa Curiel along with you.    Sincerely,    Codi Richardson, CHRISTOPHER    Enclosure  CC:  No Recipients    If you would like to receive this communication electronically, please contact [email or web address] or [phone] to request more information on [employer] access.    For providers and/or their staff who would like to refer a patient to [facility], please contact us through our one-stop-shop provider referral line, [facility], at [phone].    If you feel you have received this communication in error or would no longer like to receive these types of communications, please e-mail [email or web address]

## 2020-11-12 ENCOUNTER — IMMUNIZATION (OUTPATIENT)
Dept: PHARMACY | Facility: CLINIC | Age: 75
End: 2020-11-12
Payer: MEDICARE

## 2020-11-12 ENCOUNTER — OFFICE VISIT (OUTPATIENT)
Dept: PRIMARY CARE CLINIC | Facility: CLINIC | Age: 75
End: 2020-11-12
Payer: MEDICARE

## 2020-11-12 VITALS
BODY MASS INDEX: 42.85 KG/M2 | OXYGEN SATURATION: 97 % | SYSTOLIC BLOOD PRESSURE: 130 MMHG | DIASTOLIC BLOOD PRESSURE: 62 MMHG | WEIGHT: 218.25 LBS | TEMPERATURE: 99 F | HEART RATE: 96 BPM | HEIGHT: 60 IN

## 2020-11-12 DIAGNOSIS — M25.551 RIGHT HIP PAIN: Primary | ICD-10-CM

## 2020-11-12 DIAGNOSIS — M48.062 NEUROGENIC CLAUDICATION DUE TO LUMBAR SPINAL STENOSIS: ICD-10-CM

## 2020-11-12 DIAGNOSIS — I10 ESSENTIAL HYPERTENSION: ICD-10-CM

## 2020-11-12 DIAGNOSIS — E66.01 CLASS 3 SEVERE OBESITY DUE TO EXCESS CALORIES WITH SERIOUS COMORBIDITY AND BODY MASS INDEX (BMI) OF 40.0 TO 44.9 IN ADULT: ICD-10-CM

## 2020-11-12 DIAGNOSIS — M48.02 CERVICAL STENOSIS OF SPINE: ICD-10-CM

## 2020-11-12 DIAGNOSIS — E21.3 HYPERPARATHYROIDISM: ICD-10-CM

## 2020-11-12 PROCEDURE — 3008F BODY MASS INDEX DOCD: CPT | Mod: HCNC,CPTII,S$GLB, | Performed by: FAMILY MEDICINE

## 2020-11-12 PROCEDURE — 1101F PT FALLS ASSESS-DOCD LE1/YR: CPT | Mod: HCNC,CPTII,S$GLB, | Performed by: FAMILY MEDICINE

## 2020-11-12 PROCEDURE — 1125F PR PAIN SEVERITY QUANTIFIED, PAIN PRESENT: ICD-10-PCS | Mod: HCNC,S$GLB,, | Performed by: FAMILY MEDICINE

## 2020-11-12 PROCEDURE — 3078F DIAST BP <80 MM HG: CPT | Mod: HCNC,CPTII,S$GLB, | Performed by: FAMILY MEDICINE

## 2020-11-12 PROCEDURE — 99214 PR OFFICE/OUTPT VISIT, EST, LEVL IV, 30-39 MIN: ICD-10-PCS | Mod: HCNC,S$GLB,, | Performed by: FAMILY MEDICINE

## 2020-11-12 PROCEDURE — 1159F PR MEDICATION LIST DOCUMENTED IN MEDICAL RECORD: ICD-10-PCS | Mod: HCNC,S$GLB,, | Performed by: FAMILY MEDICINE

## 2020-11-12 PROCEDURE — 3078F PR MOST RECENT DIASTOLIC BLOOD PRESSURE < 80 MM HG: ICD-10-PCS | Mod: HCNC,CPTII,S$GLB, | Performed by: FAMILY MEDICINE

## 2020-11-12 PROCEDURE — 99999 PR PBB SHADOW E&M-EST. PATIENT-LVL V: ICD-10-PCS | Mod: PBBFAC,HCNC,, | Performed by: FAMILY MEDICINE

## 2020-11-12 PROCEDURE — 3075F PR MOST RECENT SYSTOLIC BLOOD PRESS GE 130-139MM HG: ICD-10-PCS | Mod: HCNC,CPTII,S$GLB, | Performed by: FAMILY MEDICINE

## 2020-11-12 PROCEDURE — 99999 PR PBB SHADOW E&M-EST. PATIENT-LVL V: CPT | Mod: PBBFAC,HCNC,, | Performed by: FAMILY MEDICINE

## 2020-11-12 PROCEDURE — 1157F ADVNC CARE PLAN IN RCRD: CPT | Mod: HCNC,S$GLB,, | Performed by: FAMILY MEDICINE

## 2020-11-12 PROCEDURE — 1159F MED LIST DOCD IN RCRD: CPT | Mod: HCNC,S$GLB,, | Performed by: FAMILY MEDICINE

## 2020-11-12 PROCEDURE — 3008F PR BODY MASS INDEX (BMI) DOCUMENTED: ICD-10-PCS | Mod: HCNC,CPTII,S$GLB, | Performed by: FAMILY MEDICINE

## 2020-11-12 PROCEDURE — 3075F SYST BP GE 130 - 139MM HG: CPT | Mod: HCNC,CPTII,S$GLB, | Performed by: FAMILY MEDICINE

## 2020-11-12 PROCEDURE — 3288F PR FALLS RISK ASSESSMENT DOCUMENTED: ICD-10-PCS | Mod: HCNC,CPTII,S$GLB, | Performed by: FAMILY MEDICINE

## 2020-11-12 PROCEDURE — 1125F AMNT PAIN NOTED PAIN PRSNT: CPT | Mod: HCNC,S$GLB,, | Performed by: FAMILY MEDICINE

## 2020-11-12 PROCEDURE — 1101F PR PT FALLS ASSESS DOC 0-1 FALLS W/OUT INJ PAST YR: ICD-10-PCS | Mod: HCNC,CPTII,S$GLB, | Performed by: FAMILY MEDICINE

## 2020-11-12 PROCEDURE — 99214 OFFICE O/P EST MOD 30 MIN: CPT | Mod: HCNC,S$GLB,, | Performed by: FAMILY MEDICINE

## 2020-11-12 PROCEDURE — 1157F PR ADVANCE CARE PLAN OR EQUIV PRESENT IN MEDICAL RECORD: ICD-10-PCS | Mod: HCNC,S$GLB,, | Performed by: FAMILY MEDICINE

## 2020-11-12 PROCEDURE — 3288F FALL RISK ASSESSMENT DOCD: CPT | Mod: HCNC,CPTII,S$GLB, | Performed by: FAMILY MEDICINE

## 2020-11-12 RX ORDER — DICLOFENAC SODIUM 10 MG/G
2 GEL TOPICAL DAILY PRN
Qty: 100 G | Refills: 3 | Status: SHIPPED | OUTPATIENT
Start: 2020-11-12 | End: 2022-04-06 | Stop reason: SDUPTHER

## 2020-11-12 RX ORDER — METHYLPREDNISOLONE 4 MG/1
TABLET ORAL
Qty: 1 PACKAGE | Refills: 0 | Status: SHIPPED | OUTPATIENT
Start: 2020-11-12 | End: 2020-12-03

## 2020-11-12 RX ORDER — TRIAMCINOLONE ACETONIDE 40 MG/ML
40 INJECTION, SUSPENSION INTRA-ARTICULAR; INTRAMUSCULAR
Status: DISCONTINUED | OUTPATIENT
Start: 2020-11-12 | End: 2020-11-12

## 2020-11-12 NOTE — PATIENT INSTRUCTIONS
Endocrinology: Please call [phone] to schedule.   
Caryn  Confidence: 10/10  Anticipated Goal Completion Date: 1/30/18            Prior to Admission medications    Medication Sig Start Date End Date Taking? Authorizing Provider   INCRU ELLIPTA 62.5 MCG/INH AEPB INAHLE 1 PUFF INTO THE LUNGS DAILY 10/11/18   VITOR Navas DO   gabapentin (NEURONTIN) 100 MG capsule Take 1 capsule by mouth 4 times daily for 90 days. . 8/1/18 10/30/18  VITOR Navas DO   albuterol sulfate HFA (VENTOLIN HFA) 108 (90 Base) MCG/ACT inhaler INHALE 2 PUFFS BY MOUTH EVERY 6 HOURS AS NEEDED FOR WHEEZING 8/1/18   VITOR Navas DO   pravastatin (PRAVACHOL) 40 MG tablet TAKE 1 TABLET BY MOUTH DAILY 6/4/18   GELY Martin   lisinopril (PRINIVIL;ZESTRIL) 20 MG tablet TAKE 1 TABLET BY MOUTH DAILY 5/7/18   GELY Thomas   tiZANidine (ZANAFLEX) 4 MG tablet TAKE 1 TABLET BY MOUTH EVERY 8 HOURS AS NEEDED 3/28/18   Lacretia Friday, MD   fluticasone [facility]) 50 MCG/ACT nasal spray 2 sprays by Nasal route daily 2/1/18   VITOR Navas DO   levothyroxine (SYNTHROID) 75 MCG tablet TAKE 1 TABLET BY MOUTH DAILY 2/1/18   VITOR Navas DO   vitamin D (ERGOCALCIFEROL) 42202 UNITS CAPS capsule Take 1 capsule by mouth every 14 days 5/2/17   Dave Velasquez MD   ADVAIR DISKUS 250-50 MCG/DOSE AEPB Inhale 1 puff into the lungs 2 times daily 9/21/16   Historical Provider, MD   guaiFENesin 400 MG tablet Take 400 mg by mouth daily     Historical Provider, MD   OXYGEN Inhale  into the lungs. 2 liters per nasal cannula at     Historical Provider, MD   mupirocin (BACTROBAN) 2 % ointment Apply topically 3 times daily.  5/16/14   Dave Velasquez MD       Future Appointments  Date Time Provider Beba Dawson   11/4/2019 1:30 PM [address] [facility],[address], DO [place]      and   COPD Assessment    Does the patient understand envrionmental exposure?:  Yes  Is the patient able to verbalize Rescue vs. Long Acting medications?:  Yes  Does the patient have a nebulizer?:

## 2020-11-12 NOTE — PROGRESS NOTES
Subjective:       Patient ID: Nilsa Curiel is a 75 y.o. female.    Chief Complaint: Hip Pain (right side from back down to leg)      76 yo female presents for left sided back pain. She has a history of lumbar spondylosis and facet arthropathy with spinal canal stenosis at L3-L5 & right foraminal disc protrusion at L1-2.     She was in her usual state of health until 6 days ago when she had mild (2-3/10) left sided back pain aggravated by walking and squats. No groin numbness or sphincter incontinence. No fever. No vomiting.    She also has chronic neck pain with cervical stenosis incidentally discovered on brain MRI and right sided hip pain. No swelling. She is able to perform her usual activities but gets pain with ambulation. No extremity weakness or paralysis.    She has yet to reschedule appointment with endocrinology for hyperparathyroidism. She has been lost to follow up for years and is aysmptomatic re: hyperparathyroidism.    The following portions of the patient's history were reviewed and updated as appropriate: allergies, current medications, past family history, past medical history, past social history, past surgical history and problem list.      Back Pain  This is a recurrent problem. The current episode started in the past 7 days. The problem occurs intermittently. The problem has been waxing and waning since onset. The pain is present in the gluteal and sacro-iliac. The quality of the pain is described as aching. The pain radiates to the right thigh. The pain is at a severity of 6/10. The pain is moderate. The pain is the same all the time. The symptoms are aggravated by bending, position, sitting, standing and twisting. Stiffness is present all day. Pertinent negatives include no abdominal pain, bladder incontinence, bowel incontinence, chest pain, dysuria, fever, headaches (headaches improved (seen by neurology)), leg pain, numbness, paresis, paresthesias, pelvic pain, tingling, weakness or  "weight loss. Risk factors include lack of exercise, menopause and obesity. She has tried analgesics and heat for the symptoms. The treatment provided moderate relief.     Review of Systems   Constitutional: Negative for activity change, appetite change, chills, diaphoresis, fatigue, fever, unexpected weight change and weight loss.   HENT: Negative for nasal congestion, dental problem, facial swelling, nosebleeds, postnasal drip, rhinorrhea, sore throat, tinnitus and trouble swallowing.    Eyes: Negative for pain, discharge, itching and visual disturbance.   Respiratory: Negative for apnea, chest tightness, shortness of breath, wheezing and stridor.    Cardiovascular: Negative for chest pain, palpitations and leg swelling.   Gastrointestinal: Negative for abdominal distention, abdominal pain, bowel incontinence, constipation, diarrhea, nausea, rectal pain and vomiting.   Endocrine: Negative for cold intolerance, heat intolerance and polyuria.   Genitourinary: Negative for bladder incontinence, difficulty urinating, dysuria, frequency, hematuria, pelvic pain and urgency.   Musculoskeletal: Positive for arthralgias, back pain, myalgias and neck pain. Negative for gait problem, leg pain and neck stiffness.   Integumentary:  Negative for color change and rash.   Neurological: Negative for dizziness, tingling, tremors, seizures, syncope, facial asymmetry, weakness, numbness, headaches (headaches improved (seen by neurology)) and paresthesias.   Hematological: Negative for adenopathy. Does not bruise/bleed easily.   Psychiatric/Behavioral: Negative for agitation, confusion, hallucinations, self-injury and suicidal ideas. The patient is not hyperactive.           Objective:       Vitals:    11/12/20 0825   BP: 130/62   Pulse: 96   Temp: 98.9 °F (37.2 °C)   TempSrc: Oral   SpO2: 97%   Weight: 99 kg (218 lb 4.1 oz)   Height: 4' 11.5" (1.511 m)     Physical Exam  Constitutional:       General: She is not in acute distress.     " Appearance: She is well-developed. She is not diaphoretic.   HENT:      Head: Normocephalic and atraumatic.   Eyes:      General: No scleral icterus.        Right eye: No discharge.         Left eye: No discharge.      Conjunctiva/sclera: Conjunctivae normal.   Neck:      Musculoskeletal: Normal range of motion and neck supple.      Thyroid: No thyromegaly.   Cardiovascular:      Rate and Rhythm: Normal rate and regular rhythm.      Heart sounds: Normal heart sounds. No murmur. No friction rub. No gallop.    Pulmonary:      Effort: Pulmonary effort is normal. No respiratory distress.      Breath sounds: Normal breath sounds.   Chest:      Chest wall: No tenderness.   Abdominal:      General: Bowel sounds are normal. There is no distension.      Palpations: Abdomen is soft. There is no mass.      Tenderness: There is no abdominal tenderness. There is no guarding or rebound.   Musculoskeletal: Normal range of motion.      Comments: Musculoskeletal:  Antalgic gait and station.  No misalignment, no asymmetry, no crepitation, no defects, no  tenderness, no masses, no effusions, painful range of motion to flexion and hyperextension of spine & positive MARCELINA of right hip. No atrophy or abnormal strength or tone in the head, neck, spine, ribs, pelvis or extremities.      Lymphadenopathy:      Cervical: No cervical adenopathy.   Skin:     General: Skin is warm.      Capillary Refill: Capillary refill takes less than 2 seconds.      Findings: No rash.   Neurological:      Mental Status: She is alert and oriented to person, place, and time.      Cranial Nerves: No cranial nerve deficit.      Motor: No abnormal muscle tone.      Coordination: Coordination normal.      Deep Tendon Reflexes: Reflexes normal.   Psychiatric:         Thought Content: Thought content normal.         Judgment: Judgment normal.           8/8/2018 MRI lumbar    IMPRESSION:      Lumbar spondylosis, resulting in severe spinal canal stenosis at L3-L4, and  moderate spinal canal stenosis at L4-5, as above.     Prominent facet arthropathy from L3-4 through L5-S1 with grade 1 anterolisthesis at L4-5.     Small right paracentral/ foraminal disc protrusion at L1-2.     3/27/2018 C spine XR  Degenerative change.    Assessment:       1. Right hip pain    2. Neurogenic claudication due to lumbar spinal stenosis    3. Cervical stenosis of spine    4. Essential hypertension    5. Class 3 severe obesity due to excess calories with serious comorbidity and body mass index (BMI) of 40.0 to 44.9 in adult    6. Hyperparathyroidism        Plan:       1. Right hip pain  She would like to hold off on  triamcinolone acetonide injection 40 mg: concern for transient elevation of blood pressure but will hold on to Medrol dose pack in the event that pain becomes excruciating.  -     Ambulatory referral/consult to Physical/Occupational Therapy; Future; Expected date: 11/19/2020  -     methylPREDNISolone (MEDROL DOSEPACK) 4 mg tablet; use as directed  Dispense: 1 Package; Refill: 0  -     diclofenac sodium (VOLTAREN) 1 % Gel; Apply 2 g topically daily as needed.  Dispense: 100 g; Refill: 3  If no improvement then will need to be seen by orthopedic team    2. Neurogenic claudication due to lumbar spinal stenosis  -     Ambulatory referral/consult to Physical/Occupational Therapy; Future; Expected date: 11/19/2020  If no improvement or worsening will refer to Back and spine clinic    3. Cervical stenosis of spine  -     Ambulatory referral/consult to Physical/Occupational Therapy; Future; Expected date: 11/19/2020  May consider seeing pain management if no improvement    4. Essential hypertension  Continue to monitor BP. /62. Continue antihypertensive therapy.    5. Class 3 severe obesity due to excess calories with serious comorbidity and body mass index (BMI) of 40.0 to 44.9 in adult  Weight loss to provide off loading of the joint will be beneficial.    6. Hyperparathyroidism  Patient  will decide on follow up with endocrinology. She is currently asymptomatic.    Disclaimer: This note has been generated using voice-recognition software. There may be typographical errors that have been missed during proof-reading

## 2020-11-13 ENCOUNTER — TELEPHONE (OUTPATIENT)
Dept: OBSTETRICS AND GYNECOLOGY | Facility: CLINIC | Age: 75
End: 2020-11-13

## 2020-11-15 PROBLEM — M48.02 CERVICAL STENOSIS OF SPINE: Status: ACTIVE | Noted: 2020-11-15

## 2020-11-16 ENCOUNTER — TELEPHONE (OUTPATIENT)
Dept: OBSTETRICS AND GYNECOLOGY | Facility: CLINIC | Age: 75
End: 2020-11-16

## 2020-11-17 DIAGNOSIS — J34.9 SINUS DISORDER: ICD-10-CM

## 2020-11-17 DIAGNOSIS — I10 ESSENTIAL HYPERTENSION: ICD-10-CM

## 2020-11-17 RX ORDER — FLUTICASONE PROPIONATE 50 MCG
SPRAY, SUSPENSION (ML) NASAL
Qty: 32 G | Refills: 4 | Status: SHIPPED | OUTPATIENT
Start: 2020-11-17 | End: 2022-12-08 | Stop reason: SDUPTHER

## 2020-11-18 RX ORDER — OLMESARTAN MEDOXOMIL 40 MG/1
40 TABLET ORAL DAILY
Qty: 90 TABLET | Refills: 2 | Status: SHIPPED | OUTPATIENT
Start: 2020-11-18 | End: 2021-04-26

## 2020-11-30 ENCOUNTER — TELEPHONE (OUTPATIENT)
Dept: OBSTETRICS AND GYNECOLOGY | Facility: CLINIC | Age: 75
End: 2020-11-30

## 2021-01-26 ENCOUNTER — OFFICE VISIT (OUTPATIENT)
Dept: PRIMARY CARE CLINIC | Facility: CLINIC | Age: 76
End: 2021-01-26
Payer: MEDICARE

## 2021-01-26 VITALS
HEART RATE: 98 BPM | RESPIRATION RATE: 19 BRPM | DIASTOLIC BLOOD PRESSURE: 77 MMHG | OXYGEN SATURATION: 99 % | BODY MASS INDEX: 42.2 KG/M2 | TEMPERATURE: 98 F | HEIGHT: 60 IN | WEIGHT: 214.94 LBS | SYSTOLIC BLOOD PRESSURE: 138 MMHG

## 2021-01-26 DIAGNOSIS — E66.01 CLASS 3 SEVERE OBESITY DUE TO EXCESS CALORIES WITH SERIOUS COMORBIDITY AND BODY MASS INDEX (BMI) OF 40.0 TO 44.9 IN ADULT: ICD-10-CM

## 2021-01-26 DIAGNOSIS — M43.16 SPONDYLOLISTHESIS OF LUMBAR REGION: Primary | ICD-10-CM

## 2021-01-26 DIAGNOSIS — L30.9 DERMATITIS: ICD-10-CM

## 2021-01-26 DIAGNOSIS — M25.551 RIGHT HIP PAIN: ICD-10-CM

## 2021-01-26 PROCEDURE — 3078F DIAST BP <80 MM HG: CPT | Mod: HCNC,CPTII,S$GLB, | Performed by: FAMILY MEDICINE

## 2021-01-26 PROCEDURE — 99214 OFFICE O/P EST MOD 30 MIN: CPT | Mod: HCNC,25,S$GLB, | Performed by: FAMILY MEDICINE

## 2021-01-26 PROCEDURE — 96372 PR INJECTION,THERAP/PROPH/DIAG2ST, IM OR SUBCUT: ICD-10-PCS | Mod: HCNC,S$GLB,, | Performed by: FAMILY MEDICINE

## 2021-01-26 PROCEDURE — 1159F MED LIST DOCD IN RCRD: CPT | Mod: HCNC,S$GLB,, | Performed by: FAMILY MEDICINE

## 2021-01-26 PROCEDURE — 1157F PR ADVANCE CARE PLAN OR EQUIV PRESENT IN MEDICAL RECORD: ICD-10-PCS | Mod: HCNC,S$GLB,, | Performed by: FAMILY MEDICINE

## 2021-01-26 PROCEDURE — 3288F PR FALLS RISK ASSESSMENT DOCUMENTED: ICD-10-PCS | Mod: HCNC,CPTII,S$GLB, | Performed by: FAMILY MEDICINE

## 2021-01-26 PROCEDURE — 96372 THER/PROPH/DIAG INJ SC/IM: CPT | Mod: HCNC,S$GLB,, | Performed by: FAMILY MEDICINE

## 2021-01-26 PROCEDURE — 3288F FALL RISK ASSESSMENT DOCD: CPT | Mod: HCNC,CPTII,S$GLB, | Performed by: FAMILY MEDICINE

## 2021-01-26 PROCEDURE — 1101F PR PT FALLS ASSESS DOC 0-1 FALLS W/OUT INJ PAST YR: ICD-10-PCS | Mod: HCNC,CPTII,S$GLB, | Performed by: FAMILY MEDICINE

## 2021-01-26 PROCEDURE — 3075F PR MOST RECENT SYSTOLIC BLOOD PRESS GE 130-139MM HG: ICD-10-PCS | Mod: HCNC,CPTII,S$GLB, | Performed by: FAMILY MEDICINE

## 2021-01-26 PROCEDURE — 99999 PR PBB SHADOW E&M-EST. PATIENT-LVL V: CPT | Mod: PBBFAC,HCNC,, | Performed by: FAMILY MEDICINE

## 2021-01-26 PROCEDURE — 1157F ADVNC CARE PLAN IN RCRD: CPT | Mod: HCNC,S$GLB,, | Performed by: FAMILY MEDICINE

## 2021-01-26 PROCEDURE — 99214 PR OFFICE/OUTPT VISIT, EST, LEVL IV, 30-39 MIN: ICD-10-PCS | Mod: HCNC,25,S$GLB, | Performed by: FAMILY MEDICINE

## 2021-01-26 PROCEDURE — 99999 PR PBB SHADOW E&M-EST. PATIENT-LVL V: ICD-10-PCS | Mod: PBBFAC,HCNC,, | Performed by: FAMILY MEDICINE

## 2021-01-26 PROCEDURE — 3008F PR BODY MASS INDEX (BMI) DOCUMENTED: ICD-10-PCS | Mod: HCNC,CPTII,S$GLB, | Performed by: FAMILY MEDICINE

## 2021-01-26 PROCEDURE — 1101F PT FALLS ASSESS-DOCD LE1/YR: CPT | Mod: HCNC,CPTII,S$GLB, | Performed by: FAMILY MEDICINE

## 2021-01-26 PROCEDURE — 3008F BODY MASS INDEX DOCD: CPT | Mod: HCNC,CPTII,S$GLB, | Performed by: FAMILY MEDICINE

## 2021-01-26 PROCEDURE — 1159F PR MEDICATION LIST DOCUMENTED IN MEDICAL RECORD: ICD-10-PCS | Mod: HCNC,S$GLB,, | Performed by: FAMILY MEDICINE

## 2021-01-26 PROCEDURE — 1125F PR PAIN SEVERITY QUANTIFIED, PAIN PRESENT: ICD-10-PCS | Mod: HCNC,S$GLB,, | Performed by: FAMILY MEDICINE

## 2021-01-26 PROCEDURE — 99499 RISK ADDL DX/OHS AUDIT: ICD-10-PCS | Mod: S$GLB,,, | Performed by: FAMILY MEDICINE

## 2021-01-26 PROCEDURE — 3078F PR MOST RECENT DIASTOLIC BLOOD PRESSURE < 80 MM HG: ICD-10-PCS | Mod: HCNC,CPTII,S$GLB, | Performed by: FAMILY MEDICINE

## 2021-01-26 PROCEDURE — 3075F SYST BP GE 130 - 139MM HG: CPT | Mod: HCNC,CPTII,S$GLB, | Performed by: FAMILY MEDICINE

## 2021-01-26 PROCEDURE — 1125F AMNT PAIN NOTED PAIN PRSNT: CPT | Mod: HCNC,S$GLB,, | Performed by: FAMILY MEDICINE

## 2021-01-26 PROCEDURE — 99499 UNLISTED E&M SERVICE: CPT | Mod: S$GLB,,, | Performed by: FAMILY MEDICINE

## 2021-01-26 RX ORDER — TRIAMCINOLONE ACETONIDE 40 MG/ML
40 INJECTION, SUSPENSION INTRA-ARTICULAR; INTRAMUSCULAR
Status: COMPLETED | OUTPATIENT
Start: 2021-01-26 | End: 2021-01-26

## 2021-01-26 RX ORDER — PREGABALIN 50 MG/1
CAPSULE ORAL
Qty: 60 CAPSULE | Refills: 0 | Status: SHIPPED | OUTPATIENT
Start: 2021-01-26 | End: 2021-05-19

## 2021-01-26 RX ORDER — NYSTATIN AND TRIAMCINOLONE ACETONIDE 100000; 1 [USP'U]/G; MG/G
CREAM TOPICAL 2 TIMES DAILY
Qty: 60 G | Refills: 3 | Status: SHIPPED | OUTPATIENT
Start: 2021-01-26 | End: 2023-09-05

## 2021-01-26 RX ADMIN — TRIAMCINOLONE ACETONIDE 40 MG: 40 INJECTION, SUSPENSION INTRA-ARTICULAR; INTRAMUSCULAR at 02:01

## 2021-02-05 ENCOUNTER — PATIENT MESSAGE (OUTPATIENT)
Dept: PRIMARY CARE CLINIC | Facility: CLINIC | Age: 76
End: 2021-02-05

## 2021-04-16 ENCOUNTER — PATIENT MESSAGE (OUTPATIENT)
Dept: RESEARCH | Facility: HOSPITAL | Age: 76
End: 2021-04-16

## 2021-05-05 ENCOUNTER — HOSPITAL ENCOUNTER (OUTPATIENT)
Dept: RADIOLOGY | Facility: HOSPITAL | Age: 76
Discharge: HOME | End: 2021-05-05
Attending: NURSE PRACTITIONER
Payer: MEDICARE

## 2021-05-05 ENCOUNTER — TELEPHONE (OUTPATIENT)
Dept: CARDIOLOGY | Facility: CLINIC | Age: 76
End: 2021-05-05

## 2021-05-05 DIAGNOSIS — R10.2 PELVIC PAIN: ICD-10-CM

## 2021-05-05 PROCEDURE — 76830 TRANSVAGINAL US NON-OB: CPT | Mod: 26,,, | Performed by: RADIOLOGY

## 2021-05-05 PROCEDURE — 76856 US PELVIS COMP WITH TRANSVAG NON-OB (XPD): ICD-10-PCS | Mod: 26,,, | Performed by: RADIOLOGY

## 2021-05-05 PROCEDURE — 76856 US EXAM PELVIC COMPLETE: CPT | Mod: TC

## 2021-05-05 PROCEDURE — 76830 US PELVIS COMP WITH TRANSVAG NON-OB (XPD): ICD-10-PCS | Mod: 26,,, | Performed by: RADIOLOGY

## 2021-05-05 PROCEDURE — 76856 US EXAM PELVIC COMPLETE: CPT | Mod: 26,,, | Performed by: RADIOLOGY

## 2021-05-14 ENCOUNTER — OFFICE VISIT (OUTPATIENT)
Dept: GASTROENTEROLOGY | Facility: CLINIC | Age: 76
End: 2021-05-14
Payer: MEDICARE

## 2021-05-14 VITALS
BODY MASS INDEX: 40.91 KG/M2 | OXYGEN SATURATION: 97 % | HEART RATE: 111 BPM | HEIGHT: 61 IN | WEIGHT: 216.69 LBS | SYSTOLIC BLOOD PRESSURE: 139 MMHG | DIASTOLIC BLOOD PRESSURE: 78 MMHG

## 2021-05-14 DIAGNOSIS — R10.13 EPIGASTRIC ABDOMINAL PAIN: ICD-10-CM

## 2021-05-14 DIAGNOSIS — K21.9 GASTROESOPHAGEAL REFLUX DISEASE WITHOUT ESOPHAGITIS: ICD-10-CM

## 2021-05-14 DIAGNOSIS — Z12.11 COLON CANCER SCREENING: Primary | ICD-10-CM

## 2021-05-14 PROCEDURE — 1126F PR PAIN SEVERITY QUANTIFIED, NO PAIN PRESENT: ICD-10-PCS | Mod: S$GLB,,, | Performed by: INTERNAL MEDICINE

## 2021-05-14 PROCEDURE — 1100F PR PT FALLS ASSESS DOC 2+ FALLS/FALL W/INJURY/YR: ICD-10-PCS | Mod: CPTII,S$GLB,, | Performed by: INTERNAL MEDICINE

## 2021-05-14 PROCEDURE — 99999 PR PBB SHADOW E&M-EST. PATIENT-LVL V: ICD-10-PCS | Mod: PBBFAC,,, | Performed by: INTERNAL MEDICINE

## 2021-05-14 PROCEDURE — 1159F MED LIST DOCD IN RCRD: CPT | Mod: S$GLB,,, | Performed by: INTERNAL MEDICINE

## 2021-05-14 PROCEDURE — 3008F PR BODY MASS INDEX (BMI) DOCUMENTED: ICD-10-PCS | Mod: CPTII,S$GLB,, | Performed by: INTERNAL MEDICINE

## 2021-05-14 PROCEDURE — 1157F ADVNC CARE PLAN IN RCRD: CPT | Mod: S$GLB,,, | Performed by: INTERNAL MEDICINE

## 2021-05-14 PROCEDURE — 3008F BODY MASS INDEX DOCD: CPT | Mod: CPTII,S$GLB,, | Performed by: INTERNAL MEDICINE

## 2021-05-14 PROCEDURE — 1159F PR MEDICATION LIST DOCUMENTED IN MEDICAL RECORD: ICD-10-PCS | Mod: S$GLB,,, | Performed by: INTERNAL MEDICINE

## 2021-05-14 PROCEDURE — 99204 OFFICE O/P NEW MOD 45 MIN: CPT | Mod: S$GLB,,, | Performed by: INTERNAL MEDICINE

## 2021-05-14 PROCEDURE — 99204 PR OFFICE/OUTPT VISIT, NEW, LEVL IV, 45-59 MIN: ICD-10-PCS | Mod: S$GLB,,, | Performed by: INTERNAL MEDICINE

## 2021-05-14 PROCEDURE — 3288F PR FALLS RISK ASSESSMENT DOCUMENTED: ICD-10-PCS | Mod: CPTII,S$GLB,, | Performed by: INTERNAL MEDICINE

## 2021-05-14 PROCEDURE — 99999 PR PBB SHADOW E&M-EST. PATIENT-LVL V: CPT | Mod: PBBFAC,,, | Performed by: INTERNAL MEDICINE

## 2021-05-14 PROCEDURE — 1157F PR ADVANCE CARE PLAN OR EQUIV PRESENT IN MEDICAL RECORD: ICD-10-PCS | Mod: S$GLB,,, | Performed by: INTERNAL MEDICINE

## 2021-05-14 PROCEDURE — 3288F FALL RISK ASSESSMENT DOCD: CPT | Mod: CPTII,S$GLB,, | Performed by: INTERNAL MEDICINE

## 2021-05-14 PROCEDURE — 1100F PTFALLS ASSESS-DOCD GE2>/YR: CPT | Mod: CPTII,S$GLB,, | Performed by: INTERNAL MEDICINE

## 2021-05-14 PROCEDURE — 1126F AMNT PAIN NOTED NONE PRSNT: CPT | Mod: S$GLB,,, | Performed by: INTERNAL MEDICINE

## 2021-05-19 ENCOUNTER — OFFICE VISIT (OUTPATIENT)
Dept: PRIMARY CARE CLINIC | Facility: CLINIC | Age: 76
End: 2021-05-19
Payer: MEDICARE

## 2021-05-19 ENCOUNTER — LAB VISIT (OUTPATIENT)
Dept: LAB | Facility: HOSPITAL | Age: 76
End: 2021-05-19
Attending: FAMILY MEDICINE
Payer: MEDICARE

## 2021-05-19 VITALS
DIASTOLIC BLOOD PRESSURE: 60 MMHG | OXYGEN SATURATION: 99 % | SYSTOLIC BLOOD PRESSURE: 136 MMHG | HEART RATE: 113 BPM | WEIGHT: 216.13 LBS | HEIGHT: 61 IN | TEMPERATURE: 98 F | BODY MASS INDEX: 40.8 KG/M2 | RESPIRATION RATE: 19 BRPM

## 2021-05-19 DIAGNOSIS — E66.01 CLASS 3 SEVERE OBESITY DUE TO EXCESS CALORIES WITH SERIOUS COMORBIDITY AND BODY MASS INDEX (BMI) OF 40.0 TO 44.9 IN ADULT: ICD-10-CM

## 2021-05-19 DIAGNOSIS — M15.9 PRIMARY OSTEOARTHRITIS INVOLVING MULTIPLE JOINTS: ICD-10-CM

## 2021-05-19 DIAGNOSIS — Z51.81 THERAPEUTIC DRUG MONITORING: ICD-10-CM

## 2021-05-19 DIAGNOSIS — Z12.31 SCREENING MAMMOGRAM, ENCOUNTER FOR: ICD-10-CM

## 2021-05-19 DIAGNOSIS — M79.89 SWELLING OF LOWER EXTREMITY: ICD-10-CM

## 2021-05-19 DIAGNOSIS — R25.2 CRAMPS, EXTREMITY: ICD-10-CM

## 2021-05-19 DIAGNOSIS — I87.2 VENOUS INSUFFICIENCY (CHRONIC) (PERIPHERAL): ICD-10-CM

## 2021-05-19 DIAGNOSIS — M48.062 NEUROGENIC CLAUDICATION DUE TO LUMBAR SPINAL STENOSIS: Primary | ICD-10-CM

## 2021-05-19 DIAGNOSIS — M43.17 SPONDYLOLISTHESIS OF LUMBOSACRAL REGION: ICD-10-CM

## 2021-05-19 LAB
25(OH)D3+25(OH)D2 SERPL-MCNC: 45 NG/ML (ref 30–96)
ANION GAP SERPL CALC-SCNC: 10 MMOL/L (ref 8–16)
BUN SERPL-MCNC: 14 MG/DL (ref 8–23)
CALCIUM SERPL-MCNC: 10.9 MG/DL (ref 8.7–10.5)
CHLORIDE SERPL-SCNC: 107 MMOL/L (ref 95–110)
CO2 SERPL-SCNC: 24 MMOL/L (ref 23–29)
CREAT SERPL-MCNC: 1 MG/DL (ref 0.5–1.4)
EST. GFR  (AFRICAN AMERICAN): >60 ML/MIN/1.73 M^2
EST. GFR  (NON AFRICAN AMERICAN): 56 ML/MIN/1.73 M^2
GLUCOSE SERPL-MCNC: 94 MG/DL (ref 70–110)
MAGNESIUM SERPL-MCNC: 2 MG/DL (ref 1.6–2.6)
POTASSIUM SERPL-SCNC: 4.5 MMOL/L (ref 3.5–5.1)
SODIUM SERPL-SCNC: 141 MMOL/L (ref 136–145)

## 2021-05-19 PROCEDURE — 1126F AMNT PAIN NOTED NONE PRSNT: CPT | Mod: S$GLB,,, | Performed by: FAMILY MEDICINE

## 2021-05-19 PROCEDURE — 3008F BODY MASS INDEX DOCD: CPT | Mod: CPTII,S$GLB,, | Performed by: FAMILY MEDICINE

## 2021-05-19 PROCEDURE — 1157F ADVNC CARE PLAN IN RCRD: CPT | Mod: S$GLB,,, | Performed by: FAMILY MEDICINE

## 2021-05-19 PROCEDURE — 99999 PR PBB SHADOW E&M-EST. PATIENT-LVL V: ICD-10-PCS | Mod: PBBFAC,,, | Performed by: FAMILY MEDICINE

## 2021-05-19 PROCEDURE — 83735 ASSAY OF MAGNESIUM: CPT | Performed by: FAMILY MEDICINE

## 2021-05-19 PROCEDURE — 1159F PR MEDICATION LIST DOCUMENTED IN MEDICAL RECORD: ICD-10-PCS | Mod: S$GLB,,, | Performed by: FAMILY MEDICINE

## 2021-05-19 PROCEDURE — 36415 COLL VENOUS BLD VENIPUNCTURE: CPT | Mod: PN | Performed by: FAMILY MEDICINE

## 2021-05-19 PROCEDURE — 1126F PR PAIN SEVERITY QUANTIFIED, NO PAIN PRESENT: ICD-10-PCS | Mod: S$GLB,,, | Performed by: FAMILY MEDICINE

## 2021-05-19 PROCEDURE — 1101F PR PT FALLS ASSESS DOC 0-1 FALLS W/OUT INJ PAST YR: ICD-10-PCS | Mod: CPTII,S$GLB,, | Performed by: FAMILY MEDICINE

## 2021-05-19 PROCEDURE — 99214 OFFICE O/P EST MOD 30 MIN: CPT | Mod: S$GLB,,, | Performed by: FAMILY MEDICINE

## 2021-05-19 PROCEDURE — 3008F PR BODY MASS INDEX (BMI) DOCUMENTED: ICD-10-PCS | Mod: CPTII,S$GLB,, | Performed by: FAMILY MEDICINE

## 2021-05-19 PROCEDURE — 80048 BASIC METABOLIC PNL TOTAL CA: CPT | Performed by: FAMILY MEDICINE

## 2021-05-19 PROCEDURE — 99999 PR PBB SHADOW E&M-EST. PATIENT-LVL V: CPT | Mod: PBBFAC,,, | Performed by: FAMILY MEDICINE

## 2021-05-19 PROCEDURE — 1159F MED LIST DOCD IN RCRD: CPT | Mod: S$GLB,,, | Performed by: FAMILY MEDICINE

## 2021-05-19 PROCEDURE — 82306 VITAMIN D 25 HYDROXY: CPT | Performed by: FAMILY MEDICINE

## 2021-05-19 PROCEDURE — 1101F PT FALLS ASSESS-DOCD LE1/YR: CPT | Mod: CPTII,S$GLB,, | Performed by: FAMILY MEDICINE

## 2021-05-19 PROCEDURE — 3288F FALL RISK ASSESSMENT DOCD: CPT | Mod: CPTII,S$GLB,, | Performed by: FAMILY MEDICINE

## 2021-05-19 PROCEDURE — 99214 PR OFFICE/OUTPT VISIT, EST, LEVL IV, 30-39 MIN: ICD-10-PCS | Mod: S$GLB,,, | Performed by: FAMILY MEDICINE

## 2021-05-19 PROCEDURE — 1157F PR ADVANCE CARE PLAN OR EQUIV PRESENT IN MEDICAL RECORD: ICD-10-PCS | Mod: S$GLB,,, | Performed by: FAMILY MEDICINE

## 2021-05-19 PROCEDURE — 3288F PR FALLS RISK ASSESSMENT DOCUMENTED: ICD-10-PCS | Mod: CPTII,S$GLB,, | Performed by: FAMILY MEDICINE

## 2021-05-21 ENCOUNTER — TELEPHONE (OUTPATIENT)
Dept: PRIMARY CARE CLINIC | Facility: CLINIC | Age: 76
End: 2021-05-21

## 2021-06-08 ENCOUNTER — TELEPHONE (OUTPATIENT)
Dept: GASTROENTEROLOGY | Facility: CLINIC | Age: 76
End: 2021-06-08

## 2021-06-08 ENCOUNTER — HOSPITAL ENCOUNTER (OUTPATIENT)
Dept: RADIOLOGY | Facility: HOSPITAL | Age: 76
Discharge: HOME | End: 2021-06-08
Attending: FAMILY MEDICINE
Payer: MEDICARE

## 2021-06-08 ENCOUNTER — HOSPITAL ENCOUNTER (OUTPATIENT)
Dept: RADIOLOGY | Facility: HOSPITAL | Age: 76
Discharge: HOME | End: 2021-06-08
Attending: INTERNAL MEDICINE
Payer: MEDICARE

## 2021-06-08 DIAGNOSIS — R10.13 EPIGASTRIC ABDOMINAL PAIN: ICD-10-CM

## 2021-06-08 DIAGNOSIS — I87.2 VENOUS INSUFFICIENCY (CHRONIC) (PERIPHERAL): ICD-10-CM

## 2021-06-08 DIAGNOSIS — M79.89 SWELLING OF LOWER EXTREMITY: ICD-10-CM

## 2021-06-08 PROCEDURE — 76700 US EXAM ABDOM COMPLETE: CPT | Mod: TC

## 2021-06-08 PROCEDURE — 93970 EXTREMITY STUDY: CPT | Mod: TC

## 2021-06-08 PROCEDURE — 76700 US ABDOMEN COMPLETE: ICD-10-PCS | Mod: 26,,, | Performed by: RADIOLOGY

## 2021-06-08 PROCEDURE — 93970 US LOWER EXTREMITY VEINS BILATERAL: ICD-10-PCS | Mod: 26,,, | Performed by: RADIOLOGY

## 2021-06-08 PROCEDURE — 93970 EXTREMITY STUDY: CPT | Mod: 26,,, | Performed by: RADIOLOGY

## 2021-06-08 PROCEDURE — 76700 US EXAM ABDOM COMPLETE: CPT | Mod: 26,,, | Performed by: RADIOLOGY

## 2021-06-09 ENCOUNTER — TELEPHONE (OUTPATIENT)
Dept: PRIMARY CARE CLINIC | Facility: CLINIC | Age: 76
End: 2021-06-09

## 2021-06-09 ENCOUNTER — OFFICE VISIT (OUTPATIENT)
Dept: DERMATOLOGY | Facility: CLINIC | Age: 76
End: 2021-06-09
Payer: MEDICARE

## 2021-06-09 DIAGNOSIS — L66.9 SCARRING ALOPECIA: ICD-10-CM

## 2021-06-09 DIAGNOSIS — L30.4 INTERTRIGO: Primary | ICD-10-CM

## 2021-06-09 DIAGNOSIS — L65.9 HAIR LOSS: ICD-10-CM

## 2021-06-09 PROCEDURE — 99999 PR PBB SHADOW E&M-EST. PATIENT-LVL III: ICD-10-PCS | Mod: PBBFAC,,, | Performed by: DERMATOLOGY

## 2021-06-09 PROCEDURE — 1126F PR PAIN SEVERITY QUANTIFIED, NO PAIN PRESENT: ICD-10-PCS | Mod: S$GLB,,, | Performed by: DERMATOLOGY

## 2021-06-09 PROCEDURE — 1126F AMNT PAIN NOTED NONE PRSNT: CPT | Mod: S$GLB,,, | Performed by: DERMATOLOGY

## 2021-06-09 PROCEDURE — 1157F PR ADVANCE CARE PLAN OR EQUIV PRESENT IN MEDICAL RECORD: ICD-10-PCS | Mod: S$GLB,,, | Performed by: DERMATOLOGY

## 2021-06-09 PROCEDURE — 1101F PR PT FALLS ASSESS DOC 0-1 FALLS W/OUT INJ PAST YR: ICD-10-PCS | Mod: CPTII,S$GLB,, | Performed by: DERMATOLOGY

## 2021-06-09 PROCEDURE — 1101F PT FALLS ASSESS-DOCD LE1/YR: CPT | Mod: CPTII,S$GLB,, | Performed by: DERMATOLOGY

## 2021-06-09 PROCEDURE — 99999 PR PBB SHADOW E&M-EST. PATIENT-LVL III: CPT | Mod: PBBFAC,,, | Performed by: DERMATOLOGY

## 2021-06-09 PROCEDURE — 1157F ADVNC CARE PLAN IN RCRD: CPT | Mod: S$GLB,,, | Performed by: DERMATOLOGY

## 2021-06-09 PROCEDURE — 99214 PR OFFICE/OUTPT VISIT, EST, LEVL IV, 30-39 MIN: ICD-10-PCS | Mod: S$GLB,,, | Performed by: DERMATOLOGY

## 2021-06-09 PROCEDURE — 3288F FALL RISK ASSESSMENT DOCD: CPT | Mod: CPTII,S$GLB,, | Performed by: DERMATOLOGY

## 2021-06-09 PROCEDURE — 3288F PR FALLS RISK ASSESSMENT DOCUMENTED: ICD-10-PCS | Mod: CPTII,S$GLB,, | Performed by: DERMATOLOGY

## 2021-06-09 PROCEDURE — 99214 OFFICE O/P EST MOD 30 MIN: CPT | Mod: S$GLB,,, | Performed by: DERMATOLOGY

## 2021-06-09 RX ORDER — FLUOCINONIDE TOPICAL SOLUTION USP, 0.05% 0.5 MG/ML
SOLUTION TOPICAL
Qty: 60 ML | Refills: 3 | Status: SHIPPED | OUTPATIENT
Start: 2021-06-09 | End: 2023-09-05

## 2021-06-09 RX ORDER — KETOCONAZOLE 20 MG/ML
SHAMPOO, SUSPENSION TOPICAL
Qty: 120 ML | Refills: 5 | Status: SHIPPED | OUTPATIENT
Start: 2021-06-09 | End: 2022-12-08 | Stop reason: SDUPTHER

## 2021-06-23 ENCOUNTER — HOSPITAL ENCOUNTER (OUTPATIENT)
Dept: RADIOLOGY | Facility: HOSPITAL | Age: 76
Discharge: HOME | End: 2021-06-23
Attending: FAMILY MEDICINE
Payer: MEDICARE

## 2021-06-23 DIAGNOSIS — Z12.31 SCREENING MAMMOGRAM, ENCOUNTER FOR: ICD-10-CM

## 2021-06-23 PROCEDURE — 77067 SCR MAMMO BI INCL CAD: CPT | Mod: 26,,, | Performed by: RADIOLOGY

## 2021-06-23 PROCEDURE — 77067 SCR MAMMO BI INCL CAD: CPT | Mod: TC,PN

## 2021-06-23 PROCEDURE — 77067 MAMMO DIGITAL SCREENING BILAT WITH TOMO: ICD-10-PCS | Mod: 26,,, | Performed by: RADIOLOGY

## 2021-06-23 PROCEDURE — 77063 MAMMO DIGITAL SCREENING BILAT WITH TOMO: ICD-10-PCS | Mod: 26,,, | Performed by: RADIOLOGY

## 2021-06-23 PROCEDURE — 77063 BREAST TOMOSYNTHESIS BI: CPT | Mod: 26,,, | Performed by: RADIOLOGY

## 2021-06-24 ENCOUNTER — TELEPHONE (OUTPATIENT)
Dept: CARDIOLOGY | Facility: CLINIC | Age: 76
End: 2021-06-24

## 2021-06-25 ENCOUNTER — PES CALL (OUTPATIENT)
Dept: ADMINISTRATIVE | Facility: CLINIC | Age: 76
End: 2021-06-25

## 2021-07-20 ENCOUNTER — PATIENT OUTREACH (OUTPATIENT)
Dept: ADMINISTRATIVE | Facility: HOSPITAL | Age: 76
End: 2021-07-20

## 2021-07-20 DIAGNOSIS — Z12.11 COLON CANCER SCREENING: Primary | ICD-10-CM

## 2021-08-13 ENCOUNTER — PATIENT MESSAGE (OUTPATIENT)
Dept: ENDOSCOPY | Facility: HOSPITAL | Age: 76
End: 2021-08-13

## 2021-08-13 DIAGNOSIS — Z01.818 PRE-OP TESTING: ICD-10-CM

## 2021-08-13 DIAGNOSIS — Z12.11 SPECIAL SCREENING FOR MALIGNANT NEOPLASMS, COLON: Primary | ICD-10-CM

## 2021-08-13 RX ORDER — SODIUM, POTASSIUM,MAG SULFATES 17.5-3.13G
1 SOLUTION, RECONSTITUTED, ORAL ORAL DAILY
Qty: 1 KIT | Refills: 0 | Status: SHIPPED | OUTPATIENT
Start: 2021-08-13 | End: 2021-08-15

## 2021-09-13 ENCOUNTER — PES CALL (OUTPATIENT)
Dept: ADMINISTRATIVE | Facility: CLINIC | Age: 76
End: 2021-09-13

## 2021-11-12 ENCOUNTER — TELEPHONE (OUTPATIENT)
Dept: PRIMARY CARE CLINIC | Facility: CLINIC | Age: 76
End: 2021-11-12
Payer: MEDICARE

## 2021-11-19 LAB — HEMOCCULT STL QL IA: POSITIVE

## 2021-12-10 ENCOUNTER — PATIENT OUTREACH (OUTPATIENT)
Dept: PRIMARY CARE CLINIC | Facility: CLINIC | Age: 76
End: 2021-12-10
Payer: MEDICARE

## 2021-12-10 DIAGNOSIS — Z12.11 SPECIAL SCREENING FOR MALIGNANT NEOPLASMS, COLON: Primary | ICD-10-CM

## 2021-12-10 DIAGNOSIS — K21.9 GASTROESOPHAGEAL REFLUX DISEASE, UNSPECIFIED WHETHER ESOPHAGITIS PRESENT: Primary | ICD-10-CM

## 2021-12-10 DIAGNOSIS — Z12.11 SCREENING FOR COLON CANCER: ICD-10-CM

## 2021-12-10 RX ORDER — POLYETHYLENE GLYCOL 3350, SODIUM SULFATE ANHYDROUS, SODIUM BICARBONATE, SODIUM CHLORIDE, POTASSIUM CHLORIDE 236; 22.74; 6.74; 5.86; 2.97 G/4L; G/4L; G/4L; G/4L; G/4L
4 POWDER, FOR SOLUTION ORAL ONCE
Qty: 4000 ML | Refills: 0 | Status: SHIPPED | OUTPATIENT
Start: 2021-12-10 | End: 2021-12-10

## 2021-12-13 ENCOUNTER — TELEPHONE (OUTPATIENT)
Dept: PRIMARY CARE CLINIC | Facility: CLINIC | Age: 76
End: 2021-12-13
Payer: MEDICARE

## 2021-12-13 ENCOUNTER — PATIENT MESSAGE (OUTPATIENT)
Dept: PRIMARY CARE CLINIC | Facility: CLINIC | Age: 76
End: 2021-12-13
Payer: MEDICARE

## 2021-12-13 DIAGNOSIS — R19.5 POSITIVE FIT (FECAL IMMUNOCHEMICAL TEST): Primary | ICD-10-CM

## 2021-12-14 ENCOUNTER — TELEPHONE (OUTPATIENT)
Dept: PRIMARY CARE CLINIC | Facility: CLINIC | Age: 76
End: 2021-12-14
Payer: MEDICARE

## 2021-12-29 ENCOUNTER — OFFICE VISIT (OUTPATIENT)
Dept: PRIMARY CARE CLINIC | Facility: CLINIC | Age: 76
End: 2021-12-29
Payer: MEDICARE

## 2021-12-29 ENCOUNTER — LAB VISIT (OUTPATIENT)
Dept: LAB | Facility: HOSPITAL | Age: 76
End: 2021-12-29
Attending: FAMILY MEDICINE
Payer: MEDICARE

## 2021-12-29 VITALS
BODY MASS INDEX: 41.17 KG/M2 | SYSTOLIC BLOOD PRESSURE: 128 MMHG | HEART RATE: 116 BPM | TEMPERATURE: 99 F | OXYGEN SATURATION: 96 % | HEIGHT: 61 IN | WEIGHT: 218.06 LBS | DIASTOLIC BLOOD PRESSURE: 84 MMHG

## 2021-12-29 DIAGNOSIS — M48.00 CENTRAL STENOSIS OF SPINAL CANAL: ICD-10-CM

## 2021-12-29 DIAGNOSIS — R73.03 PREDIABETES: ICD-10-CM

## 2021-12-29 DIAGNOSIS — I70.0 ATHEROSCLEROSIS OF AORTA: ICD-10-CM

## 2021-12-29 DIAGNOSIS — R79.9 ABNORMAL FINDING OF BLOOD CHEMISTRY, UNSPECIFIED: ICD-10-CM

## 2021-12-29 DIAGNOSIS — I10 ESSENTIAL HYPERTENSION: ICD-10-CM

## 2021-12-29 DIAGNOSIS — E66.01 CLASS 3 SEVERE OBESITY DUE TO EXCESS CALORIES WITH SERIOUS COMORBIDITY AND BODY MASS INDEX (BMI) OF 40.0 TO 44.9 IN ADULT: ICD-10-CM

## 2021-12-29 DIAGNOSIS — I42.1 HYPERTROPHIC OBSTRUCTIVE CARDIOMYOPATHY (HOCM): ICD-10-CM

## 2021-12-29 DIAGNOSIS — Z00.00 ROUTINE MEDICAL EXAM: Primary | ICD-10-CM

## 2021-12-29 DIAGNOSIS — N18.30 STAGE 3 CHRONIC KIDNEY DISEASE, UNSPECIFIED WHETHER STAGE 3A OR 3B CKD: ICD-10-CM

## 2021-12-29 DIAGNOSIS — E78.2 MIXED HYPERLIPIDEMIA: ICD-10-CM

## 2021-12-29 DIAGNOSIS — Z00.00 ROUTINE MEDICAL EXAM: ICD-10-CM

## 2021-12-29 DIAGNOSIS — G89.29 CHRONIC MIDLINE LOW BACK PAIN, UNSPECIFIED WHETHER SCIATICA PRESENT: ICD-10-CM

## 2021-12-29 DIAGNOSIS — I10 PRIMARY HYPERTENSION: ICD-10-CM

## 2021-12-29 DIAGNOSIS — E21.3 HYPERPARATHYROIDISM: ICD-10-CM

## 2021-12-29 DIAGNOSIS — M54.50 CHRONIC MIDLINE LOW BACK PAIN, UNSPECIFIED WHETHER SCIATICA PRESENT: ICD-10-CM

## 2021-12-29 LAB
ALBUMIN SERPL BCP-MCNC: 3.7 G/DL (ref 3.5–5.2)
ALP SERPL-CCNC: 113 U/L (ref 55–135)
ALT SERPL W/O P-5'-P-CCNC: 18 U/L (ref 10–44)
ANION GAP SERPL CALC-SCNC: 11 MMOL/L (ref 8–16)
AST SERPL-CCNC: 17 U/L (ref 10–40)
BILIRUB SERPL-MCNC: 0.5 MG/DL (ref 0.1–1)
BUN SERPL-MCNC: 14 MG/DL (ref 8–23)
CALCIUM SERPL-MCNC: 10.9 MG/DL (ref 8.7–10.5)
CHLORIDE SERPL-SCNC: 106 MMOL/L (ref 95–110)
CHOLEST SERPL-MCNC: 200 MG/DL (ref 120–199)
CHOLEST/HDLC SERPL: 4 {RATIO} (ref 2–5)
CO2 SERPL-SCNC: 24 MMOL/L (ref 23–29)
CREAT SERPL-MCNC: 0.9 MG/DL (ref 0.5–1.4)
ERYTHROCYTE [DISTWIDTH] IN BLOOD BY AUTOMATED COUNT: 13.6 % (ref 11.5–14.5)
EST. GFR  (AFRICAN AMERICAN): >60 ML/MIN/1.73 M^2
EST. GFR  (NON AFRICAN AMERICAN): >60 ML/MIN/1.73 M^2
ESTIMATED AVG GLUCOSE: 108 MG/DL (ref 68–131)
GLUCOSE SERPL-MCNC: 100 MG/DL (ref 70–110)
HBA1C MFR BLD: 5.4 % (ref 4–5.6)
HCT VFR BLD AUTO: 36.4 % (ref 37–48.5)
HDLC SERPL-MCNC: 50 MG/DL (ref 40–75)
HDLC SERPL: 25 % (ref 20–50)
HGB BLD-MCNC: 10.7 G/DL (ref 12–16)
LDLC SERPL CALC-MCNC: 117.4 MG/DL (ref 63–159)
MCH RBC QN AUTO: 24.2 PG (ref 27–31)
MCHC RBC AUTO-ENTMCNC: 29.4 G/DL (ref 32–36)
MCV RBC AUTO: 82 FL (ref 82–98)
NONHDLC SERPL-MCNC: 150 MG/DL
PLATELET # BLD AUTO: 381 K/UL (ref 150–450)
PMV BLD AUTO: 10.2 FL (ref 9.2–12.9)
POTASSIUM SERPL-SCNC: 4 MMOL/L (ref 3.5–5.1)
PROT SERPL-MCNC: 8.1 G/DL (ref 6–8.4)
RBC # BLD AUTO: 4.42 M/UL (ref 4–5.4)
SODIUM SERPL-SCNC: 141 MMOL/L (ref 136–145)
TRIGL SERPL-MCNC: 163 MG/DL (ref 30–150)
TSH SERPL DL<=0.005 MIU/L-ACNC: 3.67 UIU/ML (ref 0.4–4)
WBC # BLD AUTO: 8.24 K/UL (ref 3.9–12.7)

## 2021-12-29 PROCEDURE — 99397 PR PREVENTIVE VISIT,EST,65 & OVER: ICD-10-PCS | Mod: S$GLB,,, | Performed by: FAMILY MEDICINE

## 2021-12-29 PROCEDURE — 3288F FALL RISK ASSESSMENT DOCD: CPT | Mod: CPTII,S$GLB,, | Performed by: FAMILY MEDICINE

## 2021-12-29 PROCEDURE — 84443 ASSAY THYROID STIM HORMONE: CPT | Performed by: FAMILY MEDICINE

## 2021-12-29 PROCEDURE — 3044F HG A1C LEVEL LT 7.0%: CPT | Mod: CPTII,S$GLB,, | Performed by: FAMILY MEDICINE

## 2021-12-29 PROCEDURE — 1157F ADVNC CARE PLAN IN RCRD: CPT | Mod: CPTII,S$GLB,, | Performed by: FAMILY MEDICINE

## 2021-12-29 PROCEDURE — 99999 PR PBB SHADOW E&M-EST. PATIENT-LVL V: ICD-10-PCS | Mod: PBBFAC,,, | Performed by: FAMILY MEDICINE

## 2021-12-29 PROCEDURE — 3074F SYST BP LT 130 MM HG: CPT | Mod: CPTII,S$GLB,, | Performed by: FAMILY MEDICINE

## 2021-12-29 PROCEDURE — 1125F AMNT PAIN NOTED PAIN PRSNT: CPT | Mod: CPTII,S$GLB,, | Performed by: FAMILY MEDICINE

## 2021-12-29 PROCEDURE — 3079F DIAST BP 80-89 MM HG: CPT | Mod: CPTII,S$GLB,, | Performed by: FAMILY MEDICINE

## 2021-12-29 PROCEDURE — 4010F ACE/ARB THERAPY RXD/TAKEN: CPT | Mod: CPTII,S$GLB,, | Performed by: FAMILY MEDICINE

## 2021-12-29 PROCEDURE — 1160F PR REVIEW ALL MEDS BY PRESCRIBER/CLIN PHARMACIST DOCUMENTED: ICD-10-PCS | Mod: CPTII,S$GLB,, | Performed by: FAMILY MEDICINE

## 2021-12-29 PROCEDURE — 80061 LIPID PANEL: CPT | Performed by: FAMILY MEDICINE

## 2021-12-29 PROCEDURE — 4010F PR ACE/ARB THEARPY RXD/TAKEN: ICD-10-PCS | Mod: CPTII,S$GLB,, | Performed by: FAMILY MEDICINE

## 2021-12-29 PROCEDURE — 3044F PR MOST RECENT HEMOGLOBIN A1C LEVEL <7.0%: ICD-10-PCS | Mod: CPTII,S$GLB,, | Performed by: FAMILY MEDICINE

## 2021-12-29 PROCEDURE — 1125F PR PAIN SEVERITY QUANTIFIED, PAIN PRESENT: ICD-10-PCS | Mod: CPTII,S$GLB,, | Performed by: FAMILY MEDICINE

## 2021-12-29 PROCEDURE — 1159F MED LIST DOCD IN RCRD: CPT | Mod: CPTII,S$GLB,, | Performed by: FAMILY MEDICINE

## 2021-12-29 PROCEDURE — 80053 COMPREHEN METABOLIC PANEL: CPT | Performed by: FAMILY MEDICINE

## 2021-12-29 PROCEDURE — 99999 PR PBB SHADOW E&M-EST. PATIENT-LVL V: CPT | Mod: PBBFAC,,, | Performed by: FAMILY MEDICINE

## 2021-12-29 PROCEDURE — 3008F PR BODY MASS INDEX (BMI) DOCUMENTED: ICD-10-PCS | Mod: CPTII,S$GLB,, | Performed by: FAMILY MEDICINE

## 2021-12-29 PROCEDURE — 1160F RVW MEDS BY RX/DR IN RCRD: CPT | Mod: CPTII,S$GLB,, | Performed by: FAMILY MEDICINE

## 2021-12-29 PROCEDURE — 1157F PR ADVANCE CARE PLAN OR EQUIV PRESENT IN MEDICAL RECORD: ICD-10-PCS | Mod: CPTII,S$GLB,, | Performed by: FAMILY MEDICINE

## 2021-12-29 PROCEDURE — 36415 COLL VENOUS BLD VENIPUNCTURE: CPT | Mod: PN | Performed by: FAMILY MEDICINE

## 2021-12-29 PROCEDURE — 3288F PR FALLS RISK ASSESSMENT DOCUMENTED: ICD-10-PCS | Mod: CPTII,S$GLB,, | Performed by: FAMILY MEDICINE

## 2021-12-29 PROCEDURE — 1101F PR PT FALLS ASSESS DOC 0-1 FALLS W/OUT INJ PAST YR: ICD-10-PCS | Mod: CPTII,S$GLB,, | Performed by: FAMILY MEDICINE

## 2021-12-29 PROCEDURE — 99397 PER PM REEVAL EST PAT 65+ YR: CPT | Mod: S$GLB,,, | Performed by: FAMILY MEDICINE

## 2021-12-29 PROCEDURE — 99499 RISK ADDL DX/OHS AUDIT: ICD-10-PCS | Mod: HCNC,S$GLB,, | Performed by: FAMILY MEDICINE

## 2021-12-29 PROCEDURE — 3079F PR MOST RECENT DIASTOLIC BLOOD PRESSURE 80-89 MM HG: ICD-10-PCS | Mod: CPTII,S$GLB,, | Performed by: FAMILY MEDICINE

## 2021-12-29 PROCEDURE — 3008F BODY MASS INDEX DOCD: CPT | Mod: CPTII,S$GLB,, | Performed by: FAMILY MEDICINE

## 2021-12-29 PROCEDURE — 3074F PR MOST RECENT SYSTOLIC BLOOD PRESSURE < 130 MM HG: ICD-10-PCS | Mod: CPTII,S$GLB,, | Performed by: FAMILY MEDICINE

## 2021-12-29 PROCEDURE — 99499 UNLISTED E&M SERVICE: CPT | Mod: HCNC,S$GLB,, | Performed by: FAMILY MEDICINE

## 2021-12-29 PROCEDURE — 1101F PT FALLS ASSESS-DOCD LE1/YR: CPT | Mod: CPTII,S$GLB,, | Performed by: FAMILY MEDICINE

## 2021-12-29 PROCEDURE — 83036 HEMOGLOBIN GLYCOSYLATED A1C: CPT | Performed by: FAMILY MEDICINE

## 2021-12-29 PROCEDURE — 85027 COMPLETE CBC AUTOMATED: CPT | Performed by: FAMILY MEDICINE

## 2021-12-29 PROCEDURE — 1159F PR MEDICATION LIST DOCUMENTED IN MEDICAL RECORD: ICD-10-PCS | Mod: CPTII,S$GLB,, | Performed by: FAMILY MEDICINE

## 2021-12-29 RX ORDER — AMLODIPINE BESYLATE 10 MG/1
10 TABLET ORAL DAILY
Qty: 90 TABLET | Refills: 1 | Status: SHIPPED | OUTPATIENT
Start: 2021-12-29 | End: 2022-06-08 | Stop reason: SDUPTHER

## 2021-12-29 RX ORDER — OLMESARTAN MEDOXOMIL 40 MG/1
40 TABLET ORAL DAILY
Qty: 90 TABLET | Refills: 1 | Status: SHIPPED | OUTPATIENT
Start: 2021-12-29 | End: 2022-06-08 | Stop reason: SDUPTHER

## 2021-12-30 PROBLEM — R73.03 PREDIABETES: Status: RESOLVED | Noted: 2021-12-30 | Resolved: 2021-12-30

## 2021-12-30 PROBLEM — R73.03 PREDIABETES: Status: ACTIVE | Noted: 2021-12-30

## 2022-01-10 DIAGNOSIS — Z01.818 PRE-OP TESTING: ICD-10-CM

## 2022-01-31 ENCOUNTER — ANESTHESIA (OUTPATIENT)
Dept: ENDOSCOPY | Facility: HOSPITAL | Age: 77
End: 2022-01-31
Payer: MEDICARE

## 2022-01-31 ENCOUNTER — ANESTHESIA EVENT (OUTPATIENT)
Dept: ENDOSCOPY | Facility: HOSPITAL | Age: 77
End: 2022-01-31
Payer: MEDICARE

## 2022-01-31 ENCOUNTER — HOSPITAL ENCOUNTER (OUTPATIENT)
Facility: HOSPITAL | Age: 77
Discharge: HOME | End: 2022-01-31
Attending: INTERNAL MEDICINE | Admitting: INTERNAL MEDICINE
Payer: MEDICARE

## 2022-01-31 VITALS
HEART RATE: 97 BPM | SYSTOLIC BLOOD PRESSURE: 158 MMHG | WEIGHT: 210 LBS | OXYGEN SATURATION: 95 % | HEIGHT: 61 IN | DIASTOLIC BLOOD PRESSURE: 73 MMHG | TEMPERATURE: 98 F | BODY MASS INDEX: 39.65 KG/M2 | RESPIRATION RATE: 20 BRPM

## 2022-01-31 DIAGNOSIS — K21.9 GASTROESOPHAGEAL REFLUX DISEASE WITHOUT ESOPHAGITIS: Primary | ICD-10-CM

## 2022-01-31 DIAGNOSIS — R19.5 POSITIVE FIT (FECAL IMMUNOCHEMICAL TEST): ICD-10-CM

## 2022-01-31 PROCEDURE — 88305 TISSUE EXAM BY PATHOLOGIST: ICD-10-PCS | Mod: 26,HCNC,, | Performed by: PATHOLOGY

## 2022-01-31 PROCEDURE — 88342 IMHCHEM/IMCYTCHM 1ST ANTB: CPT | Mod: 26,HCNC,, | Performed by: PATHOLOGY

## 2022-01-31 PROCEDURE — 43239 EGD BIOPSY SINGLE/MULTIPLE: CPT | Mod: HCNC,51,GC, | Performed by: INTERNAL MEDICINE

## 2022-01-31 PROCEDURE — 37000008 HC ANESTHESIA 1ST 15 MINUTES: Mod: HCNC | Performed by: INTERNAL MEDICINE

## 2022-01-31 PROCEDURE — 25000003 PHARM REV CODE 250: Mod: HCNC | Performed by: INTERNAL MEDICINE

## 2022-01-31 PROCEDURE — 37000009 HC ANESTHESIA EA ADD 15 MINS: Mod: HCNC | Performed by: INTERNAL MEDICINE

## 2022-01-31 PROCEDURE — 88342 IMHCHEM/IMCYTCHM 1ST ANTB: CPT | Mod: HCNC | Performed by: PATHOLOGY

## 2022-01-31 PROCEDURE — 43239 EGD BIOPSY SINGLE/MULTIPLE: CPT | Mod: HCNC | Performed by: INTERNAL MEDICINE

## 2022-01-31 PROCEDURE — 88305 TISSUE EXAM BY PATHOLOGIST: CPT | Mod: 26,HCNC,, | Performed by: PATHOLOGY

## 2022-01-31 PROCEDURE — 88305 TISSUE EXAM BY PATHOLOGIST: CPT | Mod: HCNC | Performed by: PATHOLOGY

## 2022-01-31 PROCEDURE — 45378 PR COLONOSCOPY,DIAGNOSTIC: ICD-10-PCS | Mod: HCNC,GC,, | Performed by: INTERNAL MEDICINE

## 2022-01-31 PROCEDURE — 27201012 HC FORCEPS, HOT/COLD, DISP: Mod: HCNC | Performed by: INTERNAL MEDICINE

## 2022-01-31 PROCEDURE — 45378 DIAGNOSTIC COLONOSCOPY: CPT | Mod: HCNC | Performed by: INTERNAL MEDICINE

## 2022-01-31 PROCEDURE — 43239 PR EGD, FLEX, W/BIOPSY, SGL/MULTI: ICD-10-PCS | Mod: HCNC,51,GC, | Performed by: INTERNAL MEDICINE

## 2022-01-31 PROCEDURE — E9220 PRA ENDO ANESTHESIA: ICD-10-PCS | Mod: HCNC,,, | Performed by: NURSE ANESTHETIST, CERTIFIED REGISTERED

## 2022-01-31 PROCEDURE — 63600175 PHARM REV CODE 636 W HCPCS: Mod: HCNC | Performed by: NURSE ANESTHETIST, CERTIFIED REGISTERED

## 2022-01-31 PROCEDURE — E9220 PRA ENDO ANESTHESIA: HCPCS | Mod: HCNC,,, | Performed by: NURSE ANESTHETIST, CERTIFIED REGISTERED

## 2022-01-31 PROCEDURE — 45378 DIAGNOSTIC COLONOSCOPY: CPT | Mod: HCNC,GC,, | Performed by: INTERNAL MEDICINE

## 2022-01-31 PROCEDURE — 88342 CHG IMMUNOCYTOCHEMISTRY: ICD-10-PCS | Mod: 26,HCNC,, | Performed by: PATHOLOGY

## 2022-01-31 RX ORDER — PROPOFOL 10 MG/ML
VIAL (ML) INTRAVENOUS CONTINUOUS PRN
Status: DISCONTINUED | OUTPATIENT
Start: 2022-01-31 | End: 2022-01-31

## 2022-01-31 RX ORDER — PROPOFOL 10 MG/ML
VIAL (ML) INTRAVENOUS
Status: DISCONTINUED | OUTPATIENT
Start: 2022-01-31 | End: 2022-01-31

## 2022-01-31 RX ORDER — SODIUM CHLORIDE 9 MG/ML
INJECTION, SOLUTION INTRAVENOUS CONTINUOUS
Status: DISCONTINUED | OUTPATIENT
Start: 2022-01-31 | End: 2022-01-31 | Stop reason: HOSPADM

## 2022-01-31 RX ORDER — OMEPRAZOLE 40 MG/1
40 CAPSULE, DELAYED RELEASE ORAL EVERY MORNING
Qty: 30 CAPSULE | Refills: 3 | Status: SHIPPED | OUTPATIENT
Start: 2022-01-31 | End: 2022-06-08 | Stop reason: SDUPTHER

## 2022-01-31 RX ORDER — LIDOCAINE HCL/PF 100 MG/5ML
SYRINGE (ML) INTRAVENOUS
Status: DISCONTINUED | OUTPATIENT
Start: 2022-01-31 | End: 2022-01-31

## 2022-01-31 RX ADMIN — Medication 80 MG: at 03:01

## 2022-01-31 RX ADMIN — PROPOFOL 175 MCG/KG/MIN: 10 INJECTION, EMULSION INTRAVENOUS at 03:01

## 2022-01-31 RX ADMIN — PROPOFOL 50 MG: 10 INJECTION, EMULSION INTRAVENOUS at 03:01

## 2022-01-31 RX ADMIN — SODIUM CHLORIDE: 0.9 INJECTION, SOLUTION INTRAVENOUS at 02:01

## 2022-01-31 NOTE — PLAN OF CARE
Procedure completed. Pt discharged with daughter Germain. In no acute distress. Discharge instructions given. Verbalizes understanding of post procedure restrictions and instructions.

## 2022-01-31 NOTE — ANESTHESIA PREPROCEDURE EVALUATION
01/31/2022  Nilsa Curiel is a 76 y.o., female with HTN, GERD, anemia, MONE, and morbid obesity here for EGD and colonoscopy.    Past Medical History:   Diagnosis Date    Acute bilateral low back pain without sciatica 10/10/2017    Anemia 4/24/2018    Arthritis     Atopic dermatitis     Chronic kidney disease, stage III (moderate) 8/18/2020    GERD (gastroesophageal reflux disease)     Hyperlipidemia     Hypertension     Left ventricular hypertrophy 8/16/2016    Morbid obesity with BMI of 40.0-44.9, adult 4/1/2016    MONE (obstructive sleep apnea) 7/25/2018     Lab Results   Component Value Date    WBC 8.24 12/29/2021    HGB 10.7 (L) 12/29/2021    HCT 36.4 (L) 12/29/2021    MCV 82 12/29/2021     12/29/2021       No results found for this or any previous visit.      Anesthesia Evaluation    I have reviewed the Patient Summary Reports.    I have reviewed the Nursing Notes. I have reviewed the NPO Status.   I have reviewed the Medications.     Review of Systems  Anesthesia Hx:  No problems with previous Anesthesia    Cardiovascular:   Hypertension    Pulmonary:   Sleep Apnea    Renal/:   Chronic Renal Disease    Hepatic/GI:   GERD    Musculoskeletal:   Arthritis         Physical Exam  General:  Morbid Obesity    Airway/Jaw/Neck:  Airway Findings: Mouth Opening: Normal Tongue: Normal  General Airway Assessment: Adult  Mallampati: II  Improves to II with phonation.  TM Distance: Normal, at least 6 cm                 Anesthesia Plan  Type of Anesthesia, risks & benefits discussed:  Anesthesia Type:  general    Patient's Preference:   Plan Factors:          Intra-op Monitoring Plan: standard ASA monitors  Intra-op Monitoring Plan Comments:   Post Op Pain Control Plan: multimodal analgesia  Post Op Pain Control Plan Comments:     Induction:   IV  Beta Blocker:  Patient is not currently on a  Beta-Blocker (No further documentation required).       Informed Consent: Patient understands risks and agrees with Anesthesia plan.  Questions answered. Anesthesia consent signed with patient.  ASA Score: 3     Day of Surgery Review of History & Physical:  There are no significant changes.  H&P update referred to the provider.         Ready For Surgery From Anesthesia Perspective.

## 2022-01-31 NOTE — [employer]OVATION PATIENT INSTRUCTIONS
Discharge Summary/Instructions after an Endoscopic Procedure  Patient Name: Nilsa Curiel  Patient MRN: [id number]  Patient YOB: 1945 Monday, January 31, 2022  Tani Lara MD  Dear patient,  As a result of recent federal legislation (The Federal Cures Act), you may   receive lab or pathology results from your procedure in your [facility]   account before your physician is able to contact you. Your physician or   their representative will relay the results to you with their   recommendations at their soonest availability.  Thank you,  RESTRICTIONS:  During your procedure today, you received medications for sedation.  These   medications may affect your judgment, balance and coordination.  Therefore,   for 24 hours, you have the following restrictions:   - DO NOT drive a car, operate machinery, make legal/financial decisions,   sign important papers or drink alcohol.    ACTIVITY:  Today: no heavy lifting, straining or running due to procedural   sedation/anesthesia.  The following day: return to full activity including work.  DIET:  Eat and drink normally unless instructed otherwise.     TREATMENT FOR COMMON SIDE EFFECTS:  - Mild abdominal pain, nausea, belching, bloating or excessive gas:  rest,   eat lightly and use a heating pad.  - Sore Throat: treat with throat lozenges and/or gargle with warm salt   water.  - Because air was used during the procedure, expelling large amounts of air   from your rectum or belching is normal.  - If a bowel prep was taken, you may not have a bowel movement for 1-3 days.    This is normal.  SYMPTOMS TO WATCH FOR AND REPORT TO YOUR PHYSICIAN:  1. Abdominal pain or bloating, other than gas cramps.  2. Chest pain.  3. Back pain.  4. Signs of infection such as: chills or fever occurring within 24 hours   after the procedure.  5. Rectal bleeding, which would show as bright red, maroon, or black stools.   (A tablespoon of blood from the rectum is not serious, especially if    hemorrhoids are present.)  6. Vomiting.  7. Weakness or dizziness.  GO DIRECTLY TO THE NEAREST EMERGENCY ROOM IF YOU HAVE ANY OF THE FOLLOWING:      Difficulty breathing              Chills and/or fever over 101 F   Persistent vomiting and/or vomiting blood   Severe abdominal pain   Severe chest pain   Black, tarry stools   Bleeding- more than one tablespoon   Any other symptom or condition that you feel may need urgent attention  Your doctor recommends these additional instructions:  If any biopsies were taken, your doctors clinic will contact you in 1 to 2   weeks with any results.  - Patient has a contact number available for emergencies.  The signs and   symptoms of potential delayed complications were discussed with the   patient.  Return to normal activities tomorrow.  Written discharge   instructions were provided to the patient.   - Discharge patient to home.   - Repeat colonoscopy is not recommended due to current age (66 years or   older) for screening purposes.   - The findings and recommendations were discussed with the designated   responsible adult.  For questions, problems or results please call your physician - Tani Lara MD at Work:  [phone].  [facility] [place], EMERGENCY ROOM PHONE NUMBER: [phone]  IF A COMPLICATION OR EMERGENCY SITUATION ARISES AND YOU ARE UNABLE TO REACH   YOUR PHYSICIAN - GO DIRECTLY TO THE EMERGENCY ROOM.  Tani Lara MD  1/31/2022 3:50:09 PM  This report has been verified and signed electronically.  Dear patient,  As a result of recent federal legislation (The Federal Cures Act), you may   receive lab or pathology results from your procedure in your [facility]   account before your physician is able to contact you. Your physician or   their representative will relay the results to you with their   recommendations at their soonest availability.  Thank you,  PROVATION

## 2022-01-31 NOTE — TRANSFER OF CARE
"Anesthesia Transfer of Care Note    Patient: Nilsa Curiel    Procedure(s) Performed: Procedure(s) (LRB):  EGD (ESOPHAGOGASTRODUODENOSCOPY) (N/A)  COLONOSCOPY--positive fit kit (N/A)    Patient location: GI    Anesthesia Type: general    Transport from OR: Transported from OR on room air with adequate spontaneous ventilation    Post pain: adequate analgesia    Post assessment: no apparent anesthetic complications and tolerated procedure well    Post vital signs: stable    Level of consciousness: lethargic and responds to stimulation    Nausea/Vomiting: no nausea/vomiting    Complications: none    Transfer of care protocol was followed      Last vitals:   Visit Vitals  BP (!) 197/84 (BP Location: Left arm, Patient Position: Sitting)   Pulse (!) 124   Temp 36.6 °C (97.9 °F) (Temporal)   Resp (!) 28   Ht 5' 1" (1.549 m)   Wt 95.3 kg (210 lb)   SpO2 98%   Breastfeeding No   BMI 39.68 kg/m²     "

## 2022-01-31 NOTE — PROVATION PATIENT INSTRUCTIONS
Discharge Summary/Instructions after an Endoscopic Procedure  Patient Name: Nilsa Curiel  Patient MRN: [id number]  Patient YOB: 1945 Monday, January 31, 2022  Tani Lara MD  Dear patient,  As a result of recent federal legislation (The Federal Cures Act), you may   receive lab or pathology results from your procedure in your [facility]   account before your physician is able to contact you. Your physician or   their representative will relay the results to you with their   recommendations at their soonest availability.  Thank you,  RESTRICTIONS:  During your procedure today, you received medications for sedation.  These   medications may affect your judgment, balance and coordination.  Therefore,   for 24 hours, you have the following restrictions:   - DO NOT drive a car, operate machinery, make legal/financial decisions,   sign important papers or drink alcohol.    ACTIVITY:  Today: no heavy lifting, straining or running due to procedural   sedation/anesthesia.  The following day: return to full activity including work.  DIET:  Eat and drink normally unless instructed otherwise.     TREATMENT FOR COMMON SIDE EFFECTS:  - Mild abdominal pain, nausea, belching, bloating or excessive gas:  rest,   eat lightly and use a heating pad.  - Sore Throat: treat with throat lozenges and/or gargle with warm salt   water.  - Because air was used during the procedure, expelling large amounts of air   from your rectum or belching is normal.  - If a bowel prep was taken, you may not have a bowel movement for 1-3 days.    This is normal.  SYMPTOMS TO WATCH FOR AND REPORT TO YOUR PHYSICIAN:  1. Abdominal pain or bloating, other than gas cramps.  2. Chest pain.  3. Back pain.  4. Signs of infection such as: chills or fever occurring within 24 hours   after the procedure.  5. Rectal bleeding, which would show as bright red, maroon, or black stools.   (A tablespoon of blood from the rectum is not serious, especially if    hemorrhoids are present.)  6. Vomiting.  7. Weakness or dizziness.  GO DIRECTLY TO THE NEAREST EMERGENCY ROOM IF YOU HAVE ANY OF THE FOLLOWING:      Difficulty breathing              Chills and/or fever over 101 F   Persistent vomiting and/or vomiting blood   Severe abdominal pain   Severe chest pain   Black, tarry stools   Bleeding- more than one tablespoon   Any other symptom or condition that you feel may need urgent attention  Your doctor recommends these additional instructions:  If any biopsies were taken, your doctors clinic will contact you in 1 to 2   weeks with any results.  - Discharge patient to home.   - Perform a colonoscopy today.   - Await pathology results.   - The findings and recommendations were discussed with the designated   responsible adult.  For questions, problems or results please call your physician - Tani Lara MD at Work:  [phone].  [facility], EMERGENCY ROOM PHONE NUMBER: [phone]  IF A COMPLICATION OR EMERGENCY SITUATION ARISES AND YOU ARE UNABLE TO REACH   YOUR PHYSICIAN - GO DIRECTLY TO THE EMERGENCY ROOM.  Tani Lara MD  1/31/2022 3:33:12 PM  This report has been verified and signed electronically.  Dear patient,  As a result of recent federal legislation (The Federal Cures Act), you may   receive lab or pathology results from your procedure in your [facility]   account before your physician is able to contact you. Your physician or   their representative will relay the results to you with their   recommendations at their soonest availability.  Thank you,  PROVATION

## 2022-01-31 NOTE — H&P
Short Stay Endoscopy History and Physical    PCP - Velma Bryant MD    Procedure - EGD and colonoscopy  ASA - per anesthesia  Mallampati - per anesthesia  History of Anesthesia problems - no  Family history Anesthesia problems -  no   Plan of anesthesia - General    HPI:  This is a 76 y.o. female here for evaluation of : +Fit test, anemia    ROS:  Constitutional: No fevers, chills  CV: No chest pain  Pulm: No shortness of breath  GI: see HPI  Derm: No rash    Medical History:  has a past medical history of Acute bilateral low back pain without sciatica (10/10/2017), Anemia (2018), Arthritis, Atopic dermatitis, Chronic kidney disease, stage III (moderate) (2020), GERD (gastroesophageal reflux disease), Hyperlipidemia, Hypertension, Left ventricular hypertrophy (2016), Morbid obesity with BMI of 40.0-44.9, adult (2016), and MONE (obstructive sleep apnea) (2018).    Surgical History:  has a past surgical history that includes  section; Tubal ligation; Esophagogastroduodenoscopy (N/A, 10/4/2021); and Colonoscopy (N/A, 10/4/2021).    Family History: family history includes Breast cancer (age of onset: 91) in her mother; Cancer in her sister; Cancer (age of onset: 91) in her mother; Cataracts in her brother; Dementia in her mother; Diabetes in her brother, mother, and sister; Gout in her sister; Heart disease in her sister; Hyperlipidemia in her sister; Hypertension in her mother, sister, sister, and son; Kidney disease in her sister and sister; Multiple sclerosis in her daughter; No Known Problems in her brother and daughter; Other in her brother and father; Stroke in her brother.. Otherwise no colon cancer, inflammatory bowel disease, or GI malignancies.    Social History:  reports that she has never smoked. She has never used smokeless tobacco. She reports that she does not drink alcohol and does not use drugs.    Review of patient's allergies indicates:  No Known  Allergies    Medications:   Medications Prior to Admission   Medication Sig Dispense Refill Last Dose    amLODIPine (NORVASC) 10 MG tablet Take 1 tablet (10 mg total) by mouth once daily. 90 tablet 1 1/31/2022 at Unknown time    biotin 300 mcg Tab Take 1 tablet by mouth once daily.   Past Week at Unknown time    hydroCHLOROthiazide (HYDRODIURIL) 12.5 MG Tab 1/2 tablet daily 45 tablet 3 Past Week at Unknown time    lactobacillus combo no.6 (PROBIOTIC COMPLEX) 4 billion cell Tab Take 1 tablet by mouth once daily. 90 tablet 3 Past Week at Unknown time    MULTIVIT-MIN/FA/CA CARB/VIT K (WOMEN'S 50+ DAILY FORMULA ORAL) Take by mouth.   Past Week at Unknown time    olmesartan (BENICAR) 40 MG tablet Take 1 tablet (40 mg total) by mouth once daily. 90 tablet 1 1/31/2022 at Unknown time    potassium gluconate 595 mg (99 mg) Tab Take 1 tablet by mouth once daily. 90 tablet 3 Past Week at Unknown time    TURMERIC ORAL Take by mouth.   Past Week at Unknown time    aspirin (ECOTRIN) 81 MG EC tablet Take 1 tablet (81 mg total) by mouth 3 (three) times a week.       betamethasone valerate 0.1% (VALISONE) 0.1 % Oint Apply topically once daily. 45 g 3     cetirizine (ZYRTEC) 10 MG tablet Take 1 tablet (10 mg total) by mouth once daily. 90 tablet 3     clobetasol (TEMOVATE) 0.05 % cream APPLY TO THE AFFECTED AREA TWICE DAILY 30 g 0     diclofenac sodium (VOLTAREN) 1 % Gel Apply 2 g topically daily as needed. 100 g 3     flu vac 2021 65up-[id number],PF, (FLUAD QUAD 2021-22,65Y UP,,PF,) 60 mcg (15 mcg x 4)/0.5 mL Syrg inject into muscle 0.5 mL 0     fluocinonide (LIDEX) 0.05 % external solution AAA scalp qday - bid prn pruritus 60 mL 3     fluticasone propionate (FLONASE) 50 mcg/actuation nasal spray USE 1 SPRAY IN EACH NOSTRIL EVERY DAY 32 g 4     ketoconazole (NIZORAL) 2 % cream AOO TO THE AFFECTED AREA TWICE DAILY, APPLY TO AXILLA AND ABDOMINAL CREASES 15 g 0     ketoconazole (NIZORAL) 2 % shampoo Wash hair with  medicated shampoo at least 2x/week - let sit on scalp at least 5 minutes prior to rinsing 120 mL 5     nystatin-triamcinolone (MYCOLOG II) cream Apply topically 2 (two) times a day. 60 g 3     omega-3 fatty acids 300 mg Cap Take by mouth.       pantoprazole (PROTONIX) 40 MG tablet Take 1 tablet (40 mg total) by mouth daily as needed. 90 tablet 3 Unknown at Unknown time    riboflavin, vitamin B2, (VITAMIN B-2 ORAL) Take 1 capsule by mouth once daily.       sumatriptan (IMITREX) 100 MG tablet Take 1 tablet (100 mg total) by mouth daily as needed for Migraine. No more than 2 doses in 24 hours 27 tablet 3 Unknown at Unknown time    TAC 0.1%-LOPROX-MOM Apply to affected area twice daily after cool blow dry. Discard of after 30 days. (Patient taking differently: Apply to affected area twice daily after cool blow dry. Discard of after 30 days.) 60 g 3     ubidecarenone (COENZYME Q10) 100 mg Tab Take 1 tablet by mouth once daily.             Physical Exam:    Vital Signs:   Vitals:    01/31/22 1427   BP: (!) 197/84   Pulse: (!) 124   Resp: (!) 28   Temp: 97.9 °F (36.6 °C)       General Appearance: Well appearing in no acute distress  Eyes:    No scleral icterus  ENT: lips and tongue normal  Lungs: no use of accessory muscles  Heart:  normal rate, regular rhythm  Abdomen: Soft, non tender, non distended   Extremities: no edema  Skin: No rash      Labs:  Lab Results   Component Value Date    WBC 8.24 12/29/2021    HGB 10.7 (L) 12/29/2021    HCT 36.4 (L) 12/29/2021     12/29/2021    CHOL 200 (H) 12/29/2021    TRIG 163 (H) 12/29/2021    HDL 50 12/29/2021    ALT 18 12/29/2021    AST 17 12/29/2021     12/29/2021    K 4.0 12/29/2021     12/29/2021    CREATININE 0.9 12/29/2021    BUN 14 12/29/2021    CO2 24 12/29/2021    TSH 3.670 12/29/2021    HGBA1C 5.4 12/29/2021       I have explained the risks and benefits of endoscopy procedures to the patient including but not limited to bleeding, perforation,  infection, and death.  The patient was asked if they understand and allowed to ask any further questions to their satisfaction.    Evon Germain MD

## 2022-01-31 NOTE — DISCHARGE INSTRUCTIONS
Patient Education       Colonoscopy   Why is this procedure done?   Colonoscopy is done so your doctor can see the inside of your large intestines, also called your colon, and your rectum. It uses a lighted tube called a scope, which has a tiny camera that can be moved through the large intestine. This may be done to:  · Check for colon cancer or growths called polyps  · Look for the source of rectal bleeding  · Find the cause of changes in your bowel movements  · Find the cause of belly or rectal pain  · Check results from other tests  · Check your response to treatment for other diseases  · Learn about weight loss  What happens before the procedure?   · Your doctor will ask you about your health history and do an exam. The doctor may order tests for your stool. Talk to the doctor about  ? All the drugs you are taking. Be sure to include all prescription and over-the-counter (OTC) drugs, and herbal supplements. Tell the doctor about any drug allergy. Bring a list of drugs you take with you.  ? Any bleeding problems. Be sure to tell your doctor if you are taking any drugs that may cause bleeding. Some of these are warfarin, rivaroxaban, apixaban, ticagrelor, clopidogrel, ketorolac, ibuprofen, naproxen, or aspirin. Certain vitamins and herbs, such as garlic and fish oil, may also add to the risk for bleeding. You may need to stop these drugs as well. Talk to your doctor about them.  · The colon needs to be cleaned out before this test. Your doctor will tell you to take drugs that will cause watery loose stools. These may be liquids, pills, or both. You may need to take these the day before and the day of your test.  · You will be placed on a clear liquid diet the day before the exam and you will need to only have clear liquids until the test is done. Clear liquids include water, sports drinks, broth, soft drinks, and juices, but avoid anything that is red or purple in color. Do not drink alcohol.  · Your doctor may  ask you not to eat or drink any food other than the drugs or liquids that clean out your colon.  · You will not be allowed to drive after the procedure. Ask a family member or a friend to help you get home and stay with you if possible.  What happens during the procedure?   · The staff will put an IV in your arm to give you fluids and drugs. You may be given a drug to make you sleepy.  · You will lie on your side with your knees bent and pulled up toward your chest.  · The doctor will use a small thin tube, called a scope, with a light and a camera on it. The tube is put into your anus and moved through your rectum and into the large intestine or colon.  · Small amounts of air are put into your colon. The camera lets your doctor look at the lining of your colon.  · Your doctor may take small tissue samples and remove small growths.     · The tube is then taken out.  · The procedure may take 30 to 45 minutes.  What happens after the procedure?   · You will go to a recovery area and the staff will watch you closely.  · You will want to rest after your procedure.  · You may feel groggy.  · You should be able to eat your usual diet after the test.  · You will have gas and you may have mild cramping. This is normal.  · A small amount of bleeding may happen during the first few days after your procedure.  · If tissue was removed, it will be sent to a lab to be checked. Your doctor will tell you the results after a week or two.  What problems could happen?   · Tear inside your colon  · Bleeding can happen for a few days afterwards  Where can I learn more?   American Cancer Society  https://www.cancer.org/cancer/colon-rectal-cancer/hrbwclwzi-fbuyrdaiy-wbocuza/fvtsqtrsr-ddcew-iles.html   American Society of Clinical Oncology  https://www.cancer.net/navigating-cancer-care/diagnosing-cancer/tests-and-procedures/colonoscopy   Last Reviewed Date   2021-03-10  Consumer Information Use and Disclaimer   This information is not  specific medical advice and does not replace information you receive from your health care provider. This is only a brief summary of general information. It does NOT include all information about conditions, illnesses, injuries, tests, procedures, treatments, therapies, discharge instructions or life-style choices that may apply to you. You must talk with your health care provider for complete information about your health and treatment options. This information should not be used to decide whether or not to accept your health care providers advice, instructions or recommendations. Only your health care provider has the knowledge and training to provide advice that is right for you.  Copyright   Copyright © 2021 [employer] and its affiliates and/or licensors. All rights reserved.  Patient Education       Upper GI Endoscopy   Why is this procedure done?   This procedure is done to view your upper gastrointestinal (GI) tract. This includes your throat and food pipe (esophagus). It also includes your stomach and the first part of the small bowel. Some people have this test for problems like coughing or throwing up blood. Other people may be having bad belly pain or blood in their stool. You may be having trouble swallowing or problems with acid reflux.  Doctors often use this test to look for problems like:  · Ulcers  · Cancer or tumor growths  · Internal bleeding  · Swelling  · Inflammation  · Infection  · Champagne's esophagus  · Gastroesophageal reflux disease or GERD  · Swallowing problems     What will the results be?   Your doctor may find the problem inside your body that is causing your signs. The doctor can also treat some problems while doing this procedure. This may include things like stopping bleeding or removing a growth.  What happens before the procedure?   Your doctor will take your history and do an exam. Talk to the doctor about:  · All the drugs you are taking. Be sure to include all  prescription, over the counter, vitamins, and herbal supplements. Bring a list of drugs you take with you.  · Tell the doctor if you have any drug allergy.  · Any bleeding problems. Be sure to tell your doctor if you are taking any drugs that may cause bleeding. Some of these are warfarin, rivaroxaban, apixaban, ticagrelor, clopidogrel, ketorolac, ibuprofen, naproxen, or aspirin. Certain vitamins and herbs, such as garlic and fish oil, may also add to the risk for bleeding. You may need to stop these drugs as well. Talk to your doctor about them.  · When you need to stop eating or drinking before your procedure.  You will not be allowed to drive right away after the procedure. Ask a family member or a friend to drive you home.  What happens during the procedure?   · Once you are in the operating room, the staff will put an IV in your arm to give you fluids and drugs. You will be given a drug to make you sleepy. It will also help you stay pain free during the surgery.  · Your doctor may spray a drug in your throat to numb the area.  · You will be asked to lie on your left side. The staff may put a small tube in your nose to help you breathe. Your doctor may place a tool in your mouth to keep it open during the procedure. The staff may place a suction tool in your mouth to lessen saliva flow.  · The doctor will put a special scope in your mouth and down your food pipe. It is a long, thin tube with lights and a small camera. It sends images to a screen in the operating room where the camera is being used.  · To be able to view the site clearly, gas will be pumped into your belly.  · Your doctor will use the scope to see if there are problems in your upper GI tract. Small tools may be used with the scope to fix any problems that are found. Your doctor may stop an area of bleeding or take out a tumor. The doctor may also remove a growth or take tissue samples for biopsy.  · This procedure takes about 15 to 30  minutes.  What happens after the procedure?   · You will go to the Recovery Room and the staff will watch you closely.  · You will be allowed to go home when you are awake and able to eat and drink.  · You may feel bloated after the procedure. This is from any gas the doctor may have used to help see your GI tract better.  · You may have a sore throat after the procedure. You can drink fluid once the numbing drugs in your throat wear off.  · Ask your doctor when the results will be available. Set up a visit to talk about them.  What drugs may be needed?   The doctor may order drugs to:  · Help with pain  · Decrease the acid in your stomach  What problems could happen?   · Painful swallowing  · Upset stomach  · Injury to food pipe   · Throwing up  · Tear in the esophagus  Where can I learn more?   American [employer] G[employer]  https://gi.org/topics/upper-gi-endoscopy-egd/   Last Reviewed Date   2021-10-05  Consumer Information Use and Disclaimer   This information is not specific medical advice and does not replace information you receive from your health care provider. This is only a brief summary of general information. It does NOT include all information about conditions, illnesses, injuries, tests, procedures, treatments, therapies, discharge instructions or life-style choices that may apply to you. You must talk with your health care provider for complete information about your health and treatment options. This information should not be used to decide whether or not to accept your health care providers advice, instructions or recommendations. Only your health care provider has the knowledge and training to provide advice that is right for you.  Copyright   Copyright © 2021 [employer] and its affiliates and/or licensors. All rights reserved.

## 2022-02-01 NOTE — ANESTHESIA POSTPROCEDURE EVALUATION
Anesthesia Post Evaluation    Patient: Nilsa Curiel    Procedure(s) Performed: Procedure(s) (LRB):  EGD (ESOPHAGOGASTRODUODENOSCOPY) (N/A)  COLONOSCOPY--positive fit kit (N/A)    Final Anesthesia Type: general      Patient location during evaluation: GI PACU  Patient participation: Yes- Able to Participate  Level of consciousness: awake and alert  Post-procedure vital signs: reviewed and stable  Pain management: adequate  Airway patency: patent    PONV status at discharge: No PONV  Anesthetic complications: no      Cardiovascular status: blood pressure returned to baseline  Respiratory status: spontaneous ventilation  Hydration status: euvolemic  Follow-up not needed.          Vitals Value Taken Time   /73 01/31/22 1628   Temp 36.5 °C (97.7 °F) 01/31/22 1557   Pulse 97 01/31/22 1628   Resp 20 01/31/22 1628   SpO2 95 % 01/31/22 1628         Event Time   Out of Recovery 16:31:24         Pain/Jon Score: Jon Score: 10 (1/31/2022  4:28 PM)

## 2022-02-04 ENCOUNTER — PATIENT MESSAGE (OUTPATIENT)
Dept: GASTROENTEROLOGY | Facility: CLINIC | Age: 77
End: 2022-02-04
Payer: MEDICARE

## 2022-02-04 LAB
FINAL PATHOLOGIC DIAGNOSIS: NORMAL
GROSS: NORMAL
Lab: NORMAL

## 2022-02-17 ENCOUNTER — OFFICE VISIT (OUTPATIENT)
Dept: CARDIOLOGY | Facility: CLINIC | Age: 77
End: 2022-02-17
Payer: MEDICARE

## 2022-02-17 VITALS
SYSTOLIC BLOOD PRESSURE: 140 MMHG | HEIGHT: 61 IN | BODY MASS INDEX: 40.9 KG/M2 | OXYGEN SATURATION: 97 % | HEART RATE: 115 BPM | DIASTOLIC BLOOD PRESSURE: 70 MMHG | WEIGHT: 216.63 LBS

## 2022-02-17 DIAGNOSIS — I51.89 DIASTOLIC DYSFUNCTION WITHOUT HEART FAILURE: ICD-10-CM

## 2022-02-17 DIAGNOSIS — K21.00 GASTROESOPHAGEAL REFLUX DISEASE WITH ESOPHAGITIS WITHOUT HEMORRHAGE: ICD-10-CM

## 2022-02-17 DIAGNOSIS — E78.2 MIXED HYPERLIPIDEMIA: ICD-10-CM

## 2022-02-17 DIAGNOSIS — I42.1 HYPERTROPHIC OBSTRUCTIVE CARDIOMYOPATHY (HOCM): Primary | ICD-10-CM

## 2022-02-17 DIAGNOSIS — I10 ESSENTIAL HYPERTENSION: ICD-10-CM

## 2022-02-17 DIAGNOSIS — E66.01 MORBID OBESITY: ICD-10-CM

## 2022-02-17 PROCEDURE — 99214 PR OFFICE/OUTPT VISIT, EST, LEVL IV, 30-39 MIN: ICD-10-PCS | Mod: HCNC,S$GLB,, | Performed by: INTERNAL MEDICINE

## 2022-02-17 PROCEDURE — 4010F ACE/ARB THERAPY RXD/TAKEN: CPT | Mod: HCNC,CPTII,S$GLB, | Performed by: INTERNAL MEDICINE

## 2022-02-17 PROCEDURE — 3077F PR MOST RECENT SYSTOLIC BLOOD PRESSURE >= 140 MM HG: ICD-10-PCS | Mod: HCNC,CPTII,S$GLB, | Performed by: INTERNAL MEDICINE

## 2022-02-17 PROCEDURE — 3008F PR BODY MASS INDEX (BMI) DOCUMENTED: ICD-10-PCS | Mod: HCNC,CPTII,S$GLB, | Performed by: INTERNAL MEDICINE

## 2022-02-17 PROCEDURE — 3288F FALL RISK ASSESSMENT DOCD: CPT | Mod: HCNC,CPTII,S$GLB, | Performed by: INTERNAL MEDICINE

## 2022-02-17 PROCEDURE — 1159F MED LIST DOCD IN RCRD: CPT | Mod: HCNC,CPTII,S$GLB, | Performed by: INTERNAL MEDICINE

## 2022-02-17 PROCEDURE — 3008F BODY MASS INDEX DOCD: CPT | Mod: HCNC,CPTII,S$GLB, | Performed by: INTERNAL MEDICINE

## 2022-02-17 PROCEDURE — 1125F PR PAIN SEVERITY QUANTIFIED, PAIN PRESENT: ICD-10-PCS | Mod: HCNC,CPTII,S$GLB, | Performed by: INTERNAL MEDICINE

## 2022-02-17 PROCEDURE — 1159F PR MEDICATION LIST DOCUMENTED IN MEDICAL RECORD: ICD-10-PCS | Mod: HCNC,CPTII,S$GLB, | Performed by: INTERNAL MEDICINE

## 2022-02-17 PROCEDURE — 99999 PR PBB SHADOW E&M-EST. PATIENT-LVL V: ICD-10-PCS | Mod: PBBFAC,HCNC,, | Performed by: INTERNAL MEDICINE

## 2022-02-17 PROCEDURE — 1125F AMNT PAIN NOTED PAIN PRSNT: CPT | Mod: HCNC,CPTII,S$GLB, | Performed by: INTERNAL MEDICINE

## 2022-02-17 PROCEDURE — 1101F PT FALLS ASSESS-DOCD LE1/YR: CPT | Mod: HCNC,CPTII,S$GLB, | Performed by: INTERNAL MEDICINE

## 2022-02-17 PROCEDURE — 1157F PR ADVANCE CARE PLAN OR EQUIV PRESENT IN MEDICAL RECORD: ICD-10-PCS | Mod: HCNC,CPTII,S$GLB, | Performed by: INTERNAL MEDICINE

## 2022-02-17 PROCEDURE — 1157F ADVNC CARE PLAN IN RCRD: CPT | Mod: HCNC,CPTII,S$GLB, | Performed by: INTERNAL MEDICINE

## 2022-02-17 PROCEDURE — 99999 PR PBB SHADOW E&M-EST. PATIENT-LVL V: CPT | Mod: PBBFAC,HCNC,, | Performed by: INTERNAL MEDICINE

## 2022-02-17 PROCEDURE — 4010F PR ACE/ARB THEARPY RXD/TAKEN: ICD-10-PCS | Mod: HCNC,CPTII,S$GLB, | Performed by: INTERNAL MEDICINE

## 2022-02-17 PROCEDURE — 99499 UNLISTED E&M SERVICE: CPT | Mod: S$GLB,,, | Performed by: INTERNAL MEDICINE

## 2022-02-17 PROCEDURE — 99499 RISK ADDL DX/OHS AUDIT: ICD-10-PCS | Mod: S$GLB,,, | Performed by: INTERNAL MEDICINE

## 2022-02-17 PROCEDURE — 3077F SYST BP >= 140 MM HG: CPT | Mod: HCNC,CPTII,S$GLB, | Performed by: INTERNAL MEDICINE

## 2022-02-17 PROCEDURE — 1101F PR PT FALLS ASSESS DOC 0-1 FALLS W/OUT INJ PAST YR: ICD-10-PCS | Mod: HCNC,CPTII,S$GLB, | Performed by: INTERNAL MEDICINE

## 2022-02-17 PROCEDURE — 99214 OFFICE O/P EST MOD 30 MIN: CPT | Mod: HCNC,S$GLB,, | Performed by: INTERNAL MEDICINE

## 2022-02-17 PROCEDURE — 3078F PR MOST RECENT DIASTOLIC BLOOD PRESSURE < 80 MM HG: ICD-10-PCS | Mod: HCNC,CPTII,S$GLB, | Performed by: INTERNAL MEDICINE

## 2022-02-17 PROCEDURE — 3078F DIAST BP <80 MM HG: CPT | Mod: HCNC,CPTII,S$GLB, | Performed by: INTERNAL MEDICINE

## 2022-02-17 PROCEDURE — 3288F PR FALLS RISK ASSESSMENT DOCUMENTED: ICD-10-PCS | Mod: HCNC,CPTII,S$GLB, | Performed by: INTERNAL MEDICINE

## 2022-02-17 NOTE — PROGRESS NOTES
Subjective:    Patient ID:  Nilsa Curiel is a 76 y.o. female who presents for follow-up of HOCM    HPI     The patient is a 76 year old female previously followed by Dr Aj with asymptomatic HOCM. 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 She reports chronic back pain, recurring GERD and chronic NUÑEZ. She walks regularly. She denies chest pain or palpitations      CONCLUSIONS     1 - Hyperdynamic left ventricular systolic function (EF >70%) with mild outflow obstruction from mid cavity obliteration.     2 - Mildly depressed right ventricular systolic function .     3 - Impaired LV relaxation, elevated LAP (grade 2 diastolic dysfunction).     4 - The estimated PA systolic pressure is 36 mmHg.     This document has been electronically    SIGNED BY: Gaurav Damon MD On: 05/08/2018 11:18  Lab Results   Component Value Date     12/29/2021    K 4.0 12/29/2021     12/29/2021    CO2 24 12/29/2021    BUN 14 12/29/2021    CREATININE 0.9 12/29/2021     12/29/2021    HGBA1C 5.4 12/29/2021    MG 2.0 05/19/2021    AST 17 12/29/2021    ALT 18 12/29/2021    ALBUMIN 3.7 12/29/2021    PROT 8.1 12/29/2021    BILITOT 0.5 12/29/2021    WBC 8.24 12/29/2021    HGB 10.7 (L) 12/29/2021    HCT 36.4 (L) 12/29/2021    MCV 82 12/29/2021     12/29/2021    TSH 3.670 12/29/2021         Lab Results   Component Value Date    CHOL 200 (H) 12/29/2021    HDL 50 12/29/2021    TRIG 163 (H) 12/29/2021       Lab Results   Component Value Date    LDLCALC 117.4 12/29/2021       Past Medical History:   Diagnosis Date    Acute bilateral low back pain without sciatica 10/10/2017    Anemia 4/24/2018    Arthritis     Atopic dermatitis     Chronic kidney disease, stage III (moderate) 8/18/2020    GERD (gastroesophageal reflux disease)     Hyperlipidemia     Hypertension     Left ventricular hypertrophy 8/16/2016    Morbid obesity with BMI of 40.0-44.9, adult 4/1/2016    MONE  (obstructive sleep apnea) 7/25/2018       Current Outpatient Medications:     amLODIPine (NORVASC) 10 MG tablet, Take 1 tablet (10 mg total) by mouth once daily., Disp: 90 tablet, Rfl: 1    betamethasone valerate 0.1% (VALISONE) 0.1 % Oint, Apply topically once daily., Disp: 45 g, Rfl: 3    biotin 300 mcg Tab, Take 1 tablet by mouth once daily., Disp: , Rfl:     clobetasol (TEMOVATE) 0.05 % cream, APPLY TO THE AFFECTED AREA TWICE DAILY, Disp: 30 g, Rfl: 0    diclofenac sodium (VOLTAREN) 1 % Gel, Apply 2 g topically daily as needed., Disp: 100 g, Rfl: 3    flu vac 2021 65up-maoLY15I,PF, (FLUAD QUAD 2021-22,65Y UP,,PF,) 60 mcg (15 mcg x 4)/0.5 mL Syrg, inject into muscle, Disp: 0.5 mL, Rfl: 0    fluocinonide (LIDEX) 0.05 % external solution, AAA scalp qday - bid prn pruritus, Disp: 60 mL, Rfl: 3    fluticasone propionate (FLONASE) 50 mcg/actuation nasal spray, USE 1 SPRAY IN EACH NOSTRIL EVERY DAY, Disp: 32 g, Rfl: 4    hydroCHLOROthiazide (HYDRODIURIL) 12.5 MG Tab, 1/2 tablet daily, Disp: 45 tablet, Rfl: 3    ketoconazole (NIZORAL) 2 % cream, AOO TO THE AFFECTED AREA TWICE DAILY, APPLY TO AXILLA AND ABDOMINAL CREASES, Disp: 15 g, Rfl: 0    ketoconazole (NIZORAL) 2 % shampoo, Wash hair with medicated shampoo at least 2x/week - let sit on scalp at least 5 minutes prior to rinsing, Disp: 120 mL, Rfl: 5    lactobacillus combo no.6 (PROBIOTIC COMPLEX) 4 billion cell Tab, Take 1 tablet by mouth once daily., Disp: 90 tablet, Rfl: 3    MULTIVIT-MIN/FA/CA CARB/VIT K (WOMEN'S 50+ DAILY FORMULA ORAL), Take by mouth., Disp: , Rfl:     nystatin-triamcinolone (MYCOLOG II) cream, Apply topically 2 (two) times a day., Disp: 60 g, Rfl: 3    olmesartan (BENICAR) 40 MG tablet, Take 1 tablet (40 mg total) by mouth once daily., Disp: 90 tablet, Rfl: 1    omega-3 fatty acids 300 mg Cap, Take by mouth., Disp: , Rfl:     omeprazole (PRILOSEC) 40 MG capsule, Take 1 capsule (40 mg total) by mouth every morning., Disp: 30  capsule, Rfl: 3    pantoprazole (PROTONIX) 40 MG tablet, Take 1 tablet (40 mg total) by mouth daily as needed., Disp: 90 tablet, Rfl: 3    potassium gluconate 595 mg (99 mg) Tab, Take 1 tablet by mouth once daily., Disp: 90 tablet, Rfl: 3    riboflavin, vitamin B2, (VITAMIN B-2 ORAL), Take 1 capsule by mouth once daily., Disp: , Rfl:     sumatriptan (IMITREX) 100 MG tablet, Take 1 tablet (100 mg total) by mouth daily as needed for Migraine. No more than 2 doses in 24 hours, Disp: 27 tablet, Rfl: 3    TAC 0.1%-LOPROX-MOM, Apply to affected area twice daily after cool blow dry. Discard of after 30 days. (Patient taking differently: Apply to affected area twice daily after cool blow dry. Discard of after 30 days.), Disp: 60 g, Rfl: 3    TURMERIC ORAL, Take by mouth., Disp: , Rfl:     ubidecarenone (COENZYME Q10) 100 mg Tab, Take 1 tablet by mouth once daily. , Disp: , Rfl:     aspirin (ECOTRIN) 81 MG EC tablet, Take 1 tablet (81 mg total) by mouth 3 (three) times a week., Disp: , Rfl:     cetirizine (ZYRTEC) 10 MG tablet, Take 1 tablet (10 mg total) by mouth once daily., Disp: 90 tablet, Rfl: 3          Review of Systems   Constitutional: Negative for decreased appetite, diaphoresis, fever, malaise/fatigue, weight gain and weight loss.   HENT: Negative for congestion, ear discharge, ear pain and nosebleeds.    Eyes: Negative for blurred vision, double vision and visual disturbance.   Cardiovascular: Positive for dyspnea on exertion. Negative for chest pain, claudication, cyanosis, irregular heartbeat, leg swelling, near-syncope, orthopnea, palpitations, paroxysmal nocturnal dyspnea and syncope.   Respiratory: Negative for cough, hemoptysis, shortness of breath, sleep disturbances due to breathing, snoring, sputum production and wheezing.    Endocrine: Negative for polydipsia, polyphagia and polyuria.   Hematologic/Lymphatic: Negative for adenopathy and bleeding problem. Does not bruise/bleed easily.   Skin:  "Negative for color change, nail changes, poor wound healing and rash.   Musculoskeletal: Negative for muscle cramps and muscle weakness.   Gastrointestinal: Positive for heartburn. Negative for abdominal pain, anorexia, change in bowel habit, hematochezia, nausea and vomiting.   Genitourinary: Negative for dysuria, frequency and hematuria.   Neurological: Negative for brief paralysis, difficulty with concentration, excessive daytime sleepiness, dizziness, focal weakness, headaches, light-headedness, seizures, vertigo and weakness.   Psychiatric/Behavioral: Negative for altered mental status and depression.   Allergic/Immunologic: Negative for persistent infections.        Objective:BP (!) 140/70   Pulse (!) 115   Ht 5' 1" (1.549 m)   Wt 98.2 kg (216 lb 9.6 oz)   SpO2 97%   BMI 40.93 kg/m²             Physical Exam  Vitals reviewed.   Constitutional:       Appearance: She is well-developed and well-nourished. She is obese.   HENT:      Head: Normocephalic.      Right Ear: External ear normal.      Left Ear: External ear normal.      Nose: Nose normal.        Comments: Inspection of lips, teeth and gums normalEyes:      General: No scleral icterus.     Extraocular Movements: EOM normal.      Conjunctiva/sclera: Conjunctivae normal.      Pupils: Pupils are equal, round, and reactive to light.   Neck:      Thyroid: No thyromegaly.      Vascular: No JVD.      Trachea: No tracheal deviation.   Cardiovascular:      Rate and Rhythm: Normal rate and regular rhythm.      Pulses: Intact distal pulses.           Carotid pulses are 2+ on the right side and 2+ on the left side.       Dorsalis pedis pulses are 0 on the right side.        Posterior tibial pulses are 0 on the right side and 0 on the left side.      Heart sounds: S1 normal and S2 normal. Murmur heard.    Medium-pitched harsh midsystolic murmur is present with a grade of 2/6 at the upper left sternal border.  No friction rub. No gallop.       Comments: JVP " appears normal  No edema  Pulmonary:      Effort: Pulmonary effort is normal. No respiratory distress.      Breath sounds: Normal breath sounds. No wheezing or rales.   Chest:      Chest wall: No tenderness.   Abdominal:      General: Bowel sounds are normal. There is no distension.      Palpations: Abdomen is soft. There is no hepatosplenomegaly.      Tenderness: There is no abdominal tenderness. There is no guarding.   Musculoskeletal:         General: No tenderness or edema. Normal range of motion.      Cervical back: Normal range of motion.   Lymphadenopathy:      Comments: Palpation of lymph nodes of neck and groin normal   Skin:     General: Skin is warm and dry.      Coloration: Skin is not pale.      Findings: No erythema or rash.      Comments: No ankle nor pretibial edema   Neurological:      Mental Status: She is alert and oriented to person, place, and time.      Cranial Nerves: No cranial nerve deficit.      Motor: No abnormal muscle tone.      Coordination: Coordination normal.   Psychiatric:         Mood and Affect: Mood and affect normal.         Behavior: Behavior normal.         Thought Content: Thought content normal.         Judgment: Judgment normal.           Assessment:       1. Hypertrophic obstructive cardiomyopathy (HOCM)    2. Diastolic dysfunction without heart failure    3. Morbid obesity    4. Gastroesophageal reflux disease with esophagitis without hemorrhage    5. Essential hypertension    6. Mixed hyperlipidemia         Plan:       Nilsa was seen today for cardiomyopathy.    Diagnoses and all orders for this visit:    Hypertrophic obstructive cardiomyopathy (HOCM)    Diastolic dysfunction without heart failure    Morbid obesity    Gastroesophageal reflux disease with esophagitis without hemorrhage    Essential hypertension    Mixed hyperlipidemia

## 2022-03-18 ENCOUNTER — CLINICAL SUPPORT (OUTPATIENT)
Dept: REHABILITATION | Facility: OTHER | Age: 77
End: 2022-03-18
Attending: FAMILY MEDICINE
Payer: MEDICARE

## 2022-03-18 DIAGNOSIS — Z74.09 MOBILITY POOR: ICD-10-CM

## 2022-03-18 DIAGNOSIS — R29.898 DECREASED STRENGTH OF TRUNK AND BACK: ICD-10-CM

## 2022-03-18 DIAGNOSIS — G89.29 CHRONIC MIDLINE LOW BACK PAIN, UNSPECIFIED WHETHER SCIATICA PRESENT: ICD-10-CM

## 2022-03-18 DIAGNOSIS — M54.50 CHRONIC MIDLINE LOW BACK PAIN, UNSPECIFIED WHETHER SCIATICA PRESENT: ICD-10-CM

## 2022-03-18 DIAGNOSIS — R29.3 POSTURE IMBALANCE: ICD-10-CM

## 2022-03-18 PROCEDURE — 97110 THERAPEUTIC EXERCISES: CPT

## 2022-03-18 PROCEDURE — 97162 PT EVAL MOD COMPLEX 30 MIN: CPT

## 2022-03-18 NOTE — PLAN OF CARE
[facility]     Name: Nilsa Curiel  Clinic Number: [id number]    Therapy Diagnosis:   Encounter Diagnoses   Name Primary?    Chronic midline low back pain, unspecified whether sciatica present     Posture imbalance     Decreased strength of trunk and back     Mobility poor      Physician: Gaurav Allison MD    Physician Orders: PT Eval and Treat   Medical Diagnosis from Referral: Chronic midline low back pain, unspecified whether sciatica present  Evaluation Date: 3/18/2022  Authorization Period Expiration: 12/29/2022  Plan of Care Expiration: 6/18/2022  Reassessment Due: 4/18/2022  Visit # / Visits authorized: 1/ 1 (pending)    Time In: 10:05  Time Out: 11:15  Total Billable Time: 70 minutes    Precautions: Standard and Fall    Pattern of pain determined: Pattern 1      Subjective   Date of onset: Fell down stairs and then a year later the back strarted hurting  History of current condition - MS. Ash reports: her pain started about 5-6 years ago, she has been to PT in 2019 and is got better and it started again in 2020. Was dx with spinal stenosis. Hurts when walking, can't walk far or long, she tries to walk around the house 45 min in the morning and night, has to lean on to something when walking. She states she goes to [employer]. Daughter does grocery shopping or gets groceries delivered. As long as she is leaning on something it doesn't hurt as much but will get tired. Always has cramps in the legs and feet before the back pain started, thinks it is from her fluid meds. She does have tingling in the fingers sometimes and the L arm feels like pressure     Medical History:   Past Medical History:   Diagnosis Date    Acute bilateral low back pain without sciatica 10/10/2017    Anemia 4/24/2018    Arthritis     Atopic dermatitis     Chronic kidney disease, stage III (moderate) 8/18/2020    GERD (gastroesophageal reflux disease)     Hyperlipidemia     Hypertension      Left ventricular hypertrophy 2016    Morbid obesity with BMI of 40.0-44.9, adult 2016    MONE (obstructive sleep apnea) 2018       Surgical History:   Nilsa Curiel  has a past surgical history that includes  section; Tubal ligation; Esophagogastroduodenoscopy (N/A, 10/4/2021); Colonoscopy (N/A, 10/4/2021); Esophagogastroduodenoscopy (N/A, 2022); and Colonoscopy (N/A, 2022).    Medications:   Nilsa has a current medication list which includes the following prescription(s): amlodipine, aspirin, betamethasone valerate 0.1%, biotin, cetirizine, clobetasol, diclofenac sodium, fluad quad -(65y up)(pf), fluocinonide, fluticasone propionate, hydrochlorothiazide, ketoconazole, ketoconazole, lactobacillus combo no.6, mv-mn/folic ac/calcium/vit k1, nystatin-triamcinolone, olmesartan, omega-3 fatty acids, omeprazole, pantoprazole, potassium gluconate, riboflavin (vitamin b2), sumatriptan, TAC 0.1%-LOPROX-MOM, turmeric, coenzyme q10, and [DISCONTINUED] triamcinolone acetonide 0.1%.    Allergies:   Review of patient's allergies indicates:  No Known Allergies     Imaging, MRI studies:     EXAMINATION:  MRI LUMBAR SPINE WITHOUT CONTRAST     CLINICAL HISTORY:  chronic low back pain with severe spinal canal stenosis 2018; Spinal stenosis, site unspecified     TECHNIQUE:  Multiplanar, multisequence MR images were acquired from the thoracolumbar junction to the sacrum without the administration of contrast.     COMPARISON:  2018     FINDINGS:  There is no evidence of fracture or marrow replacement process.  There is grade 1 anterolisthesis of L4 on L5.  Otherwise, sagittal alignment is maintained.  There is disc desiccation at all lumbar levels with disc height loss, vacuum disc formation and endplate degenerative change at L3-4 and L4-5.  Conus terminates at L1-2.  Visualized cord signal is within normal limits.  There is redundancy of the cauda equina nerve roots from severe spinal  canal stenosis which will be discussed below.     Visualized retroperitoneal structures show no acute abnormalities.     There are small posterior protrusions in the lower thoracic spine which are not well evaluated on the axial images.     T11-12: Mild diffuse disc bulge slightly asymmetric to the right with ligamentum flavum thickening.  No significant central canal stenosis or left neural foraminal narrowing.  Mild right neural foraminal narrowing.     T12-L1: No significant central canal stenosis or neural foraminal narrowing.     L1-2: Mild diffuse disc bulge with mild facet arthropathy.  Mild left-sided neural foraminal narrowing.  No significant central canal stenosis.     L2-3: Diffuse disc bulge with facet arthropathy and ligamentum flavum thickening resulting in mild central canal stenosis and mild bilateral neural foraminal narrowing.     L3-4: Diffuse disc bulge with moderate facet arthropathy and ligamentum flavum thickening with facet effusions.  These findings in combination result in severe central canal stenosis.  There is mild neural foraminal narrowing.     L4-5: Severe facet arthropathy resulting in grade 1 anterolisthesis.  There is ligamentum flavum thickening and a diffuse disc bulge resulting in severe central canal stenosis.  There is moderate bilateral neural foraminal narrowing.     L5-S1: Moderate facet arthropathy.  Mild diffuse disc bulge.  No significant central canal stenosis.  Mild bilateral neural foraminal narrowing.     Impression:     Lumbar spondylosis resulting in severe central canal stenosis at L3-4 and L4-5 which has progressed when compared to prior.  Specific details at each level are discussed above.    Prior Therapy: PT in 2019, helped  Prior Treatment: Injection in 2018-19, helped for about a week,   Social History: Daughter, no steps  Occupation: retired; medical records  Leisure: Go to [employer], walking around the house      Prior Level of Function: I with ADL  Current  Level of Function: Difficulty reach behind her, cutting toenails, leans on refrigerator while cooking, difficulty sweeping, mopping  DME owned/used: none    Stand for about 10-15 min at a time      Pain:  Current 5/10, worst 8/10, best 3/10   Location:   Center of the back sometimes in the hips  Description:  Sharp, dull, aching  Aggravating Factors:  Walking, bending, reaching up high  Easing Factors:  Sitting, heating pad  Disturbed Sleep: yes, but not because of the back         Pattern of pain questions:  1.  Where is your pain the worst? Back  2.  Is your pain constant or intermittent? Constant  3.  Does bending forward make your typical pain worse? Yes  4.  Since the start of your back pain, has there been a change in your bowel or bladder? no  5.  What can't you do now that you use to be able to do? Prolonged standing and walking      Pts goals: To be able to walk      Red Flag Screening:   Cough  Sneeze  Strain: (--)  Bladder/ bowel: (--)  Falls: (--)  Night pain: (--)  Unexplained weight loss: (--)  General health: fair    OBJECTIVE     Postural examination/scapula alignment: Rounded shoulder, Head forward, Abnormal trunk flexion and Trunk deviated left   Joint integrity: Hard end feel  Skin integrity: Intact  Edema: None noted  Sitting: fair  Standing: poor  Correction of posture: NT    MOVEMENT LOSS    ROM Loss   Flexion within functional limits P!   Extension major loss P!   Side glide Right moderate loss   Side glide Left moderate loss   Rotation Right moderate loss   Rotation Left moderate loss     Lower Extremity Strength  Right LE  Left LE    Hip flexion: 5/5 Hip flexion: 5/5   Hip extension:  NT Hip extension: NT   Hip abduction: 5/5 Hip abduction: 5/5   Hip adduction:  5/5 Hip adduction:  5/5   Hip External Rotation 5/5 Hip External Rotation 5/5   Hip Internal rotation   5/5 Hip Internal rotation 4/5   Knee Flexion 5/5 Knee Flexion 5/5   Knee Extension 5/5 Knee Extension 5/5   Ankle dorsiflexion:  5/5 Ankle dorsiflexion: 5/5   Ankle plantarflexion: 5/5 Ankle plantarflexion: 5/5       GAIT:  Assistive Device used: none  Level of Assistance: independent  Patient displays the following gait deviations:  unsteady gait.     Special Tests:   Test Name  Test Result   Straight Leg Raise (--)   Neural Tension Test (--)   Crossed Straight Leg Raise (--)   Decreased piriformis length  Good mobility at hips bilat      NEUROLOGICAL SCREENING     Sensory deficit: intact to light touch    Reflexes:    Left Right   Patella Tendon 1+ 1+   Clonus (--) (--)     REPEATED TEST MOVEMENTS:    Baseline symptoms:  Repeated Flexion in Standing better   Repeated Extension in Standing end range pain; some discomfort in the legs, increased mobility   Repeated Flexion in lying no effect   Repeated Extension in lying  NT       STATIC TESTS and other movements:   Baseline symptoms:  Sitting slouched  no effect   Sitting erect end range pain       Baseline Isometric Testing on Med X equipment: Testing administered by PT  Date of testing: 3/18/2022    ROM 6-36 deg   Max Peak Torque 98    Min Peak Torque 55    Flex/Ext Ratio 1.7   % below normative data 16         Limitation/Restriction for FOTO Lumbar Survey    Therapist reviewed FOTO scores for Nilsa Curiel on 3/18/2022.   FOTO documents entered into [email or web address] - see Media section.    Limitation Score: 49%             Treatment   Treatment Time In: 10:45  Treatment Time Out: 11:15  Total Treatment time separate from Evaluation: 30 minutes      Nilsa received therapeutic exercises to develop/improve posture, lumbar/cervical ROM, strength and muscular endurance for 30 minutes including the following exercises:     Med x dynamic exercise and baseline IM test    Extension in standing 10x  Prone lying with pillows (for next visit)  Supine Lower trunk rotation 10x  Hamstring stretch (for next visit)  Hip flexor stretch (foe next visit)  Piriformis stretch.    [facility] 3/18/2022   Visit  Number 1   VAS Pain Rating 5   Lumbar Stretches - Slouch Overcorrection 10   Extension in Standing 10   Flexion in Lying 10   Lumbar Extension Seat Pad 2   Femur Restraint 6   Top Dead Center 24   Counterweight 221   Lumbar Flexion 36   Lumbar Extension 6   Lumbar Peak Torque 98   Min Torque 55   Test Percent Below Normative Data 16   Ice - Sitting 10           Written Home Exercises Provided: yes.  Exercises were reviewed and MS. Ash was able to demonstrate them prior to the end of the session.  MS. Ash demonstrated good  understanding of the education provided.     See EMR under Patient Instructions for exercises provided 3/18/2022.      Education provided:   - Patient received education regarding proper posture and body mechanics.  Patient was given top [facility] Visit 1 handouts which discuss what to expect in therapy, the purpose and opportunity for health coaching, the program,  wellness when discharged from therapy, back education and care specifically for posture seated, standing, lifting correctly, components of exercise, importance of nutrition and hydration, and importance of sleep.   Information on lumbar rolls provided.  - Bradford roll tried, recommended, and purchase information was provided.    - Patient received a handout regarding anticipated muscular soreness following the isometric test and strategies for management were reviewed with patient including stretching, using ice and scheduled rest.   - Patient received education on the [facility] program, purpose of the isometric test, progression of back strengthening as well as wellness approach and systemic strengthening.  Details of the program were discussed.  Reviewed that patient should feel support/pressure from med ex restraints but no pain or discomfort and patient expressed understanding.    MS. Ash received cold pack for 10 minutes to low back in sitting.    Assessment   Nilsa is a 76 y.o. female referred to [facility] with a medical diagnosis of Chronic midline low back pain, unspecified whether sciatica present. Pt presents with low back pain that is worse with prolonged standing and walking, she would like on getting stronger and walking longer. She had a forward flexed posture and increased pain with upright posture and lumbar extension in standing but is able to increase ROM with repeated movements. plan to work on general mobility and strengthening with focus on progressive lumbar extension for upright posture.     Pain Pattern: 1       Pt prognosis is Excellent.   Pt will benefit from skilled outpatient Physical Therapy to address the deficits stated above and in the chart below, provide pt/family education, and to maximize pt's level of independence. Based on the above history and physical examination an active physical therapy program is recommended.  Pt will continue to benefit from skilled outpatient physical therapy to address the deficits listed below in the chart, provide pt/family education and to maximize pt's level of independence in the home and community environment. .       Plan of care discussed with patient: Yes  Pt's spiritual, cultural and educational needs considered and patient is agreeable to the plan of care and goals as stated below:     Anticipated Barriers for therapy: transportation    PT Evaluation Completed? Yes    Medical necessity is demonstrated by the following problem list.    Pt presents with the following impairments:     History  Co-morbidities and personal factors that may impact the plan of care Co-morbidities:   advanced age, high BMI, sleep apnea, arthritis and HTN    Personal Factors:   lifestyle     moderate   Examination  Body Structures and Functions, activity limitations and participation restrictions that may impact the plan of care Body Regions:   back  lower extremities  trunk    Body Systems:    gross symmetry  ROM  strength  gross coordinated  movement  balance  gait  transfers  transitions  motor control    Participation Restrictions:   transportation    Activity limitations:   Learning and applying knowledge  no deficits    General Tasks and Commands  no deficits    Communication  no deficits    Mobility  lifting and carrying objects  walking    Self care  washing oneself (bathing, drying, washing hands)  dressing    Domestic Life  shopping  cooking  doing house work (cleaning house, washing dishes, laundry)    Interactions/Relationships  no deficits    Life Areas  no deficits    Community and Social Life  no deficits         moderate   Clinical Presentation evolving clinical presentation with changing clinical characteristics moderate   Decision Making/ Complexity Score: moderate       GOALS: Pt is in agreement with the following goals.    Short term goals:  6 weeks or 10 visits   1.  Pt will demonstrate increased lumbar ROM by at least 3 degrees from the initial ROM value with improvements noted in functional ROM and ability to perform ADLs.  (approp and ongoing)  2.  Pt will demonstrate increased MedX average isometric strength value  by 10% from initial test resulting in improved ability to perform bending, lifting, and carrying activities safely, confidently.  (approp and ongoing)  3.  Patient report a reduction in worst pain score by 1-2 points for improved tolerance for static standing.  (approp and ongoing)  4.  Pt able to perform HEP correctly with minimal cueing or supervision from therapist to encourage independent management of symptoms. (approp and ongoing)      Long term goals: 10 weeks or 20 visits   1. Pt will demonstrate increased lumbar ROM by at least 6 degrees from initial ROM value, resulting in improved ability to perform functional fwd bending while standing and sitting. (approp and ongoing)  2. Pt will demonstrate increased MedX average isometric strength value  by 20% from initial test resulting in improved ability to perform  "bending, lifting, and carrying activities safely, confidently.  (approp and ongoing)  3. Pt to demonstrate ability to independently control and reduce their pain through posture positioning and mechanical movements throughout a typical day.  (approp and ongoing)  4.  Pt will demonstrate reduced pain and improved functional outcomes as reported on the FOTO by reaching a limitation score of < or = 36% or less in order to demonstrate subjective improvement in pt's condition.    (approp and ongoing)  5. Pt will demonstrate independence with the HEP at discharge  (approp and ongoing)  6.  Pt able to walk for 15 min without requiring support or rest break  (approp and ongoing)      Plan   Outpatient physical therapy 2x week for 10 weeks or 20 visits to include the following:   - Patient education  - Therapeutic exercise  - Manual therapy  - Performance testing   - Neuromuscular Re-education  - Therapeutic activity   - Modalities    Pt may be seen by PTA as part of the rehabilitation team.     Therapist: Reed Blake, PT  3/18/2022    "I certify the need for these services furnished under this plan of treatment and while under my care."    ____________________________________  Physician/Referring Practitioner    _______________  Date of Signature          "

## 2022-03-21 ENCOUNTER — TELEPHONE (OUTPATIENT)
Dept: REHABILITATION | Facility: OTHER | Age: 77
End: 2022-03-21
Payer: MEDICARE

## 2022-03-21 NOTE — TELEPHONE ENCOUNTER
HC made a courtesy call to Pt after evaluation and to talk to her about our health coaching service; I left a message.

## 2022-03-31 ENCOUNTER — TELEPHONE (OUTPATIENT)
Dept: INTERNAL MEDICINE | Facility: CLINIC | Age: 77
End: 2022-03-31
Payer: MEDICARE

## 2022-03-31 NOTE — TELEPHONE ENCOUNTER
The Pt called to schedule follow-up physical therapy appointments.  I took the opportunity to talk to her about our health coaching service.

## 2022-04-06 ENCOUNTER — LAB VISIT (OUTPATIENT)
Dept: LAB | Facility: HOSPITAL | Age: 77
End: 2022-04-06
Attending: FAMILY MEDICINE
Payer: MEDICARE

## 2022-04-06 ENCOUNTER — OFFICE VISIT (OUTPATIENT)
Dept: PRIMARY CARE CLINIC | Facility: CLINIC | Age: 77
End: 2022-04-06
Payer: MEDICARE

## 2022-04-06 VITALS
SYSTOLIC BLOOD PRESSURE: 130 MMHG | BODY MASS INDEX: 41.54 KG/M2 | DIASTOLIC BLOOD PRESSURE: 60 MMHG | TEMPERATURE: 98 F | HEART RATE: 100 BPM | WEIGHT: 220 LBS | HEIGHT: 61 IN | OXYGEN SATURATION: 98 %

## 2022-04-06 DIAGNOSIS — I70.0 ATHEROSCLEROSIS OF AORTA: ICD-10-CM

## 2022-04-06 DIAGNOSIS — E21.3 HYPERPARATHYROIDISM: ICD-10-CM

## 2022-04-06 DIAGNOSIS — I50.32 CHRONIC DIASTOLIC HEART FAILURE: ICD-10-CM

## 2022-04-06 DIAGNOSIS — R06.09 DOE (DYSPNEA ON EXERTION): ICD-10-CM

## 2022-04-06 DIAGNOSIS — I50.30 HYPERTROPHIC OBSTRUCTIVE CARDIOMYOPATHY WITH DIASTOLIC HEART FAILURE: ICD-10-CM

## 2022-04-06 DIAGNOSIS — I42.1 HYPERTROPHIC OBSTRUCTIVE CARDIOMYOPATHY WITH DIASTOLIC HEART FAILURE: ICD-10-CM

## 2022-04-06 DIAGNOSIS — M25.551 RIGHT HIP PAIN: ICD-10-CM

## 2022-04-06 DIAGNOSIS — I42.1 HYPERTROPHIC OBSTRUCTIVE CARDIOMYOPATHY WITH DIASTOLIC HEART FAILURE: Primary | ICD-10-CM

## 2022-04-06 DIAGNOSIS — M25.473 ANKLE SWELLING, UNSPECIFIED LATERALITY: ICD-10-CM

## 2022-04-06 DIAGNOSIS — I10 ESSENTIAL HYPERTENSION: ICD-10-CM

## 2022-04-06 DIAGNOSIS — D69.6 THROMBOCYTOPENIA, UNSPECIFIED: ICD-10-CM

## 2022-04-06 DIAGNOSIS — D64.9 ANEMIA, UNSPECIFIED TYPE: ICD-10-CM

## 2022-04-06 DIAGNOSIS — M62.838 MUSCLE SPASM: ICD-10-CM

## 2022-04-06 DIAGNOSIS — N18.30 STAGE 3 CHRONIC KIDNEY DISEASE, UNSPECIFIED WHETHER STAGE 3A OR 3B CKD: ICD-10-CM

## 2022-04-06 DIAGNOSIS — I50.30 HYPERTROPHIC OBSTRUCTIVE CARDIOMYOPATHY WITH DIASTOLIC HEART FAILURE: Primary | ICD-10-CM

## 2022-04-06 LAB
BASOPHILS # BLD AUTO: 0.05 K/UL (ref 0–0.2)
BASOPHILS NFR BLD: 0.8 % (ref 0–1.9)
BNP SERPL-MCNC: 10 PG/ML (ref 0–99)
DIFFERENTIAL METHOD: ABNORMAL
EOSINOPHIL # BLD AUTO: 0.2 K/UL (ref 0–0.5)
EOSINOPHIL NFR BLD: 3.1 % (ref 0–8)
ERYTHROCYTE [DISTWIDTH] IN BLOOD BY AUTOMATED COUNT: 13.9 % (ref 11.5–14.5)
FOLATE SERPL-MCNC: 15.6 NG/ML (ref 4–24)
HCT VFR BLD AUTO: 35.6 % (ref 37–48.5)
HGB BLD-MCNC: 10.7 G/DL (ref 12–16)
IMM GRANULOCYTES # BLD AUTO: 0.02 K/UL (ref 0–0.04)
IMM GRANULOCYTES NFR BLD AUTO: 0.3 % (ref 0–0.5)
IRON SERPL-MCNC: 77 UG/DL (ref 30–160)
LYMPHOCYTES # BLD AUTO: 1.8 K/UL (ref 1–4.8)
LYMPHOCYTES NFR BLD: 27.6 % (ref 18–48)
MCH RBC QN AUTO: 24.2 PG (ref 27–31)
MCHC RBC AUTO-ENTMCNC: 30.1 G/DL (ref 32–36)
MCV RBC AUTO: 81 FL (ref 82–98)
MONOCYTES # BLD AUTO: 0.5 K/UL (ref 0.3–1)
MONOCYTES NFR BLD: 7.1 % (ref 4–15)
NEUTROPHILS # BLD AUTO: 3.9 K/UL (ref 1.8–7.7)
NEUTROPHILS NFR BLD: 61.1 % (ref 38–73)
NRBC BLD-RTO: 0 /100 WBC
PLATELET # BLD AUTO: 382 K/UL (ref 150–450)
PMV BLD AUTO: 10.3 FL (ref 9.2–12.9)
RBC # BLD AUTO: 4.42 M/UL (ref 4–5.4)
VIT B12 SERPL-MCNC: 514 PG/ML (ref 210–950)
WBC # BLD AUTO: 6.38 K/UL (ref 3.9–12.7)

## 2022-04-06 PROCEDURE — 1101F PT FALLS ASSESS-DOCD LE1/YR: CPT | Mod: CPTII,S$GLB,, | Performed by: FAMILY MEDICINE

## 2022-04-06 PROCEDURE — 3078F DIAST BP <80 MM HG: CPT | Mod: CPTII,S$GLB,, | Performed by: FAMILY MEDICINE

## 2022-04-06 PROCEDURE — 82746 ASSAY OF FOLIC ACID SERUM: CPT | Performed by: FAMILY MEDICINE

## 2022-04-06 PROCEDURE — 3078F PR MOST RECENT DIASTOLIC BLOOD PRESSURE < 80 MM HG: ICD-10-PCS | Mod: CPTII,S$GLB,, | Performed by: FAMILY MEDICINE

## 2022-04-06 PROCEDURE — 82607 VITAMIN B-12: CPT | Performed by: FAMILY MEDICINE

## 2022-04-06 PROCEDURE — 99999 PR PBB SHADOW E&M-EST. PATIENT-LVL V: ICD-10-PCS | Mod: PBBFAC,,, | Performed by: FAMILY MEDICINE

## 2022-04-06 PROCEDURE — 99499 UNLISTED E&M SERVICE: CPT | Mod: S$GLB,,, | Performed by: FAMILY MEDICINE

## 2022-04-06 PROCEDURE — 36415 COLL VENOUS BLD VENIPUNCTURE: CPT | Mod: PN | Performed by: FAMILY MEDICINE

## 2022-04-06 PROCEDURE — 1126F AMNT PAIN NOTED NONE PRSNT: CPT | Mod: CPTII,S$GLB,, | Performed by: FAMILY MEDICINE

## 2022-04-06 PROCEDURE — 1126F PR PAIN SEVERITY QUANTIFIED, NO PAIN PRESENT: ICD-10-PCS | Mod: CPTII,S$GLB,, | Performed by: FAMILY MEDICINE

## 2022-04-06 PROCEDURE — 3008F BODY MASS INDEX DOCD: CPT | Mod: CPTII,S$GLB,, | Performed by: FAMILY MEDICINE

## 2022-04-06 PROCEDURE — 3288F PR FALLS RISK ASSESSMENT DOCUMENTED: ICD-10-PCS | Mod: CPTII,S$GLB,, | Performed by: FAMILY MEDICINE

## 2022-04-06 PROCEDURE — 1101F PR PT FALLS ASSESS DOC 0-1 FALLS W/OUT INJ PAST YR: ICD-10-PCS | Mod: CPTII,S$GLB,, | Performed by: FAMILY MEDICINE

## 2022-04-06 PROCEDURE — 83540 ASSAY OF IRON: CPT | Performed by: FAMILY MEDICINE

## 2022-04-06 PROCEDURE — 85025 COMPLETE CBC W/AUTO DIFF WBC: CPT | Performed by: FAMILY MEDICINE

## 2022-04-06 PROCEDURE — 1157F PR ADVANCE CARE PLAN OR EQUIV PRESENT IN MEDICAL RECORD: ICD-10-PCS | Mod: CPTII,S$GLB,, | Performed by: FAMILY MEDICINE

## 2022-04-06 PROCEDURE — 1160F PR REVIEW ALL MEDS BY PRESCRIBER/CLIN PHARMACIST DOCUMENTED: ICD-10-PCS | Mod: CPTII,S$GLB,, | Performed by: FAMILY MEDICINE

## 2022-04-06 PROCEDURE — 1160F RVW MEDS BY RX/DR IN RCRD: CPT | Mod: CPTII,S$GLB,, | Performed by: FAMILY MEDICINE

## 2022-04-06 PROCEDURE — 3288F FALL RISK ASSESSMENT DOCD: CPT | Mod: CPTII,S$GLB,, | Performed by: FAMILY MEDICINE

## 2022-04-06 PROCEDURE — 99215 OFFICE O/P EST HI 40 MIN: CPT | Mod: S$GLB,,, | Performed by: FAMILY MEDICINE

## 2022-04-06 PROCEDURE — 4010F ACE/ARB THERAPY RXD/TAKEN: CPT | Mod: CPTII,S$GLB,, | Performed by: FAMILY MEDICINE

## 2022-04-06 PROCEDURE — 1159F PR MEDICATION LIST DOCUMENTED IN MEDICAL RECORD: ICD-10-PCS | Mod: CPTII,S$GLB,, | Performed by: FAMILY MEDICINE

## 2022-04-06 PROCEDURE — 99215 PR OFFICE/OUTPT VISIT, EST, LEVL V, 40-54 MIN: ICD-10-PCS | Mod: S$GLB,,, | Performed by: FAMILY MEDICINE

## 2022-04-06 PROCEDURE — 3075F PR MOST RECENT SYSTOLIC BLOOD PRESS GE 130-139MM HG: ICD-10-PCS | Mod: CPTII,S$GLB,, | Performed by: FAMILY MEDICINE

## 2022-04-06 PROCEDURE — 1159F MED LIST DOCD IN RCRD: CPT | Mod: CPTII,S$GLB,, | Performed by: FAMILY MEDICINE

## 2022-04-06 PROCEDURE — 3008F PR BODY MASS INDEX (BMI) DOCUMENTED: ICD-10-PCS | Mod: CPTII,S$GLB,, | Performed by: FAMILY MEDICINE

## 2022-04-06 PROCEDURE — 3075F SYST BP GE 130 - 139MM HG: CPT | Mod: CPTII,S$GLB,, | Performed by: FAMILY MEDICINE

## 2022-04-06 PROCEDURE — 99999 PR PBB SHADOW E&M-EST. PATIENT-LVL V: CPT | Mod: PBBFAC,,, | Performed by: FAMILY MEDICINE

## 2022-04-06 PROCEDURE — 4010F PR ACE/ARB THEARPY RXD/TAKEN: ICD-10-PCS | Mod: CPTII,S$GLB,, | Performed by: FAMILY MEDICINE

## 2022-04-06 PROCEDURE — 1157F ADVNC CARE PLAN IN RCRD: CPT | Mod: CPTII,S$GLB,, | Performed by: FAMILY MEDICINE

## 2022-04-06 PROCEDURE — 99499 RISK ADDL DX/OHS AUDIT: ICD-10-PCS | Mod: S$GLB,,, | Performed by: FAMILY MEDICINE

## 2022-04-06 PROCEDURE — 83880 ASSAY OF NATRIURETIC PEPTIDE: CPT | Performed by: FAMILY MEDICINE

## 2022-04-06 RX ORDER — POTASSIUM CHLORIDE 20 MEQ/1
20 TABLET, EXTENDED RELEASE ORAL 2 TIMES DAILY
Qty: 180 TABLET | Refills: 1 | Status: SHIPPED | OUTPATIENT
Start: 2022-04-06 | End: 2022-06-08 | Stop reason: SDUPTHER

## 2022-04-06 RX ORDER — DICLOFENAC SODIUM 10 MG/G
2 GEL TOPICAL DAILY PRN
Qty: 100 G | Refills: 3 | Status: SHIPPED | OUTPATIENT
Start: 2022-04-06 | End: 2023-09-05

## 2022-04-06 RX ORDER — HYDROCHLOROTHIAZIDE 12.5 MG/1
12.5 TABLET ORAL DAILY
Qty: 90 TABLET | Refills: 1 | Status: SHIPPED | OUTPATIENT
Start: 2022-04-06 | End: 2022-06-08

## 2022-04-06 NOTE — PROGRESS NOTES
Subjective:   Patient ID: Nilsa Curiel is a 76 y.o. female.    Chief Complaint: Leg Pain      HPI    76-year-old female here with bilateral lower extremity discomfort.  Mostly related to swelling in the ankles.  It is better in the morning    Has chronic diastolic heart failure.  She is conservative with saw consumption.    Has increasing shortness of breath on exertion.  Relatively recently saw cardiology    She is on amlodipine.    Has charley horses in the middle of the night.    Patient queried and denies any further complaints.    ALLERGIES AND MEDICATIONS: updated and reviewed.  Review of patient's allergies indicates:  No Known Allergies    Current Outpatient Medications:     amLODIPine (NORVASC) 10 MG tablet, Take 1 tablet (10 mg total) by mouth once daily., Disp: 90 tablet, Rfl: 1    aspirin (ECOTRIN) 81 MG EC tablet, Take 1 tablet (81 mg total) by mouth 3 (three) times a week., Disp: , Rfl:     betamethasone valerate 0.1% (VALISONE) 0.1 % Oint, Apply topically once daily., Disp: 45 g, Rfl: 3    biotin 300 mcg Tab, Take 1 tablet by mouth once daily., Disp: , Rfl:     cetirizine (ZYRTEC) 10 MG tablet, Take 1 tablet (10 mg total) by mouth once daily., Disp: 90 tablet, Rfl: 3    clobetasol (TEMOVATE) 0.05 % cream, APPLY TO THE AFFECTED AREA TWICE DAILY, Disp: 30 g, Rfl: 0    fluocinonide (LIDEX) 0.05 % external solution, AAA scalp qday - bid prn pruritus, Disp: 60 mL, Rfl: 3    ketoconazole (NIZORAL) 2 % cream, AOO TO THE AFFECTED AREA TWICE DAILY, APPLY TO AXILLA AND ABDOMINAL CREASES, Disp: 15 g, Rfl: 0    ketoconazole (NIZORAL) 2 % shampoo, Wash hair with medicated shampoo at least 2x/week - let sit on scalp at least 5 minutes prior to rinsing, Disp: 120 mL, Rfl: 5    lactobacillus combo no.6 (PROBIOTIC COMPLEX) 4 billion cell Tab, Take 1 tablet by mouth once daily., Disp: 90 tablet, Rfl: 3    MULTIVIT-MIN/FA/CA CARB/VIT K (WOMEN'S 50+ DAILY FORMULA ORAL), Take by mouth., Disp: , Rfl:      nystatin-triamcinolone (MYCOLOG II) cream, Apply topically 2 (two) times a day., Disp: 60 g, Rfl: 3    olmesartan (BENICAR) 40 MG tablet, Take 1 tablet (40 mg total) by mouth once daily., Disp: 90 tablet, Rfl: 1    omega-3 fatty acids 300 mg Cap, Take by mouth., Disp: , Rfl:     omeprazole (PRILOSEC) 40 MG capsule, Take 1 capsule (40 mg total) by mouth every morning., Disp: 30 capsule, Rfl: 3    potassium gluconate 595 mg (99 mg) Tab, Take 1 tablet by mouth once daily., Disp: 90 tablet, Rfl: 3    sumatriptan (IMITREX) 100 MG tablet, Take 1 tablet (100 mg total) by mouth daily as needed for Migraine. No more than 2 doses in 24 hours, Disp: 27 tablet, Rfl: 3    TURMERIC ORAL, Take by mouth., Disp: , Rfl:     ubidecarenone (COENZYME Q10) 100 mg Tab, Take 1 tablet by mouth once daily. , Disp: , Rfl:     diclofenac sodium (VOLTAREN) 1 % Gel, Apply 2 g topically daily as needed., Disp: 100 g, Rfl: 3    flu vac 2021 65up-[id number],PF, (FLUAD QUAD 2021-22,65Y UP,,PF,) 60 mcg (15 mcg x 4)/0.5 mL Syrg, inject into muscle (Patient not taking: Reported on 4/6/2022), Disp: 0.5 mL, Rfl: 0    fluticasone propionate (FLONASE) 50 mcg/actuation nasal spray, USE 1 SPRAY IN EACH NOSTRIL EVERY DAY (Patient not taking: Reported on 4/6/2022), Disp: 32 g, Rfl: 4    hydroCHLOROthiazide (HYDRODIURIL) 12.5 MG Tab, Take 1 tablet (12.5 mg total) by mouth once daily., Disp: 90 tablet, Rfl: 1    pantoprazole (PROTONIX) 40 MG tablet, Take 1 tablet (40 mg total) by mouth daily as needed. (Patient not taking: Reported on 4/6/2022), Disp: 90 tablet, Rfl: 3    potassium chloride SA (K-DUR,KLOR-CON) 20 MEQ tablet, Take 1 tablet (20 mEq total) by mouth 2 (two) times daily., Disp: 180 tablet, Rfl: 1    riboflavin, vitamin B2, (VITAMIN B-2 ORAL), Take 1 capsule by mouth once daily., Disp: , Rfl:     TAC 0.1%-LOPROX-MOM, Apply to affected area twice daily after cool blow dry. Discard of after 30 days. (Patient not taking: Reported on  4/6/2022), Disp: 60 g, Rfl: 3    Review of Systems   Constitutional: Negative for activity change, appetite change, chills, diaphoresis, fatigue, fever and unexpected weight change.   HENT: Negative for congestion, ear discharge, ear pain, facial swelling, hearing loss, nosebleeds, postnasal drip, rhinorrhea, sinus pressure, sneezing, sore throat, tinnitus, trouble swallowing and voice change.    Eyes: Negative for photophobia, pain, discharge, redness, itching and visual disturbance.   Respiratory: Positive for shortness of breath. Negative for cough, chest tightness and wheezing.    Cardiovascular: Positive for leg swelling. Negative for chest pain and palpitations.   Gastrointestinal: Negative for abdominal distention, abdominal pain, anal bleeding, blood in stool, constipation, diarrhea, nausea, rectal pain and vomiting.   Endocrine: Negative for cold intolerance, heat intolerance, polydipsia, polyphagia and polyuria.   Genitourinary: Negative for difficulty urinating, dysuria, flank pain, hematuria and menstrual problem.   Musculoskeletal: Positive for arthralgias. Negative for back pain, joint swelling, myalgias and neck pain.   Skin: Negative for rash.   Neurological: Positive for headaches. Negative for dizziness, tremors, seizures, syncope, speech difficulty, weakness, light-headedness and numbness.   Psychiatric/Behavioral: Negative for behavioral problems, confusion, decreased concentration, dysphoric mood, sleep disturbance and suicidal ideas. The patient is not nervous/anxious and is not hyperactive.        Lab Results   Component Value Date    WBC 8.24 12/29/2021    HGB 10.7 (L) 12/29/2021    HCT 36.4 (L) 12/29/2021    MCV 82 12/29/2021     12/29/2021         CMP  Sodium   Date Value Ref Range Status   12/29/2021 141 136 - 145 mmol/L Final     Potassium   Date Value Ref Range Status   12/29/2021 4.0 3.5 - 5.1 mmol/L Final     Chloride   Date Value Ref Range Status   12/29/2021 106 95 - 110  mmol/L Final     CO2   Date Value Ref Range Status   12/29/2021 24 23 - 29 mmol/L Final     Glucose   Date Value Ref Range Status   12/29/2021 100 70 - 110 mg/dL Final     BUN   Date Value Ref Range Status   12/29/2021 14 8 - 23 mg/dL Final     Creatinine   Date Value Ref Range Status   12/29/2021 0.9 0.5 - 1.4 mg/dL Final     Calcium   Date Value Ref Range Status   12/29/2021 10.9 (H) 8.7 - 10.5 mg/dL Final     Total Protein   Date Value Ref Range Status   12/29/2021 8.1 6.0 - 8.4 g/dL Final     Albumin   Date Value Ref Range Status   12/29/2021 3.7 3.5 - 5.2 g/dL Final     Total Bilirubin   Date Value Ref Range Status   12/29/2021 0.5 0.1 - 1.0 mg/dL Final     Comment:     For infants and newborns, interpretation of results should be based  on gestational age, weight and in agreement with clinical  observations.    Premature Infant recommended reference ranges:  Up to 24 hours.............<8.0 mg/dL  Up to 48 hours............<12.0 mg/dL  3-5 days..................<15.0 mg/dL  6-29 days.................<15.0 mg/dL       Alkaline Phosphatase   Date Value Ref Range Status   12/29/2021 113 55 - 135 U/L Final     AST   Date Value Ref Range Status   12/29/2021 17 10 - 40 U/L Final     ALT   Date Value Ref Range Status   12/29/2021 18 10 - 44 U/L Final     Anion Gap   Date Value Ref Range Status   12/29/2021 11 8 - 16 mmol/L Final     eGFR if    Date Value Ref Range Status   12/29/2021 >60.0 >60 mL/min/1.73 m^2 Final     eGFR if non    Date Value Ref Range Status   12/29/2021 >60.0 >60 mL/min/1.73 m^2 Final     Comment:     Calculation used to obtain the estimated glomerular filtration  rate (eGFR) is the CKD-EPI equation.           Lab Results   Component Value Date    HGBA1C 5.4 12/29/2021        Objective:     Vitals:    04/06/22 0904 04/06/22 0921   BP: (!) 160/60 130/60   Pulse: (!) 111    Temp: 98 °F (36.7 °C)    TempSrc: Oral    SpO2: 98%    Weight: 99.8 kg (220 lb 0.3 oz)   "  Height: 5' 1" (1.549 m)    PainSc: 0-No pain      Body mass index is 41.57 kg/m².    Physical Exam  Vitals and nursing note reviewed.   Constitutional:       General: She is not in acute distress.     Appearance: She is well-developed. She is not diaphoretic.   HENT:      Head: Normocephalic and atraumatic.      Right Ear: Hearing, tympanic membrane, ear canal and external ear normal. No tenderness.      Left Ear: Hearing, tympanic membrane, ear canal and external ear normal. No tenderness.      Nose: Nose normal.   Eyes:      General: Lids are normal. No scleral icterus.        Right eye: No discharge.         Left eye: No discharge.      Extraocular Movements:      Right eye: Normal extraocular motion.      Left eye: Normal extraocular motion.      Conjunctiva/sclera: Conjunctivae normal.      Right eye: Right conjunctiva is not injected.      Left eye: Left conjunctiva is not injected.      Pupils: Pupils are equal, round, and reactive to light.   Neck:      Thyroid: No thyromegaly.      Vascular: No carotid bruit or JVD.      Trachea: No tracheal deviation.   Cardiovascular:      Rate and Rhythm: Normal rate and regular rhythm.      Pulses: Normal pulses.      Heart sounds: Normal heart sounds. No murmur heard.    No friction rub.   Pulmonary:      Effort: Pulmonary effort is normal. No accessory muscle usage or respiratory distress.      Breath sounds: Normal breath sounds. No wheezing, rhonchi or rales.   Abdominal:      General: Bowel sounds are normal. There is no distension or abdominal bruit.      Palpations: Abdomen is soft. There is no mass or pulsatile mass.      Tenderness: There is no abdominal tenderness. There is no guarding or rebound. Negative signs include Stokes's sign and McBurney's sign.   Musculoskeletal:      Cervical back: Normal range of motion and neck supple. No edema.   Lymphadenopathy:      Head:      Right side of head: No submandibular, preauricular or posterior auricular " adenopathy.      Left side of head: No submandibular, preauricular or posterior auricular adenopathy.      Cervical: No cervical adenopathy.   Skin:     General: Skin is warm and dry.      Findings: No ecchymosis, erythema or rash. Rash is not urticarial.      Nails: There is no clubbing.   Neurological:      Mental Status: She is alert and oriented to person, place, and time.      GCS: GCS eye subscore is 4. GCS verbal subscore is 5. GCS motor subscore is 6.   Psychiatric:         Mood and Affect: Mood is not anxious or depressed. Affect is not angry or inappropriate.         Speech: Speech normal.         Behavior: Behavior normal. Behavior is cooperative.         Thought Content: Thought content normal.         Assessment and Plan:   Nilsa was seen today for leg pain.    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    Anemia, unspecified type  -     CBC Auto Differential; Future  -     Iron; Future  -     Vitamin B12; Future  -     Folate; Future    Right hip pain  -     diclofenac sodium (VOLTAREN) 1 % Gel; Apply 2 g topically daily as needed.    Ankle swelling, unspecified laterality    Hyperparathyroidism    Atherosclerosis of aorta    Stage 3 chronic kidney disease, unspecified whether stage 3a or 3b CKD    Muscle spasm    Other orders  -     potassium chloride SA (K-DUR,KLOR-CON) 20 MEQ tablet; Take 1 tablet (20 mEq total) by mouth 2 (two) times daily.    Weight loss by calorie restriction and exercise.    Continue sodium restriction.  Compression stockings.  Elevate lower extremities in the evening.    Check BNP.  Check echocardiogram    Labs as above regarding anemia    Voltaren gel is okay  to use for joint pain    Past labs reviewed over the past year.  Past visits reviewed over the past year.  Meds gone over in detail regarding refills.  All diagnoses gone over.    Time spent in the evaluation and management of this patient exceeded 60min and greater than 50% of this time was in face-to-face education regarding diagnoses, medications, plan, and follow-up.        No follow-ups on file.    THIS NOTE WILL BE SHARED WITH THE PATIENT.

## 2022-04-07 ENCOUNTER — CLINICAL SUPPORT (OUTPATIENT)
Dept: REHABILITATION | Facility: OTHER | Age: 77
End: 2022-04-07
Attending: FAMILY MEDICINE
Payer: MEDICARE

## 2022-04-07 DIAGNOSIS — Z74.09 MOBILITY POOR: ICD-10-CM

## 2022-04-07 DIAGNOSIS — R29.3 POSTURE IMBALANCE: Primary | ICD-10-CM

## 2022-04-07 DIAGNOSIS — R29.898 DECREASED STRENGTH OF TRUNK AND BACK: ICD-10-CM

## 2022-04-07 PROCEDURE — 97110 THERAPEUTIC EXERCISES: CPT

## 2022-04-07 NOTE — PROGRESS NOTES
[facility]      Name: Nilsa Curiel  Clinic Number: [id number]    Therapy Diagnosis:   Encounter Diagnoses   Name Primary?    Posture imbalance Yes    Decreased strength of trunk and back     Mobility poor      Physician: Gaurav Allison MD    Visit Date: 4/7/2022    Physician Orders: PT Eval and Treat   Medical Diagnosis from Referral: Chronic midline low back pain, unspecified whether sciatica present  Evaluation Date: 3/18/2022  Authorization Period Expiration: 6/17/22  Plan of Care Expiration: 6/18/2022  Reassessment Due: 4/18/2022  Visit # / Visits authorized: 2/ 20      Time In: 950  Time Out: 1040  Total Billable Time : 40 minutes     Precautions: Standard and Fall     Pattern of pain determined: Pattern       Subjective   Nilsa reports she was able to perform HEP 2x, but then went out of town and forgot her exercise sheet     Patient reports tolerating previous visit with some soreness  Patient reports their pain to be 5/10 on a 0-10 scale with 0 being no pain and 10 being the worst pain imaginable.  Pain Location: B LB/R buttocks     Social History: Daughter, no steps  Occupation: retired; medical records  Leisure: Go to [place], walking around the house    Pt goals: To be able to walk    Objective       Baseline Isometric Testing on Med X equipment: Testing administered by PT  Date of testing: 3/18/2022     ROM 6-36 deg   Max Peak Torque 98    Min Peak Torque 55    Flex/Ext Ratio 1.7   % below normative data 16            Limitation/Restriction for FOTO Lumbar Survey     Therapist reviewed FOTO scores for Nilsa Curiel on 3/18/2022.   FOTO documents entered into [facility] - see Media section.     Limitation Score: 49           Treatment    Pt was instructed in and performed the following:     Nilsa received therapeutic exercises to develop/improved posture, cardiovascular endurance, muscular endurance, lumbar/cervical ROM, strength and muscular endurance for 50 minutes  including the following exercises:     [facility] 4/7/2022   Visit Number 2   VAS Pain Rating 5   Time 5   Lumbar Stretches - Slouch Overcorrection -   Extension in Standing -   Flexion in Lying 10   Lumbar Extension Seat Pad -   Femur Restraint -   Top Dead Center -   Counterweight -   Lumbar Flexion -   Lumbar Extension -   Lumbar Peak Torque -   Min Torque -   Test Percent Below Normative Data -   Lumbar Weight 48   Repetitions 15   Rating of Perceived Exertion 3   Ice - Sitting 5       Extension in standing 10x  Supine Lower trunk rotation 10x  Hamstring stretch c/ strap 3 x10 seconds  Hip flexor stretch [id number]  Piriformis stretch. 3x10 seconds  BKTC c/ ball 10x    Not performed  Prone lying with pillows (for next visit)    Peripheral muscle strengthening which included 1 set of 15-20 repetitions at a slow, controlled 10-13 second per rep pace focused on strengthening supporting musculature for improved body mechanics and functional mobility.  Pt and therapist focused on proper form during treatment to ensure optimal strengthening of each targeted muscle group.  Machines were utilized including torso rotation, leg extension, leg curl, chest press, upright row. Tricep extension, bicep curl, leg press, and hip abduction added visit 3      Home Exercises Provided and Patient Education Provided   Home exercises include:LTR/piriformis/Extension in standing     Cardio program:TBD  Lifting education date:TBD  Lumbar roll: no    Education provided:   - exertion level for exercise  - reviewed HEP    Written Home Exercises Provided: Patient instructed to cont prior HEP.  Exercises were reviewed and MS. Ash was able to demonstrate them prior to the end of the session.  MS. Ash demonstrated fair  understanding of the education provided.     See EMR under Patient Instructions for exercises provided prior visit.          Assessment     Pt arrives today with 5/10 pain level. Pt reports she attempted HEP and did  not have any increased pain.  Reviewed HEP and added hip flex/HS and BKTC c/ ball today.  Pt reports increased stretch with hip flexor, especially on the right side.  Reviewed exertion level and proper technique on Med X.  Initiated Med X weight 48ft/lbs with completion of 15 reps and 3/10 exertion level. Continue to reinforce good posture during gait.  Pt had no c/o increased pain with peripheral exercises.  Patient is making good progress towards established goals.  Pt will continue to benefit from skilled outpatient physical therapy to address the deficits stated in the impairment chart, provide pt/family education and to maximize pt's level of independence in the home and community environment.     Anticipated Barriers for therapy: transportation  Pt's spiritual, cultural and educational needs considered and pt agreeable to plan of care and goals as stated below:       GOALS: Pt is in agreement with the following goals.     Short term goals:  6 weeks or 10 visits   1.  Pt will demonstrate increased lumbar ROM by at least 3 degrees from the initial ROM value with improvements noted in functional ROM and ability to perform ADLs.  (approp and ongoing)  2.  Pt will demonstrate increased MedX average isometric strength value  by 10% from initial test resulting in improved ability to perform bending, lifting, and carrying activities safely, confidently.  (approp and ongoing)  3.  Patient report a reduction in worst pain score by 1-2 points for improved tolerance for static standing.  (approp and ongoing)  4.  Pt able to perform HEP correctly with minimal cueing or supervision from therapist to encourage independent management of symptoms. (approp and ongoing)        Long term goals: 10 weeks or 20 visits   1. Pt will demonstrate increased lumbar ROM by at least 6 degrees from initial ROM value, resulting in improved ability to perform functional fwd bending while standing and sitting. (approp and ongoing)  2. Pt will  demonstrate increased MedX average isometric strength value  by 20% from initial test resulting in improved ability to perform bending, lifting, and carrying activities safely, confidently.  (approp and ongoing)  3. Pt to demonstrate ability to independently control and reduce their pain through posture positioning and mechanical movements throughout a typical day.  (approp and ongoing)  4.  Pt will demonstrate reduced pain and improved functional outcomes as reported on the FOTO by reaching a limitation score of < or = 36% or less in order to demonstrate subjective improvement in pt's condition.    (approp and ongoing)  5. Pt will demonstrate independence with the HEP at discharge  (approp and ongoing)  6.  Pt able to walk for 15 min without requiring support or rest break  (approp and ongoing)         Plan   Continue with established Plan of Care towards established PT goals.

## 2022-04-25 NOTE — PROGRESS NOTES
Patient is concerned that it is safe to take newly recommended rosuvastatin with his diagnosis of Lupus.    The pharmacist told him he could take a supplement of Bergamot and Co Q10 instead to get similar results. Please advise.   Subjective:       Patient ID: Nilsa Curiel is a 71 y.o. female.    Chief Complaint: Disease Management    HPI  72 yo F with PMH of atopic dermatitis, HTN, morbid obesity here for evaluation for joint pain and rash. She has pain in lower back, both hips.  Reports she has had pain for a couple of months. Pain level can be as high as 6/10, aching, non-radiating.  Denies any swelling, oral ulcers, hair loss,dry eyes or dry mouth.Denies pleurisy or photosensitivity.  Reports she has had rash in hands since she was kid and has been told by dermatology to be atopic dermatitis. Certain types of detergents will bring on rash.  Rash is improving with topical creams.       Past Medical History:   Diagnosis Date    Atopic dermatitis     GERD (gastroesophageal reflux disease)     Hyperlipidemia     Hypertension     Morbid obesity with BMI of 40.0-44.9, adult 4/1/2016       Review of Systems   Constitutional: Negative for activity change, appetite change, chills, diaphoresis, fatigue, fever and unexpected weight change.   HENT: Negative for congestion, ear discharge, ear pain, facial swelling, mouth sores, sinus pressure, sneezing, sore throat, tinnitus and trouble swallowing.    Eyes: Negative for photophobia, pain, discharge, redness, itching and visual disturbance.   Respiratory: Negative for apnea, cough, chest tightness, shortness of breath, wheezing and stridor.    Cardiovascular: Negative for chest pain and leg swelling.   Gastrointestinal: Negative for abdominal distention, abdominal pain, anal bleeding, blood in stool, constipation, diarrhea and nausea.   Endocrine: Negative for cold intolerance, heat intolerance and polydipsia.   Genitourinary: Negative for difficulty urinating, dysuria and genital sores.   Musculoskeletal: Positive for back pain. Negative for arthralgias, gait problem, joint swelling, neck pain and neck stiffness.   Skin: Positive for rash. Negative for color change, pallor and wound.    Neurological: Positive for headaches. Negative for dizziness, seizures, light-headedness and numbness.   Hematological: Negative for adenopathy. Does not bruise/bleed easily.   Psychiatric/Behavioral: Negative for sleep disturbance. The patient is not nervous/anxious.              Objective:   BP (!) 152/88   Pulse 103   Resp 18   Ht 5' (1.524 m)   Wt 97.8 kg (215 lb 11.2 oz)   BMI 42.13 kg/m²      Physical Exam   Constitutional: She is oriented to person, place, and time.   HENT:   Head: Normocephalic and atraumatic.   Right Ear: External ear normal.   Left Ear: External ear normal.   Nose: Nose normal.   Mouth/Throat: Oropharynx is clear and moist. No oropharyngeal exudate.   Eyes: Conjunctivae and EOM are normal. Pupils are equal, round, and reactive to light. Right eye exhibits no discharge. Left eye exhibits no discharge. No scleral icterus.   Neck: Neck supple. No JVD present. No thyromegaly present.   Cardiovascular: Normal rate, regular rhythm, normal heart sounds and intact distal pulses.  Exam reveals no gallop and no friction rub.    No murmur heard.  Pulmonary/Chest: Effort normal and breath sounds normal. No respiratory distress. She has no wheezes. She has no rales. She exhibits no tenderness.   Abdominal: Soft. Bowel sounds are normal. She exhibits no distension and no mass. There is no tenderness. There is no rebound and no guarding.   Lymphadenopathy:     She has no cervical adenopathy.   Neurological: She is alert and oriented to person, place, and time. No cranial nerve deficit. Gait normal. Coordination normal.   Skin: Skin is dry. Rash noted. No erythema. No pallor.     Hyperpigmentation in hands   Psychiatric: Affect and judgment normal.   Musculoskeletal: She exhibits no edema, tenderness or deformity.         Labs: reviewed       Lumbar xray (2016):  I personally reviewed DJD     Assessment:     70 yo F with PMH of atopic dermatitis, HTN, morbid obesity here for evaluation for joint  pain and rash.      1) elevated platelets:could be secondary to obesity but will work her up for systemic causes of autoimmune disease.  Have low suspicion given her negative ROS.    labs  2) Joint pain: reports mostly lower back pain. Low suspicion for inflammatory arthritis:Suspect DJD.    3 ) Rash: followed by dermatology. Thought to be from atopic dermatitis.    4. Obesity: encourage weight loss

## 2022-04-27 ENCOUNTER — TELEPHONE (OUTPATIENT)
Dept: PRIMARY CARE CLINIC | Facility: CLINIC | Age: 77
End: 2022-04-27
Payer: MEDICARE

## 2022-04-27 ENCOUNTER — OFFICE VISIT (OUTPATIENT)
Dept: DERMATOLOGY | Facility: CLINIC | Age: 77
End: 2022-04-27
Payer: MEDICARE

## 2022-04-27 DIAGNOSIS — L30.4 INTERTRIGO: ICD-10-CM

## 2022-04-27 DIAGNOSIS — L30.9 DERMATITIS: Primary | ICD-10-CM

## 2022-04-27 PROCEDURE — 99999 PR PBB SHADOW E&M-EST. PATIENT-LVL III: CPT | Mod: PBBFAC,,, | Performed by: DERMATOLOGY

## 2022-04-27 PROCEDURE — 1101F PT FALLS ASSESS-DOCD LE1/YR: CPT | Mod: CPTII,S$GLB,, | Performed by: DERMATOLOGY

## 2022-04-27 PROCEDURE — 3288F FALL RISK ASSESSMENT DOCD: CPT | Mod: CPTII,S$GLB,, | Performed by: DERMATOLOGY

## 2022-04-27 PROCEDURE — 88305 TISSUE EXAM BY PATHOLOGIST: CPT | Mod: 26,,, | Performed by: PATHOLOGY

## 2022-04-27 PROCEDURE — 1159F PR MEDICATION LIST DOCUMENTED IN MEDICAL RECORD: ICD-10-PCS | Mod: CPTII,S$GLB,, | Performed by: DERMATOLOGY

## 2022-04-27 PROCEDURE — 4010F ACE/ARB THERAPY RXD/TAKEN: CPT | Mod: CPTII,S$GLB,, | Performed by: DERMATOLOGY

## 2022-04-27 PROCEDURE — 4010F PR ACE/ARB THEARPY RXD/TAKEN: ICD-10-PCS | Mod: CPTII,S$GLB,, | Performed by: DERMATOLOGY

## 2022-04-27 PROCEDURE — 11104 PR PUNCH BIOPSY, SKIN, SINGLE LESION: ICD-10-PCS | Mod: S$GLB,,, | Performed by: DERMATOLOGY

## 2022-04-27 PROCEDURE — 1157F PR ADVANCE CARE PLAN OR EQUIV PRESENT IN MEDICAL RECORD: ICD-10-PCS | Mod: CPTII,S$GLB,, | Performed by: DERMATOLOGY

## 2022-04-27 PROCEDURE — 88312 PR  SPECIAL STAINS,GROUP I: ICD-10-PCS | Mod: 26,,, | Performed by: PATHOLOGY

## 2022-04-27 PROCEDURE — 11104 PUNCH BX SKIN SINGLE LESION: CPT | Mod: S$GLB,,, | Performed by: DERMATOLOGY

## 2022-04-27 PROCEDURE — 99999 PR PBB SHADOW E&M-EST. PATIENT-LVL III: ICD-10-PCS | Mod: PBBFAC,,, | Performed by: DERMATOLOGY

## 2022-04-27 PROCEDURE — 3288F PR FALLS RISK ASSESSMENT DOCUMENTED: ICD-10-PCS | Mod: CPTII,S$GLB,, | Performed by: DERMATOLOGY

## 2022-04-27 PROCEDURE — 99214 PR OFFICE/OUTPT VISIT, EST, LEVL IV, 30-39 MIN: ICD-10-PCS | Mod: 25,S$GLB,, | Performed by: DERMATOLOGY

## 2022-04-27 PROCEDURE — 88312 SPECIAL STAINS GROUP 1: CPT | Performed by: PATHOLOGY

## 2022-04-27 PROCEDURE — 1159F MED LIST DOCD IN RCRD: CPT | Mod: CPTII,S$GLB,, | Performed by: DERMATOLOGY

## 2022-04-27 PROCEDURE — 99214 OFFICE O/P EST MOD 30 MIN: CPT | Mod: 25,S$GLB,, | Performed by: DERMATOLOGY

## 2022-04-27 PROCEDURE — 1157F ADVNC CARE PLAN IN RCRD: CPT | Mod: CPTII,S$GLB,, | Performed by: DERMATOLOGY

## 2022-04-27 PROCEDURE — 1126F AMNT PAIN NOTED NONE PRSNT: CPT | Mod: CPTII,S$GLB,, | Performed by: DERMATOLOGY

## 2022-04-27 PROCEDURE — 1126F PR PAIN SEVERITY QUANTIFIED, NO PAIN PRESENT: ICD-10-PCS | Mod: CPTII,S$GLB,, | Performed by: DERMATOLOGY

## 2022-04-27 PROCEDURE — 88305 TISSUE EXAM BY PATHOLOGIST: CPT | Performed by: PATHOLOGY

## 2022-04-27 PROCEDURE — 1101F PR PT FALLS ASSESS DOC 0-1 FALLS W/OUT INJ PAST YR: ICD-10-PCS | Mod: CPTII,S$GLB,, | Performed by: DERMATOLOGY

## 2022-04-27 PROCEDURE — 88305 TISSUE EXAM BY PATHOLOGIST: ICD-10-PCS | Mod: 26,,, | Performed by: PATHOLOGY

## 2022-04-27 PROCEDURE — 88312 SPECIAL STAINS GROUP 1: CPT | Mod: 26,,, | Performed by: PATHOLOGY

## 2022-04-27 RX ORDER — MOMETASONE FUROATE 1 MG/G
OINTMENT TOPICAL
Qty: 45 G | Refills: 1 | Status: SHIPPED | OUTPATIENT
Start: 2022-04-27 | End: 2023-01-03 | Stop reason: SDUPTHER

## 2022-04-27 NOTE — TELEPHONE ENCOUNTER
----- Message from Varsha Davis sent at 4/27/2022 10:53 AM CDT -----  Ms Nilsa Curiel would like a referral to see a Podiatrist and she wants to get a handicap tag.    Thanks

## 2022-04-27 NOTE — PROGRESS NOTES
Subjective:       Patient ID:  Nilsa Curiel is a 76 y.o. female who presents for   Chief Complaint   Patient presents with    Follow-up     Rash is stable      Follow-up - Follow-up  Symptom course: stable  Affected locations: right hand, left hand, back, chest and torso  Signs / symptoms: asymptomatic  Severity: mild        Review of Systems   Constitutional: Negative for fever, chills and fatigue.   Skin: Negative for daily sunscreen use, recent sunburn and wears hat.   Hematologic/Lymphatic: Does not bruise/bleed easily.        Objective:    Physical Exam   Constitutional: She appears well-developed and well-nourished. No distress.   Neurological: She is alert and oriented to person, place, and time. She is not disoriented.   Psychiatric: She has a normal mood and affect.   Skin:   Areas Examined (abnormalities noted in diagram):   Abdomen Inspection Performed  Genitals / Buttocks / Groin Inspection Performed                   Diagram Legend     Erythematous scaling macule/papule c/w actinic keratosis       Vascular papule c/w angioma      Pigmented verrucoid papule/plaque c/w seborrheic keratosis      Yellow umbilicated papule c/w sebaceous hyperplasia      Irregularly shaped tan macule c/w lentigo     1-2 mm smooth white papules consistent with Milia      Movable subcutaneous cyst with punctum c/w epidermal inclusion cyst      Subcutaneous movable cyst c/w pilar cyst      Firm pink to brown papule c/w dermatofibroma      Pedunculated fleshy papule(s) c/w skin tag(s)      Evenly pigmented macule c/w junctional nevus     Mildly variegated pigmented, slightly irregular-bordered macule c/w mildly atypical nevus      Flesh colored to evenly pigmented papule c/w intradermal nevus       Pink pearly papule/plaque c/w basal cell carcinoma      Erythematous hyperkeratotic cursted plaque c/w SCC      Surgical scar with no sign of skin cancer recurrence      Open and closed comedones      Inflammatory papules and  pustules      Verrucoid papule consistent consistent with wart     Erythematous eczematous patches and plaques     Dystrophic onycholytic nail with subungual debris c/w onychomycosis     Umbilicated papule    Erythematous-base heme-crusted tan verrucoid plaque consistent with inflamed seborrheic keratosis     Erythematous Silvery Scaling Plaque c/w Psoriasis     See annotation                      Assessment / Plan:      Pathology Orders:     Normal Orders This Visit    Specimen to Pathology, Dermatology     Questions:    Procedure Type: Dermatology and skin neoplasms    Number of Specimens: 1    ------------------------: -------------------------    Spec 1 Procedure: Biopsy    Spec 1 Clinical Impression: r/o lichen planus vs eczematous dermatitis    Spec 1 Source: right dorsal  hand    Release to patient: Immediate        Dermatitis  -     Specimen to Pathology, Dermatology  -     mometasone (ELOCON) 0.1 % ointment; aaa bid of hands/arms avoid face/groin  Dispense: 45 g; Refill: 1  Punch biopsy procedure note:  Punch biopsy performed after verbal consent obtained. Area marked and prepped with alcohol. Approximately 1cc of 1% lidocaine with epinephrine injected. 4 mm disposable punch used to remove lesion. Hemostasis obtained and biopsy site closed with 1 - 2 Prolene sutures. Wound care instructions reviewed with patient and handout given.    Counseling on topical steroids:  Patient counseled that the prolonged use of topical steroids can result in the increased appearance superficial blood vessels (telangiectasias) lightening (hypopigmentation), and   thinning of the skin ( atrophy).  Patient understands to avoid using high potency steroids in skin folds, the groin or the face.  The patient verbalized understanding of proper use and possible adverse effects of topical steroids.  All patient's questions and concerns were addressed.    Intertrigo  -     triamcinolone acetonide 0.1% (KENALOG) 0.1 % cream; AAA bid  after cool blow dry  Dispense: 30 g; Refill: 5  Flares:  Recommend white vinegar: water 1:1 compresses 2x/day followed by cool blow dry and then application of prescription medication.     Maintenance:  Cool blow dry after showering 1x/day then apply Zeasorb AF powder.    If lichen planus will do steroid taper, if eczema will discuss dry skin care         Follow up in about 3 months (around 7/27/2022).

## 2022-04-27 NOTE — PATIENT INSTRUCTIONS
Counseling on topical steroids:  Patient counseled that the prolonged use of topical steroids can result in the increased appearance superficial blood vessels (telangiectasias) lightening (hypopigmentation), and   thinning of the skin ( atrophy).  Patient understands to avoid using high potency steroids in skin folds, the groin or the face.  The patient verbalized understanding of proper use and possible adverse effects of topical steroids.  All patient's questions and concerns were addressed.    Intertrigo  -     triamcinolone acetonide 0.1% (KENALOG) 0.1 % cream; AAA bid after cool blow dry  Dispense: 30 g; Refill: 5  Flares:  Recommend white vinegar: water 1:1 compresses 2x/day followed by cool blow dry and then application of prescription medication.     Maintenance:  Cool blow dry after showering 1x/day then apply Zeasorb AF powder.

## 2022-05-05 LAB
FINAL PATHOLOGIC DIAGNOSIS: NORMAL
GROSS: NORMAL
Lab: NORMAL
MICROSCOPIC EXAM: NORMAL

## 2022-05-18 ENCOUNTER — CLINICAL SUPPORT (OUTPATIENT)
Dept: DERMATOLOGY | Facility: CLINIC | Age: 77
End: 2022-05-18
Payer: MEDICARE

## 2022-05-18 PROCEDURE — 99024 POSTOP FOLLOW-UP VISIT: CPT | Mod: S$GLB,,, | Performed by: DERMATOLOGY

## 2022-05-18 PROCEDURE — 99999 PR PBB SHADOW E&M-EST. PATIENT-LVL III: CPT | Mod: PBBFAC,,,

## 2022-05-18 PROCEDURE — 99999 PR PBB SHADOW E&M-EST. PATIENT-LVL III: ICD-10-PCS | Mod: PBBFAC,,,

## 2022-05-18 PROCEDURE — 99024 PR POST-OP FOLLOW-UP VISIT: ICD-10-PCS | Mod: S$GLB,,, | Performed by: DERMATOLOGY

## 2022-05-28 ENCOUNTER — PATIENT MESSAGE (OUTPATIENT)
Dept: GASTROENTEROLOGY | Facility: CLINIC | Age: 77
End: 2022-05-28
Payer: MEDICARE

## 2022-06-02 ENCOUNTER — DOCUMENTATION ONLY (OUTPATIENT)
Dept: REHABILITATION | Facility: OTHER | Age: 77
End: 2022-06-02
Payer: MEDICARE

## 2022-06-02 NOTE — PROGRESS NOTES
PHYSICAL THERAPY DISCHARGE:    This patient was originally evaluated at our facility on: 3/18/22         Pt attended PT for a total of 2 Visits receiving: Manual Therapy, Therex, Postural Education, Body Mechanics Training, Home Exercise Instruction     This patient's last visit occurred on: 4/7/22      Short term goals were achieved: no    Long term goals were achieved: no    Pt was DC'd from our care secondary to: Pt did not return after 2nd visit, reason unknown       Therapist: Eliana Tsang, PT  6/2/2022

## 2022-06-03 ENCOUNTER — OFFICE VISIT (OUTPATIENT)
Dept: DERMATOLOGY | Facility: CLINIC | Age: 77
End: 2022-06-03
Payer: MEDICARE

## 2022-06-03 DIAGNOSIS — L30.4 INTERTRIGO: Primary | ICD-10-CM

## 2022-06-03 DIAGNOSIS — L82.1 SK (SEBORRHEIC KERATOSIS): ICD-10-CM

## 2022-06-03 DIAGNOSIS — D23.9 INTRADERMAL NEVUS: ICD-10-CM

## 2022-06-03 DIAGNOSIS — L81.9 DYSCHROMIA: ICD-10-CM

## 2022-06-03 PROCEDURE — 99214 PR OFFICE/OUTPT VISIT, EST, LEVL IV, 30-39 MIN: ICD-10-PCS | Mod: S$GLB,,, | Performed by: DERMATOLOGY

## 2022-06-03 PROCEDURE — 1159F PR MEDICATION LIST DOCUMENTED IN MEDICAL RECORD: ICD-10-PCS | Mod: CPTII,S$GLB,, | Performed by: DERMATOLOGY

## 2022-06-03 PROCEDURE — 1126F PR PAIN SEVERITY QUANTIFIED, NO PAIN PRESENT: ICD-10-PCS | Mod: CPTII,S$GLB,, | Performed by: DERMATOLOGY

## 2022-06-03 PROCEDURE — 4010F ACE/ARB THERAPY RXD/TAKEN: CPT | Mod: CPTII,S$GLB,, | Performed by: DERMATOLOGY

## 2022-06-03 PROCEDURE — 1101F PR PT FALLS ASSESS DOC 0-1 FALLS W/OUT INJ PAST YR: ICD-10-PCS | Mod: CPTII,S$GLB,, | Performed by: DERMATOLOGY

## 2022-06-03 PROCEDURE — 3288F FALL RISK ASSESSMENT DOCD: CPT | Mod: CPTII,S$GLB,, | Performed by: DERMATOLOGY

## 2022-06-03 PROCEDURE — 4010F PR ACE/ARB THEARPY RXD/TAKEN: ICD-10-PCS | Mod: CPTII,S$GLB,, | Performed by: DERMATOLOGY

## 2022-06-03 PROCEDURE — 1126F AMNT PAIN NOTED NONE PRSNT: CPT | Mod: CPTII,S$GLB,, | Performed by: DERMATOLOGY

## 2022-06-03 PROCEDURE — 1157F PR ADVANCE CARE PLAN OR EQUIV PRESENT IN MEDICAL RECORD: ICD-10-PCS | Mod: CPTII,S$GLB,, | Performed by: DERMATOLOGY

## 2022-06-03 PROCEDURE — 99999 PR PBB SHADOW E&M-EST. PATIENT-LVL IV: CPT | Mod: PBBFAC,,, | Performed by: DERMATOLOGY

## 2022-06-03 PROCEDURE — 99999 PR PBB SHADOW E&M-EST. PATIENT-LVL IV: ICD-10-PCS | Mod: PBBFAC,,, | Performed by: DERMATOLOGY

## 2022-06-03 PROCEDURE — 3288F PR FALLS RISK ASSESSMENT DOCUMENTED: ICD-10-PCS | Mod: CPTII,S$GLB,, | Performed by: DERMATOLOGY

## 2022-06-03 PROCEDURE — 1157F ADVNC CARE PLAN IN RCRD: CPT | Mod: CPTII,S$GLB,, | Performed by: DERMATOLOGY

## 2022-06-03 PROCEDURE — 99214 OFFICE O/P EST MOD 30 MIN: CPT | Mod: S$GLB,,, | Performed by: DERMATOLOGY

## 2022-06-03 PROCEDURE — 1101F PT FALLS ASSESS-DOCD LE1/YR: CPT | Mod: CPTII,S$GLB,, | Performed by: DERMATOLOGY

## 2022-06-03 PROCEDURE — 1159F MED LIST DOCD IN RCRD: CPT | Mod: CPTII,S$GLB,, | Performed by: DERMATOLOGY

## 2022-06-03 NOTE — PATIENT INSTRUCTIONS
Flares:  Recommend white vinegar: water 1:1 compresses 2x/day followed by cool blow dry and then application of prescription medication.     Maintenance:  Cool blow dry after showering 1x/day then apply Zeasorb AF powder.      - can use preparation H ointment to area at night   Prominent underlying vasculature may also cause increased pigmentation. In some patients, the eyelids are relatively translucent leading to prominence of the subcutaneous and/or muscular vascular plexus, which causes a violaceous hue under the eyes. Thus preparation H ointment will shrink vasculature  - can also use SPF 50 or above to eyes  - soft tissue fillers can be considered (cosmetic)  - topical over the counter retinol for eyes/eye creams and over the counter vitamin C creams may be helpful (Cerave has these OTC)  - topical over the counter azelic acid twice daily can help such as the ordinary 10% ( can buy online)

## 2022-06-03 NOTE — PROGRESS NOTES
Subjective:       Patient ID:  Nilsa Curiel is a 76 y.o. female who presents for   Chief Complaint   Patient presents with    Mole     Near L eye and bottom lip      Mole - Initial  Affected locations: left eye and lips  Duration: 20 years  Signs / symptoms: irritated  Severity: mild  Timing: constant  Aggravated by: nothing  Relieving factors/Treatments tried: nothing  Improvement on treatment: no relief        Review of Systems   Constitutional: Negative for fever, chills and fatigue.   Skin: Negative for daily sunscreen use, recent sunburn and wears hat.   Hematologic/Lymphatic: Does not bruise/bleed easily.        Objective:    Physical Exam   Constitutional: She appears well-developed and well-nourished. No distress.   Neurological: She is alert and oriented to person, place, and time. She is not disoriented.   Psychiatric: She has a normal mood and affect.   Skin:   Areas Examined (abnormalities noted in diagram):   Head / Face Inspection Performed                   Diagram Legend     Erythematous scaling macule/papule c/w actinic keratosis       Vascular papule c/w angioma      Pigmented verrucoid papule/plaque c/w seborrheic keratosis      Yellow umbilicated papule c/w sebaceous hyperplasia      Irregularly shaped tan macule c/w lentigo     1-2 mm smooth white papules consistent with Milia      Movable subcutaneous cyst with punctum c/w epidermal inclusion cyst      Subcutaneous movable cyst c/w pilar cyst      Firm pink to brown papule c/w dermatofibroma      Pedunculated fleshy papule(s) c/w skin tag(s)      Evenly pigmented macule c/w junctional nevus     Mildly variegated pigmented, slightly irregular-bordered macule c/w mildly atypical nevus      Flesh colored to evenly pigmented papule c/w intradermal nevus       Pink pearly papule/plaque c/w basal cell carcinoma      Erythematous hyperkeratotic cursted plaque c/w SCC      Surgical scar with no sign of skin cancer recurrence      Open and closed  comedones      Inflammatory papules and pustules      Verrucoid papule consistent consistent with wart     Erythematous eczematous patches and plaques     Dystrophic onycholytic nail with subungual debris c/w onychomycosis     Umbilicated papule    Erythematous-base heme-crusted tan verrucoid plaque consistent with inflamed seborrheic keratosis     Erythematous Silvery Scaling Plaque c/w Psoriasis     See annotation      Assessment / Plan:        Intertrigo  - controlled will call in triple cream/shake lotion as needed  Flares:  Recommend white vinegar: water 1:1 compresses 2x/day followed by cool blow dry and then application of prescription medication.     Maintenance:  Cool blow dry after showering 1x/day then apply Zeasorb AF powder.    SK (seborrheic keratosis)  These are benign inherited growths without a malignant potential. Reassurance given to patient. No treatment is necessary.     Intradermal nevus  Discussed ABCDE's of nevi.  Monitor for new mole or moles that are becoming bigger, darker, irritated, or developing irregular borders. Brochure provided. Instructed patient to observe lesion(s) for changes and follow up in clinic if changes are noted. Patient to monitor skin at home for new or changing lesions.     Dyschromia  - can use preparation H ointment to area at night   Prominent underlying vasculature may also cause increased pigmentation. In some patients, the eyelids are relatively translucent leading to prominence of the subcutaneous and/or muscular vascular plexus, which causes a violaceous hue under the eyes. Thus preparation H ointment will shrink vasculature  - can also use SPF 50 or above to eyes  - soft tissue fillers can be considered (cosmetic)  - topical over the counter retinol for eyes/eye creams and over the counter vitamin C creams may be helpful (Cerave has these OTC)  - topical over the counter azelic acid twice daily can help such as the ordinary 10% ( can buy online) \              No follow-ups on file.

## 2022-06-08 ENCOUNTER — OFFICE VISIT (OUTPATIENT)
Dept: PRIMARY CARE CLINIC | Facility: CLINIC | Age: 77
End: 2022-06-08
Payer: MEDICARE

## 2022-06-08 ENCOUNTER — LAB VISIT (OUTPATIENT)
Dept: LAB | Facility: HOSPITAL | Age: 77
End: 2022-06-08
Attending: FAMILY MEDICINE
Payer: MEDICARE

## 2022-06-08 VITALS
OXYGEN SATURATION: 99 % | TEMPERATURE: 99 F | WEIGHT: 215.19 LBS | SYSTOLIC BLOOD PRESSURE: 124 MMHG | BODY MASS INDEX: 40.63 KG/M2 | HEIGHT: 61 IN | DIASTOLIC BLOOD PRESSURE: 72 MMHG | HEART RATE: 104 BPM

## 2022-06-08 DIAGNOSIS — E66.01 SEVERE OBESITY (BMI >= 40): ICD-10-CM

## 2022-06-08 DIAGNOSIS — R51.9 HEADACHE DISORDER: ICD-10-CM

## 2022-06-08 DIAGNOSIS — M79.18 CHRONIC MUSCULOSKELETAL PAIN: Primary | ICD-10-CM

## 2022-06-08 DIAGNOSIS — I10 ESSENTIAL HYPERTENSION: ICD-10-CM

## 2022-06-08 DIAGNOSIS — G47.33 OSA (OBSTRUCTIVE SLEEP APNEA): ICD-10-CM

## 2022-06-08 DIAGNOSIS — Z12.31 ENCOUNTER FOR SCREENING MAMMOGRAM FOR MALIGNANT NEOPLASM OF BREAST: ICD-10-CM

## 2022-06-08 DIAGNOSIS — I50.30 HYPERTROPHIC OBSTRUCTIVE CARDIOMYOPATHY WITH DIASTOLIC HEART FAILURE: ICD-10-CM

## 2022-06-08 DIAGNOSIS — E66.01 CLASS 3 SEVERE OBESITY DUE TO EXCESS CALORIES WITH SERIOUS COMORBIDITY AND BODY MASS INDEX (BMI) OF 40.0 TO 44.9 IN ADULT: ICD-10-CM

## 2022-06-08 DIAGNOSIS — I65.21 STENOSIS OF RIGHT CAROTID ARTERY: ICD-10-CM

## 2022-06-08 DIAGNOSIS — G89.29 CHRONIC MUSCULOSKELETAL PAIN: ICD-10-CM

## 2022-06-08 DIAGNOSIS — N18.30 STAGE 3 CHRONIC KIDNEY DISEASE, UNSPECIFIED WHETHER STAGE 3A OR 3B CKD: ICD-10-CM

## 2022-06-08 DIAGNOSIS — G89.29 CHRONIC MUSCULOSKELETAL PAIN: Primary | ICD-10-CM

## 2022-06-08 DIAGNOSIS — I42.1 HYPERTROPHIC OBSTRUCTIVE CARDIOMYOPATHY WITH DIASTOLIC HEART FAILURE: ICD-10-CM

## 2022-06-08 DIAGNOSIS — E78.2 MIXED HYPERLIPIDEMIA: ICD-10-CM

## 2022-06-08 DIAGNOSIS — M79.18 CHRONIC MUSCULOSKELETAL PAIN: ICD-10-CM

## 2022-06-08 LAB
CCP AB SER IA-ACNC: <0.5 U/ML
CK SERPL-CCNC: 180 U/L (ref 20–180)
CRP SERPL-MCNC: 8.1 MG/L (ref 0–8.2)
ERYTHROCYTE [SEDIMENTATION RATE] IN BLOOD BY WESTERGREN METHOD: 43 MM/HR (ref 0–36)
RHEUMATOID FACT SERPL-ACNC: <13 IU/ML (ref 0–15)
T4 FREE SERPL-MCNC: 0.87 NG/DL (ref 0.71–1.51)
TSH SERPL DL<=0.005 MIU/L-ACNC: 2.28 UIU/ML (ref 0.4–4)
URATE SERPL-MCNC: 8.7 MG/DL (ref 2.4–5.7)

## 2022-06-08 PROCEDURE — 3074F PR MOST RECENT SYSTOLIC BLOOD PRESSURE < 130 MM HG: ICD-10-PCS | Mod: CPTII,S$GLB,, | Performed by: FAMILY MEDICINE

## 2022-06-08 PROCEDURE — 86235 NUCLEAR ANTIGEN ANTIBODY: CPT | Performed by: FAMILY MEDICINE

## 2022-06-08 PROCEDURE — 4010F ACE/ARB THERAPY RXD/TAKEN: CPT | Mod: CPTII,S$GLB,, | Performed by: FAMILY MEDICINE

## 2022-06-08 PROCEDURE — 1159F PR MEDICATION LIST DOCUMENTED IN MEDICAL RECORD: ICD-10-PCS | Mod: CPTII,S$GLB,, | Performed by: FAMILY MEDICINE

## 2022-06-08 PROCEDURE — 99214 OFFICE O/P EST MOD 30 MIN: CPT | Mod: S$GLB,,, | Performed by: FAMILY MEDICINE

## 2022-06-08 PROCEDURE — 84550 ASSAY OF BLOOD/URIC ACID: CPT | Performed by: FAMILY MEDICINE

## 2022-06-08 PROCEDURE — 99499 UNLISTED E&M SERVICE: CPT | Mod: S$GLB,,, | Performed by: FAMILY MEDICINE

## 2022-06-08 PROCEDURE — 1160F RVW MEDS BY RX/DR IN RCRD: CPT | Mod: CPTII,S$GLB,, | Performed by: FAMILY MEDICINE

## 2022-06-08 PROCEDURE — 84443 ASSAY THYROID STIM HORMONE: CPT | Performed by: FAMILY MEDICINE

## 2022-06-08 PROCEDURE — 86200 CCP ANTIBODY: CPT | Performed by: FAMILY MEDICINE

## 2022-06-08 PROCEDURE — 1101F PR PT FALLS ASSESS DOC 0-1 FALLS W/OUT INJ PAST YR: ICD-10-PCS | Mod: CPTII,S$GLB,, | Performed by: FAMILY MEDICINE

## 2022-06-08 PROCEDURE — 36415 COLL VENOUS BLD VENIPUNCTURE: CPT | Mod: PN | Performed by: FAMILY MEDICINE

## 2022-06-08 PROCEDURE — 86140 C-REACTIVE PROTEIN: CPT | Performed by: FAMILY MEDICINE

## 2022-06-08 PROCEDURE — 3008F BODY MASS INDEX DOCD: CPT | Mod: CPTII,S$GLB,, | Performed by: FAMILY MEDICINE

## 2022-06-08 PROCEDURE — 3008F PR BODY MASS INDEX (BMI) DOCUMENTED: ICD-10-PCS | Mod: CPTII,S$GLB,, | Performed by: FAMILY MEDICINE

## 2022-06-08 PROCEDURE — 3078F PR MOST RECENT DIASTOLIC BLOOD PRESSURE < 80 MM HG: ICD-10-PCS | Mod: CPTII,S$GLB,, | Performed by: FAMILY MEDICINE

## 2022-06-08 PROCEDURE — 1126F AMNT PAIN NOTED NONE PRSNT: CPT | Mod: CPTII,S$GLB,, | Performed by: FAMILY MEDICINE

## 2022-06-08 PROCEDURE — 85652 RBC SED RATE AUTOMATED: CPT | Performed by: FAMILY MEDICINE

## 2022-06-08 PROCEDURE — 99499 RISK ADDL DX/OHS AUDIT: ICD-10-PCS | Mod: S$GLB,,, | Performed by: FAMILY MEDICINE

## 2022-06-08 PROCEDURE — 99999 PR PBB SHADOW E&M-EST. PATIENT-LVL V: CPT | Mod: PBBFAC,,, | Performed by: FAMILY MEDICINE

## 2022-06-08 PROCEDURE — 3288F PR FALLS RISK ASSESSMENT DOCUMENTED: ICD-10-PCS | Mod: CPTII,S$GLB,, | Performed by: FAMILY MEDICINE

## 2022-06-08 PROCEDURE — 99214 PR OFFICE/OUTPT VISIT, EST, LEVL IV, 30-39 MIN: ICD-10-PCS | Mod: S$GLB,,, | Performed by: FAMILY MEDICINE

## 2022-06-08 PROCEDURE — 3288F FALL RISK ASSESSMENT DOCD: CPT | Mod: CPTII,S$GLB,, | Performed by: FAMILY MEDICINE

## 2022-06-08 PROCEDURE — 1157F ADVNC CARE PLAN IN RCRD: CPT | Mod: CPTII,S$GLB,, | Performed by: FAMILY MEDICINE

## 2022-06-08 PROCEDURE — 1101F PT FALLS ASSESS-DOCD LE1/YR: CPT | Mod: CPTII,S$GLB,, | Performed by: FAMILY MEDICINE

## 2022-06-08 PROCEDURE — 3074F SYST BP LT 130 MM HG: CPT | Mod: CPTII,S$GLB,, | Performed by: FAMILY MEDICINE

## 2022-06-08 PROCEDURE — 1157F PR ADVANCE CARE PLAN OR EQUIV PRESENT IN MEDICAL RECORD: ICD-10-PCS | Mod: CPTII,S$GLB,, | Performed by: FAMILY MEDICINE

## 2022-06-08 PROCEDURE — 99999 PR PBB SHADOW E&M-EST. PATIENT-LVL V: ICD-10-PCS | Mod: PBBFAC,,, | Performed by: FAMILY MEDICINE

## 2022-06-08 PROCEDURE — 82550 ASSAY OF CK (CPK): CPT | Performed by: FAMILY MEDICINE

## 2022-06-08 PROCEDURE — 86431 RHEUMATOID FACTOR QUANT: CPT | Performed by: FAMILY MEDICINE

## 2022-06-08 PROCEDURE — 84439 ASSAY OF FREE THYROXINE: CPT | Performed by: FAMILY MEDICINE

## 2022-06-08 PROCEDURE — 1126F PR PAIN SEVERITY QUANTIFIED, NO PAIN PRESENT: ICD-10-PCS | Mod: CPTII,S$GLB,, | Performed by: FAMILY MEDICINE

## 2022-06-08 PROCEDURE — 1160F PR REVIEW ALL MEDS BY PRESCRIBER/CLIN PHARMACIST DOCUMENTED: ICD-10-PCS | Mod: CPTII,S$GLB,, | Performed by: FAMILY MEDICINE

## 2022-06-08 PROCEDURE — 86235 NUCLEAR ANTIGEN ANTIBODY: CPT | Mod: 59 | Performed by: FAMILY MEDICINE

## 2022-06-08 PROCEDURE — 3078F DIAST BP <80 MM HG: CPT | Mod: CPTII,S$GLB,, | Performed by: FAMILY MEDICINE

## 2022-06-08 PROCEDURE — 1159F MED LIST DOCD IN RCRD: CPT | Mod: CPTII,S$GLB,, | Performed by: FAMILY MEDICINE

## 2022-06-08 PROCEDURE — 86038 ANTINUCLEAR ANTIBODIES: CPT | Performed by: FAMILY MEDICINE

## 2022-06-08 PROCEDURE — 4010F PR ACE/ARB THEARPY RXD/TAKEN: ICD-10-PCS | Mod: CPTII,S$GLB,, | Performed by: FAMILY MEDICINE

## 2022-06-08 RX ORDER — ASPIRIN 81 MG/1
81 TABLET ORAL DAILY
Qty: 90 TABLET | Refills: 3 | Status: SHIPPED | OUTPATIENT
Start: 2022-06-08 | End: 2024-02-07 | Stop reason: SDUPTHER

## 2022-06-08 RX ORDER — SUMATRIPTAN SUCCINATE 100 MG/1
100 TABLET ORAL DAILY PRN
Qty: 27 TABLET | Refills: 3 | Status: SHIPPED | OUTPATIENT
Start: 2022-06-08 | End: 2023-01-03 | Stop reason: SDUPTHER

## 2022-06-08 RX ORDER — ASPIRIN 81 MG/1
81 TABLET ORAL DAILY
COMMUNITY
End: 2022-08-30 | Stop reason: SDUPTHER

## 2022-06-08 RX ORDER — POTASSIUM CHLORIDE 20 MEQ/1
20 TABLET, EXTENDED RELEASE ORAL 2 TIMES DAILY
Qty: 180 TABLET | Refills: 1 | Status: SHIPPED | OUTPATIENT
Start: 2022-06-08 | End: 2022-08-25 | Stop reason: SDUPTHER

## 2022-06-08 RX ORDER — OMEPRAZOLE 40 MG/1
40 CAPSULE, DELAYED RELEASE ORAL EVERY MORNING
Qty: 90 CAPSULE | Refills: 3 | Status: SHIPPED | OUTPATIENT
Start: 2022-06-08 | End: 2022-08-25 | Stop reason: SDUPTHER

## 2022-06-08 RX ORDER — AMLODIPINE BESYLATE 10 MG/1
10 TABLET ORAL DAILY
Qty: 90 TABLET | Refills: 3 | Status: SHIPPED | OUTPATIENT
Start: 2022-06-08 | End: 2022-08-25 | Stop reason: SDUPTHER

## 2022-06-08 RX ORDER — OLMESARTAN MEDOXOMIL 40 MG/1
40 TABLET ORAL DAILY
Qty: 90 TABLET | Refills: 3 | Status: SHIPPED | OUTPATIENT
Start: 2022-06-08 | End: 2022-08-25 | Stop reason: SDUPTHER

## 2022-06-08 NOTE — PROGRESS NOTES
Subjective:   Patient ID: Nilsa Curiel is a 76 y.o. female.    Chief Complaint: Leg Pain and Hand Pain      HPI    Annual was dec 21    75 yo female with bilateral LE discomfort and bilateral hand pain.    No calf pain or swelling.  No popliteal fossa pain or swelling.  No thigh pain or swelling.    Was given four tablets of nortriptyline by someone when she was on a trip to help with her discomfort.    No trauma    Patient queried and denies any further complaints.    ALLERGIES AND MEDICATIONS: updated and reviewed.  Review of patient's allergies indicates:  No Known Allergies    Current Outpatient Medications:     aspirin (ECOTRIN) 81 MG EC tablet, Take 81 mg by mouth once daily., Disp: , Rfl:     betamethasone valerate 0.1% (VALISONE) 0.1 % Oint, Apply topically once daily., Disp: 45 g, Rfl: 3    biotin 300 mcg Tab, Take 1 tablet by mouth once daily., Disp: , Rfl:     clobetasol (TEMOVATE) 0.05 % cream, APPLY TO THE AFFECTED AREA TWICE DAILY, Disp: 30 g, Rfl: 0    diclofenac sodium (VOLTAREN) 1 % Gel, Apply 2 g topically daily as needed., Disp: 100 g, Rfl: 3    flu vac 2021 [id number],PF, (FLUAD QUAD 2021-22,65Y UP,,PF,) 60 mcg (15 mcg x 4)/0.5 mL Syrg, inject into muscle, Disp: 0.5 mL, Rfl: 0    fluocinonide (LIDEX) 0.05 % external solution, AAA scalp qday - bid prn pruritus, Disp: 60 mL, Rfl: 3    fluticasone propionate (FLONASE) 50 mcg/actuation nasal spray, USE 1 SPRAY IN EACH NOSTRIL EVERY DAY, Disp: 32 g, Rfl: 4    ketoconazole (NIZORAL) 2 % cream, AOO TO THE AFFECTED AREA TWICE DAILY, APPLY TO AXILLA AND ABDOMINAL CREASES, Disp: 15 g, Rfl: 0    ketoconazole (NIZORAL) 2 % shampoo, Wash hair with medicated shampoo at least 2x/week - let sit on scalp at least 5 minutes prior to rinsing, Disp: 120 mL, Rfl: 5    lactobacillus combo no.6 (PROBIOTIC COMPLEX) 4 billion cell Tab, Take 1 tablet by mouth once daily., Disp: 90 tablet, Rfl: 3    mometasone (ELOCON) 0.1 % ointment, Apply twice daily to  hands/arms. Avoid face/groin, Disp: 45 g, Rfl: 1    MULTIVIT-MIN/FA/CA CARB/VIT K (WOMEN'S 50+ DAILY FORMULA ORAL), Take by mouth., Disp: , Rfl:     nystatin-triamcinolone (MYCOLOG II) cream, Apply topically 2 (two) times a day., Disp: 60 g, Rfl: 3    omega-3 fatty acids 300 mg Cap, Take by mouth., Disp: , Rfl:     riboflavin, vitamin B2, (VITAMIN B-2 ORAL), Take 1 capsule by mouth once daily., Disp: , Rfl:     TAC 0.1%-ciclopirox-MOM, Apply to affected area twice daily after cool blow dry, Disp: 60 g, Rfl: 5    TAC 0.1%-LOPROX-MOM, Apply to affected area twice daily after cool blow dry. Discard of after 30 days., Disp: 60 g, Rfl: 3    TURMERIC ORAL, Take by mouth., Disp: , Rfl:     ubidecarenone (COENZYME Q10) 100 mg Tab, Take 1 tablet by mouth once daily. , Disp: , Rfl:     amLODIPine (NORVASC) 10 MG tablet, Take 1 tablet (10 mg total) by mouth once daily., Disp: 90 tablet, Rfl: 3    aspirin (ECOTRIN) 81 MG EC tablet, Take 1 tablet (81 mg total) by mouth once daily., Disp: 90 tablet, Rfl: 3    cetirizine (ZYRTEC) 10 MG tablet, Take 1 tablet (10 mg total) by mouth once daily., Disp: 90 tablet, Rfl: 3    diphth,pertus,acell,,tetanus (BOOSTRIX TDAP) 2.5-8-5 Lf-mcg-Lf/0.5mL Syrg injection, Inject into the muscle., Disp: 0.5 mL, Rfl: 0    olmesartan (BENICAR) 40 MG tablet, Take 1 tablet (40 mg total) by mouth once daily., Disp: 90 tablet, Rfl: 3    omeprazole (PRILOSEC) 40 MG capsule, Take 1 capsule (40 mg total) by mouth every morning. As needed for reflux symptoms, Disp: 90 capsule, Rfl: 3    potassium chloride SA (K-DUR,KLOR-CON) 20 MEQ tablet, Take 1 tablet (20 mEq total) by mouth 2 (two) times daily., Disp: 180 tablet, Rfl: 1    sumatriptan (IMITREX) 100 MG tablet, Take 1 tablet (100 mg total) by mouth daily as needed for Migraine. No more than 2 doses in 24 hours, Disp: 27 tablet, Rfl: 3    Review of Systems   Constitutional: Negative for activity change and unexpected weight change.   HENT:  Negative for hearing loss, rhinorrhea and trouble swallowing.    Eyes: Negative for discharge and visual disturbance.   Respiratory: Negative for chest tightness and wheezing.    Cardiovascular: Negative for chest pain and palpitations.   Gastrointestinal: Negative for blood in stool, constipation, diarrhea and vomiting.   Endocrine: Negative for polydipsia and polyuria.   Genitourinary: Negative for difficulty urinating, dysuria, hematuria and menstrual problem.   Musculoskeletal: Positive for arthralgias. Negative for joint swelling and neck pain.   Neurological: Negative for weakness and headaches.   Psychiatric/Behavioral: Negative for confusion and dysphoric mood.       Lab Results   Component Value Date    WBC 6.38 04/06/2022    HGB 10.7 (L) 04/06/2022    HCT 35.6 (L) 04/06/2022    MCV 81 (L) 04/06/2022     04/06/2022         CMP  Sodium   Date Value Ref Range Status   12/29/2021 141 136 - 145 mmol/L Final     Potassium   Date Value Ref Range Status   12/29/2021 4.0 3.5 - 5.1 mmol/L Final     Chloride   Date Value Ref Range Status   12/29/2021 106 95 - 110 mmol/L Final     CO2   Date Value Ref Range Status   12/29/2021 24 23 - 29 mmol/L Final     Glucose   Date Value Ref Range Status   12/29/2021 100 70 - 110 mg/dL Final     BUN   Date Value Ref Range Status   12/29/2021 14 8 - 23 mg/dL Final     Creatinine   Date Value Ref Range Status   12/29/2021 0.9 0.5 - 1.4 mg/dL Final     Calcium   Date Value Ref Range Status   12/29/2021 10.9 (H) 8.7 - 10.5 mg/dL Final     Total Protein   Date Value Ref Range Status   12/29/2021 8.1 6.0 - 8.4 g/dL Final     Albumin   Date Value Ref Range Status   12/29/2021 3.7 3.5 - 5.2 g/dL Final     Total Bilirubin   Date Value Ref Range Status   12/29/2021 0.5 0.1 - 1.0 mg/dL Final     Comment:     For infants and newborns, interpretation of results should be based  on gestational age, weight and in agreement with clinical  observations.    Premature Infant recommended  "reference ranges:  Up to 24 hours.............<8.0 mg/dL  Up to 48 hours............<12.0 mg/dL  3-5 days..................<15.0 mg/dL  6-29 days.................<15.0 mg/dL       Alkaline Phosphatase   Date Value Ref Range Status   12/29/2021 113 55 - 135 U/L Final     AST   Date Value Ref Range Status   12/29/2021 17 10 - 40 U/L Final     ALT   Date Value Ref Range Status   12/29/2021 18 10 - 44 U/L Final     Anion Gap   Date Value Ref Range Status   12/29/2021 11 8 - 16 mmol/L Final     eGFR if    Date Value Ref Range Status   12/29/2021 >60.0 >60 mL/min/1.73 m^2 Final     eGFR if non    Date Value Ref Range Status   12/29/2021 >60.0 >60 mL/min/1.73 m^2 Final     Comment:     Calculation used to obtain the estimated glomerular filtration  rate (eGFR) is the CKD-EPI equation.           Lab Results   Component Value Date    HGBA1C 5.4 12/29/2021        Objective:     Vitals:    06/08/22 0926 06/08/22 0951   BP: (!) 140/60 124/72   Pulse: 104    Temp: 99 °F (37.2 °C)    TempSrc: Oral    SpO2: 99%    Weight: 97.6 kg (215 lb 2.7 oz)    Height: 5' 1" (1.549 m)    PainSc: 0-No pain      Body mass index is 40.66 kg/m².    Physical Exam  Vitals and nursing note reviewed.   Constitutional:       General: She is not in acute distress.     Appearance: She is well-developed. She is not diaphoretic.   HENT:      Head: Normocephalic and atraumatic.      Right Ear: Hearing, tympanic membrane, ear canal and external ear normal. No tenderness.      Left Ear: Hearing, tympanic membrane, ear canal and external ear normal. No tenderness.      Nose: Nose normal.   Eyes:      General: Lids are normal. No scleral icterus.        Right eye: No discharge.         Left eye: No discharge.      Extraocular Movements:      Right eye: Normal extraocular motion.      Left eye: Normal extraocular motion.      Conjunctiva/sclera: Conjunctivae normal.      Right eye: Right conjunctiva is not injected.      Left eye: " Left conjunctiva is not injected.      Pupils: Pupils are equal, round, and reactive to light.   Neck:      Thyroid: No thyromegaly.      Vascular: No carotid bruit or JVD.      Trachea: No tracheal deviation.   Cardiovascular:      Rate and Rhythm: Normal rate and regular rhythm.      Pulses: Normal pulses.      Heart sounds: Normal heart sounds. No murmur heard.    No friction rub.   Pulmonary:      Effort: Pulmonary effort is normal. No accessory muscle usage or respiratory distress.      Breath sounds: Normal breath sounds. No wheezing, rhonchi or rales.   Abdominal:      General: Bowel sounds are normal. There is no distension or abdominal bruit.      Palpations: Abdomen is soft. There is no mass or pulsatile mass.      Tenderness: There is no abdominal tenderness. There is no guarding or rebound. Negative signs include Stokes's sign and McBurney's sign.   Musculoskeletal:      Cervical back: Normal range of motion and neck supple. No edema.   Lymphadenopathy:      Head:      Right side of head: No submandibular, preauricular or posterior auricular adenopathy.      Left side of head: No submandibular, preauricular or posterior auricular adenopathy.      Cervical: No cervical adenopathy.   Skin:     General: Skin is warm and dry.      Findings: No ecchymosis, erythema or rash. Rash is not urticarial.      Nails: There is no clubbing.   Neurological:      Mental Status: She is alert and oriented to person, place, and time.      GCS: GCS eye subscore is 4. GCS verbal subscore is 5. GCS motor subscore is 6.   Psychiatric:         Mood and Affect: Mood is not anxious or depressed. Affect is not angry or inappropriate.         Speech: Speech normal.         Behavior: Behavior normal. Behavior is cooperative.         Thought Content: Thought content normal.         Assessment and Plan:   Nilsa was seen today for leg pain and hand pain.    Diagnoses and all orders for this visit:    Chronic musculoskeletal pain  -      DARNELL Screen w/Reflex; Future  -     Sjogrens syndrome-A extractable nuclear antibody; Future  -     Anti Sm/RNP Antibody; Future  -     CK; Future  -     C-Reactive Protein; Future  -     Sedimentation rate; Future  -     Cyclic Citrullinated Peptide Antibody, IgG; Future  -     Rheumatoid Factor; Future  -     Uric Acid; Future  -     TSH; Future  -     T4, Free; Future    Headache disorder  -     sumatriptan (IMITREX) 100 MG tablet; Take 1 tablet (100 mg total) by mouth daily as needed for Migraine. No more than 2 doses in 24 hours    Essential hypertension  -     olmesartan (BENICAR) 40 MG tablet; Take 1 tablet (40 mg total) by mouth once daily.    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  -     omeprazole (PRILOSEC) 40 MG capsule; Take 1 capsule (40 mg total) by mouth every morning. As needed for reflux symptoms  -     amLODIPine (NORVASC) 10 MG tablet; Take 1 tablet (10 mg total) by mouth once daily.  -     aspirin (ECOTRIN) 81 MG EC tablet; Take 1 tablet (81 mg total) by mouth once daily.        No follow-ups on file.    THIS NOTE WILL BE SHARED WITH THE PATIENT.

## 2022-06-09 LAB
ANA SER QL IF: NORMAL
ANTI SM/RNP ANTIBODY: 0.08 RATIO (ref 0–0.99)
ANTI-SM/RNP INTERPRETATION: NEGATIVE
ANTI-SSA ANTIBODY: 0.05 RATIO (ref 0–0.99)
ANTI-SSA INTERPRETATION: NEGATIVE

## 2022-06-10 PROBLEM — R06.09 DOE (DYSPNEA ON EXERTION): Status: RESOLVED | Noted: 2022-04-06 | Resolved: 2022-06-10

## 2022-06-10 PROBLEM — E66.01 SEVERE OBESITY (BMI >= 40): Status: ACTIVE | Noted: 2022-06-10

## 2022-06-10 RX ORDER — ALLOPURINOL 100 MG/1
100 TABLET ORAL DAILY
Qty: 90 TABLET | Refills: 3 | Status: SHIPPED | OUTPATIENT
Start: 2022-06-10 | End: 2022-11-16 | Stop reason: SDUPTHER

## 2022-07-06 ENCOUNTER — PATIENT MESSAGE (OUTPATIENT)
Dept: RADIOLOGY | Facility: HOSPITAL | Age: 77
End: 2022-07-06
Payer: MEDICARE

## 2022-07-12 ENCOUNTER — HOSPITAL ENCOUNTER (OUTPATIENT)
Dept: RADIOLOGY | Facility: HOSPITAL | Age: 77
Discharge: HOME | End: 2022-07-12
Attending: FAMILY MEDICINE
Payer: MEDICARE

## 2022-07-12 DIAGNOSIS — Z12.31 ENCOUNTER FOR SCREENING MAMMOGRAM FOR MALIGNANT NEOPLASM OF BREAST: ICD-10-CM

## 2022-07-12 PROCEDURE — 77063 BREAST TOMOSYNTHESIS BI: CPT | Mod: 26,,, | Performed by: RADIOLOGY

## 2022-07-12 PROCEDURE — 77067 SCR MAMMO BI INCL CAD: CPT | Mod: 26,,, | Performed by: RADIOLOGY

## 2022-07-12 PROCEDURE — 77067 MAMMO DIGITAL SCREENING BILAT WITH TOMO: ICD-10-PCS | Mod: 26,,, | Performed by: RADIOLOGY

## 2022-07-12 PROCEDURE — 77063 MAMMO DIGITAL SCREENING BILAT WITH TOMO: ICD-10-PCS | Mod: 26,,, | Performed by: RADIOLOGY

## 2022-07-12 PROCEDURE — 77067 SCR MAMMO BI INCL CAD: CPT | Mod: TC

## 2022-08-17 ENCOUNTER — LAB VISIT (OUTPATIENT)
Dept: LAB | Facility: HOSPITAL | Age: 77
End: 2022-08-17
Attending: FAMILY MEDICINE
Payer: MEDICARE

## 2022-08-17 ENCOUNTER — OFFICE VISIT (OUTPATIENT)
Dept: PRIMARY CARE CLINIC | Facility: CLINIC | Age: 77
End: 2022-08-17
Payer: MEDICARE

## 2022-08-17 VITALS
TEMPERATURE: 98 F | DIASTOLIC BLOOD PRESSURE: 60 MMHG | HEIGHT: 61 IN | HEART RATE: 94 BPM | WEIGHT: 214.31 LBS | SYSTOLIC BLOOD PRESSURE: 122 MMHG | BODY MASS INDEX: 40.46 KG/M2 | OXYGEN SATURATION: 97 %

## 2022-08-17 DIAGNOSIS — E79.0 HYPERURICEMIA: ICD-10-CM

## 2022-08-17 DIAGNOSIS — I10 ESSENTIAL HYPERTENSION: ICD-10-CM

## 2022-08-17 DIAGNOSIS — E87.6 HYPOKALEMIA: ICD-10-CM

## 2022-08-17 DIAGNOSIS — M62.838 MUSCLE SPASMS OF BOTH LOWER EXTREMITIES: Primary | ICD-10-CM

## 2022-08-17 DIAGNOSIS — I50.30 HYPERTROPHIC OBSTRUCTIVE CARDIOMYOPATHY WITH DIASTOLIC HEART FAILURE: ICD-10-CM

## 2022-08-17 DIAGNOSIS — I42.1 HYPERTROPHIC OBSTRUCTIVE CARDIOMYOPATHY WITH DIASTOLIC HEART FAILURE: ICD-10-CM

## 2022-08-17 DIAGNOSIS — I50.32 CHRONIC DIASTOLIC HEART FAILURE: ICD-10-CM

## 2022-08-17 DIAGNOSIS — N18.30 STAGE 3 CHRONIC KIDNEY DISEASE, UNSPECIFIED WHETHER STAGE 3A OR 3B CKD: ICD-10-CM

## 2022-08-17 LAB
ANION GAP SERPL CALC-SCNC: 10 MMOL/L (ref 8–16)
BNP SERPL-MCNC: 12 PG/ML (ref 0–99)
BUN SERPL-MCNC: 10 MG/DL (ref 8–23)
CALCIUM SERPL-MCNC: 10.5 MG/DL (ref 8.7–10.5)
CHLORIDE SERPL-SCNC: 108 MMOL/L (ref 95–110)
CO2 SERPL-SCNC: 25 MMOL/L (ref 23–29)
CREAT SERPL-MCNC: 1.1 MG/DL (ref 0.5–1.4)
EST. GFR  (NO RACE VARIABLE): 52.7 ML/MIN/1.73 M^2
GLUCOSE SERPL-MCNC: 98 MG/DL (ref 70–110)
MAGNESIUM SERPL-MCNC: 1.9 MG/DL (ref 1.6–2.6)
POTASSIUM SERPL-SCNC: 4.7 MMOL/L (ref 3.5–5.1)
SODIUM SERPL-SCNC: 143 MMOL/L (ref 136–145)
URATE SERPL-MCNC: 6 MG/DL (ref 2.4–5.7)

## 2022-08-17 PROCEDURE — 1126F PR PAIN SEVERITY QUANTIFIED, NO PAIN PRESENT: ICD-10-PCS | Mod: CPTII,S$GLB,, | Performed by: FAMILY MEDICINE

## 2022-08-17 PROCEDURE — 3078F DIAST BP <80 MM HG: CPT | Mod: CPTII,S$GLB,, | Performed by: FAMILY MEDICINE

## 2022-08-17 PROCEDURE — 80048 BASIC METABOLIC PNL TOTAL CA: CPT | Performed by: FAMILY MEDICINE

## 2022-08-17 PROCEDURE — 3074F PR MOST RECENT SYSTOLIC BLOOD PRESSURE < 130 MM HG: ICD-10-PCS | Mod: CPTII,S$GLB,, | Performed by: FAMILY MEDICINE

## 2022-08-17 PROCEDURE — 3288F PR FALLS RISK ASSESSMENT DOCUMENTED: ICD-10-PCS | Mod: CPTII,S$GLB,, | Performed by: FAMILY MEDICINE

## 2022-08-17 PROCEDURE — 99999 PR PBB SHADOW E&M-EST. PATIENT-LVL V: CPT | Mod: PBBFAC,,, | Performed by: FAMILY MEDICINE

## 2022-08-17 PROCEDURE — 1101F PT FALLS ASSESS-DOCD LE1/YR: CPT | Mod: CPTII,S$GLB,, | Performed by: FAMILY MEDICINE

## 2022-08-17 PROCEDURE — 1126F AMNT PAIN NOTED NONE PRSNT: CPT | Mod: CPTII,S$GLB,, | Performed by: FAMILY MEDICINE

## 2022-08-17 PROCEDURE — 3074F SYST BP LT 130 MM HG: CPT | Mod: CPTII,S$GLB,, | Performed by: FAMILY MEDICINE

## 2022-08-17 PROCEDURE — 4010F ACE/ARB THERAPY RXD/TAKEN: CPT | Mod: CPTII,S$GLB,, | Performed by: FAMILY MEDICINE

## 2022-08-17 PROCEDURE — 1159F PR MEDICATION LIST DOCUMENTED IN MEDICAL RECORD: ICD-10-PCS | Mod: CPTII,S$GLB,, | Performed by: FAMILY MEDICINE

## 2022-08-17 PROCEDURE — 3288F FALL RISK ASSESSMENT DOCD: CPT | Mod: CPTII,S$GLB,, | Performed by: FAMILY MEDICINE

## 2022-08-17 PROCEDURE — 99214 PR OFFICE/OUTPT VISIT, EST, LEVL IV, 30-39 MIN: ICD-10-PCS | Mod: S$GLB,,, | Performed by: FAMILY MEDICINE

## 2022-08-17 PROCEDURE — 99999 PR PBB SHADOW E&M-EST. PATIENT-LVL V: ICD-10-PCS | Mod: PBBFAC,,, | Performed by: FAMILY MEDICINE

## 2022-08-17 PROCEDURE — 99214 OFFICE O/P EST MOD 30 MIN: CPT | Mod: S$GLB,,, | Performed by: FAMILY MEDICINE

## 2022-08-17 PROCEDURE — 3078F PR MOST RECENT DIASTOLIC BLOOD PRESSURE < 80 MM HG: ICD-10-PCS | Mod: CPTII,S$GLB,, | Performed by: FAMILY MEDICINE

## 2022-08-17 PROCEDURE — 1157F PR ADVANCE CARE PLAN OR EQUIV PRESENT IN MEDICAL RECORD: ICD-10-PCS | Mod: CPTII,S$GLB,, | Performed by: FAMILY MEDICINE

## 2022-08-17 PROCEDURE — 1101F PR PT FALLS ASSESS DOC 0-1 FALLS W/OUT INJ PAST YR: ICD-10-PCS | Mod: CPTII,S$GLB,, | Performed by: FAMILY MEDICINE

## 2022-08-17 PROCEDURE — 4010F PR ACE/ARB THEARPY RXD/TAKEN: ICD-10-PCS | Mod: CPTII,S$GLB,, | Performed by: FAMILY MEDICINE

## 2022-08-17 PROCEDURE — 1159F MED LIST DOCD IN RCRD: CPT | Mod: CPTII,S$GLB,, | Performed by: FAMILY MEDICINE

## 2022-08-17 PROCEDURE — 83735 ASSAY OF MAGNESIUM: CPT | Performed by: FAMILY MEDICINE

## 2022-08-17 PROCEDURE — 1160F RVW MEDS BY RX/DR IN RCRD: CPT | Mod: CPTII,S$GLB,, | Performed by: FAMILY MEDICINE

## 2022-08-17 PROCEDURE — 36415 COLL VENOUS BLD VENIPUNCTURE: CPT | Mod: PN | Performed by: FAMILY MEDICINE

## 2022-08-17 PROCEDURE — 3008F PR BODY MASS INDEX (BMI) DOCUMENTED: ICD-10-PCS | Mod: CPTII,S$GLB,, | Performed by: FAMILY MEDICINE

## 2022-08-17 PROCEDURE — 83880 ASSAY OF NATRIURETIC PEPTIDE: CPT | Performed by: FAMILY MEDICINE

## 2022-08-17 PROCEDURE — 84550 ASSAY OF BLOOD/URIC ACID: CPT | Performed by: FAMILY MEDICINE

## 2022-08-17 PROCEDURE — 3008F BODY MASS INDEX DOCD: CPT | Mod: CPTII,S$GLB,, | Performed by: FAMILY MEDICINE

## 2022-08-17 PROCEDURE — 1160F PR REVIEW ALL MEDS BY PRESCRIBER/CLIN PHARMACIST DOCUMENTED: ICD-10-PCS | Mod: CPTII,S$GLB,, | Performed by: FAMILY MEDICINE

## 2022-08-17 PROCEDURE — 1157F ADVNC CARE PLAN IN RCRD: CPT | Mod: CPTII,S$GLB,, | Performed by: FAMILY MEDICINE

## 2022-08-17 RX ORDER — HYDROCHLOROTHIAZIDE 25 MG/1
TABLET ORAL
Qty: 90 TABLET | Refills: 1 | Status: SHIPPED | OUTPATIENT
Start: 2022-08-17 | End: 2022-08-25 | Stop reason: SDUPTHER

## 2022-08-18 PROBLEM — I50.32 CHRONIC DIASTOLIC HEART FAILURE: Status: ACTIVE | Noted: 2022-08-18

## 2022-08-18 PROBLEM — E83.52 HYPERCALCEMIA: Status: RESOLVED | Noted: 2018-04-25 | Resolved: 2022-08-18

## 2022-08-18 PROBLEM — E79.0 HYPERURICEMIA: Status: ACTIVE | Noted: 2022-08-18

## 2022-08-18 PROBLEM — I50.32 CHRONIC DIASTOLIC HEART FAILURE: Status: RESOLVED | Noted: 2022-04-06 | Resolved: 2022-08-18

## 2022-08-18 PROBLEM — M62.838 MUSCLE SPASMS OF BOTH LOWER EXTREMITIES: Status: ACTIVE | Noted: 2022-08-18

## 2022-08-18 PROBLEM — E87.6 HYPOKALEMIA: Status: ACTIVE | Noted: 2022-08-18

## 2022-08-18 NOTE — PROGRESS NOTES
"/60 (BP Location: Right arm, Patient Position: Sitting, BP Method: Large (Manual))   Pulse 94   Temp 98.2 °F (36.8 °C) (Oral)   Ht 5' 1" (1.549 m)   Wt 97.2 kg (214 lb 4.6 oz)   SpO2 97%   BMI 40.49 kg/m²     Chief Complaint: No chief complaint on file.      CIELO Curiel is a 76 y.o. female here for    Muscle spasms.  Also with some shortness of breath at times.  Takes potassium prescription 20 mEq daily and not b.i.d. as directed.    Having some shortness of breath on exertion a bit more.  Patient queried and denies any further complaints    SURGICAL AND MEDICAL HISTORY: updated and reviewed.  ALLERGIES updated and reviewed.  Review of patient's allergies indicates:  No Known Allergies  CURRENT OUTPATIENT MEDICATIONS updated and reviewed    Current Outpatient Medications:     allopurinoL (ZYLOPRIM) 100 MG tablet, Take 1 tablet (100 mg total) by mouth once daily., Disp: 90 tablet, Rfl: 3    amLODIPine (NORVASC) 10 MG tablet, Take 1 tablet (10 mg total) by mouth once daily., Disp: 90 tablet, Rfl: 3    aspirin (ECOTRIN) 81 MG EC tablet, Take 81 mg by mouth once daily., Disp: , Rfl:     aspirin (ECOTRIN) 81 MG EC tablet, Take 1 tablet (81 mg total) by mouth once daily., Disp: 90 tablet, Rfl: 3    betamethasone valerate 0.1% (VALISONE) 0.1 % Oint, Apply topically once daily., Disp: 45 g, Rfl: 3    biotin 300 mcg Tab, Take 1 tablet by mouth once daily., Disp: , Rfl:     clobetasol (TEMOVATE) 0.05 % cream, APPLY TO THE AFFECTED AREA TWICE DAILY, Disp: 30 g, Rfl: 0    diclofenac sodium (VOLTAREN) 1 % Gel, Apply 2 g topically daily as needed., Disp: 100 g, Rfl: 3    fluocinonide (LIDEX) 0.05 % external solution, AAA scalp qday - bid prn pruritus, Disp: 60 mL, Rfl: 3    fluticasone propionate (FLONASE) 50 mcg/actuation nasal spray, USE 1 SPRAY IN EACH NOSTRIL EVERY DAY, Disp: 32 g, Rfl: 4    ketoconazole (NIZORAL) 2 % cream, AOO TO THE AFFECTED AREA TWICE DAILY, APPLY TO AXILLA AND ABDOMINAL " CREASES, Disp: 15 g, Rfl: 0    ketoconazole (NIZORAL) 2 % shampoo, Wash hair with medicated shampoo at least 2x/week - let sit on scalp at least 5 minutes prior to rinsing, Disp: 120 mL, Rfl: 5    lactobacillus combo no.6 (PROBIOTIC COMPLEX) 4 billion cell Tab, Take 1 tablet by mouth once daily., Disp: 90 tablet, Rfl: 3    mometasone (ELOCON) 0.1 % ointment, Apply twice daily to hands/arms. Avoid face/groin, Disp: 45 g, Rfl: 1    MULTIVIT-MIN/FA/CA CARB/VIT K (WOMEN'S 50+ DAILY FORMULA ORAL), Take by mouth., Disp: , Rfl:     nystatin-triamcinolone (MYCOLOG II) cream, Apply topically 2 (two) times a day., Disp: 60 g, Rfl: 3    olmesartan (BENICAR) 40 MG tablet, Take 1 tablet (40 mg total) by mouth once daily., Disp: 90 tablet, Rfl: 3    omega-3 fatty acids 300 mg Cap, Take by mouth., Disp: , Rfl:     omeprazole (PRILOSEC) 40 MG capsule, Take 1 capsule (40 mg total) by mouth every morning. As needed for reflux symptoms, Disp: 90 capsule, Rfl: 3    potassium chloride SA (K-DUR,KLOR-CON) 20 MEQ tablet, Take 1 tablet (20 mEq total) by mouth 2 (two) times daily., Disp: 180 tablet, Rfl: 1    riboflavin, vitamin B2, (VITAMIN B-2 ORAL), Take 1 capsule by mouth once daily., Disp: , Rfl:     sumatriptan (IMITREX) 100 MG tablet, Take 1 tablet (100 mg total) by mouth daily as needed for Migraine. No more than 2 doses in 24 hours, Disp: 27 tablet, Rfl: 3    TAC 0.1%-ciclopirox-MOM, Apply to affected area twice daily after cool blow dry, Disp: 60 g, Rfl: 5    TAC 0.1%-LOPROX-MOM, Apply to affected area twice daily after cool blow dry. Discard of after 30 days., Disp: 60 g, Rfl: 3    TURMERIC ORAL, Take by mouth., Disp: , Rfl:     ubidecarenone (COENZYME Q10) 100 mg Tab, Take 1 tablet by mouth once daily. , Disp: , Rfl:     cetirizine (ZYRTEC) 10 MG tablet, Take 1 tablet (10 mg total) by mouth once daily., Disp: 90 tablet, Rfl: 3    diphth,pertus,acell,,tetanus (BOOSTRIX TDAP) 2.5-8-5 Lf-mcg-Lf/0.5mL Syrg  injection, Inject into the muscle. (Patient not taking: Reported on 8/17/2022), Disp: 0.5 mL, Rfl: 0    flu vac 2021 65up-ffnPM43M,PF, (FLUAD QUAD 2021-22,65Y UP,,PF,) 60 mcg (15 mcg x 4)/0.5 mL Syrg, inject into muscle (Patient not taking: Reported on 8/17/2022), Disp: 0.5 mL, Rfl: 0    hydroCHLOROthiazide (HYDRODIURIL) 25 MG tablet, One po daily as needed for leg swelling., Disp: 90 tablet, Rfl: 1    Review of Systems   Constitutional: Negative for activity change, appetite change, chills, diaphoresis, fatigue, fever and unexpected weight change.   HENT: Negative for congestion, ear discharge, ear pain, facial swelling, hearing loss, nosebleeds, postnasal drip, rhinorrhea, sinus pressure, sneezing, sore throat, tinnitus, trouble swallowing and voice change.    Eyes: Negative for photophobia, pain, discharge, redness, itching and visual disturbance.   Respiratory: Negative for cough, chest tightness, shortness of breath and wheezing.    Cardiovascular: Negative for chest pain, palpitations and leg swelling.   Gastrointestinal: Negative for abdominal distention, abdominal pain, anal bleeding, blood in stool, constipation, diarrhea, nausea, rectal pain and vomiting.   Endocrine: Positive for polyuria. Negative for cold intolerance, heat intolerance, polydipsia and polyphagia.   Genitourinary: Negative for difficulty urinating, dysuria, flank pain, hematuria and menstrual problem.   Musculoskeletal: Positive for arthralgias and myalgias. Negative for back pain, joint swelling and neck pain.   Skin: Negative for rash.   Neurological: Negative for dizziness, tremors, seizures, syncope, speech difficulty, weakness, light-headedness, numbness and headaches.   Psychiatric/Behavioral: Negative for behavioral problems, confusion, decreased concentration, dysphoric mood, sleep disturbance and suicidal ideas. The patient is not nervous/anxious and is not hyperactive.        /60 (BP Location: Right arm, Patient  "Position: Sitting, BP Method: Large (Manual))   Pulse 94   Temp 98.2 °F (36.8 °C) (Oral)   Ht 5' 1" (1.549 m)   Wt 97.2 kg (214 lb 4.6 oz)   SpO2 97%   BMI 40.49 kg/m²   Physical Exam  Vitals and nursing note reviewed.   Constitutional:       General: She is not in acute distress.     Appearance: Normal appearance. She is well-developed. She is not ill-appearing or toxic-appearing.   HENT:      Head: Normocephalic and atraumatic.      Right Ear: Tympanic membrane, ear canal and external ear normal.      Left Ear: Tympanic membrane, ear canal and external ear normal.      Nose: Nose normal.      Mouth/Throat:      Lips: Pink.      Mouth: Mucous membranes are moist.      Pharynx: No oropharyngeal exudate or posterior oropharyngeal erythema.   Eyes:      General: No scleral icterus.        Right eye: No discharge.         Left eye: No discharge.      Extraocular Movements: Extraocular movements intact.      Conjunctiva/sclera: Conjunctivae normal.   Neck:      Vascular: No carotid bruit.   Cardiovascular:      Rate and Rhythm: Normal rate and regular rhythm.      Pulses: Normal pulses.      Heart sounds: Normal heart sounds. No murmur heard.  Pulmonary:      Effort: Pulmonary effort is normal. No respiratory distress.      Breath sounds: Normal breath sounds. No wheezing or rales.   Abdominal:      General: Bowel sounds are normal. There is no distension.      Palpations: Abdomen is soft. There is no mass.      Tenderness: There is no abdominal tenderness. There is no right CVA tenderness, left CVA tenderness, guarding or rebound.      Hernia: No hernia is present.   Musculoskeletal:      Cervical back: Normal range of motion and neck supple. No rigidity or tenderness.   Lymphadenopathy:      Cervical: No cervical adenopathy.   Skin:     General: Skin is warm and dry.   Neurological:      General: No focal deficit present.      Mental Status: She is alert. Mental status is at baseline.   Psychiatric:         Mood " and Affect: Mood normal.         Behavior: Behavior normal. Behavior is cooperative.       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      No follow-ups on file.    Not exactly myalgias but muscle spasms --occur suddenly at night in the lower extremities--go away within 2 minutes..  Likely potassium related.  Not likely statin related.      Follow-up with Cardiology regarding hypertrophic obstructive cardiomyopathy with diastolic heart failure.  Weight loss by calorie restriction and exercise     Check uric acid regarding chronic gouty arthritis hyperuricemia chronic kidney disease    Follow-up 3-6 months.    Gaurav Allison MD        ===========================================

## 2022-08-25 DIAGNOSIS — I10 ESSENTIAL HYPERTENSION: ICD-10-CM

## 2022-08-25 NOTE — TELEPHONE ENCOUNTER
No new care gaps identified.  [facility] Embedded Care Gaps. Reference number: [id number]. 8/25/2022   4:35:07 PM CDT

## 2022-08-25 NOTE — TELEPHONE ENCOUNTER
----- Message from Tammi Elise sent at 8/25/2022  3:11 PM CDT -----  Contact: [phone]  Pharmacy called to advise that they sent refill requests for the following medications with no response. Please Advise     Requesting an RX refill or new RX.  Is this a refill or new RX: refill  RX name and strength (copy/paste from chart):  amLODIPine (NORVASC) 10 MG tablet  Is this a 30 day or 90 day RX: 90 day   Pharmacy name and phone # (copy/paste from chart):    [facility] (Now [facility]) - [place] [address]  Phone: [phone] Fax: [phone]  The doctors have asked that we provide their patients with the following 2 reminders -- prescription refills can take up to 72 hours, and a friendly reminder that in the future you can use your [facility] account to request refills: pharm request    Requesting an RX refill or new RX.  Is this a refill or new RX: refill  RX name and strength (copy/paste from chart):  omeprazole (PRILOSEC) 40 MG capsule  Is this a 30 day or 90 day RX: 90 day   Pharmacy name and phone # (copy/paste from chart):    [facility] ([facility]) - [place] [address]  Phone: [phone] Fax: [phone]  The doctors have asked that we provide their patients with the following 2 reminders -- prescription refills can take up to 72 hours, and a friendly reminder that in the future you can use your [facility] account to request refills: pharm request    Requesting an RX refill or new RX.  Is this a refill or new RX: refill  RX name and strength (copy/paste from chart): olmesartan (BENICAR) 40 MG tablet  Is this a 30 day or 90 day RX: 90 day   Pharmacy name and phone # (copy/paste from chart):    [facility] (Now [facility]) - [address]  [address] [place] [id number]  Phone: [phone] Fax: [phone]  The doctors have asked that we provide their patients with the following 2 reminders -- prescription refills can take up to 72 hours, and a friendly reminder that in the future you can use your [facility] account to request refills: pharm request    Requesting an RX refill or new RX.  Is this a refill or new RX: refill  RX name and strength (copy/paste from chart):  hydroCHLOROthiazide (HYDRODIURIL) 25 MG tablet  Is this a 30 day or 90 day RX: 90 day   Pharmacy name and phone # (copy/paste from chart):    [facility] (Now [facility]) - [address]  [address] [place] [address]  Phone: [phone] Fax: [phone]  The doctors have asked that we provide their patients with the following 2 reminders -- prescription refills can take up to 72 hours, and a friendly reminder that in the future you can use your [facility] account to request refills: pharm request    Requesting an RX refill or new RX.  Is this a refill or new RX: refill  RX name and strength (copy/paste from chart):  potassium chloride SA  Is this a 30 day or 90 day RX: 90 day   Pharmacy name and phone # (copy/paste from chart):    [facility] ([facility]) - [facility] [address]  [address] [place] [id number]  Phone: [phone] Fax: [phone]  The doctors have asked that we provide their patients with the following 2 reminders -- prescription refills can take up to 72 hours, and a friendly reminder that in the future you can use your [email or web address] account to request refills: pharm request

## 2022-08-26 RX ORDER — HYDROCHLOROTHIAZIDE 25 MG/1
TABLET ORAL
Qty: 90 TABLET | Refills: 1 | Status: SHIPPED | OUTPATIENT
Start: 2022-08-26 | End: 2022-11-30

## 2022-08-26 RX ORDER — OMEPRAZOLE 40 MG/1
40 CAPSULE, DELAYED RELEASE ORAL EVERY MORNING
Qty: 90 CAPSULE | Refills: 1 | Status: SHIPPED | OUTPATIENT
Start: 2022-08-26 | End: 2023-01-03 | Stop reason: SDUPTHER

## 2022-08-26 RX ORDER — AMLODIPINE BESYLATE 10 MG/1
10 TABLET ORAL DAILY
Qty: 90 TABLET | Refills: 1 | Status: SHIPPED | OUTPATIENT
Start: 2022-08-26 | End: 2022-11-12

## 2022-08-26 RX ORDER — OLMESARTAN MEDOXOMIL 40 MG/1
40 TABLET ORAL DAILY
Qty: 90 TABLET | Refills: 1 | Status: SHIPPED | OUTPATIENT
Start: 2022-08-26 | End: 2022-09-15

## 2022-08-26 RX ORDER — POTASSIUM CHLORIDE 20 MEQ/1
20 TABLET, EXTENDED RELEASE ORAL 2 TIMES DAILY
Qty: 180 TABLET | Refills: 1 | Status: SHIPPED | OUTPATIENT
Start: 2022-08-26 | End: 2022-11-15

## 2022-08-30 ENCOUNTER — OFFICE VISIT (OUTPATIENT)
Dept: CARDIOLOGY | Facility: CLINIC | Age: 77
End: 2022-08-30
Payer: MEDICARE

## 2022-08-30 VITALS
DIASTOLIC BLOOD PRESSURE: 70 MMHG | OXYGEN SATURATION: 98 % | WEIGHT: 213.88 LBS | HEART RATE: 87 BPM | HEIGHT: 61 IN | SYSTOLIC BLOOD PRESSURE: 136 MMHG | BODY MASS INDEX: 40.38 KG/M2

## 2022-08-30 DIAGNOSIS — I50.30 HYPERTROPHIC OBSTRUCTIVE CARDIOMYOPATHY WITH DIASTOLIC HEART FAILURE: ICD-10-CM

## 2022-08-30 DIAGNOSIS — I42.1 HYPERTROPHIC OBSTRUCTIVE CARDIOMYOPATHY WITH DIASTOLIC HEART FAILURE: ICD-10-CM

## 2022-08-30 DIAGNOSIS — I50.32 CHRONIC DIASTOLIC HEART FAILURE: ICD-10-CM

## 2022-08-30 DIAGNOSIS — E78.2 MIXED HYPERLIPIDEMIA: ICD-10-CM

## 2022-08-30 DIAGNOSIS — I10 ESSENTIAL HYPERTENSION: Primary | ICD-10-CM

## 2022-08-30 DIAGNOSIS — E66.01 SEVERE OBESITY (BMI >= 40): ICD-10-CM

## 2022-08-30 PROCEDURE — 3075F PR MOST RECENT SYSTOLIC BLOOD PRESS GE 130-139MM HG: ICD-10-PCS | Mod: CPTII,S$GLB,, | Performed by: INTERNAL MEDICINE

## 2022-08-30 PROCEDURE — 3288F FALL RISK ASSESSMENT DOCD: CPT | Mod: CPTII,S$GLB,, | Performed by: INTERNAL MEDICINE

## 2022-08-30 PROCEDURE — 1157F ADVNC CARE PLAN IN RCRD: CPT | Mod: CPTII,S$GLB,, | Performed by: INTERNAL MEDICINE

## 2022-08-30 PROCEDURE — 3288F PR FALLS RISK ASSESSMENT DOCUMENTED: ICD-10-PCS | Mod: CPTII,S$GLB,, | Performed by: INTERNAL MEDICINE

## 2022-08-30 PROCEDURE — 93005 ELECTROCARDIOGRAM TRACING: CPT

## 2022-08-30 PROCEDURE — 4010F ACE/ARB THERAPY RXD/TAKEN: CPT | Mod: CPTII,S$GLB,, | Performed by: INTERNAL MEDICINE

## 2022-08-30 PROCEDURE — 99204 PR OFFICE/OUTPT VISIT, NEW, LEVL IV, 45-59 MIN: ICD-10-PCS | Mod: 25,S$GLB,, | Performed by: INTERNAL MEDICINE

## 2022-08-30 PROCEDURE — 99999 PR PBB SHADOW E&M-EST. PATIENT-LVL V: ICD-10-PCS | Mod: PBBFAC,,, | Performed by: INTERNAL MEDICINE

## 2022-08-30 PROCEDURE — 1101F PT FALLS ASSESS-DOCD LE1/YR: CPT | Mod: CPTII,S$GLB,, | Performed by: INTERNAL MEDICINE

## 2022-08-30 PROCEDURE — 93010 EKG 12-LEAD: ICD-10-PCS | Mod: S$GLB,,, | Performed by: INTERNAL MEDICINE

## 2022-08-30 PROCEDURE — 3008F BODY MASS INDEX DOCD: CPT | Mod: CPTII,S$GLB,, | Performed by: INTERNAL MEDICINE

## 2022-08-30 PROCEDURE — 3075F SYST BP GE 130 - 139MM HG: CPT | Mod: CPTII,S$GLB,, | Performed by: INTERNAL MEDICINE

## 2022-08-30 PROCEDURE — 3008F PR BODY MASS INDEX (BMI) DOCUMENTED: ICD-10-PCS | Mod: CPTII,S$GLB,, | Performed by: INTERNAL MEDICINE

## 2022-08-30 PROCEDURE — 93010 ELECTROCARDIOGRAM REPORT: CPT | Mod: S$GLB,,, | Performed by: INTERNAL MEDICINE

## 2022-08-30 PROCEDURE — 3078F DIAST BP <80 MM HG: CPT | Mod: CPTII,S$GLB,, | Performed by: INTERNAL MEDICINE

## 2022-08-30 PROCEDURE — 4010F PR ACE/ARB THEARPY RXD/TAKEN: ICD-10-PCS | Mod: CPTII,S$GLB,, | Performed by: INTERNAL MEDICINE

## 2022-08-30 PROCEDURE — 99999 PR PBB SHADOW E&M-EST. PATIENT-LVL V: CPT | Mod: PBBFAC,,, | Performed by: INTERNAL MEDICINE

## 2022-08-30 PROCEDURE — 3078F PR MOST RECENT DIASTOLIC BLOOD PRESSURE < 80 MM HG: ICD-10-PCS | Mod: CPTII,S$GLB,, | Performed by: INTERNAL MEDICINE

## 2022-08-30 PROCEDURE — 1125F AMNT PAIN NOTED PAIN PRSNT: CPT | Mod: CPTII,S$GLB,, | Performed by: INTERNAL MEDICINE

## 2022-08-30 PROCEDURE — 1101F PR PT FALLS ASSESS DOC 0-1 FALLS W/OUT INJ PAST YR: ICD-10-PCS | Mod: CPTII,S$GLB,, | Performed by: INTERNAL MEDICINE

## 2022-08-30 PROCEDURE — 1157F PR ADVANCE CARE PLAN OR EQUIV PRESENT IN MEDICAL RECORD: ICD-10-PCS | Mod: CPTII,S$GLB,, | Performed by: INTERNAL MEDICINE

## 2022-08-30 PROCEDURE — 99204 OFFICE O/P NEW MOD 45 MIN: CPT | Mod: 25,S$GLB,, | Performed by: INTERNAL MEDICINE

## 2022-08-30 PROCEDURE — 1125F PR PAIN SEVERITY QUANTIFIED, PAIN PRESENT: ICD-10-PCS | Mod: CPTII,S$GLB,, | Performed by: INTERNAL MEDICINE

## 2022-08-30 PROCEDURE — 1159F PR MEDICATION LIST DOCUMENTED IN MEDICAL RECORD: ICD-10-PCS | Mod: CPTII,S$GLB,, | Performed by: INTERNAL MEDICINE

## 2022-08-30 PROCEDURE — 1159F MED LIST DOCD IN RCRD: CPT | Mod: CPTII,S$GLB,, | Performed by: INTERNAL MEDICINE

## 2022-08-30 RX ORDER — CARVEDILOL 3.12 MG/1
3.12 TABLET ORAL 2 TIMES DAILY WITH MEALS
Qty: 180 TABLET | Refills: 3 | Status: SHIPPED | OUTPATIENT
Start: 2022-08-30 | End: 2022-12-08 | Stop reason: SDUPTHER

## 2022-08-30 NOTE — PROGRESS NOTES
Cardiology    8/30/2022  10:02 AM    Problem list  Patient Active Problem List   Diagnosis    Mixed hyperlipidemia    GERD (gastroesophageal reflux disease)    Atopic dermatitis    Abnormal fasting glucose    Hyperparathyroidism    Decreased strength    Impaired mobility    Right-sided carotid artery disease    Microcytic hypochromic anemia    Neurogenic claudication due to lumbar spinal stenosis    Spondylolisthesis of lumbosacral region    MOEN (obstructive sleep apnea)    Mild aortic sclerosis    Primary osteoarthritis of both shoulders    Nonintractable episodic headache    Headache disorder    Chronic kidney disease, stage III (moderate)    Cervical stenosis of spine    Posture imbalance    Decreased strength of trunk and back    Mobility poor    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  She was diagnosed with hypertrophic cardiomyopathy which has been stable.  She is not very active as she is limited by her back pain from spinal stenosis.  She is able to do household chores.  She denies any angina.  She denies any paroxysmal nocturnal dyspnea.  She does not monitor blood pressure closely.  She does not take her hydrochlorothiazide daily because of cramps.  She has never tried a beta blocker.  She denies any syncope.    Medications  Current Outpatient Medications   Medication Sig Dispense Refill    allopurinoL (ZYLOPRIM) 100 MG tablet Take 1 tablet (100 mg total) by mouth once daily. 90 tablet 3    amLODIPine (NORVASC) 10 MG tablet Take 1 tablet (10 mg total) by mouth once daily. 90 tablet 1    aspirin (ECOTRIN)  81 MG EC tablet Take 1 tablet (81 mg total) by mouth once daily. 90 tablet 3    biotin 300 mcg Tab Take 1 tablet by mouth once daily.      fluticasone propionate (FLONASE) 50 mcg/actuation nasal spray USE 1 SPRAY IN EACH NOSTRIL EVERY DAY 32 g 4    hydroCHLOROthiazide (HYDRODIURIL) 25 MG tablet One po daily as needed for leg swelling. 90 tablet 1    lactobacillus combo no.6 (PROBIOTIC COMPLEX) 4 billion cell Tab Take 1 tablet by mouth once daily. 90 tablet 3    MULTIVIT-MIN/FA/CA CARB/VIT K (WOMEN'S 50+ DAILY FORMULA ORAL) Take by mouth.      nystatin-triamcinolone (MYCOLOG II) cream Apply topically 2 (two) times a day. 60 g 3    olmesartan (BENICAR) 40 MG tablet Take 1 tablet (40 mg total) by mouth once daily. 90 tablet 1    omega-3 fatty acids 300 mg Cap Take by mouth.      omeprazole (PRILOSEC) 40 MG capsule Take 1 capsule (40 mg total) by mouth every morning. As needed for reflux symptoms 90 capsule 1    potassium chloride SA (K-DUR,KLOR-CON) 20 MEQ tablet Take 1 tablet (20 mEq total) by mouth 2 (two) times daily. 180 tablet 1    sumatriptan (IMITREX) 100 MG tablet Take 1 tablet (100 mg total) by mouth daily as needed for Migraine. No more than 2 doses in 24 hours 27 tablet 3    TURMERIC ORAL Take by mouth.      ubidecarenone (COENZYME Q10) 100 mg Tab Take 1 tablet by mouth once daily.       betamethasone valerate 0.1% (VALISONE) 0.1 % Oint Apply topically once daily. 45 g 3    cetirizine (ZYRTEC) 10 MG tablet Take 1 tablet (10 mg total) by mouth once daily. 90 tablet 3    clobetasol (TEMOVATE) 0.05 % cream APPLY TO THE AFFECTED AREA TWICE DAILY 30 g 0    diclofenac sodium (VOLTAREN) 1 % Gel Apply 2 g topically daily as needed. 100 g 3    diphth,pertus,acell,,tetanus (BOOSTRIX TDAP) 2.5-8-5 Lf-mcg-Lf/0.5mL Syrg injection Inject into the muscle. (Patient not taking: Reported on 8/17/2022) 0.5 mL 0    flu vac 2021 65up-[id number],PF, (FLUAD QUAD 2021-22,65Y UP,,PF,) 60 mcg (15 mcg x 4)/0.5 mL Syrg inject into muscle  (Patient not taking: Reported on 8/17/2022) 0.5 mL 0    fluocinonide (LIDEX) 0.05 % external solution AAA scalp qday - bid prn pruritus 60 mL 3    ketoconazole (NIZORAL) 2 % cream AOO TO THE AFFECTED AREA TWICE DAILY, APPLY TO AXILLA AND ABDOMINAL CREASES 15 g 0    ketoconazole (NIZORAL) 2 % shampoo Wash hair with medicated shampoo at least 2x/week - let sit on scalp at least 5 minutes prior to rinsing 120 mL 5    mometasone (ELOCON) 0.1 % ointment Apply twice daily to hands/arms. Avoid face/groin 45 g 1    riboflavin, vitamin B2, (VITAMIN B-2 ORAL) Take 1 capsule by mouth once daily.      TAC 0.1%-ciclopirox-MOM Apply to affected area twice daily after cool blow dry 60 g 5    TAC 0.1%-LOPROX-MOM Apply to affected area twice daily after cool blow dry. Discard of after 30 days. 60 g 3     No current facility-administered medications for this visit.      Prior to Admission medications    Medication Sig Start Date End Date Taking? Authorizing Provider   allopurinoL (ZYLOPRIM) 100 MG tablet Take 1 tablet (100 mg total) by mouth once daily. 6/10/22  Yes Gaurav Allison MD   amLODIPine (NORVASC) 10 MG tablet Take 1 tablet (10 mg total) by mouth once daily. 8/26/22  Yes Gaurav Allison MD   aspirin (ECOTRIN) 81 MG EC tablet Take 1 tablet (81 mg total) by mouth once daily. 6/8/22 6/8/23 Yes Gaurav Allison MD   biotin 300 mcg Tab Take 1 tablet by mouth once daily.   Yes Historical Provider   fluticasone propionate (FLONASE) 50 mcg/actuation nasal spray USE 1 SPRAY IN EACH NOSTRIL EVERY DAY 11/17/20  Yes Velma Bryant MD   hydroCHLOROthiazide (HYDRODIURIL) 25 MG tablet One po daily as needed for leg swelling. 8/26/22  Yes Gaurav Allison MD   lactobacillus combo no.6 (PROBIOTIC COMPLEX) 4 billion cell Tab Take 1 tablet by mouth once daily. 9/26/17  Yes PATRICK Ngo   MULTIVIT-MIN/FA/CA CARB/VIT K (WOMEN'S 50+ DAILY FORMULA ORAL) Take by mouth.   Yes Historical Provider   nystatin-triamcinolone  (MYCOLOG II) cream Apply topically 2 (two) times a day. 1/26/21  Yes Velma Bryant MD   olmesartan (BENICAR) 40 MG tablet Take 1 tablet (40 mg total) by mouth once daily. 8/26/22  Yes Gaurav Allison MD   omega-3 fatty acids 300 mg Cap Take by mouth.   Yes Historical Provider   omeprazole (PRILOSEC) 40 MG capsule Take 1 capsule (40 mg total) by mouth every morning. As needed for reflux symptoms 8/26/22  Yes Gaurav Allison MD   potassium chloride SA (K-DUR,KLOR-CON) 20 MEQ tablet Take 1 tablet (20 mEq total) by mouth 2 (two) times daily. 8/26/22  Yes Gaurav Allison MD   sumatriptan (IMITREX) 100 MG tablet Take 1 tablet (100 mg total) by mouth daily as needed for Migraine. No more than 2 doses in 24 hours 6/8/22  Yes Gaurav Allison MD   TURMERIC ORAL Take by mouth.   Yes Historical Provider   ubidecarenone (COENZYME Q10) 100 mg Tab Take 1 tablet by mouth once daily.  10/23/19  Yes Historical Provider   betamethasone valerate 0.1% (VALISONE) 0.1 % Oint Apply topically once daily. 6/17/20   Velma Bryant MD   cetirizine (ZYRTEC) 10 MG tablet Take 1 tablet (10 mg total) by mouth once daily. 6/17/20 6/17/21  Velma Bryant MD   clobetasol (TEMOVATE) 0.05 % cream APPLY TO THE AFFECTED AREA TWICE DAILY 7/8/19   MICAELA Whitaker   diclofenac sodium (VOLTAREN) 1 % Gel Apply 2 g topically daily as needed. 4/6/22   Gaurav Allison MD   diphth,pertus,acell,,tetanus (BOOSTRIX TDAP) 2.5-8-5 Lf-mcg-Lf/0.5mL Syrg injection Inject into the muscle.  Patient not taking: Reported on 8/17/2022 6/8/22   Elier Ge, PharmD   flu vac 2021 [id number],PF, (FLUAD QUAD 2021-22,65Y UP,,PF,) 60 mcg (15 mcg x 4)/0.5 mL Syrg inject into muscle  Patient not taking: Reported on 8/17/2022 11/12/21   Elier Ge, PharmD   fluocinonide (LIDEX) 0.05 % external solution AAA scalp qday - bid prn pruritus 6/9/21   Rena Hughes MD   ketoconazole (NIZORAL) 2 % cream AOO TO THE AFFECTED AREA TWICE  DAILY, APPLY TO AXILLA AND ABDOMINAL CREASES 19   MICAELA Whitaker   ketoconazole (NIZORAL) 2 % shampoo Wash hair with medicated shampoo at least 2x/week - let sit on scalp at least 5 minutes prior to rinsing 21   Rena Hughes MD   mometasone (ELOCON) 0.1 % ointment Apply twice daily to hands/arms. Avoid face/groin 22   Rena Hughes MD   riboflavin, vitamin B2, (VITAMIN B-2 ORAL) Take 1 capsule by mouth once daily.    Historical Provider   TAC 0.1%-ciclopirox-MOM Apply to affected area twice daily after cool blow dry 22   Rena Hughes MD   TAC 0.1%-LOPROX-MOM Apply to affected area twice daily after cool blow dry. Discard of after 30 days. 21   Rena Hughes MD   aspirin (ECOTRIN) 81 MG EC tablet Take 81 mg by mouth once daily.  22  Historical Provider   triamcinolone acetonide 0.1% (KENALOG) 0.1 % cream Apply topically 2 (two) times daily as needed. 20  Velma Bryant MD         History  Past Medical History:   Diagnosis Date    Acute bilateral low back pain without sciatica 10/10/2017    Anemia 2018    Arthritis     Atopic dermatitis     Chronic diastolic heart failure 2022    Chronic kidney disease, stage III (moderate) 2020    GERD (gastroesophageal reflux disease)     Hyperlipidemia     Hypertension     Left ventricular hypertrophy 2016    Morbid obesity with BMI of 40.0-44.9, adult 2016    MONE (obstructive sleep apnea) 2018     Past Surgical History:   Procedure Laterality Date     SECTION      x3    COLONOSCOPY N/A 10/4/2021    Procedure: COLONOSCOPY;  Surgeon: Taran Galvez MD;  [facility]  Service: Endoscopy;  Laterality: N/A;    COLONOSCOPY N/A 2022    Procedure: COLONOSCOPY--positive fit kit;  Surgeon: Tani Lara MD;  Location: [facility] (4TH FLR);  Service: Endoscopy;  Laterality: N/A;    ESOPHAGOGASTRODUODENOSCOPY N/A 10/4/2021    Procedure: EGD  (ESOPHAGOGASTRODUODENOSCOPY);  Surgeon: Taran Galvez MD;  Location: [facility] ENDO (4TH FLR);  Service: Endoscopy;  Laterality: N/A;  covid test 10/1-[facility]    10/1-LVM about COVID test-GT    ESOPHAGOGASTRODUODENOSCOPY N/A 1/31/2022    Procedure: EGD (ESOPHAGOGASTRODUODENOSCOPY);  Surgeon: Tani Lara MD;  Location: [facility] ENDO (4TH FLR);  Service: Endoscopy;  Laterality: N/A;  12/15 fully vaccinated; instructions to portal-st  1/28-covid [facility]-tb    TUBAL LIGATION       Social History     Socioeconomic History    Marital status:    Occupational History    Occupation: retired medical records    Tobacco Use    Smoking status: Never    Smokeless tobacco: Never   Substance and Sexual Activity    Alcohol use: No     Alcohol/week: 0.0 standard drinks    Drug use: No    Sexual activity: Never     Partners: Male   Social History Narrative    , lives with 1 daughter(Toreal), Walking some     Social Determinants of Health     Financial Resource Strain: Low Risk     Difficulty of Paying Living Expenses: Not very hard   Food Insecurity: Food Insecurity Present    Worried About Running Out of Food in the Last Year: Never true    Ran Out of Food in the Last Year: Sometimes true   Transportation Needs: Unmet Transportation Needs    Lack of Transportation (Medical): Yes    Lack of Transportation (Non-Medical): No   Physical Activity: Insufficiently Active    Days of Exercise per Week: 2 days    Minutes of Exercise per Session: 30 min   Stress: No Stress Concern Present    Feeling of Stress : Not at all   Social Connections: Unknown    Frequency of Communication with Friends and Family: More than three times a week    Frequency of Social Gatherings with Friends and Family: Once a week    Active Member of Clubs or Organizations: Yes    Attends Club or Organization Meetings: More than 4 times per year    Marital Status:    Housing Stability: Low Risk     Unable to Pay for Housing in the Last Year: No     Number of Places Lived in the Last Year: 1    Unstable Housing in the Last Year: No         Allergies  Review of patient's allergies indicates:  No Known Allergies      Review of Systems   Review of Systems   Constitutional: Negative for decreased appetite, fever and weight loss.   HENT:  Negative for congestion and nosebleeds.    Eyes:  Negative for double vision, vision loss in left eye, vision loss in right eye and visual disturbance.   Cardiovascular:  Negative for chest pain, claudication, cyanosis, dyspnea on exertion, irregular heartbeat, leg swelling, near-syncope, orthopnea, palpitations, paroxysmal nocturnal dyspnea and syncope.   Respiratory:  Negative for cough, hemoptysis, shortness of breath, sleep disturbances due to breathing, snoring, sputum production and wheezing.    Endocrine: Negative for cold intolerance and heat intolerance.   Skin:  Negative for nail changes and rash.   Musculoskeletal:  Positive for back pain. Negative for joint pain, muscle cramps, muscle weakness and myalgias.   Gastrointestinal:  Negative for change in bowel habit, heartburn, hematemesis, hematochezia, hemorrhoids and melena.   Neurological:  Negative for dizziness, focal weakness and headaches.       Physical Exam  Wt Readings from Last 1 Encounters:   08/30/22 97 kg (213 lb 13.6 oz)     BP Readings from Last 3 Encounters:   08/30/22 136/70   08/17/22 122/60   06/08/22 124/72     Pulse Readings from Last 1 Encounters:   08/30/22 87     Body mass index is 40.41 kg/m².    Physical Exam  Vitals reviewed.   Constitutional:       Appearance: She is obese.   Neck:      Vascular: No JVD.   Cardiovascular:      Rate and Rhythm: Normal rate and regular rhythm.      Pulses:           Carotid pulses are 2+ on the right side and 2+ on the left side.       Radial pulses are 2+ on the right side and 2+ on the left side.      Heart sounds: S1 normal and S2 normal. Murmur heard.   Harsh midsystolic murmur is present with a grade of 2/6.    Pulmonary:      Breath sounds: Normal breath sounds and air entry.   Musculoskeletal:      Right lower leg: No edema.      Left lower leg: No edema.   Neurological:      Mental Status: She is alert.           Assessment  1. Chronic diastolic heart failure  Stable  - Ambulatory referral/consult to Cardiology    2. Essential hypertension  Controlled    3. 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 Mixed hyperlipidemia  Stable    5. Severe obesity (BMI >= 40)  Unchanged        Plan and Discussion  Discussed her murmur is from hypertrophic cardiomyopathy.  Discussed management goals for hypertrophy cardiomyopathy including avoiding dehydration and beta-blockers.  Instructed her to stop hydrochlorothiazide.  Will start carvedilol 3.125 mg twice daily.  Echocardiogram in April reviewed.  Discussed EKG which showed normal sinus rhythm rate of 88.    Follow Up  Three months      Amando Das MD, F.A.C.C, F.S.C.A.I.

## 2022-11-02 ENCOUNTER — OFFICE VISIT (OUTPATIENT)
Dept: OBSTETRICS AND GYNECOLOGY | Facility: CLINIC | Age: 77
End: 2022-11-02
Payer: MEDICARE

## 2022-11-02 VITALS
WEIGHT: 210.13 LBS | SYSTOLIC BLOOD PRESSURE: 124 MMHG | HEIGHT: 61 IN | BODY MASS INDEX: 39.67 KG/M2 | DIASTOLIC BLOOD PRESSURE: 66 MMHG

## 2022-11-02 DIAGNOSIS — Z01.419 ENCOUNTER FOR WELL WOMAN EXAM WITH ROUTINE GYNECOLOGICAL EXAM: Primary | ICD-10-CM

## 2022-11-02 PROCEDURE — 99999 PR PBB SHADOW E&M-EST. PATIENT-LVL II: CPT | Mod: PBBFAC,,, | Performed by: OBSTETRICS & GYNECOLOGY

## 2022-11-02 PROCEDURE — 99999 PR PBB SHADOW E&M-EST. PATIENT-LVL II: ICD-10-PCS | Mod: PBBFAC,,, | Performed by: OBSTETRICS & GYNECOLOGY

## 2022-11-02 PROCEDURE — 3074F SYST BP LT 130 MM HG: CPT | Mod: CPTII,S$GLB,, | Performed by: OBSTETRICS & GYNECOLOGY

## 2022-11-02 PROCEDURE — 3078F PR MOST RECENT DIASTOLIC BLOOD PRESSURE < 80 MM HG: ICD-10-PCS | Mod: CPTII,S$GLB,, | Performed by: OBSTETRICS & GYNECOLOGY

## 2022-11-02 PROCEDURE — 1126F PR PAIN SEVERITY QUANTIFIED, NO PAIN PRESENT: ICD-10-PCS | Mod: CPTII,S$GLB,, | Performed by: OBSTETRICS & GYNECOLOGY

## 2022-11-02 PROCEDURE — 4010F ACE/ARB THERAPY RXD/TAKEN: CPT | Mod: CPTII,S$GLB,, | Performed by: OBSTETRICS & GYNECOLOGY

## 2022-11-02 PROCEDURE — 4010F PR ACE/ARB THEARPY RXD/TAKEN: ICD-10-PCS | Mod: CPTII,S$GLB,, | Performed by: OBSTETRICS & GYNECOLOGY

## 2022-11-02 PROCEDURE — 1126F AMNT PAIN NOTED NONE PRSNT: CPT | Mod: CPTII,S$GLB,, | Performed by: OBSTETRICS & GYNECOLOGY

## 2022-11-02 PROCEDURE — 3008F PR BODY MASS INDEX (BMI) DOCUMENTED: ICD-10-PCS | Mod: CPTII,S$GLB,, | Performed by: OBSTETRICS & GYNECOLOGY

## 2022-11-02 PROCEDURE — 3074F PR MOST RECENT SYSTOLIC BLOOD PRESSURE < 130 MM HG: ICD-10-PCS | Mod: CPTII,S$GLB,, | Performed by: OBSTETRICS & GYNECOLOGY

## 2022-11-02 PROCEDURE — G0101 PR CA SCREEN;PELVIC/BREAST EXAM: ICD-10-PCS | Mod: S$GLB,,, | Performed by: OBSTETRICS & GYNECOLOGY

## 2022-11-02 PROCEDURE — G0101 CA SCREEN;PELVIC/BREAST EXAM: HCPCS | Mod: S$GLB,,, | Performed by: OBSTETRICS & GYNECOLOGY

## 2022-11-02 PROCEDURE — 1157F ADVNC CARE PLAN IN RCRD: CPT | Mod: CPTII,S$GLB,, | Performed by: OBSTETRICS & GYNECOLOGY

## 2022-11-02 PROCEDURE — 3078F DIAST BP <80 MM HG: CPT | Mod: CPTII,S$GLB,, | Performed by: OBSTETRICS & GYNECOLOGY

## 2022-11-02 PROCEDURE — 1157F PR ADVANCE CARE PLAN OR EQUIV PRESENT IN MEDICAL RECORD: ICD-10-PCS | Mod: CPTII,S$GLB,, | Performed by: OBSTETRICS & GYNECOLOGY

## 2022-11-02 PROCEDURE — 3008F BODY MASS INDEX DOCD: CPT | Mod: CPTII,S$GLB,, | Performed by: OBSTETRICS & GYNECOLOGY

## 2022-11-02 NOTE — PROGRESS NOTES
History & Physical  Gynecology      SUBJECTIVE:     Chief Complaint: well woman     History of Present Illness:    Nilsa Curiel is a 76 y.o. 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 No LMP recorded. Patient is postmenopausal..  She has no unusual complaints.      No vaginal bleeding  denies break through bleeding.   denies vaginal itching or irritation.  denies vaginal discharge.    She is not sexually active      History of abnormal pap: No  Last Pap: last in , normal  Last MM2022  Last Colonoscopy:  Yes - 22  DEXA: repeat in 4 years, done in         Review of patient's allergies indicates:  No Known Allergies    Past Medical History:   Diagnosis Date    Acute bilateral low back pain without sciatica 10/10/2017    Anemia 2018    Arthritis     Atopic dermatitis     Chronic diastolic heart failure 2022    Chronic kidney disease, stage III (moderate) 2020    GERD (gastroesophageal reflux disease)     Hyperlipidemia     Hypertension     Left ventricular hypertrophy 2016    Morbid obesity with BMI of 40.0-44.9, adult 2016    MONE (obstructive sleep apnea) 2018     Past Surgical History:   Procedure Laterality Date     SECTION      x3    COLONOSCOPY N/A 10/4/2021    Procedure: COLONOSCOPY;  Surgeon: Taran Galvez MD;  Location: [address]);  Service: Endoscopy;  Laterality: N/A;    COLONOSCOPY N/A 2022    Procedure: COLONOSCOPY--positive fit kit;  Surgeon: Tani Lara MD;  Location: [address]);  Service: Endoscopy;  Laterality: N/A;    ESOPHAGOGASTRODUODENOSCOPY N/A 10/4/2021    Procedure: EGD (ESOPHAGOGASTRODUODENOSCOPY);  Surgeon: Taran Galvez MD;  Location: [address]);  Service: Endoscopy;  Laterality: N/A;  covid test 10/1-[facility]    10/1-LVM about COVID test-GT    ESOPHAGOGASTRODUODENOSCOPY N/A 2022    Procedure: EGD (ESOPHAGOGASTRODUODENOSCOPY);  Surgeon: Tani Lara MD;  Location: [facility] (4TH FLR);  Service: Endoscopy;  Laterality: N/A;  12/15 fully vaccinated; instructions to portal-st  -covsapphire ye[facility]-tb    TUBAL LIGATION       OB History          3    Para   3    Term   3            AB        Living   3         SAB        IAB        Ectopic        Multiple        Live Births                   Family History   Problem Relation Age of Onset    Hypertension Mother     Dementia Mother     Diabetes Mother     Cancer Mother 91        breast    Breast cancer Mother 91    Other Father         sepsis    Diabetes Sister     Hypertension Sister     Hyperlipidemia Sister     Diabetes Brother     Cataracts Brother     Stroke Brother     Multiple sclerosis Daughter     Hypertension Son     Hypertension Sister     Cancer Sister         pancreatic     Kidney disease Sister     Heart disease Sister         premature    Gout Sister     Kidney disease Sister     No Known Problems Daughter     No Known Problems Brother     Other Brother         murdered    Glaucoma Neg Hx     Retinal detachment Neg Hx     Macular degeneration Neg Hx     Strabismus Neg Hx     Amblyopia Neg Hx     Blindness Neg Hx     Ovarian cancer Neg Hx      Social History     Tobacco Use    Smoking status: Never    Smokeless tobacco: Never   Substance Use Topics    Alcohol use: No     Alcohol/week: 0.0 standard drinks    Drug use: No       Current Outpatient Medications   Medication Sig    allopurinoL (ZYLOPRIM) 100 MG tablet Take 1 tablet (100 mg total) by mouth once daily.    amLODIPine (NORVASC) 10 MG tablet Take 1 tablet (10 mg total) by mouth once daily.    aspirin (ECOTRIN) 81 MG EC tablet Take 1 tablet (81 mg total) by mouth once daily.    betamethasone valerate 0.1% (VALISONE) 0.1 % Oint Apply topically once daily.    biotin 300 mcg Tab Take 1 tablet by mouth once daily.    carvediloL (COREG) 3.125 MG tablet Take 1 tablet (3.125 mg total) by mouth 2 (two) times daily with meals.    cetirizine (ZYRTEC) 10 MG tablet  Take 1 tablet (10 mg total) by mouth once daily.    clobetasol (TEMOVATE) 0.05 % cream APPLY TO THE AFFECTED AREA TWICE DAILY    diclofenac sodium (VOLTAREN) 1 % Gel Apply 2 g topically daily as needed.    diphth,pertus,acell,,tetanus (BOOSTRIX TDAP) 2.5-8-5 Lf-mcg-Lf/0.5mL Syrg injection Inject into the muscle. (Patient not taking: Reported on 8/17/2022)    flu vac 2021 [id number],PF, (FLUAD QUAD 2021-22,65Y UP,,PF,) 60 mcg (15 mcg x 4)/0.5 mL Syrg inject into muscle (Patient not taking: Reported on 8/17/2022)    flu vac 2022 65up-[id number],PF, 60 mcg (15 mcg x 4)/0.5 mL Syrg Inject 0.5 mLs into the muscle once. for 1 dose    fluocinonide (LIDEX) 0.05 % external solution AAA scalp qday - bid prn pruritus    fluticasone propionate (FLONASE) 50 mcg/actuation nasal spray USE 1 SPRAY IN EACH NOSTRIL EVERY DAY    hydroCHLOROthiazide (HYDRODIURIL) 25 MG tablet One po daily as needed for leg swelling.    ketoconazole (NIZORAL) 2 % cream AOO TO THE AFFECTED AREA TWICE DAILY, APPLY TO AXILLA AND ABDOMINAL CREASES    ketoconazole (NIZORAL) 2 % shampoo Wash hair with medicated shampoo at least 2x/week - let sit on scalp at least 5 minutes prior to rinsing    lactobacillus combo no.6 (PROBIOTIC COMPLEX) 4 billion cell Tab Take 1 tablet by mouth once daily.    mometasone (ELOCON) 0.1 % ointment Apply twice daily to hands/arms. Avoid face/groin    MULTIVIT-MIN/FA/CA CARB/VIT K (WOMEN'S 50+ DAILY FORMULA ORAL) Take by mouth.    nystatin-triamcinolone (MYCOLOG II) cream Apply topically 2 (two) times a day.    olmesartan (BENICAR) 40 MG tablet TAKE 1 TABLET(40 MG) BY MOUTH EVERY DAY    omega-3 fatty acids 300 mg Cap Take by mouth.    omeprazole (PRILOSEC) 40 MG capsule Take 1 capsule (40 mg total) by mouth every morning. As needed for reflux symptoms    potassium chloride SA (K-DUR,KLOR-CON) 20 MEQ tablet Take 1 tablet (20 mEq total) by mouth 2 (two) times daily.    riboflavin, vitamin B2, (VITAMIN B-2 ORAL) Take 1 capsule by  mouth once daily.    sumatriptan (IMITREX) 100 MG tablet Take 1 tablet (100 mg total) by mouth daily as needed for Migraine. No more than 2 doses in 24 hours    TAC 0.1%-ciclopirox-MOM Apply to affected area twice daily after cool blow dry    TAC 0.1%-LOPROX-MOM Apply to affected area twice daily after cool blow dry. Discard of after 30 days.    TURMERIC ORAL Take by mouth.    ubidecarenone (COENZYME Q10) 100 mg Tab Take 1 tablet by mouth once daily.      No current facility-administered medications for this visit.         Review of Systems:  Review of Systems   Constitutional:  Negative for activity change, appetite change, fatigue, fever and unexpected weight change.   Respiratory:  Negative for cough and shortness of breath.    Cardiovascular:  Negative for chest pain and leg swelling.   Gastrointestinal:  Negative for abdominal pain, blood in stool, constipation, diarrhea, nausea and vomiting.   Endocrine: Negative for diabetes, hair loss and hot flashes.   Genitourinary:  Negative for pelvic pain, postcoital bleeding and postmenopausal bleeding.   Musculoskeletal:  Negative for back pain.   Integumentary:  Negative for hair changes, breast mass, nipple discharge and breast skin changes.   Psychiatric/Behavioral:  Negative for sleep disturbance. The patient is not nervous/anxious.    Breast: Negative for mass, mastodynia, nipple discharge and skin changes     OBJECTIVE:     Physical Exam:  Physical Exam  Constitutional:       Appearance: She is well-developed.   HENT:      Head: Normocephalic and atraumatic.   Eyes:      General: No scleral icterus.        Right eye: No discharge.         Left eye: No discharge.      Conjunctiva/sclera: Conjunctivae normal.   Pulmonary:      Effort: Pulmonary effort is normal.      Breath sounds: No stridor.   Chest:      Chest wall: No mass or tenderness.   Breasts:     Breasts are symmetrical.      Right: No inverted nipple, mass, nipple discharge, skin change or tenderness.       Left: No inverted nipple, mass, nipple discharge, skin change or tenderness.   Abdominal:      General: There is no distension.      Palpations: Abdomen is soft.      Tenderness: There is no abdominal tenderness.   Genitourinary:     Labia:         Right: No rash, tenderness, lesion or injury.         Left: No rash, tenderness, lesion or injury.       Vagina: Normal.      Cervix: No cervical motion tenderness, discharge or friability.      Adnexa:         Right: No mass, tenderness or fullness.          Left: No mass, tenderness or fullness.        Comments: Normal external genitalia.  Normal hair distribution.  Urethral meatus normal.   No cervical lesions or masses.  No vaginal bleeding noted.  No adnexal or uterine tenderness.  No palpable adnexal masses.  Musculoskeletal:         General: Normal range of motion.   Skin:     General: Skin is warm and dry.   Neurological:      Mental Status: She is alert and oriented to person, place, and time.   Psychiatric:         Behavior: Behavior normal.         Thought Content: Thought content normal.         Judgment: Judgment normal.         ASSESSMENT:       ICD-10-CM ICD-9-CM    1. Encounter for well woman exam with routine gynecological exam  Z01.419 V72.31              Plan:      Diagnoses and all orders for this visit:    Encounter for well woman exam with routine gynecological exam  - pap smears no longer indicated.  Last pap in 2014 normal  - MMG up to date  - Cscope up to date  - DEXA up to date  - Will send for flu shot today.  Discussed COVID vaccine.  All questions answered.      No orders of the defined types were placed in this encounter.      Follow up in about 1 year (around 11/2/2023) for annual.     Counseling time: 15 minutes    Dina Catherine

## 2022-11-16 NOTE — TELEPHONE ENCOUNTER
No new care gaps identified.  [facility] Embedded Care Gaps. Reference number: [id number]. 11/16/2022   2:29:16 PM CST

## 2022-11-16 NOTE — TELEPHONE ENCOUNTER
----- Message from Angélica Moctezuma sent at 11/16/2022  9:56 AM CST -----  Contact: Melodie with [facility]   Requesting an RX refill or new RX.  Is this a refill or new RX: new  RX name and strength (copy/paste from chart):  allopurinoL (ZYLOPRIM) 100 MG tablet  Is this a 30 day or 90 day RX: 90  Pharmacy name and phone # (copy/paste from chart):      [facility] - [place], [place] - [address]  [address] [address]  Phone: [phone] Fax: [phone]

## 2022-11-18 RX ORDER — ALLOPURINOL 100 MG/1
100 TABLET ORAL DAILY
Qty: 90 TABLET | Refills: 0 | Status: SHIPPED | OUTPATIENT
Start: 2022-11-18 | End: 2022-12-08 | Stop reason: SDUPTHER

## 2022-11-25 ENCOUNTER — PES CALL (OUTPATIENT)
Dept: ADMINISTRATIVE | Facility: CLINIC | Age: 77
End: 2022-11-25
Payer: MEDICARE

## 2022-11-30 ENCOUNTER — OFFICE VISIT (OUTPATIENT)
Dept: CARDIOLOGY | Facility: CLINIC | Age: 77
End: 2022-11-30
Payer: MEDICARE

## 2022-11-30 VITALS
OXYGEN SATURATION: 97 % | WEIGHT: 213.19 LBS | SYSTOLIC BLOOD PRESSURE: 130 MMHG | HEART RATE: 96 BPM | DIASTOLIC BLOOD PRESSURE: 70 MMHG | BODY MASS INDEX: 40.25 KG/M2 | HEIGHT: 61 IN

## 2022-11-30 DIAGNOSIS — I50.30 HYPERTROPHIC OBSTRUCTIVE CARDIOMYOPATHY WITH DIASTOLIC HEART FAILURE: ICD-10-CM

## 2022-11-30 DIAGNOSIS — I10 ESSENTIAL HYPERTENSION: Primary | ICD-10-CM

## 2022-11-30 DIAGNOSIS — I42.1 HYPERTROPHIC OBSTRUCTIVE CARDIOMYOPATHY WITH DIASTOLIC HEART FAILURE: ICD-10-CM

## 2022-11-30 DIAGNOSIS — I35.0 NONRHEUMATIC AORTIC VALVE STENOSIS: ICD-10-CM

## 2022-11-30 PROCEDURE — 4010F ACE/ARB THERAPY RXD/TAKEN: CPT | Mod: CPTII,S$GLB,, | Performed by: INTERNAL MEDICINE

## 2022-11-30 PROCEDURE — 4010F PR ACE/ARB THEARPY RXD/TAKEN: ICD-10-PCS | Mod: CPTII,S$GLB,, | Performed by: INTERNAL MEDICINE

## 2022-11-30 PROCEDURE — 1126F AMNT PAIN NOTED NONE PRSNT: CPT | Mod: CPTII,S$GLB,, | Performed by: INTERNAL MEDICINE

## 2022-11-30 PROCEDURE — 99214 OFFICE O/P EST MOD 30 MIN: CPT | Mod: S$GLB,,, | Performed by: INTERNAL MEDICINE

## 2022-11-30 PROCEDURE — 3075F SYST BP GE 130 - 139MM HG: CPT | Mod: CPTII,S$GLB,, | Performed by: INTERNAL MEDICINE

## 2022-11-30 PROCEDURE — 1101F PR PT FALLS ASSESS DOC 0-1 FALLS W/OUT INJ PAST YR: ICD-10-PCS | Mod: CPTII,S$GLB,, | Performed by: INTERNAL MEDICINE

## 2022-11-30 PROCEDURE — 3288F PR FALLS RISK ASSESSMENT DOCUMENTED: ICD-10-PCS | Mod: CPTII,S$GLB,, | Performed by: INTERNAL MEDICINE

## 2022-11-30 PROCEDURE — 99214 PR OFFICE/OUTPT VISIT, EST, LEVL IV, 30-39 MIN: ICD-10-PCS | Mod: S$GLB,,, | Performed by: INTERNAL MEDICINE

## 2022-11-30 PROCEDURE — 1157F ADVNC CARE PLAN IN RCRD: CPT | Mod: CPTII,S$GLB,, | Performed by: INTERNAL MEDICINE

## 2022-11-30 PROCEDURE — 1159F MED LIST DOCD IN RCRD: CPT | Mod: CPTII,S$GLB,, | Performed by: INTERNAL MEDICINE

## 2022-11-30 PROCEDURE — 1101F PT FALLS ASSESS-DOCD LE1/YR: CPT | Mod: CPTII,S$GLB,, | Performed by: INTERNAL MEDICINE

## 2022-11-30 PROCEDURE — 99999 PR PBB SHADOW E&M-EST. PATIENT-LVL V: CPT | Mod: PBBFAC,,, | Performed by: INTERNAL MEDICINE

## 2022-11-30 PROCEDURE — 3078F PR MOST RECENT DIASTOLIC BLOOD PRESSURE < 80 MM HG: ICD-10-PCS | Mod: CPTII,S$GLB,, | Performed by: INTERNAL MEDICINE

## 2022-11-30 PROCEDURE — 1159F PR MEDICATION LIST DOCUMENTED IN MEDICAL RECORD: ICD-10-PCS | Mod: CPTII,S$GLB,, | Performed by: INTERNAL MEDICINE

## 2022-11-30 PROCEDURE — 3075F PR MOST RECENT SYSTOLIC BLOOD PRESS GE 130-139MM HG: ICD-10-PCS | Mod: CPTII,S$GLB,, | Performed by: INTERNAL MEDICINE

## 2022-11-30 PROCEDURE — 1157F PR ADVANCE CARE PLAN OR EQUIV PRESENT IN MEDICAL RECORD: ICD-10-PCS | Mod: CPTII,S$GLB,, | Performed by: INTERNAL MEDICINE

## 2022-11-30 PROCEDURE — 99999 PR PBB SHADOW E&M-EST. PATIENT-LVL V: ICD-10-PCS | Mod: PBBFAC,,, | Performed by: INTERNAL MEDICINE

## 2022-11-30 PROCEDURE — 3078F DIAST BP <80 MM HG: CPT | Mod: CPTII,S$GLB,, | Performed by: INTERNAL MEDICINE

## 2022-11-30 PROCEDURE — 1126F PR PAIN SEVERITY QUANTIFIED, NO PAIN PRESENT: ICD-10-PCS | Mod: CPTII,S$GLB,, | Performed by: INTERNAL MEDICINE

## 2022-11-30 PROCEDURE — 3288F FALL RISK ASSESSMENT DOCD: CPT | Mod: CPTII,S$GLB,, | Performed by: INTERNAL MEDICINE

## 2022-11-30 NOTE — PROGRESS NOTES
Cardiology    11/30/2022  9:14 AM    Problem list  Patient Active Problem List   Diagnosis    Mixed hyperlipidemia    GERD (gastroesophageal reflux disease)    Atopic dermatitis    Abnormal fasting glucose    Hyperparathyroidism    Decreased strength    Impaired mobility    Right-sided carotid artery disease    Microcytic hypochromic anemia    Neurogenic claudication due to lumbar spinal stenosis    Spondylolisthesis of lumbosacral region    MONE (obstructive sleep apnea)    Mild aortic sclerosis    Primary osteoarthritis of both shoulders    Nonintractable episodic headache    Headache disorder    Chronic kidney disease, stage III (moderate)    Cervical stenosis of spine    Posture imbalance    Decreased strength of trunk and back    Mobility poor    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  At the last visit, hydrochlorothiazide was stopped and carvedilol was started.  She states that she is only taking half a tablet of carvedilol 3.125 mg once a day.  Her blood pressure is adequately controlled at home.  Her wrist cuff blood pressure monitor is relatively accurate (127 on her machine and 130 on our manual cuff).  She denies chest or shortness of breath.    Medications  Current Outpatient Medications   Medication Sig Dispense Refill    allopurinoL (ZYLOPRIM) 100 MG tablet Take 1 tablet (100 mg total) by mouth once daily. 90 tablet 0    amLODIPine (NORVASC) 10 MG tablet TAKE 1 TABLET ONE TIME DAILY 90 tablet 3    aspirin (ECOTRIN) 81 MG EC tablet Take 1 tablet (81 mg total) by mouth once daily. 90 tablet 3    biotin 300 mcg Tab Take 1 tablet by mouth once daily.      carvediloL (COREG) 3.125 MG tablet Take  1 tablet (3.125 mg total) by mouth 2 (two) times daily with meals. 180 tablet 3    fluticasone propionate (FLONASE) 50 mcg/actuation nasal spray USE 1 SPRAY IN EACH NOSTRIL EVERY DAY 32 g 4    lactobacillus combo no.6 (PROBIOTIC COMPLEX) 4 billion cell Tab Take 1 tablet by mouth once daily. 90 tablet 3    MULTIVIT-MIN/FA/CA CARB/VIT K (WOMEN'S 50+ DAILY FORMULA ORAL) Take by mouth.      olmesartan (BENICAR) 40 MG tablet TAKE 1 TABLET ONE TIME DAILY 90 tablet 3    omega-3 fatty acids 300 mg Cap Take by mouth.      omeprazole (PRILOSEC) 40 MG capsule Take 1 capsule (40 mg total) by mouth every morning. As needed for reflux symptoms 90 capsule 1    potassium chloride SA (K-DUR,KLOR-CON) 20 MEQ tablet TAKE 1 TABLET TWICE DAILY 180 tablet 1    sumatriptan (IMITREX) 100 MG tablet Take 1 tablet (100 mg total) by mouth daily as needed for Migraine. No more than 2 doses in 24 hours 27 tablet 3    TURMERIC ORAL Take by mouth.      ubidecarenone (COENZYME Q10) 100 mg Tab Take 1 tablet by mouth once daily.       betamethasone valerate 0.1% (VALISONE) 0.1 % Oint Apply topically once daily. 45 g 3    cetirizine (ZYRTEC) 10 MG tablet Take 1 tablet (10 mg total) by mouth once daily. 90 tablet 3    clobetasol (TEMOVATE) 0.05 % cream APPLY TO THE AFFECTED AREA TWICE DAILY 30 g 0    diclofenac sodium (VOLTAREN) 1 % Gel Apply 2 g topically daily as needed. 100 g 3    diphth,pertus,acell,,tetanus (BOOSTRIX TDAP) 2.5-8-5 Lf-mcg-Lf/0.5mL Syrg injection Inject into the muscle. (Patient not taking: Reported on 8/17/2022) 0.5 mL 0    flu vac 2021 65up-[id number],PF, (FLUAD QUAD 2021-22,65Y UP,,PF,) 60 mcg (15 mcg x 4)/0.5 mL Syrg inject into muscle (Patient not taking: Reported on 8/17/2022) 0.5 mL 0    fluocinonide (LIDEX) 0.05 % external solution AAA scalp qday - bid prn pruritus 60 mL 3    ketoconazole (NIZORAL) 2 % cream AOO TO THE AFFECTED AREA TWICE DAILY, APPLY TO AXILLA AND ABDOMINAL CREASES 15 g 0    ketoconazole (NIZORAL) 2 % shampoo  Wash hair with medicated shampoo at least 2x/week - let sit on scalp at least 5 minutes prior to rinsing 120 mL 5    mometasone (ELOCON) 0.1 % ointment Apply twice daily to hands/arms. Avoid face/groin 45 g 1    nystatin-triamcinolone (MYCOLOG II) cream Apply topically 2 (two) times a day. 60 g 3    riboflavin, vitamin B2, (VITAMIN B-2 ORAL) Take 1 capsule by mouth once daily.      TAC 0.1%-ciclopirox-MOM Apply to affected area twice daily after cool blow dry 60 g 5    TAC 0.1%-LOPROX-MOM Apply to affected area twice daily after cool blow dry. Discard of after 30 days. 60 g 3     No current facility-administered medications for this visit.      Prior to Admission medications    Medication Sig Start Date End Date Taking? Authorizing Provider   allopurinoL (ZYLOPRIM) 100 MG tablet Take 1 tablet (100 mg total) by mouth once daily. 11/18/22  Yes Gaurav Allison MD   amLODIPine (NORVASC) 10 MG tablet TAKE 1 TABLET ONE TIME DAILY 11/12/22  Yes Gaurav Allison MD   aspirin (ECOTRIN) 81 MG EC tablet Take 1 tablet (81 mg total) by mouth once daily. 6/8/22 6/8/23 Yes Gaurav Allison MD   biotin 300 mcg Tab Take 1 tablet by mouth once daily.   Yes Historical Provider   carvediloL (COREG) 3.125 MG tablet Take 1 tablet (3.125 mg total) by mouth 2 (two) times daily with meals. 8/30/22 8/30/23 Yes Amando Das MD   fluticasone propionate (FLONASE) 50 mcg/actuation nasal spray USE 1 SPRAY IN EACH NOSTRIL EVERY DAY 11/17/20  Yes Velma Bryant MD   lactobacillus combo no.6 (PROBIOTIC COMPLEX) 4 billion cell Tab Take 1 tablet by mouth once daily. 9/26/17  Yes PATRICK Ngo   MULTIVIT-MIN/FA/CA CARB/VIT K (WOMEN'S 50+ DAILY FORMULA ORAL) Take by mouth.   Yes Historical Provider   olmesartan (BENICAR) 40 MG tablet TAKE 1 TABLET ONE TIME DAILY 11/12/22  Yes Gaurav Allison MD   omega-3 fatty acids 300 mg Cap Take by mouth.   Yes Historical Provider   omeprazole (PRILOSEC) 40 MG capsule Take 1 capsule (40  mg total) by mouth every morning. As needed for reflux symptoms 8/26/22  Yes Gaurav Allison MD   potassium chloride SA (K-DUR,KLOR-CON) 20 MEQ tablet TAKE 1 TABLET TWICE DAILY 11/15/22  Yes Gaurav Allison MD   sumatriptan (IMITREX) 100 MG tablet Take 1 tablet (100 mg total) by mouth daily as needed for Migraine. No more than 2 doses in 24 hours 6/8/22  Yes Gaurav Allison MD   TURMERIC ORAL Take by mouth.   Yes Historical Provider   ubidecarenone (COENZYME Q10) 100 mg Tab Take 1 tablet by mouth once daily.  10/23/19  Yes Historical Provider   betamethasone valerate 0.1% (VALISONE) 0.1 % Oint Apply topically once daily. 6/17/20   Velma Bryant MD   cetirizine (ZYRTEC) 10 MG tablet Take 1 tablet (10 mg total) by mouth once daily. 6/17/20 6/17/21  Velma Bryant MD   clobetasol (TEMOVATE) 0.05 % cream APPLY TO THE AFFECTED AREA TWICE DAILY 7/8/19   Padmini Calhoun, MICAELA   diclofenac sodium (VOLTAREN) 1 % Gel Apply 2 g topically daily as needed. 4/6/22   Gaurav Allison MD   diphth,pertus,acell,,tetanus (BOOSTRIX TDAP) 2.5-8-5 Lf-mcg-Lf/0.5mL Syrg injection Inject into the muscle.  Patient not taking: Reported on 8/17/2022 6/8/22   Elier Ge PharmD   flu vac 2021 65up-[id number],PF, (FLUAD QUAD 2021-22,65Y UP,,PF,) 60 mcg (15 mcg x 4)/0.5 mL Syrg inject into muscle  Patient not taking: Reported on 8/17/2022 11/12/21   Elier Ge PharmD   fluocinonide (LIDEX) 0.05 % external solution AAA scalp qday - bid prn pruritus 6/9/21   Rena Hughes MD   ketoconazole (NIZORAL) 2 % cream AOO TO THE AFFECTED AREA TWICE DAILY, APPLY TO AXILLA AND ABDOMINAL CREASES 7/12/19   MICAELA Whitaker   ketoconazole (NIZORAL) 2 % shampoo Wash hair with medicated shampoo at least 2x/week - let sit on scalp at least 5 minutes prior to rinsing 6/9/21   Rena Hughes MD   mometasone (ELOCON) 0.1 % ointment Apply twice daily to hands/arms. Avoid face/groin 4/27/22   Rena Hughes MD    nystatin-triamcinolone (MYCOLOG II) cream Apply topically 2 (two) times a day. 21   Velma Bryant MD   riboflavin, vitamin B2, (VITAMIN B-2 ORAL) Take 1 capsule by mouth once daily.    Historical Provider   TAC 0.1%-ciclopirox-MOM Apply to affected area twice daily after cool blow dry 22   Rena Hughes MD   TAC 0.1%-LOPROX-MOM Apply to affected area twice daily after cool blow dry. Discard of after 30 days. 21   Rena Hughes MD   hydroCHLOROthiazide (HYDRODIURIL) 25 MG tablet One po daily as needed for leg swelling. 22  Gaurav Allison MD   triamcinolone acetonide 0.1% (KENALOG) 0.1 % cream Apply topically 2 (two) times daily as needed. 20  Velma Bryant MD         History  Past Medical History:   Diagnosis Date    Acute bilateral low back pain without sciatica 10/10/2017    Anemia 2018    Arthritis     Atopic dermatitis     Chronic diastolic heart failure 2022    Chronic kidney disease, stage III (moderate) 2020    GERD (gastroesophageal reflux disease)     Hyperlipidemia     Hypertension     Left ventricular hypertrophy 2016    Morbid obesity with BMI of 40.0-44.9, adult 2016    MONE (obstructive sleep apnea) 2018     Past Surgical History:   Procedure Laterality Date     SECTION      x3    COLONOSCOPY N/A 10/4/2021    Procedure: COLONOSCOPY;  Surgeon: Taran Galvez MD;  Location: [facility] ([address] [place] [address]);  Service: Endoscopy;  Laterality: N/A;    COLONOSCOPY N/A 2022    Procedure: COLONOSCOPY--positive fit kit;  Surgeon: Tani Lara MD;  Location: [facility] (4TH FLR);  Service: Endoscopy;  Laterality: N/A;    ESOPHAGOGASTRODUODENOSCOPY N/A 10/4/2021    Procedure: EGD (ESOPHAGOGASTRODUODENOSCOPY);  Surgeon: Taran Galvez MD;  Location: [facility] (4TH FLR);  Service: Endoscopy;  Laterality: N/A;  covid test 10/1-[facility]    10/1-LVM about COVID test-GT    ESOPHAGOGASTRODUODENOSCOPY N/A 2022     Procedure: EGD (ESOPHAGOGASTRODUODENOSCOPY);  Surgeon: Tani Lara MD;  Location: [facility] ([address] [place] [address]);  Service: Endoscopy;  Laterality: N/A;  12/15 fully vaccinated; instructions to portal-st  1/28-covid [facility]-tb    TUBAL LIGATION       Social History     Socioeconomic History    Marital status:    Occupational History    Occupation: retired medical records    Tobacco Use    Smoking status: Never    Smokeless tobacco: Never   Substance and Sexual Activity    Alcohol use: No     Alcohol/week: 0.0 standard drinks    Drug use: No    Sexual activity: Never     Partners: Male   Social History Narrative    , lives with 1 daughter(Toreal), Walking some     Social Determinants of Health     Financial Resource Strain: Low Risk     Difficulty of Paying Living Expenses: Not very hard   Food Insecurity: No Food Insecurity    Worried About Running Out of Food in the Last Year: Never true    Ran Out of Food in the Last Year: Never true   Transportation Needs: No Transportation Needs    Lack of Transportation (Medical): No    Lack of Transportation (Non-Medical): No   Physical Activity: Insufficiently Active    Days of Exercise per Week: 2 days    Minutes of Exercise per Session: 50 min   Stress: No Stress Concern Present    Feeling of Stress : Not at all   Social Connections: Unknown    Frequency of Communication with Friends and Family: More than three times a week    Frequency of Social Gatherings with Friends and Family: Patient refused    Active Member of Clubs or Organizations: Yes    Attends Club or Organization Meetings: More than 4 times per year    Marital Status:    Housing Stability: Low Risk     Unable to Pay for Housing in the Last Year: No    Number of Places Lived in the Last Year: 1    Unstable Housing in the Last Year: No         Allergies  Review of patient's allergies indicates:  No Known Allergies      Review of Systems   Review of Systems   Constitutional: Negative for decreased  appetite, fever and weight loss.   HENT:  Negative for congestion and nosebleeds.    Eyes:  Negative for double vision, vision loss in left eye, vision loss in right eye and visual disturbance.   Cardiovascular:  Negative for chest pain, claudication, cyanosis, dyspnea on exertion, irregular heartbeat, leg swelling, near-syncope, orthopnea, palpitations, paroxysmal nocturnal dyspnea and syncope.   Respiratory:  Negative for cough, hemoptysis, shortness of breath, sleep disturbances due to breathing, snoring, sputum production and wheezing.    Endocrine: Negative for cold intolerance and heat intolerance.   Skin:  Negative for nail changes and rash.   Musculoskeletal:  Positive for back pain. Negative for joint pain, muscle cramps, muscle weakness and myalgias.   Gastrointestinal:  Negative for change in bowel habit, heartburn, hematemesis, hematochezia, hemorrhoids and melena.   Neurological:  Negative for dizziness, focal weakness and headaches.       Physical Exam  Wt Readings from Last 1 Encounters:   11/30/22 96.7 kg (213 lb 3 oz)     BP Readings from Last 3 Encounters:   11/30/22 130/70   11/02/22 124/66   08/30/22 136/70     Pulse Readings from Last 1 Encounters:   11/30/22 96     Body mass index is 40.28 kg/m².    Physical Exam  Vitals reviewed.   Constitutional:       Appearance: She is obese.   Neck:      Vascular: No JVD.   Cardiovascular:      Rate and Rhythm: Normal rate and regular rhythm.      Pulses:           Carotid pulses are 2+ on the right side and 2+ on the left side.       Radial pulses are 2+ on the right side and 2+ on the left side.      Heart sounds: S1 normal and S2 normal. Murmur heard.   Harsh midsystolic murmur is present with a grade of 2/6.   Pulmonary:      Breath sounds: Normal breath sounds and air entry.   Musculoskeletal:      Right lower leg: No edema.      Left lower leg: No edema.   Neurological:      Mental Status: She is alert.           Assessment  1. Essential  hypertension  Well controlled on meds, continue to monitor    2. 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  Mild AS    Plan and Discussion  Continue current medication continue to monitor blood pressure at home.  She asked about her murmur.  Discussed her murmur is from hypertrophic cardiomyopathy and mild AS. Discussed management goals for hypertrophy cardiomyopathy including avoiding dehydration and use beta-blockers.     Follow Up  6 months      Amando Das MD, F.A.C.C, F.S.C.A.I.        35 minutes were spent in chart review, documentation and review of results, and evaluation, treatment, and counseling of patient on the same day of service.

## 2022-12-08 ENCOUNTER — OFFICE VISIT (OUTPATIENT)
Dept: PRIMARY CARE CLINIC | Facility: CLINIC | Age: 77
End: 2022-12-08
Payer: MEDICARE

## 2022-12-08 ENCOUNTER — LAB VISIT (OUTPATIENT)
Dept: LAB | Facility: HOSPITAL | Age: 77
End: 2022-12-08
Attending: FAMILY MEDICINE
Payer: MEDICARE

## 2022-12-08 VITALS
SYSTOLIC BLOOD PRESSURE: 130 MMHG | BODY MASS INDEX: 40.38 KG/M2 | OXYGEN SATURATION: 96 % | WEIGHT: 213.88 LBS | DIASTOLIC BLOOD PRESSURE: 76 MMHG | HEART RATE: 101 BPM | TEMPERATURE: 98 F | HEIGHT: 61 IN

## 2022-12-08 DIAGNOSIS — M54.2 CHRONIC NECK PAIN: ICD-10-CM

## 2022-12-08 DIAGNOSIS — D64.9 NORMOCYTIC NORMOCHROMIC ANEMIA: ICD-10-CM

## 2022-12-08 DIAGNOSIS — M25.551 CHRONIC RIGHT HIP PAIN: ICD-10-CM

## 2022-12-08 DIAGNOSIS — G89.29 CHRONIC RIGHT HIP PAIN: ICD-10-CM

## 2022-12-08 DIAGNOSIS — I10 ESSENTIAL HYPERTENSION: ICD-10-CM

## 2022-12-08 DIAGNOSIS — L65.9 HAIR LOSS: ICD-10-CM

## 2022-12-08 DIAGNOSIS — M48.00 CENTRAL STENOSIS OF SPINAL CANAL: ICD-10-CM

## 2022-12-08 DIAGNOSIS — L30.4 INTERTRIGO: ICD-10-CM

## 2022-12-08 DIAGNOSIS — Z00.00 WELL ADULT EXAM: Primary | ICD-10-CM

## 2022-12-08 DIAGNOSIS — G89.29 CHRONIC NECK PAIN: ICD-10-CM

## 2022-12-08 DIAGNOSIS — J34.9 SINUS DISORDER: ICD-10-CM

## 2022-12-08 DIAGNOSIS — M79.2 CERVICAL SPINE ARTHRITIS WITH NERVE PAIN: ICD-10-CM

## 2022-12-08 DIAGNOSIS — M47.812 CERVICAL SPINE ARTHRITIS WITH NERVE PAIN: ICD-10-CM

## 2022-12-08 LAB
ALBUMIN SERPL BCP-MCNC: 3.9 G/DL (ref 3.5–5.2)
ALP SERPL-CCNC: 113 U/L (ref 55–135)
ALT SERPL W/O P-5'-P-CCNC: 19 U/L (ref 10–44)
ANION GAP SERPL CALC-SCNC: 8 MMOL/L (ref 8–16)
AST SERPL-CCNC: 19 U/L (ref 10–40)
BASOPHILS # BLD AUTO: 0.06 K/UL (ref 0–0.2)
BASOPHILS NFR BLD: 0.9 % (ref 0–1.9)
BILIRUB SERPL-MCNC: 0.4 MG/DL (ref 0.1–1)
BUN SERPL-MCNC: 14 MG/DL (ref 8–23)
CALCIUM SERPL-MCNC: 11 MG/DL (ref 8.7–10.5)
CHLORIDE SERPL-SCNC: 109 MMOL/L (ref 95–110)
CO2 SERPL-SCNC: 24 MMOL/L (ref 23–29)
CREAT SERPL-MCNC: 1 MG/DL (ref 0.5–1.4)
DIFFERENTIAL METHOD: ABNORMAL
EOSINOPHIL # BLD AUTO: 0.5 K/UL (ref 0–0.5)
EOSINOPHIL NFR BLD: 6.9 % (ref 0–8)
ERYTHROCYTE [DISTWIDTH] IN BLOOD BY AUTOMATED COUNT: 13.7 % (ref 11.5–14.5)
EST. GFR  (NO RACE VARIABLE): 58.8 ML/MIN/1.73 M^2
FERRITIN SERPL-MCNC: 151 NG/ML (ref 20–300)
GLUCOSE SERPL-MCNC: 92 MG/DL (ref 70–110)
HCT VFR BLD AUTO: 36 % (ref 37–48.5)
HGB BLD-MCNC: 10.7 G/DL (ref 12–16)
IMM GRANULOCYTES # BLD AUTO: 0.02 K/UL (ref 0–0.04)
IMM GRANULOCYTES NFR BLD AUTO: 0.3 % (ref 0–0.5)
IRON SERPL-MCNC: 86 UG/DL (ref 30–160)
LYMPHOCYTES # BLD AUTO: 2.3 K/UL (ref 1–4.8)
LYMPHOCYTES NFR BLD: 34.4 % (ref 18–48)
MCH RBC QN AUTO: 24.8 PG (ref 27–31)
MCHC RBC AUTO-ENTMCNC: 29.7 G/DL (ref 32–36)
MCV RBC AUTO: 83 FL (ref 82–98)
MONOCYTES # BLD AUTO: 0.6 K/UL (ref 0.3–1)
MONOCYTES NFR BLD: 8.7 % (ref 4–15)
NEUTROPHILS # BLD AUTO: 3.3 K/UL (ref 1.8–7.7)
NEUTROPHILS NFR BLD: 48.8 % (ref 38–73)
NRBC BLD-RTO: 0 /100 WBC
PLATELET # BLD AUTO: 397 K/UL (ref 150–450)
PMV BLD AUTO: 10.2 FL (ref 9.2–12.9)
POTASSIUM SERPL-SCNC: 4.8 MMOL/L (ref 3.5–5.1)
PROT SERPL-MCNC: 8.2 G/DL (ref 6–8.4)
RBC # BLD AUTO: 4.32 M/UL (ref 4–5.4)
RETICS/RBC NFR AUTO: 2.1 % (ref 0.5–2.5)
SATURATED IRON: 22 % (ref 20–50)
SODIUM SERPL-SCNC: 141 MMOL/L (ref 136–145)
TOTAL IRON BINDING CAPACITY: 383 UG/DL (ref 250–450)
TRANSFERRIN SERPL-MCNC: 259 MG/DL (ref 200–375)
TRANSFERRIN SERPL-MCNC: 259 MG/DL (ref 200–375)
WBC # BLD AUTO: 6.78 K/UL (ref 3.9–12.7)

## 2022-12-08 PROCEDURE — 82728 ASSAY OF FERRITIN: CPT | Performed by: FAMILY MEDICINE

## 2022-12-08 PROCEDURE — 99999 PR PBB SHADOW E&M-EST. PATIENT-LVL V: ICD-10-PCS | Mod: PBBFAC,HCNC,, | Performed by: FAMILY MEDICINE

## 2022-12-08 PROCEDURE — 3288F PR FALLS RISK ASSESSMENT DOCUMENTED: ICD-10-PCS | Mod: HCNC,CPTII,S$GLB, | Performed by: FAMILY MEDICINE

## 2022-12-08 PROCEDURE — 3078F PR MOST RECENT DIASTOLIC BLOOD PRESSURE < 80 MM HG: ICD-10-PCS | Mod: HCNC,CPTII,S$GLB, | Performed by: FAMILY MEDICINE

## 2022-12-08 PROCEDURE — 1126F AMNT PAIN NOTED NONE PRSNT: CPT | Mod: HCNC,CPTII,S$GLB, | Performed by: FAMILY MEDICINE

## 2022-12-08 PROCEDURE — 84466 ASSAY OF TRANSFERRIN: CPT | Performed by: FAMILY MEDICINE

## 2022-12-08 PROCEDURE — 85045 AUTOMATED RETICULOCYTE COUNT: CPT | Performed by: FAMILY MEDICINE

## 2022-12-08 PROCEDURE — 3078F DIAST BP <80 MM HG: CPT | Mod: HCNC,CPTII,S$GLB, | Performed by: FAMILY MEDICINE

## 2022-12-08 PROCEDURE — 1101F PR PT FALLS ASSESS DOC 0-1 FALLS W/OUT INJ PAST YR: ICD-10-PCS | Mod: HCNC,CPTII,S$GLB, | Performed by: FAMILY MEDICINE

## 2022-12-08 PROCEDURE — 36415 COLL VENOUS BLD VENIPUNCTURE: CPT | Mod: PN | Performed by: FAMILY MEDICINE

## 2022-12-08 PROCEDURE — 1157F PR ADVANCE CARE PLAN OR EQUIV PRESENT IN MEDICAL RECORD: ICD-10-PCS | Mod: HCNC,CPTII,S$GLB, | Performed by: FAMILY MEDICINE

## 2022-12-08 PROCEDURE — 99999 PR PBB SHADOW E&M-EST. PATIENT-LVL V: CPT | Mod: PBBFAC,HCNC,, | Performed by: FAMILY MEDICINE

## 2022-12-08 PROCEDURE — 4010F ACE/ARB THERAPY RXD/TAKEN: CPT | Mod: HCNC,CPTII,S$GLB, | Performed by: FAMILY MEDICINE

## 2022-12-08 PROCEDURE — 1126F PR PAIN SEVERITY QUANTIFIED, NO PAIN PRESENT: ICD-10-PCS | Mod: HCNC,CPTII,S$GLB, | Performed by: FAMILY MEDICINE

## 2022-12-08 PROCEDURE — 85025 COMPLETE CBC W/AUTO DIFF WBC: CPT | Performed by: FAMILY MEDICINE

## 2022-12-08 PROCEDURE — 1157F ADVNC CARE PLAN IN RCRD: CPT | Mod: HCNC,CPTII,S$GLB, | Performed by: FAMILY MEDICINE

## 2022-12-08 PROCEDURE — 99215 OFFICE O/P EST HI 40 MIN: CPT | Mod: HCNC,S$GLB,, | Performed by: FAMILY MEDICINE

## 2022-12-08 PROCEDURE — 99215 PR OFFICE/OUTPT VISIT, EST, LEVL V, 40-54 MIN: ICD-10-PCS | Mod: HCNC,S$GLB,, | Performed by: FAMILY MEDICINE

## 2022-12-08 PROCEDURE — 3288F FALL RISK ASSESSMENT DOCD: CPT | Mod: HCNC,CPTII,S$GLB, | Performed by: FAMILY MEDICINE

## 2022-12-08 PROCEDURE — 1101F PT FALLS ASSESS-DOCD LE1/YR: CPT | Mod: HCNC,CPTII,S$GLB, | Performed by: FAMILY MEDICINE

## 2022-12-08 PROCEDURE — 3075F PR MOST RECENT SYSTOLIC BLOOD PRESS GE 130-139MM HG: ICD-10-PCS | Mod: HCNC,CPTII,S$GLB, | Performed by: FAMILY MEDICINE

## 2022-12-08 PROCEDURE — 80053 COMPREHEN METABOLIC PANEL: CPT | Performed by: FAMILY MEDICINE

## 2022-12-08 PROCEDURE — 4010F PR ACE/ARB THEARPY RXD/TAKEN: ICD-10-PCS | Mod: HCNC,CPTII,S$GLB, | Performed by: FAMILY MEDICINE

## 2022-12-08 PROCEDURE — 3075F SYST BP GE 130 - 139MM HG: CPT | Mod: HCNC,CPTII,S$GLB, | Performed by: FAMILY MEDICINE

## 2022-12-08 RX ORDER — ALLOPURINOL 100 MG/1
100 TABLET ORAL DAILY
Qty: 90 TABLET | Refills: 11 | Status: SHIPPED | OUTPATIENT
Start: 2022-12-08 | End: 2023-01-03 | Stop reason: SDUPTHER

## 2022-12-08 RX ORDER — KETOCONAZOLE 20 MG/ML
SHAMPOO, SUSPENSION TOPICAL
Qty: 120 ML | Refills: 5 | Status: SHIPPED | OUTPATIENT
Start: 2022-12-08 | End: 2023-09-05

## 2022-12-08 RX ORDER — CETIRIZINE HYDROCHLORIDE 10 MG/1
10 TABLET ORAL DAILY
Qty: 90 TABLET | Refills: 3 | Status: SHIPPED | OUTPATIENT
Start: 2022-12-08 | End: 2023-07-24 | Stop reason: SDUPTHER

## 2022-12-08 RX ORDER — CARVEDILOL 3.12 MG/1
3.12 TABLET ORAL 2 TIMES DAILY WITH MEALS
Qty: 180 TABLET | Refills: 3 | Status: SHIPPED | OUTPATIENT
Start: 2022-12-08 | End: 2023-01-03 | Stop reason: SDUPTHER

## 2022-12-08 RX ORDER — METHOCARBAMOL 500 MG/1
500 TABLET, FILM COATED ORAL 4 TIMES DAILY
Qty: 40 TABLET | Refills: 0 | Status: SHIPPED | OUTPATIENT
Start: 2022-12-08 | End: 2022-12-18

## 2022-12-08 RX ORDER — FLUTICASONE PROPIONATE 50 MCG
1 SPRAY, SUSPENSION (ML) NASAL DAILY
Qty: 16 G | Refills: 11 | Status: SHIPPED | OUTPATIENT
Start: 2022-12-08 | End: 2023-09-05

## 2022-12-08 RX ORDER — KETOCONAZOLE 20 MG/G
CREAM TOPICAL 2 TIMES DAILY
Qty: 15 G | Refills: 11 | Status: SHIPPED | OUTPATIENT
Start: 2022-12-08 | End: 2023-06-29 | Stop reason: CLARIF

## 2022-12-08 RX ORDER — MELOXICAM 15 MG/1
15 TABLET ORAL DAILY
Qty: 30 TABLET | Refills: 0 | Status: SHIPPED | OUTPATIENT
Start: 2022-12-08 | End: 2023-01-03

## 2022-12-09 ENCOUNTER — HOSPITAL ENCOUNTER (OUTPATIENT)
Dept: RADIOLOGY | Facility: HOSPITAL | Age: 77
Discharge: HOME | End: 2022-12-09
Attending: FAMILY MEDICINE
Payer: MEDICARE

## 2022-12-09 DIAGNOSIS — G89.29 CHRONIC RIGHT HIP PAIN: ICD-10-CM

## 2022-12-09 DIAGNOSIS — M47.812 CERVICAL SPINE ARTHRITIS WITH NERVE PAIN: ICD-10-CM

## 2022-12-09 DIAGNOSIS — G89.29 CHRONIC NECK PAIN: ICD-10-CM

## 2022-12-09 DIAGNOSIS — M54.2 CHRONIC NECK PAIN: ICD-10-CM

## 2022-12-09 DIAGNOSIS — M79.2 CERVICAL SPINE ARTHRITIS WITH NERVE PAIN: ICD-10-CM

## 2022-12-09 DIAGNOSIS — M25.551 CHRONIC RIGHT HIP PAIN: ICD-10-CM

## 2022-12-09 PROCEDURE — 72040 X-RAY EXAM NECK SPINE 2-3 VW: CPT | Mod: 26,,, | Performed by: RADIOLOGY

## 2022-12-09 PROCEDURE — 72040 XR CERVICAL SPINE AP LATERAL: ICD-10-PCS | Mod: 26,,, | Performed by: RADIOLOGY

## 2022-12-09 PROCEDURE — 72040 X-RAY EXAM NECK SPINE 2-3 VW: CPT | Mod: TC,PN

## 2022-12-09 PROCEDURE — 73502 X-RAY EXAM HIP UNI 2-3 VIEWS: CPT | Mod: TC,PN,RT

## 2022-12-09 PROCEDURE — 73502 XR HIP WITH PELVIS WHEN PERFORMED, 2 OR 3  VIEWS RIGHT: ICD-10-PCS | Mod: 26,RT,, | Performed by: RADIOLOGY

## 2022-12-09 PROCEDURE — 73502 X-RAY EXAM HIP UNI 2-3 VIEWS: CPT | Mod: 26,RT,, | Performed by: RADIOLOGY

## 2022-12-16 ENCOUNTER — TELEPHONE (OUTPATIENT)
Dept: ORTHOPEDICS | Facility: CLINIC | Age: 77
End: 2022-12-16
Payer: MEDICARE

## 2022-12-16 DIAGNOSIS — M51.36 DDD (DEGENERATIVE DISC DISEASE), LUMBAR: ICD-10-CM

## 2022-12-16 DIAGNOSIS — M50.30 DDD (DEGENERATIVE DISC DISEASE), CERVICAL: Primary | ICD-10-CM

## 2022-12-16 NOTE — TELEPHONE ENCOUNTER
----- Message from Joshua Alejo sent at 12/16/2022  1:55 PM CST -----  Regarding: X-RAYS  Contact: self  Pt stated she had x-rays done on 12/9/2022 of her neck and shoulder However she is having mid to lower back pain including pain in her right hip Pt ask for a call back      Contact info   [phone] (home)

## 2022-12-16 NOTE — TELEPHONE ENCOUNTER
Left message for patient informing about xray prior to appointment time.  Will need to call back to see what part of the body needs xray.

## 2022-12-19 ENCOUNTER — HOSPITAL ENCOUNTER (OUTPATIENT)
Dept: RADIOLOGY | Facility: HOSPITAL | Age: 77
Discharge: HOME | End: 2022-12-19
Attending: ORTHOPAEDIC SURGERY
Payer: MEDICARE

## 2022-12-19 ENCOUNTER — OFFICE VISIT (OUTPATIENT)
Dept: ORTHOPEDICS | Facility: CLINIC | Age: 77
End: 2022-12-19
Payer: MEDICARE

## 2022-12-19 VITALS — BODY MASS INDEX: 38.35 KG/M2 | HEIGHT: 61 IN | WEIGHT: 203.13 LBS

## 2022-12-19 DIAGNOSIS — M51.36 DDD (DEGENERATIVE DISC DISEASE), LUMBAR: ICD-10-CM

## 2022-12-19 DIAGNOSIS — M54.16 LUMBAR RADICULOPATHY: ICD-10-CM

## 2022-12-19 DIAGNOSIS — M47.812 CERVICAL SPONDYLOSIS: ICD-10-CM

## 2022-12-19 DIAGNOSIS — M43.10 DEGENERATIVE SPONDYLOLISTHESIS: Primary | ICD-10-CM

## 2022-12-19 DIAGNOSIS — M50.30 DDD (DEGENERATIVE DISC DISEASE), CERVICAL: ICD-10-CM

## 2022-12-19 DIAGNOSIS — M48.00 CENTRAL STENOSIS OF SPINAL CANAL: ICD-10-CM

## 2022-12-19 DIAGNOSIS — M47.816 LUMBAR SPONDYLOSIS: ICD-10-CM

## 2022-12-19 PROCEDURE — 72110 X-RAY EXAM L-2 SPINE 4/>VWS: CPT | Mod: 26,HCNC,, | Performed by: RADIOLOGY

## 2022-12-19 PROCEDURE — 1101F PR PT FALLS ASSESS DOC 0-1 FALLS W/OUT INJ PAST YR: ICD-10-PCS | Mod: HCNC,CPTII,S$GLB, | Performed by: ORTHOPAEDIC SURGERY

## 2022-12-19 PROCEDURE — 99999 PR PBB SHADOW E&M-EST. PATIENT-LVL V: CPT | Mod: PBBFAC,HCNC,, | Performed by: ORTHOPAEDIC SURGERY

## 2022-12-19 PROCEDURE — 1160F PR REVIEW ALL MEDS BY PRESCRIBER/CLIN PHARMACIST DOCUMENTED: ICD-10-PCS | Mod: HCNC,CPTII,S$GLB, | Performed by: ORTHOPAEDIC SURGERY

## 2022-12-19 PROCEDURE — 72110 X-RAY EXAM L-2 SPINE 4/>VWS: CPT | Mod: TC,HCNC

## 2022-12-19 PROCEDURE — 99999 PR PBB SHADOW E&M-EST. PATIENT-LVL V: ICD-10-PCS | Mod: PBBFAC,HCNC,, | Performed by: ORTHOPAEDIC SURGERY

## 2022-12-19 PROCEDURE — 1159F MED LIST DOCD IN RCRD: CPT | Mod: HCNC,CPTII,S$GLB, | Performed by: ORTHOPAEDIC SURGERY

## 2022-12-19 PROCEDURE — 72040 X-RAY EXAM NECK SPINE 2-3 VW: CPT | Mod: TC,HCNC

## 2022-12-19 PROCEDURE — 3288F FALL RISK ASSESSMENT DOCD: CPT | Mod: HCNC,CPTII,S$GLB, | Performed by: ORTHOPAEDIC SURGERY

## 2022-12-19 PROCEDURE — 99204 PR OFFICE/OUTPT VISIT, NEW, LEVL IV, 45-59 MIN: ICD-10-PCS | Mod: HCNC,S$GLB,, | Performed by: ORTHOPAEDIC SURGERY

## 2022-12-19 PROCEDURE — 99204 OFFICE O/P NEW MOD 45 MIN: CPT | Mod: HCNC,S$GLB,, | Performed by: ORTHOPAEDIC SURGERY

## 2022-12-19 PROCEDURE — 4010F PR ACE/ARB THEARPY RXD/TAKEN: ICD-10-PCS | Mod: HCNC,CPTII,S$GLB, | Performed by: ORTHOPAEDIC SURGERY

## 2022-12-19 PROCEDURE — 1157F ADVNC CARE PLAN IN RCRD: CPT | Mod: HCNC,CPTII,S$GLB, | Performed by: ORTHOPAEDIC SURGERY

## 2022-12-19 PROCEDURE — 3288F PR FALLS RISK ASSESSMENT DOCUMENTED: ICD-10-PCS | Mod: HCNC,CPTII,S$GLB, | Performed by: ORTHOPAEDIC SURGERY

## 2022-12-19 PROCEDURE — 1160F RVW MEDS BY RX/DR IN RCRD: CPT | Mod: HCNC,CPTII,S$GLB, | Performed by: ORTHOPAEDIC SURGERY

## 2022-12-19 PROCEDURE — 1125F PR PAIN SEVERITY QUANTIFIED, PAIN PRESENT: ICD-10-PCS | Mod: HCNC,CPTII,S$GLB, | Performed by: ORTHOPAEDIC SURGERY

## 2022-12-19 PROCEDURE — 1159F PR MEDICATION LIST DOCUMENTED IN MEDICAL RECORD: ICD-10-PCS | Mod: HCNC,CPTII,S$GLB, | Performed by: ORTHOPAEDIC SURGERY

## 2022-12-19 PROCEDURE — 1157F PR ADVANCE CARE PLAN OR EQUIV PRESENT IN MEDICAL RECORD: ICD-10-PCS | Mod: HCNC,CPTII,S$GLB, | Performed by: ORTHOPAEDIC SURGERY

## 2022-12-19 PROCEDURE — 72040 XR CERVICAL SPINE FLEXION  AND EXTENSION ONLY: ICD-10-PCS | Mod: 26,HCNC,, | Performed by: RADIOLOGY

## 2022-12-19 PROCEDURE — 1125F AMNT PAIN NOTED PAIN PRSNT: CPT | Mod: HCNC,CPTII,S$GLB, | Performed by: ORTHOPAEDIC SURGERY

## 2022-12-19 PROCEDURE — 1101F PT FALLS ASSESS-DOCD LE1/YR: CPT | Mod: HCNC,CPTII,S$GLB, | Performed by: ORTHOPAEDIC SURGERY

## 2022-12-19 PROCEDURE — 72110 XR LUMBAR SPINE AP AND LAT WITH FLEX/EXT: ICD-10-PCS | Mod: 26,HCNC,, | Performed by: RADIOLOGY

## 2022-12-19 PROCEDURE — 4010F ACE/ARB THERAPY RXD/TAKEN: CPT | Mod: HCNC,CPTII,S$GLB, | Performed by: ORTHOPAEDIC SURGERY

## 2022-12-19 PROCEDURE — 72040 X-RAY EXAM NECK SPINE 2-3 VW: CPT | Mod: 26,HCNC,, | Performed by: RADIOLOGY

## 2022-12-19 RX ORDER — GABAPENTIN 300 MG/1
300 CAPSULE ORAL 3 TIMES DAILY
Qty: 60 CAPSULE | Refills: 1 | Status: SHIPPED | OUTPATIENT
Start: 2022-12-19 | End: 2023-03-09

## 2022-12-19 NOTE — PROGRESS NOTES
DATE: 2022  PATIENT: Nilsa Curiel    Attending Physician: Jesus Alberto Sharp M.D.    CHIEF COMPLAINT: LBP and RLE pain; R shoulder tingling    HISTORY:  Nilsa Curiel is a 77 y.o. female presents for initial evaluation of LBP and right leg pain (Back - 5, Leg - 5). The pain has been present for 2 years and getting worse. The patient describes the pain as dull and it radiates posteriorly down RLE to the knee. The pain is worse with activity and improved by rest. There is BLE associated numbness and tingling. There is no subjective weakness. Prior treatments have included meds (robaxin and mobic) and PT a few years ago (healthy back program), but no LOUIS or surgery.     The Patient denies myelopathic symptoms such as handwriting changes or difficulty with buttons/coins/keys. Denies perineal paresthesias, bowel/bladder dysfunction.    The patient does not smoke, have DM or endorse IVDU. The patient is not on any blood thinners and does not take chronic narcotics. She is a retired  at [employer].    PAST MEDICAL/SURGICAL HISTORY:  Past Medical History:   Diagnosis Date    Acute bilateral low back pain without sciatica 10/10/2017    Anemia 2018    Arthritis     Atopic dermatitis     Chronic diastolic heart failure 2022    Chronic kidney disease, stage III (moderate) 2020    GERD (gastroesophageal reflux disease)     Hyperlipidemia     Hypertension     Left ventricular hypertrophy 2016    Morbid obesity with BMI of 40.0-44.9, adult 2016    MONE (obstructive sleep apnea) 2018     Past Surgical History:   Procedure Laterality Date     SECTION      x3    COLONOSCOPY N/A 10/4/2021    Procedure: COLONOSCOPY;  Surgeon: Taran Galvez MD;  Location: [address]);  Service: Endoscopy;  Laterality: N/A;    COLONOSCOPY N/A 2022    Procedure: COLONOSCOPY--positive fit kit;  Surgeon: Tani Lara MD;  Location: [facility] ([address] [place] [address]);  Service: Endoscopy;   Laterality: N/A;    ESOPHAGOGASTRODUODENOSCOPY N/A 10/4/2021    Procedure: EGD (ESOPHAGOGASTRODUODENOSCOPY);  Surgeon: Taran Galvez MD;  Location: [facility] ENDO (4TH FLR);  Service: Endoscopy;  Laterality: N/A;  covid test 10/1-[facility]    10/1-LVM about COVID test-GT    ESOPHAGOGASTRODUODENOSCOPY N/A 1/31/2022    Procedure: EGD (ESOPHAGOGASTRODUODENOSCOPY);  Surgeon: Tani Lara MD;  Location: [facility] ENDO (4TH FLR);  Service: Endoscopy;  Laterality: N/A;  12/15 fully vaccinated; instructions to portal-st  1/28-covid [facility]-tb    TUBAL LIGATION         Current Medications:   Current Outpatient Medications:     allopurinoL (ZYLOPRIM) 100 MG tablet, Take 1 tablet (100 mg total) by mouth once daily., Disp: 90 tablet, Rfl: 11    amLODIPine (NORVASC) 10 MG tablet, TAKE 1 TABLET ONE TIME DAILY, Disp: 90 tablet, Rfl: 3    aspirin (ECOTRIN) 81 MG EC tablet, Take 1 tablet (81 mg total) by mouth once daily., Disp: 90 tablet, Rfl: 3    betamethasone valerate 0.1% (VALISONE) 0.1 % Oint, Apply topically once daily., Disp: 45 g, Rfl: 3    biotin 300 mcg Tab, Take 1 tablet by mouth once daily., Disp: , Rfl:     carvediloL (COREG) 3.125 MG tablet, Take 1 tablet (3.125 mg total) by mouth 2 (two) times daily with meals., Disp: 180 tablet, Rfl: 3    cetirizine (ZYRTEC) 10 MG tablet, Take 1 tablet (10 mg total) by mouth once daily. for allergic rhinitis, Disp: 90 tablet, Rfl: 3    clobetasol (TEMOVATE) 0.05 % cream, APPLY TO THE AFFECTED AREA TWICE DAILY, Disp: 30 g, Rfl: 0    diclofenac sodium (VOLTAREN) 1 % Gel, Apply 2 g topically daily as needed., Disp: 100 g, Rfl: 3    diphth,pertus,acell,,tetanus (BOOSTRIX TDAP) 2.5-8-5 Lf-mcg-Lf/0.5mL Syrg injection, Inject into the muscle., Disp: 0.5 mL, Rfl: 0    fluocinonide (LIDEX) 0.05 % external solution, AAA scalp qday - bid prn pruritus, Disp: 60 mL, Rfl: 3    fluticasone propionate (FLONASE) 50 mcg/actuation nasal spray, 1 spray (50 mcg total) by Each Nostril route once daily.,  Disp: 16 g, Rfl: 11    ketoconazole (NIZORAL) 2 % cream, Apply topically 2 (two) times daily., Disp: 15 g, Rfl: 11    ketoconazole (NIZORAL) 2 % shampoo, Wash hair with medicated shampoo at least 2x/week - let sit on scalp at least 5 minutes prior to rinsing, Disp: 120 mL, Rfl: 5    lactobacillus combo no.6 (PROBIOTIC COMPLEX) 4 billion cell Tab, Take 1 tablet by mouth once daily., Disp: 90 tablet, Rfl: 3    meloxicam (MOBIC) 15 MG tablet, Take 1 tablet (15 mg total) by mouth once daily. Daily for musculoskeletal pain but do not take more than 15 consecutive days, Disp: 30 tablet, Rfl: 0    mometasone (ELOCON) 0.1 % ointment, Apply twice daily to hands/arms. Avoid face/groin, Disp: 45 g, Rfl: 1    MULTIVIT-MIN/FA/CA CARB/VIT K (WOMEN'S 50+ DAILY FORMULA ORAL), Take by mouth., Disp: , Rfl:     nystatin-triamcinolone (MYCOLOG II) cream, Apply topically 2 (two) times a day., Disp: 60 g, Rfl: 3    olmesartan (BENICAR) 40 MG tablet, TAKE 1 TABLET ONE TIME DAILY, Disp: 90 tablet, Rfl: 3    omega-3 fatty acids 300 mg Cap, Take by mouth., Disp: , Rfl:     omeprazole (PRILOSEC) 40 MG capsule, Take 1 capsule (40 mg total) by mouth every morning. As needed for reflux symptoms, Disp: 90 capsule, Rfl: 1    potassium chloride SA (K-DUR,KLOR-CON) 20 MEQ tablet, TAKE 1 TABLET TWICE DAILY, Disp: 180 tablet, Rfl: 1    riboflavin, vitamin B2, (VITAMIN B-2 ORAL), Take 1 capsule by mouth once daily., Disp: , Rfl:     sumatriptan (IMITREX) 100 MG tablet, Take 1 tablet (100 mg total) by mouth daily as needed for Migraine. No more than 2 doses in 24 hours, Disp: 27 tablet, Rfl: 3    TAC 0.1%-ciclopirox-MOM, Apply to affected area twice daily after cool blow dry, Disp: 60 g, Rfl: 5    TAC 0.1%-LOPROX-MOM, Apply to affected area twice daily after cool blow dry. Discard of after 30 days., Disp: 60 g, Rfl: 3    TURMERIC ORAL, Take by mouth., Disp: , Rfl:     ubidecarenone (COENZYME Q10) 100 mg Tab, Take 1 tablet by mouth once daily. , Disp: ,  Rfl:     gabapentin (NEURONTIN) 300 MG capsule, Take 1 capsule (300 mg total) by mouth 3 (three) times daily., Disp: 60 capsule, Rfl: 1    Social History:   Social History     Socioeconomic History    Marital status:    Occupational History    Occupation: retired medical records    Tobacco Use    Smoking status: Never    Smokeless tobacco: Never   Substance and Sexual Activity    Alcohol use: No     Alcohol/week: 0.0 standard drinks    Drug use: No    Sexual activity: Never     Partners: Male   Social History Narrative    , lives with 1 daughter(Toreal), Walking some     Social Determinants of Health     Financial Resource Strain: Low Risk     Difficulty of Paying Living Expenses: Not very hard   Food Insecurity: No Food Insecurity    Worried About Running Out of Food in the Last Year: Never true    Ran Out of Food in the Last Year: Never true   Transportation Needs: No Transportation Needs    Lack of Transportation (Medical): No    Lack of Transportation (Non-Medical): No   Physical Activity: Insufficiently Active    Days of Exercise per Week: 2 days    Minutes of Exercise per Session: 50 min   Stress: No Stress Concern Present    Feeling of Stress : Not at all   Social Connections: Unknown    Frequency of Communication with Friends and Family: More than three times a week    Frequency of Social Gatherings with Friends and Family: Patient refused    Active Member of Clubs or Organizations: Yes    Attends Club or Organization Meetings: More than 4 times per year    Marital Status:    Housing Stability: Low Risk     Unable to Pay for Housing in the Last Year: No    Number of Places Lived in the Last Year: 1    Unstable Housing in the Last Year: No       REVIEW OF SYSTEMS:  Constitution: Negative. Negative for chills, fever and night sweats.   Cardiovascular: Negative for chest pain and syncope.   Respiratory: Negative for cough and shortness of breath.   Gastrointestinal: See HPI. Negative for  "nausea/vomiting. Negative for abdominal pain.  Genitourinary: See HPI. Negative for discoloration or dysuria.  Skin: Negative for dry skin, itching and rash.   Hematologic/Lymphatic: negative for bleeding/clotting disorders.   Musculoskeletal: Negative for falls and muscle weakness.   Neurological: See HPI. no history of seizures. no history of cranial surgery or shunts.  Endocrine: Negative for polydipsia, polyphagia and polyuria.   Allergic/Immunologic: Negative for hives and persistent infections.  Psychiatric/Behavioral: Negative for depression and insomnia.         EXAM:  Ht 5' 1" (1.549 m)   Wt 92.1 kg (203 lb 2.5 oz)   BMI 38.39 kg/m²     General: The patient is a 77 y.o. female in no apparent distress, the patient is orientatied to person, place and time.  Psych: Normal mood and affect  HEENT: Vision grossly intact, hearing intact to the spoken word.  Lungs: Respirations unlabored.  Gait: Normal station and gait, no difficulty with toe or heel walk.   Skin: Cervical skin negative for rashes, lesions, hairy patches and surgical scars.  Range of motion: Cervical range of motion is acceptable. There is lower lumbar tenderness to palpation.  Spinal Balance: Global saggital and coronal spinal balance acceptable, no significant for scoliosis and kyphosis.  Musculoskeletal: No pain with the range of motion of the bilateral shoulders and elbows. Normal bulk and contour of the bilateral hands.  Vascular: Bilateral hands warm and well perfused, Radial pulses 2+ bilaterally.  Neurological: Normal strength and tone in all major motor groups in the bilateral upper and lower extremities. Normal sensation to light touch in the C5-T1 and L2-S1 dermatomes bilaterally.  Deep tendon reflexes symmetric 2+ in the bilateral upper and lower extremities.  Negative Inverted Radial Reflex and Blood's bilaterally. Negative Babinski bilaterally.     IMAGING:   Today I independently reviewed the following images and my " interpretations are as follows:    AP, Lat and Flex/Ex  upright L-spine films demonstrate lumbar spondylosis and DDD. L2-3, L3-4, and L4-5 anterolisthesis.    C spine Xrs showed spondylosis and DDD without listhesis.    MRI lumbar showed moderate L2-3 and severe L3-5 central stenosis. L4-5 moderate bilateral foraminal stenosis.    DEXAin 2019 showed lumbar T-score of -0.4.     Body mass index is 38.39 kg/m².  Hemoglobin A1C   Date Value Ref Range Status   12/29/2021 5.4 4.0 - 5.6 % Final     Comment:     ADA Screening Guidelines:  5.7-6.4%  Consistent with prediabetes  >or=6.5%  Consistent with diabetes    High levels of fetal hemoglobin interfere with the HbA1C  assay. Heterozygous hemoglobin variants (HbS, HgC, etc)do  not significantly interfere with this assay.   However, presence of multiple variants may affect accuracy.     03/14/2019 5.1 4.0 - 5.6 % Final     Comment:     ADA Screening Guidelines:  5.7-6.4%  Consistent with prediabetes  >or=6.5%  Consistent with diabetes  High levels of fetal hemoglobin interfere with the HbA1C  assay. Heterozygous hemoglobin variants (HbS, HgC, etc)do  not significantly interfere with this assay.   However, presence of multiple variants may affect accuracy.     03/27/2018 5.3 4.0 - 5.6 % Final     Comment:     According to ADA guidelines, hemoglobin A1c <7.0% represents  optimal control in non-pregnant diabetic patients. Different  metrics may apply to specific patient populations.   Standards of Medical Care in Diabetes-2016.  For the purpose of screening for the presence of diabetes:  <5.7%     Consistent with the absence of diabetes  5.7-6.4%  Consistent with increasing risk for diabetes   (prediabetes)  >or=6.5%  Consistent with diabetes  Currently, no consensus exists for use of hemoglobin A1c  for diagnosis of diabetes for children.  This Hemoglobin A1c assay has significant interference with fetal   hemoglobin   (HbF). The results are invalid for patients with  abnormal amounts of   HbF,   including those with known Hereditary Persistence   of Fetal Hemoglobin. Heterozygous hemoglobin variants (HbAS, HbAC,   HbAD, HbAE, HbA2) do not significantly interfere with this assay;   however, presence of multiple variants in a sample may impact the %   interference.         ASSESSMENT/PLAN:  Degenerative spondylolisthesis    Central stenosis of spinal canal  -     Ambulatory referral/consult to Orthopedics    Lumbar radiculopathy  -     gabapentin (NEURONTIN) 300 MG capsule; Take 1 capsule (300 mg total) by mouth 3 (three) times daily.  Dispense: 60 capsule; Refill: 1  -     Procedure Request Order for Pain Management; Future; Expected date: 12/19/2022    DDD (degenerative disc disease), lumbar    Lumbar spondylosis  -     Ambulatory referral/consult to Physical/Occupational Therapy; Future; Expected date: 12/26/2022    Cervical spondylosis  -     Ambulatory referral/consult to Physical/Occupational Therapy; Future; Expected date: 12/26/2022    DDD (degenerative disc disease), cervical      Follow up in about 6 weeks (around 1/30/2023).    Patient has lumbar degenerative spondylolisthesis and stenosis with RLE radiculopathy. She also has cervical spondylosis. I discussed the natural history of their diagnoses as well as surgical and nonsurgical treatment options. I educated the patient on the importance of core/back strengthening, correct posture, bending/lifting ergonomics, and low-impact aerobic exercises (walking, elliptical, and aquatherapy). I prescribed gabapentin. Continue medications. I will refer the patient to PT for neck and back. I ordered L3-4 LOUIS. Patient will follow up in 6 weeks for re-evaluation. Patient is a candidate for L2-5 PLDF if she were to fail conservative management.    Jesus Alberto Sharp MD  Orthopaedic Spine Surgeon  Department of Orthopaedic Surgery  [phone]

## 2022-12-20 ENCOUNTER — TELEPHONE (OUTPATIENT)
Dept: ADMINISTRATIVE | Facility: OTHER | Age: 77
End: 2022-12-20
Payer: MEDICARE

## 2022-12-21 ENCOUNTER — PES CALL (OUTPATIENT)
Dept: ADMINISTRATIVE | Facility: CLINIC | Age: 77
End: 2022-12-21
Payer: MEDICARE

## 2022-12-23 ENCOUNTER — PATIENT MESSAGE (OUTPATIENT)
Dept: PAIN MEDICINE | Facility: OTHER | Age: 77
End: 2022-12-23
Payer: MEDICARE

## 2022-12-23 ENCOUNTER — TELEPHONE (OUTPATIENT)
Dept: ADMINISTRATIVE | Facility: OTHER | Age: 77
End: 2022-12-23
Payer: MEDICARE

## 2022-12-23 PROBLEM — L65.9 HAIR LOSS: Status: ACTIVE | Noted: 2022-12-23

## 2022-12-23 PROBLEM — M54.2 CHRONIC NECK PAIN: Status: ACTIVE | Noted: 2022-12-23

## 2022-12-23 PROBLEM — M48.00 CENTRAL STENOSIS OF SPINAL CANAL: Status: ACTIVE | Noted: 2022-12-23

## 2022-12-23 PROBLEM — M47.812 CERVICAL SPINE ARTHRITIS WITH NERVE PAIN: Status: ACTIVE | Noted: 2022-12-23

## 2022-12-23 PROBLEM — D64.9 NORMOCYTIC NORMOCHROMIC ANEMIA: Status: ACTIVE | Noted: 2022-12-23

## 2022-12-23 PROBLEM — M25.551 CHRONIC RIGHT HIP PAIN: Status: ACTIVE | Noted: 2022-12-23

## 2022-12-23 PROBLEM — G89.29 CHRONIC RIGHT HIP PAIN: Status: ACTIVE | Noted: 2022-12-23

## 2022-12-23 PROBLEM — G89.29 CHRONIC NECK PAIN: Status: ACTIVE | Noted: 2022-12-23

## 2022-12-23 PROBLEM — M79.2 CERVICAL SPINE ARTHRITIS WITH NERVE PAIN: Status: ACTIVE | Noted: 2022-12-23

## 2022-12-23 NOTE — PROGRESS NOTES
"    /76 (BP Location: Right arm, Patient Position: Sitting, BP Method: Large (Manual))   Pulse 101   Temp 98 °F (36.7 °C) (Oral)   Ht 5' 1" (1.549 m)   Wt 97 kg (213 lb 13.5 oz)   SpO2 96%   BMI 40.41 kg/m²       ===========    Chief Complaint: No chief complaint on file.        Nilsa Curiel is a 77 y.o. female here for    HPI    Annual Exam.  Health maintenance reviewed with pt in detail inc screening studies such as lab studies and vaccines    Patient queried and denies any further complaints    SURGICAL AND MEDICAL HISTORY: updated and reviewed.  ALLERGIES updated and reviewed.  Review of patient's allergies indicates:  No Known Allergies  CURRENT OUTPATIENT MEDICATIONS updated and reviewed    Current Outpatient Medications:     amLODIPine (NORVASC) 10 MG tablet, TAKE 1 TABLET ONE TIME DAILY, Disp: 90 tablet, Rfl: 3    aspirin (ECOTRIN) 81 MG EC tablet, Take 1 tablet (81 mg total) by mouth once daily., Disp: 90 tablet, Rfl: 3    betamethasone valerate 0.1% (VALISONE) 0.1 % Oint, Apply topically once daily., Disp: 45 g, Rfl: 3    biotin 300 mcg Tab, Take 1 tablet by mouth once daily., Disp: , Rfl:     clobetasol (TEMOVATE) 0.05 % cream, APPLY TO THE AFFECTED AREA TWICE DAILY, Disp: 30 g, Rfl: 0    diclofenac sodium (VOLTAREN) 1 % Gel, Apply 2 g topically daily as needed., Disp: 100 g, Rfl: 3    diphth,pertus,acell,,tetanus (BOOSTRIX TDAP) 2.5-8-5 Lf-mcg-Lf/0.5mL Syrg injection, Inject into the muscle., Disp: 0.5 mL, Rfl: 0    fluocinonide (LIDEX) 0.05 % external solution, AAA scalp qday - bid prn pruritus, Disp: 60 mL, Rfl: 3    lactobacillus combo no.6 (PROBIOTIC COMPLEX) 4 billion cell Tab, Take 1 tablet by mouth once daily., Disp: 90 tablet, Rfl: 3    mometasone (ELOCON) 0.1 % ointment, Apply twice daily to hands/arms. Avoid face/groin, Disp: 45 g, Rfl: 1    MULTIVIT-MIN/FA/CA CARB/VIT K (WOMEN'S 50+ DAILY FORMULA ORAL), Take by mouth., Disp: , Rfl:     nystatin-triamcinolone (MYCOLOG II) cream, " Apply topically 2 (two) times a day., Disp: 60 g, Rfl: 3    olmesartan (BENICAR) 40 MG tablet, TAKE 1 TABLET ONE TIME DAILY, Disp: 90 tablet, Rfl: 3    omega-3 fatty acids 300 mg Cap, Take by mouth., Disp: , Rfl:     omeprazole (PRILOSEC) 40 MG capsule, Take 1 capsule (40 mg total) by mouth every morning. As needed for reflux symptoms, Disp: 90 capsule, Rfl: 1    potassium chloride SA (K-DUR,KLOR-CON) 20 MEQ tablet, TAKE 1 TABLET TWICE DAILY, Disp: 180 tablet, Rfl: 1    riboflavin, vitamin B2, (VITAMIN B-2 ORAL), Take 1 capsule by mouth once daily., Disp: , Rfl:     sumatriptan (IMITREX) 100 MG tablet, Take 1 tablet (100 mg total) by mouth daily as needed for Migraine. No more than 2 doses in 24 hours, Disp: 27 tablet, Rfl: 3    TAC 0.1%-ciclopirox-MOM, Apply to affected area twice daily after cool blow dry. (discard after 1/14/2023), Disp: 60 g, Rfl: 5    TAC 0.1%-LOPROX-MOM, Apply to affected area twice daily after cool blow dry. Discard of after 30 days., Disp: 60 g, Rfl: 3    TURMERIC ORAL, Take by mouth., Disp: , Rfl:     ubidecarenone (COENZYME Q10) 100 mg Tab, Take 1 tablet by mouth once daily. , Disp: , Rfl:     allopurinoL (ZYLOPRIM) 100 MG tablet, Take 1 tablet (100 mg total) by mouth once daily., Disp: 90 tablet, Rfl: 11    carvediloL (COREG) 3.125 MG tablet, Take 1 tablet (3.125 mg total) by mouth 2 (two) times daily with meals., Disp: 180 tablet, Rfl: 3    cetirizine (ZYRTEC) 10 MG tablet, Take 1 tablet (10 mg total) by mouth once daily. for allergic rhinitis, Disp: 90 tablet, Rfl: 3    fluticasone propionate (FLONASE) 50 mcg/actuation nasal spray, 1 spray (50 mcg total) by Each Nostril route once daily., Disp: 16 g, Rfl: 11    gabapentin (NEURONTIN) 300 MG capsule, Take 1 capsule (300 mg total) by mouth 3 (three) times daily., Disp: 60 capsule, Rfl: 1    ketoconazole (NIZORAL) 2 % cream, Apply topically 2 (two) times daily., Disp: 15 g, Rfl: 11    ketoconazole (NIZORAL) 2 % shampoo, Wash hair with  "medicated shampoo at least 2x/week - let sit on scalp at least 5 minutes prior to rinsing, Disp: 120 mL, Rfl: 5    meloxicam (MOBIC) 15 MG tablet, Take 1 tablet (15 mg total) by mouth once daily. Daily for musculoskeletal pain but do not take more than 15 consecutive days, Disp: 30 tablet, Rfl: 0    Review of Systems   Constitutional:  Negative for activity change, appetite change, chills, diaphoresis, fatigue, fever and unexpected weight change.   HENT:  Negative for congestion, ear discharge, ear pain, facial swelling, hearing loss, nosebleeds, postnasal drip, rhinorrhea, sinus pressure, sneezing, sore throat, tinnitus, trouble swallowing and voice change.    Eyes:  Negative for photophobia, pain, discharge, redness, itching and visual disturbance.   Respiratory:  Negative for cough, chest tightness, shortness of breath and wheezing.    Cardiovascular:  Negative for chest pain, palpitations and leg swelling.   Gastrointestinal:  Negative for abdominal distention, abdominal pain, anal bleeding, blood in stool, constipation, diarrhea, nausea, rectal pain and vomiting.   Endocrine: Negative for cold intolerance, heat intolerance, polydipsia, polyphagia and polyuria.   Genitourinary:  Negative for difficulty urinating, dysuria and flank pain.   Musculoskeletal:  Negative for arthralgias, back pain, joint swelling, myalgias and neck pain.   Skin:  Negative for rash.   Neurological:  Negative for dizziness, tremors, seizures, syncope, speech difficulty, weakness, light-headedness, numbness and headaches.   Psychiatric/Behavioral:  Negative for behavioral problems, confusion, decreased concentration, dysphoric mood, sleep disturbance and suicidal ideas. The patient is not nervous/anxious and is not hyperactive.      /76 (BP Location: Right arm, Patient Position: Sitting, BP Method: Large (Manual))   Pulse 101   Temp 98 °F (36.7 °C) (Oral)   Ht 5' 1" (1.549 m)   Wt 97 kg (213 lb 13.5 oz)   SpO2 96%   BMI 40.41 " kg/m²   Physical Exam  Vitals and nursing note reviewed.   Constitutional:       General: She is not in acute distress.     Appearance: Normal appearance. She is well-developed. She is not ill-appearing or toxic-appearing.   HENT:      Head: Normocephalic and atraumatic.      Right Ear: Tympanic membrane, ear canal and external ear normal.      Left Ear: Tympanic membrane, ear canal and external ear normal.      Nose: Nose normal.      Mouth/Throat:      Lips: Pink.      Mouth: Mucous membranes are moist.      Pharynx: No oropharyngeal exudate or posterior oropharyngeal erythema.   Eyes:      General: No scleral icterus.        Right eye: No discharge.         Left eye: No discharge.      Extraocular Movements: Extraocular movements intact.      Conjunctiva/sclera: Conjunctivae normal.   Cardiovascular:      Rate and Rhythm: Normal rate and regular rhythm.      Pulses: Normal pulses.      Heart sounds: Normal heart sounds. No murmur heard.  Pulmonary:      Effort: Pulmonary effort is normal. No respiratory distress.      Breath sounds: Normal breath sounds. No wheezing or rales.   Abdominal:      General: Bowel sounds are normal. There is no distension.      Palpations: Abdomen is soft. There is no mass.      Tenderness: There is no abdominal tenderness. There is no right CVA tenderness, left CVA tenderness, guarding or rebound.      Hernia: No hernia is present.   Musculoskeletal:      Cervical back: Normal range of motion and neck supple. No rigidity or tenderness.   Lymphadenopathy:      Cervical: No cervical adenopathy.   Skin:     General: Skin is warm and dry.   Neurological:      General: No focal deficit present.      Mental Status: She is alert. Mental status is at baseline.   Psychiatric:         Mood and Affect: Mood normal.         Behavior: Behavior normal. Behavior is cooperative.       Diagnoses and all orders for this visit:    Well adult exam    Essential hypertension    Intertrigo  -      ketoconazole (NIZORAL) 2 % cream; Apply topically 2 (two) times daily.    Hair loss  -     ketoconazole (NIZORAL) 2 % shampoo; Wash hair with medicated shampoo at least 2x/week - let sit on scalp at least 5 minutes prior to rinsing    Sinus disorder  -     fluticasone propionate (FLONASE) 50 mcg/actuation nasal spray; 1 spray (50 mcg total) by Each Nostril route once daily.  -     cetirizine (ZYRTEC) 10 MG tablet; Take 1 tablet (10 mg total) by mouth once daily. for allergic rhinitis    Central stenosis of spinal canal  -     Ambulatory referral/consult to Orthopedics; Future    Chronic right hip pain  -     X-Ray Hip 2 or 3 views Right (with Pelvis when performed); Future    Cervical spine arthritis with nerve pain  -     X-Ray Cervical Spine AP And Lateral; Future    Chronic neck pain  -     X-Ray Cervical Spine AP And Lateral; Future    Normocytic normochromic anemia  -     Ferritin; Future  -     Transferrin; Future  -     Iron and TIBC; Future  -     Comprehensive Metabolic Panel; Future  -     CBC Auto Differential; Future  -     Reticulocytes; Future    Other orders  -     carvediloL (COREG) 3.125 MG tablet; Take 1 tablet (3.125 mg total) by mouth 2 (two) times daily with meals.  -     allopurinoL (ZYLOPRIM) 100 MG tablet; Take 1 tablet (100 mg total) by mouth once daily.  -     meloxicam (MOBIC) 15 MG tablet; Take 1 tablet (15 mg total) by mouth once daily. Daily for musculoskeletal pain but do not take more than 15 consecutive days  -     methocarbamoL (ROBAXIN) 500 MG Tab; Take 1 tablet (500 mg total) by mouth 4 (four) times daily. for 10 days      Established. Time spent in the evaluation and management of this patient exceeded  40 min and greater than 50% of this time was in face-to-face time with the patient.    Total time including the following:  -preparing to see the patient (e.g., obtaining and/or reviewing old records such as old primary care notes, specialists notes, hospital notes, review of  laboratory tests, radiographic and/or cardiology studies)  -orders which can include medications, laboratory studies, radiographic studies, procedures, referrals etcetera   --communicating with the patient and/or family member/caregiver regarding a detailed explanation of the treatment/care plan  -arranging possible referrals and follow-up plan  -documentation of the visit in the electronic health record      Reviewed old pertinent medical records available.     All lab results personally reviewed and interpreted by me including last year, if available.        Gaurav Allison MD

## 2022-12-27 ENCOUNTER — TELEPHONE (OUTPATIENT)
Dept: ADMINISTRATIVE | Facility: OTHER | Age: 77
End: 2022-12-27
Payer: MEDICARE

## 2022-12-30 ENCOUNTER — CLINICAL SUPPORT (OUTPATIENT)
Dept: REHABILITATION | Facility: HOSPITAL | Age: 77
End: 2022-12-30
Attending: ORTHOPAEDIC SURGERY
Payer: MEDICARE

## 2022-12-30 DIAGNOSIS — Z74.09 IMPAIRED MOBILITY: ICD-10-CM

## 2022-12-30 DIAGNOSIS — G89.29 CHRONIC RIGHT HIP PAIN: Primary | ICD-10-CM

## 2022-12-30 DIAGNOSIS — G89.29 CHRONIC RIGHT-SIDED LOW BACK PAIN WITHOUT SCIATICA: ICD-10-CM

## 2022-12-30 DIAGNOSIS — M25.551 CHRONIC RIGHT HIP PAIN: Primary | ICD-10-CM

## 2022-12-30 DIAGNOSIS — M47.816 LUMBAR SPONDYLOSIS: ICD-10-CM

## 2022-12-30 DIAGNOSIS — R53.1 DECREASED STRENGTH: ICD-10-CM

## 2022-12-30 DIAGNOSIS — M47.812 CERVICAL SPONDYLOSIS: ICD-10-CM

## 2022-12-30 DIAGNOSIS — R29.3 POSTURE IMBALANCE: ICD-10-CM

## 2022-12-30 DIAGNOSIS — M54.50 CHRONIC RIGHT-SIDED LOW BACK PAIN WITHOUT SCIATICA: ICD-10-CM

## 2022-12-30 PROCEDURE — 97110 THERAPEUTIC EXERCISES: CPT | Mod: HCNC,PN

## 2022-12-30 PROCEDURE — 97161 PT EVAL LOW COMPLEX 20 MIN: CPT | Mod: HCNC,PN

## 2022-12-30 NOTE — PLAN OF CARE
"[facility]  Physical Therapy Initial Evaluation    Name: Nilsa Curiel  Clinic Number: [id number]    Therapy Diagnosis:   Encounter Diagnoses   Name Primary?    Lumbar spondylosis     Cervical spondylosis     Chronic right hip pain Yes    Posture imbalance     Impaired mobility     Decreased strength     Chronic right-sided low back pain without sciatica      Physician: Jesus Alberto Sharp MD    Physician Orders: PT Eval and Treat  Medical Diagnosis from Referral:   M47.816 (ICD-10-CM) - Lumbar spondylosis   M47.812 (ICD-10-CM) - Cervical spondylosis     Evaluation Date: 12/30/2022  Authorization Period Expiration: 12/19/2023  Plan of Care Expiration: 3/30/2023  Visit # / Visits authorized: 1/ 1    Time In: 1102  Time Out: 1148  Total Billable Time: 45 minutes    Precautions: Standard    Subjective     Date of onset: "years ago"  History of current condition - MS. Ash reports: right low back pain that started years ago. She has had therapy before, which helped. Symptoms have been worsening over the past year, insidiously. Symptoms are worse with walking and standing, better with sitting and lying down.     Patient states she walks in her home, tries to get up every hour. Other than that, She states she goes out of her house twice a week with her daughter to walk around the store. Patient states she sleeps from 3 am to 6 am. She is awake and may get 1-2 hours of sleep after that but has been on this schedule for most of her life. She endorses numbness and tingling in the hands but denies to feet.      Imaging   X-Ray Cervical Spine Flexion And Extension Only: Based on the 3 images acquired, no acute fractures.  Redemonstrated grade 1 anterolisthesis C4 with respect to C5 and grade 1 retrolisthesis C5 with respect to C6.  Based on the flexion and extension views, no definite instability.  Reconfirmed areas of cervical disc narrowing, endplate osteophytes, and uncovertebral spurring to that " noted on the earlier exam.  Based on these images, unremarkable predental space.  There would appear to be prevertebral soft tissue swelling which was also present on earlier exam.  Clinical correlation.    X-Ray Lumbar Spine Ap Lateral w/Flex Ext: No acute fractures, preserved vertebral body heights and pedicles.  Scattered lower thoracic and lumbar vertebral body anterolateral end plate osteophytes.  Reconfirmed grade 1 anterolisthesis L4 with respect L5.  On the present images acquired in the upright position, there is also seen to be grade 1 anterolisthesis L3 with respect L4.  The flexion and extension views demonstrate no instability.  Reconfirmed disc narrowing lower 3 lumbar disc levels.  Reconfirmed multilevel facet arthropathic changes.  Intact right and left SI joints and visualized hip joint spaces.  Abdominal aorta wall calcific change.    X-Ray Hip 2 or 3 views Right (with Pelvis when performed): Some degenerative changes involving lower lumbar spine as before.  Intact right and left SI joints.  There is felt to be mild narrowing of right more so than left superolateral hip joint spaces with some mild right and left acetabular spurring, similar findings to earlier exam.  Preserved right and left femoral head contours.  Stable rounded small pelvic densities should relate to phleboliths.    X-Ray Cervical Spine AP And Lateral: DJD with narrowing of disc spaces between C3 and C7 vertebral segments.  There is a 2-3 mm C3/C4 anterolisthesis.  No acute fracture or dislocation.  No bone destruction identified          Prior Therapy: yes for back in 2019   Exercise Routine/Sport Participation: none  Social History: lives with daughter  Occupation: retired 2004   Prior Level of Function: IADLs  Current Level of Function: IADLs    Pain:  Current 4/10, worst 8/10, best 0/10   Location: right low back radiating posterior thigh  Description: Aching, dull  Aggravating Factors: standing up, walking  Easing Factors:  sitting, lying down    Pts goals: walk more, contribute to [employer] more, travel more       Medical History:   Past Medical History:   Diagnosis Date    Acute bilateral low back pain without sciatica 10/10/2017    Anemia 2018    Arthritis     Atopic dermatitis     Chronic diastolic heart failure 2022    Chronic kidney disease, stage III (moderate) 2020    GERD (gastroesophageal reflux disease)     Hyperlipidemia     Hypertension     Left ventricular hypertrophy 2016    Morbid obesity with BMI of 40.0-44.9, adult 2016    MONE (obstructive sleep apnea) 2018       Surgical History:   Nilsa Curiel  has a past surgical history that includes  section; Tubal ligation; Esophagogastroduodenoscopy (N/A, 10/4/2021); Colonoscopy (N/A, 10/4/2021); Esophagogastroduodenoscopy (N/A, 2022); and Colonoscopy (N/A, 2022).    Medications:   Nilsa has a current medication list which includes the following prescription(s): allopurinol, amlodipine, aspirin, betamethasone valerate 0.1%, biotin, carvedilol, cetirizine, clobetasol, diclofenac sodium, boostrix tdap, fluocinonide, fluticasone propionate, gabapentin, ketoconazole, ketoconazole, lactobacillus combo no.6, meloxicam, mometasone, mv-mn/folic ac/calcium/vit k1, nystatin-triamcinolone, olmesartan, omega-3 fatty acids, omeprazole, potassium chloride sa, riboflavin (vitamin b2), sumatriptan, TAC 0.1%-ciclopirox-MOM, TAC 0.1%-LOPROX-MOM, turmeric, coenzyme q10, and [DISCONTINUED] triamcinolone acetonide 0.1%.    Allergies:   Review of patient's allergies indicates:  No Known Allergies     Objective     Posture:  forward head rounded shoulders     30 second sit to stand: 9    Functional Movements:   Gait: bilateral forefoot abduction   Squat: Pain from right lower back to  right lateral thigh   SLS: <2 sec bilaterally    Lumbar Range of Motion:    % Limitation Pain   Flexion 0   none     Extension 25%   Pain right lower back        Left Side  Bending 0 Mild right lower back        Right Side Bending 0 Moderate right lower back        Left rotation   10%  none        Right Rotation   75% Right lower back/hip radiating up back           REPEATED TEST MOVEMENTS:  Repeated Flexion in Standing    Repeated Extension in Standing worse   Repeated Flexion in lying    Repeated Extension in lying         Lower Extremity Strength   Right LE Left LE   Knee extension: 4-/5 4/5   Knee flexion: 4-/5 4/5   Hip flexion: 4-/5 4/5   Hip extension:  3/5 3/5   Hip Abduction: 3/5 3+/5   Hip ER:  3+/5 4-/5   Hip IR: 4-/5 4/5   Ankle dorsiflexion: 4+/5 4+/5   Ankle plantarflexion: 4+/5 4+/5     Passive Hip Range of Motion:   Right  Left   Flexion 95 100   Abduction NT NT   Extension 10 10   Ext. Rotation 60 60   Int. Rotation 20 20       Special Tests:    Quadrant -   Spring Test  NT   Prone Instability Test NT   Prone LE Extension +   Bridge Test +   Shear Test NT       SIJ  Right  Left    Thigh Thrust + -   Compression N/a N/a   Distraction - -   Sacral Thrust + -      Right  Left    FADIR + -   MARCELINA - -   Hip Scour + -   SLS Glute Med Test + -   Prone LE Extension + -         Neuro Tension Testing:   Sciatic nerve:      SLR: R = -     L = -   Slump Test:    R = -     L = -    Femoral Nerve Glide: NT    Sensation: intact, no deficits      CMS Impairment/Limitation/Restriction for FOTO Spine Survey    Therapist reviewed FOTO scores for Nilsa Curiel on 12/30/2022.   FOTO documents entered into [employer] - see Media section.    Limitation Score: see media  Category: Mobility       Treatment     Treatment Time In: 1130  Treatment Time Out: 1145  Total Treatment time separate from Evaluation: 15 minutes     MS. Ash received the treatments listed below:      Therapeutic exercises to develop strength, endurance, ROM, flexibility, posture, and core stabilization for 15 minutes including:  Supine lumbar rotations   Supine single knee to chest  DL glut bridges   Steven BTB     Manual  therapy techniques: Joint mobilizations, Manual traction, and Soft tissue Mobilization were applied to the:  for 00 minutes, including:        Home Exercises and Patient Education Provided     Education provided:   - Prognosis, activity modification, goals for therapy, role of therapy for care, exercises/HEP    Written Home Exercises Provided: yes.  Exercises were reviewed and MS. Ash was able to demonstrate them prior to the end of the session.   Pt received a written copy of exercises to perform at home. MS. Ash demonstrated good  understanding of the education provided.     See EMR under patient instructions for exercises given.     Assessment     Nilsa is a 77 y.o. female referred to outpatient Physical Therapy with chronic right lower back pain and right hip pain. Patient demonstrates limitations in trunk extension and right rotation most notably, and pain with active extension. She is generally deconditioned and weak globally.       Pt will benefit from skilled outpatient Physical Therapy to address the deficits stated above and in the chart below, provide pt/family education, and to maximize pt's level of independence. Pt prognosis is Good.     Plan of care discussed with patient: Yes  Pt's spiritual, cultural and educational needs considered and patient is agreeable to the plan of care and goals as stated below:       Anticipated Barriers for therapy: none      Medical Necessity is demonstrated by the following  History  Co-morbidities and personal factors that may impact the plan of care Co-morbidities:   advanced age and CKD stage 3, obesity, chronic neck and back pain    Personal Factors:   lifestyle     moderate   Examination  Body Structures and Functions, activity limitations and participation restrictions that may impact the plan of care Body Regions:   neck  back  lower extremities    Body Systems:    gross symmetry  ROM  strength  gross coordinated  movement  balance  gait  transfers  transitions  motor control  motor learning    Participation Restrictions:       Activity limitations:   Learning and applying knowledge  No deficit    General Tasks and Commands  No deficit    Communication  No deficit    Mobility  lifting and carrying objects  walking    Self care  No deficit    Domestic Life  No deficit    Interactions/Relationships  No deficit    Life Areas  Recreational Activities to A with health and wellness     Community and Social Life  No deficit          moderate   Clinical Presentation stable and uncomplicated low   Decision Making/ Complexity Score: low     GOALS:   Short Term Goals:  4 weeks  1.Report decreased low back pain  < / =  5/10 at max to increase tolerance for HEP/ADL  2. Increase ROM by 10 degrees where limited in order to perform ADLs without difficulty.  3. Increase strength by 1/3 MMT grade in gluts/quads/hamstring/core  to increase tolerance for ADL and work activities.  4. Pt to tolerate HEP to improve ROM and independence with ADL's    Long Term Goals: 12 weeks  1.Report decreased low back pain < / = 2/10 at max to increase tolerance for HEP/ADL  2.Patient goal: walk more, contribute to S[employer] more, travel more   3.Increase strength to 4+/5 in  gluts/quads/hamstring/core  to increase tolerance for ADL and work activities.  4. Pt will report at >90% on FOTO  to demonstrate increase in LE function with every day tasks.      Plan   Plan of care Certification: 12/30/2022 to 3/30/2023.    Outpatient Physical Therapy 1-2 times weekly for 12 weeks to include the following interventions: Aquatic Therapy, Cervical/Lumbar Traction, Electrical Stimulation NMES, Gait Training, Manual Therapy, Moist Heat/ Ice, Neuromuscular Re-ed, Patient Education, Self Care, Therapeutic Activities, and Therapeutic Exercise.     Horace Peng, PT , DPT

## 2023-01-03 ENCOUNTER — OFFICE VISIT (OUTPATIENT)
Dept: PRIMARY CARE CLINIC | Facility: CLINIC | Age: 78
End: 2023-01-03
Payer: MEDICARE

## 2023-01-03 ENCOUNTER — LAB VISIT (OUTPATIENT)
Dept: LAB | Facility: HOSPITAL | Age: 78
End: 2023-01-03
Attending: FAMILY MEDICINE
Payer: MEDICARE

## 2023-01-03 VITALS
TEMPERATURE: 98 F | SYSTOLIC BLOOD PRESSURE: 122 MMHG | HEART RATE: 96 BPM | BODY MASS INDEX: 41.45 KG/M2 | DIASTOLIC BLOOD PRESSURE: 60 MMHG | WEIGHT: 219.56 LBS | HEIGHT: 61 IN | OXYGEN SATURATION: 99 %

## 2023-01-03 DIAGNOSIS — R79.9 ABNORMAL FINDING OF BLOOD CHEMISTRY, UNSPECIFIED: ICD-10-CM

## 2023-01-03 DIAGNOSIS — I50.30 HYPERTROPHIC OBSTRUCTIVE CARDIOMYOPATHY WITH DIASTOLIC HEART FAILURE: ICD-10-CM

## 2023-01-03 DIAGNOSIS — R73.03 PREDIABETES: ICD-10-CM

## 2023-01-03 DIAGNOSIS — E83.52 HYPERCALCEMIA: ICD-10-CM

## 2023-01-03 DIAGNOSIS — E79.0 HYPERURICEMIA: ICD-10-CM

## 2023-01-03 DIAGNOSIS — I42.1 HYPERTROPHIC OBSTRUCTIVE CARDIOMYOPATHY WITH DIASTOLIC HEART FAILURE: ICD-10-CM

## 2023-01-03 DIAGNOSIS — Z00.00 GENERAL MEDICAL EXAM: ICD-10-CM

## 2023-01-03 DIAGNOSIS — R53.83 FATIGUE, UNSPECIFIED TYPE: ICD-10-CM

## 2023-01-03 DIAGNOSIS — R51.9 HEADACHE DISORDER: ICD-10-CM

## 2023-01-03 DIAGNOSIS — N18.30 STAGE 3 CHRONIC KIDNEY DISEASE, UNSPECIFIED WHETHER STAGE 3A OR 3B CKD: ICD-10-CM

## 2023-01-03 DIAGNOSIS — L30.9 DERMATITIS: ICD-10-CM

## 2023-01-03 DIAGNOSIS — R53.83 FATIGUE, UNSPECIFIED TYPE: Primary | ICD-10-CM

## 2023-01-03 DIAGNOSIS — E66.01 SEVERE OBESITY (BMI >= 40): ICD-10-CM

## 2023-01-03 DIAGNOSIS — E21.3 HYPERPARATHYROIDISM: ICD-10-CM

## 2023-01-03 DIAGNOSIS — I70.0 ATHEROSCLEROSIS OF AORTA: ICD-10-CM

## 2023-01-03 PROCEDURE — 99999 PR PBB SHADOW E&M-EST. PATIENT-LVL V: ICD-10-PCS | Mod: PBBFAC,HCNC,, | Performed by: FAMILY MEDICINE

## 2023-01-03 PROCEDURE — 84550 ASSAY OF BLOOD/URIC ACID: CPT | Mod: HCNC | Performed by: FAMILY MEDICINE

## 2023-01-03 PROCEDURE — 80053 COMPREHEN METABOLIC PANEL: CPT | Mod: HCNC | Performed by: FAMILY MEDICINE

## 2023-01-03 PROCEDURE — 1160F RVW MEDS BY RX/DR IN RCRD: CPT | Mod: HCNC,CPTII,S$GLB, | Performed by: FAMILY MEDICINE

## 2023-01-03 PROCEDURE — 84443 ASSAY THYROID STIM HORMONE: CPT | Mod: HCNC | Performed by: FAMILY MEDICINE

## 2023-01-03 PROCEDURE — 99214 PR OFFICE/OUTPT VISIT, EST, LEVL IV, 30-39 MIN: ICD-10-PCS | Mod: HCNC,S$GLB,, | Performed by: FAMILY MEDICINE

## 2023-01-03 PROCEDURE — 1126F AMNT PAIN NOTED NONE PRSNT: CPT | Mod: HCNC,CPTII,S$GLB, | Performed by: FAMILY MEDICINE

## 2023-01-03 PROCEDURE — 3074F PR MOST RECENT SYSTOLIC BLOOD PRESSURE < 130 MM HG: ICD-10-PCS | Mod: HCNC,CPTII,S$GLB, | Performed by: FAMILY MEDICINE

## 2023-01-03 PROCEDURE — 80061 LIPID PANEL: CPT | Mod: HCNC | Performed by: FAMILY MEDICINE

## 2023-01-03 PROCEDURE — 1101F PT FALLS ASSESS-DOCD LE1/YR: CPT | Mod: HCNC,CPTII,S$GLB, | Performed by: FAMILY MEDICINE

## 2023-01-03 PROCEDURE — 1126F PR PAIN SEVERITY QUANTIFIED, NO PAIN PRESENT: ICD-10-PCS | Mod: HCNC,CPTII,S$GLB, | Performed by: FAMILY MEDICINE

## 2023-01-03 PROCEDURE — 3078F DIAST BP <80 MM HG: CPT | Mod: HCNC,CPTII,S$GLB, | Performed by: FAMILY MEDICINE

## 2023-01-03 PROCEDURE — 3078F PR MOST RECENT DIASTOLIC BLOOD PRESSURE < 80 MM HG: ICD-10-PCS | Mod: HCNC,CPTII,S$GLB, | Performed by: FAMILY MEDICINE

## 2023-01-03 PROCEDURE — 3074F SYST BP LT 130 MM HG: CPT | Mod: HCNC,CPTII,S$GLB, | Performed by: FAMILY MEDICINE

## 2023-01-03 PROCEDURE — 84439 ASSAY OF FREE THYROXINE: CPT | Mod: HCNC | Performed by: FAMILY MEDICINE

## 2023-01-03 PROCEDURE — 3288F PR FALLS RISK ASSESSMENT DOCUMENTED: ICD-10-PCS | Mod: HCNC,CPTII,S$GLB, | Performed by: FAMILY MEDICINE

## 2023-01-03 PROCEDURE — 1101F PR PT FALLS ASSESS DOC 0-1 FALLS W/OUT INJ PAST YR: ICD-10-PCS | Mod: HCNC,CPTII,S$GLB, | Performed by: FAMILY MEDICINE

## 2023-01-03 PROCEDURE — 99214 OFFICE O/P EST MOD 30 MIN: CPT | Mod: HCNC,S$GLB,, | Performed by: FAMILY MEDICINE

## 2023-01-03 PROCEDURE — 83970 ASSAY OF PARATHORMONE: CPT | Mod: HCNC | Performed by: FAMILY MEDICINE

## 2023-01-03 PROCEDURE — 1157F ADVNC CARE PLAN IN RCRD: CPT | Mod: HCNC,CPTII,S$GLB, | Performed by: FAMILY MEDICINE

## 2023-01-03 PROCEDURE — 82306 VITAMIN D 25 HYDROXY: CPT | Mod: HCNC | Performed by: FAMILY MEDICINE

## 2023-01-03 PROCEDURE — 36415 COLL VENOUS BLD VENIPUNCTURE: CPT | Mod: HCNC,PN | Performed by: FAMILY MEDICINE

## 2023-01-03 PROCEDURE — 1157F PR ADVANCE CARE PLAN OR EQUIV PRESENT IN MEDICAL RECORD: ICD-10-PCS | Mod: HCNC,CPTII,S$GLB, | Performed by: FAMILY MEDICINE

## 2023-01-03 PROCEDURE — 1159F MED LIST DOCD IN RCRD: CPT | Mod: HCNC,CPTII,S$GLB, | Performed by: FAMILY MEDICINE

## 2023-01-03 PROCEDURE — 99999 PR PBB SHADOW E&M-EST. PATIENT-LVL V: CPT | Mod: PBBFAC,HCNC,, | Performed by: FAMILY MEDICINE

## 2023-01-03 PROCEDURE — 1159F PR MEDICATION LIST DOCUMENTED IN MEDICAL RECORD: ICD-10-PCS | Mod: HCNC,CPTII,S$GLB, | Performed by: FAMILY MEDICINE

## 2023-01-03 PROCEDURE — 1160F PR REVIEW ALL MEDS BY PRESCRIBER/CLIN PHARMACIST DOCUMENTED: ICD-10-PCS | Mod: HCNC,CPTII,S$GLB, | Performed by: FAMILY MEDICINE

## 2023-01-03 PROCEDURE — 3288F FALL RISK ASSESSMENT DOCD: CPT | Mod: HCNC,CPTII,S$GLB, | Performed by: FAMILY MEDICINE

## 2023-01-03 RX ORDER — OMEPRAZOLE 40 MG/1
40 CAPSULE, DELAYED RELEASE ORAL EVERY MORNING
Qty: 90 CAPSULE | Refills: 3 | Status: SHIPPED | OUTPATIENT
Start: 2023-01-03 | End: 2023-09-05

## 2023-01-03 RX ORDER — MOMETASONE FUROATE 1 MG/G
OINTMENT TOPICAL
Qty: 45 G | Refills: 1 | Status: SHIPPED | OUTPATIENT
Start: 2023-01-03 | End: 2023-03-09 | Stop reason: SDUPTHER

## 2023-01-03 RX ORDER — CARVEDILOL 3.12 MG/1
3.12 TABLET ORAL 2 TIMES DAILY WITH MEALS
Qty: 180 TABLET | Refills: 3 | Status: SHIPPED | OUTPATIENT
Start: 2023-01-03 | End: 2024-01-18

## 2023-01-03 RX ORDER — MELOXICAM 15 MG/1
15 TABLET ORAL DAILY
Qty: 90 TABLET | Refills: 0 | Status: SHIPPED | OUTPATIENT
Start: 2023-01-03 | End: 2023-04-10

## 2023-01-03 RX ORDER — METHOCARBAMOL 500 MG/1
500 TABLET, FILM COATED ORAL 4 TIMES DAILY
Qty: 40 TABLET | Refills: 5 | Status: SHIPPED | OUTPATIENT
Start: 2023-01-03 | End: 2023-03-02

## 2023-01-03 RX ORDER — AMLODIPINE BESYLATE 10 MG/1
10 TABLET ORAL DAILY
Qty: 90 TABLET | Refills: 3 | Status: SHIPPED | OUTPATIENT
Start: 2023-01-03 | End: 2023-11-28

## 2023-01-03 RX ORDER — ALLOPURINOL 100 MG/1
100 TABLET ORAL DAILY
Qty: 90 TABLET | Refills: 11 | Status: SHIPPED | OUTPATIENT
Start: 2023-01-03 | End: 2024-02-15

## 2023-01-03 RX ORDER — SUMATRIPTAN SUCCINATE 100 MG/1
100 TABLET ORAL DAILY PRN
Qty: 27 TABLET | Refills: 3 | Status: SHIPPED | OUTPATIENT
Start: 2023-01-03 | End: 2023-09-05

## 2023-01-03 RX ORDER — POTASSIUM CHLORIDE 20 MEQ/1
20 TABLET, EXTENDED RELEASE ORAL 2 TIMES DAILY
Qty: 180 TABLET | Refills: 3 | Status: SHIPPED | OUTPATIENT
Start: 2023-01-03 | End: 2023-11-28

## 2023-01-04 PROBLEM — R53.83 FATIGUE: Status: ACTIVE | Noted: 2023-01-04

## 2023-01-04 PROBLEM — I70.0 ATHEROSCLEROSIS OF AORTA: Status: ACTIVE | Noted: 2023-01-04

## 2023-01-04 PROBLEM — R73.03 PREDIABETES: Status: ACTIVE | Noted: 2023-01-04

## 2023-01-04 PROBLEM — L30.9 DERMATITIS: Status: ACTIVE | Noted: 2023-01-04

## 2023-01-04 PROBLEM — E83.52 HYPERCALCEMIA: Status: ACTIVE | Noted: 2023-01-04

## 2023-01-04 LAB
25(OH)D3+25(OH)D2 SERPL-MCNC: 36 NG/ML (ref 30–96)
ALBUMIN SERPL BCP-MCNC: 3.8 G/DL (ref 3.5–5.2)
ALP SERPL-CCNC: 98 U/L (ref 55–135)
ALT SERPL W/O P-5'-P-CCNC: 28 U/L (ref 10–44)
ANION GAP SERPL CALC-SCNC: 12 MMOL/L (ref 8–16)
AST SERPL-CCNC: 23 U/L (ref 10–40)
BILIRUB SERPL-MCNC: 0.3 MG/DL (ref 0.1–1)
BUN SERPL-MCNC: 17 MG/DL (ref 8–23)
CALCIUM SERPL-MCNC: 10.6 MG/DL (ref 8.7–10.5)
CHLORIDE SERPL-SCNC: 109 MMOL/L (ref 95–110)
CHOLEST SERPL-MCNC: 227 MG/DL (ref 120–199)
CHOLEST/HDLC SERPL: 4.1 {RATIO} (ref 2–5)
CO2 SERPL-SCNC: 22 MMOL/L (ref 23–29)
CREAT SERPL-MCNC: 1 MG/DL (ref 0.5–1.4)
EST. GFR  (NO RACE VARIABLE): 58.8 ML/MIN/1.73 M^2
GLUCOSE SERPL-MCNC: 97 MG/DL (ref 70–110)
HDLC SERPL-MCNC: 55 MG/DL (ref 40–75)
HDLC SERPL: 24.2 % (ref 20–50)
LDLC SERPL CALC-MCNC: 148.4 MG/DL (ref 63–159)
NONHDLC SERPL-MCNC: 172 MG/DL
POTASSIUM SERPL-SCNC: 4.9 MMOL/L (ref 3.5–5.1)
PROT SERPL-MCNC: 7.8 G/DL (ref 6–8.4)
PTH-INTACT SERPL-MCNC: 102.7 PG/ML (ref 9–77)
SODIUM SERPL-SCNC: 143 MMOL/L (ref 136–145)
T4 FREE SERPL-MCNC: 0.83 NG/DL (ref 0.71–1.51)
TRIGL SERPL-MCNC: 118 MG/DL (ref 30–150)
TSH SERPL DL<=0.005 MIU/L-ACNC: 2.27 UIU/ML (ref 0.4–4)
URATE SERPL-MCNC: 5.9 MG/DL (ref 2.4–5.7)

## 2023-01-04 NOTE — PROGRESS NOTES
"    /60 (BP Location: Right arm, Patient Position: Sitting, BP Method: Large (Manual))   Pulse 96   Temp 98.4 °F (36.9 °C) (Oral)   Ht 5' 1" (1.549 m)   Wt 99.6 kg (219 lb 9.3 oz)   SpO2 99%   BMI 41.49 kg/m²       ===========    Chief Complaint: No chief complaint on file.        Nilsa Curiel is a 77 y.o. female here for    HPI    Follow-up to discuss hypercalcemia also regarding hyperuricemia and prediabetes.  Having some flare up of her chronic dermatitis of her hands etcetera.  She is on waiting list to see Dermatology     Patient also has stage 3 chronic kidney disease, atherosclerosis of the aorta, severe obesity with a BMI of 41 and decrease activities of daily living due to impaired mobility    Patient queried and denies any further complaints    SURGICAL AND MEDICAL HISTORY: updated and reviewed.  ALLERGIES updated and reviewed.  Review of patient's allergies indicates:  No Known Allergies  CURRENT OUTPATIENT MEDICATIONS updated and reviewed    Current Outpatient Medications:     aspirin (ECOTRIN) 81 MG EC tablet, Take 1 tablet (81 mg total) by mouth once daily., Disp: 90 tablet, Rfl: 3    betamethasone valerate 0.1% (VALISONE) 0.1 % Oint, Apply topically once daily., Disp: 45 g, Rfl: 3    biotin 300 mcg Tab, Take 1 tablet by mouth once daily., Disp: , Rfl:     cetirizine (ZYRTEC) 10 MG tablet, Take 1 tablet (10 mg total) by mouth once daily. for allergic rhinitis, Disp: 90 tablet, Rfl: 3    clobetasol (TEMOVATE) 0.05 % cream, APPLY TO THE AFFECTED AREA TWICE DAILY, Disp: 30 g, Rfl: 0    diclofenac sodium (VOLTAREN) 1 % Gel, Apply 2 g topically daily as needed., Disp: 100 g, Rfl: 3    fluocinonide (LIDEX) 0.05 % external solution, AAA scalp qday - bid prn pruritus, Disp: 60 mL, Rfl: 3    fluticasone propionate (FLONASE) 50 mcg/actuation nasal spray, 1 spray (50 mcg total) by Each Nostril route once daily., Disp: 16 g, Rfl: 11    gabapentin (NEURONTIN) 300 MG capsule, Take 1 capsule (300 mg " total) by mouth 3 (three) times daily., Disp: 60 capsule, Rfl: 1    ketoconazole (NIZORAL) 2 % cream, Apply topically 2 (two) times daily., Disp: 15 g, Rfl: 11    ketoconazole (NIZORAL) 2 % shampoo, Wash hair with medicated shampoo at least 2x/week - let sit on scalp at least 5 minutes prior to rinsing, Disp: 120 mL, Rfl: 5    lactobacillus combo no.6 (PROBIOTIC COMPLEX) 4 billion cell Tab, Take 1 tablet by mouth once daily., Disp: 90 tablet, Rfl: 3    MULTIVIT-MIN/FA/CA CARB/VIT K (WOMEN'S 50+ DAILY FORMULA ORAL), Take by mouth., Disp: , Rfl:     nystatin-triamcinolone (MYCOLOG II) cream, Apply topically 2 (two) times a day., Disp: 60 g, Rfl: 3    olmesartan (BENICAR) 40 MG tablet, TAKE 1 TABLET ONE TIME DAILY, Disp: 90 tablet, Rfl: 3    omega-3 fatty acids 300 mg Cap, Take by mouth., Disp: , Rfl:     TAC 0.1%-ciclopirox-MOM, Apply to affected area twice daily after cool blow dry. (discard after 1/14/2023), Disp: 60 g, Rfl: 5    TURMERIC ORAL, Take by mouth., Disp: , Rfl:     ubidecarenone (COENZYME Q10) 100 mg Tab, Take 1 tablet by mouth once daily. , Disp: , Rfl:     allopurinoL (ZYLOPRIM) 100 MG tablet, Take 1 tablet (100 mg total) by mouth once daily., Disp: 90 tablet, Rfl: 11    amLODIPine (NORVASC) 10 MG tablet, Take 1 tablet (10 mg total) by mouth once daily., Disp: 90 tablet, Rfl: 3    carvediloL (COREG) 3.125 MG tablet, Take 1 tablet (3.125 mg total) by mouth 2 (two) times daily with meals., Disp: 180 tablet, Rfl: 3    meloxicam (MOBIC) 15 MG tablet, Take 1 tablet (15 mg total) by mouth once daily. As needed for musculoskeletal pain; do not take greater than 15 consecutive days. Take w food and water, Disp: 90 tablet, Rfl: 0    methocarbamoL (ROBAXIN) 500 MG Tab, Take 1 tablet (500 mg total) by mouth 4 (four) times daily. For muscle spasms for 10 days, Disp: 40 tablet, Rfl: 5    mometasone (ELOCON) 0.1 % ointment, Apply twice daily to hands/arms. Avoid face/groin, Disp: 45 g, Rfl: 1    omeprazole  (PRILOSEC) 40 MG capsule, Take 1 capsule (40 mg total) by mouth every morning. As needed for reflux symptoms, Disp: 90 capsule, Rfl: 3    potassium chloride SA (K-DUR,KLOR-CON) 20 MEQ tablet, Take 1 tablet (20 mEq total) by mouth 2 (two) times daily., Disp: 180 tablet, Rfl: 3    riboflavin, vitamin B2, (VITAMIN B-2 ORAL), Take 1 capsule by mouth once daily., Disp: , Rfl:     sumatriptan (IMITREX) 100 MG tablet, Take 1 tablet (100 mg total) by mouth daily as needed for Migraine. No more than 2 doses in 24 hours, Disp: 27 tablet, Rfl: 3    varicella-zoster [id number], PF, (SHINGRIX, PF,) 50 mcg/0.5 mL injection, Inject into the muscle., Disp: 1 each, Rfl: 0    Review of Systems   Constitutional:  Negative for activity change, appetite change, chills, diaphoresis, fatigue, fever and unexpected weight change.   HENT:  Negative for congestion, ear discharge, ear pain, facial swelling, hearing loss, nosebleeds, postnasal drip, rhinorrhea, sinus pressure, sneezing, sore throat, tinnitus, trouble swallowing and voice change.    Eyes:  Negative for photophobia, pain, discharge, redness, itching and visual disturbance.   Respiratory:  Negative for cough, chest tightness, shortness of breath and wheezing.    Cardiovascular:  Negative for chest pain, palpitations and leg swelling.   Gastrointestinal:  Negative for abdominal distention, abdominal pain, anal bleeding, blood in stool, constipation, diarrhea, nausea, rectal pain and vomiting.   Endocrine: Negative for cold intolerance, heat intolerance, polydipsia, polyphagia and polyuria.   Genitourinary:  Negative for difficulty urinating, dysuria and flank pain.   Musculoskeletal:  Negative for arthralgias, back pain, joint swelling, myalgias and neck pain.   Skin:  Negative for rash.   Neurological:  Negative for dizziness, tremors, seizures, syncope, speech difficulty, weakness, light-headedness, numbness and headaches.   Psychiatric/Behavioral:  Negative for behavioral  "problems, confusion, decreased concentration, dysphoric mood, sleep disturbance and suicidal ideas. The patient is not nervous/anxious and is not hyperactive.      /60 (BP Location: Right arm, Patient Position: Sitting, BP Method: Large (Manual))   Pulse 96   Temp 98.4 °F (36.9 °C) (Oral)   Ht 5' 1" (1.549 m)   Wt 99.6 kg (219 lb 9.3 oz)   SpO2 99%   BMI 41.49 kg/m²   Physical Exam  Vitals and nursing note reviewed.   Constitutional:       General: She is not in acute distress.     Appearance: Normal appearance. She is well-developed. She is not ill-appearing or toxic-appearing.   HENT:      Head: Normocephalic and atraumatic.      Right Ear: Tympanic membrane, ear canal and external ear normal.      Left Ear: Tympanic membrane, ear canal and external ear normal.      Nose: Nose normal.      Mouth/Throat:      Lips: Pink.      Mouth: Mucous membranes are moist.      Pharynx: No oropharyngeal exudate or posterior oropharyngeal erythema.   Eyes:      General: No scleral icterus.        Right eye: No discharge.         Left eye: No discharge.      Extraocular Movements: Extraocular movements intact.      Conjunctiva/sclera: Conjunctivae normal.   Cardiovascular:      Rate and Rhythm: Normal rate and regular rhythm.      Pulses: Normal pulses.      Heart sounds: Normal heart sounds. No murmur heard.  Pulmonary:      Effort: Pulmonary effort is normal. No respiratory distress.      Breath sounds: Normal breath sounds. No wheezing or rales.   Abdominal:      General: Bowel sounds are normal. There is no distension.      Palpations: Abdomen is soft. There is no mass.      Tenderness: There is no abdominal tenderness. There is no right CVA tenderness, left CVA tenderness, guarding or rebound.      Hernia: No hernia is present.   Musculoskeletal:      Cervical back: Normal range of motion and neck supple. No rigidity or tenderness.   Lymphadenopathy:      Cervical: No cervical adenopathy.   Skin:     General: " Skin is warm and dry.   Neurological:      General: No focal deficit present.      Mental Status: She is alert. Mental status is at baseline.   Psychiatric:         Mood and Affect: Mood normal.         Behavior: Behavior normal. Behavior is cooperative.       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 No more than 2 doses in 24 hours    Prediabetes  -     Hemoglobin A1C; Future    Hyperuricemia  -     URIC ACID; Future    General medical exam  -     Lipid Panel; Future    Abnormal finding of blood chemistry, unspecified  -     Lipid Panel; Future    Dermatitis  -     mometasone (ELOCON) 0.1 % ointment; Apply twice daily to hands/arms. Avoid face/groin    Severe obesity (BMI >= 40)    Atherosclerosis of aorta    Stage 3 chronic kidney disease, unspecified whether stage 3a or 3b CKD    Other orders  -     methocarbamoL (ROBAXIN) 500 MG Tab; Take 1 tablet (500 mg total) by mouth 4 (four) times daily. For muscle spasms for 10 days  -     meloxicam (MOBIC) 15 MG tablet; Take 1 tablet (15 mg total) by mouth once daily. As needed for musculoskeletal pain; do not take greater than 15 consecutive days. Take w food and water  -     allopurinoL (ZYLOPRIM) 100 MG tablet; Take 1 tablet (100 mg total) by mouth once daily.  -     potassium chloride SA (K-DUR,KLOR-CON) 20 MEQ tablet; Take 1 tablet (20 mEq total) by mouth 2 (two) times daily.  -     omeprazole (PRILOSEC) 40 MG capsule; Take 1 capsule (40 mg total) by mouth every morning. As needed for reflux symptoms  -     carvediloL (COREG) 3.125 MG tablet; Take 1 tablet  (3.125 mg total) by mouth 2 (two) times daily with meals.  -     amLODIPine (NORVASC) 10 MG tablet; Take 1 tablet (10 mg total) by mouth once daily.      Est  Time spent in the evaluation and management of this patient exceeded 30min and greater than 50% of this time was in face-to-face time with the patient.    Total time including the following:  -preparing to see the patient (e.g., obtaining and/or reviewing old records such as old primary care notes, specialists notes, hospital notes, review of laboratory tests, radiographic and/or cardiology studies)  -orders which can include medications, laboratory studies, radiographic studies, procedures, referrals etcetera   --communicating with the patient and/or family member/caregiver regarding a detailed explanation of the treatment/care plan  -arranging possible referrals and follow-up plan  -documentation of the visit in the electronic health record        Reviewed old pertinent medical records available.     All lab results personally reviewed and interpreted by me including last year, if available.        Gaurav Allison MD

## 2023-01-18 ENCOUNTER — CLINICAL SUPPORT (OUTPATIENT)
Dept: REHABILITATION | Facility: HOSPITAL | Age: 78
End: 2023-01-18
Payer: MEDICARE

## 2023-01-18 DIAGNOSIS — M25.551 CHRONIC RIGHT HIP PAIN: ICD-10-CM

## 2023-01-18 DIAGNOSIS — R53.1 DECREASED STRENGTH: Primary | ICD-10-CM

## 2023-01-18 DIAGNOSIS — G89.29 CHRONIC RIGHT HIP PAIN: ICD-10-CM

## 2023-01-18 DIAGNOSIS — R29.3 POSTURE IMBALANCE: ICD-10-CM

## 2023-01-18 DIAGNOSIS — M54.50 CHRONIC RIGHT-SIDED LOW BACK PAIN WITHOUT SCIATICA: ICD-10-CM

## 2023-01-18 DIAGNOSIS — G89.29 CHRONIC RIGHT-SIDED LOW BACK PAIN WITHOUT SCIATICA: ICD-10-CM

## 2023-01-18 DIAGNOSIS — Z74.09 MOBILITY POOR: ICD-10-CM

## 2023-01-18 PROCEDURE — 97110 THERAPEUTIC EXERCISES: CPT | Mod: PN,CQ

## 2023-01-18 NOTE — PROGRESS NOTES
Physical Therapy Daily Treatment Note         Name: Nilsa Curiel  Clinic Number: [id number]    Therapy Diagnosis:   Encounter Diagnoses   Name Primary?    Decreased strength Yes    Chronic right-sided low back pain without sciatica     Posture imbalance     Mobility poor     Chronic right hip pain      Physician: No ref. provider found    Visit Date: 2023       Physician Orders: PT Eval and Treat  Medical Diagnosis from Referral:   M47.816 (ICD-10-CM) - Lumbar spondylosis   M47.812 (ICD-10-CM) - Cervical spondylosis      Evaluation Date: 2022  Authorization Period Expiration: 2023  Plan of Care Expiration: 3/30/2023  Visit # / Visits authorized:       5th Visit FOTO - (Date, Score/ turn green)  10th Visit FOTO -  (Date, Score/ turn green )  D/C FOTO - (Date, Score/ turn green )    Time In: 11:47  Time Out: 12:30  Billable Time: 43 minutes  Non-Billable Time: 00 minutes    Precautions: Standard  Insurance: Payor: [employer] MANAGED MEDICARE / Plan: [employer] MEDICARE HMO / Product Type: Capitation /     Subjective     Pt reports: Pain increased with walking and stadning (7/10) but decreased with sitting (2/10).  She is not compliant with home exercise program.  Response to previous treatment: 1st session    Pre-Treatment Pain Ratin/10  Post-Treatment Pain Ratin/10  Location: right lower back      Objective     MS. Ash received therapeutic exercises to develop strength, endurance, ROM, and flexibility for 43 minutes including:    Low trunk rotations 3x10  Supine single knee to chest 3x10  DL glut bridges 3x10  Clamshells BTB 3x10 (performed seated today)  Hip adduction 3x10  PPT 3x10 5s hold  Supine march with PPT (perform next time)  Seated Hamstring stretch 3x30s          *Bold exercise performed       Home Exercises Provided and Patient Education Provided     Education provided:   - Continue with HEP    Written Home Exercises Provided: Patient instructed to cont prior HEP.  Exercises were  reviewed and MS. Ash was able to demonstrate them prior to the end of the session.  MS. Ash demonstrated good  understanding of the education provided.     See EMR under Patient Instructions for exercises provided prior visit.    Assessment     Pt tolerated session well. Verbal cues given with PPT to prevent pt from holding her breath with each rep. Bridges challenging due to pain and weakness, distance pelvis lifted  off mat was minimal. Nilsa will continue to benefit from therapy.     MS. Ash Is progressing well towards Her goals.     Pt prognosis is Good.     Pt will continue to benefit from skilled outpatient physical therapy to address the deficits listed in the problem list box on initial evaluation, provide pt/family education and to maximize pt's level of independence in the home and community environment.     Pt's spiritual, cultural and educational needs considered and pt agreeable to plan of care and goals.    Anticipated barriers to physical therapy: None    GOALS:   Short Term Goals:  4 weeks  1.Report decreased low back pain  < / =  5/10 at max to increase tolerance for HEP/ADL  2. Increase ROM by 10 degrees where limited in order to perform ADLs without difficulty.  3. Increase strength by 1/3 MMT grade in gluts/quads/hamstring/core  to increase tolerance for ADL and work activities.  4. Pt to tolerate HEP to improve ROM and independence with ADL's     Long Term Goals: 12 weeks  1.Report decreased low back pain < / = 2/10 at max to increase tolerance for HEP/ADL  2.Patient goal: walk more, contribute to [employer] more, travel more   3.Increase strength to 4+/5 in  gluts/quads/hamstring/core  to increase tolerance for ADL and work activities.  4. Pt will report at >90% on FOTO  to demonstrate increase in LE function with every day tasks    Plan     Continued with current POC      Maren Jose, MAYR ,  1/18/2023

## 2023-01-20 ENCOUNTER — PATIENT MESSAGE (OUTPATIENT)
Dept: DERMATOLOGY | Facility: CLINIC | Age: 78
End: 2023-01-20
Payer: MEDICARE

## 2023-01-26 ENCOUNTER — CLINICAL SUPPORT (OUTPATIENT)
Dept: REHABILITATION | Facility: HOSPITAL | Age: 78
End: 2023-01-26
Payer: MEDICARE

## 2023-01-26 DIAGNOSIS — R29.3 POSTURE IMBALANCE: ICD-10-CM

## 2023-01-26 DIAGNOSIS — Z74.09 MOBILITY POOR: ICD-10-CM

## 2023-01-26 DIAGNOSIS — R53.1 DECREASED STRENGTH: Primary | ICD-10-CM

## 2023-01-26 DIAGNOSIS — M54.50 CHRONIC RIGHT-SIDED LOW BACK PAIN WITHOUT SCIATICA: ICD-10-CM

## 2023-01-26 DIAGNOSIS — M25.551 CHRONIC RIGHT HIP PAIN: ICD-10-CM

## 2023-01-26 DIAGNOSIS — G89.29 CHRONIC RIGHT-SIDED LOW BACK PAIN WITHOUT SCIATICA: ICD-10-CM

## 2023-01-26 DIAGNOSIS — G89.29 CHRONIC RIGHT HIP PAIN: ICD-10-CM

## 2023-01-26 PROCEDURE — 97110 THERAPEUTIC EXERCISES: CPT | Mod: PN,CQ

## 2023-01-26 NOTE — PROGRESS NOTES
"Physical Therapy Daily Treatment Note         Name: Nilsa Curiel  Clinic Number: [id number]    Therapy Diagnosis:   Encounter Diagnoses   Name Primary?    Decreased strength Yes    Chronic right-sided low back pain without sciatica     Posture imbalance     Mobility poor     Chronic right hip pain        Physician: Jesus Alberto Sharp MD    Visit Date: 2023       Physician Orders: PT Eval and Treat  Medical Diagnosis from Referral:   M47.816 (ICD-10-CM) - Lumbar spondylosis   M47.812 (ICD-10-CM) - Cervical spondylosis      Evaluation Date: 2022  Authorization Period Expiration: 2023  Plan of Care Expiration: 3/30/2023  Visit # / Visits authorized: 2/ 10   PTA Visit #:       5th Visit FOTO - (Date, Score/ turn green)  10th Visit FOTO -  (Date, Score/ turn green )  D/C FOTO - (Date, Score/ turn green )    Time In: 9:32  Time Out: 10:15  Billable Time: 43 minutes  Non-Billable Time: 00 minutes    Precautions: Standard  Insurance: Payor: [employer] MANAGED MEDICARE / Plan: [employer] MEDICARE HMO / Product Type: Capitation /     Subjective     Pt reports: No significant changes. Everything feels about "usual"  She is compliant with home exercise program.  Response to previous treatment: Sore the next day    Pre-Treatment Pain Ratin/10  Post-Treatment Pain Ratin/10  Location: right lower back      Objective     MS. Ash received therapeutic exercises to develop strength, endurance, ROM, and flexibility for 43 minutes including:    Low trunk rotations 3x10  Supine single knee to chest 2x10  DL glut bridges x10   Glute sets 3x10  Clamshells BTB 3x10   Hip adduction 3x10  PPT 3x10 5s hold  Supine march with PPT (perform next time)  B Seated Hamstring stretch 3x30s   Standing hip abduction (perform ext next time) 3x8  Countertop bird dogs 3x8             *Bold exercise performed       Home Exercises Provided and Patient Education Provided     Education provided:   - Continue with HEP    Written Home Exercises " Provided: Patient instructed to cont prior HEP.  Exercises were reviewed and MS. Ash was able to demonstrate them prior to the end of the session.  MS. Ash demonstrated good  understanding of the education provided.     See EMR under Patient Instructions for exercises provided prior visit.    Assessment     Pt tolerated session well.  Glute sets added to increase glute max strength since bridges are so challenging. Standing hip abduction added to strengthen glute med and improve tolerance for ambulation. Pt very challenged due to weakness and low endurance. Verbal cues given to reduce Hip ER on either side, she was unable to demonstrate understanding. Bird dogs added to improve core stabilization and to challenge low javier and LE extensors. Pt will continue to benefit from skilled therapy.     MS. Ash Is progressing well towards Her goals.     Pt prognosis is Good.     Pt will continue to benefit from skilled outpatient physical therapy to address the deficits listed in the problem list box on initial evaluation, provide pt/family education and to maximize pt's level of independence in the home and community environment.     Pt's spiritual, cultural and educational needs considered and pt agreeable to plan of care and goals.    Anticipated barriers to physical therapy: None    GOALS:   Short Term Goals:  4 weeks  1.Report decreased low back pain  < / =  5/10 at max to increase tolerance for HEP/ADL  2. Increase ROM by 10 degrees where limited in order to perform ADLs without difficulty.  3. Increase strength by 1/3 MMT grade in gluts/quads/hamstring/core  to increase tolerance for ADL and work activities.  4. Pt to tolerate HEP to improve ROM and independence with ADL's     Long Term Goals: 12 weeks  1.Report decreased low back pain < / = 2/10 at max to increase tolerance for HEP/ADL  2.Patient goal: walk more, contribute to [employer] more, travel more   3.Increase strength to 4+/5 in  gluts/quads/hamstring/core   to increase tolerance for ADL and work activities.  4. Pt will report at >90% on FOTO  to demonstrate increase in LE function with every day tasks    Plan     Continued with current POC      Maren Jose, MARY ,  1/26/2023

## 2023-01-30 ENCOUNTER — TELEPHONE (OUTPATIENT)
Dept: OPTOMETRY | Facility: CLINIC | Age: 78
End: 2023-01-30
Payer: MEDICARE

## 2023-01-30 NOTE — TELEPHONE ENCOUNTER
Reached out to patient at 2:02PM regarding message for appt reschedule. Unable to reach pt. Left detailed vm

## 2023-02-02 ENCOUNTER — DOCUMENTATION ONLY (OUTPATIENT)
Dept: REHABILITATION | Facility: HOSPITAL | Age: 78
End: 2023-02-02
Payer: MEDICARE

## 2023-02-02 ENCOUNTER — OFFICE VISIT (OUTPATIENT)
Dept: OPTOMETRY | Facility: CLINIC | Age: 78
End: 2023-02-02
Payer: MEDICARE

## 2023-02-02 DIAGNOSIS — H52.203 HYPEROPIA WITH ASTIGMATISM AND PRESBYOPIA, BILATERAL: Primary | ICD-10-CM

## 2023-02-02 DIAGNOSIS — H40.013 OAG (OPEN ANGLE GLAUCOMA) SUSPECT, LOW RISK, BILATERAL: ICD-10-CM

## 2023-02-02 DIAGNOSIS — H04.123 DRY EYES: ICD-10-CM

## 2023-02-02 DIAGNOSIS — H25.813 COMBINED FORM OF SENILE CATARACT OF BOTH EYES: ICD-10-CM

## 2023-02-02 DIAGNOSIS — H52.4 HYPEROPIA WITH ASTIGMATISM AND PRESBYOPIA, BILATERAL: Primary | ICD-10-CM

## 2023-02-02 DIAGNOSIS — H52.03 HYPEROPIA WITH ASTIGMATISM AND PRESBYOPIA, BILATERAL: Primary | ICD-10-CM

## 2023-02-02 PROCEDURE — 92004 PR EYE EXAM, NEW PATIENT,COMPREHESV: ICD-10-PCS | Mod: S$GLB,,, | Performed by: OPTOMETRIST

## 2023-02-02 PROCEDURE — 92015 DETERMINE REFRACTIVE STATE: CPT | Mod: S$GLB,,, | Performed by: OPTOMETRIST

## 2023-02-02 PROCEDURE — 1157F ADVNC CARE PLAN IN RCRD: CPT | Mod: CPTII,S$GLB,, | Performed by: OPTOMETRIST

## 2023-02-02 PROCEDURE — 92015 PR REFRACTION: ICD-10-PCS | Mod: S$GLB,,, | Performed by: OPTOMETRIST

## 2023-02-02 PROCEDURE — 99999 PR PBB SHADOW E&M-EST. PATIENT-LVL II: ICD-10-PCS | Mod: PBBFAC,,, | Performed by: OPTOMETRIST

## 2023-02-02 PROCEDURE — 1159F PR MEDICATION LIST DOCUMENTED IN MEDICAL RECORD: ICD-10-PCS | Mod: CPTII,S$GLB,, | Performed by: OPTOMETRIST

## 2023-02-02 PROCEDURE — 92004 COMPRE OPH EXAM NEW PT 1/>: CPT | Mod: S$GLB,,, | Performed by: OPTOMETRIST

## 2023-02-02 PROCEDURE — 99999 PR PBB SHADOW E&M-EST. PATIENT-LVL II: CPT | Mod: PBBFAC,,, | Performed by: OPTOMETRIST

## 2023-02-02 PROCEDURE — 1157F PR ADVANCE CARE PLAN OR EQUIV PRESENT IN MEDICAL RECORD: ICD-10-PCS | Mod: CPTII,S$GLB,, | Performed by: OPTOMETRIST

## 2023-02-02 PROCEDURE — 1159F MED LIST DOCD IN RCRD: CPT | Mod: CPTII,S$GLB,, | Performed by: OPTOMETRIST

## 2023-02-02 NOTE — PROGRESS NOTES
"HPI     Annual Exam            Comments: Nilsa Leroy is a  76 y/o female           Comments    Pt here for annual eye exam   Pt state No complaints about va   Patient denies diplopia, headaches, flashes/floaters, pain, and   itching/burning/tearing.     Pt does not use any eye drops.   Has floaters once in a while. OS been "jumping" for 3 days now, further   described as spasm of upper eyelid.   Occasional blur upon waking.             Last edited by Yaneli Carson, OD on 2/2/2023 11:09 AM.        ROS    Positive for: Eyes  Negative for: Constitutional, Gastrointestinal, Neurological, Skin,   Genitourinary, Musculoskeletal, HENT, Endocrine, Cardiovascular,   Respiratory, Psychiatric, Allergic/Imm, Heme/Lymph  Last edited by Yaneli Carson, OD on 2/2/2023 11:09 AM.        Assessment /Plan     For exam results, see Encounter Report.    Hyperopia with astigmatism and presbyopia, bilateral    Combined form of senile cataract of both eyes    OAG (open angle glaucoma) suspect, low risk, bilateral  -     Gilbert Visual Field - OU - Extended - Both Eyes; Future  -     OCT, Optic Nerve - OU - Both Eyes; Future  -     Ultrasound pachymetry; Future    Dry eyes      Dispensed updated sRx today.   Moderate to Advanced cataracts on objective view. No significant effects on vision. Pt ed on findings. Monitor at this time.   Not fully assessed today. C/Ds larger and mildly asymmetric. IOP normotensive today. Monitor in 6mo with HVF, OCT, and Pachs.  Symptoms of dryness upon waking. Pt ed on possible etiologies of dry eye. Recommended otc AT's to use prn/ up to QID.    RTC in 6mo for glaucoma eval. Sooner prn.                    "

## 2023-02-06 ENCOUNTER — CLINICAL SUPPORT (OUTPATIENT)
Dept: REHABILITATION | Facility: HOSPITAL | Age: 78
End: 2023-02-06
Payer: MEDICARE

## 2023-02-06 DIAGNOSIS — G89.29 CHRONIC RIGHT HIP PAIN: ICD-10-CM

## 2023-02-06 DIAGNOSIS — R29.3 POSTURE IMBALANCE: ICD-10-CM

## 2023-02-06 DIAGNOSIS — M54.50 CHRONIC RIGHT-SIDED LOW BACK PAIN WITHOUT SCIATICA: ICD-10-CM

## 2023-02-06 DIAGNOSIS — M25.551 CHRONIC RIGHT HIP PAIN: ICD-10-CM

## 2023-02-06 DIAGNOSIS — R53.1 DECREASED STRENGTH: Primary | ICD-10-CM

## 2023-02-06 DIAGNOSIS — G89.29 CHRONIC RIGHT-SIDED LOW BACK PAIN WITHOUT SCIATICA: ICD-10-CM

## 2023-02-06 DIAGNOSIS — Z74.09 MOBILITY POOR: ICD-10-CM

## 2023-02-06 PROCEDURE — 97110 THERAPEUTIC EXERCISES: CPT | Mod: PN

## 2023-02-06 NOTE — PROGRESS NOTES
"Physical Therapy Daily Treatment Note         Name: Nilsa Curiel  Clinic Number: [id number]    Therapy Diagnosis:   Encounter Diagnoses   Name Primary?    Decreased strength Yes    Chronic right-sided low back pain without sciatica     Posture imbalance     Mobility poor     Chronic right hip pain        Physician: Jesus Alberto Sharp MD    Visit Date: 2023       Physician Orders: PT Eval and Treat  Medical Diagnosis from Referral:   M47.816 (ICD-10-CM) - Lumbar spondylosis   M47.812 (ICD-10-CM) - Cervical spondylosis      Evaluation Date: 2022  Authorization Period Expiration: 2023  Plan of Care Expiration: 3/30/2023  Visit # / Visits authorized: 3/ 10   PTA Visit #:       5th Visit FOTO - (Date, Score/ turn green)  10th Visit FOTO -  (Date, Score/ turn green )  D/C FOTO - (Date, Score/ turn green )    Time In: 11:04 am  Time Out: 11:47 am  Billable Time: 43 minutes  Non-Billable Time: 00 minutes    Precautions: Standard  Insurance: Payor: HU[employer] MANAGED MEDICARE / Plan: HU[employer] MEDICARE HMO / Product Type: Capitation /     Subjective     Pt reports: No significant changes. Everything feels like her usual discomfort. States standing is most difficult for her, only able to stand without support for 3-4 minutes before onset of pain.  She is compliant with home exercise program.  Response to previous treatment: Sore the next day    Pre-Treatment Pain Ratin/10  Post-Treatment Pain Ratin/10  Location: right lower back      Objective     MS. Ash received therapeutic exercises to develop strength, endurance, ROM, and flexibility for 43 minutes including:    Low trunk rotations 3x10  Supine single knee to chest 2x10  DL glut bridges 2x10   Glute sets 3x10, 5"  Clamshells BTB 3x10   Hip adduction 3x10  PPT 3x10 5s hold  Supine march with PPT 2x10  isometric contralateral abdominal hold x 10 each  B Seated Hamstring stretch 3x30s   Standing hip abduction (perform ext next time) 3x8  Countertop bird dogs " 3x8       *Bold exercise performed       Home Exercises Provided and Patient Education Provided     Education provided:   - Continue with HEP    Written Home Exercises Provided: Patient instructed to cont prior HEP.  Exercises were reviewed and MS. Ash was able to demonstrate them prior to the end of the session.  MS. Ash demonstrated good  understanding of the education provided.     See EMR under Patient Instructions for exercises provided prior visit.    Assessment     Pt tolerated session well.  Fatigues easily with exercise due to poor strength and endurance. Challenged pt with isometric holds with contralateral UE/LE to improve core strength. Discussed performance of glute squeezes in supine and standing to progress standing tolerance and strength. She would benefit from continued progress to standing exercises to improve trunk strength and standing tolerance. Pt will continue to benefit from skilled therapy.     MS. Ash Is progressing well towards Her goals.     Pt prognosis is Good.     Pt will continue to benefit from skilled outpatient physical therapy to address the deficits listed in the problem list box on initial evaluation, provide pt/family education and to maximize pt's level of independence in the home and community environment.     Pt's spiritual, cultural and educational needs considered and pt agreeable to plan of care and goals.    Anticipated barriers to physical therapy: None    GOALS:   Short Term Goals:  4 weeks  1.Report decreased low back pain  < / =  5/10 at max to increase tolerance for HEP/ADL  2. Increase ROM by 10 degrees where limited in order to perform ADLs without difficulty.  3. Increase strength by 1/3 MMT grade in gluts/quads/hamstring/core  to increase tolerance for ADL and work activities.  4. Pt to tolerate HEP to improve ROM and independence with ADL's     Long Term Goals: 12 weeks  1.Report decreased low back pain < / = 2/10 at max to increase tolerance for  HEP/ADL  2.Patient goal: walk more, contribute to [employer] more, travel more   3.Increase strength to 4+/5 in  gluts/quads/hamstring/core  to increase tolerance for ADL and work activities.  4. Pt will report at >90% on FOTO  to demonstrate increase in LE function with every day tasks    Plan     Continued with current POC      Janeth Valdez, PT ,  2/6/2023

## 2023-02-06 NOTE — PROGRESS NOTES
30 day PT-PTA face-face discussion with Jose Angel Bean DPT re:Name: Nilsa Curiel Clinic Number: [id number] patient status, POC, and plan for progression done

## 2023-02-09 DIAGNOSIS — Z00.00 ENCOUNTER FOR MEDICARE ANNUAL WELLNESS EXAM: ICD-10-CM

## 2023-02-15 ENCOUNTER — TELEPHONE (OUTPATIENT)
Dept: ORTHOPEDICS | Facility: CLINIC | Age: 78
End: 2023-02-15
Payer: MEDICARE

## 2023-02-28 ENCOUNTER — OFFICE VISIT (OUTPATIENT)
Dept: ORTHOPEDICS | Facility: CLINIC | Age: 78
End: 2023-02-28
Payer: MEDICARE

## 2023-02-28 ENCOUNTER — TELEPHONE (OUTPATIENT)
Dept: PAIN MEDICINE | Facility: CLINIC | Age: 78
End: 2023-02-28
Payer: MEDICARE

## 2023-02-28 ENCOUNTER — TELEPHONE (OUTPATIENT)
Dept: ADMINISTRATIVE | Facility: OTHER | Age: 78
End: 2023-02-28
Payer: MEDICARE

## 2023-02-28 VITALS — BODY MASS INDEX: 39.87 KG/M2 | HEIGHT: 61 IN | WEIGHT: 211.19 LBS

## 2023-02-28 DIAGNOSIS — M47.816 LUMBAR SPONDYLOSIS: ICD-10-CM

## 2023-02-28 DIAGNOSIS — M51.36 DDD (DEGENERATIVE DISC DISEASE), LUMBAR: ICD-10-CM

## 2023-02-28 DIAGNOSIS — M54.16 LUMBAR RADICULOPATHY: Primary | ICD-10-CM

## 2023-02-28 DIAGNOSIS — M43.10 DEGENERATIVE SPONDYLOLISTHESIS: Primary | ICD-10-CM

## 2023-02-28 DIAGNOSIS — M54.16 LUMBAR RADICULOPATHY: ICD-10-CM

## 2023-02-28 PROCEDURE — 1159F MED LIST DOCD IN RCRD: CPT | Mod: CPTII,S$GLB,, | Performed by: ORTHOPAEDIC SURGERY

## 2023-02-28 PROCEDURE — 1125F PR PAIN SEVERITY QUANTIFIED, PAIN PRESENT: ICD-10-PCS | Mod: CPTII,S$GLB,, | Performed by: ORTHOPAEDIC SURGERY

## 2023-02-28 PROCEDURE — 99999 PR PBB SHADOW E&M-EST. PATIENT-LVL IV: ICD-10-PCS | Mod: PBBFAC,,, | Performed by: ORTHOPAEDIC SURGERY

## 2023-02-28 PROCEDURE — 99214 OFFICE O/P EST MOD 30 MIN: CPT | Mod: S$GLB,,, | Performed by: ORTHOPAEDIC SURGERY

## 2023-02-28 PROCEDURE — 3288F FALL RISK ASSESSMENT DOCD: CPT | Mod: CPTII,S$GLB,, | Performed by: ORTHOPAEDIC SURGERY

## 2023-02-28 PROCEDURE — 99999 PR PBB SHADOW E&M-EST. PATIENT-LVL IV: CPT | Mod: PBBFAC,,, | Performed by: ORTHOPAEDIC SURGERY

## 2023-02-28 PROCEDURE — 1101F PT FALLS ASSESS-DOCD LE1/YR: CPT | Mod: CPTII,S$GLB,, | Performed by: ORTHOPAEDIC SURGERY

## 2023-02-28 PROCEDURE — 1157F ADVNC CARE PLAN IN RCRD: CPT | Mod: CPTII,S$GLB,, | Performed by: ORTHOPAEDIC SURGERY

## 2023-02-28 PROCEDURE — 1125F AMNT PAIN NOTED PAIN PRSNT: CPT | Mod: CPTII,S$GLB,, | Performed by: ORTHOPAEDIC SURGERY

## 2023-02-28 PROCEDURE — 1157F PR ADVANCE CARE PLAN OR EQUIV PRESENT IN MEDICAL RECORD: ICD-10-PCS | Mod: CPTII,S$GLB,, | Performed by: ORTHOPAEDIC SURGERY

## 2023-02-28 PROCEDURE — 1160F PR REVIEW ALL MEDS BY PRESCRIBER/CLIN PHARMACIST DOCUMENTED: ICD-10-PCS | Mod: CPTII,S$GLB,, | Performed by: ORTHOPAEDIC SURGERY

## 2023-02-28 PROCEDURE — 3288F PR FALLS RISK ASSESSMENT DOCUMENTED: ICD-10-PCS | Mod: CPTII,S$GLB,, | Performed by: ORTHOPAEDIC SURGERY

## 2023-02-28 PROCEDURE — 1159F PR MEDICATION LIST DOCUMENTED IN MEDICAL RECORD: ICD-10-PCS | Mod: CPTII,S$GLB,, | Performed by: ORTHOPAEDIC SURGERY

## 2023-02-28 PROCEDURE — 99214 PR OFFICE/OUTPT VISIT, EST, LEVL IV, 30-39 MIN: ICD-10-PCS | Mod: S$GLB,,, | Performed by: ORTHOPAEDIC SURGERY

## 2023-02-28 PROCEDURE — 1160F RVW MEDS BY RX/DR IN RCRD: CPT | Mod: CPTII,S$GLB,, | Performed by: ORTHOPAEDIC SURGERY

## 2023-02-28 PROCEDURE — 1101F PR PT FALLS ASSESS DOC 0-1 FALLS W/OUT INJ PAST YR: ICD-10-PCS | Mod: CPTII,S$GLB,, | Performed by: ORTHOPAEDIC SURGERY

## 2023-02-28 NOTE — PROGRESS NOTES
"DATE: 2/28/2023  PATIENT: Nilsa Curiel    Attending Physician: Jesus Alberto Sharp M.D.    HISTORY:  Nilsa Curiel is a 77 y.o. female who returns to me today for FU. Patient continues to have LBP that radiates posteriorly down RLE to the knee. Patient started PT, and it helped somewhat. She did not get injection scheduled yet, but she would like to get that arranged. She is miserable and wants some relief.    The patient does not smoke, have DM or endorse IVDU. The patient is not on any blood thinners and does not take chronic narcotics. She is a retired  at [employer].    PMH/PSH/FamHx/SocHx:  Unchanged from prior visit    ROS:  Positive for LBP and RLE pain  Denies perineal paresthesias, bowel or bladder incontinence    EXAM:  Ht 5' 1" (1.549 m)   Wt 95.8 kg (211 lb 3.2 oz)   BMI 39.91 kg/m²     My physical examination was notable for the following findings: motor intact BLE; SILT    IMAGING:  Today I independently reviewed the following images and my interpretations are as follows:    Previous L-spine XRs showed lumbar spondylosis and DDD. L2-3, L3-4, and L4-5 anterolisthesis.    C spine Xrs showed spondylosis and DDD without listhesis.    MRI lumbar showed moderate L2-3 and severe L3-5 central stenosis. L4-5 moderate bilateral foraminal stenosis.    DEXAin 2019 showed lumbar T-score of -0.4.      ASSESSMENT/PLAN:  Patient has lumbar degenerative spondylolisthesis and stenosis with RLE radiculopathy. I discussed the natural history of their diagnoses as well as surgical and nonsurgical treatment options. I educated the patient on the importance of core/back strengthening, correct posture, bending/lifting ergonomics, and low-impact aerobic exercises (walking, elliptical, and aquatherapy). I prescribed gabapentin. Continue medications and PT/exercises. I re-ordered L3-4 LOUSI. Patient will follow up in 6 weeks for re-evaluation. Patient is a candidate for L2-5 PLDF if she were to fail " conservative management.    Jesus Alberto Sharp MD  Orthopaedic Spine Surgeon  Department of Orthopaedic Surgery  [phone]

## 2023-03-01 ENCOUNTER — TELEPHONE (OUTPATIENT)
Dept: ADMINISTRATIVE | Facility: OTHER | Age: 78
End: 2023-03-01
Payer: MEDICARE

## 2023-03-02 ENCOUNTER — TELEPHONE (OUTPATIENT)
Dept: ADMINISTRATIVE | Facility: OTHER | Age: 78
End: 2023-03-02
Payer: MEDICARE

## 2023-03-09 ENCOUNTER — OFFICE VISIT (OUTPATIENT)
Dept: PRIMARY CARE CLINIC | Facility: CLINIC | Age: 78
End: 2023-03-09
Payer: MEDICARE

## 2023-03-09 ENCOUNTER — LAB VISIT (OUTPATIENT)
Dept: LAB | Facility: HOSPITAL | Age: 78
End: 2023-03-09
Attending: FAMILY MEDICINE
Payer: MEDICARE

## 2023-03-09 VITALS
WEIGHT: 212.94 LBS | HEART RATE: 101 BPM | DIASTOLIC BLOOD PRESSURE: 60 MMHG | BODY MASS INDEX: 40.2 KG/M2 | OXYGEN SATURATION: 98 % | HEIGHT: 61 IN | TEMPERATURE: 98 F | SYSTOLIC BLOOD PRESSURE: 124 MMHG

## 2023-03-09 DIAGNOSIS — I35.0 NONRHEUMATIC AORTIC VALVE STENOSIS: ICD-10-CM

## 2023-03-09 DIAGNOSIS — R79.9 ABNORMAL FINDING OF BLOOD CHEMISTRY, UNSPECIFIED: ICD-10-CM

## 2023-03-09 DIAGNOSIS — J34.9 SINUS DISORDER: ICD-10-CM

## 2023-03-09 DIAGNOSIS — I10 ESSENTIAL HYPERTENSION: ICD-10-CM

## 2023-03-09 DIAGNOSIS — E83.52 HYPERCALCEMIA: ICD-10-CM

## 2023-03-09 DIAGNOSIS — I65.21 STENOSIS OF RIGHT CAROTID ARTERY: ICD-10-CM

## 2023-03-09 DIAGNOSIS — L30.9 DERMATITIS: ICD-10-CM

## 2023-03-09 DIAGNOSIS — Z13.820 OSTEOPOROSIS SCREENING: ICD-10-CM

## 2023-03-09 DIAGNOSIS — I50.32 CHRONIC HEART FAILURE WITH PRESERVED EJECTION FRACTION: ICD-10-CM

## 2023-03-09 DIAGNOSIS — E78.2 MIXED HYPERLIPIDEMIA: Primary | ICD-10-CM

## 2023-03-09 DIAGNOSIS — E78.2 MIXED HYPERLIPIDEMIA: ICD-10-CM

## 2023-03-09 DIAGNOSIS — I70.0 ATHEROSCLEROSIS OF AORTA: ICD-10-CM

## 2023-03-09 DIAGNOSIS — I42.1 HYPERTROPHIC OBSTRUCTIVE CARDIOMYOPATHY WITH DIASTOLIC HEART FAILURE: ICD-10-CM

## 2023-03-09 DIAGNOSIS — Z78.0 ASYMPTOMATIC MENOPAUSAL STATE: ICD-10-CM

## 2023-03-09 DIAGNOSIS — I50.30 HYPERTROPHIC OBSTRUCTIVE CARDIOMYOPATHY WITH DIASTOLIC HEART FAILURE: ICD-10-CM

## 2023-03-09 LAB
ANION GAP SERPL CALC-SCNC: 12 MMOL/L (ref 8–16)
BUN SERPL-MCNC: 17 MG/DL (ref 8–23)
CALCIUM SERPL-MCNC: 10.9 MG/DL (ref 8.7–10.5)
CHLORIDE SERPL-SCNC: 109 MMOL/L (ref 95–110)
CO2 SERPL-SCNC: 21 MMOL/L (ref 23–29)
CREAT SERPL-MCNC: 1.2 MG/DL (ref 0.5–1.4)
EST. GFR  (NO RACE VARIABLE): 47.2 ML/MIN/1.73 M^2
ESTIMATED AVG GLUCOSE: 97 MG/DL (ref 68–131)
GLUCOSE SERPL-MCNC: 96 MG/DL (ref 70–110)
HBA1C MFR BLD: 5 % (ref 4–5.6)
POTASSIUM SERPL-SCNC: 5 MMOL/L (ref 3.5–5.1)
SODIUM SERPL-SCNC: 142 MMOL/L (ref 136–145)

## 2023-03-09 PROCEDURE — 1159F MED LIST DOCD IN RCRD: CPT | Mod: CPTII,S$GLB,, | Performed by: FAMILY MEDICINE

## 2023-03-09 PROCEDURE — 1157F ADVNC CARE PLAN IN RCRD: CPT | Mod: CPTII,S$GLB,, | Performed by: FAMILY MEDICINE

## 2023-03-09 PROCEDURE — 3078F PR MOST RECENT DIASTOLIC BLOOD PRESSURE < 80 MM HG: ICD-10-PCS | Mod: CPTII,S$GLB,, | Performed by: FAMILY MEDICINE

## 2023-03-09 PROCEDURE — 36415 COLL VENOUS BLD VENIPUNCTURE: CPT | Mod: PN | Performed by: FAMILY MEDICINE

## 2023-03-09 PROCEDURE — 99214 OFFICE O/P EST MOD 30 MIN: CPT | Mod: S$GLB,,, | Performed by: FAMILY MEDICINE

## 2023-03-09 PROCEDURE — 99999 PR PBB SHADOW E&M-EST. PATIENT-LVL V: CPT | Mod: PBBFAC,,, | Performed by: FAMILY MEDICINE

## 2023-03-09 PROCEDURE — 3288F PR FALLS RISK ASSESSMENT DOCUMENTED: ICD-10-PCS | Mod: CPTII,S$GLB,, | Performed by: FAMILY MEDICINE

## 2023-03-09 PROCEDURE — 3044F HG A1C LEVEL LT 7.0%: CPT | Mod: CPTII,S$GLB,, | Performed by: FAMILY MEDICINE

## 2023-03-09 PROCEDURE — 99999 PR PBB SHADOW E&M-EST. PATIENT-LVL V: ICD-10-PCS | Mod: PBBFAC,,, | Performed by: FAMILY MEDICINE

## 2023-03-09 PROCEDURE — 83036 HEMOGLOBIN GLYCOSYLATED A1C: CPT | Performed by: FAMILY MEDICINE

## 2023-03-09 PROCEDURE — 1101F PR PT FALLS ASSESS DOC 0-1 FALLS W/OUT INJ PAST YR: ICD-10-PCS | Mod: CPTII,S$GLB,, | Performed by: FAMILY MEDICINE

## 2023-03-09 PROCEDURE — 99214 PR OFFICE/OUTPT VISIT, EST, LEVL IV, 30-39 MIN: ICD-10-PCS | Mod: S$GLB,,, | Performed by: FAMILY MEDICINE

## 2023-03-09 PROCEDURE — 1157F PR ADVANCE CARE PLAN OR EQUIV PRESENT IN MEDICAL RECORD: ICD-10-PCS | Mod: CPTII,S$GLB,, | Performed by: FAMILY MEDICINE

## 2023-03-09 PROCEDURE — 1126F PR PAIN SEVERITY QUANTIFIED, NO PAIN PRESENT: ICD-10-PCS | Mod: CPTII,S$GLB,, | Performed by: FAMILY MEDICINE

## 2023-03-09 PROCEDURE — 3288F FALL RISK ASSESSMENT DOCD: CPT | Mod: CPTII,S$GLB,, | Performed by: FAMILY MEDICINE

## 2023-03-09 PROCEDURE — 1101F PT FALLS ASSESS-DOCD LE1/YR: CPT | Mod: CPTII,S$GLB,, | Performed by: FAMILY MEDICINE

## 2023-03-09 PROCEDURE — 1160F RVW MEDS BY RX/DR IN RCRD: CPT | Mod: CPTII,S$GLB,, | Performed by: FAMILY MEDICINE

## 2023-03-09 PROCEDURE — 3074F SYST BP LT 130 MM HG: CPT | Mod: CPTII,S$GLB,, | Performed by: FAMILY MEDICINE

## 2023-03-09 PROCEDURE — 1160F PR REVIEW ALL MEDS BY PRESCRIBER/CLIN PHARMACIST DOCUMENTED: ICD-10-PCS | Mod: CPTII,S$GLB,, | Performed by: FAMILY MEDICINE

## 2023-03-09 PROCEDURE — 3044F PR MOST RECENT HEMOGLOBIN A1C LEVEL <7.0%: ICD-10-PCS | Mod: CPTII,S$GLB,, | Performed by: FAMILY MEDICINE

## 2023-03-09 PROCEDURE — 1126F AMNT PAIN NOTED NONE PRSNT: CPT | Mod: CPTII,S$GLB,, | Performed by: FAMILY MEDICINE

## 2023-03-09 PROCEDURE — 1159F PR MEDICATION LIST DOCUMENTED IN MEDICAL RECORD: ICD-10-PCS | Mod: CPTII,S$GLB,, | Performed by: FAMILY MEDICINE

## 2023-03-09 PROCEDURE — 80048 BASIC METABOLIC PNL TOTAL CA: CPT | Performed by: FAMILY MEDICINE

## 2023-03-09 PROCEDURE — 3078F DIAST BP <80 MM HG: CPT | Mod: CPTII,S$GLB,, | Performed by: FAMILY MEDICINE

## 2023-03-09 PROCEDURE — 3074F PR MOST RECENT SYSTOLIC BLOOD PRESSURE < 130 MM HG: ICD-10-PCS | Mod: CPTII,S$GLB,, | Performed by: FAMILY MEDICINE

## 2023-03-09 RX ORDER — METHOCARBAMOL 500 MG/1
TABLET, FILM COATED ORAL
Qty: 40 TABLET | Refills: 0 | Status: SHIPPED | OUTPATIENT
Start: 2023-03-09 | End: 2023-05-16

## 2023-03-09 RX ORDER — MOMETASONE FUROATE 1 MG/G
OINTMENT TOPICAL
Qty: 45 G | Refills: 3 | Status: SHIPPED | OUTPATIENT
Start: 2023-03-09 | End: 2023-04-18 | Stop reason: SDUPTHER

## 2023-03-09 NOTE — PROGRESS NOTES
"    /60 (BP Location: Right arm, Patient Position: Sitting, BP Method: Large (Manual))   Pulse 101   Temp 98 °F (36.7 °C) (Oral)   Ht 5' 1" (1.549 m)   Wt 96.6 kg (212 lb 15.4 oz)   SpO2 98%   BMI 40.24 kg/m²       ===========    Chief Complaint: No chief complaint on file.        Nilsa Curiel is a 77 y.o. female here for    HPI    Follow-up about mixed hyperlipidemia and hypertrophic obstructive cardiomyopathy--heart failure with preserved ejection..  Asymptomatic today.    Patient queried and denies any further complaints    SURGICAL AND MEDICAL HISTORY: updated and reviewed.  ALLERGIES updated and reviewed.  Review of patient's allergies indicates:  No Known Allergies  CURRENT OUTPATIENT MEDICATIONS updated and reviewed    Current Outpatient Medications:     allopurinoL (ZYLOPRIM) 100 MG tablet, Take 1 tablet (100 mg total) by mouth once daily., Disp: 90 tablet, Rfl: 11    amLODIPine (NORVASC) 10 MG tablet, Take 1 tablet (10 mg total) by mouth once daily., Disp: 90 tablet, Rfl: 3    aspirin (ECOTRIN) 81 MG EC tablet, Take 1 tablet (81 mg total) by mouth once daily., Disp: 90 tablet, Rfl: 3    betamethasone valerate 0.1% (VALISONE) 0.1 % Oint, Apply topically once daily., Disp: 45 g, Rfl: 3    biotin 300 mcg Tab, Take 1 tablet by mouth once daily., Disp: , Rfl:     carvediloL (COREG) 3.125 MG tablet, Take 1 tablet (3.125 mg total) by mouth 2 (two) times daily with meals., Disp: 180 tablet, Rfl: 3    cetirizine (ZYRTEC) 10 MG tablet, Take 1 tablet (10 mg total) by mouth once daily. for allergic rhinitis, Disp: 90 tablet, Rfl: 3    diclofenac sodium (VOLTAREN) 1 % Gel, Apply 2 g topically daily as needed., Disp: 100 g, Rfl: 3    fluocinonide (LIDEX) 0.05 % external solution, AAA scalp qday - bid prn pruritus, Disp: 60 mL, Rfl: 3    fluticasone propionate (FLONASE) 50 mcg/actuation nasal spray, 1 spray (50 mcg total) by Each Nostril route once daily., Disp: 16 g, Rfl: 11    ketoconazole (NIZORAL) 2 % " cream, Apply topically 2 (two) times daily., Disp: 15 g, Rfl: 11    ketoconazole (NIZORAL) 2 % shampoo, Wash hair with medicated shampoo at least 2x/week - let sit on scalp at least 5 minutes prior to rinsing, Disp: 120 mL, Rfl: 5    lactobacillus combo no.6 (PROBIOTIC COMPLEX) 4 billion cell Tab, Take 1 tablet by mouth once daily., Disp: 90 tablet, Rfl: 3    meloxicam (MOBIC) 15 MG tablet, Take 1 tablet (15 mg total) by mouth once daily. As needed for musculoskeletal pain; do not take greater than 15 consecutive days. Take w food and water, Disp: 90 tablet, Rfl: 0    MULTIVIT-MIN/FA/CA CARB/VIT K (WOMEN'S 50+ DAILY FORMULA ORAL), Take by mouth., Disp: , Rfl:     nystatin-triamcinolone (MYCOLOG II) cream, Apply topically 2 (two) times a day., Disp: 60 g, Rfl: 3    olmesartan (BENICAR) 40 MG tablet, TAKE 1 TABLET ONE TIME DAILY, Disp: 90 tablet, Rfl: 3    omega-3 fatty acids 300 mg Cap, Take by mouth., Disp: , Rfl:     omeprazole (PRILOSEC) 40 MG capsule, Take 1 capsule (40 mg total) by mouth every morning. As needed for reflux symptoms, Disp: 90 capsule, Rfl: 3    potassium chloride SA (K-DUR,KLOR-CON) 20 MEQ tablet, Take 1 tablet (20 mEq total) by mouth 2 (two) times daily., Disp: 180 tablet, Rfl: 3    riboflavin, vitamin B2, (VITAMIN B-2 ORAL), Take 1 capsule by mouth once daily., Disp: , Rfl:     sumatriptan (IMITREX) 100 MG tablet, Take 1 tablet (100 mg total) by mouth daily as needed for Migraine. No more than 2 doses in 24 hours, Disp: 27 tablet, Rfl: 3    TAC 0.1%-ciclopirox-MOM, Apply to affected area twice daily after cool blow dry. (discard after 1/14/2023), Disp: 60 g, Rfl: 5    TURMERIC ORAL, Take by mouth., Disp: , Rfl:     ubidecarenone (COENZYME Q10) 100 mg Tab, Take 1 tablet by mouth once daily. , Disp: , Rfl:     methocarbamoL (ROBAXIN) 500 MG Tab, TAKE 1 TABLET FOUR TIMES DAILY FOR MUSCLE SPASM(S), Disp: 40 tablet, Rfl: 0    mometasone (ELOCON) 0.1 % ointment, Apply twice daily to hands/arms. Avoid  "face/groin, Disp: 45 g, Rfl: 3    Review of Systems   Constitutional:  Negative for activity change and unexpected weight change.   HENT:  Negative for hearing loss, rhinorrhea and trouble swallowing.    Eyes:  Negative for discharge and visual disturbance.   Respiratory:  Negative for chest tightness and wheezing.    Cardiovascular:  Negative for chest pain and palpitations.   Gastrointestinal:  Negative for blood in stool, constipation, diarrhea and vomiting.   Endocrine: Negative for polydipsia and polyuria.   Genitourinary:  Negative for difficulty urinating, dysuria, hematuria and menstrual problem.   Musculoskeletal:  Negative for arthralgias, joint swelling and neck pain.   Neurological:  Negative for weakness and headaches.   Psychiatric/Behavioral:  Negative for confusion and dysphoric mood.      /60 (BP Location: Right arm, Patient Position: Sitting, BP Method: Large (Manual))   Pulse 101   Temp 98 °F (36.7 °C) (Oral)   Ht 5' 1" (1.549 m)   Wt 96.6 kg (212 lb 15.4 oz)   SpO2 98%   BMI 40.24 kg/m²   Physical Exam    Diagnoses and all orders for this visit:    Mixed hyperlipidemia  -     Hemoglobin A1C; Future    Essential hypertension  -     Hemoglobin A1C; Future    Osteoporosis screening  -     DXA Bone Density Axial Skeleton 1 or more sites; Future    Asymptomatic menopausal state  -     DXA Bone Density Axial Skeleton 1 or more sites; Future    Sinus disorder    Dermatitis  -     mometasone (ELOCON) 0.1 % ointment; Apply twice daily to hands/arms. Avoid face/groin    Stenosis of right carotid artery    Hypercalcemia  -     Basic Metabolic Panel; Future  -     Hemoglobin A1C; Future    Abnormal finding of blood chemistry, unspecified  -     Hemoglobin A1C; Future    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    Chronic heart failure with preserved ejection fraction    Nonrheumatic aortic valve stenosis    Atherosclerosis of aorta    Other orders  -     methocarbamoL " (ROBAXIN) 500 MG Tab; TAKE 1 TABLET FOUR TIMES DAILY FOR MUSCLE SPASM(S)      Consider statin.  Weighs consider statin--way pros and cons with her significant arthralgias and myalgias already versus the risk of possibly worsening that was statin therapy.        All lab results over past 2 years available reviewed inc labs and any cardiology or radiology studies  Any new prescription medications gone over in detail including reason for taking the medication, the general mechanism of action, most common possible side effects and possible costs, etcetera.  Gaurav Allison MD      Est  Time spent in the evaluation and management of this patient exceeded 30min and greater than 50% of this time was in face-to-face time with the patient on day of the clinic visit.   This includes face-to-face time and not face-to-face time preparing to see the patient (eg, review of tests), obtaining and/or reviewing separately obtained history, documenting clinical information in the electronic or other health record, independently interpreting results and communicating results to the patient/family/caregiver, or care coordinator.   Total time including the following:  -preparing to see the patient (eg, obtaining and/or reviewing old records such as primary care notes, specialist notes, hospital notes, review of laboratory tests, radiographic and/or cardiology studies)  --placing orders and/or reviewing other physician's orders which can include medications, laboratory studies, radiographic studies, procedures, referrals etcetera   --communicating with the patient and/or family member/caregiver regarding a detailed explanation of the treatment/care plan  --documentation of the visit in the electronic health record  --communicating with referral staff, nursing staff about patient and need for arranging studies, follow-up, referras, etc.     A dictation device was used to produce this document. Use of such devices sometimes results in  grammatical errors or replacement of words that sound similarly.

## 2023-03-13 ENCOUNTER — TELEPHONE (OUTPATIENT)
Dept: PAIN MEDICINE | Facility: CLINIC | Age: 78
End: 2023-03-13
Payer: MEDICARE

## 2023-03-13 NOTE — TELEPHONE ENCOUNTER
Staff spoke with the patient regarding their procedure with Dr. Delatorre scheduled on 03/16/2023; the patient was provided the Arrival Information and scheduled time 10:30 AM.     The patient verbalized understanding.    Thank you,

## 2023-03-14 PROBLEM — I50.32 CHRONIC DIASTOLIC HEART FAILURE: Status: RESOLVED | Noted: 2022-08-18 | Resolved: 2023-03-14

## 2023-03-16 ENCOUNTER — HOSPITAL ENCOUNTER (OUTPATIENT)
Facility: HOSPITAL | Age: 78
Discharge: HOME | End: 2023-03-16
Attending: STUDENT IN AN ORGANIZED HEALTH CARE EDUCATION/TRAINING PROGRAM | Admitting: STUDENT IN AN ORGANIZED HEALTH CARE EDUCATION/TRAINING PROGRAM
Payer: MEDICARE

## 2023-03-16 VITALS
DIASTOLIC BLOOD PRESSURE: 56 MMHG | TEMPERATURE: 98 F | RESPIRATION RATE: 16 BRPM | WEIGHT: 210 LBS | SYSTOLIC BLOOD PRESSURE: 116 MMHG | HEIGHT: 61 IN | OXYGEN SATURATION: 99 % | HEART RATE: 90 BPM | BODY MASS INDEX: 39.65 KG/M2

## 2023-03-16 DIAGNOSIS — M54.16 LUMBAR RADICULOPATHY: Primary | ICD-10-CM

## 2023-03-16 PROCEDURE — 25500020 PHARM REV CODE 255: Performed by: STUDENT IN AN ORGANIZED HEALTH CARE EDUCATION/TRAINING PROGRAM

## 2023-03-16 PROCEDURE — 99900035 HC TECH TIME PER 15 MIN (STAT)

## 2023-03-16 PROCEDURE — 99152 MOD SED SAME PHYS/QHP 5/>YRS: CPT | Performed by: STUDENT IN AN ORGANIZED HEALTH CARE EDUCATION/TRAINING PROGRAM

## 2023-03-16 PROCEDURE — 64483 NJX AA&/STRD TFRM EPI L/S 1: CPT | Mod: RT | Performed by: STUDENT IN AN ORGANIZED HEALTH CARE EDUCATION/TRAINING PROGRAM

## 2023-03-16 PROCEDURE — 25000003 PHARM REV CODE 250: Performed by: STUDENT IN AN ORGANIZED HEALTH CARE EDUCATION/TRAINING PROGRAM

## 2023-03-16 PROCEDURE — 64483 PR EPIDURAL INJ, ANES/STEROID, TRANSFORAMINAL, LUMB/SACR, SNGL LEVL: ICD-10-PCS | Mod: 50,,, | Performed by: STUDENT IN AN ORGANIZED HEALTH CARE EDUCATION/TRAINING PROGRAM

## 2023-03-16 PROCEDURE — 64484 NJX AA&/STRD TFRM EPI L/S EA: CPT | Mod: RT | Performed by: STUDENT IN AN ORGANIZED HEALTH CARE EDUCATION/TRAINING PROGRAM

## 2023-03-16 PROCEDURE — 63600175 PHARM REV CODE 636 W HCPCS: Performed by: STUDENT IN AN ORGANIZED HEALTH CARE EDUCATION/TRAINING PROGRAM

## 2023-03-16 PROCEDURE — 64483 NJX AA&/STRD TFRM EPI L/S 1: CPT | Mod: 50,,, | Performed by: STUDENT IN AN ORGANIZED HEALTH CARE EDUCATION/TRAINING PROGRAM

## 2023-03-16 PROCEDURE — 94761 N-INVAS EAR/PLS OXIMETRY MLT: CPT

## 2023-03-16 RX ORDER — MIDAZOLAM HYDROCHLORIDE 1 MG/ML
2 INJECTION INTRAMUSCULAR; INTRAVENOUS ONCE AS NEEDED
Status: COMPLETED | OUTPATIENT
Start: 2023-03-16 | End: 2023-03-16

## 2023-03-16 RX ORDER — LIDOCAINE HYDROCHLORIDE 10 MG/ML
INJECTION, SOLUTION EPIDURAL; INFILTRATION; INTRACAUDAL; PERINEURAL
Status: DISCONTINUED | OUTPATIENT
Start: 2023-03-16 | End: 2023-03-16 | Stop reason: HOSPADM

## 2023-03-16 RX ORDER — FENTANYL CITRATE 50 UG/ML
25 INJECTION, SOLUTION INTRAMUSCULAR; INTRAVENOUS ONCE AS NEEDED
Status: COMPLETED | OUTPATIENT
Start: 2023-03-16 | End: 2023-03-16

## 2023-03-16 RX ORDER — SODIUM CHLORIDE 9 MG/ML
500 INJECTION, SOLUTION INTRAVENOUS CONTINUOUS
Status: DISCONTINUED | OUTPATIENT
Start: 2023-03-16 | End: 2023-03-16 | Stop reason: HOSPADM

## 2023-03-16 RX ORDER — DEXAMETHASONE SODIUM PHOSPHATE 4 MG/ML
INJECTION, SOLUTION INTRA-ARTICULAR; INTRALESIONAL; INTRAMUSCULAR; INTRAVENOUS; SOFT TISSUE
Status: DISCONTINUED | OUTPATIENT
Start: 2023-03-16 | End: 2023-03-16 | Stop reason: HOSPADM

## 2023-03-16 NOTE — PLAN OF CARE
VSS. Pt able to tolerate oral liquids. pt denies c/o pain.  dressing intact. . No distress noted. Pt states she is ready for D/C. D/C instructions reviewed with pt and family, verbalized understanding.

## 2023-03-16 NOTE — DISCHARGE SUMMARY
[facility] - [facility] (Hospital)  Discharge Note  Short Stay    Procedure(s) (LRB):  b/l L4-5 TFESI (N/A)      OUTCOME: Patient tolerated treatment/procedure well without complication and is now ready for discharge.    DISPOSITION: Home or Self Care    FINAL DIAGNOSIS:  <principal problem not specified>    FOLLOWUP: In clinic    DISCHARGE INSTRUCTIONS:  No discharge procedures on file.     TIME SPENT ON DISCHARGE: 10 minutes

## 2023-03-16 NOTE — PROGRESS NOTES
Pre op completed. All concerns addressed. Patient belongings to be placed in locker. Bed in lowest position. Call light within reach. Family not available.

## 2023-03-16 NOTE — OP NOTE
"PROCEDURE: bilateral Lumbar L4-5 Transforaminal Epidural Steroid Injection    Patient Name: Nilsa Curiel  MRN: [id number]    PROCEDURE DATE: 3/16/2023    INJECTION # 1    DIAGNOSIS: Lumbar Radiculopathy  CPT CODE: 00841 (INJECTION(S), ANESTHETIC AGENT(S) AND/OR STEROID; TRANSFORAMINAL EPIDURAL, WITH IMAGING GUIDANCE (FLUOROSCOPY OR CT), LUMBAR OR SACRAL, SINGLE LEVEL), 02522 (Each additional level).     POSTPROCEDURE DIAGNOSIS: Same    PHYSICIAN: Gato Delatorre DO  NEEDLE TYPE: - 22G 5" Spinal Needle  MEDICATIONS INJECTED: 4ml mixture of 2.5ml Dexamethasone 4mg/ml and 1.5ml 1% lidocaine split equally between each site.  CONTRAST: Omni 300    Sedation Medications - Mild Sedation with 2mg Versed and 25mcg Fentanyl    Estimated Blood Loss - <2ml  Drains: None  Specimens Removed: None  Urine Output - Not Measured  Complications: None  Outcome: Good    Informed Consent:  The patient's condition and proposed procedures, risks, and alternatives were discussed with the patient or responsible party.  The patient's / responsible party's questions were answered.   The patient / responsible party appeared to understand and chose to proceed.  Informed consent was obtained.  After obtaining written consent, an IV hep lock was placed. (See nurses notes for details).     Procedure in Detail:  The patient was taken back to the OR suite and placed in a prone position. The skin overlying the injection site was prepped and draped in an aseptic fashion. The target injection site (see above) was identified with fluoroscopy.     Procedural Pause:  A procedural pause verifying correct patient, medical record number, allergies, medications to be administered, current vital signs, and surgical site was performed immediately prior to beginning the procedure.    The skin and subcutaneous tissue overlying the target site(s) of injection for the L4-5 transforaminal epidural steroid injection was/were anesthetized using 4 mL of 1% lidocaine " with a 25-gauge, 1½-inch needle.      The fluoroscopic beam was aligned to create a tunnel view.  The above noted needle was advanced parallel to the fluoroscopic beam towards the above noted foramen under fluoroscopic guidance.  The final position of the needle(s) was identified using AP and lateral views.  Paresthesias were not noted with final needle positioning.       A microbore extension tubing was attached to the needle to minimize any movement of the needle during injection or syringe change.  After negative aspiration for heme or CSF at each site(s) where the needle(s) was placed, 1ml of contrast dye was injected to confirm appropriate placement and that there was no vascular uptake.  Pain provocation by the injected contrast material was not noted.  Then the injectate solution described above was injected in increments.  The needle was then retracted approximately [facility] and the needle track was flushed with 0.5 mL of Lidocaine 1%.  The needle(s) was then removed.     The same procedural technique outlined above was repeated on the OPPOSITE side.    The heart rate, pulse oximetry, and blood pressure were continuously monitored throughout the procedure.  The procedure was well tolerated. She was carefully escorted to the recovery room in stable condition. Patient was monitored by RN for recovery period.  The patient will be contacted in the next few days to determine extent of relief.  Patient was given post procedure and discharge instructions to follow at home.  The patient was discharged in a stable condition.    Note Electronically Signed By:  Gato Leung  03/16/2023

## 2023-03-16 NOTE — H&P
HPI  Patient presenting for Procedure(s) (LRB):  b/l L4-5 TFESI (N/A)     Patient on Anti-coagulation No    No health changes since previous encounter    Past Medical History:   Diagnosis Date    Acute bilateral low back pain without sciatica 10/10/2017    Anemia 2018    Arthritis     Atopic dermatitis     Chronic diastolic heart failure 2022    Chronic kidney disease, stage III (moderate) 2020    GERD (gastroesophageal reflux disease)     Hyperlipidemia     Hypertension     Left ventricular hypertrophy 2016    Morbid obesity with BMI of 40.0-44.9, adult 2016    MONE (obstructive sleep apnea) 2018     Past Surgical History:   Procedure Laterality Date     SECTION      x3    COLONOSCOPY N/A 10/4/2021    Procedure: COLONOSCOPY;  Surgeon: Taran Galvez MD;  Location: [facility] (4TH FLR);  Service: Endoscopy;  Laterality: N/A;    COLONOSCOPY N/A 2022    Procedure: COLONOSCOPY--positive fit kit;  Surgeon: Tani Lara MD;  Location: [facility] (4TH FLR);  Service: Endoscopy;  Laterality: N/A;    ESOPHAGOGASTRODUODENOSCOPY N/A 10/4/2021    Procedure: EGD (ESOPHAGOGASTRODUODENOSCOPY);  Surgeon: Taran Galvez MD;  Location: [facility] (4TH FLR);  Service: Endoscopy;  Laterality: N/A;  covid test 10/1-[facility]    10/1-LVM about COVID test-GT    ESOPHAGOGASTRODUODENOSCOPY N/A 2022    Procedure: EGD (ESOPHAGOGASTRODUODENOSCOPY);  Surgeon: Tani Lara MD;  Location: [facility] (4TH FLR);  Service: Endoscopy;  Laterality: N/A;  12/15 fully vaccinated; instructions to portal-st  -covid [facility]    TUBAL LIGATION       Review of patient's allergies indicates:  No Known Allergies   Current Facility-Administered Medications   Medication    0.9%  NaCl infusion    fentaNYL 50 mcg/mL injection 25 mcg    midazolam (VERSED) 1 mg/mL injection 2 mg       PMHx, PSHx, Allergies, Medications reviewed in epic    ROS negative except pain complaints in HPI    OBJECTIVE:    There were no  vitals taken for this visit.    PHYSICAL EXAMINATION:    GENERAL: Well appearing, in no acute distress, alert and oriented x3.  PSYCH:  Mood and affect appropriate.  SKIN: Skin color, texture, turgor normal, no rashes or lesions which will impact the procedure.  CV: RRR with palpation of the radial artery.  PULM: No evidence of respiratory difficulty, symmetric chest rise. Clear to auscultation.  NEURO: Cranial nerves grossly intact.    Plan:    Proceed with procedure as planned Procedure(s) (LRB):  b/l L4-5 TFESI (N/A)    Gato Leung  03/16/2023

## 2023-03-16 NOTE — PATIENT INSTRUCTIONS
[facility]  [facility]  [place] service # [phone]    POST-PROCEDURE INSTRUCTIONS:    Today you had an injection that included a steroid medications.  The steroid may or may not have been mixed with a local anesthetic when it was injected.   If the injection was in the neck, you may feel some pressure, numbness, or slight weakness in the arm after the procedure for a short period of time (this is a normal response), if this persists for longer than 1 day please contact our office or go to the emergency room.  If the injection was in the low back, you may feel some pressure, numbness, or slight weakness in the leg after the procedure for a short period of time (this is a normal response), if this persists for longer than 1 day please contact our office or go to the emergency room.  You may get side effects from the steroid.  This is not uncommon.  Symptoms include: elevated blood sugar, elevated blood pressure, headache, flushing, nausea, insomnia.  These symptoms are transient and will resolve within 1-3 days.  If symptoms last longer than this please contact our office or head to the emergency room.  Steroid medications can take anywhere from 3-14 days to take effect (rarely longer).  You may notice that your pain worsens for a short period of time after the injection, this would not be unusual due to the pressure and trauma from the needle.    If you do not have a follow up appointment scheduled, please contact my office (or the office of the physician who referred you for the procedure) to get a post-procedure follow up scheduled 2-4 weeks after the procedure.  This can be done as a virtual visit if that is more convenient for you.      What you need to do:    Keep a record of your response to the injection you had today.    How much relief did you get?   When did the relief start and how long did it last?  Were you able to decrease the use of any of your pain  medications?  Were you able to increase your level of activity?  How long did the relief last?    What to watch out for:    If you experience any of the following symptoms after your procedure, please notify the messaging service immediately (see above for contact information):   fever (increased oral temperature)   bleeding or swelling at the injection site,    drainage, rash or redness at the injection site    possible signs of infection    increased pain at the injection site   worsening of your usual pain   severe headache   new or worsening numbness    new arm and/or leg weakness, or    changes in bowel and/or bladder function: urinating or defecating on yourself and not knowing that you did it.    PLEASE FOLLOW ALL INSTRUCTIONS CAREFULLY     Do not engage in strenuous activity (e.g., lifting or pushing heavy objects or repeated bending) for 24 hours.     Do not take a bath, swim or use Jacuzzi for 24 hours after procedure. (A shower is fine).   Remove any Band-Aids when you get home.    Use cold/ice, as needed for comfort.  We recommend the use of cold therapy alternating on for 20 minutes, off for 20 minutes.    Do not apply direct heat (heating pad or heat packs) to the injection site for 24 hours.     Resume your usual medications, unless instructed otherwise by your Pain Physician.     If you are on warfarin (Coumadin) or other blood thinner, resume this medication as instructed by your prescribing Physician.    IF AT ANY POINT YOU ARE VERY CONCERNED ABOUT YOUR SYMPTOMS, PLEASE GO TO THE EMERGENCY ROOM.    If you develop worsening pain, weakness, numbness, lose bowel or bladder control (i.e., having an accident where you did not even know you had to go to the bathroom and suddenly noticed you soiled yourself), saddle anesthesia (a loss of sensation restricted to the area of the buttocks, anus and between the legs -- i.e., those parts of your body that would touch a saddle if you were sitting on one) you  need to go immediately to the emergency department for evaluation and treatment.    ----------------------------------------------------------------------------------------------------------------------------------------------------------------  If you received Sedation please read the following instructions:  POST SEDATION INSTRUCTIONS    Today you received intravenous medication (also known as sedation) that was used to help you relax and/or decrease discomfort during your procedure. This medication will be acting in your body for the next 24 hours, so you might feel a little tired or sleepy. This feeling will slowly wear off.   Common side effects associated with these medications include: drowsiness, dizziness, sleepiness, confusion, feeling excited, difficulty remembering things, lack of steadiness with walking or balance, loss of fine muscle control, slowed reflexes, difficulty focusing, and blurred vision.  Some over-the-counter and prescription medications (e.g., muscle relaxants, opioids, mood-altering medications, sedatives/hypnotics, antihistamines) can interact with the intravenous medication you received and cause an increased risk of the side effects listed above in addition to other potentially life threatening side effects. Use extreme caution if you are taking such medications, and consult with your Pain Physician or prescribing physician if you have any questions.  For the next 12-24 hours:    DO NOT--Drive a car, operate machinery or power tools   DO NOT--Drink any alcoholic beverages (not even beer), they may dangerously increase the risk of side effects.    DO NOT--Make any important legal or business decisions or sign important documents.  We advise you to have someone to assist you at home. Move slowly and carefully. Do not make sudden changes in position. Be aware of dizziness or light-headedness and move accordingly.   If you seek medical treatment within 24 hours, let the nurse or doctor  caring for you know that you have received the above medications. If you have any questions or concerns related to your sedation or treatment today please contact us.

## 2023-03-24 ENCOUNTER — TELEPHONE (OUTPATIENT)
Dept: ADMINISTRATIVE | Facility: CLINIC | Age: 78
End: 2023-03-24
Payer: MEDICARE

## 2023-03-24 NOTE — TELEPHONE ENCOUNTER
Called pt, no answer; left message informing pt I was calling to remind pt of her in office EAWV on 3/27/23 and to return my call if she had any questions; left my name and number

## 2023-03-27 ENCOUNTER — OFFICE VISIT (OUTPATIENT)
Dept: PRIMARY CARE CLINIC | Facility: CLINIC | Age: 78
End: 2023-03-27
Payer: MEDICARE

## 2023-03-27 VITALS
TEMPERATURE: 98 F | HEART RATE: 93 BPM | SYSTOLIC BLOOD PRESSURE: 138 MMHG | HEIGHT: 61 IN | BODY MASS INDEX: 40.2 KG/M2 | DIASTOLIC BLOOD PRESSURE: 61 MMHG | WEIGHT: 212.94 LBS | OXYGEN SATURATION: 96 %

## 2023-03-27 DIAGNOSIS — L30.4 INTERTRIGO: ICD-10-CM

## 2023-03-27 DIAGNOSIS — E79.0 HYPERURICEMIA: ICD-10-CM

## 2023-03-27 DIAGNOSIS — M25.551 CHRONIC RIGHT HIP PAIN: ICD-10-CM

## 2023-03-27 DIAGNOSIS — G89.29 CHRONIC RIGHT-SIDED LOW BACK PAIN WITHOUT SCIATICA: ICD-10-CM

## 2023-03-27 DIAGNOSIS — I50.30 HYPERTROPHIC OBSTRUCTIVE CARDIOMYOPATHY WITH DIASTOLIC HEART FAILURE: ICD-10-CM

## 2023-03-27 DIAGNOSIS — N18.30 STAGE 3 CHRONIC KIDNEY DISEASE, UNSPECIFIED WHETHER STAGE 3A OR 3B CKD: ICD-10-CM

## 2023-03-27 DIAGNOSIS — M47.816 LUMBAR SPONDYLOSIS: ICD-10-CM

## 2023-03-27 DIAGNOSIS — M62.838 MUSCLE SPASMS OF BOTH LOWER EXTREMITIES: ICD-10-CM

## 2023-03-27 DIAGNOSIS — M48.02 CERVICAL STENOSIS OF SPINE: ICD-10-CM

## 2023-03-27 DIAGNOSIS — M43.17 SPONDYLOLISTHESIS OF LUMBOSACRAL REGION: ICD-10-CM

## 2023-03-27 DIAGNOSIS — M25.473 ANKLE SWELLING, UNSPECIFIED LATERALITY: ICD-10-CM

## 2023-03-27 DIAGNOSIS — R29.898 DECREASED STRENGTH OF TRUNK AND BACK: ICD-10-CM

## 2023-03-27 DIAGNOSIS — R53.1 DECREASED STRENGTH: ICD-10-CM

## 2023-03-27 DIAGNOSIS — I65.21 STENOSIS OF RIGHT CAROTID ARTERY: ICD-10-CM

## 2023-03-27 DIAGNOSIS — M47.812 CERVICAL SPINE ARTHRITIS WITH NERVE PAIN: ICD-10-CM

## 2023-03-27 DIAGNOSIS — D64.9 ANEMIA, UNSPECIFIED TYPE: ICD-10-CM

## 2023-03-27 DIAGNOSIS — L20.89 FLEXURAL ATOPIC DERMATITIS: ICD-10-CM

## 2023-03-27 DIAGNOSIS — M25.551 RIGHT HIP PAIN: ICD-10-CM

## 2023-03-27 DIAGNOSIS — M54.50 CHRONIC RIGHT-SIDED LOW BACK PAIN WITHOUT SCIATICA: ICD-10-CM

## 2023-03-27 DIAGNOSIS — M79.2 CERVICAL SPINE ARTHRITIS WITH NERVE PAIN: ICD-10-CM

## 2023-03-27 DIAGNOSIS — Z74.09 OTHER REDUCED MOBILITY: ICD-10-CM

## 2023-03-27 DIAGNOSIS — R51.9 HEADACHE DISORDER: ICD-10-CM

## 2023-03-27 DIAGNOSIS — Z00.00 ENCOUNTER FOR PREVENTIVE HEALTH EXAMINATION: Primary | ICD-10-CM

## 2023-03-27 DIAGNOSIS — R53.83 FATIGUE, UNSPECIFIED TYPE: ICD-10-CM

## 2023-03-27 DIAGNOSIS — I70.0 ATHEROSCLEROSIS OF AORTA: ICD-10-CM

## 2023-03-27 DIAGNOSIS — M19.011 PRIMARY OSTEOARTHRITIS OF BOTH SHOULDERS: ICD-10-CM

## 2023-03-27 DIAGNOSIS — I42.1 HYPERTROPHIC OBSTRUCTIVE CARDIOMYOPATHY WITH DIASTOLIC HEART FAILURE: ICD-10-CM

## 2023-03-27 DIAGNOSIS — G89.29 CHRONIC RIGHT HIP PAIN: ICD-10-CM

## 2023-03-27 DIAGNOSIS — I50.32 CHRONIC HEART FAILURE WITH PRESERVED EJECTION FRACTION: ICD-10-CM

## 2023-03-27 DIAGNOSIS — M54.2 CHRONIC NECK PAIN: ICD-10-CM

## 2023-03-27 DIAGNOSIS — G47.33 OSA (OBSTRUCTIVE SLEEP APNEA): ICD-10-CM

## 2023-03-27 DIAGNOSIS — Z00.00 ENCOUNTER FOR MEDICARE ANNUAL WELLNESS EXAM: ICD-10-CM

## 2023-03-27 DIAGNOSIS — Z74.09 IMPAIRED MOBILITY: ICD-10-CM

## 2023-03-27 DIAGNOSIS — M19.012 PRIMARY OSTEOARTHRITIS OF BOTH SHOULDERS: ICD-10-CM

## 2023-03-27 DIAGNOSIS — K21.9 GASTROESOPHAGEAL REFLUX DISEASE WITHOUT ESOPHAGITIS: ICD-10-CM

## 2023-03-27 DIAGNOSIS — L65.9 HAIR LOSS: ICD-10-CM

## 2023-03-27 DIAGNOSIS — I10 ESSENTIAL HYPERTENSION: ICD-10-CM

## 2023-03-27 DIAGNOSIS — M48.062 NEUROGENIC CLAUDICATION DUE TO LUMBAR SPINAL STENOSIS: ICD-10-CM

## 2023-03-27 DIAGNOSIS — Z99.81 DEPENDENCE ON SUPPLEMENTAL OXYGEN: ICD-10-CM

## 2023-03-27 DIAGNOSIS — E78.2 MIXED HYPERLIPIDEMIA: ICD-10-CM

## 2023-03-27 DIAGNOSIS — R29.3 POSTURE IMBALANCE: ICD-10-CM

## 2023-03-27 DIAGNOSIS — G89.29 CHRONIC NECK PAIN: ICD-10-CM

## 2023-03-27 DIAGNOSIS — E21.3 HYPERPARATHYROIDISM: ICD-10-CM

## 2023-03-27 DIAGNOSIS — E83.52 HYPERCALCEMIA: ICD-10-CM

## 2023-03-27 DIAGNOSIS — I35.0 NONRHEUMATIC AORTIC VALVE STENOSIS: ICD-10-CM

## 2023-03-27 PROBLEM — D50.9 MICROCYTIC HYPOCHROMIC ANEMIA: Status: RESOLVED | Noted: 2018-04-24 | Resolved: 2023-03-27

## 2023-03-27 PROBLEM — E66.01 SEVERE OBESITY (BMI >= 40): Status: RESOLVED | Noted: 2022-06-10 | Resolved: 2023-03-27

## 2023-03-27 PROBLEM — R73.03 PREDIABETES: Status: RESOLVED | Noted: 2023-01-04 | Resolved: 2023-03-27

## 2023-03-27 PROBLEM — E87.6 HYPOKALEMIA: Status: RESOLVED | Noted: 2022-08-18 | Resolved: 2023-03-27

## 2023-03-27 PROBLEM — L30.9 DERMATITIS: Status: RESOLVED | Noted: 2023-01-04 | Resolved: 2023-03-27

## 2023-03-27 PROBLEM — M48.00 CENTRAL STENOSIS OF SPINAL CANAL: Status: RESOLVED | Noted: 2022-12-23 | Resolved: 2023-03-27

## 2023-03-27 PROCEDURE — 99999 PR PBB SHADOW E&M-EST. PATIENT-LVL IV: CPT | Mod: PBBFAC,,, | Performed by: NURSE PRACTITIONER

## 2023-03-27 PROCEDURE — 1101F PT FALLS ASSESS-DOCD LE1/YR: CPT | Mod: CPTII,S$GLB,, | Performed by: NURSE PRACTITIONER

## 2023-03-27 PROCEDURE — 3288F PR FALLS RISK ASSESSMENT DOCUMENTED: ICD-10-PCS | Mod: CPTII,S$GLB,, | Performed by: NURSE PRACTITIONER

## 2023-03-27 PROCEDURE — 1159F MED LIST DOCD IN RCRD: CPT | Mod: CPTII,S$GLB,, | Performed by: NURSE PRACTITIONER

## 2023-03-27 PROCEDURE — G0439 PR MEDICARE ANNUAL WELLNESS SUBSEQUENT VISIT: ICD-10-PCS | Mod: S$GLB,,, | Performed by: NURSE PRACTITIONER

## 2023-03-27 PROCEDURE — 1126F AMNT PAIN NOTED NONE PRSNT: CPT | Mod: CPTII,S$GLB,, | Performed by: NURSE PRACTITIONER

## 2023-03-27 PROCEDURE — 1160F PR REVIEW ALL MEDS BY PRESCRIBER/CLIN PHARMACIST DOCUMENTED: ICD-10-PCS | Mod: CPTII,S$GLB,, | Performed by: NURSE PRACTITIONER

## 2023-03-27 PROCEDURE — 3078F PR MOST RECENT DIASTOLIC BLOOD PRESSURE < 80 MM HG: ICD-10-PCS | Mod: CPTII,S$GLB,, | Performed by: NURSE PRACTITIONER

## 2023-03-27 PROCEDURE — 3075F PR MOST RECENT SYSTOLIC BLOOD PRESS GE 130-139MM HG: ICD-10-PCS | Mod: CPTII,S$GLB,, | Performed by: NURSE PRACTITIONER

## 2023-03-27 PROCEDURE — 3075F SYST BP GE 130 - 139MM HG: CPT | Mod: CPTII,S$GLB,, | Performed by: NURSE PRACTITIONER

## 2023-03-27 PROCEDURE — 3044F HG A1C LEVEL LT 7.0%: CPT | Mod: CPTII,S$GLB,, | Performed by: NURSE PRACTITIONER

## 2023-03-27 PROCEDURE — 3288F FALL RISK ASSESSMENT DOCD: CPT | Mod: CPTII,S$GLB,, | Performed by: NURSE PRACTITIONER

## 2023-03-27 PROCEDURE — 1157F PR ADVANCE CARE PLAN OR EQUIV PRESENT IN MEDICAL RECORD: ICD-10-PCS | Mod: CPTII,S$GLB,, | Performed by: NURSE PRACTITIONER

## 2023-03-27 PROCEDURE — 3044F PR MOST RECENT HEMOGLOBIN A1C LEVEL <7.0%: ICD-10-PCS | Mod: CPTII,S$GLB,, | Performed by: NURSE PRACTITIONER

## 2023-03-27 PROCEDURE — 1159F PR MEDICATION LIST DOCUMENTED IN MEDICAL RECORD: ICD-10-PCS | Mod: CPTII,S$GLB,, | Performed by: NURSE PRACTITIONER

## 2023-03-27 PROCEDURE — 1160F RVW MEDS BY RX/DR IN RCRD: CPT | Mod: CPTII,S$GLB,, | Performed by: NURSE PRACTITIONER

## 2023-03-27 PROCEDURE — 1157F ADVNC CARE PLAN IN RCRD: CPT | Mod: CPTII,S$GLB,, | Performed by: NURSE PRACTITIONER

## 2023-03-27 PROCEDURE — 99999 PR PBB SHADOW E&M-EST. PATIENT-LVL IV: ICD-10-PCS | Mod: PBBFAC,,, | Performed by: NURSE PRACTITIONER

## 2023-03-27 PROCEDURE — 1126F PR PAIN SEVERITY QUANTIFIED, NO PAIN PRESENT: ICD-10-PCS | Mod: CPTII,S$GLB,, | Performed by: NURSE PRACTITIONER

## 2023-03-27 PROCEDURE — 1101F PR PT FALLS ASSESS DOC 0-1 FALLS W/OUT INJ PAST YR: ICD-10-PCS | Mod: CPTII,S$GLB,, | Performed by: NURSE PRACTITIONER

## 2023-03-27 PROCEDURE — G0439 PPPS, SUBSEQ VISIT: HCPCS | Mod: S$GLB,,, | Performed by: NURSE PRACTITIONER

## 2023-03-27 PROCEDURE — 3078F DIAST BP <80 MM HG: CPT | Mod: CPTII,S$GLB,, | Performed by: NURSE PRACTITIONER

## 2023-03-27 NOTE — PROGRESS NOTES
"  Nilsa Curiel presented for a follow-up Medicare AWV and Health Risk Assessment   today. The following components were reviewed and updated:    Medical history  Family History  Social history  Allergies and Current Medications  Health Risk Assessment  Health Maintenance  Care Team    See Completed Assessments for Annual Wellness visit with in the encounter summary    The following assessments were completed:  Depression Screening  Cognitive function Screening  Timed Get Up Test  Whisper Test  Nutrition  Activities of Daily Living   PAQ       Vitals:    03/27/23 1059   BP: 138/61   BP Location: Right arm   Patient Position: Sitting   BP Method: Thigh Cuff (Automatic)   Pulse: 93   Temp: 98.4 °F (36.9 °C)   TempSrc: Oral   SpO2: 96%   Weight: 96.6 kg (212 lb 15.4 oz)   Height: 5' 1" (1.549 m)     Body mass index is 40.24 kg/m².   ]    Physical Exam  Constitutional:       Appearance: Normal appearance.   HENT:      Head: Normocephalic.      Right Ear: External ear normal.      Left Ear: External ear normal.      Nose: Nose normal.      Mouth/Throat:      Mouth: Mucous membranes are moist.      Pharynx: Oropharynx is clear.   Eyes:      Pupils: Pupils are equal, round, and reactive to light.   Cardiovascular:      Rate and Rhythm: Normal rate.      Pulses: Normal pulses.      Heart sounds: Normal heart sounds.   Pulmonary:      Effort: Pulmonary effort is normal.   Abdominal:      General: Bowel sounds are normal.   Musculoskeletal:         General: Normal range of motion.      Cervical back: Normal range of motion and neck supple.   Skin:     General: Skin is warm and dry.      Capillary Refill: Capillary refill takes less than 2 seconds.   Neurological:      Mental Status: She is alert and oriented to person, place, and time.   Psychiatric:         Behavior: Behavior normal.     Review for Opioid Screening: Pt does not have Rx for Opioids.  Review for Substance Use Disorders: Patient does not use " substance.    Diagnoses and health risks identified today and associated recommendations/orders:  1. Encounter for preventive health examination  Screenings performed,  as noted above. Personal preventive testing needs reviewed.     2. Encounter for Medicare annual wellness exam  Screenings performed,  as noted above. Personal preventive testing needs reviewed.   - Ambulatory Referral/Consult to Enhanced Annual Wellness Visit (eAWV)    3. Dependence on supplemental oxygen  Stable, followed by PCP.     4. Other reduced mobility  Stable, followed by PCP.     5. Mixed hyperlipidemia  Stable, followed by PCP.     6. Stenosis of right carotid artery  Stable, followed by PCP.     7. 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     8. Essential hypertension  Stable, followed by PCP.     9. Chronic heart failure with preserved ejection fraction  Stable, followed by PCP.     10. Nonrheumatic aortic valve stenosis  Stable, followed by PCP.     11. Atherosclerosis of aorta  Stable, followed by PCP.     12. Stage 3 chronic kidney disease, unspecified whether stage 3a or 3b CKD  Stable, followed by PCP.     13. Cervical stenosis of spine  Stable, followed by PCP.     14. Headache disorder  Stable, followed by PCP.     15. Cervical spine arthritis with nerve pain  Stable, followed by PCP.     16. Lumbar spondylosis  Stable, followed by PCP.     17. Flexural atopic dermatitis  Stable, followed by PCP.     18. Intertrigo  Stable, followed by PCP.     19. Hair loss  Stable, followed by PCP.     20. Hypercalcemia  Stable, followed by PCP.     21. Anemia, unspecified type  Stable, followed by PCP.     22. Hyperparathyroidism  Stable, followed by PCP.     23. Gastroesophageal reflux disease without esophagitis  Stable, followed by PCP.     24. Neurogenic claudication due to lumbar spinal stenosis  Stable, followed by PCP.     25. Spondylolisthesis of lumbosacral region  Stable, followed  by PCP.     26. Primary osteoarthritis of both shoulders  Stable, followed by PCP.     27. Chronic right-sided low back pain without sciatica  Stable, followed by PCP.     28. Decreased strength of trunk and back  Stable, followed by PCP.     29. Right hip pain  Stable, followed by PCP.     30. Ankle swelling, unspecified laterality  Stable, followed by PCP.     31. Chronic neck pain  Stable, followed by PCP.     32. Hyperuricemia  Stable, followed by PCP.     33. Chronic right hip pain  Stable, followed by PCP.     34. Muscle spasms of both lower extremities  Stable, followed by PCP.     35. Decreased strength  Stable, followed by PCP.     36. Impaired mobility  Stable, followed by PCP.     37. MONE (obstructive sleep apnea)  Stable, followed by PCP.     38. Posture imbalance  Stable, followed by PCP.     39. Fatigue, unspecified type  Stable, followed by PCP.     I offered to discuss advanced care planning, including how to pick a person who would make decisions for you if you were unable to make them for yourself, called a health care power of , and what kind of decisions you might make such as use of life sustaining treatments such as ventilators and tube feeding when faced with a life limiting illness recorded on a living will that they will need to know. (How you want to be cared for as you near the end of your natural life)     X Patient is interested in learning more about how to make advanced directives.  I provided them paperwork and offered to discuss this with them.    Provided Nilsa with a 5-10 year written screening schedule and personal prevention plan. Recommendations were developed using the USPSTF age appropriate recommendations. Education, counseling, and referrals were provided as needed.  After Visit Summary printed and given to patient which includes a list of additional screenings\tests needed.    Follow up in about 1 year (around 3/27/2024), or Annual Wellness  Examination.      Yasmin Goodman NP

## 2023-03-27 NOTE — PATIENT INSTRUCTIONS
Counseling and Referral of Other Preventative  (Italic type indicates deductible and co-insurance are waived)    Patient Name: Nilsa Curiel  Today's Date: 3/29/2023    Health Maintenance       Date Due Completion Date    COVID-19 Vaccine (3 - Booster for Moderna series) 03/27/2024 (Originally 5/27/2021) 4/1/2021    Mammogram 07/12/2023 7/12/2022    Hemoglobin A1c (Prediabetes) 03/09/2024 3/9/2023    Aspirin/Antiplatelet Therapy 03/27/2024 3/27/2023    Override on 6/28/2019: Done    DEXA Scan 03/24/2027 3/24/2023    Lipid Panel 01/03/2028 1/3/2023    TETANUS VACCINE 06/08/2032 6/8/2022        No orders of the defined types were placed in this encounter.    The following information is provided to all patients.  This information is to help you find resources for any of the problems found today that may be affecting your health:                Living healthy guide: www.[facility].[email or web address].[facility]      Understanding Diabetes: www.diabetes.org      Eating healthy: www.cdc.gov/healthyweight      [facility] home safety checklist: www.cdc.gov/steadi/patient.html      Agency on Aging: www.goea.[place].gov      Alcoholics anonymous (AA): www.aa.org      Physical Activity: www.mariela.nih.gov/tb0stxj      Tobacco use: www.quitwithusla.org     Counseling and Referral of Other Preventative  (Italic type indicates deductible and co-insurance are waived)    Patient Name: Nilsa Curiel  Today's Date: 3/29/2023    Health Maintenance       Date Due Completion Date    COVID-19 Vaccine (3 - Booster for Moderna series) 03/27/2024 (Originally 5/27/2021) 4/1/2021    Mammogram 07/12/2023 7/12/2022    Hemoglobin A1c (Prediabetes) 03/09/2024 3/9/2023    Aspirin/Antiplatelet Therapy 03/27/2024 3/27/2023    Override on 6/28/2019: Done    DEXA Scan 03/24/2027 3/24/2023    Lipid Panel 01/03/2028 1/3/2023    TETANUS VACCINE 06/08/2032 6/8/2022        No orders of the defined types were placed in this encounter.    The following information is provided to all patients.   This information is to help you find resources for any of the problems found today that may be affecting your health:                Living healthy guide: www.[facility].[place].[facility]      Understanding Diabetes: www.diabetes.org      Eating healthy: www.cdc.gov/healthyweight      CDC home safety checklist: www.cdc.gov/steadi/patient.html      Agency on Aging: www.goea.[place].[facility]      Alcoholics anonymous (AA): www.aa.org      Physical Activity: www.mariela.nih.gov/fn8mtwc      Tobacco use: www.quitwithusla.org

## 2023-04-03 ENCOUNTER — OFFICE VISIT (OUTPATIENT)
Dept: PAIN MEDICINE | Facility: CLINIC | Age: 78
End: 2023-04-03
Payer: MEDICARE

## 2023-04-03 VITALS
SYSTOLIC BLOOD PRESSURE: 145 MMHG | HEART RATE: 83 BPM | BODY MASS INDEX: 40.2 KG/M2 | HEIGHT: 61 IN | WEIGHT: 212.94 LBS | DIASTOLIC BLOOD PRESSURE: 78 MMHG

## 2023-04-03 DIAGNOSIS — M54.16 LUMBAR RADICULOPATHY: ICD-10-CM

## 2023-04-03 DIAGNOSIS — M48.062 SPINAL STENOSIS OF LUMBAR REGION WITH NEUROGENIC CLAUDICATION: Primary | ICD-10-CM

## 2023-04-03 PROCEDURE — 3288F FALL RISK ASSESSMENT DOCD: CPT | Mod: CPTII,S$GLB,, | Performed by: STUDENT IN AN ORGANIZED HEALTH CARE EDUCATION/TRAINING PROGRAM

## 2023-04-03 PROCEDURE — 1159F PR MEDICATION LIST DOCUMENTED IN MEDICAL RECORD: ICD-10-PCS | Mod: CPTII,S$GLB,, | Performed by: STUDENT IN AN ORGANIZED HEALTH CARE EDUCATION/TRAINING PROGRAM

## 2023-04-03 PROCEDURE — 1101F PT FALLS ASSESS-DOCD LE1/YR: CPT | Mod: CPTII,S$GLB,, | Performed by: STUDENT IN AN ORGANIZED HEALTH CARE EDUCATION/TRAINING PROGRAM

## 2023-04-03 PROCEDURE — 1125F AMNT PAIN NOTED PAIN PRSNT: CPT | Mod: CPTII,S$GLB,, | Performed by: STUDENT IN AN ORGANIZED HEALTH CARE EDUCATION/TRAINING PROGRAM

## 2023-04-03 PROCEDURE — 3077F PR MOST RECENT SYSTOLIC BLOOD PRESSURE >= 140 MM HG: ICD-10-PCS | Mod: CPTII,S$GLB,, | Performed by: STUDENT IN AN ORGANIZED HEALTH CARE EDUCATION/TRAINING PROGRAM

## 2023-04-03 PROCEDURE — 99204 PR OFFICE/OUTPT VISIT, NEW, LEVL IV, 45-59 MIN: ICD-10-PCS | Mod: S$GLB,,, | Performed by: STUDENT IN AN ORGANIZED HEALTH CARE EDUCATION/TRAINING PROGRAM

## 2023-04-03 PROCEDURE — 99999 PR PBB SHADOW E&M-EST. PATIENT-LVL III: ICD-10-PCS | Mod: PBBFAC,,, | Performed by: STUDENT IN AN ORGANIZED HEALTH CARE EDUCATION/TRAINING PROGRAM

## 2023-04-03 PROCEDURE — 3078F DIAST BP <80 MM HG: CPT | Mod: CPTII,S$GLB,, | Performed by: STUDENT IN AN ORGANIZED HEALTH CARE EDUCATION/TRAINING PROGRAM

## 2023-04-03 PROCEDURE — 3288F PR FALLS RISK ASSESSMENT DOCUMENTED: ICD-10-PCS | Mod: CPTII,S$GLB,, | Performed by: STUDENT IN AN ORGANIZED HEALTH CARE EDUCATION/TRAINING PROGRAM

## 2023-04-03 PROCEDURE — 1101F PR PT FALLS ASSESS DOC 0-1 FALLS W/OUT INJ PAST YR: ICD-10-PCS | Mod: CPTII,S$GLB,, | Performed by: STUDENT IN AN ORGANIZED HEALTH CARE EDUCATION/TRAINING PROGRAM

## 2023-04-03 PROCEDURE — 1125F PR PAIN SEVERITY QUANTIFIED, PAIN PRESENT: ICD-10-PCS | Mod: CPTII,S$GLB,, | Performed by: STUDENT IN AN ORGANIZED HEALTH CARE EDUCATION/TRAINING PROGRAM

## 2023-04-03 PROCEDURE — 3044F PR MOST RECENT HEMOGLOBIN A1C LEVEL <7.0%: ICD-10-PCS | Mod: CPTII,S$GLB,, | Performed by: STUDENT IN AN ORGANIZED HEALTH CARE EDUCATION/TRAINING PROGRAM

## 2023-04-03 PROCEDURE — 3044F HG A1C LEVEL LT 7.0%: CPT | Mod: CPTII,S$GLB,, | Performed by: STUDENT IN AN ORGANIZED HEALTH CARE EDUCATION/TRAINING PROGRAM

## 2023-04-03 PROCEDURE — 99999 PR PBB SHADOW E&M-EST. PATIENT-LVL III: CPT | Mod: PBBFAC,,, | Performed by: STUDENT IN AN ORGANIZED HEALTH CARE EDUCATION/TRAINING PROGRAM

## 2023-04-03 PROCEDURE — 3078F PR MOST RECENT DIASTOLIC BLOOD PRESSURE < 80 MM HG: ICD-10-PCS | Mod: CPTII,S$GLB,, | Performed by: STUDENT IN AN ORGANIZED HEALTH CARE EDUCATION/TRAINING PROGRAM

## 2023-04-03 PROCEDURE — 1157F PR ADVANCE CARE PLAN OR EQUIV PRESENT IN MEDICAL RECORD: ICD-10-PCS | Mod: CPTII,S$GLB,, | Performed by: STUDENT IN AN ORGANIZED HEALTH CARE EDUCATION/TRAINING PROGRAM

## 2023-04-03 PROCEDURE — 99204 OFFICE O/P NEW MOD 45 MIN: CPT | Mod: S$GLB,,, | Performed by: STUDENT IN AN ORGANIZED HEALTH CARE EDUCATION/TRAINING PROGRAM

## 2023-04-03 PROCEDURE — 3077F SYST BP >= 140 MM HG: CPT | Mod: CPTII,S$GLB,, | Performed by: STUDENT IN AN ORGANIZED HEALTH CARE EDUCATION/TRAINING PROGRAM

## 2023-04-03 PROCEDURE — 1159F MED LIST DOCD IN RCRD: CPT | Mod: CPTII,S$GLB,, | Performed by: STUDENT IN AN ORGANIZED HEALTH CARE EDUCATION/TRAINING PROGRAM

## 2023-04-03 PROCEDURE — 1157F ADVNC CARE PLAN IN RCRD: CPT | Mod: CPTII,S$GLB,, | Performed by: STUDENT IN AN ORGANIZED HEALTH CARE EDUCATION/TRAINING PROGRAM

## 2023-04-03 RX ORDER — PREGABALIN 75 MG/1
75 CAPSULE ORAL 2 TIMES DAILY
Qty: 60 CAPSULE | Refills: 1 | Status: SHIPPED | OUTPATIENT
Start: 2023-04-03 | End: 2023-05-16

## 2023-04-03 NOTE — H&P (VIEW-ONLY)
Chronic Pain - New Consult    Referring Physician: No ref. provider found    Date: 04/03/2023     Re: Nilsa Curiel  MR#: [id number]  YOB: 1945  Age: 77 y.o.    Chief Complaint:   Chief Complaint   Patient presents with    Low-back Pain     **This note is dictated using the M*Modal Fluency Direct word recognition program. There are word recognition mistakes that are occasionally missed on review.**    ASSESSMENT: 77 y.o. year old female with back and hip/leg pain, consistent with     1. Spinal stenosis of lumbar region with neurogenic claudication  pregabalin (LYRICA) 75 MG capsule    Procedure Order to Pain Management      2. Lumbar radiculopathy  pregabalin (LYRICA) 75 MG capsule    Procedure Order to Pain Management          PLAN:     Spinal stenosis with neurogenic claudication and lumbar radiculopathy  -symptoms are fairly classic for spinal stenosis.  Discussed her pathology and reviewed her imaging with her.  -We will schedule the patient for L5-S1 LESI.  Risks,benefits, and alternatives of the procedure were discussed with the patient and She would like to proceed with the procedure.  At this juncture, we believe that the patient's medical comorbidity status adds high risk and complexity to our proposed evaluation and treatment.  -discussed trial Lyrica. Trial 75mg BID. Will increase as tolerated  -continue PRN tylenol    CKD3  -GFR 47  -she should avoid nephrotoxic medications  -dose adjust Lyrica    - RTC 6 weeks  - Counseled patient regarding the importance of weight loss and activity modification and physical therapy.    The above plan and management options were discussed at length with patient. Patient is in agreement with the above and verbalized understanding. It will be communicated with the referring physician via electronic record, fax, or mail.  Lab/study reports reviewed were important and necessary because subsequent medical and treatment recommendations required review of the above  lab/study reports. Images viewed/reviewed above were important and necessary because subsequent medical and treatment recommendations required review of the reviewed image(s).     Electronically signed by:  Gato Delatorre DO  04/03/2023    =========================================================================================================    SUBJECTIVE:    Nilsa Curiel is a 77 y.o. female presents to the clinic for the evaluation of left lower back pain. The pain started couple of years ago following no inciting event and symptoms have been worsening.  The patient had a fall about 4-5 years ago and thinks the pain started after that. She was evaluated by Dr. Sharp and he ordered a L3-4 LOUIS, which was changed to a b/l L4-5 TFESI due to the severity of her stenosis.  The patient had good relief from the injection for about 4-5 days until she had to get  DEXA scan and laying on the table seemed to exacerbate it again. The pain will radiate into the bilateral thighs (sometimes the front, sometimes the back).    The patient states that when she goes to the store she can walk for about an hour if she is leaning on the shopping cart, and gets pain in the back and hips. She sometimes gets cramps in the legs as well if she walks a longer than normal distance.    Pain Description:    The pain is located in the lower back area and radiates to the buttock/legs .    At BEST  4/10   At WORST  6/10 on the WORST day.    On average pain is rated as 6/10.   Today the pain is rated as 6/10  The pain is continuous.  The pain is described as aching    Symptoms interfere with daily activity.   Exacerbating factors: Standing and Walking.    Mitigating factors heat and medications.   She reports 4 hours of sleep per night.    Physical Therapy/Home Exercise: No, not currently in physical therapy or home exercise program    Current Pain Medications:    - ASA 81, meloxicam 15mg qd PRN (2x/week helps some), tylenol 1000mg (2x/week),  methocarbamol 500mg BID    Failed Pain Medications:    - gabapentin 300mg TID (didn't work)    Pain Treatment Therapies:    Pain procedures:   3/17/23 - b/l L4-5 TFESI  Physical Therapy: yes. Last was 2 months ago  Chiropractor: none  Acupuncture: none  TENS unit: none  Spinal decompression: none  Joint replacement: none    Patient denies urinary incontinence, bowel incontinence, significant motor weakness, and loss of sensations.  Patient denies any suicidal or homicidal ideations     report:  Reviewed and consistent with medication use as prescribed.    Imaging:   MRI Lumbar 01/2022:  FINDINGS:  There is no evidence of fracture or marrow replacement process.  There is grade 1 anterolisthesis of L4 on L5.  Otherwise, sagittal alignment is maintained.  There is disc desiccation at all lumbar levels with disc height loss, vacuum disc formation and endplate degenerative change at L3-4 and L4-5.  Conus terminates at L1-2.  Visualized cord signal is within normal limits.  There is redundancy of the cauda equina nerve roots from severe spinal canal stenosis which will be discussed below.     Visualized retroperitoneal structures show no acute abnormalities.     There are small posterior protrusions in the lower thoracic spine which are not well evaluated on the axial images.     T11-12: Mild diffuse disc bulge slightly asymmetric to the right with ligamentum flavum thickening.  No significant central canal stenosis or left neural foraminal narrowing.  Mild right neural foraminal narrowing.     T12-L1: No significant central canal stenosis or neural foraminal narrowing.     L1-2: Mild diffuse disc bulge with mild facet arthropathy.  Mild left-sided neural foraminal narrowing.  No significant central canal stenosis.     L2-3: Diffuse disc bulge with facet arthropathy and ligamentum flavum thickening resulting in mild central canal stenosis and mild bilateral neural foraminal narrowing.     L3-4: Diffuse disc bulge with  moderate facet arthropathy and ligamentum flavum thickening with facet effusions.  These findings in combination result in severe central canal stenosis.  There is mild neural foraminal narrowing.     L4-5: Severe facet arthropathy resulting in grade 1 anterolisthesis.  There is ligamentum flavum thickening and a diffuse disc bulge resulting in severe central canal stenosis.  There is moderate bilateral neural foraminal narrowing.     L5-S1: Moderate facet arthropathy.  Mild diffuse disc bulge.  No significant central canal stenosis.  Mild bilateral neural foraminal narrowing.     Impression:     Lumbar spondylosis resulting in severe central canal stenosis at L3-4 and L4-5 which has progressed when compared to prior.  Specific details at each level are discussed above.       Past Medical History:   Diagnosis Date    Abnormal fasting glucose 2015    Acute bilateral low back pain without sciatica 10/10/2017    Anemia 2018    Arthritis     Atopic dermatitis     Central stenosis of spinal canal 2022    Chronic diastolic heart failure 2022    Chronic kidney disease, stage III (moderate) 2020    Dermatitis 2023    GERD (gastroesophageal reflux disease)     Hyperlipidemia     Hypertension     Hypokalemia 2022    Left ventricular hypertrophy 2016    Microcytic hypochromic anemia 2018    Multifactoral. See hematology consult. Some nutritional , some kidney related    Mild aortic sclerosis 3/11/2019    Mobility poor 3/18/2022    Morbid obesity with BMI of 40.0-44.9, adult 2016    Nonintractable episodic headache 2019    Normocytic normochromic anemia 2022    MONE (obstructive sleep apnea) 2018    Prediabetes 2023    Severe obesity (BMI >= 40) 6/10/2022     Past Surgical History:   Procedure Laterality Date     SECTION      x3    COLONOSCOPY N/A 10/4/2021    Procedure: COLONOSCOPY;  Surgeon: Taran Galvez MD;  Location: [facility] ([address] [place] [address]);   Service: Endoscopy;  Laterality: N/A;    COLONOSCOPY N/A 1/31/2022    Procedure: COLONOSCOPY--positive fit kit;  Surgeon: Tani Lara MD;  Location: [facility] (4TH FLR);  Service: Endoscopy;  Laterality: N/A;    ESOPHAGOGASTRODUODENOSCOPY N/A 10/4/2021    Procedure: EGD (ESOPHAGOGASTRODUODENOSCOPY);  Surgeon: Taran Galvez MD;  Location: [facility] (4TH FLR);  Service: Endoscopy;  Laterality: N/A;  covid test 10/1-[facility]    10/1-LVM about COVID test-GT    ESOPHAGOGASTRODUODENOSCOPY N/A 1/31/2022    Procedure: EGD (ESOPHAGOGASTRODUODENOSCOPY);  Surgeon: Tani Lara MD;  Location: [facility] (4TH FLR);  Service: Endoscopy;  Laterality: N/A;  12/15 fully vaccinated; instructions to portal-st  1/28-covid [facility]-tb    TRANSFORAMINAL EPIDURAL INJECTION OF STEROID N/A 3/16/2023    Procedure: b/l L4-5 TFESI;  Surgeon: Gato Delatorre DO;  Location: [facility] OR;  Service: Pain Management;  Laterality: N/A;    TUBAL LIGATION       Social History     Socioeconomic History    Marital status:    Occupational History    Occupation: retired medical records    Tobacco Use    Smoking status: Never    Smokeless tobacco: Never   Substance and Sexual Activity    Alcohol use: No     Alcohol/week: 0.0 standard drinks    Drug use: No    Sexual activity: Never     Partners: Male   Social History Narrative    , lives with 1 daughter(Toreal), Walking some     Social Determinants of Health     Financial Resource Strain: Low Risk     Difficulty of Paying Living Expenses: Not very hard   Food Insecurity: No Food Insecurity    Worried About Running Out of Food in the Last Year: Never true    Ran Out of Food in the Last Year: Never true   Transportation Needs: Unmet Transportation Needs    Lack of Transportation (Medical): Yes    Lack of Transportation (Non-Medical): Yes   Physical Activity: Sufficiently Active    Days of Exercise per Week: 2 days    Minutes of Exercise per Session: 90 min   Stress: No Stress Concern  Present    Feeling of Stress : Not at all   Social Connections: Moderately Isolated    Frequency of Communication with Friends and Family: More than three times a week    Frequency of Social Gatherings with Friends and Family: Once a week    Attends Yarsanism Services: More than 4 times per year    Active Member of Clubs or Organizations: No    Attends Club or Organization Meetings: Never    Marital Status:    Housing Stability: Low Risk     Unable to Pay for Housing in the Last Year: No    Number of Places Lived in the Last Year: 1    Unstable Housing in the Last Year: No     Family History   Problem Relation Age of Onset    Hypertension Mother     Dementia Mother     Diabetes Mother     Cancer Mother 91        breast    Breast cancer Mother 91    Other Father         sepsis    Diabetes Sister     Hypertension Sister     Hyperlipidemia Sister     Diabetes Brother     Cataracts Brother     Stroke Brother     Multiple sclerosis Daughter     Hypertension Son     Hypertension Sister     Cancer Sister         pancreatic     Kidney disease Sister     Heart disease Sister         premature    Gout Sister     Kidney disease Sister     No Known Problems Daughter     No Known Problems Brother     Other Brother         murdered    Glaucoma Neg Hx     Retinal detachment Neg Hx     Macular degeneration Neg Hx     Strabismus Neg Hx     Amblyopia Neg Hx     Blindness Neg Hx     Ovarian cancer Neg Hx        Review of patient's allergies indicates:  No Known Allergies    Current Outpatient Medications   Medication Sig    allopurinoL (ZYLOPRIM) 100 MG tablet Take 1 tablet (100 mg total) by mouth once daily.    amLODIPine (NORVASC) 10 MG tablet Take 1 tablet (10 mg total) by mouth once daily.    aspirin (ECOTRIN) 81 MG EC tablet Take 1 tablet (81 mg total) by mouth once daily.    betamethasone valerate 0.1% (VALISONE) 0.1 % Oint Apply topically once daily.    biotin 300 mcg Tab Take 1 tablet by mouth once daily.    carvediloL  (COREG) 3.125 MG tablet Take 1 tablet (3.125 mg total) by mouth 2 (two) times daily with meals.    cetirizine (ZYRTEC) 10 MG tablet Take 1 tablet (10 mg total) by mouth once daily. for allergic rhinitis    diclofenac sodium (VOLTAREN) 1 % Gel Apply 2 g topically daily as needed.    fluocinonide (LIDEX) 0.05 % external solution AAA scalp qday - bid prn pruritus    fluticasone propionate (FLONASE) 50 mcg/actuation nasal spray 1 spray (50 mcg total) by Each Nostril route once daily.    ketoconazole (NIZORAL) 2 % cream Apply topically 2 (two) times daily.    ketoconazole (NIZORAL) 2 % shampoo Wash hair with medicated shampoo at least 2x/week - let sit on scalp at least 5 minutes prior to rinsing    lactobacillus combo no.6 (PROBIOTIC COMPLEX) 4 billion cell Tab Take 1 tablet by mouth once daily.    meloxicam (MOBIC) 15 MG tablet Take 1 tablet (15 mg total) by mouth once daily. As needed for musculoskeletal pain; do not take greater than 15 consecutive days. Take w food and water    methocarbamoL (ROBAXIN) 500 MG Tab TAKE 1 TABLET FOUR TIMES DAILY FOR MUSCLE SPASM(S)    mometasone (ELOCON) 0.1 % ointment Apply twice daily to hands/arms. Avoid face/groin    MULTIVIT-MIN/FA/CA CARB/VIT K (WOMEN'S 50+ DAILY FORMULA ORAL) Take by mouth.    nystatin-triamcinolone (MYCOLOG II) cream Apply topically 2 (two) times a day.    olmesartan (BENICAR) 40 MG tablet TAKE 1 TABLET ONE TIME DAILY    omega-3 fatty acids 300 mg Cap Take by mouth.    omeprazole (PRILOSEC) 40 MG capsule Take 1 capsule (40 mg total) by mouth every morning. As needed for reflux symptoms (Patient not taking: Reported on 3/27/2023)    potassium chloride SA (K-DUR,KLOR-CON) 20 MEQ tablet Take 1 tablet (20 mEq total) by mouth 2 (two) times daily.    pregabalin (LYRICA) 75 MG capsule Take 1 capsule (75 mg total) by mouth 2 (two) times daily.    riboflavin, vitamin B2, (VITAMIN B-2 ORAL) Take 1 capsule by mouth once daily.    sumatriptan (IMITREX) 100 MG tablet Take 1  "tablet (100 mg total) by mouth daily as needed for Migraine. No more than 2 doses in 24 hours    TAC 0.1%-ciclopirox-MOM Apply to affected area twice daily after cool blow dry. (discard after 1/14/2023)    TURMERIC ORAL Take by mouth.    ubidecarenone (COENZYME Q10) 100 mg Tab Take 1 tablet by mouth once daily.      No current facility-administered medications for this visit.       REVIEW OF SYSTEMS:    GENERAL:  No weight loss, malaise or fevers.  HEENT:   No recent changes in vision or hearing  NECK:  Negative for lumps, no difficulty with swallowing.  RESPIRATORY:  Negative for cough, wheezing or shortness of breath, patient denies any recent URI.  CARDIOVASCULAR:  Negative for chest pain, leg swelling or palpitations.  GI:  Negative for abdominal discomfort, blood in stools or black stools or change in bowel habits.  MUSCULOSKELETAL:  See HPI.  SKIN:  Negative for lesions, rash, and itching.  PSYCH:  No mood disorder or recent psychosocial stressors.  Patients sleep is not disturbed secondary to pain.  HEMATOLOGY/LYMPHOLOGY:  Negative for prolonged bleeding, bruising easily or swollen nodes.  Patient is not currently taking any anti-coagulants  NEURO:   No history of headaches, syncope, paralysis, seizures or tremors.  All other reviewed and negative other than HPI.    OBJECTIVE:    BP (!) 145/78 (BP Location: Left arm, Patient Position: Sitting)   Pulse 83   Ht 5' 1" (1.549 m)   Wt 96.6 kg (212 lb 15.4 oz)   BMI 40.24 kg/m²     PHYSICAL EXAMINATION:    GENERAL: Well appearing, in no acute distress, alert and oriented x3.  PSYCH:  Mood and affect appropriate.  SKIN: Skin color, texture, turgor normal, no rashes or lesions.  HEAD/FACE:  Normocephalic, atraumatic. Cranial nerves grossly intact.  CV: RRR with palpation of the radial artery.  PULM: CTAB. No evidence of respiratory difficulty, symmetric chest rise.  GI:  Soft and non-tender. obese    BACK:   - No obvious deformity or signs of trauma, Normal " lumbar lordotic curve  - Negative spinous process tenderness  - Positive paravertebral tenderness  - Positive pain to palpation over the facet joints of the lumbar spine.   - Positive QL / Iliac crest / Glut tenderness  - Slump test is Negative for radicular pain  - Slump test is Positive for back pain  - Supine Straight leg raising is Negative for radicular pain  - Supine Straight leg raising is Negative for back pain  - Lumbar ROM is diminished in Flexion without pain  - Lumbar ROM is diminished in Extension with pain  - Lumbar ROM is diminished in Lateral Flexion with pain  - Lumbar ROM is diminished in Rotation with pain  - Positive Sustained Hip Flexion test (for discogenic pain)  - Positive Altered Gait, Posture  - Axial facet loading test Positive on the bilateral side(s)    SI Joint exam:  - Negative SI joint tenderness to palpation  - Hemant's sign Negative  - Yeoman's Test: Negative for SI joint pain indicating anterior SI ligament involvement. Positive for anterior thigh pain/paresthesia which indicates femoral nerve stretch.  - Gaenslen's Test:Negative  - Finger Shade's Sign:Negative  - SI compression test:Did not perform  - SI distraction test:Did not perform  - Thigh Thrust: Negative  - SI Thrust: Did not perform    MUSKULOSKELETAL:    EXTREMITIES:   Hip Exam:  - Log Roll Negative  - FADIR Negative  - Stinchfield Did not perform  - Hip Scour Negative  - GTB Tenderness Positive      MUSCULOSKELETAL:  No atrophy or tone abnormalities are noted in the UE or LE.  No deformities, edema, or skin discoloration are noted on visible skin. Good capillary refill.    NEURO: Bilateral upper and lower extremity coordination and muscle stretch reflexes are physiologic and symmetric.      NEUROLOGICAL EXAM:  MENTAL STATUS: A x O x 3, good concentration, speech is fluent and goal directed  MEMORY: recent and remote are intact  CN: CN2-12 grossly intact  MOTOR: 5/5 in all muscle groups of lE  DTRs: 0+ intact  symmetric  Sensation:    -no Loss of sensation in a left lower and right lower L-1, L-2, L-3, L-4, and L-5 bilaterally distribution.  Babinski: absent on the bilateral side(s)  Blood: absent on the bilateral side(s)  Clonus: absent on the bilateral side(s)    GAIT: abnormal. Flexed posture. Short steps

## 2023-04-03 NOTE — PROGRESS NOTES
Chronic Pain - New Consult    Referring Physician: No ref. provider found    Date: 04/03/2023     Re: Nilsa Curiel  MR#: [id number]  YOB: 1945  Age: 77 y.o.    Chief Complaint:   Chief Complaint   Patient presents with    Low-back Pain     **This note is dictated using the M*Modal Fluency Direct word recognition program. There are word recognition mistakes that are occasionally missed on review.**    ASSESSMENT: 77 y.o. year old female with back and hip/leg pain, consistent with     1. Spinal stenosis of lumbar region with neurogenic claudication  pregabalin (LYRICA) 75 MG capsule    Procedure Order to Pain Management      2. Lumbar radiculopathy  pregabalin (LYRICA) 75 MG capsule    Procedure Order to Pain Management          PLAN:     Spinal stenosis with neurogenic claudication and lumbar radiculopathy  -symptoms are fairly classic for spinal stenosis.  Discussed her pathology and reviewed her imaging with her.  -We will schedule the patient for L5-S1 LESI.  Risks,benefits, and alternatives of the procedure were discussed with the patient and She would like to proceed with the procedure.  At this juncture, we believe that the patient's medical comorbidity status adds high risk and complexity to our proposed evaluation and treatment.  -discussed trial Lyrica. Trial 75mg BID. Will increase as tolerated  -continue PRN tylenol    CKD3  -GFR 47  -she should avoid nephrotoxic medications  -dose adjust Lyrica    - RTC 6 weeks  - Counseled patient regarding the importance of weight loss and activity modification and physical therapy.    The above plan and management options were discussed at length with patient. Patient is in agreement with the above and verbalized understanding. It will be communicated with the referring physician via electronic record, fax, or mail.  Lab/study reports reviewed were important and necessary because subsequent medical and treatment recommendations required review of the above  lab/study reports. Images viewed/reviewed above were important and necessary because subsequent medical and treatment recommendations required review of the reviewed image(s).     Electronically signed by:  Gato Delatorre DO  04/03/2023    =========================================================================================================    SUBJECTIVE:    Nilsa Curiel is a 77 y.o. female presents to the clinic for the evaluation of left lower back pain. The pain started couple of years ago following no inciting event and symptoms have been worsening.  The patient had a fall about 4-5 years ago and thinks the pain started after that. She was evaluated by Dr. Sharp and he ordered a L3-4 LOUIS, which was changed to a b/l L4-5 TFESI due to the severity of her stenosis.  The patient had good relief from the injection for about 4-5 days until she had to get  DEXA scan and laying on the table seemed to exacerbate it again. The pain will radiate into the bilateral thighs (sometimes the front, sometimes the back).    The patient states that when she goes to the store she can walk for about an hour if she is leaning on the shopping cart, and gets pain in the back and hips. She sometimes gets cramps in the legs as well if she walks a longer than normal distance.    Pain Description:    The pain is located in the lower back area and radiates to the buttock/legs .    At BEST  4/10   At WORST  6/10 on the WORST day.    On average pain is rated as 6/10.   Today the pain is rated as 6/10  The pain is continuous.  The pain is described as aching    Symptoms interfere with daily activity.   Exacerbating factors: Standing and Walking.    Mitigating factors heat and medications.   She reports 4 hours of sleep per night.    Physical Therapy/Home Exercise: No, not currently in physical therapy or home exercise program    Current Pain Medications:    - ASA 81, meloxicam 15mg qd PRN (2x/week helps some), tylenol 1000mg (2x/week),  methocarbamol 500mg BID    Failed Pain Medications:    - gabapentin 300mg TID (didn't work)    Pain Treatment Therapies:    Pain procedures:   3/17/23 - b/l L4-5 TFESI  Physical Therapy: yes. Last was 2 months ago  Chiropractor: none  Acupuncture: none  TENS unit: none  Spinal decompression: none  Joint replacement: none    Patient denies urinary incontinence, bowel incontinence, significant motor weakness, and loss of sensations.  Patient denies any suicidal or homicidal ideations     report:  Reviewed and consistent with medication use as prescribed.    Imaging:   MRI Lumbar 01/2022:  FINDINGS:  There is no evidence of fracture or marrow replacement process.  There is grade 1 anterolisthesis of L4 on L5.  Otherwise, sagittal alignment is maintained.  There is disc desiccation at all lumbar levels with disc height loss, vacuum disc formation and endplate degenerative change at L3-4 and L4-5.  Conus terminates at L1-2.  Visualized cord signal is within normal limits.  There is redundancy of the cauda equina nerve roots from severe spinal canal stenosis which will be discussed below.     Visualized retroperitoneal structures show no acute abnormalities.     There are small posterior protrusions in the lower thoracic spine which are not well evaluated on the axial images.     T11-12: Mild diffuse disc bulge slightly asymmetric to the right with ligamentum flavum thickening.  No significant central canal stenosis or left neural foraminal narrowing.  Mild right neural foraminal narrowing.     T12-L1: No significant central canal stenosis or neural foraminal narrowing.     L1-2: Mild diffuse disc bulge with mild facet arthropathy.  Mild left-sided neural foraminal narrowing.  No significant central canal stenosis.     L2-3: Diffuse disc bulge with facet arthropathy and ligamentum flavum thickening resulting in mild central canal stenosis and mild bilateral neural foraminal narrowing.     L3-4: Diffuse disc bulge with  moderate facet arthropathy and ligamentum flavum thickening with facet effusions.  These findings in combination result in severe central canal stenosis.  There is mild neural foraminal narrowing.     L4-5: Severe facet arthropathy resulting in grade 1 anterolisthesis.  There is ligamentum flavum thickening and a diffuse disc bulge resulting in severe central canal stenosis.  There is moderate bilateral neural foraminal narrowing.     L5-S1: Moderate facet arthropathy.  Mild diffuse disc bulge.  No significant central canal stenosis.  Mild bilateral neural foraminal narrowing.     Impression:     Lumbar spondylosis resulting in severe central canal stenosis at L3-4 and L4-5 which has progressed when compared to prior.  Specific details at each level are discussed above.       Past Medical History:   Diagnosis Date    Abnormal fasting glucose 2015    Acute bilateral low back pain without sciatica 10/10/2017    Anemia 2018    Arthritis     Atopic dermatitis     Central stenosis of spinal canal 2022    Chronic diastolic heart failure 2022    Chronic kidney disease, stage III (moderate) 2020    Dermatitis 2023    GERD (gastroesophageal reflux disease)     Hyperlipidemia     Hypertension     Hypokalemia 2022    Left ventricular hypertrophy 2016    Microcytic hypochromic anemia 2018    Multifactoral. See hematology consult. Some nutritional , some kidney related    Mild aortic sclerosis 3/11/2019    Mobility poor 3/18/2022    Morbid obesity with BMI of 40.0-44.9, adult 2016    Nonintractable episodic headache 2019    Normocytic normochromic anemia 2022    MONE (obstructive sleep apnea) 2018    Prediabetes 2023    Severe obesity (BMI >= 40) 6/10/2022     Past Surgical History:   Procedure Laterality Date     SECTION      x3    COLONOSCOPY N/A 10/4/2021    Procedure: COLONOSCOPY;  Surgeon: Taran Galvez MD;  Location: [facility] ([address] [place] [address]);   Service: Endoscopy;  Laterality: N/A;    COLONOSCOPY N/A 1/31/2022    Procedure: COLONOSCOPY--positive fit kit;  Surgeon: Tani Lara MD;  Location: [facility] (4TH FLR);  Service: Endoscopy;  Laterality: N/A;    ESOPHAGOGASTRODUODENOSCOPY N/A 10/4/2021    Procedure: EGD (ESOPHAGOGASTRODUODENOSCOPY);  Surgeon: Taran Galvez MD;  Location: [facility] (4TH FLR);  Service: Endoscopy;  Laterality: N/A;  covid test 10/1-[facility]    10/1-LVM about COVID test-GT    ESOPHAGOGASTRODUODENOSCOPY N/A 1/31/2022    Procedure: EGD (ESOPHAGOGASTRODUODENOSCOPY);  Surgeon: Tani Lara MD;  Location: [facility] (4TH FLR);  Service: Endoscopy;  Laterality: N/A;  12/15 fully vaccinated; instructions to portal-st  1/28-covid [facility]-tb    TRANSFORAMINAL EPIDURAL INJECTION OF STEROID N/A 3/16/2023    Procedure: b/l L4-5 TFESI;  Surgeon: Gato Delatorre DO;  Location: [facility] OR;  Service: Pain Management;  Laterality: N/A;    TUBAL LIGATION       Social History     Socioeconomic History    Marital status:    Occupational History    Occupation: retired medical records    Tobacco Use    Smoking status: Never    Smokeless tobacco: Never   Substance and Sexual Activity    Alcohol use: No     Alcohol/week: 0.0 standard drinks    Drug use: No    Sexual activity: Never     Partners: Male   Social History Narrative    , lives with 1 daughter(Toreal), Walking some     Social Determinants of Health     Financial Resource Strain: Low Risk     Difficulty of Paying Living Expenses: Not very hard   Food Insecurity: No Food Insecurity    Worried About Running Out of Food in the Last Year: Never true    Ran Out of Food in the Last Year: Never true   Transportation Needs: Unmet Transportation Needs    Lack of Transportation (Medical): Yes    Lack of Transportation (Non-Medical): Yes   Physical Activity: Sufficiently Active    Days of Exercise per Week: 2 days    Minutes of Exercise per Session: 90 min   Stress: No Stress Concern  Present    Feeling of Stress : Not at all   Social Connections: Moderately Isolated    Frequency of Communication with Friends and Family: More than three times a week    Frequency of Social Gatherings with Friends and Family: Once a week    Attends Hoahaoism Services: More than 4 times per year    Active Member of Clubs or Organizations: No    Attends Club or Organization Meetings: Never    Marital Status:    Housing Stability: Low Risk     Unable to Pay for Housing in the Last Year: No    Number of Places Lived in the Last Year: 1    Unstable Housing in the Last Year: No     Family History   Problem Relation Age of Onset    Hypertension Mother     Dementia Mother     Diabetes Mother     Cancer Mother 91        breast    Breast cancer Mother 91    Other Father         sepsis    Diabetes Sister     Hypertension Sister     Hyperlipidemia Sister     Diabetes Brother     Cataracts Brother     Stroke Brother     Multiple sclerosis Daughter     Hypertension Son     Hypertension Sister     Cancer Sister         pancreatic     Kidney disease Sister     Heart disease Sister         premature    Gout Sister     Kidney disease Sister     No Known Problems Daughter     No Known Problems Brother     Other Brother         murdered    Glaucoma Neg Hx     Retinal detachment Neg Hx     Macular degeneration Neg Hx     Strabismus Neg Hx     Amblyopia Neg Hx     Blindness Neg Hx     Ovarian cancer Neg Hx        Review of patient's allergies indicates:  No Known Allergies    Current Outpatient Medications   Medication Sig    allopurinoL (ZYLOPRIM) 100 MG tablet Take 1 tablet (100 mg total) by mouth once daily.    amLODIPine (NORVASC) 10 MG tablet Take 1 tablet (10 mg total) by mouth once daily.    aspirin (ECOTRIN) 81 MG EC tablet Take 1 tablet (81 mg total) by mouth once daily.    betamethasone valerate 0.1% (VALISONE) 0.1 % Oint Apply topically once daily.    biotin 300 mcg Tab Take 1 tablet by mouth once daily.    carvediloL  (COREG) 3.125 MG tablet Take 1 tablet (3.125 mg total) by mouth 2 (two) times daily with meals.    cetirizine (ZYRTEC) 10 MG tablet Take 1 tablet (10 mg total) by mouth once daily. for allergic rhinitis    diclofenac sodium (VOLTAREN) 1 % Gel Apply 2 g topically daily as needed.    fluocinonide (LIDEX) 0.05 % external solution AAA scalp qday - bid prn pruritus    fluticasone propionate (FLONASE) 50 mcg/actuation nasal spray 1 spray (50 mcg total) by Each Nostril route once daily.    ketoconazole (NIZORAL) 2 % cream Apply topically 2 (two) times daily.    ketoconazole (NIZORAL) 2 % shampoo Wash hair with medicated shampoo at least 2x/week - let sit on scalp at least 5 minutes prior to rinsing    lactobacillus combo no.6 (PROBIOTIC COMPLEX) 4 billion cell Tab Take 1 tablet by mouth once daily.    meloxicam (MOBIC) 15 MG tablet Take 1 tablet (15 mg total) by mouth once daily. As needed for musculoskeletal pain; do not take greater than 15 consecutive days. Take w food and water    methocarbamoL (ROBAXIN) 500 MG Tab TAKE 1 TABLET FOUR TIMES DAILY FOR MUSCLE SPASM(S)    mometasone (ELOCON) 0.1 % ointment Apply twice daily to hands/arms. Avoid face/groin    MULTIVIT-MIN/FA/CA CARB/VIT K (WOMEN'S 50+ DAILY FORMULA ORAL) Take by mouth.    nystatin-triamcinolone (MYCOLOG II) cream Apply topically 2 (two) times a day.    olmesartan (BENICAR) 40 MG tablet TAKE 1 TABLET ONE TIME DAILY    omega-3 fatty acids 300 mg Cap Take by mouth.    omeprazole (PRILOSEC) 40 MG capsule Take 1 capsule (40 mg total) by mouth every morning. As needed for reflux symptoms (Patient not taking: Reported on 3/27/2023)    potassium chloride SA (K-DUR,KLOR-CON) 20 MEQ tablet Take 1 tablet (20 mEq total) by mouth 2 (two) times daily.    pregabalin (LYRICA) 75 MG capsule Take 1 capsule (75 mg total) by mouth 2 (two) times daily.    riboflavin, vitamin B2, (VITAMIN B-2 ORAL) Take 1 capsule by mouth once daily.    sumatriptan (IMITREX) 100 MG tablet Take 1  "tablet (100 mg total) by mouth daily as needed for Migraine. No more than 2 doses in 24 hours    TAC 0.1%-ciclopirox-MOM Apply to affected area twice daily after cool blow dry. (discard after 1/14/2023)    TURMERIC ORAL Take by mouth.    ubidecarenone (COENZYME Q10) 100 mg Tab Take 1 tablet by mouth once daily.      No current facility-administered medications for this visit.       REVIEW OF SYSTEMS:    GENERAL:  No weight loss, malaise or fevers.  HEENT:   No recent changes in vision or hearing  NECK:  Negative for lumps, no difficulty with swallowing.  RESPIRATORY:  Negative for cough, wheezing or shortness of breath, patient denies any recent URI.  CARDIOVASCULAR:  Negative for chest pain, leg swelling or palpitations.  GI:  Negative for abdominal discomfort, blood in stools or black stools or change in bowel habits.  MUSCULOSKELETAL:  See HPI.  SKIN:  Negative for lesions, rash, and itching.  PSYCH:  No mood disorder or recent psychosocial stressors.  Patients sleep is not disturbed secondary to pain.  HEMATOLOGY/LYMPHOLOGY:  Negative for prolonged bleeding, bruising easily or swollen nodes.  Patient is not currently taking any anti-coagulants  NEURO:   No history of headaches, syncope, paralysis, seizures or tremors.  All other reviewed and negative other than HPI.    OBJECTIVE:    BP (!) 145/78 (BP Location: Left arm, Patient Position: Sitting)   Pulse 83   Ht 5' 1" (1.549 m)   Wt 96.6 kg (212 lb 15.4 oz)   BMI 40.24 kg/m²     PHYSICAL EXAMINATION:    GENERAL: Well appearing, in no acute distress, alert and oriented x3.  PSYCH:  Mood and affect appropriate.  SKIN: Skin color, texture, turgor normal, no rashes or lesions.  HEAD/FACE:  Normocephalic, atraumatic. Cranial nerves grossly intact.  CV: RRR with palpation of the radial artery.  PULM: CTAB. No evidence of respiratory difficulty, symmetric chest rise.  GI:  Soft and non-tender. obese    BACK:   - No obvious deformity or signs of trauma, Normal " lumbar lordotic curve  - Negative spinous process tenderness  - Positive paravertebral tenderness  - Positive pain to palpation over the facet joints of the lumbar spine.   - Positive QL / Iliac crest / Glut tenderness  - Slump test is Negative for radicular pain  - Slump test is Positive for back pain  - Supine Straight leg raising is Negative for radicular pain  - Supine Straight leg raising is Negative for back pain  - Lumbar ROM is diminished in Flexion without pain  - Lumbar ROM is diminished in Extension with pain  - Lumbar ROM is diminished in Lateral Flexion with pain  - Lumbar ROM is diminished in Rotation with pain  - Positive Sustained Hip Flexion test (for discogenic pain)  - Positive Altered Gait, Posture  - Axial facet loading test Positive on the bilateral side(s)    SI Joint exam:  - Negative SI joint tenderness to palpation  - Hemant's sign Negative  - Yeoman's Test: Negative for SI joint pain indicating anterior SI ligament involvement. Positive for anterior thigh pain/paresthesia which indicates femoral nerve stretch.  - Gaenslen's Test:Negative  - Finger Shade's Sign:Negative  - SI compression test:Did not perform  - SI distraction test:Did not perform  - Thigh Thrust: Negative  - SI Thrust: Did not perform    MUSKULOSKELETAL:    EXTREMITIES:   Hip Exam:  - Log Roll Negative  - FADIR Negative  - Stinchfield Did not perform  - Hip Scour Negative  - GTB Tenderness Positive      MUSCULOSKELETAL:  No atrophy or tone abnormalities are noted in the UE or LE.  No deformities, edema, or skin discoloration are noted on visible skin. Good capillary refill.    NEURO: Bilateral upper and lower extremity coordination and muscle stretch reflexes are physiologic and symmetric.      NEUROLOGICAL EXAM:  MENTAL STATUS: A x O x 3, good concentration, speech is fluent and goal directed  MEMORY: recent and remote are intact  CN: CN2-12 grossly intact  MOTOR: 5/5 in all muscle groups of lE  DTRs: 0+ intact  symmetric  Sensation:    -no Loss of sensation in a left lower and right lower L-1, L-2, L-3, L-4, and L-5 bilaterally distribution.  Babinski: absent on the bilateral side(s)  Blood: absent on the bilateral side(s)  Clonus: absent on the bilateral side(s)    GAIT: abnormal. Flexed posture. Short steps

## 2023-04-05 ENCOUNTER — PATIENT MESSAGE (OUTPATIENT)
Dept: PAIN MEDICINE | Facility: OTHER | Age: 78
End: 2023-04-05
Payer: MEDICARE

## 2023-04-18 ENCOUNTER — OFFICE VISIT (OUTPATIENT)
Dept: DERMATOLOGY | Facility: CLINIC | Age: 78
End: 2023-04-18
Payer: MEDICARE

## 2023-04-18 DIAGNOSIS — B37.2 CANDIDAL INTERTRIGO: Primary | ICD-10-CM

## 2023-04-18 DIAGNOSIS — L30.9 HAND ECZEMA: ICD-10-CM

## 2023-04-18 PROCEDURE — 1159F MED LIST DOCD IN RCRD: CPT | Mod: CPTII,S$GLB,, | Performed by: DERMATOLOGY

## 2023-04-18 PROCEDURE — 1160F PR REVIEW ALL MEDS BY PRESCRIBER/CLIN PHARMACIST DOCUMENTED: ICD-10-PCS | Mod: CPTII,S$GLB,, | Performed by: DERMATOLOGY

## 2023-04-18 PROCEDURE — 1101F PT FALLS ASSESS-DOCD LE1/YR: CPT | Mod: CPTII,S$GLB,, | Performed by: DERMATOLOGY

## 2023-04-18 PROCEDURE — 1157F ADVNC CARE PLAN IN RCRD: CPT | Mod: CPTII,S$GLB,, | Performed by: DERMATOLOGY

## 2023-04-18 PROCEDURE — 3288F FALL RISK ASSESSMENT DOCD: CPT | Mod: CPTII,S$GLB,, | Performed by: DERMATOLOGY

## 2023-04-18 PROCEDURE — 3044F HG A1C LEVEL LT 7.0%: CPT | Mod: CPTII,S$GLB,, | Performed by: DERMATOLOGY

## 2023-04-18 PROCEDURE — 1125F PR PAIN SEVERITY QUANTIFIED, PAIN PRESENT: ICD-10-PCS | Mod: CPTII,S$GLB,, | Performed by: DERMATOLOGY

## 2023-04-18 PROCEDURE — 1101F PR PT FALLS ASSESS DOC 0-1 FALLS W/OUT INJ PAST YR: ICD-10-PCS | Mod: CPTII,S$GLB,, | Performed by: DERMATOLOGY

## 2023-04-18 PROCEDURE — 3044F PR MOST RECENT HEMOGLOBIN A1C LEVEL <7.0%: ICD-10-PCS | Mod: CPTII,S$GLB,, | Performed by: DERMATOLOGY

## 2023-04-18 PROCEDURE — 1125F AMNT PAIN NOTED PAIN PRSNT: CPT | Mod: CPTII,S$GLB,, | Performed by: DERMATOLOGY

## 2023-04-18 PROCEDURE — 3288F PR FALLS RISK ASSESSMENT DOCUMENTED: ICD-10-PCS | Mod: CPTII,S$GLB,, | Performed by: DERMATOLOGY

## 2023-04-18 PROCEDURE — 1159F PR MEDICATION LIST DOCUMENTED IN MEDICAL RECORD: ICD-10-PCS | Mod: CPTII,S$GLB,, | Performed by: DERMATOLOGY

## 2023-04-18 PROCEDURE — 99214 PR OFFICE/OUTPT VISIT, EST, LEVL IV, 30-39 MIN: ICD-10-PCS | Mod: S$GLB,,, | Performed by: DERMATOLOGY

## 2023-04-18 PROCEDURE — 99214 OFFICE O/P EST MOD 30 MIN: CPT | Mod: S$GLB,,, | Performed by: DERMATOLOGY

## 2023-04-18 PROCEDURE — 1160F RVW MEDS BY RX/DR IN RCRD: CPT | Mod: CPTII,S$GLB,, | Performed by: DERMATOLOGY

## 2023-04-18 PROCEDURE — 1157F PR ADVANCE CARE PLAN OR EQUIV PRESENT IN MEDICAL RECORD: ICD-10-PCS | Mod: CPTII,S$GLB,, | Performed by: DERMATOLOGY

## 2023-04-18 RX ORDER — MOMETASONE FUROATE 1 MG/G
OINTMENT TOPICAL
Qty: 45 G | Refills: 3 | Status: SHIPPED | OUTPATIENT
Start: 2023-04-18 | End: 2023-12-12 | Stop reason: SDUPTHER

## 2023-04-18 RX ORDER — TRIAMCINOLONE ACETONIDE 0.25 MG/G
CREAM TOPICAL
Qty: 15 G | Refills: 1 | Status: SHIPPED | OUTPATIENT
Start: 2023-04-18 | End: 2023-09-05

## 2023-04-18 RX ORDER — KETOCONAZOLE 20 MG/G
CREAM TOPICAL
Qty: 15 G | Refills: 1 | Status: SHIPPED | OUTPATIENT
Start: 2023-04-18 | End: 2023-12-12

## 2023-04-18 NOTE — PATIENT INSTRUCTIONS
Combine in an airtight container and shake well:  15 g tube of triamcinolone 0.025% cream  15 g tube of ketoconazole 2% cream  ~6 tsp of Original Milk of Magnesia    Use twice daily on affected area of skin folds.   Use no longer than 2-3 weeks in a row.    Once improved, switch to Zeasorb AF powder to affected areas daily for maintenance.

## 2023-04-18 NOTE — PROGRESS NOTES
"  Patient Information  Name: Nilsa Curiel  : 1945  MRN: [id number]     Referring Physician:  No ref. provider found   Primary Care Physician:  Gaurav Allison MD   Date of Visit: 2023      Subjective:     History of Present lllness:    Nilsa Curiel is a 77 y.o. female who presents with a chief complaint of rash.  Location: hands and "creases"  Duration: years, off and on  Signs/Symptoms: rashes on hands, skin breaks down/feels sore in creases  Relieving factors/Prior treatments: bathes with Dove liquid soap but doesn't let it touch the creases due to worsening the rash; mometasone helps with hands    Clinical documentation obtained by nursing staff reviewed.    Review of Systems    Objective:   Physical Exam   Constitutional: She appears well-developed and well-nourished. No distress.   Neurological: She is alert and oriented to person, place, and time. She is not disoriented.   Psychiatric: She has a normal mood and affect.   Skin:   Areas Examined (abnormalities noted in diagram):   Chest / Axilla Inspection Performed  Abdomen Inspection Performed  RUE Inspected  LUE Inspection Performed          Diagram Legend     Erythematous scaling macule/papule c/w actinic keratosis       Vascular papule c/w angioma      Pigmented verrucoid papule/plaque c/w seborrheic keratosis      Yellow umbilicated papule c/w sebaceous hyperplasia      Irregularly shaped tan macule c/w lentigo     1-2 mm smooth white papules consistent with Milia      Movable subcutaneous cyst with punctum c/w epidermal inclusion cyst      Subcutaneous movable cyst c/w pilar cyst      Firm pink to brown papule c/w dermatofibroma      Pedunculated fleshy papule(s) c/w skin tag(s)      Evenly pigmented macule c/w junctional nevus     Mildly variegated pigmented, slightly irregular-bordered macule c/w mildly atypical nevus      Flesh colored to evenly pigmented papule c/w intradermal nevus       Pink pearly papule/plaque c/w basal cell " carcinoma      Erythematous hyperkeratotic cursted plaque c/w SCC      Surgical scar with no sign of skin cancer recurrence      Open and closed comedones      Inflammatory papules and pustules      Verrucoid papule consistent consistent with wart     Erythematous eczematous patches and plaques     Dystrophic onycholytic nail with subungual debris c/w onychomycosis     Umbilicated papule    Erythematous-base heme-crusted tan verrucoid plaque consistent with inflamed seborrheic keratosis     Erythematous Silvery Scaling Plaque c/w Psoriasis     See annotation    No images are attached to the encounter or orders placed in the encounter.      [] Data reviewed  [] Prior external notes reviewed  [] Independent review of test  [] Management discussed with another provider  [] Independent historian    Assessment / Plan:        Candidal intertrigo  - chronic problem, not at treatment goal  -     ketoconazole (NIZORAL) 2 % cream; Apply to affected areas of body BID prn irritation.  Dispense: 15 g; Refill: 1  -     triamcinolone acetonide 0.025% (KENALOG) 0.025 % cream; Apply to affected areas of body BID prn irritation.  Dispense: 15 g; Refill: 1    Combine in an airtight container and shake well:  15 g tube of triamcinolone 0.025% cream  15 g tube of ketoconazole 2% cream  ~6 tsp of Original Milk of Magnesia    Use twice daily on affected area of skin folds.   Use no longer than 2-3 weeks in a row.    Once improved, switch to Zeasorb AF powder to affected areas daily for maintenance.    Hand eczema  - chronic problem, not at treatment goal  - Discussed the importance of frequent hand moisturization every hour and after every hand washing. Wash hands with Dove Sensitive Skin Beauty Bar or other fragrance-free cleanser and moisturize with OTC CeraVe healing ointment or Neutrogena Norwegian Formula Hand Cream.   - Minimize hand washing unless visibly dirty, and use an alcohol-based hand  that contains moisturizers and  lacks common allergens, such as Hello Forbes, SupplyAID, SanitizeRx, or Hydra Miley.  - Can apply the prescribed topical steroid followed by white cotton gloves or a warm, damp towel for severe flares.   -     mometasone (ELOCON) 0.1 % ointment; Apply twice daily to hands prn eczema.  Dispense: 45 g; Refill: 3      Follow up in about 3 months (around 7/18/2023).      Brittany Simeon MD, FAAD  [facility]

## 2023-04-20 ENCOUNTER — PATIENT MESSAGE (OUTPATIENT)
Dept: ADMINISTRATIVE | Facility: OTHER | Age: 78
End: 2023-04-20
Payer: MEDICARE

## 2023-04-21 ENCOUNTER — PATIENT MESSAGE (OUTPATIENT)
Dept: ADMINISTRATIVE | Facility: OTHER | Age: 78
End: 2023-04-21
Payer: MEDICARE

## 2023-04-26 ENCOUNTER — HOSPITAL ENCOUNTER (OUTPATIENT)
Facility: OTHER | Age: 78
Discharge: HOME | End: 2023-05-01
Attending: ANESTHESIOLOGY | Admitting: ANESTHESIOLOGY
Payer: MEDICARE

## 2023-04-26 VITALS
OXYGEN SATURATION: 95 % | DIASTOLIC BLOOD PRESSURE: 60 MMHG | RESPIRATION RATE: 16 BRPM | BODY MASS INDEX: 39.65 KG/M2 | HEART RATE: 89 BPM | TEMPERATURE: 99 F | HEIGHT: 61 IN | SYSTOLIC BLOOD PRESSURE: 130 MMHG | WEIGHT: 210 LBS

## 2023-04-26 DIAGNOSIS — G89.29 CHRONIC PAIN: ICD-10-CM

## 2023-04-26 DIAGNOSIS — M51.36 DDD (DEGENERATIVE DISC DISEASE), LUMBAR: Primary | ICD-10-CM

## 2023-04-26 DIAGNOSIS — M54.16 LUMBAR RADICULOPATHY: ICD-10-CM

## 2023-04-26 PROCEDURE — 62323 NJX INTERLAMINAR LMBR/SAC: CPT | Mod: ,,, | Performed by: ANESTHESIOLOGY

## 2023-04-26 PROCEDURE — 62323 NJX INTERLAMINAR LMBR/SAC: CPT | Performed by: ANESTHESIOLOGY

## 2023-04-26 PROCEDURE — 62323 PR INJ LUMBAR/SACRAL, W/IMAGING GUIDANCE: ICD-10-PCS | Mod: ,,, | Performed by: ANESTHESIOLOGY

## 2023-04-26 PROCEDURE — 25000003 PHARM REV CODE 250

## 2023-04-26 PROCEDURE — 25000003 PHARM REV CODE 250: Performed by: ANESTHESIOLOGY

## 2023-04-26 PROCEDURE — 63600175 PHARM REV CODE 636 W HCPCS: Performed by: ANESTHESIOLOGY

## 2023-04-26 PROCEDURE — 25500020 PHARM REV CODE 255: Performed by: ANESTHESIOLOGY

## 2023-04-26 RX ORDER — LIDOCAINE HYDROCHLORIDE 10 MG/ML
INJECTION, SOLUTION EPIDURAL; INFILTRATION; INTRACAUDAL; PERINEURAL
Status: DISCONTINUED | OUTPATIENT
Start: 2023-04-26 | End: 2023-04-26 | Stop reason: HOSPADM

## 2023-04-26 RX ORDER — MIDAZOLAM HYDROCHLORIDE 1 MG/ML
INJECTION INTRAMUSCULAR; INTRAVENOUS
Status: DISCONTINUED | OUTPATIENT
Start: 2023-04-26 | End: 2023-04-26 | Stop reason: HOSPADM

## 2023-04-26 RX ORDER — LIDOCAINE HYDROCHLORIDE 20 MG/ML
INJECTION, SOLUTION INFILTRATION; PERINEURAL
Status: DISCONTINUED | OUTPATIENT
Start: 2023-04-26 | End: 2023-04-26 | Stop reason: HOSPADM

## 2023-04-26 RX ORDER — DEXAMETHASONE SODIUM PHOSPHATE 10 MG/ML
INJECTION INTRAMUSCULAR; INTRAVENOUS
Status: DISCONTINUED | OUTPATIENT
Start: 2023-04-26 | End: 2023-04-26 | Stop reason: HOSPADM

## 2023-04-26 RX ORDER — SODIUM CHLORIDE 9 MG/ML
500 INJECTION, SOLUTION INTRAVENOUS CONTINUOUS
Status: ACTIVE | OUTPATIENT
Start: 2023-04-26 | End: 2023-04-27

## 2023-04-26 RX ORDER — FENTANYL CITRATE 50 UG/ML
INJECTION, SOLUTION INTRAMUSCULAR; INTRAVENOUS
Status: DISCONTINUED | OUTPATIENT
Start: 2023-04-26 | End: 2023-04-26 | Stop reason: HOSPADM

## 2023-04-26 NOTE — OP NOTE
Lumbar Interlaminar Epidural Steroid Injection under Fluoroscopic Guidance    The procedure, risks, benefits, and options were discussed with the patient. There are no contraindications to the procedure. The patent expressed understanding and agreed to the procedure. Informed written consent was obtained prior to the start of the procedure and can be found in the patient's chart.    PATIENT NAME: Mrs. Nilsa Curiel   MRN: [id number]     DATE OF PROCEDURE: 04/26/2023    PROCEDURE: Lumbar Interlaminar Epidural Steroid Injection L5/S1 under Fluoroscopic Guidance    PRE-OP DIAGNOSIS: Spinal stenosis of lumbar region with neurogenic claudication [M48.062]  Lumbar radiculopathy [M54.16] Lumbar radiculopathy [M54.16]    POST-OP DIAGNOSIS: Same    PHYSICIAN: Majo Meyers MD    ASSISTANTS: Isael Clayton     MEDICATIONS INJECTED: Preservative-free Decadron 10mg with 4cc of Lidocaine 1% MPF and preservative free normal saline    LOCAL ANESTHETIC INJECTED: Xylocaine 2%     SEDATION: Versed 1mg and Fentanyl 25mcg                                                                                                                                                                                     Conscious sedation ordered by M.D. Patient re-evaluation prior to administration of conscious sedation. No changes noted in patient's status from initial evaluation. The patient's vital signs were monitored by RN and patient remained hemodynamically stable throughout the procedure.    Event Time In   Sedation Start 1142   Sedation End 1145       ESTIMATED BLOOD LOSS: None    COMPLICATIONS: None    TECHNIQUE: Time-out was performed to identify the patient and procedure to be performed. With the patient laying in a prone position, the surgical area was prepped and draped in the usual sterile fashion using ChloraPrep and a fenestrated drape. The level was determined under fluoroscopy guidance. Skin anesthesia was achieved by injecting  Lidocaine 2% over the injection site. The interlaminar space was then approached with a 20 gauge,  3.5 inch Tuohy needle that was introduced under fluoroscopic guidance in the AP, lateral and/or contralateral oblique imaging. Once the Ligamentum flavum was encountered loss of resistance to air was used to enter the epidural space. With positive loss of resistance and negative aspiration for CSF or Blood, contrast dye Omnipaque (240mg/mL) was injected to confirm placement and there was no vascular runoff. 2 mL of the medication mixture listed above was injected slowly. Displacement of the radio opaque contrast after injection of the medication confirmed that the medication went into the area of the epidural space. The needles were removed and bleeding was nil. A sterile dressing was applied. No specimens collected. The patient tolerated the procedure well.       The patient was monitored after the procedure in the recovery area. They were given post-procedure and discharge instructions to follow at home. The patient was discharged in a stable condition.      Maoj Meyers MD   04/26/2023

## 2023-04-26 NOTE — DISCHARGE INSTRUCTIONS
Adult Procedural Sedation Instructions    Recovery After Procedural Sedation (Adult)  You have been given medicine by vein to make you sleep during your surgery. This may have included both a pain medicine and sleeping medicine. Most of the effects have worn off. But you may still have some drowsiness for the next 6 to 8 hours.  Home care  Follow these guidelines when you get home:  For the next 8 hours, you should be watched by a responsible adult. This person should make sure your condition is not getting worse.  Don't drink any alcohol for the next 24 hours.  Don't drive, operate dangerous machinery, or make important business or personal decisions during the next 24 hours.  Note: Your healthcare provider may tell you not to take any medicine by mouth for pain or sleep in the next 4 hours. These medicines may react with the medicines you were given in the hospital. This could cause a much stronger response than usual.  Follow-up care  Follow up with your healthcare provider if you are not alert and back to your usual level of activity within 12 hours.  When to seek medical advice  Call your healthcare provider right away if any of these occur:  Drowsiness gets worse  Weakness or dizziness gets worse  Repeated vomiting  You can't be awakened   Date Last Reviewed: 10/18/2016  © 0196-0481 [employer]. [address] [place] [address], [place] [address]. All rights reserved. This information is not intended as a substitute for professional medical care. Always follow your healthcare professional's instructions.          Thank you for allowing us to care for you today. You may receive a survey about the care we provided. Your feedback is valuable and helps us provide excellent care throughout the community.     Home Care Instructions for Pain Management:    1. DIET:   You may resume your normal diet today.   2. BATHING:   You may shower with luke warm water. No tub baths or anything that will soak injection sites  under water for the next 24 hours.  3. DRESSING:   You may remove your bandage today.   4. ACTIVITY LEVEL:   You may resume your normal activities 24 hrs after your procedure. Nothing strenuous today.  5. MEDICATIONS:   You may resume your normal medications today. To restart blood thinners, ask your doctor.  6. DRIVING    If you have received any sedatives by mouth today, you may not drive for 12 hours.    If you have received any sedation through your IV, you may not drive for 24 hrs.   7. SPECIAL INSTRUCTIONS:   No heat to the injection site for 24 hrs including, hot bath or shower, heating pad, moist heat, or hot tubs.    Use ice pack to injection site for any pain or discomfort.  Apply ice packs for 20 minute intervals as needed.    IF you have diabetes, be sure to monitor your blood sugar more closely. IF your injection contained steroids your blood sugar levels may become higher than normal.    If you are still having pain upon discharge:  Your pain may improve over the next 48 hours. The anesthetic (numbing medication) works immediately to 48 hours. IF your injection contained a steroid (anti-inflammatory medication), it takes approximately 3 days to start feeling relief and 7-10 days to see your greatest results from the medication. It is possible you may need subsequent injections. This would be discussed at your follow up appointment with pain management or your referring doctor.    Please call the PAIN MANAGEMENT office at [phone] or ON CALL pager at [phone] if you experienced any:   -Weakness or loss of sensation  -Fever > 101.5  -Pain uncontrolled with oral medications   -Persistent nausea, vomiting, or diarrhea  -Redness or drainage from the injection sites, or any other worrisome concerns.   If physician on call was not reached or could not communicate with our office for any reason please go to the nearest emergency department.

## 2023-04-26 NOTE — DISCHARGE SUMMARY
Discharge Note  Short Stay      SUMMARY     Admit Date: 4/26/2023    Attending Physician: Majo Meyers      Discharge Physician: Majo Meyers      Discharge Date: 4/26/2023 11:40 AM    Procedure(s) (LRB):  INJECTION, STEROID, EPIDURAL, L5-S1 (N/A)    Final Diagnosis: Spinal stenosis of lumbar region with neurogenic claudication [M48.062]  Lumbar radiculopathy [M54.16]    Disposition: Home or self care    Patient Instructions:   Current Discharge Medication List        CONTINUE these medications which have NOT CHANGED    Details   allopurinoL (ZYLOPRIM) 100 MG tablet Take 1 tablet (100 mg total) by mouth once daily.  Qty: 90 tablet, Refills: 11      amLODIPine (NORVASC) 10 MG tablet Take 1 tablet (10 mg total) by mouth once daily.  Qty: 90 tablet, Refills: 3    Comments: .      aspirin (ECOTRIN) 81 MG EC tablet Take 1 tablet (81 mg total) by mouth once daily.  Qty: 90 tablet, Refills: 3      betamethasone valerate 0.1% (VALISONE) 0.1 % Oint Apply topically once daily.  Qty: 45 g, Refills: 3    Associated Diagnoses: Flexural atopic dermatitis      biotin 300 mcg Tab Take 1 tablet by mouth once daily.      carvediloL (COREG) 3.125 MG tablet Take 1 tablet (3.125 mg total) by mouth 2 (two) times daily with meals.  Qty: 180 tablet, Refills: 3    Comments: .      cetirizine (ZYRTEC) 10 MG tablet Take 1 tablet (10 mg total) by mouth once daily. for allergic rhinitis  Qty: 90 tablet, Refills: 3    Associated Diagnoses: Sinus disorder      diclofenac sodium (VOLTAREN) 1 % Gel Apply 2 g topically daily as needed.  Qty: 100 g, Refills: 3    Associated Diagnoses: Right hip pain      fluocinonide (LIDEX) 0.05 % external solution AAA scalp qday - bid prn pruritus  Qty: 60 mL, Refills: 3    Associated Diagnoses: Hair loss      fluticasone propionate (FLONASE) 50 mcg/actuation nasal spray 1 spray (50 mcg total) by Each Nostril route once daily.  Qty: 16 g, Refills: 11    Associated Diagnoses: Sinus disorder      !!  ketoconazole (NIZORAL) 2 % cream Apply topically 2 (two) times daily.  Qty: 15 g, Refills: 11    Associated Diagnoses: Intertrigo      !! ketoconazole (NIZORAL) 2 % cream Apply to affected areas of body BID prn irritation.  Qty: 15 g, Refills: 1    Associated Diagnoses: Candidal intertrigo      ketoconazole (NIZORAL) 2 % shampoo Wash hair with medicated shampoo at least 2x/week - let sit on scalp at least 5 minutes prior to rinsing  Qty: 120 mL, Refills: 5    Associated Diagnoses: Hair loss      lactobacillus combo no.6 (PROBIOTIC COMPLEX) 4 billion cell Tab Take 1 tablet by mouth once daily.  Qty: 90 tablet, Refills: 3    Associated Diagnoses: Gastroesophageal reflux disease without esophagitis      meloxicam (MOBIC) 15 MG tablet TAKE 1 TABLET EVERY DAY WITH FOOD AND WATER AS NEEDED FOR MUSCULOSKELETAL PAIN; DO NOT TAKE MORE THAN 15 CONSECUTIVE DAYS  Qty: 90 tablet, Refills: 0      methocarbamoL (ROBAXIN) 500 MG Tab TAKE 1 TABLET FOUR TIMES DAILY FOR MUSCLE SPASM(S)  Qty: 40 tablet, Refills: 0      mometasone (ELOCON) 0.1 % ointment Apply twice daily to hands prn eczema.  Qty: 45 g, Refills: 3    Associated Diagnoses: Hand eczema      MULTIVIT-MIN/FA/CA CARB/VIT K (WOMEN'S 50+ DAILY FORMULA ORAL) Take by mouth.      nystatin-triamcinolone (MYCOLOG II) cream Apply topically 2 (two) times a day.  Qty: 60 g, Refills: 3    Associated Diagnoses: Dermatitis      olmesartan (BENICAR) 40 MG tablet TAKE 1 TABLET ONE TIME DAILY  Qty: 90 tablet, Refills: 3    Associated Diagnoses: Essential hypertension      omega-3 fatty acids 300 mg Cap Take by mouth.      omeprazole (PRILOSEC) 40 MG capsule Take 1 capsule (40 mg total) by mouth every morning. As needed for reflux symptoms  Qty: 90 capsule, Refills: 3      potassium chloride SA (K-DUR,KLOR-CON) 20 MEQ tablet Take 1 tablet (20 mEq total) by mouth 2 (two) times daily.  Qty: 180 tablet, Refills: 3      pregabalin (LYRICA) 75 MG capsule Take 1 capsule (75 mg total) by mouth 2  (two) times daily.  Qty: 60 capsule, Refills: 1    Associated Diagnoses: Lumbar radiculopathy; Spinal stenosis of lumbar region with neurogenic claudication      riboflavin, vitamin B2, (VITAMIN B-2 ORAL) Take 1 capsule by mouth once daily.    Associated Diagnoses: Neurogenic claudication due to lumbar spinal stenosis      sumatriptan (IMITREX) 100 MG tablet Take 1 tablet (100 mg total) by mouth daily as needed for Migraine. No more than 2 doses in 24 hours  Qty: 27 tablet, Refills: 3    Associated Diagnoses: Headache disorder      TAC 0.1%-ciclopirox-MOM Apply to affected area twice daily after cool blow dry. (discard after 1/14/2023)  Qty: 60 g, Refills: 5    Comments: Pharmacist: mix 30 grams of TAC 0.1% cream with 30 grams of Loprox cream in 120cc of Milk of Magnesia  Associated Diagnoses: Intertrigo      triamcinolone acetonide 0.025% (KENALOG) 0.025 % cream Apply to affected areas of body BID prn irritation.  Qty: 15 g, Refills: 1    Associated Diagnoses: Candidal intertrigo      TURMERIC ORAL Take by mouth.      ubidecarenone (COENZYME Q10) 100 mg Tab Take 1 tablet by mouth once daily.        !! - Potential duplicate medications found. Please discuss with provider.              Discharge Diagnosis: Spinal stenosis of lumbar region with neurogenic claudication [M48.062]  Lumbar radiculopathy [M54.16]  Condition on Discharge: Stable with no complications to procedure   Diet on Discharge: Same as before.  Activity: as per instruction sheet.  Discharge to: Home with a responsible adult.  Follow up: 2-4 weeks       Please call my office or pager at [phone] if experienced any weakness or loss of sensation, fever > 101.5, pain uncontrolled with oral medications, persistent nausea/vomiting/or diarrhea, redness or drainage from the incisions, or any other worrisome concerns. If physician on call was not reached or could not communicate with our office for any reason please go to the nearest emergency department       Majo Meyers  04/26/2023

## 2023-05-16 ENCOUNTER — OFFICE VISIT (OUTPATIENT)
Dept: ORTHOPEDICS | Facility: CLINIC | Age: 78
End: 2023-05-16
Payer: MEDICARE

## 2023-05-16 VITALS — HEIGHT: 61 IN | BODY MASS INDEX: 40.71 KG/M2 | WEIGHT: 215.63 LBS

## 2023-05-16 DIAGNOSIS — M47.816 LUMBAR SPONDYLOSIS: ICD-10-CM

## 2023-05-16 DIAGNOSIS — M48.07 SPINAL STENOSIS, LUMBOSACRAL REGION: ICD-10-CM

## 2023-05-16 DIAGNOSIS — M54.16 LUMBAR RADICULOPATHY: ICD-10-CM

## 2023-05-16 DIAGNOSIS — M51.36 DDD (DEGENERATIVE DISC DISEASE), LUMBAR: ICD-10-CM

## 2023-05-16 DIAGNOSIS — M43.10 DEGENERATIVE SPONDYLOLISTHESIS: Primary | ICD-10-CM

## 2023-05-16 PROCEDURE — 99999 PR PBB SHADOW E&M-EST. PATIENT-LVL IV: CPT | Mod: PBBFAC,,, | Performed by: ORTHOPAEDIC SURGERY

## 2023-05-16 PROCEDURE — 3044F PR MOST RECENT HEMOGLOBIN A1C LEVEL <7.0%: ICD-10-PCS | Mod: CPTII,,, | Performed by: ORTHOPAEDIC SURGERY

## 2023-05-16 PROCEDURE — 3288F PR FALLS RISK ASSESSMENT DOCUMENTED: ICD-10-PCS | Mod: CPTII,,, | Performed by: ORTHOPAEDIC SURGERY

## 2023-05-16 PROCEDURE — 3288F FALL RISK ASSESSMENT DOCD: CPT | Mod: CPTII,,, | Performed by: ORTHOPAEDIC SURGERY

## 2023-05-16 PROCEDURE — 99214 OFFICE O/P EST MOD 30 MIN: CPT | Mod: ,,, | Performed by: ORTHOPAEDIC SURGERY

## 2023-05-16 PROCEDURE — 1125F AMNT PAIN NOTED PAIN PRSNT: CPT | Mod: CPTII,,, | Performed by: ORTHOPAEDIC SURGERY

## 2023-05-16 PROCEDURE — 1157F PR ADVANCE CARE PLAN OR EQUIV PRESENT IN MEDICAL RECORD: ICD-10-PCS | Mod: CPTII,,, | Performed by: ORTHOPAEDIC SURGERY

## 2023-05-16 PROCEDURE — 1157F ADVNC CARE PLAN IN RCRD: CPT | Mod: CPTII,,, | Performed by: ORTHOPAEDIC SURGERY

## 2023-05-16 PROCEDURE — 1159F MED LIST DOCD IN RCRD: CPT | Mod: CPTII,,, | Performed by: ORTHOPAEDIC SURGERY

## 2023-05-16 PROCEDURE — 1125F PR PAIN SEVERITY QUANTIFIED, PAIN PRESENT: ICD-10-PCS | Mod: CPTII,,, | Performed by: ORTHOPAEDIC SURGERY

## 2023-05-16 PROCEDURE — 99999 PR PBB SHADOW E&M-EST. PATIENT-LVL IV: ICD-10-PCS | Mod: PBBFAC,,, | Performed by: ORTHOPAEDIC SURGERY

## 2023-05-16 PROCEDURE — 99214 PR OFFICE/OUTPT VISIT, EST, LEVL IV, 30-39 MIN: ICD-10-PCS | Mod: ,,, | Performed by: ORTHOPAEDIC SURGERY

## 2023-05-16 PROCEDURE — 1101F PT FALLS ASSESS-DOCD LE1/YR: CPT | Mod: CPTII,,, | Performed by: ORTHOPAEDIC SURGERY

## 2023-05-16 PROCEDURE — 1101F PR PT FALLS ASSESS DOC 0-1 FALLS W/OUT INJ PAST YR: ICD-10-PCS | Mod: CPTII,,, | Performed by: ORTHOPAEDIC SURGERY

## 2023-05-16 PROCEDURE — 1160F PR REVIEW ALL MEDS BY PRESCRIBER/CLIN PHARMACIST DOCUMENTED: ICD-10-PCS | Mod: CPTII,,, | Performed by: ORTHOPAEDIC SURGERY

## 2023-05-16 PROCEDURE — 1160F RVW MEDS BY RX/DR IN RCRD: CPT | Mod: CPTII,,, | Performed by: ORTHOPAEDIC SURGERY

## 2023-05-16 PROCEDURE — 1159F PR MEDICATION LIST DOCUMENTED IN MEDICAL RECORD: ICD-10-PCS | Mod: CPTII,,, | Performed by: ORTHOPAEDIC SURGERY

## 2023-05-16 PROCEDURE — 3044F HG A1C LEVEL LT 7.0%: CPT | Mod: CPTII,,, | Performed by: ORTHOPAEDIC SURGERY

## 2023-05-16 RX ORDER — PREGABALIN 100 MG/1
100 CAPSULE ORAL 2 TIMES DAILY
Qty: 60 CAPSULE | Refills: 1 | Status: SHIPPED | OUTPATIENT
Start: 2023-05-16 | End: 2023-07-21 | Stop reason: CLARIF

## 2023-05-16 RX ORDER — MELOXICAM 15 MG/1
15 TABLET ORAL DAILY
Qty: 60 TABLET | Refills: 1 | Status: ON HOLD | OUTPATIENT
Start: 2023-05-16 | End: 2023-08-16 | Stop reason: HOSPADM

## 2023-05-16 RX ORDER — METHOCARBAMOL 500 MG/1
500 TABLET, FILM COATED ORAL 3 TIMES DAILY
Qty: 60 TABLET | Refills: 1 | Status: ON HOLD | OUTPATIENT
Start: 2023-05-16 | End: 2023-08-16 | Stop reason: HOSPADM

## 2023-05-16 NOTE — PROGRESS NOTES
"DATE: 5/16/2023  PATIENT: Nilsa Curiel    Attending Physician: Jesus Alberto Sharp M.D.    HISTORY:  Nilsa Curiel is a 77 y.o. female who returns to me today for FU after L5-S1 LOUIS, which helped 55% of her pain. Patient continues to have LBP that radiates posteriorly down RLE to the knee. Patient has been doing PT and taking meds with minimal relief. She is miserable and wants surgical intervention.    3/16/23: b/l L4-5 TFESI - helped somewhat  4/26/23: L5-S1 LOUIS - helped about 55%    The patient does not smoke, have DM or endorse IVDU. The patient is not on any blood thinners and does not take chronic narcotics. She is a retired  at [employer].    PMH/PSH/FamHx/SocHx:  Unchanged from prior visit    ROS:  Positive for LBP and RLE pain  Denies perineal paresthesias, bowel or bladder incontinence    EXAM:  Ht 5' 1" (1.549 m)   Wt 97.8 kg (215 lb 9.8 oz)   BMI 40.74 kg/m²     My physical examination was notable for the following findings: motor intact BLE; SILT    IMAGING:  Today I independently reviewed the following images and my interpretations are as follows:    Previous L-spine XRs showed lumbar spondylosis and DDD. L2-3, L3-4, and L4-5 anterolisthesis.    C spine Xrs showed spondylosis and DDD without listhesis.    MRI lumbar showed moderate L2-3 and severe L3-5 central stenosis. L4-5 moderate bilateral foraminal stenosis.    DEXAin 2019 showed lumbar T-score of -0.4.      ASSESSMENT/PLAN:  Patient has lumbar degenerative spondylolisthesis and stenosis with RLE radiculopathy. I discussed the natural history of their diagnoses as well as surgical and nonsurgical treatment options. I educated the patient on the importance of core/back strengthening, correct posture, bending/lifting ergonomics, and low-impact aerobic exercises (walking, elliptical, and aquatherapy). I prescribed gabapentin. Continue medications and PT/exercises. I prescribed mobic, robaxin and lyrica. Patient has failed " conservative management and is a candidate for L2-5 PLDF/TLIF (R). Patient will follow up in 2 months for preop. I ordered CT lumbar for preop planning.    Jesus Alberto Sharp MD  Orthopaedic Spine Surgeon  Department of Orthopaedic Surgery  [phone]

## 2023-05-17 DIAGNOSIS — M43.10 DEGENERATIVE SPONDYLOLISTHESIS: Primary | ICD-10-CM

## 2023-05-17 NOTE — PROGRESS NOTES
Date of Surgery - 08/16/2023  Patient class - Inpatient  Provider - Jesus Alberto Sharp  Procedure requested - Transforaminal Lumbar Interbody Fusion (TLIF)   Levels: L2-5 PLDF/TLIF  Plan of Care: 2 MIDNIGHTS  Instrumentation - Globus excelsius (TLIF)  Medical Necessity - not urgent  CPT codes: 23775, 30489, 01045, and 81726  post procedural disposition - med/surg  estimated length of stay - 2 midnights    Patient first seen:  12/19/2022  Patient's most recent visit: 5/18/2023  Patient imaging: xrays, MRI.  Results: Previous L-spine XRs showed lumbar spondylosis and DDD. L2-3, L3-4, and L4-5 anterolisthesis.  MRI lumbar showed moderate L2-3 and severe L3-5 central stenosis. L4-5 moderate bilateral foraminal stenosis.  Treatment failed: the patient has tried and failed conservative treatment including medications, physical therapy and ESIs.    Medications tried: lyrica, mobic, and robaxin  Activity Modification: stopped working out, changed pillows, and decreased activity level  Physical therapy (dates, duration, efficacy): Yes. 12/30/22-2/6/2023  Home exercises: Yes.  I provided the patient with a home exercise program. It is the [facility] spine conditioning program. Exercises include head rolls, kneeling back extension, sitting rotation stretch, modified seated side straddle, knee to chest, bird dog, plank, modified seated plank, hip bridges, abdominal bracing, and abdominal crunch. Pt will complete each exercise 5 times daily for 6-8 weeks.  Epidural steroid injections: Yes.  Dates: 3/16/23 BILAT L4-5 TFESI, 4/26/23 L5-S1 LOUIS Relief: no  Functional impairment of ADLs: The patient's severe pain is affecting their quality of life and limiting their daily life, including working and ability to provide self care.       Pertinent physical exam findings: unsteady gait    *please upload patient clinicals including all notes from Aleshia James PA-C, Hali Daigle PA-C, M. Claribel Crum NP, Philip Chairez MD, and/or Jesus Alberto Sharp,  MD.  Please also include any and all physical therapy and pain management notes.

## 2023-06-06 ENCOUNTER — OFFICE VISIT (OUTPATIENT)
Dept: CARDIOLOGY | Facility: CLINIC | Age: 78
End: 2023-06-06
Payer: MEDICARE

## 2023-06-06 VITALS
DIASTOLIC BLOOD PRESSURE: 54 MMHG | WEIGHT: 215.81 LBS | HEART RATE: 100 BPM | BODY MASS INDEX: 40.75 KG/M2 | SYSTOLIC BLOOD PRESSURE: 124 MMHG | OXYGEN SATURATION: 97 % | HEIGHT: 61 IN

## 2023-06-06 DIAGNOSIS — E78.2 MIXED HYPERLIPIDEMIA: ICD-10-CM

## 2023-06-06 DIAGNOSIS — I50.32 CHRONIC HEART FAILURE WITH PRESERVED EJECTION FRACTION: ICD-10-CM

## 2023-06-06 DIAGNOSIS — I70.0 ATHEROSCLEROSIS OF AORTA: Primary | ICD-10-CM

## 2023-06-06 DIAGNOSIS — I35.0 NONRHEUMATIC AORTIC VALVE STENOSIS: ICD-10-CM

## 2023-06-06 DIAGNOSIS — I10 ESSENTIAL HYPERTENSION: ICD-10-CM

## 2023-06-06 PROCEDURE — 93000 ELECTROCARDIOGRAM COMPLETE: CPT | Mod: S$GLB,,, | Performed by: INTERNAL MEDICINE

## 2023-06-06 PROCEDURE — 3078F DIAST BP <80 MM HG: CPT | Mod: CPTII,S$GLB,, | Performed by: INTERNAL MEDICINE

## 2023-06-06 PROCEDURE — 99999 PR PBB SHADOW E&M-EST. PATIENT-LVL III: CPT | Mod: PBBFAC,,, | Performed by: INTERNAL MEDICINE

## 2023-06-06 PROCEDURE — 3288F PR FALLS RISK ASSESSMENT DOCUMENTED: ICD-10-PCS | Mod: CPTII,S$GLB,, | Performed by: INTERNAL MEDICINE

## 2023-06-06 PROCEDURE — 3074F PR MOST RECENT SYSTOLIC BLOOD PRESSURE < 130 MM HG: ICD-10-PCS | Mod: CPTII,S$GLB,, | Performed by: INTERNAL MEDICINE

## 2023-06-06 PROCEDURE — 1125F AMNT PAIN NOTED PAIN PRSNT: CPT | Mod: CPTII,S$GLB,, | Performed by: INTERNAL MEDICINE

## 2023-06-06 PROCEDURE — 99999 PR PBB SHADOW E&M-EST. PATIENT-LVL III: ICD-10-PCS | Mod: PBBFAC,,, | Performed by: INTERNAL MEDICINE

## 2023-06-06 PROCEDURE — 1101F PT FALLS ASSESS-DOCD LE1/YR: CPT | Mod: CPTII,S$GLB,, | Performed by: INTERNAL MEDICINE

## 2023-06-06 PROCEDURE — 99214 OFFICE O/P EST MOD 30 MIN: CPT | Mod: S$GLB,,, | Performed by: INTERNAL MEDICINE

## 2023-06-06 PROCEDURE — 1159F PR MEDICATION LIST DOCUMENTED IN MEDICAL RECORD: ICD-10-PCS | Mod: CPTII,S$GLB,, | Performed by: INTERNAL MEDICINE

## 2023-06-06 PROCEDURE — 1157F ADVNC CARE PLAN IN RCRD: CPT | Mod: CPTII,S$GLB,, | Performed by: INTERNAL MEDICINE

## 2023-06-06 PROCEDURE — 3288F FALL RISK ASSESSMENT DOCD: CPT | Mod: CPTII,S$GLB,, | Performed by: INTERNAL MEDICINE

## 2023-06-06 PROCEDURE — 1101F PR PT FALLS ASSESS DOC 0-1 FALLS W/OUT INJ PAST YR: ICD-10-PCS | Mod: CPTII,S$GLB,, | Performed by: INTERNAL MEDICINE

## 2023-06-06 PROCEDURE — 99214 PR OFFICE/OUTPT VISIT, EST, LEVL IV, 30-39 MIN: ICD-10-PCS | Mod: S$GLB,,, | Performed by: INTERNAL MEDICINE

## 2023-06-06 PROCEDURE — 93000 EKG 12-LEAD: ICD-10-PCS | Mod: S$GLB,,, | Performed by: INTERNAL MEDICINE

## 2023-06-06 PROCEDURE — 3074F SYST BP LT 130 MM HG: CPT | Mod: CPTII,S$GLB,, | Performed by: INTERNAL MEDICINE

## 2023-06-06 PROCEDURE — 1157F PR ADVANCE CARE PLAN OR EQUIV PRESENT IN MEDICAL RECORD: ICD-10-PCS | Mod: CPTII,S$GLB,, | Performed by: INTERNAL MEDICINE

## 2023-06-06 PROCEDURE — 3044F PR MOST RECENT HEMOGLOBIN A1C LEVEL <7.0%: ICD-10-PCS | Mod: CPTII,S$GLB,, | Performed by: INTERNAL MEDICINE

## 2023-06-06 PROCEDURE — 1159F MED LIST DOCD IN RCRD: CPT | Mod: CPTII,S$GLB,, | Performed by: INTERNAL MEDICINE

## 2023-06-06 PROCEDURE — 3078F PR MOST RECENT DIASTOLIC BLOOD PRESSURE < 80 MM HG: ICD-10-PCS | Mod: CPTII,S$GLB,, | Performed by: INTERNAL MEDICINE

## 2023-06-06 PROCEDURE — 1125F PR PAIN SEVERITY QUANTIFIED, PAIN PRESENT: ICD-10-PCS | Mod: CPTII,S$GLB,, | Performed by: INTERNAL MEDICINE

## 2023-06-06 PROCEDURE — 3044F HG A1C LEVEL LT 7.0%: CPT | Mod: CPTII,S$GLB,, | Performed by: INTERNAL MEDICINE

## 2023-06-06 RX ORDER — ROSUVASTATIN CALCIUM 20 MG/1
20 TABLET, COATED ORAL DAILY
Qty: 90 TABLET | Refills: 3 | Status: SHIPPED | OUTPATIENT
Start: 2023-06-06 | End: 2024-06-05

## 2023-06-06 RX ORDER — ROSUVASTATIN CALCIUM 20 MG/1
20 TABLET, COATED ORAL DAILY
Qty: 90 TABLET | Refills: 3 | Status: SHIPPED | OUTPATIENT
Start: 2023-06-06 | End: 2023-06-06

## 2023-06-06 NOTE — PROGRESS NOTES
Cardiology    6/6/2023  9:32 AM    Problem list  Patient Active Problem List   Diagnosis    Mixed hyperlipidemia    GERD (gastroesophageal reflux disease)    Atopic dermatitis    Hyperparathyroidism    Decreased strength    Impaired mobility    Right-sided carotid artery disease    Neurogenic claudication due to lumbar spinal stenosis    Spondylolisthesis of lumbosacral region    MONE (obstructive sleep apnea)    Primary osteoarthritis of both shoulders    Chronic right-sided low back pain without sciatica    Headache disorder    Chronic kidney disease, stage III (moderate)    Cervical stenosis of spine    Posture imbalance    Decreased strength of trunk and back    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    Essential hypertension    Anemia    Right hip pain    Ankle swelling    Intertrigo    Muscle spasms of both lower extremities    Hyperuricemia    Chronic heart failure with preserved ejection fraction    Nonrheumatic aortic valve stenosis    Hair loss    Cervical spine arthritis with nerve pain    Chronic right hip pain    Chronic neck pain    Lumbar spondylosis    Hypercalcemia    Fatigue    Atherosclerosis of aorta       CC:  F/u    HPI:  She has no new complaints.  She denies any angina, dyspnea on exertion, palpitation or syncope.  She is not very active due to her chronic low back pain.  Her blood pressure is controlled at home.  She is tolerating her medications.    Medications  Current Outpatient Medications   Medication Sig Dispense Refill    allopurinoL (ZYLOPRIM) 100 MG tablet Take 1 tablet (100 mg total) by mouth once daily. 90 tablet 11    amLODIPine (NORVASC) 10 MG tablet Take 1 tablet (10 mg total) by mouth once daily. 90 tablet 3    aspirin (ECOTRIN) 81 MG EC tablet Take 1 tablet (81 mg total) by mouth once daily. 90 tablet 3    biotin 300 mcg Tab Take 1 tablet by mouth once daily.      carvediloL (COREG) 3.125 MG tablet Take 1 tablet (3.125 mg total) by mouth 2  (two) times daily with meals. 180 tablet 3    cetirizine (ZYRTEC) 10 MG tablet Take 1 tablet (10 mg total) by mouth once daily. for allergic rhinitis 90 tablet 3    fluticasone propionate (FLONASE) 50 mcg/actuation nasal spray 1 spray (50 mcg total) by Each Nostril route once daily. 16 g 11    lactobacillus combo no.6 (PROBIOTIC COMPLEX) 4 billion cell Tab Take 1 tablet by mouth once daily. 90 tablet 3    meloxicam (MOBIC) 15 MG tablet Take 1 tablet (15 mg total) by mouth once daily. 60 tablet 1    methocarbamoL (ROBAXIN) 500 MG Tab Take 1 tablet (500 mg total) by mouth 3 (three) times daily. 60 tablet 1    MULTIVIT-MIN/FA/CA CARB/VIT K (WOMEN'S 50+ DAILY FORMULA ORAL) Take by mouth.      olmesartan (BENICAR) 40 MG tablet TAKE 1 TABLET ONE TIME DAILY 90 tablet 3    omega-3 fatty acids 300 mg Cap Take by mouth.      omeprazole (PRILOSEC) 40 MG capsule Take 1 capsule (40 mg total) by mouth every morning. As needed for reflux symptoms 90 capsule 3    potassium chloride SA (K-DUR,KLOR-CON) 20 MEQ tablet Take 1 tablet (20 mEq total) by mouth 2 (two) times daily. 180 tablet 3    pregabalin (LYRICA) 100 MG capsule Take 1 capsule (100 mg total) by mouth 2 (two) times daily. 60 capsule 1    riboflavin, vitamin B2, (VITAMIN B-2 ORAL) Take 1 capsule by mouth once daily.      sumatriptan (IMITREX) 100 MG tablet Take 1 tablet (100 mg total) by mouth daily as needed for Migraine. No more than 2 doses in 24 hours 27 tablet 3    TURMERIC ORAL Take by mouth.      ubidecarenone (COENZYME Q10) 100 mg Tab Take 1 tablet by mouth once daily.       betamethasone valerate 0.1% (VALISONE) 0.1 % Oint Apply topically once daily. 45 g 3    diclofenac sodium (VOLTAREN) 1 % Gel Apply 2 g topically daily as needed. 100 g 3    fluocinonide (LIDEX) 0.05 % external solution AAA scalp qday - bid prn pruritus 60 mL 3    ketoconazole (NIZORAL) 2 % cream Apply topically 2 (two) times daily. 15 g 11    ketoconazole (NIZORAL) 2 % cream Apply to affected  areas of body BID prn irritation. 15 g 1    ketoconazole (NIZORAL) 2 % shampoo Wash hair with medicated shampoo at least 2x/week - let sit on scalp at least 5 minutes prior to rinsing 120 mL 5    mometasone (ELOCON) 0.1 % ointment Apply twice daily to hands prn eczema. 45 g 3    nystatin-triamcinolone (MYCOLOG II) cream Apply topically 2 (two) times a day. 60 g 3    TAC 0.1%-ciclopirox-MOM Apply to affected area twice daily after cool blow dry. (discard after 1/14/2023) 60 g 5    triamcinolone acetonide 0.025% (KENALOG) 0.025 % cream Apply to affected areas of body BID prn irritation. 15 g 1     No current facility-administered medications for this visit.      Prior to Admission medications    Medication Sig Start Date End Date Taking? Authorizing Provider   allopurinoL (ZYLOPRIM) 100 MG tablet Take 1 tablet (100 mg total) by mouth once daily. 1/3/23  Yes Gaurav Allison MD   amLODIPine (NORVASC) 10 MG tablet Take 1 tablet (10 mg total) by mouth once daily. 1/3/23  Yes Gaurav Allison MD   aspirin (ECOTRIN) 81 MG EC tablet Take 1 tablet (81 mg total) by mouth once daily. 6/8/22 6/8/23 Yes Gaurav Allison MD   biotin 300 mcg Tab Take 1 tablet by mouth once daily.   Yes Historical Provider   carvediloL (COREG) 3.125 MG tablet Take 1 tablet (3.125 mg total) by mouth 2 (two) times daily with meals. 1/3/23 1/3/24 Yes Gaurav Allison MD   cetirizine (ZYRTEC) 10 MG tablet Take 1 tablet (10 mg total) by mouth once daily. for allergic rhinitis 12/8/22 12/8/23 Yes Gaurav Allison MD   fluticasone propionate (FLONASE) 50 mcg/actuation nasal spray 1 spray (50 mcg total) by Each Nostril route once daily. 12/8/22  Yes Gaurav Allison MD   lactobacillus combo no.6 (PROBIOTIC COMPLEX) 4 billion cell Tab Take 1 tablet by mouth once daily. 9/26/17  Yes OUMOU Ngo-C   meloxicam (MOBIC) 15 MG tablet Take 1 tablet (15 mg total) by mouth once daily. 5/16/23  Yes Jesus Alberto Sharp MD   methocarbamoL (ROBAXIN) 500 MG  Tab Take 1 tablet (500 mg total) by mouth 3 (three) times daily. 5/16/23  Yes Jesus Alberto Sharp MD   MULTIVIT-MIN/FA/CA CARB/VIT K (WOMEN'S 50+ DAILY FORMULA ORAL) Take by mouth.   Yes Historical Provider   olmesartan (BENICAR) 40 MG tablet TAKE 1 TABLET ONE TIME DAILY 11/12/22  Yes Gaurav Allison MD   omega-3 fatty acids 300 mg Cap Take by mouth.   Yes Historical Provider   omeprazole (PRILOSEC) 40 MG capsule Take 1 capsule (40 mg total) by mouth every morning. As needed for reflux symptoms 1/3/23  Yes Gaurav Allison MD   potassium chloride SA (K-DUR,KLOR-CON) 20 MEQ tablet Take 1 tablet (20 mEq total) by mouth 2 (two) times daily. 1/3/23  Yes Gaurav Allison MD   pregabalin (LYRICA) 100 MG capsule Take 1 capsule (100 mg total) by mouth 2 (two) times daily. 5/16/23  Yes Jesus Alberto Sharp MD   riboflavin, vitamin B2, (VITAMIN B-2 ORAL) Take 1 capsule by mouth once daily.   Yes Historical Provider   sumatriptan (IMITREX) 100 MG tablet Take 1 tablet (100 mg total) by mouth daily as needed for Migraine. No more than 2 doses in 24 hours 1/3/23  Yes Gaurav Allison MD   TURMERIC ORAL Take by mouth.   Yes Historical Provider   ubidecarenone (COENZYME Q10) 100 mg Tab Take 1 tablet by mouth once daily.  10/23/19  Yes Historical Provider   betamethasone valerate 0.1% (VALISONE) 0.1 % Oint Apply topically once daily. 6/17/20   Velma Bryant MD   diclofenac sodium (VOLTAREN) 1 % Gel Apply 2 g topically daily as needed. 4/6/22   Gaurav Allison MD   fluocinonide (LIDEX) 0.05 % external solution AAA scalp qday - bid prn pruritus 6/9/21   Rena Hughes MD   ketoconazole (NIZORAL) 2 % cream Apply topically 2 (two) times daily. 12/8/22   Gaurav Allison MD   ketoconazole (NIZORAL) 2 % cream Apply to affected areas of body BID prn irritation. 4/18/23   Brittany Simeon MD   ketoconazole (NIZORAL) 2 % shampoo Wash hair with medicated shampoo at least 2x/week - let sit on scalp at least 5 minutes prior to  rinsing 22   Gaurav Allison MD   mometasone (ELOCON) 0.1 % ointment Apply twice daily to hands prn eczema. 23   Brittany Simeon MD   nystatin-triamcinolone (MYCOLOG II) cream Apply topically 2 (two) times a day. 21   Velma Bryant MD   TAC 0.1%-ciclopirox-MOM Apply to affected area twice daily after cool blow dry. (discard after 2023) 22   Rena Hughes MD   triamcinolone acetonide 0.025% (KENALOG) 0.025 % cream Apply to affected areas of body BID prn irritation. 23   Brittany Simeon MD         History  Past Medical History:   Diagnosis Date    Abnormal fasting glucose 2015    Acute bilateral low back pain without sciatica 10/10/2017    Anemia 2018    Arthritis     Atopic dermatitis     Central stenosis of spinal canal 2022    Chronic diastolic heart failure 2022    Chronic kidney disease, stage III (moderate) 2020    Dermatitis 2023    GERD (gastroesophageal reflux disease)     Hyperlipidemia     Hypertension     Hypokalemia 2022    Joint pain     Left ventricular hypertrophy 2016    Microcytic hypochromic anemia 2018    Multifactoral. See hematology consult. Some nutritional , some kidney related    Mild aortic sclerosis 2019    Mobility poor 2022    Morbid obesity with BMI of 40.0-44.9, adult 2016    Nonintractable episodic headache 2019    Normocytic normochromic anemia 2022    MONE (obstructive sleep apnea) 2018    Prediabetes 2023    Severe obesity (BMI >= 40) 06/10/2022     Past Surgical History:   Procedure Laterality Date     SECTION      x3    COLONOSCOPY N/A 10/04/2021    Procedure: COLONOSCOPY;  Surgeon: Taran Galvez MD;  Location: [facility] ([address] [place] [address]);  Service: Endoscopy;  Laterality: N/A;    COLONOSCOPY N/A 2022    Procedure: COLONOSCOPY--positive fit kit;  Surgeon: Tani Lara MD;  [facility]  Service: Endoscopy;   Laterality: N/A;    EPIDURAL STEROID INJECTION N/A 4/26/2023    Procedure: INJECTION, STEROID, EPIDURAL, L5-S1;  Surgeon: Majo Meyers MD;  Location: [facility] MGT;  Service: Pain Management;  Laterality: N/A;    ESOPHAGOGASTRODUODENOSCOPY N/A 10/04/2021    Procedure: EGD (ESOPHAGOGASTRODUODENOSCOPY);  Surgeon: Taran Galvez MD;  Location: [facility] ENDO (4TH FLR);  Service: Endoscopy;  Laterality: N/A;  covid test 10/1-st [facility]    10/1-LVM about COVID test-GT    ESOPHAGOGASTRODUODENOSCOPY N/A 01/31/2022    Procedure: EGD (ESOPHAGOGASTRODUODENOSCOPY);  Surgeon: Tani Lara MD;  Location: [facility] ENDO (4TH FLR);  Service: Endoscopy;  Laterality: N/A;  12/15 fully vaccinated; instructions to portal-st  1/28-covid  [facility]-tb    TRANSFORAMINAL EPIDURAL INJECTION OF STEROID N/A 03/16/2023    Procedure: b/l L4-5 TFESI;  Surgeon: Gato Delatorre DO;  Location: [facility];  Service: Pain Management;  Laterality: N/A;    TUBAL LIGATION       Social History     Socioeconomic History    Marital status:    Occupational History    Occupation: retired medical records    Tobacco Use    Smoking status: Never    Smokeless tobacco: Never   Substance and Sexual Activity    Alcohol use: Never    Drug use: No    Sexual activity: Not Currently     Partners: Male     Birth control/protection: None   Other Topics Concern    Are you pregnant or think you may be? No    Breast-feeding No   Social History Narrative    , lives with 1 daughter(Toreal), Walking some     Social Determinants of Health     Financial Resource Strain: Low Risk     Difficulty of Paying Living Expenses: Not very hard   Food Insecurity: No Food Insecurity    Worried About Running Out of Food in the Last Year: Never true    Ran Out of Food in the Last Year: Never true   Transportation Needs: Unmet Transportation Needs    Lack of Transportation (Medical): Yes    Lack of Transportation (Non-Medical): Yes   Physical Activity: Insufficiently Active     Days of Exercise per Week: 2 days    Minutes of Exercise per Session: 70 min   Stress: No Stress Concern Present    Feeling of Stress : Not at all   Social Connections: Moderately Integrated    Frequency of Communication with Friends and Family: More than three times a week    Frequency of Social Gatherings with Friends and Family: Once a week    Attends Jewish Services: More than 4 times per year    Active Member of Clubs or Organizations: Yes    Attends Club or Organization Meetings: More than 4 times per year    Marital Status:    Housing Stability: Low Risk     Unable to Pay for Housing in the Last Year: No    Number of Places Lived in the Last Year: 1    Unstable Housing in the Last Year: No         Allergies  Review of patient's allergies indicates:  No Known Allergies      Review of Systems   Review of Systems   Constitutional: Negative for decreased appetite, fever and weight loss.   HENT:  Negative for congestion and nosebleeds.    Eyes:  Negative for double vision, vision loss in left eye, vision loss in right eye and visual disturbance.   Cardiovascular:  Negative for chest pain, claudication, cyanosis, dyspnea on exertion, irregular heartbeat, leg swelling, near-syncope, orthopnea, palpitations, paroxysmal nocturnal dyspnea and syncope.   Respiratory:  Negative for cough, hemoptysis, shortness of breath, sleep disturbances due to breathing, snoring, sputum production and wheezing.    Endocrine: Negative for cold intolerance and heat intolerance.   Skin:  Negative for nail changes and rash.   Musculoskeletal:  Positive for back pain. Negative for joint pain, muscle cramps, muscle weakness and myalgias.   Gastrointestinal:  Negative for change in bowel habit, heartburn, hematemesis, hematochezia, hemorrhoids and melena.   Neurological:  Negative for dizziness, focal weakness and headaches.       Physical Exam  Wt Readings from Last 1 Encounters:   06/06/23 97.9 kg (215 lb 13.3 oz)     BP  Readings from Last 3 Encounters:   06/06/23 (!) 124/54   04/26/23 130/60   04/03/23 (!) 145/78     Pulse Readings from Last 1 Encounters:   06/06/23 100     Body mass index is 40.78 kg/m².    Physical Exam  Vitals reviewed.   Constitutional:       Appearance: She is obese.   Neck:      Vascular: No JVD.   Cardiovascular:      Rate and Rhythm: Normal rate and regular rhythm.      Pulses:           Carotid pulses are 2+ on the right side and 2+ on the left side.       Radial pulses are 2+ on the right side and 2+ on the left side.      Heart sounds: S1 normal and S2 normal. Murmur heard.   Harsh midsystolic murmur is present with a grade of 2/6.   Pulmonary:      Breath sounds: Normal breath sounds and air entry.   Musculoskeletal:      Right lower leg: No edema.      Left lower leg: No edema.   Neurological:      Mental Status: She is alert.       The 10-year ASCVD risk score (Kp MALIK, et al., 2019) is: 16.3%    Values used to calculate the score:      Age: 77 years      Sex: Female      Is Non- : Yes      Diabetic: No      Tobacco smoker: No      Systolic Blood Pressure: 124 mmHg      Is BP treated: Yes      HDL Cholesterol: 55 mg/dL      Total Cholesterol: 227 mg/dL      Assessment  1. Atherosclerosis of aorta  stable    2. Nonrheumatic aortic valve stenosis  Mild AS on last echo 2022  - EKG 12-lead  - Echo; Future    3. Chronic heart failure with preserved ejection fraction  stable    4. Essential hypertension  Controlled on meds, continue to monitor    5. Mixed hyperlipidemia  Will start a statin        Plan and Discussion  Review her labs in January.  Her EKG today showed normal sinus rhythm rate of 99.  Discussed her lipid profile.  Her ASCVD score is 16%.  Discussed starting a statin and she has agreed.  Will start rosuvastatin 20 mg once a day.  Will repeat labs in 6 months.  Will also get an echocardiogram to assess her aortic stenosis.      Follow Up  6 months with labs and  echo      Amando Das MD, F.A.C.C, F.S.C.A.I.        30 minutes were spent in chart review, documentation and review of results, and evaluation, treatment, and counseling of patient on the same day of service.    Disclaimer: This document was created using voice recognition software ([employer]). Although it may be edited, this document may contain errors related to incorrect recognition of the spoken word. Please call the physician if clarification is needed.

## 2023-06-28 ENCOUNTER — TELEPHONE (OUTPATIENT)
Dept: PAIN MEDICINE | Facility: CLINIC | Age: 78
End: 2023-06-28
Payer: MEDICARE

## 2023-07-07 ENCOUNTER — OFFICE VISIT (OUTPATIENT)
Dept: PRIMARY CARE CLINIC | Facility: CLINIC | Age: 78
End: 2023-07-07
Payer: MEDICARE

## 2023-07-07 VITALS
DIASTOLIC BLOOD PRESSURE: 60 MMHG | OXYGEN SATURATION: 97 % | HEIGHT: 61 IN | BODY MASS INDEX: 39.7 KG/M2 | WEIGHT: 210.31 LBS | SYSTOLIC BLOOD PRESSURE: 132 MMHG | TEMPERATURE: 98 F | HEART RATE: 85 BPM

## 2023-07-07 DIAGNOSIS — I42.1 HYPERTROPHIC OBSTRUCTIVE CARDIOMYOPATHY WITH DIASTOLIC HEART FAILURE: ICD-10-CM

## 2023-07-07 DIAGNOSIS — I70.0 ATHEROSCLEROSIS OF AORTA: ICD-10-CM

## 2023-07-07 DIAGNOSIS — M47.816 LUMBAR SPONDYLOSIS: ICD-10-CM

## 2023-07-07 DIAGNOSIS — G47.33 OSA (OBSTRUCTIVE SLEEP APNEA): ICD-10-CM

## 2023-07-07 DIAGNOSIS — I50.30 HYPERTROPHIC OBSTRUCTIVE CARDIOMYOPATHY WITH DIASTOLIC HEART FAILURE: ICD-10-CM

## 2023-07-07 DIAGNOSIS — L20.89 FLEXURAL ATOPIC DERMATITIS: ICD-10-CM

## 2023-07-07 DIAGNOSIS — L65.9 HAIR LOSS: ICD-10-CM

## 2023-07-07 DIAGNOSIS — I50.32 CHRONIC HEART FAILURE WITH PRESERVED EJECTION FRACTION: ICD-10-CM

## 2023-07-07 DIAGNOSIS — M48.062 NEUROGENIC CLAUDICATION DUE TO LUMBAR SPINAL STENOSIS: ICD-10-CM

## 2023-07-07 DIAGNOSIS — E78.2 MIXED HYPERLIPIDEMIA: Primary | ICD-10-CM

## 2023-07-07 DIAGNOSIS — K21.9 GASTROESOPHAGEAL REFLUX DISEASE WITHOUT ESOPHAGITIS: ICD-10-CM

## 2023-07-07 DIAGNOSIS — N18.30 STAGE 3 CHRONIC KIDNEY DISEASE, UNSPECIFIED WHETHER STAGE 3A OR 3B CKD: ICD-10-CM

## 2023-07-07 DIAGNOSIS — M47.812 CERVICAL SPINE ARTHRITIS WITH NERVE PAIN: ICD-10-CM

## 2023-07-07 DIAGNOSIS — E21.3 HYPERPARATHYROIDISM: ICD-10-CM

## 2023-07-07 DIAGNOSIS — I35.0 NONRHEUMATIC AORTIC VALVE STENOSIS: ICD-10-CM

## 2023-07-07 DIAGNOSIS — M48.02 CERVICAL STENOSIS OF SPINE: ICD-10-CM

## 2023-07-07 DIAGNOSIS — M79.2 CERVICAL SPINE ARTHRITIS WITH NERVE PAIN: ICD-10-CM

## 2023-07-07 DIAGNOSIS — I65.21 STENOSIS OF RIGHT CAROTID ARTERY: ICD-10-CM

## 2023-07-07 DIAGNOSIS — I10 ESSENTIAL HYPERTENSION: ICD-10-CM

## 2023-07-07 PROCEDURE — 99999 PR PBB SHADOW E&M-EST. PATIENT-LVL V: ICD-10-PCS | Mod: PBBFAC,,, | Performed by: FAMILY MEDICINE

## 2023-07-07 PROCEDURE — 1157F PR ADVANCE CARE PLAN OR EQUIV PRESENT IN MEDICAL RECORD: ICD-10-PCS | Mod: CPTII,S$GLB,, | Performed by: FAMILY MEDICINE

## 2023-07-07 PROCEDURE — 99999 PR PBB SHADOW E&M-EST. PATIENT-LVL V: CPT | Mod: PBBFAC,,, | Performed by: FAMILY MEDICINE

## 2023-07-07 PROCEDURE — 1125F PR PAIN SEVERITY QUANTIFIED, PAIN PRESENT: ICD-10-PCS | Mod: CPTII,S$GLB,, | Performed by: FAMILY MEDICINE

## 2023-07-07 PROCEDURE — 1101F PT FALLS ASSESS-DOCD LE1/YR: CPT | Mod: CPTII,S$GLB,, | Performed by: FAMILY MEDICINE

## 2023-07-07 PROCEDURE — 1101F PR PT FALLS ASSESS DOC 0-1 FALLS W/OUT INJ PAST YR: ICD-10-PCS | Mod: CPTII,S$GLB,, | Performed by: FAMILY MEDICINE

## 2023-07-07 PROCEDURE — 3288F FALL RISK ASSESSMENT DOCD: CPT | Mod: CPTII,S$GLB,, | Performed by: FAMILY MEDICINE

## 2023-07-07 PROCEDURE — 3288F PR FALLS RISK ASSESSMENT DOCUMENTED: ICD-10-PCS | Mod: CPTII,S$GLB,, | Performed by: FAMILY MEDICINE

## 2023-07-07 PROCEDURE — 99214 OFFICE O/P EST MOD 30 MIN: CPT | Mod: S$GLB,,, | Performed by: FAMILY MEDICINE

## 2023-07-07 PROCEDURE — 3078F PR MOST RECENT DIASTOLIC BLOOD PRESSURE < 80 MM HG: ICD-10-PCS | Mod: CPTII,S$GLB,, | Performed by: FAMILY MEDICINE

## 2023-07-07 PROCEDURE — 1157F ADVNC CARE PLAN IN RCRD: CPT | Mod: CPTII,S$GLB,, | Performed by: FAMILY MEDICINE

## 2023-07-07 PROCEDURE — 1160F PR REVIEW ALL MEDS BY PRESCRIBER/CLIN PHARMACIST DOCUMENTED: ICD-10-PCS | Mod: CPTII,S$GLB,, | Performed by: FAMILY MEDICINE

## 2023-07-07 PROCEDURE — 3044F PR MOST RECENT HEMOGLOBIN A1C LEVEL <7.0%: ICD-10-PCS | Mod: CPTII,S$GLB,, | Performed by: FAMILY MEDICINE

## 2023-07-07 PROCEDURE — 3078F DIAST BP <80 MM HG: CPT | Mod: CPTII,S$GLB,, | Performed by: FAMILY MEDICINE

## 2023-07-07 PROCEDURE — 1159F PR MEDICATION LIST DOCUMENTED IN MEDICAL RECORD: ICD-10-PCS | Mod: CPTII,S$GLB,, | Performed by: FAMILY MEDICINE

## 2023-07-07 PROCEDURE — 99214 PR OFFICE/OUTPT VISIT, EST, LEVL IV, 30-39 MIN: ICD-10-PCS | Mod: S$GLB,,, | Performed by: FAMILY MEDICINE

## 2023-07-07 PROCEDURE — 3044F HG A1C LEVEL LT 7.0%: CPT | Mod: CPTII,S$GLB,, | Performed by: FAMILY MEDICINE

## 2023-07-07 PROCEDURE — 3075F SYST BP GE 130 - 139MM HG: CPT | Mod: CPTII,S$GLB,, | Performed by: FAMILY MEDICINE

## 2023-07-07 PROCEDURE — 1159F MED LIST DOCD IN RCRD: CPT | Mod: CPTII,S$GLB,, | Performed by: FAMILY MEDICINE

## 2023-07-07 PROCEDURE — 3075F PR MOST RECENT SYSTOLIC BLOOD PRESS GE 130-139MM HG: ICD-10-PCS | Mod: CPTII,S$GLB,, | Performed by: FAMILY MEDICINE

## 2023-07-07 PROCEDURE — 1125F AMNT PAIN NOTED PAIN PRSNT: CPT | Mod: CPTII,S$GLB,, | Performed by: FAMILY MEDICINE

## 2023-07-07 PROCEDURE — 1160F RVW MEDS BY RX/DR IN RCRD: CPT | Mod: CPTII,S$GLB,, | Performed by: FAMILY MEDICINE

## 2023-07-13 DIAGNOSIS — Z12.31 ENCOUNTER FOR SCREENING MAMMOGRAM FOR MALIGNANT NEOPLASM OF BREAST: Primary | ICD-10-CM

## 2023-07-19 PROBLEM — E83.52 HYPERCALCEMIA: Status: RESOLVED | Noted: 2023-01-04 | Resolved: 2023-07-19

## 2023-07-19 PROBLEM — D64.9 ANEMIA: Status: RESOLVED | Noted: 2022-04-06 | Resolved: 2023-07-19

## 2023-07-19 NOTE — PROGRESS NOTES
"    /60   Pulse 85   Temp 98.3 °F (36.8 °C)   Ht 5' 1" (1.549 m)   Wt 95.4 kg (210 lb 5.1 oz)   SpO2 97%   BMI 39.74 kg/m²       ===========    Chief Complaint: No chief complaint on file.          Rash  This is a recurrent problem. The current episode started more than 1 month ago. The problem has been waxing and waning since onset. The affected locations include the left hand and left fingers. The rash is characterized by dryness and itchiness. She was exposed to nothing. Pertinent negatives include no congestion, cough, diarrhea, eye pain, facial edema, fatigue, fever, joint pain, nail changes, rhinorrhea, shortness of breath, sore throat or vomiting. Past treatments include anti-itch cream and antibiotic cream. The treatment provided moderate relief. Her past medical history is significant for eczema and varicella. There is no history of allergies or asthma.     Nilsa Curiel is a 77 y.o. female     here for    Annual Wellness/Preventative Exam.  Health maintenance reviewed with patient in detail inc any recent labs and studies and needs for future screening labs.  Age-appropriate vaccines and other age-appropriate screening studies reviewed with patient in detail.  Sleep health reviewed with patient.  Skin health regarding possible skin cancer screening reviewed with patient.  General regularity of bowel movements and urinations reviewed with patient including any possibility of urine leakage.  Vision screening reviewed with patient.      Patient queried and denies any further complaints    Patient has MEDICAL HISTORY OF  Patient Active Problem List   Diagnosis    Mixed hyperlipidemia    GERD (gastroesophageal reflux disease)    Atopic dermatitis    Hyperparathyroidism    Decreased strength    Impaired mobility    Right-sided carotid artery disease    Neurogenic claudication due to lumbar spinal stenosis    Spondylolisthesis of lumbosacral region    MONE (obstructive sleep apnea)    Primary " osteoarthritis of both shoulders    Chronic right-sided low back pain without sciatica    Headache disorder    Chronic kidney disease, stage III (moderate)    Cervical stenosis of spine    Posture imbalance    Decreased strength of trunk and back    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    Essential hypertension    Right hip pain    Ankle swelling    Intertrigo    Muscle spasms of both lower extremities    Hyperuricemia    Chronic heart failure with preserved ejection fraction    Nonrheumatic aortic valve stenosis    Hair loss    Cervical spine arthritis with nerve pain    Chronic right hip pain    Chronic neck pain    Lumbar spondylosis    Fatigue    Atherosclerosis of aorta       SURGICAL AND MEDICAL HISTORY: updated and reviewed.  Past Surgical History:   Procedure Laterality Date     SECTION      x3    COLONOSCOPY N/A 10/04/2021    Procedure: COLONOSCOPY;  Surgeon: Taran Galvez MD;  [facility]  Service: Endoscopy;  Laterality: N/A;    COLONOSCOPY N/A 2022    Procedure: COLONOSCOPY--positive fit kit;  Surgeon: Tani Lara MD;  Location: [facility] (4TH FLR);  Service: Endoscopy;  Laterality: N/A;    EPIDURAL STEROID INJECTION N/A 2023    Procedure: INJECTION, STEROID, EPIDURAL, L5-S1;  Surgeon: Majo Meyers MD;  Location: [facility] PAIN MGT;  Service: Pain Management;  Laterality: N/A;    ESOPHAGOGASTRODUODENOSCOPY N/A 10/04/2021    Procedure: EGD (ESOPHAGOGASTRODUODENOSCOPY);  Surgeon: Taran Galvez MD;  Location: [facility] (4TH FLR);  Service: Endoscopy;  Laterality: N/A;  covid test 10/1-[facility]    10/1-[facility] about COVID test-GT    ESOPHAGOGASTRODUODENOSCOPY N/A 2022    Procedure: EGD (ESOPHAGOGASTRODUODENOSCOPY);  Surgeon: Tani Lara MD;  Location: [facility] (4TH FLR);  Service: Endoscopy;  Laterality: N/A;  12/15 fully vaccinated; instructions to portal-st  -covid [facility]    TRANSFORAMINAL EPIDURAL INJECTION OF  STEROID N/A 03/16/2023    Procedure: b/l L4-5 TFESI;  Surgeon: Gato Delatorre DO;  Location: [facility] OR;  Service: Pain Management;  Laterality: N/A;    TUBAL LIGATION       ALLERGIES updated and reviewed.  Review of patient's allergies indicates:  No Known Allergies    CURRENT OUTPATIENT MEDICATIONS updated and reviewed    Current Outpatient Medications:     allopurinoL (ZYLOPRIM) 100 MG tablet, Take 1 tablet (100 mg total) by mouth once daily., Disp: 90 tablet, Rfl: 11    amLODIPine (NORVASC) 10 MG tablet, Take 1 tablet (10 mg total) by mouth once daily., Disp: 90 tablet, Rfl: 3    betamethasone valerate 0.1% (VALISONE) 0.1 % Oint, Apply topically once daily., Disp: 45 g, Rfl: 3    biotin 300 mcg Tab, Take 1 tablet by mouth once daily., Disp: , Rfl:     carvediloL (COREG) 3.125 MG tablet, Take 1 tablet (3.125 mg total) by mouth 2 (two) times daily with meals., Disp: 180 tablet, Rfl: 3    cetirizine (ZYRTEC) 10 MG tablet, Take 1 tablet (10 mg total) by mouth once daily. for allergic rhinitis, Disp: 90 tablet, Rfl: 3    diclofenac sodium (VOLTAREN) 1 % Gel, Apply 2 g topically daily as needed., Disp: 100 g, Rfl: 3    fluocinonide (LIDEX) 0.05 % external solution, AAA scalp qday - bid prn pruritus, Disp: 60 mL, Rfl: 3    fluticasone propionate (FLONASE) 50 mcg/actuation nasal spray, 1 spray (50 mcg total) by Each Nostril route once daily., Disp: 16 g, Rfl: 11    ketoconazole (NIZORAL) 2 % cream, Apply to affected areas of body BID prn irritation., Disp: 15 g, Rfl: 1    ketoconazole (NIZORAL) 2 % shampoo, Wash hair with medicated shampoo at least 2x/week - let sit on scalp at least 5 minutes prior to rinsing, Disp: 120 mL, Rfl: 5    lactobacillus combo no.6 (PROBIOTIC COMPLEX) 4 billion cell Tab, Take 1 tablet by mouth once daily., Disp: 90 tablet, Rfl: 3    meloxicam (MOBIC) 15 MG tablet, Take 1 tablet (15 mg total) by mouth once daily., Disp: 60 tablet, Rfl: 1    methocarbamoL (ROBAXIN) 500 MG Tab, Take 1  tablet (500 mg total) by mouth 3 (three) times daily., Disp: 60 tablet, Rfl: 1    mometasone (ELOCON) 0.1 % ointment, Apply twice daily to hands prn eczema., Disp: 45 g, Rfl: 3    MULTIVIT-MIN/FA/CA CARB/VIT K (WOMEN'S 50+ DAILY FORMULA ORAL), Take by mouth., Disp: , Rfl:     nystatin-triamcinolone (MYCOLOG II) cream, Apply topically 2 (two) times a day., Disp: 60 g, Rfl: 3    olmesartan (BENICAR) 40 MG tablet, TAKE 1 TABLET ONE TIME DAILY, Disp: 90 tablet, Rfl: 3    omega-3 fatty acids 300 mg Cap, Take by mouth., Disp: , Rfl:     omeprazole (PRILOSEC) 40 MG capsule, Take 1 capsule (40 mg total) by mouth every morning. As needed for reflux symptoms, Disp: 90 capsule, Rfl: 3    potassium chloride SA (K-DUR,KLOR-CON) 20 MEQ tablet, Take 1 tablet (20 mEq total) by mouth 2 (two) times daily., Disp: 180 tablet, Rfl: 3    pregabalin (LYRICA) 100 MG capsule, Take 1 capsule (100 mg total) by mouth 2 (two) times daily., Disp: 60 capsule, Rfl: 1    riboflavin, vitamin B2, (VITAMIN B-2 ORAL), Take 1 capsule by mouth once daily., Disp: , Rfl:     rosuvastatin (CRESTOR) 20 MG tablet, Take 1 tablet (20 mg total) by mouth once daily., Disp: 90 tablet, Rfl: 3    sumatriptan (IMITREX) 100 MG tablet, Take 1 tablet (100 mg total) by mouth daily as needed for Migraine. No more than 2 doses in 24 hours, Disp: 27 tablet, Rfl: 3    TAC 0.1%-ciclopirox-MOM, Apply to affected area twice daily after cool blow dry. (discard after 1/14/2023), Disp: 60 g, Rfl: 5    triamcinolone acetonide 0.025% (KENALOG) 0.025 % cream, Apply to affected areas of body BID prn irritation., Disp: 15 g, Rfl: 1    TURMERIC ORAL, Take by mouth., Disp: , Rfl:     ubidecarenone (COENZYME Q10) 100 mg Tab, Take 1 tablet by mouth once daily. , Disp: , Rfl:     aspirin (ECOTRIN) 81 MG EC tablet, Take 1 tablet (81 mg total) by mouth once daily., Disp: 90 tablet, Rfl: 3    Review of Systems   Constitutional:  Negative for activity change, appetite change, chills,  "diaphoresis, fatigue, fever and unexpected weight change.   HENT:  Negative for congestion, ear discharge, ear pain, facial swelling, hearing loss, nosebleeds, postnasal drip, rhinorrhea, sinus pressure, sneezing, sore throat, tinnitus, trouble swallowing and voice change.    Eyes:  Negative for photophobia, pain, discharge, redness, itching and visual disturbance.   Respiratory:  Negative for cough, chest tightness, shortness of breath and wheezing.    Cardiovascular:  Negative for chest pain, palpitations and leg swelling.   Gastrointestinal:  Negative for abdominal distention, abdominal pain, anal bleeding, blood in stool, constipation, diarrhea, nausea, rectal pain and vomiting.   Endocrine: Negative for cold intolerance, heat intolerance, polydipsia, polyphagia and polyuria.   Genitourinary:  Negative for difficulty urinating, dysuria and flank pain.   Musculoskeletal:  Negative for arthralgias, back pain, joint pain, joint swelling, myalgias and neck pain.   Skin:  Positive for rash. Negative for nail changes.   Neurological:  Negative for dizziness, tremors, seizures, syncope, speech difficulty, weakness, light-headedness, numbness and headaches.   Psychiatric/Behavioral:  Negative for behavioral problems, confusion, decreased concentration, dysphoric mood, sleep disturbance and suicidal ideas. The patient is not nervous/anxious and is not hyperactive.      /60   Pulse 85   Temp 98.3 °F (36.8 °C)   Ht 5' 1" (1.549 m)   Wt 95.4 kg (210 lb 5.1 oz)   SpO2 97%   BMI 39.74 kg/m²   Physical Exam  Vitals and nursing note reviewed.   Constitutional:       General: She is not in acute distress.     Appearance: Normal appearance. She is well-developed. She is not ill-appearing or toxic-appearing.   HENT:      Head: Normocephalic and atraumatic.      Right Ear: Tympanic membrane, ear canal and external ear normal.      Left Ear: Tympanic membrane, ear canal and external ear normal.      Nose: Nose normal.    "   Mouth/Throat:      Lips: Pink.      Mouth: Mucous membranes are moist.      Pharynx: No oropharyngeal exudate or posterior oropharyngeal erythema.   Eyes:      General: No scleral icterus.        Right eye: No discharge.         Left eye: No discharge.      Extraocular Movements: Extraocular movements intact.      Conjunctiva/sclera: Conjunctivae normal.   Cardiovascular:      Rate and Rhythm: Normal rate and regular rhythm.      Pulses: Normal pulses.      Heart sounds: Normal heart sounds. No murmur heard.  Pulmonary:      Effort: Pulmonary effort is normal. No respiratory distress.      Breath sounds: Normal breath sounds. No wheezing or rales.   Abdominal:      General: Bowel sounds are normal. There is no distension.      Palpations: Abdomen is soft. There is no mass.      Tenderness: There is no abdominal tenderness. There is no right CVA tenderness, left CVA tenderness, guarding or rebound.      Hernia: No hernia is present.   Musculoskeletal:      Cervical back: Normal range of motion and neck supple. No rigidity or tenderness.   Lymphadenopathy:      Cervical: No cervical adenopathy.   Skin:     General: Skin is warm and dry.   Neurological:      General: No focal deficit present.      Mental Status: She is alert. Mental status is at baseline.   Psychiatric:         Mood and Affect: Mood normal.         Behavior: Behavior normal. Behavior is cooperative.       ASSESSMENT/PLAN  Diagnoses and all orders for this visit:    Mixed hyperlipidemia    Essential hypertension    Hair loss    Stenosis of right carotid artery    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    Chronic heart failure with preserved ejection fraction    Nonrheumatic aortic valve stenosis    Atherosclerosis of aorta    Stage 3 chronic kidney disease, unspecified whether stage 3a or 3b CKD    Gastroesophageal reflux disease without esophagitis    Hyperparathyroidism    Neurogenic claudication due to lumbar spinal  stenosis    Cervical stenosis of spine    Cervical spine arthritis with nerve pain    Lumbar spondylosis    Flexural atopic dermatitis    MONE (obstructive sleep apnea)            All lab results over past 2 years available reviewed inc labs and any cardiology or radiology studies.  Any new prescription medications gone over in detail including reason for taking the medication, the general mechanism of action, most common possible side effects and possible costs, etcetera.    Chronic conditions updated. Other than changes or additions as above, cont current medications and maintain follow-up with specialists if indicated.     Gaurav Allison MD  A dictation device was used to produce this document. Use of such devices sometimes results in grammatical errors or replacement of words that sound similarly.

## 2023-07-21 ENCOUNTER — TELEPHONE (OUTPATIENT)
Dept: PRIMARY CARE CLINIC | Facility: CLINIC | Age: 78
End: 2023-07-21

## 2023-07-21 ENCOUNTER — TELEPHONE (OUTPATIENT)
Dept: PREADMISSION TESTING | Facility: HOSPITAL | Age: 78
End: 2023-07-21
Payer: MEDICARE

## 2023-07-21 DIAGNOSIS — M79.604 PAIN OF RIGHT LOWER EXTREMITY: ICD-10-CM

## 2023-07-21 DIAGNOSIS — Z01.818 PREOPERATIVE TESTING: Primary | ICD-10-CM

## 2023-07-21 NOTE — ANESTHESIA PAT ROS NOTE
07/21/2023  Nilsa Curiel is a 77 y.o., female.      Pre-op Assessment          Review of Systems  Anesthesia Hx:  No problems with previous Anesthesia   History of prior surgery of interest to airway management or planning: cervical fusion.         Denies Family Hx of Anesthesia complications.    Denies Personal Hx of Anesthesia complications.                    Social:  Non-Smoker, No Alcohol Use       Hematology/Oncology:    Oncology Normal    -- Anemia:                                  EENT/Dental:  EENT/Dental Normal           Cardiovascular:     Hypertension       Denies Angina.     hyperlipidemia     Functional Capacity 3 METS    Aortic Stenosis (AS), mild       Congestive Heart Failure (CHF) , Chronic Congestive Heart Failure  , on chronic Rx, no recent change Cardiomyopathy, Hypertrophic Cardiomyopathy                        Pulmonary:       Denies Shortness of breath.  Denies Recent URI. Sleep Apnea, CPAP                Renal/:       Kidney Function/Disease, Chronic Kidney Disease (CKD) , CKD Stage III (GFR 30-59)            Hepatic/GI:     GERD             Musculoskeletal:     Joint Disease:  Arthritis, Osteoarthritis   Bone Disorders:       Spine Disorders:        Spinal Stenosis, Cervical Spinal Stenosis Cervical Spine Disorder, Cervical Disc Disease   Lumbar Spine Disorders DEGENERATIVE SPONDYLOLISTHESIS LUMBAR  Neurological:      Headaches         Osteoarthritis                           Endocrine:     Parathyroid Disease, Hyperparathyroidism          Metabolic Disorders, Morbid Obesity / BMI > 40  Psych:  Psychiatric Normal                    Physical Exam  General: Well nourished    Airway:  Mouth Opening: Small, but > 3cm  Tongue: Normal  Neck ROM: Normal ROM  Neck: Girth Increased    Dental:  Intact    Chest/Lungs:  Clear to auscultation    Heart:  Rate: Normal  Rhythm: Regular  Rhythm  Sounds: Normal          Anesthesia Assessment: Preoperative EQUATION    Planned Procedure: Procedure(s) (LRB):  FUSION, SPINE, LUMBAR, TLIF, POSTERIOR APPROACH, USING PEDICLE SCREW L2-5 PLDF/TLIF GLOBUS ROBOT SNS:SSEP/EMG (N/A)  Requested Anesthesia Type:General  Surgeon: Jesus Alberto Sharp MD  Service: Neurosurgery  Known or anticipated Date of Surgery:8/16/2023    Surgeon notes: reviewed    Electronic QUestionnaire Assessment completed via nurse interview with patient.        Triage considerations:     The patient has no apparent active cardiac condition (No unstable coronary Syndrome such as severe unstable angina or recent [<1 month] myocardial infarction, decompensated CHF, severe valvular   disease or significant arrhythmia)    Previous anesthesia records:MAC and No problems  1/31/2022   EGD (ESOPHAGOGASTRODUODENOSCOPY) (Abdomen)   COLONOSCOPY--positive fit kit (Abdomen)   Airway/Jaw/Neck:  Airway Findings: Mouth Opening: Normal Tongue: Normal  General Airway Assessment: Adult  Mallampati: II  Improves to II with phonation.  TM Distance: Normal, at least 6 cm                Last PCP note: 3-6 months ago , within [facility]   Subspecialty notes: Cardiology: General, Dermatology, Ortho, Pain Management, OPTOMETRY, OB/GYN    Other important co-morbidities:PER Epic:  CHF, GERD, HLD, HTN, Morbid Obesity, MONE, and DEGENERATIVE SPONDYLOLISTHESIS, CM, AS, CKD, ANEMIA OSTEOARTHRITIS, HYPERPARATHYROIDISM       Tests already available:  Available tests,  within 3 months , 6-12 months ago , within [facility] .   5/12/2023 CT LUMBAR SPINE W/O CONTRAST, 6/6/2023 EKG, 12/19/2022 XRAY LUMBAR SPINE AP/LAT W F/E          Instructions given. (See in Nurse's note)    Optimization:  Anesthesia Preop Clinic Assessment  Indicated    Medical Opinion Indicated       Sub-specialist consult indicated:   CARDIOLOGY       Plan:    Testing:  BMP, CBC, PT/INR, PTT, T&S, and UA   Pre-anesthesia  visit       Visit focus: concerns in complex  and/or prolonged anesthesia, COMORBIDITIES     Consultation:Patient's PCP for re-evaluation     Patient  has previously scheduled Medical Appointment:7/25 DR MASON    Navigation: Tests Scheduled. TBD             Consults scheduled.TBD             Results will be tracked by Preop Clinic.  Cleo Merritt RN BSN    8/2- (CARDIOLOGY)-Plan and Discussion  Discussed her blood pressure is well controlled.  She has no unstable cardiac symptoms.  Discussed her echocardiogram findings with good left ventricular function and mild aortic stenosis.  She should proceed with planned back surgery as scheduled without additional cardiac testing.

## 2023-07-21 NOTE — PRE-PROCEDURE INSTRUCTIONS
Patient stated has not had any problem with anesthesia in the past. Will need medical clearance from ;your PCP,meg Allison.  She has a  appt on 7/24. Will need cardiac clearance from Dr.Thanh Das.  She will make an appt. She said she takes ASA 81 for prevention. Will need poc appt, labs and ua.  Our  will call to set up these appts.    Preop instructions given. Hold aspirin, aspirin containing products, nsaids(aleve, advil, motrin, ibuprofen, naprosyn, naproxen, voltaren, diclofenac, Mobic, Meloxicam), vitamins ( Multivitamin, Vitamin B2 )and supplements ( Biotin, Omea3 fatty acids, Coenzyme Q10, Tumeric) one week prior to surgery.     May take Tylenol.( Also sent to [facility] portal)  Verbalizes understanding.

## 2023-07-21 NOTE — TELEPHONE ENCOUNTER
----- Message from Cleo Merritt RN sent at 7/21/2023  9:31 AM CDT -----  Patient is scheduled for TLIF L2-5 on 8/16 with Dr. Sharp. ( Approximately 265 minutes of general anesthesia) She will need medical clearance and has an appt with you on 7/24.  Thanks!     Per Dr. Mckeon's instructions

## 2023-07-21 NOTE — TELEPHONE ENCOUNTER
----- Message from Cleo Merritt RN sent at 7/21/2023  9:30 AM CDT -----  Surgery 8/16  Please schedule poc appt, labs and ua.  Thanks!

## 2023-07-24 ENCOUNTER — OFFICE VISIT (OUTPATIENT)
Dept: PRIMARY CARE CLINIC | Facility: CLINIC | Age: 78
End: 2023-07-24
Payer: MEDICARE

## 2023-07-24 ENCOUNTER — LAB VISIT (OUTPATIENT)
Dept: LAB | Facility: HOSPITAL | Age: 78
End: 2023-07-24
Attending: ANESTHESIOLOGY
Payer: MEDICARE

## 2023-07-24 VITALS
DIASTOLIC BLOOD PRESSURE: 68 MMHG | SYSTOLIC BLOOD PRESSURE: 120 MMHG | BODY MASS INDEX: 39.13 KG/M2 | HEART RATE: 90 BPM | HEIGHT: 61 IN | WEIGHT: 207.25 LBS | TEMPERATURE: 98 F | OXYGEN SATURATION: 99 %

## 2023-07-24 DIAGNOSIS — I42.1 HYPERTROPHIC OBSTRUCTIVE CARDIOMYOPATHY WITH DIASTOLIC HEART FAILURE: ICD-10-CM

## 2023-07-24 DIAGNOSIS — E21.3 HYPERPARATHYROIDISM: ICD-10-CM

## 2023-07-24 DIAGNOSIS — G47.33 OSA (OBSTRUCTIVE SLEEP APNEA): ICD-10-CM

## 2023-07-24 DIAGNOSIS — Z01.818 PREOP EXAMINATION: Primary | ICD-10-CM

## 2023-07-24 DIAGNOSIS — R29.3 POSTURE IMBALANCE: ICD-10-CM

## 2023-07-24 DIAGNOSIS — I65.21 STENOSIS OF RIGHT CAROTID ARTERY: ICD-10-CM

## 2023-07-24 DIAGNOSIS — Z01.818 PREOPERATIVE TESTING: ICD-10-CM

## 2023-07-24 DIAGNOSIS — J34.9 SINUS DISORDER: ICD-10-CM

## 2023-07-24 DIAGNOSIS — Z74.09 IMPAIRED MOBILITY: ICD-10-CM

## 2023-07-24 DIAGNOSIS — E78.2 MIXED HYPERLIPIDEMIA: ICD-10-CM

## 2023-07-24 DIAGNOSIS — E79.0 HYPERURICEMIA: ICD-10-CM

## 2023-07-24 DIAGNOSIS — I50.30 HYPERTROPHIC OBSTRUCTIVE CARDIOMYOPATHY WITH DIASTOLIC HEART FAILURE: ICD-10-CM

## 2023-07-24 DIAGNOSIS — I50.32 CHRONIC HEART FAILURE WITH PRESERVED EJECTION FRACTION: ICD-10-CM

## 2023-07-24 DIAGNOSIS — L20.89 FLEXURAL ATOPIC DERMATITIS: ICD-10-CM

## 2023-07-24 DIAGNOSIS — M79.604 PAIN OF RIGHT LOWER EXTREMITY: ICD-10-CM

## 2023-07-24 DIAGNOSIS — N18.30 STAGE 3 CHRONIC KIDNEY DISEASE, UNSPECIFIED WHETHER STAGE 3A OR 3B CKD: ICD-10-CM

## 2023-07-24 DIAGNOSIS — I70.0 ATHEROSCLEROSIS OF AORTA: ICD-10-CM

## 2023-07-24 DIAGNOSIS — R53.1 DECREASED STRENGTH: ICD-10-CM

## 2023-07-24 DIAGNOSIS — I10 ESSENTIAL HYPERTENSION: ICD-10-CM

## 2023-07-24 DIAGNOSIS — Z01.810 PREOP CARDIOVASCULAR EXAM: ICD-10-CM

## 2023-07-24 LAB
ABO + RH BLD: NORMAL
ANION GAP SERPL CALC-SCNC: 8 MMOL/L (ref 8–16)
APTT PPP: 30.8 SEC (ref 21–32)
BASOPHILS # BLD AUTO: 0.06 K/UL (ref 0–0.2)
BASOPHILS NFR BLD: 0.7 % (ref 0–1.9)
BLD GP AB SCN CELLS X3 SERPL QL: NORMAL
BUN SERPL-MCNC: 19 MG/DL (ref 8–23)
CALCIUM SERPL-MCNC: 10.7 MG/DL (ref 8.7–10.5)
CHLORIDE SERPL-SCNC: 111 MMOL/L (ref 95–110)
CO2 SERPL-SCNC: 22 MMOL/L (ref 23–29)
CREAT SERPL-MCNC: 1 MG/DL (ref 0.5–1.4)
DIFFERENTIAL METHOD: ABNORMAL
EOSINOPHIL # BLD AUTO: 0.3 K/UL (ref 0–0.5)
EOSINOPHIL NFR BLD: 3.2 % (ref 0–8)
ERYTHROCYTE [DISTWIDTH] IN BLOOD BY AUTOMATED COUNT: 13.7 % (ref 11.5–14.5)
EST. GFR  (NO RACE VARIABLE): 58.8 ML/MIN/1.73 M^2
GLUCOSE SERPL-MCNC: 86 MG/DL (ref 70–110)
HCT VFR BLD AUTO: 32.1 % (ref 37–48.5)
HGB BLD-MCNC: 9.8 G/DL (ref 12–16)
IMM GRANULOCYTES # BLD AUTO: 0.02 K/UL (ref 0–0.04)
IMM GRANULOCYTES NFR BLD AUTO: 0.2 % (ref 0–0.5)
INR PPP: 1 (ref 0.8–1.2)
LYMPHOCYTES # BLD AUTO: 2.2 K/UL (ref 1–4.8)
LYMPHOCYTES NFR BLD: 26.9 % (ref 18–48)
MCH RBC QN AUTO: 25.1 PG (ref 27–31)
MCHC RBC AUTO-ENTMCNC: 30.5 G/DL (ref 32–36)
MCV RBC AUTO: 82 FL (ref 82–98)
MONOCYTES # BLD AUTO: 0.5 K/UL (ref 0.3–1)
MONOCYTES NFR BLD: 6.2 % (ref 4–15)
NEUTROPHILS # BLD AUTO: 5.2 K/UL (ref 1.8–7.7)
NEUTROPHILS NFR BLD: 62.8 % (ref 38–73)
NRBC BLD-RTO: 0 /100 WBC
PLATELET # BLD AUTO: 425 K/UL (ref 150–450)
PMV BLD AUTO: 10.4 FL (ref 9.2–12.9)
POTASSIUM SERPL-SCNC: 4.4 MMOL/L (ref 3.5–5.1)
PROTHROMBIN TIME: 10.4 SEC (ref 9–12.5)
RBC # BLD AUTO: 3.9 M/UL (ref 4–5.4)
SODIUM SERPL-SCNC: 141 MMOL/L (ref 136–145)
SPECIMEN OUTDATE: NORMAL
WBC # BLD AUTO: 8.25 K/UL (ref 3.9–12.7)

## 2023-07-24 PROCEDURE — 99999 PR PBB SHADOW E&M-EST. PATIENT-LVL III: ICD-10-PCS | Mod: PBBFAC,,, | Performed by: FAMILY MEDICINE

## 2023-07-24 PROCEDURE — 99214 OFFICE O/P EST MOD 30 MIN: CPT | Mod: S$GLB,,, | Performed by: FAMILY MEDICINE

## 2023-07-24 PROCEDURE — 1157F PR ADVANCE CARE PLAN OR EQUIV PRESENT IN MEDICAL RECORD: ICD-10-PCS | Mod: CPTII,S$GLB,, | Performed by: FAMILY MEDICINE

## 2023-07-24 PROCEDURE — 1126F AMNT PAIN NOTED NONE PRSNT: CPT | Mod: CPTII,S$GLB,, | Performed by: FAMILY MEDICINE

## 2023-07-24 PROCEDURE — 3288F PR FALLS RISK ASSESSMENT DOCUMENTED: ICD-10-PCS | Mod: CPTII,S$GLB,, | Performed by: FAMILY MEDICINE

## 2023-07-24 PROCEDURE — 99214 PR OFFICE/OUTPT VISIT, EST, LEVL IV, 30-39 MIN: ICD-10-PCS | Mod: S$GLB,,, | Performed by: FAMILY MEDICINE

## 2023-07-24 PROCEDURE — 80048 BASIC METABOLIC PNL TOTAL CA: CPT | Performed by: ANESTHESIOLOGY

## 2023-07-24 PROCEDURE — 99999 PR PBB SHADOW E&M-EST. PATIENT-LVL III: CPT | Mod: PBBFAC,,, | Performed by: FAMILY MEDICINE

## 2023-07-24 PROCEDURE — 3078F DIAST BP <80 MM HG: CPT | Mod: CPTII,S$GLB,, | Performed by: FAMILY MEDICINE

## 2023-07-24 PROCEDURE — 3288F FALL RISK ASSESSMENT DOCD: CPT | Mod: CPTII,S$GLB,, | Performed by: FAMILY MEDICINE

## 2023-07-24 PROCEDURE — 1157F ADVNC CARE PLAN IN RCRD: CPT | Mod: CPTII,S$GLB,, | Performed by: FAMILY MEDICINE

## 2023-07-24 PROCEDURE — 3078F PR MOST RECENT DIASTOLIC BLOOD PRESSURE < 80 MM HG: ICD-10-PCS | Mod: CPTII,S$GLB,, | Performed by: FAMILY MEDICINE

## 2023-07-24 PROCEDURE — 1101F PT FALLS ASSESS-DOCD LE1/YR: CPT | Mod: CPTII,S$GLB,, | Performed by: FAMILY MEDICINE

## 2023-07-24 PROCEDURE — 85025 COMPLETE CBC W/AUTO DIFF WBC: CPT | Performed by: ANESTHESIOLOGY

## 2023-07-24 PROCEDURE — 1101F PR PT FALLS ASSESS DOC 0-1 FALLS W/OUT INJ PAST YR: ICD-10-PCS | Mod: CPTII,S$GLB,, | Performed by: FAMILY MEDICINE

## 2023-07-24 PROCEDURE — 85730 THROMBOPLASTIN TIME PARTIAL: CPT | Performed by: ANESTHESIOLOGY

## 2023-07-24 PROCEDURE — 3074F SYST BP LT 130 MM HG: CPT | Mod: CPTII,S$GLB,, | Performed by: FAMILY MEDICINE

## 2023-07-24 PROCEDURE — 3044F HG A1C LEVEL LT 7.0%: CPT | Mod: CPTII,S$GLB,, | Performed by: FAMILY MEDICINE

## 2023-07-24 PROCEDURE — 85610 PROTHROMBIN TIME: CPT | Performed by: ANESTHESIOLOGY

## 2023-07-24 PROCEDURE — 3074F PR MOST RECENT SYSTOLIC BLOOD PRESSURE < 130 MM HG: ICD-10-PCS | Mod: CPTII,S$GLB,, | Performed by: FAMILY MEDICINE

## 2023-07-24 PROCEDURE — 3044F PR MOST RECENT HEMOGLOBIN A1C LEVEL <7.0%: ICD-10-PCS | Mod: CPTII,S$GLB,, | Performed by: FAMILY MEDICINE

## 2023-07-24 PROCEDURE — 36415 COLL VENOUS BLD VENIPUNCTURE: CPT | Mod: PN | Performed by: ANESTHESIOLOGY

## 2023-07-24 PROCEDURE — 86900 BLOOD TYPING SEROLOGIC ABO: CPT | Performed by: ANESTHESIOLOGY

## 2023-07-24 PROCEDURE — 1126F PR PAIN SEVERITY QUANTIFIED, NO PAIN PRESENT: ICD-10-PCS | Mod: CPTII,S$GLB,, | Performed by: FAMILY MEDICINE

## 2023-07-24 RX ORDER — BETAMETHASONE DIPROPIONATE 0.5 MG/G
OINTMENT TOPICAL 2 TIMES DAILY
Qty: 45 G | Refills: 11 | Status: SHIPPED | OUTPATIENT
Start: 2023-07-24

## 2023-07-24 RX ORDER — CETIRIZINE HYDROCHLORIDE 10 MG/1
10 TABLET ORAL DAILY
Qty: 90 TABLET | Refills: 3 | Status: SHIPPED | OUTPATIENT
Start: 2023-07-24 | End: 2023-09-05

## 2023-07-24 NOTE — PROGRESS NOTES
"    /68 (BP Location: Right arm, Patient Position: Sitting, BP Method: Large (Manual))   Pulse 90   Temp 98.1 °F (36.7 °C) (Oral)   Ht 5' 1" (1.549 m)   Wt 94 kg (207 lb 3.7 oz)   SpO2 99%   BMI 39.16 kg/m²       ===========    Chief Complaint: No chief complaint on file.          HPI    Nilsa Curiel is a 77 y.o. female     here for    Preop for lumbar spine fusion planned for Aug 16 w Dr. Jesus Alberto Sharp.   Has echocardiogram set for next week per card.    Pt w atherosclerosis of aorta, HFpEF      Patient queried and denies any further complaints    Patient has MEDICAL HISTORY OF  Patient Active Problem List   Diagnosis    Mixed hyperlipidemia    GERD (gastroesophageal reflux disease)    Atopic dermatitis    Hyperparathyroidism    Decreased strength    Impaired mobility    Right-sided carotid artery disease    Neurogenic claudication due to lumbar spinal stenosis    Spondylolisthesis of lumbosacral region    MONE (obstructive sleep apnea)    Primary osteoarthritis of both shoulders    Chronic right-sided low back pain without sciatica    Headache disorder    Chronic kidney disease, stage III (moderate)    Cervical stenosis of spine    Posture imbalance    Decreased strength of trunk and back    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    Essential hypertension    Right hip pain    Ankle swelling    Intertrigo    Muscle spasms of both lower extremities    Hyperuricemia    Chronic heart failure with preserved ejection fraction    Nonrheumatic aortic valve stenosis    Hair loss    Cervical spine arthritis with nerve pain    Chronic right hip pain    Chronic neck pain    Lumbar spondylosis    Fatigue    Atherosclerosis of aorta       SURGICAL AND MEDICAL HISTORY: updated and reviewed.  Past Surgical History:   Procedure Laterality Date     SECTION      x3    COLONOSCOPY N/A 10/04/2021    Procedure: COLONOSCOPY;  Surgeon: Taran Galvez MD;  Location: [facility] (4TH FLR);  " Service: Endoscopy;  Laterality: N/A;    COLONOSCOPY N/A 01/31/2022    Procedure: COLONOSCOPY--positive fit kit;  Surgeon: Tani Lara MD;  Location: [facility] (4TH FLR);  Service: Endoscopy;  Laterality: N/A;    EPIDURAL STEROID INJECTION N/A 4/26/2023    Procedure: INJECTION, STEROID, EPIDURAL, L5-S1;  Surgeon: Majo Meyers MD;  Location: [facility] PAIN MGT;  Service: Pain Management;  Laterality: N/A;    ESOPHAGOGASTRODUODENOSCOPY N/A 10/04/2021    Procedure: EGD (ESOPHAGOGASTRODUODENOSCOPY);  Surgeon: Taran Galvez MD;  Location: [facility] (4TH FLR);  Service: Endoscopy;  Laterality: N/A;  covid test 10/1-[facility]    10/1-LVM about COVID test-GT    ESOPHAGOGASTRODUODENOSCOPY N/A 01/31/2022    Procedure: EGD (ESOPHAGOGASTRODUODENOSCOPY);  Surgeon: Tani Lara MD;  Location: [facility] (4TH FLR);  Service: Endoscopy;  Laterality: N/A;  12/15 fully vaccinated; instructions to portal-st  1/28-covid [facility]-tb    TRANSFORAMINAL EPIDURAL INJECTION OF STEROID N/A 03/16/2023    Procedure: b/l L4-5 TFESI;  Surgeon: Gato Delatorre DO;  Location: [facility] OR;  Service: Pain Management;  Laterality: N/A;    TUBAL LIGATION       ALLERGIES updated and reviewed.  Review of patient's allergies indicates:  No Known Allergies    CURRENT OUTPATIENT MEDICATIONS updated and reviewed    Current Outpatient Medications:     allopurinoL (ZYLOPRIM) 100 MG tablet, Take 1 tablet (100 mg total) by mouth once daily., Disp: 90 tablet, Rfl: 11    amLODIPine (NORVASC) 10 MG tablet, Take 1 tablet (10 mg total) by mouth once daily., Disp: 90 tablet, Rfl: 3    betamethasone valerate 0.1% (VALISONE) 0.1 % Oint, Apply topically once daily., Disp: 45 g, Rfl: 3    biotin 300 mcg Tab, Take 1 tablet by mouth once daily., Disp: , Rfl:     carvediloL (COREG) 3.125 MG tablet, Take 1 tablet (3.125 mg total) by mouth 2 (two) times daily with meals., Disp: 180 tablet, Rfl: 3    diclofenac sodium (VOLTAREN) 1 % Gel, Apply 2 g topically daily as  needed., Disp: 100 g, Rfl: 3    fluocinonide (LIDEX) 0.05 % external solution, AAA scalp qday - bid prn pruritus, Disp: 60 mL, Rfl: 3    fluticasone propionate (FLONASE) 50 mcg/actuation nasal spray, 1 spray (50 mcg total) by Each Nostril route once daily., Disp: 16 g, Rfl: 11    ketoconazole (NIZORAL) 2 % cream, Apply to affected areas of body BID prn irritation., Disp: 15 g, Rfl: 1    ketoconazole (NIZORAL) 2 % shampoo, Wash hair with medicated shampoo at least 2x/week - let sit on scalp at least 5 minutes prior to rinsing, Disp: 120 mL, Rfl: 5    lactobacillus combo no.6 (PROBIOTIC COMPLEX) 4 billion cell Tab, Take 1 tablet by mouth once daily., Disp: 90 tablet, Rfl: 3    meloxicam (MOBIC) 15 MG tablet, Take 1 tablet (15 mg total) by mouth once daily., Disp: 60 tablet, Rfl: 1    methocarbamoL (ROBAXIN) 500 MG Tab, Take 1 tablet (500 mg total) by mouth 3 (three) times daily., Disp: 60 tablet, Rfl: 1    mometasone (ELOCON) 0.1 % ointment, Apply twice daily to hands prn eczema., Disp: 45 g, Rfl: 3    MULTIVIT-MIN/FA/CA CARB/VIT K (WOMEN'S 50+ DAILY FORMULA ORAL), Take by mouth., Disp: , Rfl:     nystatin-triamcinolone (MYCOLOG II) cream, Apply topically 2 (two) times a day., Disp: 60 g, Rfl: 3    olmesartan (BENICAR) 40 MG tablet, TAKE 1 TABLET ONE TIME DAILY, Disp: 90 tablet, Rfl: 3    omega-3 fatty acids 300 mg Cap, Take by mouth., Disp: , Rfl:     omeprazole (PRILOSEC) 40 MG capsule, Take 1 capsule (40 mg total) by mouth every morning. As needed for reflux symptoms, Disp: 90 capsule, Rfl: 3    potassium chloride SA (K-DUR,KLOR-CON) 20 MEQ tablet, Take 1 tablet (20 mEq total) by mouth 2 (two) times daily., Disp: 180 tablet, Rfl: 3    riboflavin, vitamin B2, (VITAMIN B-2 ORAL), Take 1 capsule by mouth once daily., Disp: , Rfl:     rosuvastatin (CRESTOR) 20 MG tablet, Take 1 tablet (20 mg total) by mouth once daily., Disp: 90 tablet, Rfl: 3    sumatriptan (IMITREX) 100 MG tablet, Take 1 tablet (100 mg total) by  mouth daily as needed for Migraine. No more than 2 doses in 24 hours, Disp: 27 tablet, Rfl: 3    TAC 0.1%-ciclopirox-MOM, Apply to affected area twice daily after cool blow dry. (discard after 1/14/2023), Disp: 60 g, Rfl: 5    triamcinolone acetonide 0.025% (KENALOG) 0.025 % cream, Apply to affected areas of body BID prn irritation., Disp: 15 g, Rfl: 1    TURMERIC ORAL, Take by mouth., Disp: , Rfl:     ubidecarenone (COENZYME Q10) 100 mg Tab, Take 1 tablet by mouth once daily. , Disp: , Rfl:     aspirin (ECOTRIN) 81 MG EC tablet, Take 1 tablet (81 mg total) by mouth once daily., Disp: 90 tablet, Rfl: 3    betamethasone dipropionate (DIPROLENE) 0.05 % ointment, Apply topically 2 (two) times daily., Disp: 45 g, Rfl: 11    cetirizine (ZYRTEC) 10 MG tablet, Take 1 tablet (10 mg total) by mouth once daily. for allergic rhinitis, Disp: 90 tablet, Rfl: 3    Review of Systems   Constitutional:  Negative for activity change, appetite change, chills, diaphoresis, fatigue, fever and unexpected weight change.   HENT:  Negative for congestion, ear discharge, ear pain, facial swelling, hearing loss, nosebleeds, postnasal drip, rhinorrhea, sinus pressure, sneezing, sore throat, tinnitus, trouble swallowing and voice change.    Eyes:  Negative for photophobia, pain, discharge, redness, itching and visual disturbance.   Respiratory:  Negative for cough, chest tightness, shortness of breath and wheezing.    Cardiovascular:  Negative for chest pain, palpitations and leg swelling.   Gastrointestinal:  Negative for abdominal distention, abdominal pain, anal bleeding, blood in stool, constipation, diarrhea, nausea, rectal pain and vomiting.   Endocrine: Negative for cold intolerance, heat intolerance, polydipsia, polyphagia and polyuria.   Genitourinary:  Negative for difficulty urinating, dysuria and flank pain.   Musculoskeletal:  Negative for arthralgias, back pain, joint swelling, myalgias and neck pain.   Skin:  Negative for rash.  "  Neurological:  Negative for dizziness, tremors, seizures, syncope, speech difficulty, weakness, light-headedness, numbness and headaches.   Psychiatric/Behavioral:  Negative for behavioral problems, confusion, decreased concentration, dysphoric mood, sleep disturbance and suicidal ideas. The patient is not nervous/anxious and is not hyperactive.        /68 (BP Location: Right arm, Patient Position: Sitting, BP Method: Large (Manual))   Pulse 90   Temp 98.1 °F (36.7 °C) (Oral)   Ht 5' 1" (1.549 m)   Wt 94 kg (207 lb 3.7 oz)   SpO2 99%   BMI 39.16 kg/m²   Physical Exam  Vitals and nursing note reviewed.   Constitutional:       General: She is not in acute distress.     Appearance: Normal appearance. She is well-developed. She is not ill-appearing or toxic-appearing.   HENT:      Head: Normocephalic and atraumatic.      Right Ear: Tympanic membrane, ear canal and external ear normal.      Left Ear: Tympanic membrane, ear canal and external ear normal.      Nose: Nose normal.      Mouth/Throat:      Lips: Pink.      Mouth: Mucous membranes are moist.      Pharynx: No oropharyngeal exudate or posterior oropharyngeal erythema.   Eyes:      General: No scleral icterus.        Right eye: No discharge.         Left eye: No discharge.      Extraocular Movements: Extraocular movements intact.      Conjunctiva/sclera: Conjunctivae normal.   Cardiovascular:      Rate and Rhythm: Normal rate and regular rhythm.      Pulses: Normal pulses.      Heart sounds: Normal heart sounds. No murmur heard.  Pulmonary:      Effort: Pulmonary effort is normal. No respiratory distress.      Breath sounds: Normal breath sounds. No wheezing or rales.   Abdominal:      General: Bowel sounds are normal. There is no distension.      Palpations: Abdomen is soft. There is no mass.      Tenderness: There is no abdominal tenderness. There is no right CVA tenderness, left CVA tenderness, guarding or rebound.      Hernia: No hernia is " present.   Musculoskeletal:      Cervical back: Normal range of motion and neck supple. No rigidity or tenderness.   Lymphadenopathy:      Cervical: No cervical adenopathy.   Skin:     General: Skin is warm and dry.   Neurological:      General: No focal deficit present.      Mental Status: She is alert. Mental status is at baseline.   Psychiatric:         Mood and Affect: Mood normal.         Behavior: Behavior normal. Behavior is cooperative.         ASSESSMENT/PLAN  Diagnoses and all orders for this visit:    Preop examination    Chronic heart failure with preserved ejection fraction    Stage 3 chronic kidney disease, unspecified whether stage 3a or 3b CKD    Essential hypertension    Mixed hyperlipidemia    Preop cardiovascular exam  -     X-Ray Chest PA And Lateral; Future    Flexural atopic dermatitis  -     betamethasone dipropionate (DIPROLENE) 0.05 % ointment; Apply topically 2 (two) times daily.  -     cetirizine (ZYRTEC) 10 MG tablet; Take 1 tablet (10 mg total) by mouth once daily. 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  Chest x-ray planned.  Labs a from July 24 2023 reviewed    This 77-year-old female has multiple comorbidities including mixed hyperlipidemia, carotid artery disease, hypertrophic obstructive cardiomyopathy, chronic heart failure with preserved ejection fraction, chronic kidney disease stage 3, atherosclerosis of aorta, severe obesity with comorbidities, obstructive sleep apnea, age 77, etcetera    Pt also seeing card prior to surgery and will get preop evaluation from Cardiology also.  From my perspective she can proceed with surgery and anesthesia at moderate  risk of complications from surgery and anesthesia.  Patient expresses understanding of such as evidenced by summary back to me.      All lab results over past 2 years available reviewed inc labs and any cardiology or radiology studies.  Any new prescription medications gone over in detail including reason for taking the medication, the general mechanism of action, most common possible side effects and possible costs, etcetera.    Chronic conditions updated. Other than changes or additions as above, cont current medications and maintain follow-up with specialists if indicated.     Gaurav Allison MD  A dictation device was used to produce this document. Use of such devices sometimes results in grammatical errors or replacement of words that sound similarly.

## 2023-07-25 ENCOUNTER — OFFICE VISIT (OUTPATIENT)
Dept: ORTHOPEDICS | Facility: CLINIC | Age: 78
End: 2023-07-25
Payer: MEDICARE

## 2023-07-25 VITALS — BODY MASS INDEX: 39 KG/M2 | WEIGHT: 206.56 LBS | HEIGHT: 61 IN

## 2023-07-25 DIAGNOSIS — M54.16 LUMBAR RADICULOPATHY: ICD-10-CM

## 2023-07-25 DIAGNOSIS — M51.36 DDD (DEGENERATIVE DISC DISEASE), LUMBAR: ICD-10-CM

## 2023-07-25 DIAGNOSIS — M47.816 LUMBAR SPONDYLOSIS: ICD-10-CM

## 2023-07-25 DIAGNOSIS — M43.10 DEGENERATIVE SPONDYLOLISTHESIS: Primary | ICD-10-CM

## 2023-07-25 DIAGNOSIS — M54.9 DORSALGIA, UNSPECIFIED: ICD-10-CM

## 2023-07-25 PROCEDURE — 99214 PR OFFICE/OUTPT VISIT, EST, LEVL IV, 30-39 MIN: ICD-10-PCS | Mod: S$GLB,,, | Performed by: ORTHOPAEDIC SURGERY

## 2023-07-25 PROCEDURE — 1159F MED LIST DOCD IN RCRD: CPT | Mod: CPTII,S$GLB,, | Performed by: ORTHOPAEDIC SURGERY

## 2023-07-25 PROCEDURE — 3288F FALL RISK ASSESSMENT DOCD: CPT | Mod: CPTII,S$GLB,, | Performed by: ORTHOPAEDIC SURGERY

## 2023-07-25 PROCEDURE — 99999 PR PBB SHADOW E&M-EST. PATIENT-LVL IV: CPT | Mod: PBBFAC,,, | Performed by: ORTHOPAEDIC SURGERY

## 2023-07-25 PROCEDURE — 1101F PR PT FALLS ASSESS DOC 0-1 FALLS W/OUT INJ PAST YR: ICD-10-PCS | Mod: CPTII,S$GLB,, | Performed by: ORTHOPAEDIC SURGERY

## 2023-07-25 PROCEDURE — 99999 PR PBB SHADOW E&M-EST. PATIENT-LVL IV: ICD-10-PCS | Mod: PBBFAC,,, | Performed by: ORTHOPAEDIC SURGERY

## 2023-07-25 PROCEDURE — 1160F PR REVIEW ALL MEDS BY PRESCRIBER/CLIN PHARMACIST DOCUMENTED: ICD-10-PCS | Mod: CPTII,S$GLB,, | Performed by: ORTHOPAEDIC SURGERY

## 2023-07-25 PROCEDURE — 1160F RVW MEDS BY RX/DR IN RCRD: CPT | Mod: CPTII,S$GLB,, | Performed by: ORTHOPAEDIC SURGERY

## 2023-07-25 PROCEDURE — 1159F PR MEDICATION LIST DOCUMENTED IN MEDICAL RECORD: ICD-10-PCS | Mod: CPTII,S$GLB,, | Performed by: ORTHOPAEDIC SURGERY

## 2023-07-25 PROCEDURE — 3044F HG A1C LEVEL LT 7.0%: CPT | Mod: CPTII,S$GLB,, | Performed by: ORTHOPAEDIC SURGERY

## 2023-07-25 PROCEDURE — 3288F PR FALLS RISK ASSESSMENT DOCUMENTED: ICD-10-PCS | Mod: CPTII,S$GLB,, | Performed by: ORTHOPAEDIC SURGERY

## 2023-07-25 PROCEDURE — 99214 OFFICE O/P EST MOD 30 MIN: CPT | Mod: S$GLB,,, | Performed by: ORTHOPAEDIC SURGERY

## 2023-07-25 PROCEDURE — 1125F AMNT PAIN NOTED PAIN PRSNT: CPT | Mod: CPTII,S$GLB,, | Performed by: ORTHOPAEDIC SURGERY

## 2023-07-25 PROCEDURE — 1157F ADVNC CARE PLAN IN RCRD: CPT | Mod: CPTII,S$GLB,, | Performed by: ORTHOPAEDIC SURGERY

## 2023-07-25 PROCEDURE — 1101F PT FALLS ASSESS-DOCD LE1/YR: CPT | Mod: CPTII,S$GLB,, | Performed by: ORTHOPAEDIC SURGERY

## 2023-07-25 PROCEDURE — 3044F PR MOST RECENT HEMOGLOBIN A1C LEVEL <7.0%: ICD-10-PCS | Mod: CPTII,S$GLB,, | Performed by: ORTHOPAEDIC SURGERY

## 2023-07-25 PROCEDURE — 1157F PR ADVANCE CARE PLAN OR EQUIV PRESENT IN MEDICAL RECORD: ICD-10-PCS | Mod: CPTII,S$GLB,, | Performed by: ORTHOPAEDIC SURGERY

## 2023-07-25 PROCEDURE — 1125F PR PAIN SEVERITY QUANTIFIED, PAIN PRESENT: ICD-10-PCS | Mod: CPTII,S$GLB,, | Performed by: ORTHOPAEDIC SURGERY

## 2023-07-25 NOTE — PROGRESS NOTES
"DATE: 7/25/2023  PATIENT: Nilsa Curiel    Attending Physician: Jesus Alberto Sharp M.D.    HISTORY:  Nilsa Curiel is a 77 y.o. female who returns to me today for preop. Patient continues to have LBP that radiates posteriorly down RLE to the knee. Patient has been doing PT and taking meds with minimal relief. She is miserable and wants surgical intervention.    3/16/23: b/l L4-5 TFESI - helped somewhat  4/26/23: L5-S1 LOUIS - helped about 55%    The patient does not smoke, have DM or endorse IVDU. The patient is not on any blood thinners and does not take chronic narcotics. She is a retired  at [employer].    PMH/PSH/FamHx/SocHx:  Unchanged from prior visit    ROS:  Positive for LBP and RLE pain  Denies perineal paresthesias, bowel or bladder incontinence    EXAM:  Ht 5' 1" (1.549 m)   Wt 93.7 kg (206 lb 9.1 oz)   BMI 39.03 kg/m²     My physical examination was notable for the following findings: motor intact BLE; SILT    IMAGING:  Today I independently reviewed the following images and my interpretations are as follows:    Previous L-spine XRs showed lumbar spondylosis and DDD. L2-3, L3-4, and L4-5 anterolisthesis.    C spine Xrs showed spondylosis and DDD without listhesis.    MRI lumbar showed moderate L2-3 and severe L3-5 central stenosis. L4-5 moderate bilateral foraminal stenosis.    DEXA in 2019 showed lumbar T-score of -0.4.      CT lumbar showed spondylosis and DDD. Anterolisthesis L3-5.    ASSESSMENT/PLAN:  Patient has lumbar degenerative spondylolisthesis and stenosis with RLE radiculopathy. I discussed the natural history of their diagnoses as well as surgical and nonsurgical treatment options. I educated the patient on the importance of core/back strengthening, correct posture, bending/lifting ergonomics, and low-impact aerobic exercises (walking, elliptical, and aquatherapy). I prescribed gabapentin. Continue medications and PT/exercises. Patient has failed conservative " management and is a candidate for L2-5 PLDF/TLIF (R). I ordered an updated lumbar MRI.    I had a sit down discussion with the patient regarding the above surgery. We specifically discussed the risks, benefits, and alternatives to surgery. We discussed the surgical procedure including the skin incision, nerve decompression, bone fusion, allograft, iliac crest bone graft, and surgical implants including pedicle screws and interbody devices as indicated: they understand the risks include but are not limited to death, paralysis, blindness, bleeding, infection, damage to arteries, veins and nerves, spinal fluid leak, continued or worsening pain, no improvement in symptoms, non-union, and the possible need for more surgery in the future, the possible need for perioperative blood transfusion, as well as the possibility other unforseen and unknown complications. We talked about expected hospital stay and recovery period. All questions were answered; they understand and wish to proceed.      Jesus Alberto Sharp MD  Orthopaedic Spine Surgeon  Department of Orthopaedic Surgery  [phone]

## 2023-07-25 NOTE — H&P (VIEW-ONLY)
"DATE: 7/25/2023  PATIENT: Nilsa Curiel    Attending Physician: Jesus Alberto Sharp M.D.    HISTORY:  Nilsa Curiel is a 77 y.o. female who returns to me today for preop. Patient continues to have LBP that radiates posteriorly down RLE to the knee. Patient has been doing PT and taking meds with minimal relief. She is miserable and wants surgical intervention.    3/16/23: b/l L4-5 TFESI - helped somewhat  4/26/23: L5-S1 LOUIS - helped about 55%    The patient does not smoke, have DM or endorse IVDU. The patient is not on any blood thinners and does not take chronic narcotics. She is a retired  at [employer].    PMH/PSH/FamHx/SocHx:  Unchanged from prior visit    ROS:  Positive for LBP and RLE pain  Denies perineal paresthesias, bowel or bladder incontinence    EXAM:  Ht 5' 1" (1.549 m)   Wt 93.7 kg (206 lb 9.1 oz)   BMI 39.03 kg/m²     My physical examination was notable for the following findings: motor intact BLE; SILT    IMAGING:  Today I independently reviewed the following images and my interpretations are as follows:    Previous L-spine XRs showed lumbar spondylosis and DDD. L2-3, L3-4, and L4-5 anterolisthesis.    C spine Xrs showed spondylosis and DDD without listhesis.    MRI lumbar showed moderate L2-3 and severe L3-5 central stenosis. L4-5 moderate bilateral foraminal stenosis.    DEXA in 2019 showed lumbar T-score of -0.4.      CT lumbar showed spondylosis and DDD. Anterolisthesis L3-5.    ASSESSMENT/PLAN:  Patient has lumbar degenerative spondylolisthesis and stenosis with RLE radiculopathy. I discussed the natural history of their diagnoses as well as surgical and nonsurgical treatment options. I educated the patient on the importance of core/back strengthening, correct posture, bending/lifting ergonomics, and low-impact aerobic exercises (walking, elliptical, and aquatherapy). I prescribed gabapentin. Continue medications and PT/exercises. Patient has failed conservative " management and is a candidate for L2-5 PLDF/TLIF (R). I ordered an updated lumbar MRI.    I had a sit down discussion with the patient regarding the above surgery. We specifically discussed the risks, benefits, and alternatives to surgery. We discussed the surgical procedure including the skin incision, nerve decompression, bone fusion, allograft, iliac crest bone graft, and surgical implants including pedicle screws and interbody devices as indicated: they understand the risks include but are not limited to death, paralysis, blindness, bleeding, infection, damage to arteries, veins and nerves, spinal fluid leak, continued or worsening pain, no improvement in symptoms, non-union, and the possible need for more surgery in the future, the possible need for perioperative blood transfusion, as well as the possibility other unforseen and unknown complications. We talked about expected hospital stay and recovery period. All questions were answered; they understand and wish to proceed.      Jesus Alberto Sharp MD  Orthopaedic Spine Surgeon  Department of Orthopaedic Surgery  [phone]

## 2023-08-02 ENCOUNTER — OFFICE VISIT (OUTPATIENT)
Dept: CARDIOLOGY | Facility: CLINIC | Age: 78
End: 2023-08-02
Payer: MEDICARE

## 2023-08-02 VITALS
DIASTOLIC BLOOD PRESSURE: 60 MMHG | HEIGHT: 61 IN | WEIGHT: 205.88 LBS | HEART RATE: 86 BPM | SYSTOLIC BLOOD PRESSURE: 124 MMHG | OXYGEN SATURATION: 98 % | BODY MASS INDEX: 38.87 KG/M2

## 2023-08-02 DIAGNOSIS — I70.0 ATHEROSCLEROSIS OF AORTA: Primary | ICD-10-CM

## 2023-08-02 DIAGNOSIS — I50.32 CHRONIC HEART FAILURE WITH PRESERVED EJECTION FRACTION: ICD-10-CM

## 2023-08-02 DIAGNOSIS — I10 ESSENTIAL HYPERTENSION: ICD-10-CM

## 2023-08-02 DIAGNOSIS — I35.0 NONRHEUMATIC AORTIC VALVE STENOSIS: ICD-10-CM

## 2023-08-02 PROCEDURE — 3044F HG A1C LEVEL LT 7.0%: CPT | Mod: CPTII,S$GLB,, | Performed by: INTERNAL MEDICINE

## 2023-08-02 PROCEDURE — 3078F DIAST BP <80 MM HG: CPT | Mod: CPTII,S$GLB,, | Performed by: INTERNAL MEDICINE

## 2023-08-02 PROCEDURE — 3074F SYST BP LT 130 MM HG: CPT | Mod: CPTII,S$GLB,, | Performed by: INTERNAL MEDICINE

## 2023-08-02 PROCEDURE — 1159F MED LIST DOCD IN RCRD: CPT | Mod: CPTII,S$GLB,, | Performed by: INTERNAL MEDICINE

## 2023-08-02 PROCEDURE — 3288F FALL RISK ASSESSMENT DOCD: CPT | Mod: CPTII,S$GLB,, | Performed by: INTERNAL MEDICINE

## 2023-08-02 PROCEDURE — 1125F PR PAIN SEVERITY QUANTIFIED, PAIN PRESENT: ICD-10-PCS | Mod: CPTII,S$GLB,, | Performed by: INTERNAL MEDICINE

## 2023-08-02 PROCEDURE — 3074F PR MOST RECENT SYSTOLIC BLOOD PRESSURE < 130 MM HG: ICD-10-PCS | Mod: CPTII,S$GLB,, | Performed by: INTERNAL MEDICINE

## 2023-08-02 PROCEDURE — 3288F PR FALLS RISK ASSESSMENT DOCUMENTED: ICD-10-PCS | Mod: CPTII,S$GLB,, | Performed by: INTERNAL MEDICINE

## 2023-08-02 PROCEDURE — 1157F PR ADVANCE CARE PLAN OR EQUIV PRESENT IN MEDICAL RECORD: ICD-10-PCS | Mod: CPTII,S$GLB,, | Performed by: INTERNAL MEDICINE

## 2023-08-02 PROCEDURE — 99214 OFFICE O/P EST MOD 30 MIN: CPT | Mod: S$GLB,,, | Performed by: INTERNAL MEDICINE

## 2023-08-02 PROCEDURE — 1159F PR MEDICATION LIST DOCUMENTED IN MEDICAL RECORD: ICD-10-PCS | Mod: CPTII,S$GLB,, | Performed by: INTERNAL MEDICINE

## 2023-08-02 PROCEDURE — 99214 PR OFFICE/OUTPT VISIT, EST, LEVL IV, 30-39 MIN: ICD-10-PCS | Mod: S$GLB,,, | Performed by: INTERNAL MEDICINE

## 2023-08-02 PROCEDURE — 1157F ADVNC CARE PLAN IN RCRD: CPT | Mod: CPTII,S$GLB,, | Performed by: INTERNAL MEDICINE

## 2023-08-02 PROCEDURE — 99999 PR PBB SHADOW E&M-EST. PATIENT-LVL V: CPT | Mod: PBBFAC,,, | Performed by: INTERNAL MEDICINE

## 2023-08-02 PROCEDURE — 1125F AMNT PAIN NOTED PAIN PRSNT: CPT | Mod: CPTII,S$GLB,, | Performed by: INTERNAL MEDICINE

## 2023-08-02 PROCEDURE — 1101F PR PT FALLS ASSESS DOC 0-1 FALLS W/OUT INJ PAST YR: ICD-10-PCS | Mod: CPTII,S$GLB,, | Performed by: INTERNAL MEDICINE

## 2023-08-02 PROCEDURE — 3044F PR MOST RECENT HEMOGLOBIN A1C LEVEL <7.0%: ICD-10-PCS | Mod: CPTII,S$GLB,, | Performed by: INTERNAL MEDICINE

## 2023-08-02 PROCEDURE — 99999 PR PBB SHADOW E&M-EST. PATIENT-LVL V: ICD-10-PCS | Mod: PBBFAC,,, | Performed by: INTERNAL MEDICINE

## 2023-08-02 PROCEDURE — 3078F PR MOST RECENT DIASTOLIC BLOOD PRESSURE < 80 MM HG: ICD-10-PCS | Mod: CPTII,S$GLB,, | Performed by: INTERNAL MEDICINE

## 2023-08-02 PROCEDURE — 1101F PT FALLS ASSESS-DOCD LE1/YR: CPT | Mod: CPTII,S$GLB,, | Performed by: INTERNAL MEDICINE

## 2023-08-02 NOTE — PROGRESS NOTES
Cardiology    8/2/2023  10:42 AM    Problem list  Patient Active Problem List   Diagnosis    Mixed hyperlipidemia    GERD (gastroesophageal reflux disease)    Atopic dermatitis    Hyperparathyroidism    Decreased strength    Impaired mobility    Right-sided carotid artery disease    Neurogenic claudication due to lumbar spinal stenosis    Spondylolisthesis of lumbosacral region    MONE (obstructive sleep apnea)    Primary osteoarthritis of both shoulders    Chronic right-sided low back pain without sciatica    Headache disorder    Chronic kidney disease, stage III (moderate)    Cervical stenosis of spine    Posture imbalance    Decreased strength of trunk and back    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    Essential hypertension    Right hip pain    Ankle swelling    Intertrigo    Muscle spasms of both lower extremities    Hyperuricemia    Chronic heart failure with preserved ejection fraction    Nonrheumatic aortic valve stenosis    Hair loss    Cervical spine arthritis with nerve pain    Chronic right hip pain    Chronic neck pain    Lumbar spondylosis    Fatigue    Atherosclerosis of aorta       CC:  F/u    HPI:  She is here for cardiac clearance.  She is scheduled for back surgery in 2 weeks.  She denies any angina, dyspnea on exertion, palpitation or syncope.  Echocardiogram showed good left ventricular function and mild aortic stenosis which is stable.    Medications  Current Outpatient Medications   Medication Sig Dispense Refill    allopurinoL (ZYLOPRIM) 100 MG tablet Take 1 tablet (100 mg total) by mouth once daily. 90 tablet 11    amLODIPine (NORVASC) 10 MG tablet Take 1 tablet (10 mg total) by mouth once daily. 90 tablet 3    betamethasone dipropionate (DIPROLENE) 0.05 % ointment Apply topically 2 (two) times daily. 45 g 11    betamethasone valerate 0.1% (VALISONE) 0.1 % Oint Apply topically once daily. 45 g 3    biotin 300 mcg Tab Take 1 tablet by mouth once daily.       carvediloL (COREG) 3.125 MG tablet Take 1 tablet (3.125 mg total) by mouth 2 (two) times daily with meals. 180 tablet 3    cetirizine (ZYRTEC) 10 MG tablet Take 1 tablet (10 mg total) by mouth once daily. for allergic rhinitis 90 tablet 3    diclofenac sodium (VOLTAREN) 1 % Gel Apply 2 g topically daily as needed. 100 g 3    fluocinonide (LIDEX) 0.05 % external solution AAA scalp qday - bid prn pruritus 60 mL 3    fluticasone propionate (FLONASE) 50 mcg/actuation nasal spray 1 spray (50 mcg total) by Each Nostril route once daily. 16 g 11    ketoconazole (NIZORAL) 2 % cream Apply to affected areas of body BID prn irritation. 15 g 1    ketoconazole (NIZORAL) 2 % shampoo Wash hair with medicated shampoo at least 2x/week - let sit on scalp at least 5 minutes prior to rinsing 120 mL 5    lactobacillus combo no.6 (PROBIOTIC COMPLEX) 4 billion cell Tab Take 1 tablet by mouth once daily. 90 tablet 3    meloxicam (MOBIC) 15 MG tablet Take 1 tablet (15 mg total) by mouth once daily. 60 tablet 1    methocarbamoL (ROBAXIN) 500 MG Tab Take 1 tablet (500 mg total) by mouth 3 (three) times daily. 60 tablet 1    mometasone (ELOCON) 0.1 % ointment Apply twice daily to hands prn eczema. 45 g 3    MULTIVIT-MIN/FA/CA CARB/VIT K (WOMEN'S 50+ DAILY FORMULA ORAL) Take by mouth.      nystatin-triamcinolone (MYCOLOG II) cream Apply topically 2 (two) times a day. 60 g 3    olmesartan (BENICAR) 40 MG tablet TAKE 1 TABLET ONE TIME DAILY 90 tablet 3    omega-3 fatty acids 300 mg Cap Take by mouth.      omeprazole (PRILOSEC) 40 MG capsule Take 1 capsule (40 mg total) by mouth every morning. As needed for reflux symptoms 90 capsule 3    potassium chloride SA (K-DUR,KLOR-CON) 20 MEQ tablet Take 1 tablet (20 mEq total) by mouth 2 (two) times daily. 180 tablet 3    riboflavin, vitamin B2, (VITAMIN B-2 ORAL) Take 1 capsule by mouth once daily.      rosuvastatin (CRESTOR) 20 MG tablet Take 1 tablet (20 mg total) by mouth once daily. 90 tablet 3     sumatriptan (IMITREX) 100 MG tablet Take 1 tablet (100 mg total) by mouth daily as needed for Migraine. No more than 2 doses in 24 hours 27 tablet 3    TAC 0.1%-ciclopirox-MOM Apply to affected area twice daily after cool blow dry. (discard after 1/14/2023) 60 g 5    triamcinolone acetonide 0.025% (KENALOG) 0.025 % cream Apply to affected areas of body BID prn irritation. 15 g 1    TURMERIC ORAL Take by mouth.      ubidecarenone (COENZYME Q10) 100 mg Tab Take 1 tablet by mouth once daily.       aspirin (ECOTRIN) 81 MG EC tablet Take 1 tablet (81 mg total) by mouth once daily. 90 tablet 3     No current facility-administered medications for this visit.      Prior to Admission medications    Medication Sig Start Date End Date Taking? Authorizing Provider   allopurinoL (ZYLOPRIM) 100 MG tablet Take 1 tablet (100 mg total) by mouth once daily. 1/3/23  Yes Gaurav Allison MD   amLODIPine (NORVASC) 10 MG tablet Take 1 tablet (10 mg total) by mouth once daily. 1/3/23  Yes Gaurav Allison MD   betamethasone dipropionate (DIPROLENE) 0.05 % ointment Apply topically 2 (two) times daily. 7/24/23  Yes Gaurav Allison MD   betamethasone valerate 0.1% (VALISONE) 0.1 % Oint Apply topically once daily. 6/17/20  Yes Velma Bryant MD   biotin 300 mcg Tab Take 1 tablet by mouth once daily.   Yes Historical Provider   carvediloL (COREG) 3.125 MG tablet Take 1 tablet (3.125 mg total) by mouth 2 (two) times daily with meals. 1/3/23 1/3/24 Yes Gaurav Allison MD   cetirizine (ZYRTEC) 10 MG tablet Take 1 tablet (10 mg total) by mouth once daily. for allergic rhinitis 7/24/23 7/23/24 Yes Gaurav Allison MD   diclofenac sodium (VOLTAREN) 1 % Gel Apply 2 g topically daily as needed. 4/6/22  Yes Gaurav Allison MD   fluocinonide (LIDEX) 0.05 % external solution AAA scalp qday - bid prn pruritus 6/9/21  Yes Rena Hughes MD   fluticasone propionate (FLONASE) 50 mcg/actuation nasal spray 1 spray (50 mcg total) by  Each Nostril route once daily. 12/8/22  Yes Gaurav Allison MD   ketoconazole (NIZORAL) 2 % cream Apply to affected areas of body BID prn irritation. 4/18/23  Yes Brittany Simeon MD   ketoconazole (NIZORAL) 2 % shampoo Wash hair with medicated shampoo at least 2x/week - let sit on scalp at least 5 minutes prior to rinsing 12/8/22  Yes Gaurav Allison MD   lactobacillus combo no.6 (PROBIOTIC COMPLEX) 4 billion cell Tab Take 1 tablet by mouth once daily. 9/26/17  Yes OUMOU Nog-C   meloxicam (MOBIC) 15 MG tablet Take 1 tablet (15 mg total) by mouth once daily. 5/16/23  Yes Jesus Alberto Sharp MD   methocarbamoL (ROBAXIN) 500 MG Tab Take 1 tablet (500 mg total) by mouth 3 (three) times daily. 5/16/23  Yes Jesus Alberto Sharp MD   mometasone (ELOCON) 0.1 % ointment Apply twice daily to hands prn eczema. 4/18/23  Yes Brittany Simeon MD   MULTIVIT-MIN/FA/CA CARB/VIT K (WOMEN'S 50+ DAILY FORMULA ORAL) Take by mouth.   Yes Historical Provider   nystatin-triamcinolone (MYCOLOG II) cream Apply topically 2 (two) times a day. 1/26/21  Yes Velma Bryant MD   olmesartan (BENICAR) 40 MG tablet TAKE 1 TABLET ONE TIME DAILY 11/12/22  Yes Gaurav Allison MD   omega-3 fatty acids 300 mg Cap Take by mouth.   Yes Historical Provider   omeprazole (PRILOSEC) 40 MG capsule Take 1 capsule (40 mg total) by mouth every morning. As needed for reflux symptoms 1/3/23  Yes Gaurav Allison MD   potassium chloride SA (K-DUR,KLOR-CON) 20 MEQ tablet Take 1 tablet (20 mEq total) by mouth 2 (two) times daily. 1/3/23  Yes Gaurav Allison MD   riboflavin, vitamin B2, (VITAMIN B-2 ORAL) Take 1 capsule by mouth once daily.   Yes Historical Provider   rosuvastatin (CRESTOR) 20 MG tablet Take 1 tablet (20 mg total) by mouth once daily. 6/6/23 6/5/24 Yes Amando Das MD   sumatriptan (IMITREX) 100 MG tablet Take 1 tablet (100 mg total) by mouth daily as needed for Migraine. No more than 2 doses in 24 hours 1/3/23  Yes  Gaurav Allison MD   TAC 0.1%-ciclopirox-MOM Apply to affected area twice daily after cool blow dry. (discard after 2023) 22  Yes Rena Hughes MD   triamcinolone acetonide 0.025% (KENALOG) 0.025 % cream Apply to affected areas of body BID prn irritation. 23  Yes Brittany Simeon MD   TURMERIC ORAL Take by mouth.   Yes Historical Provider   ubidecarenone (COENZYME Q10) 100 mg Tab Take 1 tablet by mouth once daily.  10/23/19  Yes Historical Provider   aspirin (ECOTRIN) 81 MG EC tablet Take 1 tablet (81 mg total) by mouth once daily. 22  Gaurav Allison MD         History  Past Medical History:   Diagnosis Date    Abnormal fasting glucose 2015    Acute bilateral low back pain without sciatica 10/10/2017    Anemia 2018    Arthritis     Atopic dermatitis     Central stenosis of spinal canal 2022    Chronic diastolic heart failure 2022    Chronic kidney disease, stage III (moderate) 2020    Dermatitis 2023    GERD (gastroesophageal reflux disease)     Hyperlipidemia     Hypertension     Hypokalemia 2022    Joint pain     Left ventricular hypertrophy 2016    Microcytic hypochromic anemia 2018    Multifactoral. See hematology consult. Some nutritional , some kidney related    Mild aortic sclerosis 2019    Mobility poor 2022    Morbid obesity with BMI of 40.0-44.9, adult 2016    Nonintractable episodic headache 2019    Normocytic normochromic anemia 2022    MONE (obstructive sleep apnea) 2018    Prediabetes 2023    Severe obesity (BMI >= 40) 06/10/2022     Past Surgical History:   Procedure Laterality Date     SECTION      x3    COLONOSCOPY N/A 10/04/2021    Procedure: COLONOSCOPY;  Surgeon: Taran Galvez MD;  Location: [address];  Service: Endoscopy;  Laterality: N/A;    COLONOSCOPY N/A 2022    Procedure: COLONOSCOPY--positive fit kit;  Surgeon: Tani Lara MD;   Location: [facility] (4TH FLR);  Service: Endoscopy;  Laterality: N/A;    EPIDURAL STEROID INJECTION N/A 4/26/2023    Procedure: INJECTION, STEROID, EPIDURAL, L5-S1;  Surgeon: Majo Meyers MD;  Location: [facility] [place] [facility];  Service: Pain Management;  Laterality: N/A;    ESOPHAGOGASTRODUODENOSCOPY N/A 10/04/2021    Procedure: EGD (ESOPHAGOGASTRODUODENOSCOPY);  Surgeon: Taran Galvez MD;  Location: [facility] (4TH FLR);  Service: Endoscopy;  Laterality: N/A;  covid test 10/1-[facility]    10/1-LVM about COVID test-GT    ESOPHAGOGASTRODUODENOSCOPY N/A 01/31/2022    Procedure: EGD (ESOPHAGOGASTRODUODENOSCOPY);  Surgeon: Tani Lara MD;  Location: [facility] (4TH FLR);  Service: Endoscopy;  Laterality: N/A;  12/15 fully vaccinated; instructions to portal-st  1/28-covid [facility]-tb    TRANSFORAMINAL EPIDURAL INJECTION OF STEROID N/A 03/16/2023    Procedure: b/l L4-5 TFESI;  Surgeon: Gato Delatorre DO;  Location: [facility] OR;  Service: Pain Management;  Laterality: N/A;    TUBAL LIGATION       Social History     Socioeconomic History    Marital status:    Occupational History    Occupation: retired medical records    Tobacco Use    Smoking status: Never    Smokeless tobacco: Never   Substance and Sexual Activity    Alcohol use: Never    Drug use: No    Sexual activity: Not Currently     Partners: Male     Birth control/protection: None   Other Topics Concern    Are you pregnant or think you may be? No    Breast-feeding No   Social History Narrative    , lives with 1 daughter(Toreal), Walking some     Social Determinants of Health     Financial Resource Strain: Low Risk  (7/31/2023)    Overall Financial Resource Strain (CARDIA)     Difficulty of Paying Living Expenses: Not very hard   Food Insecurity: No Food Insecurity (7/31/2023)    Hunger Vital Sign     Worried About Running Out of Food in the Last Year: Never true     Ran Out of Food in the Last Year: Never true   Transportation Needs: Unmet  Transportation Needs (7/31/2023)    PRAPARE - Transportation     Lack of Transportation (Medical): Yes     Lack of Transportation (Non-Medical): No   Physical Activity: Insufficiently Active (7/31/2023)    Exercise Vital Sign     Days of Exercise per Week: 2 days     Minutes of Exercise per Session: 50 min   Stress: No Stress Concern Present (7/31/2023)    [employer] - Occupational Stress Questionnaire     Feeling of Stress : Not at all   Social Connections: Moderately Integrated (7/31/2023)    Social Connection and Isolation Panel [NHANES]     Frequency of Communication with Friends and Family: More than three times a week     Frequency of Social Gatherings with Friends and Family: Once a week     Attends Sabianism Services: More than 4 times per year     Active Member of Clubs or Organizations: Yes     Attends Club or Organization Meetings: More than 4 times per year     Marital Status:    Housing Stability: High Risk (7/31/2023)    Housing Stability Vital Sign     Unable to Pay for Housing in the Last Year: No     Number of Places Lived in the Last Year: 1     Unstable Housing in the Last Year: Yes         Allergies  Review of patient's allergies indicates:  No Known Allergies      Review of Systems   Review of Systems   Constitutional: Negative for decreased appetite, fever and weight loss.   HENT:  Negative for congestion and nosebleeds.    Eyes:  Negative for double vision, vision loss in left eye, vision loss in right eye and visual disturbance.   Cardiovascular:  Negative for chest pain, claudication, cyanosis, dyspnea on exertion, irregular heartbeat, leg swelling, near-syncope, orthopnea, palpitations, paroxysmal nocturnal dyspnea and syncope.   Respiratory:  Negative for cough, hemoptysis, shortness of breath, sleep disturbances due to breathing, snoring, sputum production and wheezing.    Endocrine: Negative for cold intolerance and heat intolerance.   Skin:  Negative  for nail changes and rash.   Musculoskeletal:  Positive for back pain. Negative for joint pain, muscle cramps, muscle weakness and myalgias.   Gastrointestinal:  Negative for change in bowel habit, heartburn, hematemesis, hematochezia, hemorrhoids and melena.   Neurological:  Negative for dizziness, focal weakness and headaches.         Physical Exam  Wt Readings from Last 1 Encounters:   08/02/23 93.4 kg (205 lb 14.4 oz)     BP Readings from Last 3 Encounters:   08/02/23 124/60   07/24/23 120/68   07/07/23 132/60     Pulse Readings from Last 1 Encounters:   08/02/23 86     Body mass index is 38.9 kg/m².    Physical Exam  Vitals reviewed.   Constitutional:       Appearance: She is obese.   Neck:      Vascular: No JVD.   Cardiovascular:      Rate and Rhythm: Normal rate and regular rhythm.      Pulses:           Carotid pulses are 2+ on the right side and 2+ on the left side.       Radial pulses are 2+ on the right side and 2+ on the left side.      Heart sounds: S1 normal and S2 normal. Murmur heard.      Harsh midsystolic murmur is present with a grade of 2/6.   Pulmonary:      Breath sounds: Normal breath sounds and air entry.   Musculoskeletal:      Right lower leg: No edema.      Left lower leg: No edema.   Neurological:      Mental Status: She is alert.             Assessment  1. Atherosclerosis of aorta  Stable    2. Nonrheumatic aortic valve stenosis  Mild aortic stenosis, stable    3. Essential hypertension  Well controlled current medications, continue to monitor    4. Chronic heart failure with preserved ejection fraction  Stable        Plan and Discussion  Discussed her blood pressure is well controlled.  She has no unstable cardiac symptoms.  Discussed her echocardiogram findings with good left ventricular function and mild aortic stenosis.  She should proceed with planned back surgery as scheduled without additional cardiac testing.    Follow Up  4 months as scheduled      Amando Das MD, F.A.C.C,  F.S.C.A.I.        30 minutes were spent in chart review, documentation and review of results, and evaluation, treatment, and counseling of patient on the same day of service.    Disclaimer: This document was created using voice recognition software ([employer]). Although it may be edited, this document may contain errors related to incorrect recognition of the spoken word. Please call the physician if clarification is needed.

## 2023-08-03 ENCOUNTER — HOSPITAL ENCOUNTER (OUTPATIENT)
Dept: PREADMISSION TESTING | Facility: HOSPITAL | Age: 78
Discharge: HOME | End: 2023-08-03
Attending: ORTHOPAEDIC SURGERY
Payer: MEDICARE

## 2023-08-03 ENCOUNTER — TELEPHONE (OUTPATIENT)
Dept: PREADMISSION TESTING | Facility: HOSPITAL | Age: 78
End: 2023-08-03

## 2023-08-03 VITALS
TEMPERATURE: 99 F | HEART RATE: 88 BPM | BODY MASS INDEX: 41.47 KG/M2 | WEIGHT: 205.69 LBS | OXYGEN SATURATION: 99 % | SYSTOLIC BLOOD PRESSURE: 148 MMHG | HEIGHT: 59 IN | DIASTOLIC BLOOD PRESSURE: 66 MMHG

## 2023-08-03 DIAGNOSIS — D64.9 ANEMIA, UNSPECIFIED TYPE: ICD-10-CM

## 2023-08-03 DIAGNOSIS — Z01.818 PREOPERATIVE TESTING: Primary | ICD-10-CM

## 2023-08-03 NOTE — TELEPHONE ENCOUNTER
----- Message from Priscilla Sullivan RN sent at 8/3/2023 12:17 PM CDT -----  8/16-Please schedule labs at [facility] location on 8/7

## 2023-08-07 ENCOUNTER — HOSPITAL ENCOUNTER (OUTPATIENT)
Dept: RADIOLOGY | Facility: HOSPITAL | Age: 78
Discharge: HOME | End: 2023-08-07
Attending: ORTHOPAEDIC SURGERY
Payer: MEDICARE

## 2023-08-07 DIAGNOSIS — M54.9 DORSALGIA, UNSPECIFIED: ICD-10-CM

## 2023-08-07 PROCEDURE — 72148 MRI LUMBAR SPINE WITHOUT CONTRAST: ICD-10-PCS | Mod: 26,,, | Performed by: RADIOLOGY

## 2023-08-07 PROCEDURE — 72148 MRI LUMBAR SPINE W/O DYE: CPT | Mod: 26,,, | Performed by: RADIOLOGY

## 2023-08-07 PROCEDURE — 72148 MRI LUMBAR SPINE W/O DYE: CPT | Mod: TC

## 2023-08-11 NOTE — PATIENT INSTRUCTIONS
DR. CARYL MASON'S POSTOPERATIVE INSTRUCTIONS -   LUMBAR FUSION       Antibiotics: You do not need additional antibiotics at home.    NSAIDs: Please refrain from taking ibuprofen (Advil), naproxen (Aleve), and other non steroidal anti-inflammatory medications other than the Celebrex that will be prescribed to you after surgery.    Wound Care: You may remove your dressing and shower 7 days after surgery. Until then please keep your wound clean and dry. Sponge baths are acceptable. Do not go in a pool or hot tub until seen in clinic. Please leave the small steri-strips covering your wound in place until they fall off naturally (2 weeks). You may notice clear suture ends hanging from the sides of your incision after the steri-strips are removed, it is ok to clip these with scissors.    Brace: You may be prescribed a brace, please wear this when up and walking, it is not necessary to wear at night when sleeping.    Pain: We will use a multimodal approach for pain management after your surgery.  You will be given a prescription for pain medicine when you are discharged from the hospital.  You will also be given prescriptions for Robaxin (a muscle relaxer), Gabapentin, Celebrex and Tylenol.  Please note: you will only be given ONE prescription for narcotics when you are discharged from the hospital.  This medication is for breakthrough pain only. This medication will not be refilled.  The other medications given to you may be refilled if needed.      Infection: Signs of infection include increasing wound drainage and redness around the wound, as well as a temperature over 101.5 degrees. It is unnecessary to take your temperature on a routine basis. Please call the below number if you are concerned about an infection.    Driving and Work: It is ok to return to driving and work as long as you are not taking narcotic pain medications and can walk greater than 100 feet. Please do not lift over 10 pounds or participate in  exercise or sports until cleared by Dr. Sharp.    Deep Venous Thrombosis (Blood Clots): Symptoms include swelling in the legs and shortness of breath. Please call the office or proceed to the nearest emergency room if you have any of these symptoms.    Physical Therapy: The best physical therapy immediately after surgery is walking. Please try to walk as much as possible.    Follow-up: You will be scheduled for a follow-up appointment in 4 weeks with either Dr. Sharp or his physician assistant, Hali Daigle PA-C.    Questions: During business hours please call [phone] for routine questions. For after hours questions please call [phone] and ask to speak with the Orthopaedic resident on call.    Disability: If you submitted short term disability paperwork for us to complete and would like to check the status, please call the Disability Department at [phone].  You may fax any necessary paperwork to [phone].

## 2023-08-15 ENCOUNTER — ANESTHESIA EVENT (OUTPATIENT)
Dept: SURGERY | Facility: HOSPITAL | Age: 78
DRG: 454 | End: 2023-08-15
Payer: MEDICARE

## 2023-08-15 ENCOUNTER — TELEPHONE (OUTPATIENT)
Dept: ORTHOPEDICS | Facility: CLINIC | Age: 78
End: 2023-08-15
Payer: MEDICARE

## 2023-08-15 NOTE — ANESTHESIA PREPROCEDURE EVALUATION
[facility]  Anesthesia Pre-Operative Evaluation         Patient Name: Nilsa Curiel  YOB: 1945  MRN: [id number]    SUBJECTIVE:     Pre-operative evaluation for Procedure(s) (LRB):  FUSION, SPINE, LUMBAR, TLIF, POSTERIOR APPROACH, USING PEDICLE SCREW L2-5 PLDF/TLIF GLOBUS ROBOT SNS:SSEP/EMG cell saver (N/A)     08/15/2023    Nilsa Curiel is a 77 y.o. female w/ a significant PMHx of HFpEF, MONE, HTN, morbid obesity-BMI 42, HLD, CKD 3, and GERD who presents for the above procedure.      LDA: None documented.    Prev airway: None documented.     Drips: None documented.    Patient Active Problem List   Diagnosis    Mixed hyperlipidemia    GERD (gastroesophageal reflux disease)    Atopic dermatitis    Hyperparathyroidism    Decreased strength    Impaired mobility    Right-sided carotid artery disease    Neurogenic claudication due to lumbar spinal stenosis    Spondylolisthesis of lumbosacral region    MONE (obstructive sleep apnea)    Primary osteoarthritis of both shoulders    Chronic right-sided low back pain without sciatica    Headache disorder    Chronic kidney disease, stage III (moderate)    Cervical stenosis of spine    Posture imbalance    Decreased strength of trunk and back    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    Essential hypertension    Right hip pain    Ankle swelling    Intertrigo    Muscle spasms of both lower extremities    Hyperuricemia    Chronic heart failure with preserved ejection fraction    Nonrheumatic aortic valve stenosis    Hair loss    Cervical spine arthritis with nerve pain    Chronic right hip pain    Chronic neck pain    Lumbar spondylosis    Fatigue    Atherosclerosis of aorta       Review of patient's allergies indicates:  No Known Allergies    Current Inpatient Medications:      No current facility-administered medications on file prior to encounter.     Current Outpatient Medications on  File Prior to Encounter   Medication Sig Dispense Refill    allopurinoL (ZYLOPRIM) 100 MG tablet Take 1 tablet (100 mg total) by mouth once daily. 90 tablet 11    amLODIPine (NORVASC) 10 MG tablet Take 1 tablet (10 mg total) by mouth once daily. 90 tablet 3    aspirin (ECOTRIN) 81 MG EC tablet Take 1 tablet (81 mg total) by mouth once daily. 90 tablet 3    betamethasone valerate 0.1% (VALISONE) 0.1 % Oint Apply topically once daily. 45 g 3    biotin 300 mcg Tab Take 1 tablet by mouth once daily.      carvediloL (COREG) 3.125 MG tablet Take 1 tablet (3.125 mg total) by mouth 2 (two) times daily with meals. 180 tablet 3    diclofenac sodium (VOLTAREN) 1 % Gel Apply 2 g topically daily as needed. 100 g 3    fluocinonide (LIDEX) 0.05 % external solution AAA scalp qday - bid prn pruritus 60 mL 3    fluticasone propionate (FLONASE) 50 mcg/actuation nasal spray 1 spray (50 mcg total) by Each Nostril route once daily. 16 g 11    ketoconazole (NIZORAL) 2 % cream Apply to affected areas of body BID prn irritation. 15 g 1    ketoconazole (NIZORAL) 2 % shampoo Wash hair with medicated shampoo at least 2x/week - let sit on scalp at least 5 minutes prior to rinsing 120 mL 5    lactobacillus combo no.6 (PROBIOTIC COMPLEX) 4 billion cell Tab Take 1 tablet by mouth once daily. 90 tablet 3    meloxicam (MOBIC) 15 MG tablet Take 1 tablet (15 mg total) by mouth once daily. 60 tablet 1    methocarbamoL (ROBAXIN) 500 MG Tab Take 1 tablet (500 mg total) by mouth 3 (three) times daily. 60 tablet 1    mometasone (ELOCON) 0.1 % ointment Apply twice daily to hands prn eczema. 45 g 3    MULTIVIT-MIN/FA/CA CARB/VIT K (WOMEN'S 50+ DAILY FORMULA ORAL) Take by mouth.      nystatin-triamcinolone (MYCOLOG II) cream Apply topically 2 (two) times a day. 60 g 3    olmesartan (BENICAR) 40 MG tablet TAKE 1 TABLET ONE TIME DAILY 90 tablet 3    omega-3 fatty acids 300 mg Cap Take by mouth.      omeprazole (PRILOSEC) 40 MG capsule Take 1  capsule (40 mg total) by mouth every morning. As needed for reflux symptoms 90 capsule 3    potassium chloride SA (K-DUR,KLOR-CON) 20 MEQ tablet Take 1 tablet (20 mEq total) by mouth 2 (two) times daily. 180 tablet 3    riboflavin, vitamin B2, (VITAMIN B-2 ORAL) Take 1 capsule by mouth once daily.      sumatriptan (IMITREX) 100 MG tablet Take 1 tablet (100 mg total) by mouth daily as needed for Migraine. No more than 2 doses in 24 hours 27 tablet 3    TAC 0.1%-ciclopirox-MOM Apply to affected area twice daily after cool blow dry. (discard after 2023) 60 g 5    triamcinolone acetonide 0.025% (KENALOG) 0.025 % cream Apply to affected areas of body BID prn irritation. 15 g 1    TURMERIC ORAL Take by mouth.      ubidecarenone (COENZYME Q10) 100 mg Tab Take 1 tablet by mouth once daily.          Past Surgical History:   Procedure Laterality Date     SECTION      x3    COLONOSCOPY N/A 10/04/2021    Procedure: COLONOSCOPY;  Surgeon: Taran Galvez MD;  Location: [facility] L[facility] (4TH FLR);  Service: Endoscopy;  Laterality: N/A;    COLONOSCOPY N/A 2022    Procedure: COLONOSCOPY--positive fit kit;  Surgeon: Tani Lara MD;  Location: [facility] (4TH FLR);  Service: Endoscopy;  Laterality: N/A;    EPIDURAL STEROID INJECTION N/A 2023    Procedure: INJECTION, STEROID, EPIDURAL, L5-S1;  Surgeon: Majo Meyers MD;  Location: [facility];  Service: Pain Management;  Laterality: N/A;    ESOPHAGOGASTRODUODENOSCOPY N/A 10/04/2021    Procedure: EGD (ESOPHAGOGASTRODUODENOSCOPY);  Surgeon: Taran Galvez MD;  Location: [facility] (4TH FLR);  Service: Endoscopy;  Laterality: N/A;  covid test 10/1-[facility]    10/1-[facility] about COVID test-GT    ESOPHAGOGASTRODUODENOSCOPY N/A 2022    Procedure: EGD (ESOPHAGOGASTRODUODENOSCOPY);  Surgeon: Tani Lara MD;  Location: [facility] (4TH FLR);  Service: Endoscopy;  Laterality: N/A;  12/15 fully vaccinated; instructions to portal-st  -covid [facility]    TRANSFORAMINAL EPIDURAL INJECTION OF STEROID N/A 03/16/2023    Procedure: b/l L4-5 TFESI;  Surgeon: Gato Delatorre DO;  Location: [facility] OR;  Service: Pain Management;  Laterality: N/A;    TUBAL LIGATION         Social History     Socioeconomic History    Marital status:    Occupational History    Occupation: retired medical records    Tobacco Use    Smoking status: Never    Smokeless tobacco: Never   Substance and Sexual Activity    Alcohol use: Never    Drug use: No    Sexual activity: Not Currently     Partners: Male     Birth control/protection: None   Other Topics Concern    Are you pregnant or think you may be? No    Breast-feeding No   Social History Narrative    , lives with 1 daughter(Toreal), Walking some     Social Determinants of Health     Financial Resource Strain: Low Risk  (7/31/2023)    Overall Financial Resource Strain (CARDIA)     Difficulty of Paying Living Expenses: Not very hard   Food Insecurity: No Food Insecurity (7/31/2023)    Hunger Vital Sign     Worried About Running Out of Food in the Last Year: Never true     Ran Out of Food in the Last Year: Never true   Transportation Needs: Unmet Transportation Needs (7/31/2023)    PRAPARE - Transportation     Lack of Transportation (Medical): Yes     Lack of Transportation (Non-Medical): No   Physical Activity: Insufficiently Active (7/31/2023)    Exercise Vital Sign     Days of Exercise per Week: 2 days     Minutes of Exercise per Session: 50 min   Stress: No Stress Concern Present (7/31/2023)    Citizen of [place] of Occupational Health - Occupational Stress Questionnaire     Feeling of Stress : Not at all   Social Connections: Moderately Integrated (7/31/2023)    Social Connection and Isolation Panel [NHANES]     Frequency of Communication with Friends and Family: More than three times a week     Frequency of Social Gatherings with Friends and Family: Once a week     Attends Pentec[employer]al  "Services: More than 4 times per year     Active Member of Clubs or Organizations: Yes     Attends Club or Organization Meetings: More than 4 times per year     Marital Status:    Housing Stability: High Risk (7/31/2023)    Housing Stability Vital Sign     Unable to Pay for Housing in the Last Year: No     Number of Places Lived in the Last Year: 1     Unstable Housing in the Last Year: Yes       OBJECTIVE:     Vital Signs Range (Last 24H):         CBC:   No results for input(s): "WBC", "RBC", "HGB", "HCT", "PLT", "MCV", "MCH", "MCHC" in the last 72 hours.    CMP: No results for input(s): "NA", "K", "CL", "CO2", "BUN", "CREATININE", "GLU", "MG", "PHOS", "CALCIUM", "ALBUMIN", "PROT", "ALKPHOS", "ALT", "AST", "BILITOT" in the last 72 hours.    INR:  No results for input(s): "PT", "INR", "PROTIME", "APTT" in the last 72 hours.    Diagnostic Studies: No relevant studies.    EKG:   Results for orders placed or performed in visit on 06/06/23   EKG 12-lead    Collection Time: 06/06/23  9:12 AM    Narrative    Test Reason : I35.0,    Vent. Rate : 099 BPM     Atrial Rate : 099 BPM     P-R Int : 164 ms          QRS Dur : 064 ms      QT Int : 318 ms       P-R-T Axes : 062 041 035 degrees     QTc Int : 408 ms    Normal sinus rhythm  Normal ECG  When compared with ECG of 30-AUG-2022 10:05,  No significant change was found  Confirmed by Amando Das MD ([id number]) on 6/9/2023 8:54:08 AM    Referred By:             Confirmed By:Amando Das MD        2D ECHO:   Results for orders placed during the hospital encounter of 04/11/22    Echo    Interpretation Summary  · The left ventricle is normal in size with mild concentric hypertrophy and hyperdynamic systolic function.  · The estimated ejection fraction is 75%.  · Grade I left ventricular diastolic dysfunction.  · Normal right ventricular size with normal right ventricular systolic function.  · There is mild aortic valve stenosis.  · Aortic valve area is 1.30 cm2; peak " velocity is 2.86 m/s; mean gradient is 18 mmHg.  · Intermediate central venous pressure (8 mmHg).  · The estimated PA systolic pressure is 35 mmHg.  · Moderate left atrial enlargement.         ASSESSMENT/PLAN:         Pre-op Assessment    I have reviewed the Patient Summary Reports.     I have reviewed the Nursing Notes. I have reviewed the NPO Status.   I have reviewed the Medications.     Review of Systems  Anesthesia Hx:  No problems with previous Anesthesia  History of prior surgery of interest to airway management or planning: Previous anesthesia: General Denies Family Hx of Anesthesia complications.   Denies Personal Hx of Anesthesia complications.   Social:  Non-Smoker    Hematology/Oncology:     Oncology Normal    -- Anemia:   Cardiovascular:   Denies Hypertension.   Denies Angina. hyperlipidemia ECG has been reviewed.    Pulmonary:   Denies Shortness of breath.  Denies Recent URI. Sleep Apnea    Renal/:   Chronic Renal Disease, CKD    Hepatic/GI:   GERD Denies Liver Disease.    Musculoskeletal:   Arthritis     Neurological:   Denies CVA. Headaches Denies Seizures.    Endocrine:  Endocrine Normal Denies Diabetes.        Physical Exam  General: Well nourished, Cooperative and Alert    Airway:  Mallampati: II   Mouth Opening: Normal  TM Distance: Normal  Tongue: Normal  Neck ROM: Normal ROM    Dental:  Intact        Anesthesia Plan  Type of Anesthesia, risks & benefits discussed:    Anesthesia Type: Gen ETT  Intra-op Monitoring Plan: Standard ASA Monitors and Art Line  Post Op Pain Control Plan: multimodal analgesia  Induction:  IV  Airway Plan: Direct, Post-Induction  Informed Consent: Informed consent signed with the Patient and all parties understand the risks and agree with anesthesia plan.  All questions answered.   ASA Score: 3  Day of Surgery Review of History & Physical: H&P Update referred to the surgeon/provider.    Ready For Surgery From Anesthesia Perspective.     .

## 2023-08-15 NOTE — TELEPHONE ENCOUNTER
Spoke with patient and informed her of surgery date and time and she agreed verbally. Informed patient of area to park and where to go that morning to sign in (Day of Surgery). No eating or drinking after 12 am. And wash with Ana-Hex 4. Also wash hair night before surgery. Patient agreed verbally.  ARRIVAL TIME 6 AM

## 2023-08-16 ENCOUNTER — ANESTHESIA (OUTPATIENT)
Dept: SURGERY | Facility: HOSPITAL | Age: 78
DRG: 454 | End: 2023-08-16
Payer: MEDICARE

## 2023-08-16 ENCOUNTER — HOSPITAL ENCOUNTER (INPATIENT)
Facility: HOSPITAL | Age: 78
LOS: 3 days | Discharge: HOME HEALTH | DRG: 454 | End: 2023-08-19
Attending: ORTHOPAEDIC SURGERY | Admitting: ORTHOPAEDIC SURGERY
Payer: MEDICARE

## 2023-08-16 ENCOUNTER — TELEPHONE (OUTPATIENT)
Dept: ORTHOPEDICS | Facility: CLINIC | Age: 78
End: 2023-08-16
Payer: MEDICARE

## 2023-08-16 DIAGNOSIS — M19.011 PRIMARY OSTEOARTHRITIS OF BOTH SHOULDERS: ICD-10-CM

## 2023-08-16 DIAGNOSIS — E66.01 SEVERE OBESITY (BMI >= 40): ICD-10-CM

## 2023-08-16 DIAGNOSIS — Z74.09 IMPAIRED MOBILITY: ICD-10-CM

## 2023-08-16 DIAGNOSIS — R53.1 DECREASED STRENGTH: ICD-10-CM

## 2023-08-16 DIAGNOSIS — M54.50 CHRONIC RIGHT-SIDED LOW BACK PAIN WITHOUT SCIATICA: ICD-10-CM

## 2023-08-16 DIAGNOSIS — G47.33 OSA (OBSTRUCTIVE SLEEP APNEA): ICD-10-CM

## 2023-08-16 DIAGNOSIS — M43.17 SPONDYLOLISTHESIS OF LUMBOSACRAL REGION: ICD-10-CM

## 2023-08-16 DIAGNOSIS — R29.3 POSTURE IMBALANCE: ICD-10-CM

## 2023-08-16 DIAGNOSIS — K21.9 GASTROESOPHAGEAL REFLUX DISEASE WITHOUT ESOPHAGITIS: ICD-10-CM

## 2023-08-16 DIAGNOSIS — D62 ACUTE BLOOD LOSS ANEMIA: ICD-10-CM

## 2023-08-16 DIAGNOSIS — I50.30 HYPERTROPHIC OBSTRUCTIVE CARDIOMYOPATHY WITH DIASTOLIC HEART FAILURE: ICD-10-CM

## 2023-08-16 DIAGNOSIS — M25.473 ANKLE SWELLING, UNSPECIFIED LATERALITY: ICD-10-CM

## 2023-08-16 DIAGNOSIS — E79.0 HYPERURICEMIA: ICD-10-CM

## 2023-08-16 DIAGNOSIS — R53.83 FATIGUE, UNSPECIFIED TYPE: ICD-10-CM

## 2023-08-16 DIAGNOSIS — M19.012 PRIMARY OSTEOARTHRITIS OF BOTH SHOULDERS: ICD-10-CM

## 2023-08-16 DIAGNOSIS — I42.1 HYPERTROPHIC OBSTRUCTIVE CARDIOMYOPATHY WITH DIASTOLIC HEART FAILURE: ICD-10-CM

## 2023-08-16 DIAGNOSIS — M43.10 DEGENERATIVE SPONDYLOLISTHESIS: ICD-10-CM

## 2023-08-16 DIAGNOSIS — I70.0 ATHEROSCLEROSIS OF AORTA: ICD-10-CM

## 2023-08-16 DIAGNOSIS — M25.551 CHRONIC RIGHT HIP PAIN: ICD-10-CM

## 2023-08-16 DIAGNOSIS — M47.816 LUMBAR SPONDYLOSIS: ICD-10-CM

## 2023-08-16 DIAGNOSIS — R29.898 DECREASED STRENGTH OF TRUNK AND BACK: ICD-10-CM

## 2023-08-16 DIAGNOSIS — R51.9 HEADACHE DISORDER: ICD-10-CM

## 2023-08-16 DIAGNOSIS — M79.2 CERVICAL SPINE ARTHRITIS WITH NERVE PAIN: ICD-10-CM

## 2023-08-16 DIAGNOSIS — M48.02 CERVICAL STENOSIS OF SPINE: ICD-10-CM

## 2023-08-16 DIAGNOSIS — M48.062 NEUROGENIC CLAUDICATION DUE TO LUMBAR SPINAL STENOSIS: Primary | ICD-10-CM

## 2023-08-16 DIAGNOSIS — N18.30 STAGE 3 CHRONIC KIDNEY DISEASE, UNSPECIFIED WHETHER STAGE 3A OR 3B CKD: ICD-10-CM

## 2023-08-16 DIAGNOSIS — I10 ESSENTIAL HYPERTENSION: ICD-10-CM

## 2023-08-16 DIAGNOSIS — M62.838 MUSCLE SPASMS OF BOTH LOWER EXTREMITIES: ICD-10-CM

## 2023-08-16 DIAGNOSIS — I65.21 STENOSIS OF RIGHT CAROTID ARTERY: ICD-10-CM

## 2023-08-16 DIAGNOSIS — L65.9 HAIR LOSS: ICD-10-CM

## 2023-08-16 DIAGNOSIS — G89.29 CHRONIC NECK PAIN: ICD-10-CM

## 2023-08-16 DIAGNOSIS — M47.812 CERVICAL SPINE ARTHRITIS WITH NERVE PAIN: ICD-10-CM

## 2023-08-16 DIAGNOSIS — G89.29 CHRONIC RIGHT HIP PAIN: ICD-10-CM

## 2023-08-16 DIAGNOSIS — I50.32 CHRONIC HEART FAILURE WITH PRESERVED EJECTION FRACTION: ICD-10-CM

## 2023-08-16 DIAGNOSIS — L30.4 INTERTRIGO: ICD-10-CM

## 2023-08-16 DIAGNOSIS — E21.3 HYPERPARATHYROIDISM: ICD-10-CM

## 2023-08-16 DIAGNOSIS — I35.0 NONRHEUMATIC AORTIC VALVE STENOSIS: ICD-10-CM

## 2023-08-16 DIAGNOSIS — E78.2 MIXED HYPERLIPIDEMIA: ICD-10-CM

## 2023-08-16 DIAGNOSIS — G89.29 CHRONIC RIGHT-SIDED LOW BACK PAIN WITHOUT SCIATICA: ICD-10-CM

## 2023-08-16 DIAGNOSIS — L20.89 FLEXURAL ATOPIC DERMATITIS: ICD-10-CM

## 2023-08-16 DIAGNOSIS — M25.551 RIGHT HIP PAIN: ICD-10-CM

## 2023-08-16 DIAGNOSIS — M54.2 CHRONIC NECK PAIN: ICD-10-CM

## 2023-08-16 DIAGNOSIS — Z98.890 S/P SPINAL SURGERY: Primary | ICD-10-CM

## 2023-08-16 LAB
ABO + RH BLD: NORMAL
ALBUMIN SERPL BCP-MCNC: 2.9 G/DL (ref 3.5–5.2)
ALP SERPL-CCNC: 76 U/L (ref 55–135)
ALT SERPL W/O P-5'-P-CCNC: 15 U/L (ref 10–44)
ANION GAP SERPL CALC-SCNC: 7 MMOL/L (ref 8–16)
AST SERPL-CCNC: 35 U/L (ref 10–40)
BILIRUB SERPL-MCNC: 0.3 MG/DL (ref 0.1–1)
BLD GP AB SCN CELLS X3 SERPL QL: NORMAL
BUN SERPL-MCNC: 14 MG/DL (ref 8–23)
CALCIUM SERPL-MCNC: 8.1 MG/DL (ref 8.7–10.5)
CHLORIDE SERPL-SCNC: 115 MMOL/L (ref 95–110)
CO2 SERPL-SCNC: 18 MMOL/L (ref 23–29)
CREAT SERPL-MCNC: 0.9 MG/DL (ref 0.5–1.4)
EST. GFR  (NO RACE VARIABLE): >60 ML/MIN/1.73 M^2
GLUCOSE SERPL-MCNC: 168 MG/DL (ref 70–110)
POTASSIUM SERPL-SCNC: 4.5 MMOL/L (ref 3.5–5.1)
PROT SERPL-MCNC: 6.2 G/DL (ref 6–8.4)
SODIUM SERPL-SCNC: 140 MMOL/L (ref 136–145)
SPECIMEN OUTDATE: NORMAL

## 2023-08-16 PROCEDURE — 27201423 OPTIME MED/SURG SUP & DEVICES STERILE SUPPLY: Performed by: ORTHOPAEDIC SURGERY

## 2023-08-16 PROCEDURE — 71000039 HC RECOVERY, EACH ADD'L HOUR: Performed by: ORTHOPAEDIC SURGERY

## 2023-08-16 PROCEDURE — 22853 INSJ BIOMECHANICAL DEVICE: CPT | Mod: AS,,, | Performed by: ORTHOPAEDIC SURGERY

## 2023-08-16 PROCEDURE — 22614 PR ARTHRODESIS, POST/POSTLAT, SNGL INTERSPACE, EA ADDTL: ICD-10-PCS | Mod: ,,, | Performed by: ORTHOPAEDIC SURGERY

## 2023-08-16 PROCEDURE — 22614 ARTHRD PST TQ 1NTRSPC EA ADD: CPT | Mod: ,,, | Performed by: ORTHOPAEDIC SURGERY

## 2023-08-16 PROCEDURE — 71000033 HC RECOVERY, INTIAL HOUR: Performed by: ORTHOPAEDIC SURGERY

## 2023-08-16 PROCEDURE — 27201037 HC PRESSURE MONITORING SET UP

## 2023-08-16 PROCEDURE — 22853 INSJ BIOMECHANICAL DEVICE: CPT | Mod: ,,, | Performed by: ORTHOPAEDIC SURGERY

## 2023-08-16 PROCEDURE — 22842 INSERT SPINE FIXATION DEVICE: CPT | Mod: AS,,, | Performed by: ORTHOPAEDIC SURGERY

## 2023-08-16 PROCEDURE — 86900 BLOOD TYPING SEROLOGIC ABO: CPT | Performed by: ORTHOPAEDIC SURGERY

## 2023-08-16 PROCEDURE — 25000003 PHARM REV CODE 250: Performed by: ORTHOPAEDIC SURGERY

## 2023-08-16 PROCEDURE — 63052 PR LAMINECT/FACETECT/FORAMINOT, ARTHRODESIS, 1 VERTEBR SEGM: ICD-10-PCS | Mod: 59,,, | Performed by: ORTHOPAEDIC SURGERY

## 2023-08-16 PROCEDURE — 63600175 PHARM REV CODE 636 W HCPCS

## 2023-08-16 PROCEDURE — 22633 PR ARTHRODESIS, COMBINED TECHN, SNGL INTERSPACE, LUMBAR: ICD-10-PCS | Mod: AS,,, | Performed by: ORTHOPAEDIC SURGERY

## 2023-08-16 PROCEDURE — 22853 PR INSERT BIOMECH DEV W/INTERBODY ARTHRODESIS, EA CONTIGUOUS DEFECT: ICD-10-PCS | Mod: AS,,, | Performed by: ORTHOPAEDIC SURGERY

## 2023-08-16 PROCEDURE — 22853 PR INSERT BIOMECH DEV W/INTERBODY ARTHRODESIS, EA CONTIGUOUS DEFECT: ICD-10-PCS | Mod: ,,, | Performed by: ORTHOPAEDIC SURGERY

## 2023-08-16 PROCEDURE — 22842 PR POSTERIOR SEGMENTAL INSTRUMENTATION 3-6 VRT SEG: ICD-10-PCS | Mod: AS,,, | Performed by: ORTHOPAEDIC SURGERY

## 2023-08-16 PROCEDURE — 63600175 PHARM REV CODE 636 W HCPCS: Performed by: STUDENT IN AN ORGANIZED HEALTH CARE EDUCATION/TRAINING PROGRAM

## 2023-08-16 PROCEDURE — 22633 PR ARTHRODESIS, COMBINED TECHN, SNGL INTERSPACE, LUMBAR: ICD-10-PCS | Mod: ,,, | Performed by: ORTHOPAEDIC SURGERY

## 2023-08-16 PROCEDURE — 36415 COLL VENOUS BLD VENIPUNCTURE: CPT | Performed by: ORTHOPAEDIC SURGERY

## 2023-08-16 PROCEDURE — 27800903 OPTIME MED/SURG SUP & DEVICES OTHER IMPLANTS: Performed by: ORTHOPAEDIC SURGERY

## 2023-08-16 PROCEDURE — 22634 PR ARTHRODESIS, COMBINED TECHN, SNGL INTERSPACE, EA ADDTL: ICD-10-PCS | Mod: ,,, | Performed by: ORTHOPAEDIC SURGERY

## 2023-08-16 PROCEDURE — 22634 ARTHRD CMBN 1NTRSPC EA ADDL: CPT | Mod: AS,,, | Performed by: ORTHOPAEDIC SURGERY

## 2023-08-16 PROCEDURE — 71000015 HC POSTOP RECOV 1ST HR: Performed by: ORTHOPAEDIC SURGERY

## 2023-08-16 PROCEDURE — C1713 ANCHOR/SCREW BN/BN,TIS/BN: HCPCS | Performed by: ORTHOPAEDIC SURGERY

## 2023-08-16 PROCEDURE — 63052 LAM FACETC/FRMT ARTHRD LUM 1: CPT | Mod: 59,AS,, | Performed by: ORTHOPAEDIC SURGERY

## 2023-08-16 PROCEDURE — 94761 N-INVAS EAR/PLS OXIMETRY MLT: CPT

## 2023-08-16 PROCEDURE — 25000003 PHARM REV CODE 250: Performed by: STUDENT IN AN ORGANIZED HEALTH CARE EDUCATION/TRAINING PROGRAM

## 2023-08-16 PROCEDURE — 63600175 PHARM REV CODE 636 W HCPCS: Performed by: ORTHOPAEDIC SURGERY

## 2023-08-16 PROCEDURE — 63052 PR LAMINECT/FACETECT/FORAMINOT, ARTHRODESIS, 1 VERTEBR SEGM: ICD-10-PCS | Mod: 59,AS,, | Performed by: ORTHOPAEDIC SURGERY

## 2023-08-16 PROCEDURE — 20936 SP BONE AGRFT LOCAL ADD-ON: CPT | Mod: ,,, | Performed by: ORTHOPAEDIC SURGERY

## 2023-08-16 PROCEDURE — 36620 INSERTION CATHETER ARTERY: CPT | Mod: 59,,, | Performed by: ANESTHESIOLOGY

## 2023-08-16 PROCEDURE — D9220A PRA ANESTHESIA: ICD-10-PCS | Mod: ,,, | Performed by: ANESTHESIOLOGY

## 2023-08-16 PROCEDURE — 37000009 HC ANESTHESIA EA ADD 15 MINS: Performed by: ORTHOPAEDIC SURGERY

## 2023-08-16 PROCEDURE — 25000003 PHARM REV CODE 250

## 2023-08-16 PROCEDURE — 63047 LAM FACETEC & FORAMOT LUMBAR: CPT | Mod: 59,,, | Performed by: ORTHOPAEDIC SURGERY

## 2023-08-16 PROCEDURE — 22634 ARTHRD CMBN 1NTRSPC EA ADDL: CPT | Mod: ,,, | Performed by: ORTHOPAEDIC SURGERY

## 2023-08-16 PROCEDURE — D9220A PRA ANESTHESIA: Mod: ,,, | Performed by: ANESTHESIOLOGY

## 2023-08-16 PROCEDURE — 22842 INSERT SPINE FIXATION DEVICE: CPT | Mod: ,,, | Performed by: ORTHOPAEDIC SURGERY

## 2023-08-16 PROCEDURE — 36000710: Performed by: ORTHOPAEDIC SURGERY

## 2023-08-16 PROCEDURE — 22633 ARTHRD CMBN 1NTRSPC LUMBAR: CPT | Mod: AS,,, | Performed by: ORTHOPAEDIC SURGERY

## 2023-08-16 PROCEDURE — 22634 PR ARTHRODESIS, COMBINED TECHN, SNGL INTERSPACE, EA ADDTL: ICD-10-PCS | Mod: AS,,, | Performed by: ORTHOPAEDIC SURGERY

## 2023-08-16 PROCEDURE — 22614 PR ARTHRODESIS, POST/POSTLAT, SNGL INTERSPACE, EA ADDTL: ICD-10-PCS | Mod: AS,,, | Performed by: ORTHOPAEDIC SURGERY

## 2023-08-16 PROCEDURE — 63047 PR LAMINEC/FACETECT/FORAMIN,LUMBAR 1 SEG: ICD-10-PCS | Mod: 59,,, | Performed by: ORTHOPAEDIC SURGERY

## 2023-08-16 PROCEDURE — 22842 PR POSTERIOR SEGMENTAL INSTRUMENTATION 3-6 VRT SEG: ICD-10-PCS | Mod: ,,, | Performed by: ORTHOPAEDIC SURGERY

## 2023-08-16 PROCEDURE — 63047 PR LAMINEC/FACETECT/FORAMIN,LUMBAR 1 SEG: ICD-10-PCS | Mod: 59,AS,, | Performed by: ORTHOPAEDIC SURGERY

## 2023-08-16 PROCEDURE — 36000711: Performed by: ORTHOPAEDIC SURGERY

## 2023-08-16 PROCEDURE — 20936 PR AUTOGRAFT SPINE SURGERY LOCAL FROM SAME INCISION: ICD-10-PCS | Mod: ,,, | Performed by: ORTHOPAEDIC SURGERY

## 2023-08-16 PROCEDURE — 22633 ARTHRD CMBN 1NTRSPC LUMBAR: CPT | Mod: ,,, | Performed by: ORTHOPAEDIC SURGERY

## 2023-08-16 PROCEDURE — 63052 LAM FACETC/FRMT ARTHRD LUM 1: CPT | Mod: 59,,, | Performed by: ORTHOPAEDIC SURGERY

## 2023-08-16 PROCEDURE — 36620 ARTERIAL: ICD-10-PCS | Mod: 59,,, | Performed by: ANESTHESIOLOGY

## 2023-08-16 PROCEDURE — 37000008 HC ANESTHESIA 1ST 15 MINUTES: Performed by: ORTHOPAEDIC SURGERY

## 2023-08-16 PROCEDURE — 11000001 HC ACUTE MED/SURG PRIVATE ROOM

## 2023-08-16 PROCEDURE — C1729 CATH, DRAINAGE: HCPCS | Performed by: ORTHOPAEDIC SURGERY

## 2023-08-16 PROCEDURE — 22614 ARTHRD PST TQ 1NTRSPC EA ADD: CPT | Mod: AS,,, | Performed by: ORTHOPAEDIC SURGERY

## 2023-08-16 PROCEDURE — 63047 LAM FACETEC & FORAMOT LUMBAR: CPT | Mod: 59,AS,, | Performed by: ORTHOPAEDIC SURGERY

## 2023-08-16 PROCEDURE — 80053 COMPREHEN METABOLIC PANEL: CPT | Performed by: ORTHOPAEDIC SURGERY

## 2023-08-16 DEVICE — CHIPS CANCELLOUS 30CC: Type: IMPLANTABLE DEVICE | Site: SPINE LUMBAR | Status: FUNCTIONAL

## 2023-08-16 DEVICE — SCREW CREO TREADED 5.5X40MM: Type: IMPLANTABLE DEVICE | Site: SPINE LUMBAR | Status: FUNCTIONAL

## 2023-08-16 DEVICE — SCREW BONE SPINAL 6.5X40MM: Type: IMPLANTABLE DEVICE | Site: SPINE LUMBAR | Status: FUNCTIONAL

## 2023-08-16 DEVICE — CAP SPINAL LOCK THRD CREO 5.5: Type: IMPLANTABLE DEVICE | Site: SPINE LUMBAR | Status: FUNCTIONAL

## 2023-08-16 DEVICE — PUTTY DBX 5CC: Type: IMPLANTABLE DEVICE | Site: SPINE LUMBAR | Status: FUNCTIONAL

## 2023-08-16 RX ORDER — GABAPENTIN 100 MG/1
CAPSULE ORAL
Status: DISPENSED
Start: 2023-08-16 | End: 2023-08-17

## 2023-08-16 RX ORDER — ONDANSETRON 2 MG/ML
4 INJECTION INTRAMUSCULAR; INTRAVENOUS EVERY 12 HOURS PRN
Status: DISCONTINUED | OUTPATIENT
Start: 2023-08-16 | End: 2023-08-19 | Stop reason: HOSPADM

## 2023-08-16 RX ORDER — HYDROMORPHONE HYDROCHLORIDE 1 MG/ML
0.2 INJECTION, SOLUTION INTRAMUSCULAR; INTRAVENOUS; SUBCUTANEOUS EVERY 5 MIN PRN
Status: DISCONTINUED | OUTPATIENT
Start: 2023-08-16 | End: 2023-08-16

## 2023-08-16 RX ORDER — OXYCODONE HYDROCHLORIDE 10 MG/1
10 TABLET ORAL EVERY 4 HOURS PRN
Status: DISCONTINUED | OUTPATIENT
Start: 2023-08-16 | End: 2023-08-19 | Stop reason: HOSPADM

## 2023-08-16 RX ORDER — VALSARTAN 80 MG/1
320 TABLET ORAL DAILY
Status: DISCONTINUED | OUTPATIENT
Start: 2023-08-16 | End: 2023-08-19 | Stop reason: HOSPADM

## 2023-08-16 RX ORDER — FAMOTIDINE 20 MG/1
TABLET, FILM COATED ORAL
Status: DISPENSED
Start: 2023-08-16 | End: 2023-08-17

## 2023-08-16 RX ORDER — PROCHLORPERAZINE EDISYLATE 5 MG/ML
5 INJECTION INTRAMUSCULAR; INTRAVENOUS EVERY 30 MIN PRN
Status: DISCONTINUED | OUTPATIENT
Start: 2023-08-16 | End: 2023-08-16 | Stop reason: HOSPADM

## 2023-08-16 RX ORDER — DEXAMETHASONE SODIUM PHOSPHATE 4 MG/ML
INJECTION, SOLUTION INTRA-ARTICULAR; INTRALESIONAL; INTRAMUSCULAR; INTRAVENOUS; SOFT TISSUE
Status: DISCONTINUED | OUTPATIENT
Start: 2023-08-16 | End: 2023-08-16

## 2023-08-16 RX ORDER — METHOCARBAMOL 750 MG/1
750 TABLET, FILM COATED ORAL 3 TIMES DAILY
Status: DISCONTINUED | OUTPATIENT
Start: 2023-08-16 | End: 2023-08-19 | Stop reason: HOSPADM

## 2023-08-16 RX ORDER — ATORVASTATIN CALCIUM 40 MG/1
80 TABLET, FILM COATED ORAL DAILY
Status: DISCONTINUED | OUTPATIENT
Start: 2023-08-17 | End: 2023-08-19 | Stop reason: HOSPADM

## 2023-08-16 RX ORDER — ALLOPURINOL 100 MG/1
100 TABLET ORAL DAILY
Status: DISCONTINUED | OUTPATIENT
Start: 2023-08-17 | End: 2023-08-19 | Stop reason: HOSPADM

## 2023-08-16 RX ORDER — MUPIROCIN 20 MG/G
OINTMENT TOPICAL
Status: DISPENSED
Start: 2023-08-16 | End: 2023-08-17

## 2023-08-16 RX ORDER — HEPARIN SODIUM 5000 [USP'U]/ML
5000 INJECTION, SOLUTION INTRAVENOUS; SUBCUTANEOUS EVERY 8 HOURS
Status: DISCONTINUED | OUTPATIENT
Start: 2023-08-17 | End: 2023-08-19 | Stop reason: HOSPADM

## 2023-08-16 RX ORDER — LIDOCAINE HYDROCHLORIDE 20 MG/ML
INJECTION, SOLUTION EPIDURAL; INFILTRATION; INTRACAUDAL; PERINEURAL
Status: DISCONTINUED | OUTPATIENT
Start: 2023-08-16 | End: 2023-08-16

## 2023-08-16 RX ORDER — METHOCARBAMOL 750 MG/1
TABLET, FILM COATED ORAL
Status: DISPENSED
Start: 2023-08-16 | End: 2023-08-17

## 2023-08-16 RX ORDER — AMOXICILLIN 250 MG
1 CAPSULE ORAL 2 TIMES DAILY
Status: DISCONTINUED | OUTPATIENT
Start: 2023-08-16 | End: 2023-08-19 | Stop reason: HOSPADM

## 2023-08-16 RX ORDER — HYDROMORPHONE HYDROCHLORIDE 1 MG/ML
0.2 INJECTION, SOLUTION INTRAMUSCULAR; INTRAVENOUS; SUBCUTANEOUS EVERY 5 MIN PRN
Status: DISCONTINUED | OUTPATIENT
Start: 2023-08-16 | End: 2023-08-16 | Stop reason: HOSPADM

## 2023-08-16 RX ORDER — GABAPENTIN 100 MG/1
100 CAPSULE ORAL 3 TIMES DAILY
Qty: 45 CAPSULE | Refills: 0 | Status: SHIPPED | OUTPATIENT
Start: 2023-08-16 | End: 2023-09-05

## 2023-08-16 RX ORDER — ONDANSETRON 8 MG/1
8 TABLET, ORALLY DISINTEGRATING ORAL EVERY 8 HOURS PRN
Status: DISCONTINUED | OUTPATIENT
Start: 2023-08-16 | End: 2023-08-19 | Stop reason: HOSPADM

## 2023-08-16 RX ORDER — METHOCARBAMOL 500 MG/1
1000 TABLET, FILM COATED ORAL 3 TIMES DAILY PRN
Status: DISCONTINUED | OUTPATIENT
Start: 2023-08-16 | End: 2023-08-16

## 2023-08-16 RX ORDER — ONDANSETRON 2 MG/ML
INJECTION INTRAMUSCULAR; INTRAVENOUS
Status: DISCONTINUED | OUTPATIENT
Start: 2023-08-16 | End: 2023-08-16

## 2023-08-16 RX ORDER — ACETAMINOPHEN 500 MG
1000 TABLET ORAL ONCE
Status: COMPLETED | OUTPATIENT
Start: 2023-08-16 | End: 2023-08-16

## 2023-08-16 RX ORDER — ACETAMINOPHEN 500 MG
500 TABLET ORAL 3 TIMES DAILY
Qty: 90 TABLET | Refills: 0 | Status: ON HOLD | OUTPATIENT
Start: 2023-08-16 | End: 2023-10-04 | Stop reason: HOSPADM

## 2023-08-16 RX ORDER — CARVEDILOL 3.12 MG/1
TABLET ORAL
Status: DISPENSED
Start: 2023-08-16 | End: 2023-08-17

## 2023-08-16 RX ORDER — CEFAZOLIN SODIUM 1 G/3ML
INJECTION, POWDER, FOR SOLUTION INTRAMUSCULAR; INTRAVENOUS
Status: DISCONTINUED | OUTPATIENT
Start: 2023-08-16 | End: 2023-08-16

## 2023-08-16 RX ORDER — OXYCODONE HYDROCHLORIDE 5 MG/1
5 TABLET ORAL EVERY 4 HOURS PRN
Status: DISCONTINUED | OUTPATIENT
Start: 2023-08-16 | End: 2023-08-19 | Stop reason: HOSPADM

## 2023-08-16 RX ORDER — CELECOXIB 200 MG/1
200 CAPSULE ORAL DAILY
Status: DISCONTINUED | OUTPATIENT
Start: 2023-08-16 | End: 2023-08-19 | Stop reason: HOSPADM

## 2023-08-16 RX ORDER — SODIUM CHLORIDE 9 MG/ML
INJECTION, SOLUTION INTRAVENOUS CONTINUOUS
Status: ACTIVE | OUTPATIENT
Start: 2023-08-16 | End: 2023-08-17

## 2023-08-16 RX ORDER — ACETAMINOPHEN 325 MG/1
TABLET ORAL
Status: DISPENSED
Start: 2023-08-16 | End: 2023-08-17

## 2023-08-16 RX ORDER — ROCURONIUM BROMIDE 10 MG/ML
INJECTION, SOLUTION INTRAVENOUS
Status: DISCONTINUED | OUTPATIENT
Start: 2023-08-16 | End: 2023-08-16

## 2023-08-16 RX ORDER — GABAPENTIN 100 MG/1
100 CAPSULE ORAL 3 TIMES DAILY
Status: DISCONTINUED | OUTPATIENT
Start: 2023-08-16 | End: 2023-08-19 | Stop reason: HOSPADM

## 2023-08-16 RX ORDER — PROCHLORPERAZINE EDISYLATE 5 MG/ML
5 INJECTION INTRAMUSCULAR; INTRAVENOUS EVERY 30 MIN PRN
Status: DISCONTINUED | OUTPATIENT
Start: 2023-08-16 | End: 2023-08-16

## 2023-08-16 RX ORDER — ACETAMINOPHEN 325 MG/1
650 TABLET ORAL EVERY 8 HOURS
Status: DISCONTINUED | OUTPATIENT
Start: 2023-08-16 | End: 2023-08-19 | Stop reason: HOSPADM

## 2023-08-16 RX ORDER — EPHEDRINE SULFATE 50 MG/ML
INJECTION, SOLUTION INTRAVENOUS
Status: DISCONTINUED | OUTPATIENT
Start: 2023-08-16 | End: 2023-08-16

## 2023-08-16 RX ORDER — KETAMINE HCL IN 0.9 % NACL 50 MG/5 ML
SYRINGE (ML) INTRAVENOUS
Status: DISCONTINUED | OUTPATIENT
Start: 2023-08-16 | End: 2023-08-16

## 2023-08-16 RX ORDER — VANCOMYCIN HYDROCHLORIDE 1 G/20ML
INJECTION, POWDER, LYOPHILIZED, FOR SOLUTION INTRAVENOUS
Status: DISCONTINUED | OUTPATIENT
Start: 2023-08-16 | End: 2023-08-16 | Stop reason: HOSPADM

## 2023-08-16 RX ORDER — FENTANYL CITRATE 50 UG/ML
INJECTION, SOLUTION INTRAMUSCULAR; INTRAVENOUS
Status: DISCONTINUED | OUTPATIENT
Start: 2023-08-16 | End: 2023-08-16

## 2023-08-16 RX ORDER — POLYETHYLENE GLYCOL 3350 17 G/17G
17 POWDER, FOR SOLUTION ORAL DAILY
Status: DISCONTINUED | OUTPATIENT
Start: 2023-08-16 | End: 2023-08-19 | Stop reason: HOSPADM

## 2023-08-16 RX ORDER — AMLODIPINE BESYLATE 10 MG/1
10 TABLET ORAL DAILY
Status: DISCONTINUED | OUTPATIENT
Start: 2023-08-17 | End: 2023-08-19 | Stop reason: HOSPADM

## 2023-08-16 RX ORDER — PROPOFOL 10 MG/ML
VIAL (ML) INTRAVENOUS
Status: DISCONTINUED | OUTPATIENT
Start: 2023-08-16 | End: 2023-08-16

## 2023-08-16 RX ORDER — METHOCARBAMOL 500 MG/1
1000 TABLET, FILM COATED ORAL 3 TIMES DAILY
Qty: 90 TABLET | Refills: 0 | Status: ON HOLD | OUTPATIENT
Start: 2023-08-16 | End: 2023-10-04 | Stop reason: HOSPADM

## 2023-08-16 RX ORDER — OXYCODONE HYDROCHLORIDE 5 MG/1
5 TABLET ORAL EVERY 6 HOURS PRN
Qty: 24 TABLET | Refills: 0 | Status: SHIPPED | OUTPATIENT
Start: 2023-08-16 | End: 2023-08-31 | Stop reason: SDUPTHER

## 2023-08-16 RX ORDER — CARVEDILOL 3.12 MG/1
3.12 TABLET ORAL 2 TIMES DAILY WITH MEALS
Status: DISCONTINUED | OUTPATIENT
Start: 2023-08-16 | End: 2023-08-19 | Stop reason: HOSPADM

## 2023-08-16 RX ORDER — ACETAMINOPHEN 325 MG/1
650 TABLET ORAL EVERY 4 HOURS PRN
Status: DISCONTINUED | OUTPATIENT
Start: 2023-08-16 | End: 2023-08-16

## 2023-08-16 RX ORDER — CELECOXIB 100 MG/1
100 CAPSULE ORAL 2 TIMES DAILY
Qty: 28 CAPSULE | Refills: 0 | Status: ON HOLD | OUTPATIENT
Start: 2023-08-16 | End: 2023-10-04 | Stop reason: HOSPADM

## 2023-08-16 RX ORDER — PHENYLEPHRINE HCL IN 0.9% NACL 1 MG/10 ML
SYRINGE (ML) INTRAVENOUS
Status: DISCONTINUED | OUTPATIENT
Start: 2023-08-16 | End: 2023-08-16

## 2023-08-16 RX ORDER — AMOXICILLIN 250 MG
CAPSULE ORAL
Status: DISPENSED
Start: 2023-08-16 | End: 2023-08-17

## 2023-08-16 RX ORDER — FAMOTIDINE 20 MG/1
20 TABLET, FILM COATED ORAL 2 TIMES DAILY
Status: DISCONTINUED | OUTPATIENT
Start: 2023-08-16 | End: 2023-08-19 | Stop reason: HOSPADM

## 2023-08-16 RX ORDER — MUPIROCIN 20 MG/G
OINTMENT TOPICAL 2 TIMES DAILY
Status: DISCONTINUED | OUTPATIENT
Start: 2023-08-16 | End: 2023-08-19 | Stop reason: HOSPADM

## 2023-08-16 RX ADMIN — CEFAZOLIN 2 G: 330 INJECTION, POWDER, FOR SOLUTION INTRAMUSCULAR; INTRAVENOUS at 08:08

## 2023-08-16 RX ADMIN — HYDROMORPHONE HYDROCHLORIDE 0.2 MG: 1 INJECTION, SOLUTION INTRAMUSCULAR; INTRAVENOUS; SUBCUTANEOUS at 01:08

## 2023-08-16 RX ADMIN — PROPOFOL 30 MG: 10 INJECTION, EMULSION INTRAVENOUS at 12:08

## 2023-08-16 RX ADMIN — LIDOCAINE HYDROCHLORIDE 100 MG: 20 INJECTION, SOLUTION EPIDURAL; INFILTRATION; INTRACAUDAL at 08:08

## 2023-08-16 RX ADMIN — SUGAMMADEX 200 MG: 100 INJECTION, SOLUTION INTRAVENOUS at 10:08

## 2023-08-16 RX ADMIN — ACETAMINOPHEN 650 MG: 325 TABLET ORAL at 04:08

## 2023-08-16 RX ADMIN — SENNOSIDES AND DOCUSATE SODIUM 1 TABLET: 50; 8.6 TABLET ORAL at 10:08

## 2023-08-16 RX ADMIN — PROPOFOL 40 MG: 10 INJECTION, EMULSION INTRAVENOUS at 12:08

## 2023-08-16 RX ADMIN — Medication 100 MCG: at 08:08

## 2023-08-16 RX ADMIN — EPHEDRINE SULFATE 10 MG: 50 INJECTION INTRAVENOUS at 09:08

## 2023-08-16 RX ADMIN — CEFAZOLIN 2 G: 2 INJECTION, POWDER, FOR SOLUTION INTRAMUSCULAR; INTRAVENOUS at 03:08

## 2023-08-16 RX ADMIN — HYDROMORPHONE HYDROCHLORIDE 0.2 MG: 1 INJECTION, SOLUTION INTRAMUSCULAR; INTRAVENOUS; SUBCUTANEOUS at 03:08

## 2023-08-16 RX ADMIN — GABAPENTIN 100 MG: 100 CAPSULE ORAL at 03:08

## 2023-08-16 RX ADMIN — SODIUM CHLORIDE 0.25 MCG/KG/MIN: 9 INJECTION, SOLUTION INTRAVENOUS at 09:08

## 2023-08-16 RX ADMIN — SODIUM CHLORIDE: 0.9 INJECTION, SOLUTION INTRAVENOUS at 07:08

## 2023-08-16 RX ADMIN — Medication 200 MCG: at 12:08

## 2023-08-16 RX ADMIN — EPHEDRINE SULFATE 5 MG: 50 INJECTION INTRAVENOUS at 11:08

## 2023-08-16 RX ADMIN — CARVEDILOL 3.12 MG: 3.12 TABLET, FILM COATED ORAL at 10:08

## 2023-08-16 RX ADMIN — EPHEDRINE SULFATE 20 MG: 50 INJECTION INTRAVENOUS at 08:08

## 2023-08-16 RX ADMIN — OXYCODONE HYDROCHLORIDE 5 MG: 5 TABLET ORAL at 03:08

## 2023-08-16 RX ADMIN — METHOCARBAMOL 750 MG: 750 TABLET ORAL at 03:08

## 2023-08-16 RX ADMIN — Medication 10 MG: at 11:08

## 2023-08-16 RX ADMIN — HYDROMORPHONE HYDROCHLORIDE 0.2 MG: 1 INJECTION, SOLUTION INTRAMUSCULAR; INTRAVENOUS; SUBCUTANEOUS at 04:08

## 2023-08-16 RX ADMIN — ACETAMINOPHEN 1000 MG: 500 TABLET ORAL at 06:08

## 2023-08-16 RX ADMIN — CELECOXIB 200 MG: 200 CAPSULE ORAL at 04:08

## 2023-08-16 RX ADMIN — PROCHLORPERAZINE EDISYLATE 5 MG: 5 INJECTION INTRAMUSCULAR; INTRAVENOUS at 01:08

## 2023-08-16 RX ADMIN — PROPOFOL 180 MG: 10 INJECTION, EMULSION INTRAVENOUS at 08:08

## 2023-08-16 RX ADMIN — METHOCARBAMOL 750 MG: 750 TABLET ORAL at 10:08

## 2023-08-16 RX ADMIN — SODIUM CHLORIDE: 9 INJECTION, SOLUTION INTRAVENOUS at 01:08

## 2023-08-16 RX ADMIN — Medication 10 MG: at 10:08

## 2023-08-16 RX ADMIN — SODIUM CHLORIDE, SODIUM ACETATE ANHYDROUS, SODIUM GLUCONATE, POTASSIUM CHLORIDE, AND MAGNESIUM CHLORIDE: 526; 222; 502; 37; 30 INJECTION, SOLUTION INTRAVENOUS at 08:08

## 2023-08-16 RX ADMIN — MUPIROCIN: 20 OINTMENT TOPICAL at 10:08

## 2023-08-16 RX ADMIN — FENTANYL CITRATE 100 MCG: 50 INJECTION INTRAMUSCULAR; INTRAVENOUS at 08:08

## 2023-08-16 RX ADMIN — Medication 200 MCG: at 08:08

## 2023-08-16 RX ADMIN — FAMOTIDINE 20 MG: 20 TABLET, FILM COATED ORAL at 10:08

## 2023-08-16 RX ADMIN — ROCURONIUM BROMIDE 60 MG: 10 INJECTION, SOLUTION INTRAVENOUS at 08:08

## 2023-08-16 RX ADMIN — ONDANSETRON 4 MG: 2 INJECTION INTRAMUSCULAR; INTRAVENOUS at 12:08

## 2023-08-16 RX ADMIN — ACETAMINOPHEN 650 MG: 325 TABLET ORAL at 10:08

## 2023-08-16 RX ADMIN — ROCURONIUM BROMIDE 40 MG: 10 INJECTION, SOLUTION INTRAVENOUS at 09:08

## 2023-08-16 RX ADMIN — DEXAMETHASONE SODIUM PHOSPHATE 8 MG: 4 INJECTION INTRA-ARTICULAR; INTRALESIONAL; INTRAMUSCULAR; INTRAVENOUS; SOFT TISSUE at 08:08

## 2023-08-16 RX ADMIN — Medication 30 MG: at 09:08

## 2023-08-16 RX ADMIN — FAMOTIDINE 20 MG: 20 TABLET, FILM COATED ORAL at 04:08

## 2023-08-16 NOTE — ANESTHESIA PROCEDURE NOTES
Intubation    Date/Time: 8/16/2023 8:08 AM    Performed by: Vilma Sinha MD  Authorized by: Juan R Nolan MD    Intubation:     Induction:  Intravenous    Intubated:  Postinduction    Mask Ventilation:  Easy mask    Attempts:  1    Attempted By:  Resident anesthesiologist    Method of Intubation:  Direct    Blade:  [place] 2    Laryngeal View Grade: Grade I - full view of cords      Difficult Airway Encountered?: No      Complications:  None    Airway Device:  Oral endotracheal tube    Airway Device Size:  7.0    Style/Cuff Inflation:  Cuffed    Tube secured:  22    Secured at:  The lips    Placement Verified By:  Capnometry    Complicating Factors:  None    Findings Post-Intubation:  BS equal bilateral and atraumatic/condition of teeth unchanged

## 2023-08-16 NOTE — TELEPHONE ENCOUNTER
----- Message from Hali Daigle PA-C sent at 8/16/2023 12:51 PM CDT -----  Needs 4 week post op lumbar ap lat

## 2023-08-16 NOTE — ANESTHESIA PROCEDURE NOTES
Arterial    Diagnosis: Posterior lumbar fusion    Patient location during procedure: done in OR    Staffing  Authorizing Provider: Juan R Nolan MD  Performing Provider: Vilma Sinha MD    Staffing  Performed by: Vilma Sinha MD  Authorized by: Juan R Nolan MD    Anesthesiologist was present at the time of the procedure.    Preanesthetic Checklist  Completed: patient identified, IV checked, site marked, risks and benefits discussed, surgical consent, monitors and equipment checked, pre-op evaluation, timeout performed and anesthesia consent givenArterial  Skin Prep: chlorhexidine gluconate  Local Infiltration: none  Orientation: left  Location: radial    Catheter Size: 20 G  Catheter placement by Anatomical landmarks. Heme positive aspiration all ports. Insertion Attempts: 2  Assessment  Dressing: secured with tape and tegaderm  Patient: Tolerated well

## 2023-08-16 NOTE — ANESTHESIA POSTPROCEDURE EVALUATION
Anesthesia Post Evaluation    Patient: Nilsa Curiel    Procedure(s) Performed: Procedure(s) (LRB):  FUSION, SPINE, LUMBAR, TLIF, POSTERIOR APPROACH, USING PEDICLE SCREW L2-5 PLDF/TLIF GLOBUS ROBOT SNS:SSEP/EMG cell saver (N/A)    Final Anesthesia Type: general      Patient location during evaluation: PACU  Patient participation: Yes- Able to Participate  Level of consciousness: awake and alert and oriented  Post-procedure vital signs: reviewed and stable  Pain management: adequate  Airway patency: patent    PONV status at discharge: No PONV  Anesthetic complications: no      Cardiovascular status: hemodynamically stable  Respiratory status: unassisted, spontaneous ventilation and room air  Hydration status: euvolemic  Follow-up not needed.          Vitals Value Taken Time   /55 08/16/23 1447   Temp 98 08/16/23 1500   Pulse 78 08/16/23 1459   Resp 12 08/16/23 1459   SpO2 94 % 08/16/23 1459   Vitals shown include unvalidated device data.      No case tracking events are documented in the log.      Pain/Jon Score: Pain Rating Prior to Med Admin: 8 (8/16/2023  1:58 PM)  Jon Score: 8 (8/16/2023  2:00 PM)

## 2023-08-16 NOTE — OP NOTE
DATE OF PROCEDURE: 8/16/2023.     SURGEON: Jesus Alberto Sharp M.D.     FIRST ASSISTANT: Hali Daigle PA-C, no resident was available.     PREOPERATIVE DIAGNOSIS:   1.   L2-5 spondylolisthesis grade 1  2.   Symptomatic lumbar spinal stenosis lumbar radiculopathy     POSTOPERATIVE DIAGNOSIS:   Same     PROCEDURES PERFORMED:  Posterior lateral and posterior lumbar interbody fusion L2-5  2.   Posterior nonsegmental instrumentation L2-5  3.   L2, L3 and L4 laminectomy, and bilateral foraminotomy for decompression of central and bilateral foraminal stenosis.  4.   Application of titanium intervertebral spacer device to L3-4 and L4-5 disc defect  5.   Robotic assistance for placement of the pedicle screws (requiring greater than 30 minutes of preoperative planning time)  6.   Auto and allografting     ANESTHESIA: General endotracheal anesthesia.     ESTIMATED BLOOD LOSS: 150 mL.     IMPLANTS: Globus screws  L3-4: expandable 7-13 mm height (15deg lordosis, 22mm length) titanium cage.  L4-5: expandable 7-13 mm height (15deg lordosis, 22mm length) titanium cage.  Altapore putty   30cc cancellous chips     SPECIMENS: None.     FINDINGS: severe L2-5 stenosis.     DRAINS: One deep and one superficial HV drains     COMPLICATIONS: None.     SPONGE AND NEEDLE COUNT: Correct x2.     NEUROLOGICAL MONITORING: Somatosensory potentials, free run EMG, and EMG stimulation lumbar pedicle screws. There were no changes to SSEP baselines. There no significant EMG activity. All screws stimulated at or greater than than 19 milliamps except for R L4 (16mA) and R L5 (11mA) screws. They were removed, and tracts were palpated. R L4 screw had a small medial cortical breach and R L5 screw had no cortical breaches, so they were re-inserted.     REASON FOR OPERATION AND BRIEF HISTORY AND PHYSICAL: Nilsa Curiel is a 77 y.o. female with an L2-5 spondylolisthesis, and refractory lumbar spinal stenosis with radiculopathy.  She has failed extensive  conservative management and is here for surgery today.     DESCRIPTION OF INFORMED CONSENT: Please see my last clinic note for a full description of the informed consent I had with the patient.     DESCRIPTION OF PROCEDURE: The patient was met in the preoperative area where all final questions were answered. Their lumbar spine was marked as the operative site. The patient was then brought to the operating room where anesthesia was induced. The patient was then flipped prone onto the Shashi spine table. All bony prominences were padded, paying special attention to the eyes, nose and mouth, axillae, ulnar nerve and hands, genitalia, knees and feet, this was confirmed to be adequate with the anesthesia team. Sequential compression devices were run throughout the case on the bilateral calves. The surgical field was prepped and draped in normal sterile manner after using fluoroscopy to localize our incisoin. A full time-out was then called, verifying patient's identity, surgery, site, and that they had been administered a weight appropriate dose of Ancef, no specific nursing, anesthetic or surgical concerns were identified and it was decided that it was safe to proceed with surgery. I informed all members of the operative team that they were empowered to speak up regarding concerns at any time during the procedure.     I then made a midline incision and carried dissection down through the skin and subcutaneous tissues using combination of sharp dissection and electrocautery where necessary. The dorsal fascia of the lumbar spine was identified and incised in the midline and I performed a preliminary subperiosteal exposure of the bilateral L1-2, L2-3, L3-4 and L4-5 facet joints, and what I took to be the L2-L5 transverse processes bilaterally. At the conclusion my preliminary exposure, I placed a marker on what I took to be the left L4 pedicle, took a lateral radiograph, and this confirmed my levels.  Next I finalized my  exposure. Subsequently I performed bilateral L2-3, L3-4, and L4-5 facetectomies with an osteotome.     We placed a pin in the right posterior superior iliac spine for the robotic array. AP and lateral fluoroscopy images were taken to register to the preoperative CT scan and plan that we had performed prior to the incision.  Once that was done, the robotic arm was used to cannulate pedicle screw tracts bilaterally at L2-L5. Xrays confirmed adequate positioning of the screws on AP and lateral images.     I then began my neurological decompression. High-speed drill was used to thin the L4 lamina at the level of the pars. This was then removed on block, and I released the ligamentum flavum centrally with a forward angle curette and resected it with a Kerrison punch. I subsequently performed a central as well as bilateral foraminal decompression with a Kerrison punch. Working on the right of midline I used a osteotome to remove the superior articular process of L5. I then performed a subtotal L4-5 diskectomy in a standard fashion. The disc space was then thoroughly irrigated and a expandable 7-13 titanium spacer was impacted, and expanded, and confirmed to be at the correct level in adequate position radiographically. The disc space then packed with morselized bone graft.    High-speed drill was used to thin the L3 lamina at the level of the pars. This was then removed on block, and I released the ligamentum flavum centrally with a forward angle curette and resected it with a Kerrison punch. I subsequently performed a central as well as bilateral foraminal decompression with a Kerrison punch. Working on the right of midline I used a osteotome to remove the superior articular process of L4. I then performed a subtotal L3-4 diskectomy in a standard fashion. The disc space was then thoroughly irrigated and a expandable 7-13 titanium spacer was impacted, and expanded, and confirmed to be at the correct level in adequate  position radiographically. The disc space then packed with morselized bone graft.    High-speed drill was used to thin the L2 lamina at the level of the pars. This was then removed on block, and I released the ligamentum flavum centrally with a forward angle curette and resected it with a Kerrison punch. I subsequently performed a central as well as bilateral foraminal decompression with a Kerrison punch.     The wound was then copiously irrigated. Rods were measured, cut, contoured, and locked into place with torque wrench. Morselized bone graft, mixed with 1 g vancomycin powder, was laid dorsally along the decorticated transverse processes from L2-5.     500 mg of vancomycin powder was placed in the deep space, and a deep drain was then placed. The deep fascia was then closed with #1 Vicryl sutures in an interrupted, figure-of-eight fashion. A superficial drain was then placed. The superficial layer was then irrigated and closed over an additional 500 mg of vancomycin powder with 2-0 Vicryl, 3-0 Monocryl, Staples and Dermabond. A soft dressing was then placed. The drains were secured in place with silk sutures. The patient was then flipped supine, extubated and transferred to the Recovery Room in stable condition.    Justification of -22 Modifier: This was a more complex case that usual given the patient's morbid obesity and deep tissue. This made all aspects of the surgery more difficult, from exposure to discectomy and instrumentation.    Jesus Alberto Sharp MD  Orthopaedic Spine Surgeon  Department of Orthopaedic Surgery  [phone]

## 2023-08-16 NOTE — OPERATIVE NOTE ADDENDUM
Certification of Assistant at Surgery       Surgery Date: 8/16/2023     Participating Surgeons:  Surgeon(s) and Role:     * Jesus Alberto Sharp MD - Primary    Procedures:  Procedure(s) (LRB):  FUSION, SPINE, LUMBAR, TLIF, POSTERIOR APPROACH, USING PEDICLE SCREW L2-5 PLDF/TLIF GLOBUS ROBOT SNS:SSEP/EMG cell saver (N/A)    Assistant Surgeon's Certification of Necessity:  I understand that section 1842 (b) (6) (d) of the Social Security Act generally prohibits Medicare Part B reasonable charge payment for the services of assistants at surgery in teaching hospitals when qualified residents are available to furnish such services. I certify that the services for which payment is claimed were medically necessary, and that no qualified resident was available to perform the services. I further understand that these services are subject to post-payment review by the Medicare carrier.      Hali Daigle PA-C    08/16/2023  12:49 PM

## 2023-08-16 NOTE — DISCHARGE INSTRUCTIONS
DR. CARYL MASON'S POSTOPERATIVE INSTRUCTIONS -   LUMBAR FUSION       Antibiotics: You do not need additional antibiotics at home.    NSAIDs: Please refrain from taking ibuprofen (Advil), naproxen (Aleve), and other non steroidal anti-inflammatory medications other than the Celebrex that will be prescribed to you after surgery.    Wound Care: You may remove your dressing and shower 7 days after surgery. Until then please keep your wound clean and dry. Sponge baths are acceptable. Do not go in a pool or hot tub until seen in clinic. Please leave the small steri-strips covering your wound in place until they fall off naturally (2 weeks). You may notice clear suture ends hanging from the sides of your incision after the steri-strips are removed, it is ok to clip these with scissors.    Brace: You may be prescribed a brace, please wear this when up and walking, it is not necessary to wear at night when sleeping.    Pain: We will use a multimodal approach for pain management after your surgery.  You will be given a prescription for pain medicine when you are discharged from the hospital.  You will also be given prescriptions for Robaxin (a muscle relaxer), Gabapentin, Celebrex and Tylenol.  Please note: you will only be given ONE prescription for narcotics when you are discharged from the hospital.  This medication is for breakthrough pain only. This medication will not be refilled.  The other medications given to you may be refilled if needed.      Infection: Signs of infection include increasing wound drainage and redness around the wound, as well as a temperature over 101.5 degrees. It is unnecessary to take your temperature on a routine basis. Please call the below number if you are concerned about an infection.    Driving and Work: It is ok to return to driving and work as long as you are not taking narcotic pain medications and can walk greater than 100 feet. Please do not lift over 10 pounds or participate in  exercise or sports until cleared by Dr. Sharp.    Deep Venous Thrombosis (Blood Clots): Symptoms include swelling in the legs and shortness of breath. Please call the office or proceed to the nearest emergency room if you have any of these symptoms.    Physical Therapy: The best physical therapy immediately after surgery is walking. Please try to walk as much as possible.    Follow-up: You will be scheduled for a follow-up appointment in 4 weeks with either Dr. Sharp or his physician assistant, Hali Daigle PA-C.    Questions: During business hours please call [phone] for routine questions. For after hours questions please call [phone] and ask to speak with the Orthopaedic resident on call.    Disability: If you submitted short term disability paperwork for us to complete and would like to check the status, please call the Disability Department at [phone].  You may fax any necessary paperwork to [phone].

## 2023-08-16 NOTE — TRANSFER OF CARE
Anesthesia Transfer of Care Note    Patient: Nilsa Curiel    Procedure(s) Performed: Procedure(s) (LRB):  FUSION, SPINE, LUMBAR, TLIF, POSTERIOR APPROACH, USING PEDICLE SCREW L2-5 PLDF/TLIF GLOBUS ROBOT SNS:SSEP/EMG cell saver (N/A)    Patient location: PACU    Anesthesia Type: general    Transport from OR: Transported from OR on 6-10 L/min O2 by face mask with adequate spontaneous ventilation. Upon arrival to PACU/ICU, patient attached to 100% O2 by T-piece with adequate spontaneous ventilation    Post pain: adequate analgesia    Post assessment: no apparent anesthetic complications    Post vital signs: stable    Level of consciousness: awake    Nausea/Vomiting: no nausea/vomiting    Complications: none    Transfer of care protocol was followed      Last vitals:   Visit Vitals  BP (!) 159/72 (BP Location: Left arm, Patient Position: Lying)   Pulse 91   Temp 36.6 °C (97.9 °F) (Temporal)   Resp 18   Ht 5' (1.524 m)   Wt 93 kg (205 lb)   SpO2 100%   Breastfeeding No   BMI 40.04 kg/m²

## 2023-08-17 LAB
ANION GAP SERPL CALC-SCNC: 9 MMOL/L (ref 8–16)
BASOPHILS # BLD AUTO: 0.01 K/UL (ref 0–0.2)
BASOPHILS NFR BLD: 0.1 % (ref 0–1.9)
BUN SERPL-MCNC: 11 MG/DL (ref 8–23)
CALCIUM SERPL-MCNC: 8.3 MG/DL (ref 8.7–10.5)
CHLORIDE SERPL-SCNC: 115 MMOL/L (ref 95–110)
CO2 SERPL-SCNC: 20 MMOL/L (ref 23–29)
CREAT SERPL-MCNC: 0.9 MG/DL (ref 0.5–1.4)
DIFFERENTIAL METHOD: ABNORMAL
EOSINOPHIL # BLD AUTO: 0 K/UL (ref 0–0.5)
EOSINOPHIL NFR BLD: 0 % (ref 0–8)
ERYTHROCYTE [DISTWIDTH] IN BLOOD BY AUTOMATED COUNT: 13.8 % (ref 11.5–14.5)
EST. GFR  (NO RACE VARIABLE): >60 ML/MIN/1.73 M^2
GLUCOSE SERPL-MCNC: 116 MG/DL (ref 70–110)
HCT VFR BLD AUTO: 26.3 % (ref 37–48.5)
HGB BLD-MCNC: 8.1 G/DL (ref 12–16)
IMM GRANULOCYTES # BLD AUTO: 0.05 K/UL (ref 0–0.04)
IMM GRANULOCYTES NFR BLD AUTO: 0.4 % (ref 0–0.5)
LYMPHOCYTES # BLD AUTO: 0.9 K/UL (ref 1–4.8)
LYMPHOCYTES NFR BLD: 7.1 % (ref 18–48)
MCH RBC QN AUTO: 24.9 PG (ref 27–31)
MCHC RBC AUTO-ENTMCNC: 30.8 G/DL (ref 32–36)
MCV RBC AUTO: 81 FL (ref 82–98)
MONOCYTES # BLD AUTO: 0.6 K/UL (ref 0.3–1)
MONOCYTES NFR BLD: 4.4 % (ref 4–15)
NEUTROPHILS # BLD AUTO: 11.4 K/UL (ref 1.8–7.7)
NEUTROPHILS NFR BLD: 88 % (ref 38–73)
NRBC BLD-RTO: 0 /100 WBC
PLATELET # BLD AUTO: 250 K/UL (ref 150–450)
PMV BLD AUTO: 10.1 FL (ref 9.2–12.9)
POTASSIUM SERPL-SCNC: 4.1 MMOL/L (ref 3.5–5.1)
RBC # BLD AUTO: 3.25 M/UL (ref 4–5.4)
SODIUM SERPL-SCNC: 144 MMOL/L (ref 136–145)
WBC # BLD AUTO: 12.89 K/UL (ref 3.9–12.7)

## 2023-08-17 PROCEDURE — 25000003 PHARM REV CODE 250: Performed by: ORTHOPAEDIC SURGERY

## 2023-08-17 PROCEDURE — 63600175 PHARM REV CODE 636 W HCPCS: Performed by: ORTHOPAEDIC SURGERY

## 2023-08-17 PROCEDURE — 97166 OT EVAL MOD COMPLEX 45 MIN: CPT

## 2023-08-17 PROCEDURE — 97161 PT EVAL LOW COMPLEX 20 MIN: CPT

## 2023-08-17 PROCEDURE — 36415 COLL VENOUS BLD VENIPUNCTURE: CPT | Performed by: STUDENT IN AN ORGANIZED HEALTH CARE EDUCATION/TRAINING PROGRAM

## 2023-08-17 PROCEDURE — 97530 THERAPEUTIC ACTIVITIES: CPT

## 2023-08-17 PROCEDURE — 97116 GAIT TRAINING THERAPY: CPT

## 2023-08-17 PROCEDURE — 25000003 PHARM REV CODE 250: Performed by: STUDENT IN AN ORGANIZED HEALTH CARE EDUCATION/TRAINING PROGRAM

## 2023-08-17 PROCEDURE — 80048 BASIC METABOLIC PNL TOTAL CA: CPT | Performed by: STUDENT IN AN ORGANIZED HEALTH CARE EDUCATION/TRAINING PROGRAM

## 2023-08-17 PROCEDURE — 99900035 HC TECH TIME PER 15 MIN (STAT)

## 2023-08-17 PROCEDURE — 11000001 HC ACUTE MED/SURG PRIVATE ROOM

## 2023-08-17 PROCEDURE — 85025 COMPLETE CBC W/AUTO DIFF WBC: CPT | Performed by: ORTHOPAEDIC SURGERY

## 2023-08-17 RX ORDER — HEPARIN SODIUM 5000 [USP'U]/ML
INJECTION, SOLUTION INTRAVENOUS; SUBCUTANEOUS
Status: DISPENSED
Start: 2023-08-17 | End: 2023-08-17

## 2023-08-17 RX ORDER — ACETAMINOPHEN 325 MG/1
TABLET ORAL
Status: DISPENSED
Start: 2023-08-17 | End: 2023-08-17

## 2023-08-17 RX ORDER — CEFAZOLIN 2 G/1
INJECTION, POWDER, FOR SOLUTION INTRAMUSCULAR; INTRAVENOUS
Status: DISPENSED
Start: 2023-08-17 | End: 2023-08-17

## 2023-08-17 RX ADMIN — ATORVASTATIN CALCIUM 80 MG: 40 TABLET, FILM COATED ORAL at 09:08

## 2023-08-17 RX ADMIN — CARVEDILOL 3.12 MG: 3.12 TABLET, FILM COATED ORAL at 09:08

## 2023-08-17 RX ADMIN — HEPARIN SODIUM 5000 UNITS: 5000 INJECTION INTRAVENOUS; SUBCUTANEOUS at 09:08

## 2023-08-17 RX ADMIN — CARVEDILOL 3.12 MG: 3.12 TABLET, FILM COATED ORAL at 04:08

## 2023-08-17 RX ADMIN — ALLOPURINOL 100 MG: 100 TABLET ORAL at 09:08

## 2023-08-17 RX ADMIN — FAMOTIDINE 20 MG: 20 TABLET, FILM COATED ORAL at 09:08

## 2023-08-17 RX ADMIN — MUPIROCIN: 20 OINTMENT TOPICAL at 09:08

## 2023-08-17 RX ADMIN — CELECOXIB 200 MG: 200 CAPSULE ORAL at 09:08

## 2023-08-17 RX ADMIN — GABAPENTIN 100 MG: 100 CAPSULE ORAL at 09:08

## 2023-08-17 RX ADMIN — SENNOSIDES AND DOCUSATE SODIUM 1 TABLET: 50; 8.6 TABLET ORAL at 09:08

## 2023-08-17 RX ADMIN — ACETAMINOPHEN 650 MG: 325 TABLET ORAL at 09:08

## 2023-08-17 RX ADMIN — ACETAMINOPHEN 650 MG: 325 TABLET ORAL at 02:08

## 2023-08-17 RX ADMIN — AMLODIPINE BESYLATE 10 MG: 10 TABLET ORAL at 09:08

## 2023-08-17 RX ADMIN — CEFAZOLIN 2 G: 2 INJECTION, POWDER, FOR SOLUTION INTRAMUSCULAR; INTRAVENOUS at 12:08

## 2023-08-17 RX ADMIN — ACETAMINOPHEN 650 MG: 325 TABLET ORAL at 06:08

## 2023-08-17 RX ADMIN — POLYETHYLENE GLYCOL 3350 17 G: 17 POWDER, FOR SOLUTION ORAL at 09:08

## 2023-08-17 RX ADMIN — METHOCARBAMOL 750 MG: 750 TABLET ORAL at 09:08

## 2023-08-17 RX ADMIN — VALSARTAN 320 MG: 80 TABLET, FILM COATED ORAL at 09:08

## 2023-08-17 RX ADMIN — METHOCARBAMOL 750 MG: 750 TABLET ORAL at 03:08

## 2023-08-17 RX ADMIN — OXYCODONE HYDROCHLORIDE 5 MG: 5 TABLET ORAL at 09:08

## 2023-08-17 RX ADMIN — HEPARIN SODIUM 5000 UNITS: 5000 INJECTION INTRAVENOUS; SUBCUTANEOUS at 06:08

## 2023-08-17 RX ADMIN — HEPARIN SODIUM 5000 UNITS: 5000 INJECTION INTRAVENOUS; SUBCUTANEOUS at 02:08

## 2023-08-17 NOTE — PLAN OF CARE
PT eval completed- see note for details, goals and POC established.     Problem: Physical Therapy  Goal: Physical Therapy Goal  Description: Goals to be met by: 23     Patient will increase functional independence with mobility by performin. Supine to sit with Stand-by Assistance  2. Sit to stand transfer with Supervision  3. Bed to chair transfer with Supervision using Rolling Walker  4. Gait  x 150 feet with Supervision using Rolling Walker.   5. Lower extremity exercise program x20 reps per handout, with independence    Outcome: Ongoing, Progressing   2023

## 2023-08-17 NOTE — SUBJECTIVE & OBJECTIVE
Principal Problem:Spondylolisthesis of lumbosacral region    Principal Orthopedic Problem: s/p L2-5 lami fusion 8/16    Interval History: POD1. Pain moderately well controlled. Vitals stable on RA. Hb 8.1. CPAP on overnight. PT/OT eval today. Drain output 75, 0cc. Mayer out once up with PT.     Review of patient's allergies indicates:  No Known Allergies    Current Facility-Administered Medications   Medication    0.9%  NaCl infusion    acetaminophen tablet 650 mg    allopurinoL tablet 100 mg    amLODIPine tablet 10 mg    atorvastatin tablet 80 mg    carvediloL tablet 3.125 mg    celecoxib capsule 200 mg    famotidine tablet 20 mg    gabapentin capsule 100 mg    heparin (porcine) injection 5,000 Units    methocarbamoL tablet 750 mg    mupirocin 2 % ointment    ondansetron disintegrating tablet 8 mg    ondansetron injection 4 mg    oxyCODONE immediate release tablet 5 mg    oxyCODONE immediate release tablet Tab 10 mg    polyethylene glycol packet 17 g    senna-docusate 8.6-50 mg per tablet 1 tablet    valsartan tablet 320 mg     Objective:     Vital Signs (Most Recent):  Temp: 97.7 °F (36.5 °C) (08/17/23 0800)  Pulse: 84 (08/17/23 0800)  Resp: 16 (08/17/23 0800)  BP: 131/60 (08/17/23 0800)  SpO2: 95 % (08/17/23 0800) Vital Signs (24h Range):  Temp:  [96.6 °F (35.9 °C)-98.1 °F (36.7 °C)] 97.7 °F (36.5 °C)  Pulse:  [70-91] 84  Resp:  [12-22] 16  SpO2:  [93 %-100 %] 95 %  BP: ()/(50-67) 131/60     Weight: 93 kg (205 lb)  Height: 5' (152.4 cm)  Body mass index is 40.04 kg/m².      Intake/Output Summary (Last 24 hours) at 8/17/2023 0898  Last data filed at 8/17/2023 0651  Gross per 24 hour   Intake 3230 ml   Output 2375 ml   Net 855 ml        Ortho/SPM Exam  Incisional dressing c/d/I   Drains intact with S/S output  BLE strength 5/5      Significant Labs: CBC:   Recent Labs   Lab 08/17/23  0412   WBC 12.89*   HGB 8.1*   HCT 26.3*        All pertinent labs within the past 24 hours have been  reviewed.    Significant Imaging: I have reviewed and interpreted all pertinent imaging results/findings.

## 2023-08-17 NOTE — PLAN OF CARE
OT evaluation completed. OT POC and goals established.     Problem: Occupational Therapy  Goal: Occupational Therapy Goal  Description: Goals to be met by: 8/31/2023     Patient will increase functional independence with ADLs by performing:    UE Dressing with Modified [place].  Pt will verbalize 3/3 spinal precautions independently.   LE Dressing with Modified [place].  Grooming while standing at sink with Modified [place].  Toileting from toilet with Modified [place] for hygiene and clothing management.   Rolling to Bilateral with Modified [place] utilizing log rolling technique to maintain spinal precautions.   Supine to sit with Modified [place].  Step transfer with Modified [place]  Toilet transfer to toilet with Modified [place].    Outcome: Ongoing, Progressing

## 2023-08-17 NOTE — PROGRESS NOTES
[facility]  Orthopedics  Progress Note    Patient Name: Nilsa Curiel  MRN: [id number]  Admission Date: 8/16/2023  Hospital Length of Stay: 1 days  Attending Provider: Jesus Alberto Sharp MD  Primary Care Provider: Gaurav Allison MD  Follow-up For: Procedure(s) (LRB):  FUSION, SPINE, LUMBAR, TLIF, POSTERIOR APPROACH, USING PEDICLE SCREW L2-5 PLDF/TLIF GLOBUS ROBOT SNS:SSEP/EMG cell saver (N/A)    Post-Operative Day: 1 Day Post-Op  Subjective:     Principal Problem:Spondylolisthesis of lumbosacral region    Principal Orthopedic Problem: s/p L2-5 lami fusion 8/16    Interval History: POD1. Pain moderately well controlled. Vitals stable on RA. Hb 8.1. CPAP on overnight. PT/OT eval today. Drain output 75, 0cc. Mayer out once up with PT.     Review of patient's allergies indicates:  No Known Allergies    Current Facility-Administered Medications   Medication    0.9%  NaCl infusion    acetaminophen tablet 650 mg    allopurinoL tablet 100 mg    amLODIPine tablet 10 mg    atorvastatin tablet 80 mg    carvediloL tablet 3.125 mg    celecoxib capsule 200 mg    famotidine tablet 20 mg    gabapentin capsule 100 mg    heparin (porcine) injection 5,000 Units    methocarbamoL tablet 750 mg    mupirocin 2 % ointment    ondansetron disintegrating tablet 8 mg    ondansetron injection 4 mg    oxyCODONE immediate release tablet 5 mg    oxyCODONE immediate release tablet Tab 10 mg    polyethylene glycol packet 17 g    senna-docusate 8.6-50 mg per tablet 1 tablet    valsartan tablet 320 mg     Objective:     Vital Signs (Most Recent):  Temp: 97.7 °F (36.5 °C) (08/17/23 0800)  Pulse: 84 (08/17/23 0800)  Resp: 16 (08/17/23 0800)  BP: 131/60 (08/17/23 0800)  SpO2: 95 % (08/17/23 0800) Vital Signs (24h Range):  Temp:  [96.6 °F (35.9 °C)-98.1 °F (36.7 °C)] 97.7 °F (36.5 °C)  Pulse:  [70-91] 84  Resp:  [12-22] 16  SpO2:  [93 %-100 %] 95 %  BP: ()/(50-67) 131/60     Weight: 93 kg (205 lb)  Height: 5' (152.4  cm)  Body mass index is 40.04 kg/m².      Intake/Output Summary (Last 24 hours) at 8/17/2023 0829  Last data filed at 8/17/2023 0651  Gross per 24 hour   Intake 3230 ml   Output 2375 ml   Net 855 ml        Ortho/SPM Exam  Incisional dressing c/d/I   Drains intact with S/S output  BLE strength 5/5      Significant Labs: CBC:   Recent Labs   Lab 08/17/23  0412   WBC 12.89*   HGB 8.1*   HCT 26.3*        All pertinent labs within the past 24 hours have been reviewed.    Significant Imaging: I have reviewed and interpreted all pertinent imaging results/findings.    Assessment/Plan:     * Spondylolisthesis of lumbosacral region  Nilsa Curiel is a 77 y.o. female s/p L2-5 PSF on 8/16/23    VS:  Stable on RA  Labs:  Hb 8.1   Pain control: multimodal regimen  PT/OT: WBAT with spine precautions   DVT PPx:  Heparin 5k tid   Abx:  24 hr ancef   Leyla:  Ariel today POD1   Drain:  75cc, continue drain care   F/u: 4wk post op appt     Dispo: PT/OT eval today. Ariel michelle.               Ella Bravo MD  Orthopedics  [facility] - Surgery

## 2023-08-17 NOTE — PT/OT/SLP EVAL
"Occupational Therapy   Co-Evaluation and Tx    Name: Nilsa Curiel  MRN: [id number]  Admitting Diagnosis: Spondylolisthesis of lumbosacral region  Recent Surgery: Procedure(s) (LRB):  FUSION, SPINE, LUMBAR, TLIF, POSTERIOR APPROACH, USING PEDICLE SCREW L2-5 PLDF/TLIF GLOBUS ROBOT SNS:SSEP/EMG cell saver (N/A) 1 Day Post-Op    Recommendations:     Discharge Recommendations: other (see comments)  Discharge Equipment Recommendations:  walker, rolling  Barriers to discharge:  None    Assessment:     Nilsa Curiel is a 77 y.o. female with a medical diagnosis of Spondylolisthesis of lumbosacral region.  She presents with the following performance deficits affecting function: weakness, impaired endurance, impaired self care skills, impaired functional mobility, gait instability, impaired balance, pain, orthopedic precautions. Pt tolerated session well, was attentive to all education provided, and demo good adherence to spinal precautions throughout session. She demo ability perform bed mobility with Min A and all functional mobility with CGA and RW for safety. Pt would benefit from continued skilled acute OT services in order to maximize (I) and with ADLs and functional mobility to ensure safe return to PLOF in the least restrictive environment. OT recommending further skilled OT services once pt is medically appropriate for d/c.       Rehab Prognosis: Good; patient would benefit from acute skilled OT services to address these deficits and reach maximum level of function.       Plan:     Patient to be seen 3 x/week to address the above listed problems via self-care/home management, therapeutic activities, therapeutic exercises, neuromuscular re-education  Plan of Care Expires: 09/16/23  Plan of Care Reviewed with: patient    Subjective   "I haven't s  Chief Complaint: None stated   Patient/Family Comments/goals: return to PLOF     Occupational Profile:  Living Environment: Pt lives with daughter in a ground level apartment " "with 0 FELIPE. Pt has a t/s combo for bathing.   Previous level of function: (I) with ADLs and utilized a quad cane or SPC (however, stated she avoided using them so she can "be as independent as possible")   Roles and Routines: Pt was driving and is retired.   Equipment Used at Home: cane, quad, cane, straight  Assistance upon Discharge: Daughter, however works during the day     Pain/Comfort:  Pain Rating 1: 5/10  Location - Side 1: Bilateral  Location - Orientation 1: generalized  Location 1: back  Pain Addressed 1: Pre-medicate for activity, Reposition, Distraction, Nurse notified  Pain Rating Post-Intervention 1: 7/10  Location - Orientation 2: generalized  Location 2: back  Pain Addressed 2: Reposition, Pre-medicate for activity, Cessation of Activity, Nurse notified    Patients cultural, spiritual, Temple conflicts given the current situation: no    Objective:     Communicated with: RN prior to session.  Patient found HOB elevated with peripheral IV, telemetry, pulse ox (continuous), SCD, GENEVA drain upon OT entry to room.    General Precautions: Standard, fall  Orthopedic Precautions: spinal precautions  Braces: N/A  Respiratory Status: Room air    Occupational Performance:    Bed Mobility:    Patient completed Rolling/Turning to Right with minimum assistance  Patient completed Scooting/Bridging with stand by assistance  Patient completed Supine to Sit with minimum assistance    Functional Mobility/Transfers:  Patient completed Sit <> Stand Transfer with contact guard assistance  with  rolling walker   Patient completed Bed <> Chair Transfer using Step Transfer technique with contact guard assistance with rolling walker  Functional Mobility: Pt participated in room mobility to simulate home and community distances for 20 ft with CGA and RW.   Pt required increased time and verbal and tactile cues for safe RW management and staying inside RW KEILA      Activities of Daily Living:  Feeding:  stand by assistance " for sitting balance to take medicine and drink water with RN supervision   Upper Body Dressing: moderate assistance to don gown while sitting EOB   Lower Body Dressing: SBA for performing Fig 4 technique for donning socks      Toileting: denied needs at this time      Cognitive/Visual Perceptual:  Cognitive/Psychosocial Skills:     -       Oriented to: Person, Place, Time, and Situation   -       Follows Commands/attention:Follows one-step commands  -       Communication: clear/fluent  -       Memory: No Deficits noted  -       Safety awareness/insight to disability: intact   Visual/Perceptual:      -Intact wears reading glasses at baseline     Physical Exam:  Balance:    -           Static sitting balance: SPV-SBA  - Static standing balance: CGA with RW  - Dynamic standing balance:  CGA with RW   Postural examination/scapula alignment:    -       No postural abnormalities identified  Skin integrity: Visible skin intact  Edema:  None noted  Sensation:    -       Intact  Dominant hand:    -       L  Upper Extremity Range of Motion:     -       Right Upper Extremity: WFL  -       Left Upper Extremity: WFL  Upper Extremity Strength:    -       Right Upper Extremity: WFL  -       Left Upper Extremity: WFL   Strength:    -       Right Upper Extremity: WFL  -       Left Upper Extremity: WFL  Fine Motor Coordination:    -       Intact    AMPAC 6 Click ADL:  AMPAC Total Score: 20    Treatment & Education:  Pt  educated on:   Role of OT, POC, and d/c planning.   Spinal precautions- no bending, lifting, twisting and how to adhere to them with compensatory techniques while performing various  ADLs.  Safe transfer techniques and proper body mechanics for fall prevention and improved independence with functional transfers   Importance of OOB activities to increase endurance and tolerance for increased participation in daily ADLs.   Utilizing the call bell to request for assistance with all functional mobility to ensure  safety during hospital stay.      Pt verbalized understanding and all questions were addressed within the scope of OT.     Patient left up in chair with all lines intact, call button in reach, and RN present    GOALS:   Multidisciplinary Problems       Occupational Therapy Goals          Problem: Occupational Therapy    Goal Priority Disciplines Outcome Interventions   Occupational Therapy Goal     OT, PT/OT Ongoing, Progressing    Description: Goals to be met by: 8/31/2023     Patient will increase functional independence with ADLs by performing:    UE Dressing with Modified [place].  Pt will verbalize 3/3 spinal precautions independently.   LE Dressing with Modified [place].  Grooming while standing at sink with Modified [place].  Toileting from toilet with Modified [place] for hygiene and clothing management.   Rolling to Bilateral with Modified [place] utilizing log rolling technique to maintain spinal precautions.   Supine to sit with Modified [place].  Step transfer with Modified [place]  Toilet transfer to toilet with Modified [place].                         History:     Past Medical History:   Diagnosis Date    Abnormal fasting glucose 09/04/2015    Acute bilateral low back pain without sciatica 10/10/2017    Anemia 04/24/2018    Arthritis     Atopic dermatitis     Central stenosis of spinal canal 12/23/2022    Chronic diastolic heart failure 04/06/2022    Chronic kidney disease, stage III (moderate) 08/18/2020    Dermatitis 01/04/2023    GERD (gastroesophageal reflux disease)     Hyperlipidemia     Hypertension     Hypokalemia 08/18/2022    Joint pain     Left ventricular hypertrophy 08/16/2016    Microcytic hypochromic anemia 04/24/2018    Multifactoral. See hematology consult. Some nutritional , some kidney related    Mild aortic sclerosis 03/11/2019    Mobility poor 03/18/2022    Morbid obesity with BMI of 40.0-44.9, adult 04/01/2016    Nonintractable episodic  headache 2019    Normocytic normochromic anemia 2022    MONE (obstructive sleep apnea) 2018    Prediabetes 2023    Severe obesity (BMI >= 40) 06/10/2022         Past Surgical History:   Procedure Laterality Date     SECTION      x3    COLONOSCOPY N/A 10/04/2021    Procedure: COLONOSCOPY;  Surgeon: Taran Galvez MD;  Location: [facility] (4TH FLR);  Service: Endoscopy;  Laterality: N/A;    COLONOSCOPY N/A 2022    Procedure: COLONOSCOPY--positive fit kit;  Surgeon: Tani Lara MD;  Location: [facility] (4TH FLR);  Service: Endoscopy;  Laterality: N/A;    EPIDURAL STEROID INJECTION N/A 2023    Procedure: INJECTION, STEROID, EPIDURAL, L5-S1;  Surgeon: Majo Meyers MD;  Location: [facility] PAIN MGT;  Service: Pain Management;  Laterality: N/A;    ESOPHAGOGASTRODUODENOSCOPY N/A 10/04/2021    Procedure: EGD (ESOPHAGOGASTRODUODENOSCOPY);  Surgeon: Taran Galvez MD;  Location: [facility] (4TH FLR);  Service: Endoscopy;  Laterality: N/A;  covid test 10/1-[facility]    10/1-LVM about COVID test-GT    ESOPHAGOGASTRODUODENOSCOPY N/A 2022    Procedure: EGD (ESOPHAGOGASTRODUODENOSCOPY);  Surgeon: Tani Lara MD;  Location: [facility] (4TH FLR);  Service: Endoscopy;  Laterality: N/A;  12/15 fully vaccinated; instructions to portal-st  -covid [facility]-tb    FUSION, SPINE, LUMBAR, TLIF, POSTERIOR APPROACH, USING PEDICLE SCREW N/A 2023    Procedure: FUSION, SPINE, LUMBAR, TLIF, POSTERIOR APPROACH, USING PEDICLE SCREW L2-5 PLDF/TLIF GLOBUS ROBOT SNS:SSEP/EMG cell saver;  Surgeon: Jesus Alberto Sharp MD;  [facility];  Service: Neurosurgery;  Laterality: N/A;    TRANSFORAMINAL EPIDURAL INJECTION OF STEROID N/A 2023    Procedure: b/l L4-5 TFESI;  Surgeon: Gato Delatorre DO;  Location: [facility];  Service: Pain Management;  Laterality: N/A;    TUBAL LIGATION         Time Tracking:     OT Date of Treatment: 23  OT Start Time: 916  OT Stop Time:  0940  OT Total Time (min): 24 min    Billable Minutes:Evaluation 10  Therapeutic Activity 14    8/17/2023  Co-evaluation/treatment performed due to patient's multiple deficits requiring two skilled therapists to appropriately and safely assess patient's strength, endurance, functional mobility, and ADL performance while facilitating functional tasks in addition to accommodating for patient's activity tolerance and medical acuity.

## 2023-08-17 NOTE — PT/OT/SLP EVAL
Physical Therapy Co-Evaluation    Patient Name:  Nilsa Curiel   MRN:  [id number]    Recommendations:     Discharge Recommendations: other (see comments)   Discharge Equipment Recommendations: walker, rolling   Barriers to discharge: None    Assessment:     Nilsa Curiel is a 77 y.o. female admitted with a medical diagnosis of Spondylolisthesis of lumbosacral region.  She presents with the following impairments/functional limitations: weakness, impaired endurance, impaired self care skills, impaired functional mobility, orthopedic precautions, decreased ROM, gait instability   Pt receptive and tolerated PT co-eval with OT well. Pt educated on spinal precautions prior to start of treatment with pt verbalizing understanding. Pt able to perform bed mobility, transfers, and amb this session. Pt amb ~20 in the room with RW and CGA for safety.   .    Rehab Prognosis: Good; patient would benefit from acute skilled PT services to address these deficits and reach maximum level of function.    Recent Surgery: Procedure(s) (LRB):  FUSION, SPINE, LUMBAR, TLIF, POSTERIOR APPROACH, USING PEDICLE SCREW L2-5 PLDF/TLIF GLOBUS ROBOT SNS:SSEP/EMG cell saver (N/A) 1 Day Post-Op    Plan:     During this hospitalization, patient to be seen 3 x/week to address the identified rehab impairments via gait training, therapeutic activities, therapeutic exercises, neuromuscular re-education and progress toward the following goals:    Plan of Care Expires:  09/16/23    Subjective     Chief Complaint: back pain with mobility  Patient/Family Comments/goals: To get better  Pain/Comfort:  Pain Rating 1: 5/10  Location - Side 1: Bilateral  Location - Orientation 1: generalized  Location 1: back  Pain Addressed 1: Reposition, Distraction, Pre-medicate for activity  Pain Rating Post-Intervention 1: 7/10    Patients cultural, spiritual, Muslim conflicts given the current situation: no    Patient History:     Living Environment: Pt lives with daughter in  1st floor apartment with 0 FELIPE. Bathroom: tub/shower combo   Prior Level of Function: Mod I using SPC or quad cane for amb.  DME owned: SPC, quad cane  Caregiver Assistance: Daughter (works during the day)    Objective:     Communicated with RN prior to session.  Patient found HOB elevated with hemovac, telemetry, pulse ox (continuous), SCD, peripheral IV  upon PT entry to room.    General Precautions: Standard, fall  Orthopedic Precautions:spinal precautions   Braces: N/A  Respiratory Status: Room air    Exams:  Sensation:    -       Intact  RLE ROM: WFL  RLE Strength: grossly 4-/5  LLE ROM: WFL  LLE Strength: grossly 4-/5    Functional Mobility:    Bed Mobility:   Rolling: to R with minimum assistance  Supine > Sit: minimum assistance  Scooting: Stand by assistance    Transfers:   Sit <> Stand Transfer: contact guard assistance from EOB using rolling walker     Balance:   Sitting balance: FAIR+: Maintains balance through MINIMAL excursions of active trunk motion  Standing balance:   FAIR+: Takes MINIMAL challenges from all directions  FAIR: Needs CONTACT GUARD during gait                 Gait:  Distance: 20' in the room   Assistive Device: RW  Assistance Level: contact guard assistance  Gait Assessment: Pt amb with forward flexed posture, decreased bruna, and decreased step length. Pt needed verbal cues for RW management. No LOB        AM-PAC 6 CLICK MOBILITY  Total Score:18     OT present for coeval due to pt's multiple medical comorbidities and functional/cognition deficits requiring two skilled therapists to appropriately progress pt's musculoskeletal strength, neuromuscular control, and endurance while taking into consideration medical acuity and pt safety.    Treatment & Education:  Gait training performed with pt using RW, pt amb 20' in the room with CGA.  Pt educated on safety with mobility and using call button for assistance from nursing staff with OOB mobility.  Pt educated on tips to reduce fall  risk.  Pt educated on amb 2-3x per day and increased movement during hospital stay.  All questions answered within the scope of PT.  White board updated accordingly.      Patient left up in chair with all lines intact, call button in reach, and RN present.    GOALS:   Multidisciplinary Problems       Physical Therapy Goals          Problem: Physical Therapy    Goal Priority Disciplines Outcome Goal Variances Interventions   Physical Therapy Goal     PT, PT/OT Ongoing, Progressing     Description: Goals to be met by: 23     Patient will increase functional independence with mobility by performin. Supine to sit with Stand-by Assistance  2. Sit to stand transfer with Supervision  3. Bed to chair transfer with Supervision using Rolling Walker  4. Gait  x 150 feet with Supervision using Rolling Walker.   5. Lower extremity exercise program x20 reps per handout, with independence                         History:     Past Medical History:   Diagnosis Date    Abnormal fasting glucose 2015    Acute bilateral low back pain without sciatica 10/10/2017    Anemia 2018    Arthritis     Atopic dermatitis     Central stenosis of spinal canal 2022    Chronic diastolic heart failure 2022    Chronic kidney disease, stage III (moderate) 2020    Dermatitis 2023    GERD (gastroesophageal reflux disease)     Hyperlipidemia     Hypertension     Hypokalemia 2022    Joint pain     Left ventricular hypertrophy 2016    Microcytic hypochromic anemia 2018    Multifactoral. See hematology consult. Some nutritional , some kidney related    Mild aortic sclerosis 2019    Mobility poor 2022    Morbid obesity with BMI of 40.0-44.9, adult 2016    Nonintractable episodic headache 2019    Normocytic normochromic anemia 2022    MONE (obstructive sleep apnea) 2018    Prediabetes 2023    Severe obesity (BMI >= 40) 06/10/2022       Past Surgical  History:   Procedure Laterality Date     SECTION      x3    COLONOSCOPY N/A 10/04/2021    Procedure: COLONOSCOPY;  Surgeon: Taran Galvez MD;  Location: [facility] (4TH FLR);  Service: Endoscopy;  Laterality: N/A;    COLONOSCOPY N/A 2022    Procedure: COLONOSCOPY--positive fit kit;  Surgeon: Tani Lara MD;  Location: [facility] (4TH FLR);  Service: Endoscopy;  Laterality: N/A;    EPIDURAL STEROID INJECTION N/A 2023    Procedure: INJECTION, STEROID, EPIDURAL, L5-S1;  Surgeon: Majo Meyers MD;  Location: [facility] [place] [facility];  Service: Pain Management;  Laterality: N/A;    ESOPHAGOGASTRODUODENOSCOPY N/A 10/04/2021    Procedure: EGD (ESOPHAGOGASTRODUODENOSCOPY);  Surgeon: Taran Galvez MD;  Location: [facility] (4TH FLR);  Service: Endoscopy;  Laterality: N/A;  covid test 10/1-[facility]    10/1-LVM about COVID test-GT    ESOPHAGOGASTRODUODENOSCOPY N/A 2022    Procedure: EGD (ESOPHAGOGASTRODUODENOSCOPY);  Surgeon: Tani Lara MD;  Location: [facility] (4TH FLR);  Service: Endoscopy;  Laterality: N/A;  12/15 fully vaccinated; instructions to portal-st  -covid [facility]-tb    FUSION, SPINE, LUMBAR, TLIF, POSTERIOR APPROACH, USING PEDICLE SCREW N/A 2023    Procedure: FUSION, SPINE, LUMBAR, TLIF, POSTERIOR APPROACH, USING PEDICLE SCREW L2-5 PLDF/TLIF GLOBUS ROBOT SNS:SSEP/EMG cell saver;  Surgeon: Jesus Alberto Sharp MD;  Location: [facility] 2ND FLR;  Service: Neurosurgery;  Laterality: N/A;    TRANSFORAMINAL EPIDURAL INJECTION OF STEROID N/A 2023    Procedure: b/l L4-5 TFESI;  Surgeon: Gato Delatorre DO;  Location: [facility] OR;  Service: Pain Management;  Laterality: N/A;    TUBAL LIGATION         Time Tracking:     PT Received On: 23  PT Start Time: 916     PT Stop Time: 940  PT Total Time (min): 24 min     Billable Minutes: Evaluation 10 and Gait Training 14      2023

## 2023-08-17 NOTE — ASSESSMENT & PLAN NOTE
Nilsa Curiel is a 77 y.o. female s/p L2-5 PSF on 8/16/23    VS:  Stable on RA  Labs:  Hb 8.1   Pain control: multimodal regimen  PT/OT: WBAT with spine precautions   DVT PPx:  Heparin 5k tid   Abx:  24 hr ancef   Leyla:  Ariel today POD1   Drain:  75cc, continue drain care   F/u: 4wk post op appt     Dispo: PT/OT eval today. Ariel michelle.

## 2023-08-17 NOTE — PLAN OF CARE
[facility] - Surgery  Initial Discharge Assessment       Primary Care Provider: Gaurav Allison MD    Admission Diagnosis: Degenerative spondylolisthesis [M43.10]    Admission Date: 8/16/2023  Expected Discharge Date: 8/18/2023    Transition of Care Barriers: None    Payor: HU[employer] MANAGED MEDICARE / Plan: HU[employer] MEDICARE HMO / Product Type: Capitation /     Extended Emergency Contact Information  Primary Emergency Contact: Germain Curiel   [facility]  Home Phone: [phone]  Relation: Daughter    Discharge Plan A: Home Health, Home with family  Discharge Plan B: Home with family      [id number] - [place], [place] - [address] AT [facility] [place]  [address]  [facility] [address]  Phone: [phone] Fax: [phone]    [facility] - [facility] [address] [facility]  [address] [facility] [address]  Phone: [phone] Fax: [phone]      Initial Assessment (most recent)       Adult Discharge Assessment - 08/17/23 1107          Discharge Assessment    Assessment Type Discharge Planning Assessment     Confirmed/corrected address, phone number and insurance Yes     Confirmed Demographics Correct on Facesheet     Source of Information patient     Communicated MOY with patient/caregiver Yes     People in Home child(danna), adult     Do you expect to return to your current living situation? Yes     Do you have help at home or someone to help you manage your care at home? Yes     Who are your caregiver(s) and their phone number(s)? daughter - Germain     Prior to hospitilization cognitive status: Alert/Oriented     Current cognitive status: Alert/Oriented     Home Layout Able to live on 1st floor     Equipment Currently Used at Home CPAP;cane, quad     Do you currently have service(s) that help you manage your care at home? No     Do you take prescription medications? Yes     Do you have prescription coverage? Yes     Do you have  any problems affording any of your prescribed medications? No     Is the patient taking medications as prescribed? yes     How do you get to doctors appointments? family or friend will provide     Are you on dialysis? No     Do you take coumadin? No     DME Needed Upon Discharge  walker, rolling     Discharge Plan discussed with: Patient     Transition of Care Barriers None     Discharge Plan A Home Health;Home with family     Discharge Plan B Home with family                   Patient lives with her 33y/o daughter Germain in a single story home with no entry steps. Patient daughter is primary caregiver and will assist in care post hospitalization. She will also provide transportation home.

## 2023-08-17 NOTE — PLAN OF CARE
08/17/23 1107   Post-Acute Status   Post-Acute Authorization Home Health   Home Health Status Referrals Sent     SW faxed referral to [facility] via [facility] for review. SW will follow up as needed.    [facility]- Accepted, pending HH order.    Li Yanes LMSW  Case Management   [facility]   Ext. [phone]

## 2023-08-18 PROBLEM — D62 ACUTE BLOOD LOSS ANEMIA: Status: ACTIVE | Noted: 2023-08-18

## 2023-08-18 PROCEDURE — 25000003 PHARM REV CODE 250: Performed by: ORTHOPAEDIC SURGERY

## 2023-08-18 PROCEDURE — 97116 GAIT TRAINING THERAPY: CPT

## 2023-08-18 PROCEDURE — 25000003 PHARM REV CODE 250: Performed by: STUDENT IN AN ORGANIZED HEALTH CARE EDUCATION/TRAINING PROGRAM

## 2023-08-18 PROCEDURE — 63600175 PHARM REV CODE 636 W HCPCS: Performed by: ORTHOPAEDIC SURGERY

## 2023-08-18 PROCEDURE — 97530 THERAPEUTIC ACTIVITIES: CPT

## 2023-08-18 PROCEDURE — 97535 SELF CARE MNGMENT TRAINING: CPT

## 2023-08-18 PROCEDURE — 11000001 HC ACUTE MED/SURG PRIVATE ROOM

## 2023-08-18 RX ADMIN — METHOCARBAMOL 750 MG: 750 TABLET ORAL at 03:08

## 2023-08-18 RX ADMIN — OXYCODONE HYDROCHLORIDE 5 MG: 5 TABLET ORAL at 05:08

## 2023-08-18 RX ADMIN — CELECOXIB 200 MG: 200 CAPSULE ORAL at 09:08

## 2023-08-18 RX ADMIN — ACETAMINOPHEN 650 MG: 325 TABLET ORAL at 01:08

## 2023-08-18 RX ADMIN — HEPARIN SODIUM 5000 UNITS: 5000 INJECTION INTRAVENOUS; SUBCUTANEOUS at 09:08

## 2023-08-18 RX ADMIN — METHOCARBAMOL 750 MG: 750 TABLET ORAL at 09:08

## 2023-08-18 RX ADMIN — FAMOTIDINE 20 MG: 20 TABLET, FILM COATED ORAL at 09:08

## 2023-08-18 RX ADMIN — MUPIROCIN: 20 OINTMENT TOPICAL at 09:08

## 2023-08-18 RX ADMIN — ACETAMINOPHEN 650 MG: 325 TABLET ORAL at 05:08

## 2023-08-18 RX ADMIN — OXYCODONE HYDROCHLORIDE 10 MG: 10 TABLET ORAL at 05:08

## 2023-08-18 RX ADMIN — SENNOSIDES AND DOCUSATE SODIUM 1 TABLET: 50; 8.6 TABLET ORAL at 09:08

## 2023-08-18 RX ADMIN — CARVEDILOL 3.12 MG: 3.12 TABLET, FILM COATED ORAL at 05:08

## 2023-08-18 RX ADMIN — MUPIROCIN: 20 OINTMENT TOPICAL at 12:08

## 2023-08-18 RX ADMIN — HEPARIN SODIUM 5000 UNITS: 5000 INJECTION INTRAVENOUS; SUBCUTANEOUS at 01:08

## 2023-08-18 RX ADMIN — ACETAMINOPHEN 650 MG: 325 TABLET ORAL at 09:08

## 2023-08-18 RX ADMIN — HEPARIN SODIUM 5000 UNITS: 5000 INJECTION INTRAVENOUS; SUBCUTANEOUS at 05:08

## 2023-08-18 RX ADMIN — POLYETHYLENE GLYCOL 3350 17 G: 17 POWDER, FOR SOLUTION ORAL at 09:08

## 2023-08-18 RX ADMIN — ATORVASTATIN CALCIUM 80 MG: 40 TABLET, FILM COATED ORAL at 09:08

## 2023-08-18 RX ADMIN — ALLOPURINOL 100 MG: 100 TABLET ORAL at 09:08

## 2023-08-18 NOTE — SUBJECTIVE & OBJECTIVE
Principal Problem:Spondylolisthesis of lumbosacral region    Principal Orthopedic Problem: s/p L2-5 lami fusion 8/16    Interval History: POD2. Pain well controlled. 20ft with PT yesterday - recs for HH. Drain output 30, 0 cc overnight. Voiding independently.      Review of patient's allergies indicates:  No Known Allergies    Current Facility-Administered Medications   Medication    acetaminophen tablet 650 mg    allopurinoL tablet 100 mg    amLODIPine tablet 10 mg    atorvastatin tablet 80 mg    carvediloL tablet 3.125 mg    celecoxib capsule 200 mg    famotidine tablet 20 mg    gabapentin capsule 100 mg    heparin (porcine) injection 5,000 Units    methocarbamoL tablet 750 mg    mupirocin 2 % ointment    ondansetron disintegrating tablet 8 mg    ondansetron injection 4 mg    oxyCODONE immediate release tablet 5 mg    oxyCODONE immediate release tablet Tab 10 mg    polyethylene glycol packet 17 g    senna-docusate 8.6-50 mg per tablet 1 tablet    valsartan tablet 320 mg     Objective:     Vital Signs (Most Recent):  Temp: 97.6 °F (36.4 °C) (08/18/23 0509)  Pulse: (!) 135 (08/18/23 0509)  Resp: 18 (08/18/23 0559)  BP: (!) 154/68 (08/18/23 0509)  SpO2: 99 % (08/18/23 0509) Vital Signs (24h Range):  Temp:  [96.9 °F (36.1 °C)-98.5 °F (36.9 °C)] 97.6 °F (36.4 °C)  Pulse:  [] 135  Resp:  [16-18] 18  SpO2:  [95 %-99 %] 99 %  BP: (110-154)/(56-69) 154/68     Weight: 92.9 kg (204 lb 12.9 oz)  Height: 5' (152.4 cm)  Body mass index is 40 kg/m².      Intake/Output Summary (Last 24 hours) at 8/18/2023 0716  Last data filed at 8/18/2023 0607  Gross per 24 hour   Intake --   Output 120 ml   Net -120 ml        Ortho/SPM Exam  Incisional dressing c/d/I   Drains intact with S/S output  BLE strength 5/5      Significant Labs: CBC:   Recent Labs   Lab 08/17/23  0412   WBC 12.89*   HGB 8.1*   HCT 26.3*        All pertinent labs within the past 24 hours have been reviewed.    Significant Imaging: I have reviewed and  interpreted all pertinent imaging results/findings.

## 2023-08-18 NOTE — ASSESSMENT & PLAN NOTE
Nilsa Curiel is a 77 y.o. female s/p L2-5 PSF on 8/16/23    VS:  Stable on RA  Labs:  Hb 8.1, no further labs  Pain control: multimodal regimen  PT/OT: WBAT with spine precautions   DVT PPx:  Heparin 5k tid   Abx:  24 hr ancef   Mayer:  Dc POD1   Drain:  30cc overnight, will dc today   F/u: 4wk post op appt     Dispo: Continue PT/OT. Drains out today

## 2023-08-18 NOTE — PLAN OF CARE
[facility]      HOME HEALTH ORDERS  FACE TO FACE ENCOUNTER    Patient Name: Nilsa Curiel  YOB: 1945    PCP: Gaurav Allison MD   PCP Address: [address]  / [place] [address]  PCP Phone Number: [phone]  PCP Fax: [phone]    Encounter Date: 5/17/23    Admit to Home Health    Diagnoses:  Active Hospital Problems    Diagnosis  POA    *Spondylolisthesis of lumbosacral region [M43.17]  Yes    Severe obesity (BMI >= 40) [E66.01]  Yes    Essential hypertension [I10]  Yes    MONE (obstructive sleep apnea) [G47.33]  Yes      Resolved Hospital Problems   No resolved problems to display.       Follow Up Appointments:  Future Appointments   Date Time Provider Department Center   8/31/2023  3:00 PM Hali Daigle PA-C [facility] SPINE [facility]   9/7/2023  9:15 AM Brittany Simeon MD [facility] DERM [facility]   9/14/2023 12:30 PM NOM OIC-XRAY [facility]   9/14/2023  1:30 PM Hali Dailge PA-C [facility] SPINE [facility]   10/5/2023  9:30 AM Gaurav Allison MD [facility]   12/6/2023 10:15 AM LAB, SBP SBP LAB [facility]   12/12/2023  9:20 AM Amando Das MD [facility] CARDIO [facility]       Allergies:Review of patient's allergies indicates:  No Known Allergies    Medications: Review discharge medications with patient and family and provide education.    Current Facility-Administered Medications   Medication Dose Route Frequency Provider Last Rate Last Admin    acetaminophen tablet 650 mg  650 mg Oral Q8H Ella Bravo MD   650 mg at 08/18/23 0557    allopurinoL tablet 100 mg  100 mg Oral Daily Hali Daigle PA-SUZI   100 mg at 08/17/23 0928    amLODIPine tablet 10 mg  10 mg Oral Daily Hali Daigle PA-C   10 mg at 08/17/23 0923    atorvastatin tablet 80 mg  80 mg Oral Daily Hali Daigle PA-C   80 mg at 08/17/23 0923    carvediloL tablet 3.125 mg  3.125 mg Oral BID  Hali Daigle, NIDIA   3.125  mg at 08/17/23 1648    celecoxib capsule 200 mg  200 mg Oral Daily Ella Bravo MD   200 mg at 08/17/23 0924    famotidine tablet 20 mg  20 mg Oral BID Hali Daigle PA-C   20 mg at 08/17/23 2144    gabapentin capsule 100 mg  100 mg Oral TID Hali Daigle PA-C   100 mg at 08/17/23 2144    heparin (porcine) injection 5,000 Units  5,000 Units Subcutaneous Q8H Hali Daigle PA-C   5,000 Units at 08/18/23 0557    methocarbamoL tablet 750 mg  750 mg Oral TID Ella Bravo MD   750 mg at 08/17/23 2138    mupirocin 2 % ointment   Nasal BID Hali Daigle PA-C   Given at 08/18/23 0026    ondansetron disintegrating tablet 8 mg  8 mg Oral Q8H PRN Hali Daigle PA-C        ondansetron injection 4 mg  4 mg Intravenous Q12H PRN Hali Daigle PA-C        oxyCODONE immediate release tablet 5 mg  5 mg Oral Q4H PRN Hali Daigle PA-C   5 mg at 08/17/23 0932    oxyCODONE immediate release tablet Tab 10 mg  10 mg Oral Q4H PRN Hali Daigle PA-C   10 mg at 08/18/23 0559    polyethylene glycol packet 17 g  17 g Oral Daily Hali Daigle PA-C   17 g at 08/17/23 0923    senna-docusate 8.6-50 mg per tablet 1 tablet  1 tablet Oral BID Hali Daigle PA-C   1 tablet at 08/17/23 2138    valsartan tablet 320 mg  320 mg Oral Daily Hali Daigle PA-C   320 mg at 08/17/23 0922     Current Discharge Medication List        START taking these medications    Details   acetaminophen (TYLENOL) 500 MG tablet Take 1 tablet (500 mg total) by mouth 3 (three) times daily.  Qty: 90 tablet, Refills: 0      celecoxib (CELEBREX) 100 MG capsule Take 1 capsule (100 mg total) by mouth 2 (two) times daily.  Qty: 28 capsule, Refills: 0      gabapentin (NEURONTIN) 100 MG capsule Take 1 capsule (100 mg total) by mouth 3 (three) times daily.  Qty: 45 capsule, Refills: 0      oxyCODONE (ROXICODONE) 5 MG immediate release tablet Take 1 tablet (5 mg total) by  mouth every 6 (six) hours as needed for Pain (for breakthrough pain).  Qty: 24 tablet, Refills: 0    Comments: Quantity prescribed more than 7 day supply? No           CONTINUE these medications which have CHANGED    Details   methocarbamoL (ROBAXIN) 500 MG Tab Take 2 tablets (1,000 mg total) by mouth 3 (three) times daily.  Qty: 90 tablet, Refills: 0           CONTINUE these medications which have NOT CHANGED    Details   allopurinoL (ZYLOPRIM) 100 MG tablet Take 1 tablet (100 mg total) by mouth once daily.  Qty: 90 tablet, Refills: 11      amLODIPine (NORVASC) 10 MG tablet Take 1 tablet (10 mg total) by mouth once daily.  Qty: 90 tablet, Refills: 3    Comments: .      carvediloL (COREG) 3.125 MG tablet Take 1 tablet (3.125 mg total) by mouth 2 (two) times daily with meals.  Qty: 180 tablet, Refills: 3    Comments: .      olmesartan (BENICAR) 40 MG tablet TAKE 1 TABLET ONE TIME DAILY  Qty: 90 tablet, Refills: 3    Associated Diagnoses: Essential hypertension      omeprazole (PRILOSEC) 40 MG capsule Take 1 capsule (40 mg total) by mouth every morning. As needed for reflux symptoms  Qty: 90 capsule, Refills: 3      potassium chloride SA (K-DUR,KLOR-CON) 20 MEQ tablet Take 1 tablet (20 mEq total) by mouth 2 (two) times daily.  Qty: 180 tablet, Refills: 3      rosuvastatin (CRESTOR) 20 MG tablet Take 1 tablet (20 mg total) by mouth once daily.  Qty: 90 tablet, Refills: 3      aspirin (ECOTRIN) 81 MG EC tablet Take 1 tablet (81 mg total) by mouth once daily.  Qty: 90 tablet, Refills: 3      betamethasone dipropionate (DIPROLENE) 0.05 % ointment Apply topically 2 (two) times daily.  Qty: 45 g, Refills: 11    Associated Diagnoses: Flexural atopic dermatitis      betamethasone valerate 0.1% (VALISONE) 0.1 % Oint Apply topically once daily.  Qty: 45 g, Refills: 3    Associated Diagnoses: Flexural atopic dermatitis      biotin 300 mcg Tab Take 1 tablet by mouth once daily.      cetirizine (ZYRTEC) 10 MG tablet Take 1 tablet  (10 mg total) by mouth once daily. for allergic rhinitis  Qty: 90 tablet, Refills: 3    Associated Diagnoses: Flexural atopic dermatitis      diclofenac sodium (VOLTAREN) 1 % Gel Apply 2 g topically daily as needed.  Qty: 100 g, Refills: 3    Associated Diagnoses: Right hip pain      fluocinonide (LIDEX) 0.05 % external solution AAA scalp qday - bid prn pruritus  Qty: 60 mL, Refills: 3    Associated Diagnoses: Hair loss      fluticasone propionate (FLONASE) 50 mcg/actuation nasal spray 1 spray (50 mcg total) by Each Nostril route once daily.  Qty: 16 g, Refills: 11    Associated Diagnoses: Sinus disorder      ketoconazole (NIZORAL) 2 % cream Apply to affected areas of body BID prn irritation.  Qty: 15 g, Refills: 1    Associated Diagnoses: Candidal intertrigo      ketoconazole (NIZORAL) 2 % shampoo Wash hair with medicated shampoo at least 2x/week - let sit on scalp at least 5 minutes prior to rinsing  Qty: 120 mL, Refills: 5    Associated Diagnoses: Hair loss      lactobacillus combo no.6 (PROBIOTIC COMPLEX) 4 billion cell Tab Take 1 tablet by mouth once daily.  Qty: 90 tablet, Refills: 3    Associated Diagnoses: Gastroesophageal reflux disease without esophagitis      mometasone (ELOCON) 0.1 % ointment Apply twice daily to hands prn eczema.  Qty: 45 g, Refills: 3    Associated Diagnoses: Hand eczema      MULTIVIT-MIN/FA/CA CARB/VIT K (WOMEN'S 50+ DAILY FORMULA ORAL) Take by mouth.      nystatin-triamcinolone (MYCOLOG II) cream Apply topically 2 (two) times a day.  Qty: 60 g, Refills: 3    Associated Diagnoses: Dermatitis      omega-3 fatty acids 300 mg Cap Take by mouth.      riboflavin, vitamin B2, (VITAMIN B-2 ORAL) Take 1 capsule by mouth once daily.    Associated Diagnoses: Neurogenic claudication due to lumbar spinal stenosis      sumatriptan (IMITREX) 100 MG tablet Take 1 tablet (100 mg total) by mouth daily as needed for Migraine. No more than 2 doses in 24 hours  Qty: 27 tablet, Refills: 3    Associated  Diagnoses: Headache disorder      TAC 0.1%-ciclopirox-MOM Apply to affected area twice daily after cool blow dry. (discard after 1/14/2023)  Qty: 60 g, Refills: 5    Comments: Pharmacist: mix 30 grams of TAC 0.1% cream with 30 grams of Loprox cream in 120cc of Milk of Magnesia  Associated Diagnoses: Intertrigo      triamcinolone acetonide 0.025% (KENALOG) 0.025 % cream Apply to affected areas of body BID prn irritation.  Qty: 15 g, Refills: 1    Associated Diagnoses: Candidal intertrigo      TURMERIC ORAL Take by mouth.      ubidecarenone (COENZYME Q10) 100 mg Tab Take 1 tablet by mouth once daily.            STOP taking these medications       meloxicam (MOBIC) 15 MG tablet Comments:   Reason for Stopping:                 I have seen and examined this patient within the last 30 days. My clinical findings that support the need for the home health skilled services and home bound status are the following:no   Weakness/numbness causing balance and gait disturbance due to Surgery making it taxing to leave home.     Diet:   regular diet    Labs:  none    Referrals/ Consults  Physical Therapy to evaluate and treat. Evaluate for home safety and equipment needs; Establish/upgrade home exercise program. Perform / instruct on therapeutic exercises, gait training, transfer training, and Range of Motion.  Occupational Therapy to evaluate and treat. Evaluate home environment for safety and equipment needs. Perform/Instruct on transfers, ADL training, ROM, and therapeutic exercises.    Activities:   other weight bear as tolerated with spine precautions    Nursing:   Agency to admit patient within 24 hours of hospital discharge unless specified on physician order or at patient request    SN to complete comprehensive assessment including routine vital signs. Instruct on disease process and s/s of complications to report to MD. Review/verify medication list sent home with the patient at time of discharge  and instruct  patient/caregiver as needed. Frequency may be adjusted depending on start of care date.     Skilled nurse to perform up to 3 visits PRN for symptoms related to diagnosis    Notify MD if SBP > 160 or < 90; DBP > 90 or < 50; HR > 120 or < 50; Temp > 101; O2 < 88%;     Ok to schedule additional visits based on staff availability and patient request on consecutive days within the home health episode.    When multiple disciplines ordered:    Start of Care occurs on Sunday - Wednesday schedule remaining discipline evaluations as ordered on separate consecutive days following the start of care.    Thursday SOC -schedule subsequent evaluations Friday and Monday the following week.     Friday - Saturday SOC - schedule subsequent discipline evaluations on consecutive days starting Monday of the following week.      Miscellaneous   none    Home Health Aide:  none    Wound Care Orders  Leave surgical dressing intact for 7 days post op then ok to remove and shower.     I certify that this patient is confined to her home and needs physical therapy and occupational therapy.

## 2023-08-18 NOTE — PT/OT/SLP PROGRESS
Occupational Therapy Treatment    Patient Name:  Nilsa Curiel   MRN:  [id number]  Admit Date: 8/16/2023  Admitting Diagnosis:  Spondylolisthesis of lumbosacral region   Length of Stay: 2 days  Recent Surgery: Procedure(s) (LRB):  FUSION, SPINE, LUMBAR, TLIF, POSTERIOR APPROACH, USING PEDICLE SCREW L2-5 PLDF/TLIF GLOBUS ROBOT SNS:SSEP/EMG cell saver (N/A) 2 Days Post-Op    Recommendations:     Discharge Recommendations: other (see comments)  Discharge Equipment Recommendations:  walker, rolling  Barriers to discharge:  None    Plan:     Patient to be seen 3 x/week to address the above listed problems via self-care/home management, therapeutic activities, therapeutic exercises, neuromuscular re-education  Plan of Care Expires: 09/16/23  Plan of Care Reviewed with: patient    Assessment:   Nilsa Curiel is a 77 y.o. female with a medical diagnosis of Spondylolisthesis of lumbosacral region.  She presents with the following performance deficits affecting function: weakness, impaired endurance, impaired self care skills, impaired functional mobility, gait instability, impaired balance, pain, decreased safety awareness, decreased lower extremity function, orthopedic precautions, impaired coordination.      Pt tolerated session fairly this date and was willing to participate. Demonstrating continued increased pain, impaired balance, and decreased safety awareness, requiring increased time to complete functional tasks.  Patient limited by increased reports of pain. Patient is progressing towards established goals, and continues to benefit from acute skilled OT services to increase functional performance and improve quality of life. OT to continue to recommend other post acute skilled therapy services at discharge to improve pt functional independence and increase patient safety before returning home.      Rehab Prognosis: Good; patient would benefit from acute skilled OT services to address these deficits and reach maximum  "level of function.        Subjective   Communicated with: Nurse prior to session.  Patient found HOB elevated with hemovac, PureWick, peripheral IV, SCD upon OT entry to room. Pt agreeable to participate at this time.    Patient: " It just hurts "    Pain/Comfort:  Pain Rating 1: 6/10  Location - Orientation 1: generalized  Location 1: back  Pain Addressed 1: Distraction, Cessation of Activity  Pain Rating Post-Intervention 1: 6/10    Objective:   Patient found with: hemovac, PureWick, peripheral IV, SCD   General Precautions: Standard, Cardiac fall   Orthopedic Precautions:spinal precautions   Braces: N/A   Oxygen Device: Room Air  Vitals: /65   Pulse 82   Temp (!) 95.5 °F (35.3 °C)   Resp 16   Ht 5' (1.524 m)   Wt 92.9 kg (204 lb 12.9 oz)   SpO2 (!) 94%   Breastfeeding No   BMI 40.00 kg/m²     Outcome Measures:  [facility] 6 Click ADL: 20    Cognition:   Alert and Cooperative  Command following: follows one-step commands  Communication: clear/fluent    Occupational Performance:  Bed Mobility:    Patient completed Rolling/Turning to Right with  contact guard assistance  Patient completed Supine to Sit with contact guard assistance on R side of bed  Scooting anteriorly to EOB to have both feet planted on floor: contact guard assistance    Functional Mobility/Transfers:  Static Sitting EOB: SBA  Patient completed Sit <> Stand Transfer from EOB with contact guard assistance  with  rolling walker   Static Standing Balance: SBA  Dynamic Standing Balance: SBA  Patient completed functional mobility of household distance ~30ft with CGA-SBA using RW.      Activities of Daily Living:  Grooming: stand by assistance to perform oral care and wash face in stance at sink    AMPA 6 Click ADL:  AMPA Total Score: 20    Treatment & Education:  -Pt education on OT role and POC.  -Importance of E/OOB activity with staff assistance, emphasis on daily participation  -Safety during functional transfer and mobility " ensured  -Patient provided with education on importance of Bilateral UB/LB integration during functional tasks for improvement in functional performance.   -Education provided/reviewed, questions answered within OT scope of practice.   -Patient demonstrates understanding and learning this date.         Patient left up in chair with all lines intact, call button in reach, and nurse notified    GOALS:   Multidisciplinary Problems       Occupational Therapy Goals          Problem: Occupational Therapy    Goal Priority Disciplines Outcome Interventions   Occupational Therapy Goal     OT, PT/OT Ongoing, Progressing    Description: Goals to be met by: 8/31/2023     Patient will increase functional independence with ADLs by performing:    UE Dressing with Modified [place].  Pt will verbalize 3/3 spinal precautions independently.   LE Dressing with Modified [place].  Grooming while standing at sink with Modified [place].  Toileting from toilet with Modified [place] for hygiene and clothing management.   Rolling to Bilateral with Modified [place] utilizing log rolling technique to maintain spinal precautions.   Supine to sit with Modified [place].  Step transfer with Modified [place]  Toilet transfer to toilet with Modified [place].                         Time Tracking:     OT Date of Treatment: 08/18/23  OT Start Time: 0929  OT Stop Time: 0954  OT Total Time (min): 25 min    Billable Minutes:Self Care/Home Management 15  Therapeutic Activity 10      8/18/2023

## 2023-08-18 NOTE — PROGRESS NOTES
[facility]  Orthopedics  Progress Note    Patient Name: Nilsa Curiel  MRN: [id number]  Admission Date: 8/16/2023  Hospital Length of Stay: 2 days  Attending Provider: Jesus Alberto Sharp MD  Primary Care Provider: Gaurav Allison MD  Follow-up For: Procedure(s) (LRB):  FUSION, SPINE, LUMBAR, TLIF, POSTERIOR APPROACH, USING PEDICLE SCREW L2-5 PLDF/TLIF GLOBUS ROBOT SNS:SSEP/EMG cell saver (N/A)    Post-Operative Day: 2 Days Post-Op  Subjective:     Principal Problem:Spondylolisthesis of lumbosacral region    Principal Orthopedic Problem: s/p L2-5 lami fusion 8/16    Interval History: POD2. Pain well controlled. 20ft with PT yesterday - recs for HH. Drain output 30, 0 cc overnight. Voiding independently.      Review of patient's allergies indicates:  No Known Allergies    Current Facility-Administered Medications   Medication    acetaminophen tablet 650 mg    allopurinoL tablet 100 mg    amLODIPine tablet 10 mg    atorvastatin tablet 80 mg    carvediloL tablet 3.125 mg    celecoxib capsule 200 mg    famotidine tablet 20 mg    gabapentin capsule 100 mg    heparin (porcine) injection 5,000 Units    methocarbamoL tablet 750 mg    mupirocin 2 % ointment    ondansetron disintegrating tablet 8 mg    ondansetron injection 4 mg    oxyCODONE immediate release tablet 5 mg    oxyCODONE immediate release tablet Tab 10 mg    polyethylene glycol packet 17 g    senna-docusate 8.6-50 mg per tablet 1 tablet    valsartan tablet 320 mg     Objective:     Vital Signs (Most Recent):  Temp: 97.6 °F (36.4 °C) (08/18/23 0509)  Pulse: (!) 135 (08/18/23 0509)  Resp: 18 (08/18/23 0559)  BP: (!) 154/68 (08/18/23 0509)  SpO2: 99 % (08/18/23 0509) Vital Signs (24h Range):  Temp:  [96.9 °F (36.1 °C)-98.5 °F (36.9 °C)] 97.6 °F (36.4 °C)  Pulse:  [] 135  Resp:  [16-18] 18  SpO2:  [95 %-99 %] 99 %  BP: (110-154)/(56-69) 154/68     Weight: 92.9 kg (204 lb 12.9 oz)  Height: 5' (152.4 cm)  Body mass index is 40  kg/m².      Intake/Output Summary (Last 24 hours) at 8/18/2023 0716  Last data filed at 8/18/2023 0607  Gross per 24 hour   Intake --   Output 120 ml   Net -120 ml        Ortho/SPM Exam  Incisional dressing c/d/I   Drains intact with S/S output  BLE strength 5/5      Significant Labs: CBC:   Recent Labs   Lab 08/17/23  0412   WBC 12.89*   HGB 8.1*   HCT 26.3*        All pertinent labs within the past 24 hours have been reviewed.    Significant Imaging: I have reviewed and interpreted all pertinent imaging results/findings.    Assessment/Plan:     * Spondylolisthesis of lumbosacral region  Nilsa Curiel is a 77 y.o. female s/p L2-5 PSF on 8/16/23    VS:  Stable on RA  Labs:  Hb 8.1, no further labs  Pain control: multimodal regimen  PT/OT: WBAT with spine precautions   DVT PPx:  Heparin 5k tid   Abx:  24 hr ancef   Mayer:  Dc POD1   Drain:  30cc overnight, will dc today   F/u: 4wk post op appt     Dispo: Continue PT/OT. Drains out today                Ella Bravo MD  Orthopedics  [facility] - Surgery

## 2023-08-18 NOTE — CARE UPDATE
"RAPID RESPONSE NURSE CHART REVIEW        Chart Reviewed: 08/18/2023, 5:33 AM    MRN: [id number]  Bed: 557/557 A    Dx: Spondylolisthesis of lumbosacral region    Nilsa Curiel has a past medical history of Abnormal fasting glucose, Acute bilateral low back pain without sciatica, Anemia, Arthritis, Atopic dermatitis, Central stenosis of spinal canal, Chronic diastolic heart failure, Chronic kidney disease, stage III (moderate), Dermatitis, GERD (gastroesophageal reflux disease), Hyperlipidemia, Hypertension, Hypokalemia, Joint pain, Left ventricular hypertrophy, Microcytic hypochromic anemia, Mild aortic sclerosis, Mobility poor, Morbid obesity with BMI of 40.0-44.9, adult, Nonintractable episodic headache, Normocytic normochromic anemia, MONE (obstructive sleep apnea), Prediabetes, and Severe obesity (BMI >= 40).    Last VS: BP (!) 154/68   Pulse (!) 135   Temp 97.6 °F (36.4 °C)   Resp 18   Ht 5' (1.524 m)   Wt 92.9 kg (204 lb 12.9 oz)   SpO2 99%   Breastfeeding No   BMI 40.00 kg/m²     24H Vital Sign Range:  Temp:  [96.9 °F (36.1 °C)-98.5 °F (36.9 °C)]   Pulse:  []   Resp:  [16-18]   BP: (110-154)/(56-69)   SpO2:  [95 %-99 %]     Level of Consciousness (AVPU): alert    Recent Labs     08/17/23  0412   WBC 12.89*   HGB 8.1*   HCT 26.3*          Recent Labs     08/16/23  1314 08/17/23  0412    144   K 4.5 4.1   * 115*   CO2 18* 20*   BUN 14 11   CREATININE 0.9 0.9   * 116*        No results for input(s): "PH", "PCO2", "PO2", "HCO3", "POCSATURATED", "BE" in the last 72 hours.     OXYGEN:  Flow (L/min): 8          MEWS score: 1    Bedside RN, Padmini  contacted for tachycardia per flowsheet data. Charted in error and corrected per primary RN. No additional concerns verbalized at this time. Instructed to call [phone] for further concerns or assistance.    Marilynn Mariano RN        "

## 2023-08-18 NOTE — CODE/ RAPID DOCUMENTATION
RAPID RESPONSE NURSE CHART REVIEW       Chart Reviewed: 08/18/2023, 7:44 AM    MRN: [id number]  Bed: 557/557 A    Dx: Spondylolisthesis of lumbosacral region    Nilsa Curiel has a past medical history of Abnormal fasting glucose, Acute bilateral low back pain without sciatica, Anemia, Arthritis, Atopic dermatitis, Central stenosis of spinal canal, Chronic diastolic heart failure, Chronic kidney disease, stage III (moderate), Dermatitis, GERD (gastroesophageal reflux disease), Hyperlipidemia, Hypertension, Hypokalemia, Joint pain, Left ventricular hypertrophy, Microcytic hypochromic anemia, Mild aortic sclerosis, Mobility poor, Morbid obesity with BMI of 40.0-44.9, adult, Nonintractable episodic headache, Normocytic normochromic anemia, MONE (obstructive sleep apnea), Prediabetes, and Severe obesity (BMI >= 40).    Last VS: BP (!) 154/68   Pulse (!) 135   Temp 97.6 °F (36.4 °C)   Resp 18   Ht 5' (1.524 m)   Wt 92.9 kg (204 lb 12.9 oz)   SpO2 99%   Breastfeeding No   BMI 40.00 kg/m²     24H Vital Sign Range:  Temp:  [96.9 °F (36.1 °C)-98.5 °F (36.9 °C)]   Pulse:  []   Resp:  [16-18]   BP: (110-154)/(56-69)   SpO2:  [95 %-99 %]     Level of Consciousness (AVPU): alert    Recent Labs     08/17/23  0412   WBC 12.89*   HGB 8.1*   HCT 26.3*          Recent Labs     08/16/23  1314 08/17/23  0412    144   K 4.5 4.1   * 115*   CO2 18* 20*   BUN 14 11   CREATININE 0.9 0.9   * 116*       OXYGEN:  Flow (L/min): 8      MEWS score: 1    Rounding completed with charge ZACK Crawley. No concerns verbalized at this time. Instructed to call [phone] for further concerns or assistance.    Yris Jackson RN

## 2023-08-18 NOTE — NURSING
No complaints during the night; vss; dressing to lower back dry and intact; hemovac drains in place with very small amt drainage

## 2023-08-18 NOTE — HOSPITAL COURSE
The patient arrived to the [facility] for pre-operative management.  Upon completion of the pre-operative preparation, the patient was taken back to the operative theatre. The above procedure was performed without complication and the patient was transported to the post anesthesia care unit in stable condition.  After appropriate recovery from the anaesthetic agents used during the surgery, the patient was then transported to the inpatient floor. The patient has tolerated regular diet.  The patient's pain has been controlled using a multimodal approach. Currently, the patient's pain is well controlled on an oral regimen.  The patient has been voiding without difficulty.  The patient began participation in physical therapy after surgery and has progressed throughout the entire hospital stay. Once a downward trend was seen the output the drain was removed and dressed with a sterile dressing. Post-operative X-rays show stable hardware. Currently, the patient's progress is sufficient to allow the them to be discharged to home with  safely.  The patient agrees with this assessment and desires a discharge today.

## 2023-08-18 NOTE — HPI
Nilsa Curiel is a 77 y.o. female who returns to me today for preop. Patient continues to have LBP that radiates posteriorly down RLE to the knee. Patient has been doing PT and taking meds with minimal relief. She is miserable and wants surgical intervention.     3/16/23: b/l L4-5 TFESI - helped somewhat  4/26/23: L5-S1 LOUIS - helped about 55%     The patient does not smoke, have DM or endorse IVDU. The patient is not on any blood thinners and does not take chronic narcotics. She is a retired  at [employer].

## 2023-08-18 NOTE — PT/OT/SLP PROGRESS
Physical Therapy  Treatment    Nilsa Curiel   [id number]    Time Tracking:     PT Received On: 08/18/23   PT Start Time: 1058   PT Stop Time: 1136   PT Total Time (min): 38 min    Billable Minutes: Gait Training 14 and Therapeutic Activity 24 minutes       Recommendations:     Discharge recommendations:  Other (would benefit from post-acute PT upon D/C)     Equipment recommendations: Rolling Walker    Barriers to Discharge: None    Patient Information:     Recent Surgery: Procedure(s) (LRB):  FUSION, SPINE, LUMBAR, TLIF, POSTERIOR APPROACH, USING PEDICLE SCREW L2-5 PLDF/TLIF GLOBUS ROBOT SNS:SSEP/EMG cell saver (N/A) 2 Days Post-Op    Diagnosis: Spondylolisthesis of lumbosacral region    Length of Stay: 2 days    General Precautions: Standard, fall, WBAT  Orthopedic Precautions: Spinal precautions (avoid bending,lifting > 5 lbs and twisting x 6 weeks)  Brace: None    Assessment:     Nilsa Curiel tolerated treatment well today. She was reclined in her bedside chair reading upon my entry to room, very pleasant and agreeable to session. Reports 6/10 generalized R sided lower back pain into her hip with activity. Able to stand 2x from chair and 1x from bathroom toilet with stand-by assistance, using rolling walker this morning. Ambulates 82 ft in hallways with rolling walker and stand-by assistance, therapist with bedside chair in tow. Ambulates with slow gait speed but steady and no losses of balance; distance is ultimately limited by fatigue and mild SOB (wearing mask when moving in hallways, patient preference). Once back to room she was an urge to void so brought her into restroom for toilet transfers, performed her own pericare on toilet, up to sink to wash hands (SBA) before getting back into chair. Able to verbalize 3/3 spinal precautions, educated her on how to step on/off her stool at home in order into bed. She has some hesitancy about return to home due to daughter not always being available (works during day)  "but she is moving well and from my standpoint is safe for d/c once medically appropriate. Discussed PT role, POC, and goals with patient; verbalized understanding. Nilsa Curiel will continue to benefit from acute PT services to promote mobility during this admission and improve return to PLOF.    Problem List: weakness, decreased endurance, impaired self-care skills, impaired mobility, decreased sitting or standing balance, gait instability, spinal precautions, impaired cardiopulmonary response to activity, and pain    Rehab Prognosis: Good; patient would benefit from acute skilled PT services to address these deficits and reach maximum level of function.    Plan:     Patient to be seen 3 x/week to address the above listed problems via gait training, therapeutic activities, therapeutic exercises, neuromuscular re-education    Plan of Care Expires: 09/16/23  Plan of Care reviewed with: patient    Subjective:     Communicated with RN Carey prior to treatment, appropriate to see for treatment.    Pt found reclined in bedside chair upon PT entry to room, agreeable to treatment.    Patient commenting: "I've been dealing with back pain for 5 years or so now."    Does this patient have any cultural, spiritual, Islam conflicts given the current situation? Patient/family has no barriers to learning. Patient/family verbalizes understanding of his/her program and goals and demonstrates them correctly. No cultural, spiritual, or educational needs identified.    Objective:     Patient found with: curtis, SCD, Madelyn    Pain:  Pain Rating 1: 6/10 at R lower back into his R hip  Pain Rating Post-Intervention 1: 6/10 (same, see above) post-ambulation    Functional Mobility:    Bed Mobility:  NT; reclined in bedside chair before and after session    Transfers:  Sit to Stand: stand-by assistance from bedside chair with RW x 2 trial(s)  Toilet Transfer: stand-by assistance on/off toilet with RW x 1 trial (s)    Gait:  82 feet " in hallways with rolling walker and stand-by assistance, therapist with bedside chair in tow.  Ambulates with slow gait speed but steady and no losses of balance  Distance is ultimately limited by fatigue and mild SOB (wearing mask when moving in hallways, patient preference)    Assist level: Stand-By Assist  Device: Rolling walker    Balance:  Static Sit: Supervision at edge of toilet or on toilet    Static Stand: Stand-By Assist with Rolling walker    Additional Therapeutic Activity/Exercises:     1. She was reclined in her bedside chair reading upon my entry to room, very pleasant and agreeable to session. Reports 6/10 generalized R sided lower back pain into her hip with activity. Able to stand 2x from chair and 1x from bathroom toilet with stand-by assistance, using rolling walker this morning.    2. Ambulates 82 ft in hallways with rolling walker and stand-by assistance, therapist with bedside chair in tow. Ambulates with slow gait speed but steady and no losses of balance; distance is ultimately limited by fatigue and mild SOB (wearing mask when moving in hallways, patient preference).    3. Once back to room she was an urge to void so brought her into restroom for toilet transfers, performed her own pericare on toilet, up to sink to wash hands (SBA) before getting back into chair.    4. Able to verbalize 3/3 spinal precautions, educated her on how to step on/off her stool at home in order into bed. She has some hesitancy about return to home due to daughter not always being available (works during day) but she is moving well and from my standpoint is safe for d/c once medically appropriate.    5. I obtained a bedside commode and placed in her room, moves well enough to where she does not need to rely on purewick. Confirmed/updated RN Tram on this.    6. Discussed PT role, POC, and goals with patient; verbalized understanding    AM-PAC 6 CLICK MOBILITY  Turning over in bed (including adjusting bedclothes,  sheets and blankets)?: 3  Sitting down on and standing up from a chair with arms (e.g., wheelchair, bedside commode, etc.): 4  Moving from lying on back to sitting on the side of the bed?: 3  Moving to and from a bed to a chair (including a wheelchair)?: 3  Need to walk in hospital room?: 3  Climbing 3-5 steps with a railing?: 3  Basic Mobility Total Score: 19    Patient was left reclined in bedside chair with all lines intact, call button in reach, and RN notified.    GOALS:   Multidisciplinary Problems       Physical Therapy Goals          Problem: Physical Therapy    Goal Priority Disciplines Outcome Goal Variances Interventions   Physical Therapy Goal     PT, PT/OT Ongoing, Progressing     Description: Goals to be met by: 23     Patient will increase functional independence with mobility by performin. Supine to sit with Stand-by Assistance - Not met  2. Sit to stand transfer with Supervision - Not met  3. Bed to chair transfer with Supervision using Rolling Walker - Not met  4. Gait  x 150 feet with Supervision using Rolling Walker - Not met  5. Lower extremity exercise program x20 reps per handout, with independence - Not met                     Vish Garcia, PT  2023

## 2023-08-19 VITALS
HEIGHT: 60 IN | WEIGHT: 204.81 LBS | TEMPERATURE: 98 F | BODY MASS INDEX: 40.21 KG/M2 | OXYGEN SATURATION: 99 % | HEART RATE: 73 BPM | RESPIRATION RATE: 18 BRPM | SYSTOLIC BLOOD PRESSURE: 122 MMHG | DIASTOLIC BLOOD PRESSURE: 56 MMHG

## 2023-08-19 PROCEDURE — 97116 GAIT TRAINING THERAPY: CPT | Mod: CQ

## 2023-08-19 PROCEDURE — 25000003 PHARM REV CODE 250: Performed by: STUDENT IN AN ORGANIZED HEALTH CARE EDUCATION/TRAINING PROGRAM

## 2023-08-19 PROCEDURE — 63600175 PHARM REV CODE 636 W HCPCS: Performed by: ORTHOPAEDIC SURGERY

## 2023-08-19 PROCEDURE — 25000003 PHARM REV CODE 250: Performed by: ORTHOPAEDIC SURGERY

## 2023-08-19 PROCEDURE — 97530 THERAPEUTIC ACTIVITIES: CPT | Mod: CQ

## 2023-08-19 RX ADMIN — HEPARIN SODIUM 5000 UNITS: 5000 INJECTION INTRAVENOUS; SUBCUTANEOUS at 05:08

## 2023-08-19 RX ADMIN — ACETAMINOPHEN 650 MG: 325 TABLET ORAL at 05:08

## 2023-08-19 RX ADMIN — AMLODIPINE BESYLATE 10 MG: 10 TABLET ORAL at 09:08

## 2023-08-19 RX ADMIN — POLYETHYLENE GLYCOL 3350 17 G: 17 POWDER, FOR SOLUTION ORAL at 09:08

## 2023-08-19 RX ADMIN — OXYCODONE HYDROCHLORIDE 5 MG: 5 TABLET ORAL at 09:08

## 2023-08-19 RX ADMIN — FAMOTIDINE 20 MG: 20 TABLET, FILM COATED ORAL at 09:08

## 2023-08-19 RX ADMIN — METHOCARBAMOL 750 MG: 750 TABLET ORAL at 09:08

## 2023-08-19 RX ADMIN — MUPIROCIN: 20 OINTMENT TOPICAL at 09:08

## 2023-08-19 RX ADMIN — CARVEDILOL 3.12 MG: 3.12 TABLET, FILM COATED ORAL at 09:08

## 2023-08-19 RX ADMIN — ALLOPURINOL 100 MG: 100 TABLET ORAL at 09:08

## 2023-08-19 RX ADMIN — VALSARTAN 320 MG: 80 TABLET, FILM COATED ORAL at 09:08

## 2023-08-19 RX ADMIN — SENNOSIDES AND DOCUSATE SODIUM 1 TABLET: 50; 8.6 TABLET ORAL at 09:08

## 2023-08-19 RX ADMIN — CELECOXIB 200 MG: 200 CAPSULE ORAL at 09:08

## 2023-08-19 RX ADMIN — ATORVASTATIN CALCIUM 80 MG: 40 TABLET, FILM COATED ORAL at 09:08

## 2023-08-19 NOTE — PLAN OF CARE
[facility]  Discharge Final Note    Primary Care Provider: Gaurav Allison MD    Expected Discharge Date: 8/19/2023    Final Discharge Note (most recent)       Final Note - 08/19/23 0901          Final Note    Assessment Type Final Discharge Note     Anticipated Discharge Disposition Home-Health Care [facility] [place] [place]     Hospital Resources/Appts/Education Provided Provided patient/caregiver with written discharge plan information;Post-Acute resouces added to AVS        Post-Acute Status    Home Health Status Set-up Complete/Auth obtained     Discharge Delays None known at this time                     Important Message from Medicare             After-discharge care                [facility]   Service: Home Health Services    [address], [address]  [place] [address]   Phone: [phone]                     DC summary and HH order sent to [facility]. HME rep will reach out to pt regarding payment of RW and delivery to bedside.    Marilynn Molina, MSW, LCSW  Weekend [facility]  [facility] [phone]

## 2023-08-19 NOTE — PROGRESS NOTES
[facility]  Orthopedics  Progress Note    Patient Name: Nilsa Curiel  MRN: [id number]  Admission Date: 8/16/2023  Hospital Length of Stay: 3 days  Attending Provider: Jesus Alberto Sharp MD  Primary Care Provider: Gaurav Allison MD  Follow-up For: Procedure(s) (LRB):  FUSION, SPINE, LUMBAR, TLIF, POSTERIOR APPROACH, USING PEDICLE SCREW L2-5 PLDF/TLIF GLOBUS ROBOT SNS:SSEP/EMG cell saver (N/A)    Post-Operative Day: 3 Days Post-Op  Subjective:     Principal Problem:Spondylolisthesis of lumbosacral region    Principal Orthopedic Problem: s/p L2-5 lami fusion 8/16    Interval History: POD3. Pain well controlled. 84 with PT yesterday - recs for HH. Drain removed Friday, 8/18. Voiding independently.      Will discuss potential DC today with Staff.     Review of patient's allergies indicates:  No Known Allergies    Current Facility-Administered Medications   Medication    acetaminophen tablet 650 mg    allopurinoL tablet 100 mg    amLODIPine tablet 10 mg    atorvastatin tablet 80 mg    carvediloL tablet 3.125 mg    celecoxib capsule 200 mg    famotidine tablet 20 mg    gabapentin capsule 100 mg    heparin (porcine) injection 5,000 Units    methocarbamoL tablet 750 mg    mupirocin 2 % ointment    ondansetron disintegrating tablet 8 mg    ondansetron injection 4 mg    oxyCODONE immediate release tablet 5 mg    oxyCODONE immediate release tablet Tab 10 mg    polyethylene glycol packet 17 g    senna-docusate 8.6-50 mg per tablet 1 tablet    valsartan tablet 320 mg     Objective:     Vital Signs (Most Recent):  Temp: 98.2 °F (36.8 °C) (08/19/23 0009)  Pulse: 91 (08/19/23 0009)  Resp: 18 (08/19/23 0009)  BP: (!) 107/55 (08/19/23 0009)  SpO2: (!) 93 % (08/19/23 0009) Vital Signs (24h Range):  Temp:  [95.5 °F (35.3 °C)-98.2 °F (36.8 °C)] 98.2 °F (36.8 °C)  Pulse:  [76-91] 91  Resp:  [16-18] 18  SpO2:  [92 %-99 %] 93 %  BP: (106-135)/(50-65) 107/55     Weight: 92.9 kg (204 lb 12.9 oz)  Height: 5' (152.4  "cm)  Body mass index is 40 kg/m².      Intake/Output Summary (Last 24 hours) at 8/19/2023 0705  Last data filed at 8/18/2023 1202  Gross per 24 hour   Intake --   Output 910 ml   Net -910 ml         Ortho/SPM Exam  Incisional dressing c/d/I   BLE strength 5/5      Significant Labs: CBC:   No results for input(s): "WBC", "HGB", "HCT", "PLT" in the last 48 hours.    All pertinent labs within the past 24 hours have been reviewed.    Significant Imaging: I have reviewed and interpreted all pertinent imaging results/findings.    Assessment/Plan:     * Spondylolisthesis of lumbosacral region  Nilsa Curiel is a 77 y.o. female s/p L2-5 PSF on 8/16/23    VS:  Stable on RA  Labs:  Hb 8.1, no further labs  Pain control: multimodal regimen  PT/OT: WBAT with spine precautions   DVT PPx:  Heparin 5k tid   Abx:  24 hr ancef   Mayer:  Dc POD1   Drain: removed 8/18/23  F/u: 4wk post op appt     Dispo: Will discuss potential Dc with staff today.               JOSEFA DAVILA MD  Orthopedics  [facility] - Surgery  "

## 2023-08-19 NOTE — SUBJECTIVE & OBJECTIVE
"Principal Problem:Spondylolisthesis of lumbosacral region    Principal Orthopedic Problem: s/p L2-5 lami fusion 8/16    Interval History: POD3. Pain well controlled. 84 with PT yesterday - recs for HH. Drain removed Friday, 8/18. Voiding independently.      Will discuss potential DC today with Staff.     Review of patient's allergies indicates:  No Known Allergies    Current Facility-Administered Medications   Medication    acetaminophen tablet 650 mg    allopurinoL tablet 100 mg    amLODIPine tablet 10 mg    atorvastatin tablet 80 mg    carvediloL tablet 3.125 mg    celecoxib capsule 200 mg    famotidine tablet 20 mg    gabapentin capsule 100 mg    heparin (porcine) injection 5,000 Units    methocarbamoL tablet 750 mg    mupirocin 2 % ointment    ondansetron disintegrating tablet 8 mg    ondansetron injection 4 mg    oxyCODONE immediate release tablet 5 mg    oxyCODONE immediate release tablet Tab 10 mg    polyethylene glycol packet 17 g    senna-docusate 8.6-50 mg per tablet 1 tablet    valsartan tablet 320 mg     Objective:     Vital Signs (Most Recent):  Temp: 98.2 °F (36.8 °C) (08/19/23 0009)  Pulse: 91 (08/19/23 0009)  Resp: 18 (08/19/23 0009)  BP: (!) 107/55 (08/19/23 0009)  SpO2: (!) 93 % (08/19/23 0009) Vital Signs (24h Range):  Temp:  [95.5 °F (35.3 °C)-98.2 °F (36.8 °C)] 98.2 °F (36.8 °C)  Pulse:  [76-91] 91  Resp:  [16-18] 18  SpO2:  [92 %-99 %] 93 %  BP: (106-135)/(50-65) 107/55     Weight: 92.9 kg (204 lb 12.9 oz)  Height: 5' (152.4 cm)  Body mass index is 40 kg/m².      Intake/Output Summary (Last 24 hours) at 8/19/2023 0705  Last data filed at 8/18/2023 1202  Gross per 24 hour   Intake --   Output 910 ml   Net -910 ml          Ortho/SPM Exam  Incisional dressing c/d/I   BLE strength 5/5      Significant Labs: CBC:   No results for input(s): "WBC", "HGB", "HCT", "PLT" in the last 48 hours.    All pertinent labs within the past 24 hours have been reviewed.    Significant Imaging: I have reviewed and " interpreted all pertinent imaging results/findings.

## 2023-08-19 NOTE — PT/OT/SLP PROGRESS
Physical Therapy Treatment    Patient Name:  Nilsa Curiel   MRN:  [id number]    Recommendations:     Discharge Recommendations: other (see comments)  Discharge Equipment Recommendations: walker, rolling  Barriers to discharge: None    Assessment:     Nilsa Curiel is a 77 y.o. female admitted with a medical diagnosis of Spondylolisthesis of lumbosacral region.  She presents with the following impairments/functional limitations: weakness, impaired endurance, impaired self care skills, impaired functional mobility, gait instability, impaired balance, pain, impaired cardiopulmonary response to activity, orthopedic precautions, decreased ROM .Pt Progressing with PT Intervention. Pt Progressing with improving gait distance. Pt would continue to benefit from skilled PT to address overall functional mobility, goals , and to return to functional baseline.  Goals remain appropriate    Rehab Prognosis: Good; patient would benefit from acute skilled PT services to address these deficits and reach maximum level of function.    Recent Surgery: Procedure(s) (LRB):  FUSION, SPINE, LUMBAR, TLIF, POSTERIOR APPROACH, USING PEDICLE SCREW L2-5 PLDF/TLIF GLOBUS ROBOT SNS:SSEP/EMG cell saver (N/A) 3 Days Post-Op    Plan:     During this hospitalization, patient to be seen 3 x/week to address the identified rehab impairments via gait training, therapeutic activities, therapeutic exercises, neuromuscular re-education and progress toward the following goals:    Plan of Care Expires:  09/16/23    Subjective     Chief Complaint: fatigue  Patient/Family Comments/goals: I am leaving today  Pain/Comfort:  Pain Rating 1: 6/10  Location - Side 1: Right  Location - Orientation 1: lower  Location 1: back  Pain Addressed 1: Reposition, Distraction      Objective:     Communicated with RN prior to session.  Patient found HOB elevated with  (hemovac; SCD; PureWick) upon PT entry to room.     General Precautions: Standard, fall  Orthopedic Precautions:  spinal precautions (WBAT)  Braces: N/A  Respiratory Status: Room air     Functional Mobility:  Bed Mobility:     Rolling Right: stand by assistance  Scooting: stand by assistance  Supine to Sit: minimum assistance  Transfers:     Sit to Stand:  stand by assistance with rolling walker  Gait: pt ambulated 70 ft x 2  with rolling walker and stand-by assistance, therapist with bedside chair in tow.  Stairs:  Pt ascended/descended 4 stair(s) with No Assistive Device with bilateral handrails with Contact Guard Assistance.       AM-PAC 6 CLICK MOBILITY  Turning over in bed (including adjusting bedclothes, sheets and blankets)?: 3  Sitting down on and standing up from a chair with arms (e.g., wheelchair, bedside commode, etc.): 4  Moving from lying on back to sitting on the side of the bed?: 3  Moving to and from a bed to a chair (including a wheelchair)?: 3  Need to walk in hospital room?: 3  Climbing 3-5 steps with a railing?: 3  Basic Mobility Total Score: 19       Treatment & Education:  Therapist provided instruction and educated of  patient on progress, safety,d/c,PT POC,   proper body mechanics, energy conservation, and fall prevention strategies during tasks listed above, on the effects of prolonged immobility and the importance of performing OOB activity and exercises to promote healing and reduce recovery time  Education on spinal precautions (no BLT: bending, lifting >?5 lbs, and twisting.) Education on log roll for bed mobility in order to maintain spinal precautions    Patient  facilitated therex  seated in bedside chair B LE AROM AP, LAQ, Hip Flexion, Hip Abd/Add. Patient required skilled PTA for instruction of exercises and appropriate cues to perform exercises safely, sequencing and appropriately.   Exercises performed to develop and maintain pt's strength, endurance and flexibility.  Updated white board with appropriate PT mobility information for medical team notification     Donned an extra gown     Call  nursing/pct to transfer to chair/use bathroom. Pt stated understanding      Bedside table in front of patient and area set up for function, convenience, and safety. RN aware of patient's mobility needs and status. Questions/concerns addressed within PTA scope of practice; patient  with no further questions. Time was provided for active listening, discussion of health disposition, and discussion of safe discharge. Pt?verbalized?agreement .    Patient left up in chair with all lines intact, call button in reach, and nsg notified..    GOALS:   Multidisciplinary Problems       Physical Therapy Goals          Problem: Physical Therapy    Goal Priority Disciplines Outcome Goal Variances Interventions   Physical Therapy Goal     PT, PT/OT Ongoing, Progressing     Description: Goals to be met by: 23     Patient will increase functional independence with mobility by performin. Supine to sit with Stand-by Assistance - Not met  2. Sit to stand transfer with Supervision - Not met  3. Bed to chair transfer with Supervision using Rolling Walker - Not met  4. Gait  x 150 feet with Supervision using Rolling Walker - Not met  5. Lower extremity exercise program x20 reps per handout, with independence - Not met                       Time Tracking:     PT Received On: 23  PT Start Time: 839     PT Stop Time: 921  PT Total Time (min): 42 min     Billable Minutes: Gait Training 15 and Therapeutic Activity 26                   2023

## 2023-08-19 NOTE — ASSESSMENT & PLAN NOTE
Nilsa Curiel is a 77 y.o. female s/p L2-5 PSF on 8/16/23    VS:  Stable on RA  Labs:  Hb 8.1, no further labs  Pain control: multimodal regimen  PT/OT: WBAT with spine precautions   DVT PPx:  Heparin 5k tid   Abx:  24 hr ancef   Mayer:  Dc POD1   Drain: removed 8/18/23  F/u: 4wk post op appt     Dispo: Will discuss potential Dc with staff today.

## 2023-08-19 NOTE — DISCHARGE SUMMARY
[facility]  Orthopedics  Discharge Summary      Patient Name: Nilsa Curiel  MRN: [id number]  Admission Date: 8/16/2023  Hospital Length of Stay: 3 days  Discharge Date and Time:  08/19/2023 8:28 AM  Attending Physician: Jesus Alberto Sharp MD   Discharging Provider: JOSEFA DAVILA MD  Primary Care Provider: Gaurav Allison MD    HPI: Nilsa Curiel is a 77 y.o. female who returns to me today for preop. Patient continues to have LBP that radiates posteriorly down RLE to the knee. Patient has been doing PT and taking meds with minimal relief. She is miserable and wants surgical intervention.     3/16/23: b/l L4-5 TFESI - helped somewhat  4/26/23: L5-S1 LOUIS - helped about 55%     The patient does not smoke, have DM or endorse IVDU. The patient is not on any blood thinners and does not take chronic narcotics. She is a retired  at [employer].    Procedure(s) (LRB):  FUSION, SPINE, LUMBAR, TLIF, POSTERIOR APPROACH, USING PEDICLE SCREW L2-5 PLDF/TLIF GLOBUS ROBOT SNS:SSEP/EMG cell saver (N/A)      Hospital Course: Patient presented for above procedure.  Tolerated it well and was discharged home POD3 after voiding, tolerating diet, ambulating, pain controlled. Discharge instructions, follow-up appointment, and med rec are below.      Consults (From admission, onward)          Status Ordering Provider     IP consult case management/social work  Once        Provider:  (Not yet assigned)    Acknowledged PEDRO PABLO HASSAN            Significant Diagnostic Studies: No pertinent studies.    Pending Diagnostic Studies:       None          Final Active Diagnoses:    Diagnosis Date Noted POA    PRINCIPAL PROBLEM:  Spondylolisthesis of lumbosacral region [M43.17] 07/25/2018 Yes    Acute blood loss anemia [D62] 08/18/2023 No    Severe obesity (BMI >= 40) [E66.01] 06/10/2022 Yes    Essential hypertension [I10] 04/06/2022 Yes    MONE (obstructive sleep apnea) [G47.33] 07/25/2018 Yes      Problems  "Resolved During this Admission:      Discharged Condition: good    Disposition: Home-Health Care [facility]    Follow Up:    Patient Instructions:      WALKER FOR HOME USE     Order Specific Question Answer Comments   Type of Walker: Adult (5'4"-6'6")    With wheels? Yes    Height: 5' (1.524 m)    Weight: 92.9 kg (204 lb 12.9 oz)    Length of need (1-99 months): 99    Does patient have medical equipment at home? cane, quad    Does patient have medical equipment at home? cane, straight    Please check all that apply: Patient's condition impairs ambulation.    Please check all that apply: Patient is unable to safely ambulate without equipment.      Diet general     Call MD for:  temperature >100.4     Call MD for:  persistent nausea and vomiting     Call MD for:  severe uncontrolled pain     Call MD for:  difficulty breathing, headache or visual disturbances     Call MD for:  redness, tenderness, or signs of infection (pain, swelling, redness, odor or green/yellow discharge around incision site)     Call MD for:  hives     Call MD for:  persistent dizziness or light-headedness     Call MD for:  extreme fatigue     Leave dressing on - Keep it clean, dry, and intact until clinic visit     Medications:  Reconciled Home Medications:      Medication List        START taking these medications      acetaminophen 500 MG tablet  Commonly known as: TYLENOL  Take 1 tablet (500 mg total) by mouth 3 (three) times daily.     celecoxib 100 MG capsule  Commonly known as: CeleBREX  Take 1 capsule (100 mg total) by mouth 2 (two) times daily.     gabapentin 100 MG capsule  Commonly known as: NEURONTIN  Take 1 capsule (100 mg total) by mouth 3 (three) times daily.     oxyCODONE 5 MG immediate release tablet  Commonly known as: ROXICODONE  Take 1 tablet (5 mg total) by mouth every 6 (six) hours as needed for Pain (for breakthrough pain).            CHANGE how you take these medications      methocarbamoL 500 MG Tab  Commonly known as: " ROBAXIN  Take 2 tablets (1,000 mg total) by mouth 3 (three) times daily.  What changed: how much to take            CONTINUE taking these medications      allopurinoL 100 MG tablet  Commonly known as: ZYLOPRIM  Take 1 tablet (100 mg total) by mouth once daily.     amLODIPine 10 MG tablet  Commonly known as: NORVASC  Take 1 tablet (10 mg total) by mouth once daily.     aspirin 81 MG EC tablet  Commonly known as: ECOTRIN  Take 1 tablet (81 mg total) by mouth once daily.     betamethasone dipropionate 0.05 % ointment  Commonly known as: DIPROLENE  Apply topically 2 (two) times daily.     betamethasone valerate 0.1% 0.1 % Oint  Commonly known as: VALISONE  Apply topically once daily.     biotin 300 mcg Tab  Take 1 tablet by mouth once daily.     carvediloL 3.125 MG tablet  Commonly known as: COREG  Take 1 tablet (3.125 mg total) by mouth 2 (two) times daily with meals.     cetirizine 10 MG tablet  Commonly known as: ZYRTEC  Take 1 tablet (10 mg total) by mouth once daily. for allergic rhinitis     coenzyme Q10 100 mg Tab  Take 1 tablet by mouth once daily.     diclofenac sodium 1 % Gel  Commonly known as: VOLTAREN  Apply 2 g topically daily as needed.     fluocinonide 0.05 % external solution  Commonly known as: LIDEX  AAA scalp qday - bid prn pruritus     fluticasone propionate 50 mcg/actuation nasal spray  Commonly known as: FLONASE  1 spray (50 mcg total) by Each Nostril route once daily.     * ketoconazole 2 % shampoo  Commonly known as: NIZORAL  Wash hair with medicated shampoo at least 2x/week - let sit on scalp at least 5 minutes prior to rinsing     * ketoconazole 2 % cream  Commonly known as: NIZORAL  Apply to affected areas of body BID prn irritation.     lactobacillus combo no.6 4 billion cell Tab  Commonly known as: PROBIOTIC COMPLEX  Take 1 tablet by mouth once daily.     mometasone 0.1 % ointment  Commonly known as: ELOCON  Apply twice daily to hands prn eczema.     nystatin-triamcinolone cream  Commonly  known as: MYCOLOG II  Apply topically 2 (two) times a day.     olmesartan 40 MG tablet  Commonly known as: BENICAR  TAKE 1 TABLET ONE TIME DAILY     omega-3 fatty acids 300 mg Cap  Take by mouth.     omeprazole 40 MG capsule  Commonly known as: PRILOSEC  Take 1 capsule (40 mg total) by mouth every morning. As needed for reflux symptoms     potassium chloride SA 20 MEQ tablet  Commonly known as: K-DUR,KLOR-CON  Take 1 tablet (20 mEq total) by mouth 2 (two) times daily.     rosuvastatin 20 MG tablet  Commonly known as: CRESTOR  Take 1 tablet (20 mg total) by mouth once daily.     sumatriptan 100 MG tablet  Commonly known as: IMITREX  Take 1 tablet (100 mg total) by mouth daily as needed for Migraine. No more than 2 doses in 24 hours     TAC 0.1%-ciclopirox-MOM  Apply to affected area twice daily after cool blow dry. (discard after 1/14/2023)     triamcinolone acetonide 0.025% 0.025 % cream  Commonly known as: KENALOG  Apply to affected areas of body BID prn irritation.     TURMERIC ORAL  Take by mouth.     VITAMIN B-2 ORAL  Take 1 capsule by mouth once daily.     WOMEN'S 50+ DAILY FORMULA ORAL  Take by mouth.           * This list has 2 medication(s) that are the same as other medications prescribed for you. Read the directions carefully, and ask your doctor or other care provider to review them with you.                STOP taking these medications      meloxicam 15 MG tablet  Commonly known as: MADDIE DAVILA MD  Orthopedics  [facility]

## 2023-08-21 ENCOUNTER — PATIENT OUTREACH (OUTPATIENT)
Dept: ADMINISTRATIVE | Facility: CLINIC | Age: 78
End: 2023-08-21
Payer: MEDICARE

## 2023-08-21 NOTE — PROGRESS NOTES
C3 nurse spoke with Nilsa Curiel  for a [facility] post hospital discharge follow up call. [facility] Petra Hatch NP on 08/24/2023 at 0800

## 2023-08-22 PROCEDURE — G0180 MD CERTIFICATION HHA PATIENT: HCPCS | Mod: ,,, | Performed by: ORTHOPAEDIC SURGERY

## 2023-08-22 PROCEDURE — G0180 PR HOME HEALTH MD CERTIFICATION: ICD-10-PCS | Mod: ,,, | Performed by: ORTHOPAEDIC SURGERY

## 2023-08-23 DIAGNOSIS — M43.10 DEGENERATIVE SPONDYLOLISTHESIS: ICD-10-CM

## 2023-08-23 DIAGNOSIS — Z98.890 S/P SPINAL SURGERY: Primary | ICD-10-CM

## 2023-08-28 ENCOUNTER — OFFICE VISIT (OUTPATIENT)
Dept: HOME HEALTH SERVICES | Facility: CLINIC | Age: 78
End: 2023-08-28
Payer: MEDICARE

## 2023-08-28 DIAGNOSIS — R51.9 HEADACHE DISORDER: ICD-10-CM

## 2023-08-28 DIAGNOSIS — E78.2 MIXED HYPERLIPIDEMIA: ICD-10-CM

## 2023-08-28 DIAGNOSIS — I10 ESSENTIAL HYPERTENSION: ICD-10-CM

## 2023-08-28 DIAGNOSIS — Z74.09 IMPAIRED MOBILITY: ICD-10-CM

## 2023-08-28 DIAGNOSIS — E66.01 SEVERE OBESITY (BMI >= 40): ICD-10-CM

## 2023-08-28 DIAGNOSIS — M43.17 SPONDYLOLISTHESIS OF LUMBOSACRAL REGION: ICD-10-CM

## 2023-08-28 PROCEDURE — 1157F ADVNC CARE PLAN IN RCRD: CPT | Mod: CPTII,S$GLB,, | Performed by: NURSE PRACTITIONER

## 2023-08-28 PROCEDURE — 3288F FALL RISK ASSESSMENT DOCD: CPT | Mod: CPTII,S$GLB,, | Performed by: NURSE PRACTITIONER

## 2023-08-28 PROCEDURE — 1157F PR ADVANCE CARE PLAN OR EQUIV PRESENT IN MEDICAL RECORD: ICD-10-PCS | Mod: CPTII,S$GLB,, | Performed by: NURSE PRACTITIONER

## 2023-08-28 PROCEDURE — 99497 PR ADVNCD CARE PLAN 30 MIN: ICD-10-PCS | Mod: S$GLB,,, | Performed by: NURSE PRACTITIONER

## 2023-08-28 PROCEDURE — 99497 ADVNCD CARE PLAN 30 MIN: CPT | Mod: S$GLB,,, | Performed by: NURSE PRACTITIONER

## 2023-08-28 PROCEDURE — 3044F HG A1C LEVEL LT 7.0%: CPT | Mod: CPTII,S$GLB,, | Performed by: NURSE PRACTITIONER

## 2023-08-28 PROCEDURE — 1101F PR PT FALLS ASSESS DOC 0-1 FALLS W/OUT INJ PAST YR: ICD-10-PCS | Mod: CPTII,S$GLB,, | Performed by: NURSE PRACTITIONER

## 2023-08-28 PROCEDURE — 99495 TCM SERVICES (MODERATE COMPLEXITY): ICD-10-PCS | Mod: S$GLB,,, | Performed by: NURSE PRACTITIONER

## 2023-08-28 PROCEDURE — 1125F AMNT PAIN NOTED PAIN PRSNT: CPT | Mod: CPTII,S$GLB,, | Performed by: NURSE PRACTITIONER

## 2023-08-28 PROCEDURE — 3078F PR MOST RECENT DIASTOLIC BLOOD PRESSURE < 80 MM HG: ICD-10-PCS | Mod: CPTII,S$GLB,, | Performed by: NURSE PRACTITIONER

## 2023-08-28 PROCEDURE — 3288F PR FALLS RISK ASSESSMENT DOCUMENTED: ICD-10-PCS | Mod: CPTII,S$GLB,, | Performed by: NURSE PRACTITIONER

## 2023-08-28 PROCEDURE — 1101F PT FALLS ASSESS-DOCD LE1/YR: CPT | Mod: CPTII,S$GLB,, | Performed by: NURSE PRACTITIONER

## 2023-08-28 PROCEDURE — 3044F PR MOST RECENT HEMOGLOBIN A1C LEVEL <7.0%: ICD-10-PCS | Mod: CPTII,S$GLB,, | Performed by: NURSE PRACTITIONER

## 2023-08-28 PROCEDURE — 3078F DIAST BP <80 MM HG: CPT | Mod: CPTII,S$GLB,, | Performed by: NURSE PRACTITIONER

## 2023-08-28 PROCEDURE — 3077F SYST BP >= 140 MM HG: CPT | Mod: CPTII,S$GLB,, | Performed by: NURSE PRACTITIONER

## 2023-08-28 PROCEDURE — 1125F PR PAIN SEVERITY QUANTIFIED, PAIN PRESENT: ICD-10-PCS | Mod: CPTII,S$GLB,, | Performed by: NURSE PRACTITIONER

## 2023-08-28 PROCEDURE — 3077F PR MOST RECENT SYSTOLIC BLOOD PRESSURE >= 140 MM HG: ICD-10-PCS | Mod: CPTII,S$GLB,, | Performed by: NURSE PRACTITIONER

## 2023-08-28 PROCEDURE — 99495 TRANSJ CARE MGMT MOD F2F 14D: CPT | Mod: S$GLB,,, | Performed by: NURSE PRACTITIONER

## 2023-08-30 NOTE — PROGRESS NOTES
Date: 08/30/2023    Supervising Physician: Jesus Alberto Sharp M.D.    Date of Surgery: 8/16/23    Procedure: L2-5 TLIF    History: Nilsa Curiel is seen today for follow-up following the above listed procedure. Overall the patient is doing well but today notes she has been having low back pain but is doing ok.   Pain is well controlled with current pain medication.  she denies fever, chills, and sweats since the time of the surgery.       Exam: Post op dressing taken down. Staples removed. Incision is healing well, clean, dry and intact.   There is no sign of infection. Neuro exam is stable. No signs of DVT.    Radiographs: n/a    Assessment/Plan: 2 weeks post op.    Doing well postoperatively.  reviewed.    I will plan to see the patient back for the next postop visit in 2 weeks.     Thank you for the opportunity to participate in this patient's care. Please give me a call if there are any concerns or questions.    Dr. Jesus Alberto Sharp has personally examined the patient, answered all questions, and agreed with the above plan.

## 2023-08-31 ENCOUNTER — OFFICE VISIT (OUTPATIENT)
Dept: ORTHOPEDICS | Facility: CLINIC | Age: 78
End: 2023-08-31
Payer: MEDICARE

## 2023-08-31 VITALS — HEIGHT: 61 IN | BODY MASS INDEX: 38.67 KG/M2 | WEIGHT: 204.81 LBS

## 2023-08-31 DIAGNOSIS — Z98.890 S/P SPINAL SURGERY: Primary | ICD-10-CM

## 2023-08-31 PROCEDURE — 1157F PR ADVANCE CARE PLAN OR EQUIV PRESENT IN MEDICAL RECORD: ICD-10-PCS | Mod: CPTII,S$GLB,, | Performed by: ORTHOPAEDIC SURGERY

## 2023-08-31 PROCEDURE — 3288F PR FALLS RISK ASSESSMENT DOCUMENTED: ICD-10-PCS | Mod: CPTII,S$GLB,, | Performed by: ORTHOPAEDIC SURGERY

## 2023-08-31 PROCEDURE — 1125F PR PAIN SEVERITY QUANTIFIED, PAIN PRESENT: ICD-10-PCS | Mod: CPTII,S$GLB,, | Performed by: ORTHOPAEDIC SURGERY

## 2023-08-31 PROCEDURE — 3044F HG A1C LEVEL LT 7.0%: CPT | Mod: CPTII,S$GLB,, | Performed by: ORTHOPAEDIC SURGERY

## 2023-08-31 PROCEDURE — 1159F MED LIST DOCD IN RCRD: CPT | Mod: CPTII,S$GLB,, | Performed by: ORTHOPAEDIC SURGERY

## 2023-08-31 PROCEDURE — 99024 POSTOP FOLLOW-UP VISIT: CPT | Mod: S$GLB,,, | Performed by: ORTHOPAEDIC SURGERY

## 2023-08-31 PROCEDURE — 99999 PR PBB SHADOW E&M-EST. PATIENT-LVL IV: ICD-10-PCS | Mod: PBBFAC,,, | Performed by: ORTHOPAEDIC SURGERY

## 2023-08-31 PROCEDURE — 1125F AMNT PAIN NOTED PAIN PRSNT: CPT | Mod: CPTII,S$GLB,, | Performed by: ORTHOPAEDIC SURGERY

## 2023-08-31 PROCEDURE — 3288F FALL RISK ASSESSMENT DOCD: CPT | Mod: CPTII,S$GLB,, | Performed by: ORTHOPAEDIC SURGERY

## 2023-08-31 PROCEDURE — 1157F ADVNC CARE PLAN IN RCRD: CPT | Mod: CPTII,S$GLB,, | Performed by: ORTHOPAEDIC SURGERY

## 2023-08-31 PROCEDURE — 1101F PR PT FALLS ASSESS DOC 0-1 FALLS W/OUT INJ PAST YR: ICD-10-PCS | Mod: CPTII,S$GLB,, | Performed by: ORTHOPAEDIC SURGERY

## 2023-08-31 PROCEDURE — 99024 PR POST-OP FOLLOW-UP VISIT: ICD-10-PCS | Mod: S$GLB,,, | Performed by: ORTHOPAEDIC SURGERY

## 2023-08-31 PROCEDURE — 1159F PR MEDICATION LIST DOCUMENTED IN MEDICAL RECORD: ICD-10-PCS | Mod: CPTII,S$GLB,, | Performed by: ORTHOPAEDIC SURGERY

## 2023-08-31 PROCEDURE — 3044F PR MOST RECENT HEMOGLOBIN A1C LEVEL <7.0%: ICD-10-PCS | Mod: CPTII,S$GLB,, | Performed by: ORTHOPAEDIC SURGERY

## 2023-08-31 PROCEDURE — 1101F PT FALLS ASSESS-DOCD LE1/YR: CPT | Mod: CPTII,S$GLB,, | Performed by: ORTHOPAEDIC SURGERY

## 2023-08-31 PROCEDURE — 99999 PR PBB SHADOW E&M-EST. PATIENT-LVL IV: CPT | Mod: PBBFAC,,, | Performed by: ORTHOPAEDIC SURGERY

## 2023-08-31 RX ORDER — OXYCODONE HYDROCHLORIDE 5 MG/1
5 TABLET ORAL EVERY 6 HOURS PRN
Qty: 24 TABLET | Refills: 0 | Status: SHIPPED | OUTPATIENT
Start: 2023-08-31 | End: 2023-09-07 | Stop reason: SDUPTHER

## 2023-09-05 VITALS
DIASTOLIC BLOOD PRESSURE: 77 MMHG | TEMPERATURE: 98 F | HEART RATE: 78 BPM | SYSTOLIC BLOOD PRESSURE: 142 MMHG | OXYGEN SATURATION: 98 % | RESPIRATION RATE: 18 BRPM

## 2023-09-05 NOTE — PROGRESS NOTES
"[facility]  Transition of Care Home Visit    Visit Date: 8/28/2023  Encounter Provider: Bhavesh Hatch NP  PCP:  Gaurav Allison MD    PRESENTING HISTORY      Patient ID: Nilsa Curiel 77 y.o. female.    Chief Complaint: Hospital Follow Up    Patient consent was obtained prior to treatment on this visit.    The patient is being seen at home due to a physical debility or recent acute hospitalization that presents a taxing effort to leave the home, to mitigate high risk of hospital readmission or due to the limited availability of reliable or safe options for transportation to the point of access to the provider. The visit meets the criteria for medical necessity as defined by CMS as "health-care services needed to prevent, diagnose, or treat an illness, injury, condition, disease, or its symptoms and that meet accepted standards of medicine." Prior to treatment on this visit the chart was reviewed and patient consent was obtained.    HPI: Nilsa Curiel is a 77 y.o. female who returns to me today for preop. Patient continues to have LBP that radiates posteriorly down RLE to the knee. Patient has been doing PT and taking meds with minimal relief. She is miserable and wants surgical intervention.   3/16/23: b/l L4-5 TFESI - helped somewhat  4/26/23: L5-S1 LOUIS - helped about 55%   The patient does not smoke, have DM or endorse IVDU. The patient is not on any blood thinners and does not take chronic narcotics. She is a retired  at [employer].   Procedure(s) (LRB):  FUSION, SPINE, LUMBAR, TLIF, POSTERIOR APPROACH, USING PEDICLE SCREW L2-5 PLDF/TLIF GLOBUS ROBOT SNS:SSEP/EMG cell saver (N/A)      Hospital Course: Patient presented for above procedure.  Tolerated it well and was discharged home POD3 after voiding, tolerating diet, ambulating, pain controlled. Discharge instructions, follow-up appointment, and med rec are below.    Today:  Ms. Nilsa Curiel is a 77 y.o. female is being " seen and examined at home today for transitional care visit to the home environment post-discharge from inpatient hospitalization encounter described above. Nilsa presents at baseline state of health as reported by patient and caregiver. VSS. No acute issues reported.  Back incision with dressing clean, dry, intact.  Denies numbness, tingling, bladder or bowel issues, chest pain, SOB, peripheral edema, abdominal pain. She is current with [facility]. Reports taking all medications as prescribed. No other needs identified at this time. She is aware of upcoming follow up and plan on attending.     Admission Date: 8/16/2023  Hospital Length of Stay: 3 days  Discharge Date and Time:  08/19/2023 8:28 AM  _________________________________________________________________    Review of Systems   Constitutional:  Negative for chills and fever.   HENT:  Negative for congestion, postnasal drip and rhinorrhea.    Eyes:  Negative for visual disturbance.   Respiratory:  Negative for chest tightness.    Cardiovascular:  Negative for chest pain and palpitations.   Gastrointestinal:  Negative for abdominal pain, blood in stool, diarrhea, nausea and vomiting.   Genitourinary:  Negative for difficulty urinating.   Musculoskeletal:  Positive for back pain and gait problem.   Skin:  Positive for wound. Negative for color change.   Neurological:  Negative for dizziness and weakness.   Hematological:  Does not bruise/bleed easily.   Psychiatric/Behavioral:  Negative for agitation.        Assessments:  Environmental: single story home, no steps to enter, adequate lighting and temperature control  Functional Status: Assistance with ADL's/IADL's, ambulates with assistance of a cane/walker, continent of bowel and bladder  Safety: Fall Precautions, COVID Precautions/Social Distancing/Mask Use  Nutritional: Adequate  Home Health: [facility]  DME/Supplies: rw, wc, bsc    Ethical / Legal: Advance Care Planning   Capacity to make medical decisions:   yes, Conflict no  Surrogate decision maker:  Ladi Groves Relationship: Daughter  Advance Directives:  yes  HCPOA: yes  [place]:  no  Code Status:  full code    Advance Care Planning/Goals of Care:    Advanced Care Directives,  [place] forms left in the home for family review, discussion and signing with instructions to return upon their next provider encounter for inclusion to the medical record.  Discussed Advance Care Planning for 20 minutes.       PAST HISTORY:     Past Medical History:   Diagnosis Date    Abnormal fasting glucose 2015    Acute bilateral low back pain without sciatica 10/10/2017    Anemia 2018    Arthritis     Atopic dermatitis     Central stenosis of spinal canal 2022    Chronic diastolic heart failure 2022    Chronic kidney disease, stage III (moderate) 2020    Dermatitis 2023    GERD (gastroesophageal reflux disease)     Hyperlipidemia     Hypertension     Hypokalemia 2022    Joint pain     Left ventricular hypertrophy 2016    Microcytic hypochromic anemia 2018    Multifactoral. See hematology consult. Some nutritional , some kidney related    Mild aortic sclerosis 2019    Mobility poor 2022    Morbid obesity with BMI of 40.0-44.9, adult 2016    Nonintractable episodic headache 2019    Normocytic normochromic anemia 2022    MONE (obstructive sleep apnea) 2018    Prediabetes 2023    Severe obesity (BMI >= 40) 06/10/2022       Past Surgical History:   Procedure Laterality Date     SECTION      x3    COLONOSCOPY N/A 10/04/2021    Procedure: COLONOSCOPY;  Surgeon: Taran Galvez MD;  Location: [address]);  Service: Endoscopy;  Laterality: N/A;    COLONOSCOPY N/A 2022    Procedure: COLONOSCOPY--positive fit kit;  Surgeon: Tani Lara MD;  Location: [facility] LIVE ([address] [place] [address]);  Service: Endoscopy;  Laterality: N/A;    EPIDURAL STEROID INJECTION N/A 2023    Procedure: INJECTION,  STEROID, EPIDURAL, L5-S1;  Surgeon: Majo Meyers MD;  Location: [facility] PAIN MGT;  Service: Pain Management;  Laterality: N/A;    ESOPHAGOGASTRODUODENOSCOPY N/A 10/04/2021    Procedure: EGD (ESOPHAGOGASTRODUODENOSCOPY);  Surgeon: Taran Galvez MD;  Location: [facility] ENDO (4TH FLR);  Service: Endoscopy;  Laterality: N/A;  covid test 10/1-[facility]    10/1-LVM about COVID test-GT    ESOPHAGOGASTRODUODENOSCOPY N/A 01/31/2022    Procedure: EGD (ESOPHAGOGASTRODUODENOSCOPY);  Surgeon: Tani Lara MD;  Location: [facility] ENDO (4TH FLR);  Service: Endoscopy;  Laterality: N/A;  12/15 fully vaccinated; instructions to portal-st  1/28-covid [facility]-tb    FUSION, SPINE, LUMBAR, TLIF, POSTERIOR APPROACH, USING PEDICLE SCREW N/A 8/16/2023    Procedure: FUSION, SPINE, LUMBAR, TLIF, POSTERIOR APPROACH, USING PEDICLE SCREW L2-5 PLDF/TLIF GLOBUS ROBOT SNS:SSEP/EMG cell saver;  Surgeon: Jesus Alberto Sharp MD;  Location: [facility] OR 2ND FLR;  Service: Neurosurgery;  Laterality: N/A;    TRANSFORAMINAL EPIDURAL INJECTION OF STEROID N/A 03/16/2023    Procedure: b/l L4-5 TFESI;  Surgeon: Gato Delatorre DO;  Location: [facility] OR;  Service: Pain Management;  Laterality: N/A;    TUBAL LIGATION         Family History   Problem Relation Age of Onset    Hypertension Mother     Dementia Mother     Diabetes Mother     Cancer Mother 91        breast    Breast cancer Mother 91    Other Father         sepsis    Diabetes Sister     Hypertension Sister     Hyperlipidemia Sister     Diabetes Brother     Cataracts Brother     Stroke Brother     Multiple sclerosis Daughter     Hypertension Son     Hypertension Sister     Cancer Sister         pancreatic     Kidney disease Sister     Heart disease Sister         premature    Gout Sister     Kidney disease Sister     No Known Problems Daughter     No Known Problems Brother     Other Brother         murdered    Glaucoma Neg Hx     Retinal detachment Neg Hx     Macular degeneration Neg Hx     Strabismus Neg  Hx     Amblyopia Neg Hx     Blindness Neg Hx     Ovarian cancer Neg Hx        Social History     Socioeconomic History    Marital status:    Occupational History    Occupation: retired medical records    Tobacco Use    Smoking status: Never    Smokeless tobacco: Never   Substance and Sexual Activity    Alcohol use: Never    Drug use: No    Sexual activity: Not Currently     Partners: Male     Birth control/protection: None   Other Topics Concern    Are you pregnant or think you may be? No    Breast-feeding No   Social History Narrative    , lives with 1 daughter(Toreal), Walking some     Social Determinants of Health     Financial Resource Strain: Low Risk  (7/31/2023)    Overall Financial Resource Strain (CARDIA)     Difficulty of Paying Living Expenses: Not very hard   Food Insecurity: No Food Insecurity (7/31/2023)    Hunger Vital Sign     Worried About Running Out of Food in the Last Year: Never true     Ran Out of Food in the Last Year: Never true   Transportation Needs: Unmet Transportation Needs (7/31/2023)    PRAPARE - Transportation     Lack of Transportation (Medical): Yes     Lack of Transportation (Non-Medical): No   Physical Activity: Inactive (8/26/2023)    Exercise Vital Sign     Days of Exercise per Week: 0 days     Minutes of Exercise per Session: 0 min   Stress: No Stress Concern Present (8/26/2023)    [employer] - Occupational Stress Questionnaire     Feeling of Stress : Not at all   Social Connections: Moderately Integrated (8/26/2023)    Social Connection and Isolation Panel [NHANES]     Frequency of Communication with Friends and Family: More than three times a week     Frequency of Social Gatherings with Friends and Family: Once a week     Attends [employer] Services: More than 4 times per year     Active Member of Clubs or Organizations: Yes     Attends Club or Organization Meetings: More than 4 times per year     Marital Status:    Housing  Stability: High Risk (8/26/2023)    Housing Stability Vital Sign     Unable to Pay for Housing in the Last Year: Yes     Number of Places Lived in the Last Year: 1     Unstable Housing in the Last Year: No       MEDICATIONS & ALLERGIES:     Current Outpatient Medications on File Prior to Visit   Medication Sig Dispense Refill    acetaminophen (TYLENOL) 500 MG tablet Take 1 tablet (500 mg total) by mouth 3 (three) times daily. 90 tablet 0    allopurinoL (ZYLOPRIM) 100 MG tablet Take 1 tablet (100 mg total) by mouth once daily. 90 tablet 11    amLODIPine (NORVASC) 10 MG tablet Take 1 tablet (10 mg total) by mouth once daily. 90 tablet 3    aspirin (ECOTRIN) 81 MG EC tablet Take 1 tablet (81 mg total) by mouth once daily. 90 tablet 3    betamethasone dipropionate (DIPROLENE) 0.05 % ointment Apply topically 2 (two) times daily. 45 g 11    betamethasone valerate 0.1% (VALISONE) 0.1 % Oint Apply topically once daily. (Patient not taking: Reported on 8/21/2023) 45 g 3    biotin 300 mcg Tab Take 1 tablet by mouth once daily.      carvediloL (COREG) 3.125 MG tablet Take 1 tablet (3.125 mg total) by mouth 2 (two) times daily with meals. 180 tablet 3    celecoxib (CELEBREX) 100 MG capsule Take 1 capsule (100 mg total) by mouth 2 (two) times daily. 28 capsule 0    cetirizine (ZYRTEC) 10 MG tablet Take 1 tablet (10 mg total) by mouth once daily. for allergic rhinitis (Patient not taking: Reported on 8/21/2023) 90 tablet 3    diclofenac sodium (VOLTAREN) 1 % Gel Apply 2 g topically daily as needed. (Patient not taking: Reported on 8/21/2023) 100 g 3    fluocinonide (LIDEX) 0.05 % external solution AAA scalp qday - bid prn pruritus (Patient not taking: Reported on 8/21/2023) 60 mL 3    fluticasone propionate (FLONASE) 50 mcg/actuation nasal spray 1 spray (50 mcg total) by Each Nostril route once daily. (Patient not taking: Reported on 8/21/2023) 16 g 11    gabapentin (NEURONTIN) 100 MG capsule Take 1 capsule (100 mg total) by  mouth 3 (three) times daily. (Patient not taking: Reported on 8/21/2023) 45 capsule 0    ketoconazole (NIZORAL) 2 % cream Apply to affected areas of body BID prn irritation. (Patient not taking: Reported on 8/21/2023) 15 g 1    ketoconazole (NIZORAL) 2 % shampoo Wash hair with medicated shampoo at least 2x/week - let sit on scalp at least 5 minutes prior to rinsing (Patient not taking: Reported on 8/21/2023) 120 mL 5    lactobacillus combo no.6 (PROBIOTIC COMPLEX) 4 billion cell Tab Take 1 tablet by mouth once daily. (Patient not taking: Reported on 8/21/2023) 90 tablet 3    methocarbamoL (ROBAXIN) 500 MG Tab Take 2 tablets (1,000 mg total) by mouth 3 (three) times daily. 90 tablet 0    mometasone (ELOCON) 0.1 % ointment Apply twice daily to hands prn eczema. (Patient not taking: Reported on 8/21/2023) 45 g 3    MULTIVIT-MIN/FA/CA CARB/VIT K (WOMEN'S 50+ DAILY FORMULA ORAL) Take by mouth.      nystatin-triamcinolone (MYCOLOG II) cream Apply topically 2 (two) times a day. (Patient not taking: Reported on 8/21/2023) 60 g 3    olmesartan (BENICAR) 40 MG tablet TAKE 1 TABLET ONE TIME DAILY 90 tablet 3    omega-3 fatty acids 300 mg Cap Take by mouth.      omeprazole (PRILOSEC) 40 MG capsule Take 1 capsule (40 mg total) by mouth every morning. As needed for reflux symptoms (Patient not taking: Reported on 8/21/2023) 90 capsule 3    potassium chloride SA (K-DUR,KLOR-CON) 20 MEQ tablet Take 1 tablet (20 mEq total) by mouth 2 (two) times daily. (Patient not taking: Reported on 8/21/2023) 180 tablet 3    riboflavin, vitamin B2, (VITAMIN B-2 ORAL) Take 1 capsule by mouth once daily.      rosuvastatin (CRESTOR) 20 MG tablet Take 1 tablet (20 mg total) by mouth once daily. 90 tablet 3    sumatriptan (IMITREX) 100 MG tablet Take 1 tablet (100 mg total) by mouth daily as needed for Migraine. No more than 2 doses in 24 hours (Patient not taking: Reported on 8/21/2023) 27 tablet 3    TAC 0.1%-ciclopirox-MOM Apply to affected area  "twice daily after cool blow dry. (discard after 1/14/2023) (Patient not taking: Reported on 8/21/2023) 60 g 5    triamcinolone acetonide 0.025% (KENALOG) 0.025 % cream Apply to affected areas of body BID prn irritation. (Patient not taking: Reported on 8/21/2023) 15 g 1    TURMERIC ORAL Take by mouth.      ubidecarenone (COENZYME Q10) 100 mg Tab Take 1 tablet by mouth once daily.        No current facility-administered medications on file prior to visit.        Review of patient's allergies indicates:  No Known Allergies    OBJECTIVE:     Vital Signs:  Vitals:    08/28/23 1300   BP: (!) 142/77   Pulse: 78   Resp: 18   Temp: 98.2 °F (36.8 °C)     There is no height or weight on file to calculate BMI.       Physical Exam  HENT:      Head: Normocephalic and atraumatic.      Nose: No congestion or rhinorrhea.   Cardiovascular:      Rate and Rhythm: Normal rate.   Pulmonary:      Effort: No respiratory distress.      Breath sounds: Normal breath sounds.   Abdominal:      General: Bowel sounds are normal.      Palpations: Abdomen is soft.   Musculoskeletal:      Cervical back: Normal range of motion and neck supple.      Right lower leg: No edema.      Left lower leg: No edema.   Skin:     General: Skin is warm and dry.   Neurological:      Mental Status: She is alert and oriented to person, place, and time.   Psychiatric:         Mood and Affect: Mood normal.       Laboratory  Lab Results   Component Value Date    WBC 12.89 (H) 08/17/2023    HGB 8.1 (L) 08/17/2023    HCT 26.3 (L) 08/17/2023    MCV 81 (L) 08/17/2023     08/17/2023     Lab Results   Component Value Date    INR 1.0 07/24/2023     Lab Results   Component Value Date    HGBA1C 5.0 03/09/2023     No results for input(s): "POCTGLUCOSE" in the last 72 hours.    TRANSITION OF CARE:     [facility] Contact Note: 8/21/23    Family and/or Caretaker present at visit?  Yes.  Diagnostic tests reviewed/disposition: No diagnosic tests pending after this " hospitalization.  Disease/illness education: Importance of Compliance with all prescribed medications and treatments, COVID Precautions/Social Distancing/Mask Use  Home health/community services discussion/referrals: Patient has home health established at [facility] .   Establishment or re-establishment of referral orders for community resources: No other necessary community resources.   Discussion with other health care providers: No discussion with other health care providers necessary.     Transition of Care Visit:  I have reviewed and updated the history and problem list. I have reconciled the medication list. I have discussed the hospitalization and current medical issues, prognosis and plans with the patient/family.     Medications Reconciliation:   I have reconciled the patient's home medications and discharge medications with the patient/family. I have updated all changes. Refer to After-Visit Medication List.    Discharge plans, follow-up instructions, future appointments, provider contact information, indicators to seek emergency treatment and encouragement to call for any questions, concerns or clarification of the patient's plan of care explained to patient and/or caregiver(s), whom confirm understanding of provided information and endorse willingness to comply.     ASSESSMENT & PLAN:     HIGH RISK CONDITION(S):  Patient has a condition that poses threat to life and bodily function: s/p back surgery       1. Spondylolisthesis of lumbosacral region  Assessment & Plan:   s/p L2-5 PSF on 8/16/23  VSS  Prn pain control  Continue home health PT/OT  Continue meds as prescribed   Keep scheduled follow up         2. Severe obesity (BMI >= 40)  Assessment & Plan:  She has been educated on the negative effects of obesity to one's health and encouraged to consider lifestyle diet modifications and exercise.       3. Essential hypertension  Assessment & Plan:  Stable  Continue meds as prescribed       4. Mixed  hyperlipidemia  Assessment & Plan:  denies chest pain  Continue statin       5. Headache disorder  Assessment & Plan:  No acute issues  Continue meds as prescribed       6. Impaired mobility  Assessment & Plan:  Continue home pt/ot               Instructions:  - Continue all medications, treatments and therapies as ordered.   - Follow all instructions, recommendations as discussed.  - Maintain Safety Precautions at all times.  - Attend all medical appointments as scheduled.  - For worsening symptoms: call Primary Care Physician or Nurse Practitioner.  - For emergencies, call 911 or immediately report to the nearest emergency room.  - Limit Risks of environmental exposure to coronavirus/COVID-19 as discussed including: social distancing, hand hygiene, and limiting departures from the home for necessities only.          Were controlled substances prescribed?  No      Scheduled Follow-up :  Future Appointments   Date Time Provider Department Center   9/7/2023  9:15 AM Brittany Simeon MD [facility] DERM [facility]   9/14/2023 12:30 PM NOM OIC-XRAY [facility]   9/14/2023  1:30 PM Hali Daigle PA-C [facility] SPINE [facility] [address]   9/28/2023 10:30 AM Hali Daigle PA-C [facility] SPINE [address]   10/5/2023  9:30 AM Gaurav Allison MD [facility]   12/6/2023 10:15 AM LAB, [facility] LAB [facility]   12/12/2023  9:20 AM Amando Das MD [facility] CARDIO [facility]       After Visit Medication List :     Medication List            Accurate as of August 28, 2023 11:59 PM. If you have any questions, ask your nurse or doctor.                CONTINUE taking these medications      acetaminophen 500 MG tablet  Commonly known as: TYLENOL  Take 1 tablet (500 mg total) by mouth 3 (three) times daily.     allopurinoL 100 MG tablet  Commonly known as: ZYLOPRIM  Take 1 tablet (100 mg total) by mouth once daily.     amLODIPine 10 MG tablet  Commonly known as: NORVASC  Take 1 tablet (10 mg  total) by mouth once daily.     aspirin 81 MG EC tablet  Commonly known as: ECOTRIN  Take 1 tablet (81 mg total) by mouth once daily.     betamethasone dipropionate 0.05 % ointment  Commonly known as: DIPROLENE  Apply topically 2 (two) times daily.     betamethasone valerate 0.1% 0.1 % Oint  Commonly known as: VALISONE  Apply topically once daily.     biotin 300 mcg Tab     carvediloL 3.125 MG tablet  Commonly known as: COREG  Take 1 tablet (3.125 mg total) by mouth 2 (two) times daily with meals.     celecoxib 100 MG capsule  Commonly known as: CeleBREX  Take 1 capsule (100 mg total) by mouth 2 (two) times daily.     cetirizine 10 MG tablet  Commonly known as: ZYRTEC  Take 1 tablet (10 mg total) by mouth once daily. for allergic rhinitis     coenzyme Q10 100 mg Tab     diclofenac sodium 1 % Gel  Commonly known as: VOLTAREN  Apply 2 g topically daily as needed.     fluocinonide 0.05 % external solution  Commonly known as: LIDEX  AAA scalp qday - bid prn pruritus     fluticasone propionate 50 mcg/actuation nasal spray  Commonly known as: FLONASE  1 spray (50 mcg total) by Each Nostril route once daily.     gabapentin 100 MG capsule  Commonly known as: NEURONTIN  Take 1 capsule (100 mg total) by mouth 3 (three) times daily.     * ketoconazole 2 % shampoo  Commonly known as: NIZORAL  Wash hair with medicated shampoo at least 2x/week - let sit on scalp at least 5 minutes prior to rinsing     * ketoconazole 2 % cream  Commonly known as: NIZORAL  Apply to affected areas of body BID prn irritation.     lactobacillus combo no.6 4 billion cell Tab  Commonly known as: PROBIOTIC COMPLEX  Take 1 tablet by mouth once daily.     methocarbamoL 500 MG Tab  Commonly known as: ROBAXIN  Take 2 tablets (1,000 mg total) by mouth 3 (three) times daily.     mometasone 0.1 % ointment  Commonly known as: ELOCON  Apply twice daily to hands prn eczema.     nystatin-triamcinolone cream  Commonly known as: MYCOLOG II  Apply topically 2 (two)  times a day.     olmesartan 40 MG tablet  Commonly known as: BENICAR  TAKE 1 TABLET ONE TIME DAILY     omega-3 fatty acids 300 mg Cap     omeprazole 40 MG capsule  Commonly known as: PRILOSEC  Take 1 capsule (40 mg total) by mouth every morning. As needed for reflux symptoms     oxyCODONE 5 MG immediate release tablet  Commonly known as: ROXICODONE  Take 1 tablet (5 mg total) by mouth every 6 (six) hours as needed for Pain (for breakthrough pain).     potassium chloride SA 20 MEQ tablet  Commonly known as: K-DUR,KLOR-CON  Take 1 tablet (20 mEq total) by mouth 2 (two) times daily.     rosuvastatin 20 MG tablet  Commonly known as: CRESTOR  Take 1 tablet (20 mg total) by mouth once daily.     sumatriptan 100 MG tablet  Commonly known as: IMITREX  Take 1 tablet (100 mg total) by mouth daily as needed for Migraine. No more than 2 doses in 24 hours     TAC 0.1%-ciclopirox-MOM  Apply to affected area twice daily after cool blow dry. (discard after 1/14/2023)     triamcinolone acetonide 0.025% 0.025 % cream  Commonly known as: KENALOG  Apply to affected areas of body BID prn irritation.     TURMERIC ORAL     VITAMIN B-2 ORAL     WOMEN'S 50+ DAILY FORMULA ORAL           * This list has 2 medication(s) that are the same as other medications prescribed for you. Read the directions carefully, and ask your doctor or other care provider to review them with you.                  Signature:       MICAELA Jacob-C   [facility]    Total Face-to-Face Encounter: 60 minutes with >50% of time spent discussing the care with the patient/family.

## 2023-09-05 NOTE — ASSESSMENT & PLAN NOTE
She has been educated on the negative effects of obesity to one's health and encouraged to consider lifestyle diet modifications and exercise.

## 2023-09-05 NOTE — ASSESSMENT & PLAN NOTE
s/p L2-5 PSF on 8/16/23  VSS  Prn pain control  Continue home health PT/OT  Continue meds as prescribed   Keep scheduled follow up

## 2023-09-07 RX ORDER — OXYCODONE HYDROCHLORIDE 5 MG/1
5 TABLET ORAL EVERY 6 HOURS PRN
Qty: 24 TABLET | Refills: 0 | Status: SHIPPED | OUTPATIENT
Start: 2023-09-07 | End: 2023-09-20 | Stop reason: SDUPTHER

## 2023-09-11 RX ORDER — MELOXICAM 15 MG/1
TABLET ORAL
Qty: 90 TABLET | Refills: 0 | OUTPATIENT
Start: 2023-09-11

## 2023-09-11 NOTE — TELEPHONE ENCOUNTER
Left voicemail. Attempted to call the patient to let them know that we received a refill request from her pharmacy for meloxicam. Dr. Allison refused the refill because this medication is harmful to her due to her chronic kidney disease and she should no longer be taking this. If she has any further questions she can contact us to make an appointment. I sent a portal message.

## 2023-09-12 NOTE — PROGRESS NOTES
Date: 09/12/2023    Supervising Physician: Jesus Alberto Sharp M.D.    Date of Surgery: 8/16/23     Procedure: L2-5 TLIF    History: Nilsa Curiel is seen today for follow-up following the above listed procedure. Overall the patient is doing well but today notes the bottom of her incision is burning and tender.   Pain is well controlled with current pain medication.  she denies fever, chills, and sweats since the time of the surgery.       Exam: Post op dressing taken down.  Area of dehiscence at posterior end of incision, no active drainage. Scabbing noted.  There is no sign of infection. Neuro exam is stable. No signs of DVT.    Radiographs: hardware in place no failure    Assessment/Plan: 4 weeks post op.    Doing well postoperatively.  reviewed. Will do betadine paint BID for 7 days and bactrim for 5 days and have pt come back next week for wound check.    Thank you for the opportunity to participate in this patient's care. Please give me a call if there are any concerns or questions.

## 2023-09-14 ENCOUNTER — OFFICE VISIT (OUTPATIENT)
Dept: ORTHOPEDICS | Facility: CLINIC | Age: 78
End: 2023-09-14
Payer: MEDICARE

## 2023-09-14 ENCOUNTER — HOSPITAL ENCOUNTER (OUTPATIENT)
Dept: RADIOLOGY | Facility: HOSPITAL | Age: 78
Discharge: HOME | End: 2023-09-14
Attending: FAMILY MEDICINE
Payer: MEDICARE

## 2023-09-14 VITALS — HEIGHT: 61 IN | WEIGHT: 204.81 LBS | BODY MASS INDEX: 38.67 KG/M2

## 2023-09-14 DIAGNOSIS — Z98.890 S/P SPINAL SURGERY: Primary | ICD-10-CM

## 2023-09-14 DIAGNOSIS — Z98.890 S/P SPINAL SURGERY: ICD-10-CM

## 2023-09-14 PROCEDURE — 1101F PR PT FALLS ASSESS DOC 0-1 FALLS W/OUT INJ PAST YR: ICD-10-PCS | Mod: CPTII,S$GLB,, | Performed by: ORTHOPAEDIC SURGERY

## 2023-09-14 PROCEDURE — 3044F HG A1C LEVEL LT 7.0%: CPT | Mod: CPTII,S$GLB,, | Performed by: ORTHOPAEDIC SURGERY

## 2023-09-14 PROCEDURE — 72100 X-RAY EXAM L-S SPINE 2/3 VWS: CPT | Mod: TC

## 2023-09-14 PROCEDURE — 3044F PR MOST RECENT HEMOGLOBIN A1C LEVEL <7.0%: ICD-10-PCS | Mod: CPTII,S$GLB,, | Performed by: ORTHOPAEDIC SURGERY

## 2023-09-14 PROCEDURE — 1157F ADVNC CARE PLAN IN RCRD: CPT | Mod: CPTII,S$GLB,, | Performed by: ORTHOPAEDIC SURGERY

## 2023-09-14 PROCEDURE — 99999 PR PBB SHADOW E&M-EST. PATIENT-LVL III: ICD-10-PCS | Mod: PBBFAC,,, | Performed by: ORTHOPAEDIC SURGERY

## 2023-09-14 PROCEDURE — 72100 XR LUMBAR SPINE AP AND LATERAL: ICD-10-PCS | Mod: 26,,, | Performed by: RADIOLOGY

## 2023-09-14 PROCEDURE — 1159F MED LIST DOCD IN RCRD: CPT | Mod: CPTII,S$GLB,, | Performed by: ORTHOPAEDIC SURGERY

## 2023-09-14 PROCEDURE — 1101F PT FALLS ASSESS-DOCD LE1/YR: CPT | Mod: CPTII,S$GLB,, | Performed by: ORTHOPAEDIC SURGERY

## 2023-09-14 PROCEDURE — 3288F PR FALLS RISK ASSESSMENT DOCUMENTED: ICD-10-PCS | Mod: CPTII,S$GLB,, | Performed by: ORTHOPAEDIC SURGERY

## 2023-09-14 PROCEDURE — 1159F PR MEDICATION LIST DOCUMENTED IN MEDICAL RECORD: ICD-10-PCS | Mod: CPTII,S$GLB,, | Performed by: ORTHOPAEDIC SURGERY

## 2023-09-14 PROCEDURE — 1125F PR PAIN SEVERITY QUANTIFIED, PAIN PRESENT: ICD-10-PCS | Mod: CPTII,S$GLB,, | Performed by: ORTHOPAEDIC SURGERY

## 2023-09-14 PROCEDURE — 99024 POSTOP FOLLOW-UP VISIT: CPT | Mod: S$GLB,,, | Performed by: ORTHOPAEDIC SURGERY

## 2023-09-14 PROCEDURE — 72100 X-RAY EXAM L-S SPINE 2/3 VWS: CPT | Mod: 26,,, | Performed by: RADIOLOGY

## 2023-09-14 PROCEDURE — 1157F PR ADVANCE CARE PLAN OR EQUIV PRESENT IN MEDICAL RECORD: ICD-10-PCS | Mod: CPTII,S$GLB,, | Performed by: ORTHOPAEDIC SURGERY

## 2023-09-14 PROCEDURE — 3288F FALL RISK ASSESSMENT DOCD: CPT | Mod: CPTII,S$GLB,, | Performed by: ORTHOPAEDIC SURGERY

## 2023-09-14 PROCEDURE — 1125F AMNT PAIN NOTED PAIN PRSNT: CPT | Mod: CPTII,S$GLB,, | Performed by: ORTHOPAEDIC SURGERY

## 2023-09-14 PROCEDURE — 99999 PR PBB SHADOW E&M-EST. PATIENT-LVL III: CPT | Mod: PBBFAC,,, | Performed by: ORTHOPAEDIC SURGERY

## 2023-09-14 PROCEDURE — 99024 PR POST-OP FOLLOW-UP VISIT: ICD-10-PCS | Mod: S$GLB,,, | Performed by: ORTHOPAEDIC SURGERY

## 2023-09-14 RX ORDER — SULFAMETHOXAZOLE AND TRIMETHOPRIM 800; 160 MG/1; MG/1
1 TABLET ORAL 2 TIMES DAILY
Qty: 10 TABLET | Refills: 0 | Status: ON HOLD | OUTPATIENT
Start: 2023-09-14 | End: 2023-10-04

## 2023-09-20 ENCOUNTER — EXTERNAL HOME HEALTH (OUTPATIENT)
Dept: HOME HEALTH SERVICES | Facility: HOSPITAL | Age: 78
End: 2023-09-20
Payer: MEDICARE

## 2023-09-20 RX ORDER — OXYCODONE HYDROCHLORIDE 5 MG/1
5 TABLET ORAL EVERY 6 HOURS PRN
Qty: 24 TABLET | Refills: 0 | Status: SHIPPED | OUTPATIENT
Start: 2023-09-20 | End: 2023-09-28

## 2023-09-20 NOTE — PROGRESS NOTES
Date: 09/20/2023    Supervising Physician: Jesus Alberto Sharp M.D.    Date of Surgery:  8/16/23     Procedure: L2-5 TLIF    History: Nilsa Curiel is seen today for follow-up following the above listed procedure. Overall the patient is doing well but today notes she finished the antibiotics and has been doing betadine paint BID.   Pain is well controlled with current pain medication.  she denies fever, chills, and sweats since the time of the surgery.     Exam: Post op dressing taken down.  Inferior end of incision is open, no drainage, pus, erythema, unable to probe.  There is no sign of infection. Neuro exam is stable. No signs of DVT.    Radiographs: hardware in place no failure    Assessment/Plan: 5 weeks post op.    Will continue betadine paint BID and have pt send a picture of wound next week as she does not have transportation to come to clinic.    Thank you for the opportunity to participate in this patient's care. Please give me a call if there are any concerns or questions.

## 2023-09-21 ENCOUNTER — OFFICE VISIT (OUTPATIENT)
Dept: ORTHOPEDICS | Facility: CLINIC | Age: 78
End: 2023-09-21
Payer: MEDICARE

## 2023-09-21 VITALS — BODY MASS INDEX: 38.67 KG/M2 | HEIGHT: 61 IN | WEIGHT: 204.81 LBS

## 2023-09-21 DIAGNOSIS — Z98.890 S/P SPINAL SURGERY: Primary | ICD-10-CM

## 2023-09-21 PROCEDURE — 1157F ADVNC CARE PLAN IN RCRD: CPT | Mod: CPTII,S$GLB,, | Performed by: ORTHOPAEDIC SURGERY

## 2023-09-21 PROCEDURE — 99999 PR PBB SHADOW E&M-EST. PATIENT-LVL III: CPT | Mod: PBBFAC,,, | Performed by: ORTHOPAEDIC SURGERY

## 2023-09-21 PROCEDURE — 99024 PR POST-OP FOLLOW-UP VISIT: ICD-10-PCS | Mod: S$GLB,,, | Performed by: ORTHOPAEDIC SURGERY

## 2023-09-21 PROCEDURE — 1157F PR ADVANCE CARE PLAN OR EQUIV PRESENT IN MEDICAL RECORD: ICD-10-PCS | Mod: CPTII,S$GLB,, | Performed by: ORTHOPAEDIC SURGERY

## 2023-09-21 PROCEDURE — 3288F PR FALLS RISK ASSESSMENT DOCUMENTED: ICD-10-PCS | Mod: CPTII,S$GLB,, | Performed by: ORTHOPAEDIC SURGERY

## 2023-09-21 PROCEDURE — 1125F AMNT PAIN NOTED PAIN PRSNT: CPT | Mod: CPTII,S$GLB,, | Performed by: ORTHOPAEDIC SURGERY

## 2023-09-21 PROCEDURE — 3044F PR MOST RECENT HEMOGLOBIN A1C LEVEL <7.0%: ICD-10-PCS | Mod: CPTII,S$GLB,, | Performed by: ORTHOPAEDIC SURGERY

## 2023-09-21 PROCEDURE — 1101F PT FALLS ASSESS-DOCD LE1/YR: CPT | Mod: CPTII,S$GLB,, | Performed by: ORTHOPAEDIC SURGERY

## 2023-09-21 PROCEDURE — 99024 POSTOP FOLLOW-UP VISIT: CPT | Mod: S$GLB,,, | Performed by: ORTHOPAEDIC SURGERY

## 2023-09-21 PROCEDURE — 1159F MED LIST DOCD IN RCRD: CPT | Mod: CPTII,S$GLB,, | Performed by: ORTHOPAEDIC SURGERY

## 2023-09-21 PROCEDURE — 1159F PR MEDICATION LIST DOCUMENTED IN MEDICAL RECORD: ICD-10-PCS | Mod: CPTII,S$GLB,, | Performed by: ORTHOPAEDIC SURGERY

## 2023-09-21 PROCEDURE — 1125F PR PAIN SEVERITY QUANTIFIED, PAIN PRESENT: ICD-10-PCS | Mod: CPTII,S$GLB,, | Performed by: ORTHOPAEDIC SURGERY

## 2023-09-21 PROCEDURE — 99999 PR PBB SHADOW E&M-EST. PATIENT-LVL III: ICD-10-PCS | Mod: PBBFAC,,, | Performed by: ORTHOPAEDIC SURGERY

## 2023-09-21 PROCEDURE — 1101F PR PT FALLS ASSESS DOC 0-1 FALLS W/OUT INJ PAST YR: ICD-10-PCS | Mod: CPTII,S$GLB,, | Performed by: ORTHOPAEDIC SURGERY

## 2023-09-21 PROCEDURE — 3288F FALL RISK ASSESSMENT DOCD: CPT | Mod: CPTII,S$GLB,, | Performed by: ORTHOPAEDIC SURGERY

## 2023-09-21 PROCEDURE — 3044F HG A1C LEVEL LT 7.0%: CPT | Mod: CPTII,S$GLB,, | Performed by: ORTHOPAEDIC SURGERY

## 2023-09-28 ENCOUNTER — PATIENT MESSAGE (OUTPATIENT)
Dept: ORTHOPEDICS | Facility: CLINIC | Age: 78
End: 2023-09-28

## 2023-09-28 RX ORDER — OXYCODONE HYDROCHLORIDE 5 MG/1
5 TABLET ORAL EVERY 8 HOURS PRN
Qty: 21 TABLET | Refills: 0 | Status: ON HOLD | OUTPATIENT
Start: 2023-09-28 | End: 2023-10-04 | Stop reason: HOSPADM

## 2023-10-02 ENCOUNTER — OFFICE VISIT (OUTPATIENT)
Dept: ORTHOPEDICS | Facility: CLINIC | Age: 78
End: 2023-10-02
Payer: MEDICARE

## 2023-10-02 VITALS — BODY MASS INDEX: 38.67 KG/M2 | WEIGHT: 204.81 LBS | HEIGHT: 61 IN

## 2023-10-02 DIAGNOSIS — S31.000A OPEN WOUND OF LUMBAR REGION: ICD-10-CM

## 2023-10-02 DIAGNOSIS — Z98.890 S/P SPINAL SURGERY: Primary | ICD-10-CM

## 2023-10-02 PROCEDURE — 99999 PR PBB SHADOW E&M-EST. PATIENT-LVL IV: ICD-10-PCS | Mod: PBBFAC,,, | Performed by: ORTHOPAEDIC SURGERY

## 2023-10-02 PROCEDURE — 1125F AMNT PAIN NOTED PAIN PRSNT: CPT | Mod: CPTII,S$GLB,, | Performed by: ORTHOPAEDIC SURGERY

## 2023-10-02 PROCEDURE — 1101F PR PT FALLS ASSESS DOC 0-1 FALLS W/OUT INJ PAST YR: ICD-10-PCS | Mod: CPTII,S$GLB,, | Performed by: ORTHOPAEDIC SURGERY

## 2023-10-02 PROCEDURE — 99024 POSTOP FOLLOW-UP VISIT: CPT | Mod: S$GLB,,, | Performed by: ORTHOPAEDIC SURGERY

## 2023-10-02 PROCEDURE — 1157F PR ADVANCE CARE PLAN OR EQUIV PRESENT IN MEDICAL RECORD: ICD-10-PCS | Mod: CPTII,S$GLB,, | Performed by: ORTHOPAEDIC SURGERY

## 2023-10-02 PROCEDURE — 3288F PR FALLS RISK ASSESSMENT DOCUMENTED: ICD-10-PCS | Mod: CPTII,S$GLB,, | Performed by: ORTHOPAEDIC SURGERY

## 2023-10-02 PROCEDURE — 3288F FALL RISK ASSESSMENT DOCD: CPT | Mod: CPTII,S$GLB,, | Performed by: ORTHOPAEDIC SURGERY

## 2023-10-02 PROCEDURE — 99999 PR PBB SHADOW E&M-EST. PATIENT-LVL IV: CPT | Mod: PBBFAC,,, | Performed by: ORTHOPAEDIC SURGERY

## 2023-10-02 PROCEDURE — 1157F ADVNC CARE PLAN IN RCRD: CPT | Mod: CPTII,S$GLB,, | Performed by: ORTHOPAEDIC SURGERY

## 2023-10-02 PROCEDURE — 1125F PR PAIN SEVERITY QUANTIFIED, PAIN PRESENT: ICD-10-PCS | Mod: CPTII,S$GLB,, | Performed by: ORTHOPAEDIC SURGERY

## 2023-10-02 PROCEDURE — 3044F PR MOST RECENT HEMOGLOBIN A1C LEVEL <7.0%: ICD-10-PCS | Mod: CPTII,S$GLB,, | Performed by: ORTHOPAEDIC SURGERY

## 2023-10-02 PROCEDURE — 1159F PR MEDICATION LIST DOCUMENTED IN MEDICAL RECORD: ICD-10-PCS | Mod: CPTII,S$GLB,, | Performed by: ORTHOPAEDIC SURGERY

## 2023-10-02 PROCEDURE — 1101F PT FALLS ASSESS-DOCD LE1/YR: CPT | Mod: CPTII,S$GLB,, | Performed by: ORTHOPAEDIC SURGERY

## 2023-10-02 PROCEDURE — 1159F MED LIST DOCD IN RCRD: CPT | Mod: CPTII,S$GLB,, | Performed by: ORTHOPAEDIC SURGERY

## 2023-10-02 PROCEDURE — 3044F HG A1C LEVEL LT 7.0%: CPT | Mod: CPTII,S$GLB,, | Performed by: ORTHOPAEDIC SURGERY

## 2023-10-02 PROCEDURE — 99024 PR POST-OP FOLLOW-UP VISIT: ICD-10-PCS | Mod: S$GLB,,, | Performed by: ORTHOPAEDIC SURGERY

## 2023-10-02 NOTE — H&P (VIEW-ONLY)
Date: 10/02/2023    Supervising Physician: Jesus Alberto Sharp M.D.    Date of Surgery: 8/16/23     Procedure: L2-5 TLIF    History: Nilsa Curiel is seen today for follow-up following the above listed procedure. Overall the patient is doing well but today notes she feels like her wound is getting worse. She reports drainage from the wound. She also reports chills for the past few days as well has shooting left leg pain.      Exam: Post op dressing taken down.   Inferior end of incision is open with some purulent drainage. Unable to probe.    Radiographs: hardware in place no failure    Assessment/Plan: 6 weeks post op.    Pt presents with open wound after lumbar fusion. Will book for washout with closure done by Plastic Surgery on 10/4/23.    Thank you for the opportunity to participate in this patient's care. Please give me a call if there are any concerns or questions.

## 2023-10-03 ENCOUNTER — TELEPHONE (OUTPATIENT)
Dept: ORTHOPEDICS | Facility: CLINIC | Age: 78
End: 2023-10-03
Payer: MEDICARE

## 2023-10-03 NOTE — TELEPHONE ENCOUNTER
Spoke to pt, informed herarrival time for surgery tomorrow is 1200pm at [facility] 2nd floor. No food or drink after midnight. Pt pleased and verbalized understanding.

## 2023-10-04 ENCOUNTER — HOSPITAL ENCOUNTER (INPATIENT)
Facility: HOSPITAL | Age: 78
LOS: 3 days | Discharge: HOME HEALTH | DRG: 902 | End: 2023-10-07
Attending: ORTHOPAEDIC SURGERY | Admitting: ORTHOPAEDIC SURGERY
Payer: MEDICARE

## 2023-10-04 ENCOUNTER — ANESTHESIA (OUTPATIENT)
Dept: SURGERY | Facility: HOSPITAL | Age: 78
DRG: 902 | End: 2023-10-04
Payer: MEDICARE

## 2023-10-04 ENCOUNTER — ANESTHESIA EVENT (OUTPATIENT)
Dept: SURGERY | Facility: HOSPITAL | Age: 78
DRG: 902 | End: 2023-10-04
Payer: MEDICARE

## 2023-10-04 DIAGNOSIS — S31.000A OPEN WOUND OF LUMBAR REGION: Primary | ICD-10-CM

## 2023-10-04 DIAGNOSIS — Z98.890 S/P LUMBAR SPINE OPERATION: ICD-10-CM

## 2023-10-04 DIAGNOSIS — Z51.81 THERAPEUTIC DRUG MONITORING: ICD-10-CM

## 2023-10-04 LAB
ALBUMIN SERPL BCP-MCNC: 3.4 G/DL (ref 3.5–5.2)
ALP SERPL-CCNC: 118 U/L (ref 55–135)
ALT SERPL W/O P-5'-P-CCNC: 13 U/L (ref 10–44)
ANION GAP SERPL CALC-SCNC: 6 MMOL/L (ref 8–16)
AST SERPL-CCNC: 24 U/L (ref 10–40)
BILIRUB SERPL-MCNC: 0.3 MG/DL (ref 0.1–1)
BUN SERPL-MCNC: 12 MG/DL (ref 8–23)
CALCIUM SERPL-MCNC: 9.6 MG/DL (ref 8.7–10.5)
CHLORIDE SERPL-SCNC: 112 MMOL/L (ref 95–110)
CO2 SERPL-SCNC: 20 MMOL/L (ref 23–29)
CREAT SERPL-MCNC: 1 MG/DL (ref 0.5–1.4)
CREAT SERPL-MCNC: 1 MG/DL (ref 0.5–1.4)
EST. GFR  (NO RACE VARIABLE): 58.8 ML/MIN/1.73 M^2
EST. GFR  (NO RACE VARIABLE): 58.8 ML/MIN/1.73 M^2
GLUCOSE SERPL-MCNC: 108 MG/DL (ref 70–110)
GRAM STN SPEC: NORMAL
POTASSIUM SERPL-SCNC: 5.3 MMOL/L (ref 3.5–5.1)
PROT SERPL-MCNC: 7.6 G/DL (ref 6–8.4)
SODIUM SERPL-SCNC: 138 MMOL/L (ref 136–145)

## 2023-10-04 PROCEDURE — 87116 MYCOBACTERIA CULTURE: CPT | Mod: 59 | Performed by: ORTHOPAEDIC SURGERY

## 2023-10-04 PROCEDURE — 87205 SMEAR GRAM STAIN: CPT | Performed by: ORTHOPAEDIC SURGERY

## 2023-10-04 PROCEDURE — 87077 CULTURE AEROBIC IDENTIFY: CPT | Mod: 59 | Performed by: ORTHOPAEDIC SURGERY

## 2023-10-04 PROCEDURE — D9220A PRA ANESTHESIA: ICD-10-PCS | Mod: CRNA,,, | Performed by: NURSE ANESTHETIST, CERTIFIED REGISTERED

## 2023-10-04 PROCEDURE — D9220A PRA ANESTHESIA: Mod: ANES,,, | Performed by: ANESTHESIOLOGY

## 2023-10-04 PROCEDURE — 36000707: Performed by: ORTHOPAEDIC SURGERY

## 2023-10-04 PROCEDURE — C1729 CATH, DRAINAGE: HCPCS | Performed by: ORTHOPAEDIC SURGERY

## 2023-10-04 PROCEDURE — 63600175 PHARM REV CODE 636 W HCPCS: Performed by: ORTHOPAEDIC SURGERY

## 2023-10-04 PROCEDURE — 10180 I&D COMPLEX PO WOUND INFCTJ: CPT | Mod: 78,,, | Performed by: ORTHOPAEDIC SURGERY

## 2023-10-04 PROCEDURE — 13160 PR SECD CLOS SURG WND EXTEN/COMPLIC: ICD-10-PCS | Mod: ,,, | Performed by: SURGERY

## 2023-10-04 PROCEDURE — D9220A PRA ANESTHESIA: ICD-10-PCS | Mod: ANES,,, | Performed by: ANESTHESIOLOGY

## 2023-10-04 PROCEDURE — 87102 FUNGUS ISOLATION CULTURE: CPT | Performed by: ORTHOPAEDIC SURGERY

## 2023-10-04 PROCEDURE — D9220A PRA ANESTHESIA: Mod: CRNA,,, | Performed by: NURSE ANESTHETIST, CERTIFIED REGISTERED

## 2023-10-04 PROCEDURE — 80053 COMPREHEN METABOLIC PANEL: CPT | Performed by: ORTHOPAEDIC SURGERY

## 2023-10-04 PROCEDURE — 25000003 PHARM REV CODE 250: Performed by: ORTHOPAEDIC SURGERY

## 2023-10-04 PROCEDURE — 63600175 PHARM REV CODE 636 W HCPCS: Performed by: NURSE ANESTHETIST, CERTIFIED REGISTERED

## 2023-10-04 PROCEDURE — 87070 CULTURE OTHR SPECIMN AEROBIC: CPT | Performed by: ORTHOPAEDIC SURGERY

## 2023-10-04 PROCEDURE — 87206 SMEAR FLUORESCENT/ACID STAI: CPT | Performed by: ORTHOPAEDIC SURGERY

## 2023-10-04 PROCEDURE — 71000016 HC POSTOP RECOV ADDL HR: Performed by: ORTHOPAEDIC SURGERY

## 2023-10-04 PROCEDURE — 37000008 HC ANESTHESIA 1ST 15 MINUTES: Performed by: ORTHOPAEDIC SURGERY

## 2023-10-04 PROCEDURE — 63600175 PHARM REV CODE 636 W HCPCS: Performed by: ANESTHESIOLOGY

## 2023-10-04 PROCEDURE — 10180 PR COMPLEX DRAINAGE, WOUND: ICD-10-PCS | Mod: 78,,, | Performed by: ORTHOPAEDIC SURGERY

## 2023-10-04 PROCEDURE — 37000009 HC ANESTHESIA EA ADD 15 MINS: Performed by: ORTHOPAEDIC SURGERY

## 2023-10-04 PROCEDURE — 71000015 HC POSTOP RECOV 1ST HR: Performed by: ORTHOPAEDIC SURGERY

## 2023-10-04 PROCEDURE — 36000706: Performed by: ORTHOPAEDIC SURGERY

## 2023-10-04 PROCEDURE — 25000003 PHARM REV CODE 250: Performed by: NURSE ANESTHETIST, CERTIFIED REGISTERED

## 2023-10-04 PROCEDURE — 11000001 HC ACUTE MED/SURG PRIVATE ROOM

## 2023-10-04 PROCEDURE — 94761 N-INVAS EAR/PLS OXIMETRY MLT: CPT

## 2023-10-04 PROCEDURE — 87075 CULTR BACTERIA EXCEPT BLOOD: CPT | Performed by: ORTHOPAEDIC SURGERY

## 2023-10-04 PROCEDURE — 87186 SC STD MICRODIL/AGAR DIL: CPT | Performed by: ORTHOPAEDIC SURGERY

## 2023-10-04 PROCEDURE — 71000033 HC RECOVERY, INTIAL HOUR: Performed by: ORTHOPAEDIC SURGERY

## 2023-10-04 PROCEDURE — 87015 SPECIMEN INFECT AGNT CONCNTJ: CPT | Performed by: ORTHOPAEDIC SURGERY

## 2023-10-04 PROCEDURE — 63600175 PHARM REV CODE 636 W HCPCS

## 2023-10-04 PROCEDURE — 13160 SEC CLSR SURG WND/DEHSN XTN: CPT | Mod: ,,, | Performed by: SURGERY

## 2023-10-04 RX ORDER — GABAPENTIN 100 MG/1
100 CAPSULE ORAL 3 TIMES DAILY
Status: DISCONTINUED | OUTPATIENT
Start: 2023-10-04 | End: 2023-10-07 | Stop reason: HOSPADM

## 2023-10-04 RX ORDER — PROPOFOL 10 MG/ML
VIAL (ML) INTRAVENOUS
Status: DISCONTINUED | OUTPATIENT
Start: 2023-10-04 | End: 2023-10-04

## 2023-10-04 RX ORDER — ONDANSETRON 2 MG/ML
4 INJECTION INTRAMUSCULAR; INTRAVENOUS EVERY 12 HOURS PRN
Status: DISCONTINUED | OUTPATIENT
Start: 2023-10-04 | End: 2023-10-07 | Stop reason: HOSPADM

## 2023-10-04 RX ORDER — GABAPENTIN 100 MG/1
100 CAPSULE ORAL 3 TIMES DAILY
Qty: 45 CAPSULE | Refills: 0 | Status: SHIPPED | OUTPATIENT
Start: 2023-10-04 | End: 2023-12-12

## 2023-10-04 RX ORDER — FENTANYL CITRATE 50 UG/ML
INJECTION, SOLUTION INTRAMUSCULAR; INTRAVENOUS
Status: DISCONTINUED | OUTPATIENT
Start: 2023-10-04 | End: 2023-10-04

## 2023-10-04 RX ORDER — FENTANYL CITRATE 50 UG/ML
25 INJECTION, SOLUTION INTRAMUSCULAR; INTRAVENOUS EVERY 5 MIN PRN
Status: DISCONTINUED | OUTPATIENT
Start: 2023-10-04 | End: 2023-10-04 | Stop reason: HOSPADM

## 2023-10-04 RX ORDER — OXYCODONE HYDROCHLORIDE 5 MG/1
5 TABLET ORAL EVERY 4 HOURS PRN
Status: DISCONTINUED | OUTPATIENT
Start: 2023-10-04 | End: 2023-10-07 | Stop reason: HOSPADM

## 2023-10-04 RX ORDER — ACETAMINOPHEN 500 MG
500 TABLET ORAL 3 TIMES DAILY
Qty: 90 TABLET | Refills: 0 | Status: SHIPPED | OUTPATIENT
Start: 2023-10-04 | End: 2024-02-27 | Stop reason: SDUPTHER

## 2023-10-04 RX ORDER — SODIUM CHLORIDE 9 MG/ML
INJECTION, SOLUTION INTRAVENOUS CONTINUOUS
Status: DISCONTINUED | OUTPATIENT
Start: 2023-10-04 | End: 2023-10-07 | Stop reason: HOSPADM

## 2023-10-04 RX ORDER — CELECOXIB 100 MG/1
100 CAPSULE ORAL 2 TIMES DAILY
Qty: 28 CAPSULE | Refills: 0 | Status: SHIPPED | OUTPATIENT
Start: 2023-10-04

## 2023-10-04 RX ORDER — CARVEDILOL 3.12 MG/1
3.12 TABLET ORAL 2 TIMES DAILY WITH MEALS
Status: DISCONTINUED | OUTPATIENT
Start: 2023-10-04 | End: 2023-10-07 | Stop reason: HOSPADM

## 2023-10-04 RX ORDER — METHOCARBAMOL 500 MG/1
1000 TABLET, FILM COATED ORAL 3 TIMES DAILY
Qty: 90 TABLET | Refills: 0 | Status: SHIPPED | OUTPATIENT
Start: 2023-10-04 | End: 2024-02-27 | Stop reason: SDUPTHER

## 2023-10-04 RX ORDER — HYDROMORPHONE HYDROCHLORIDE 1 MG/ML
INJECTION, SOLUTION INTRAMUSCULAR; INTRAVENOUS; SUBCUTANEOUS
Status: COMPLETED
Start: 2023-10-04 | End: 2023-10-04

## 2023-10-04 RX ORDER — ONDANSETRON 2 MG/ML
INJECTION INTRAMUSCULAR; INTRAVENOUS
Status: DISCONTINUED | OUTPATIENT
Start: 2023-10-04 | End: 2023-10-04

## 2023-10-04 RX ORDER — PHENYLEPHRINE HYDROCHLORIDE 10 MG/ML
INJECTION INTRAVENOUS
Status: DISCONTINUED | OUTPATIENT
Start: 2023-10-04 | End: 2023-10-04

## 2023-10-04 RX ORDER — AMLODIPINE BESYLATE 5 MG/1
10 TABLET ORAL DAILY
Status: DISCONTINUED | OUTPATIENT
Start: 2023-10-05 | End: 2023-10-07 | Stop reason: HOSPADM

## 2023-10-04 RX ORDER — DEXMEDETOMIDINE HYDROCHLORIDE 100 UG/ML
INJECTION, SOLUTION INTRAVENOUS
Status: DISCONTINUED | OUTPATIENT
Start: 2023-10-04 | End: 2023-10-04

## 2023-10-04 RX ORDER — FAMOTIDINE 20 MG/1
20 TABLET, FILM COATED ORAL DAILY
Status: DISCONTINUED | OUTPATIENT
Start: 2023-10-04 | End: 2023-10-07 | Stop reason: HOSPADM

## 2023-10-04 RX ORDER — LIDOCAINE HYDROCHLORIDE 20 MG/ML
INJECTION INTRAVENOUS
Status: DISCONTINUED | OUTPATIENT
Start: 2023-10-04 | End: 2023-10-04

## 2023-10-04 RX ORDER — AMOXICILLIN 250 MG
1 CAPSULE ORAL 2 TIMES DAILY
Status: DISCONTINUED | OUTPATIENT
Start: 2023-10-04 | End: 2023-10-07 | Stop reason: HOSPADM

## 2023-10-04 RX ORDER — DEXAMETHASONE SODIUM PHOSPHATE 4 MG/ML
INJECTION, SOLUTION INTRA-ARTICULAR; INTRALESIONAL; INTRAMUSCULAR; INTRAVENOUS; SOFT TISSUE
Status: DISCONTINUED | OUTPATIENT
Start: 2023-10-04 | End: 2023-10-04

## 2023-10-04 RX ORDER — SODIUM CHLORIDE 9 MG/ML
INJECTION, SOLUTION INTRAVENOUS CONTINUOUS
Status: ACTIVE | OUTPATIENT
Start: 2023-10-04 | End: 2023-10-05

## 2023-10-04 RX ORDER — HYDROMORPHONE HYDROCHLORIDE 1 MG/ML
0.2 INJECTION, SOLUTION INTRAMUSCULAR; INTRAVENOUS; SUBCUTANEOUS EVERY 5 MIN PRN
Status: DISCONTINUED | OUTPATIENT
Start: 2023-10-04 | End: 2023-10-04 | Stop reason: HOSPADM

## 2023-10-04 RX ORDER — OXYCODONE HYDROCHLORIDE 5 MG/1
5 TABLET ORAL EVERY 6 HOURS PRN
Qty: 21 TABLET | Refills: 0 | Status: SHIPPED | OUTPATIENT
Start: 2023-10-04 | End: 2023-10-18 | Stop reason: SDUPTHER

## 2023-10-04 RX ORDER — LIDOCAINE HYDROCHLORIDE 10 MG/ML
1 INJECTION, SOLUTION EPIDURAL; INFILTRATION; INTRACAUDAL; PERINEURAL ONCE AS NEEDED
Status: COMPLETED | OUTPATIENT
Start: 2023-10-04 | End: 2023-10-04

## 2023-10-04 RX ORDER — KETAMINE HCL IN 0.9 % NACL 50 MG/5 ML
SYRINGE (ML) INTRAVENOUS
Status: DISCONTINUED | OUTPATIENT
Start: 2023-10-04 | End: 2023-10-04

## 2023-10-04 RX ORDER — POLYETHYLENE GLYCOL 3350 17 G/17G
17 POWDER, FOR SOLUTION ORAL DAILY
Status: DISCONTINUED | OUTPATIENT
Start: 2023-10-05 | End: 2023-10-07 | Stop reason: HOSPADM

## 2023-10-04 RX ORDER — ROCURONIUM BROMIDE 10 MG/ML
INJECTION, SOLUTION INTRAVENOUS
Status: DISCONTINUED | OUTPATIENT
Start: 2023-10-04 | End: 2023-10-04

## 2023-10-04 RX ORDER — VANCOMYCIN HYDROCHLORIDE 1 G/20ML
INJECTION, POWDER, LYOPHILIZED, FOR SOLUTION INTRAVENOUS
Status: DISCONTINUED | OUTPATIENT
Start: 2023-10-04 | End: 2023-10-04

## 2023-10-04 RX ORDER — OXYCODONE HYDROCHLORIDE 10 MG/1
10 TABLET ORAL EVERY 4 HOURS PRN
Status: DISCONTINUED | OUTPATIENT
Start: 2023-10-04 | End: 2023-10-07 | Stop reason: HOSPADM

## 2023-10-04 RX ORDER — HALOPERIDOL 5 MG/ML
0.5 INJECTION INTRAMUSCULAR EVERY 10 MIN PRN
Status: DISCONTINUED | OUTPATIENT
Start: 2023-10-04 | End: 2023-10-04 | Stop reason: HOSPADM

## 2023-10-04 RX ORDER — METHOCARBAMOL 500 MG/1
1000 TABLET, FILM COATED ORAL 3 TIMES DAILY PRN
Status: DISCONTINUED | OUTPATIENT
Start: 2023-10-04 | End: 2023-10-07 | Stop reason: HOSPADM

## 2023-10-04 RX ORDER — MUPIROCIN 20 MG/G
OINTMENT TOPICAL 2 TIMES DAILY
Status: DISCONTINUED | OUTPATIENT
Start: 2023-10-04 | End: 2023-10-07 | Stop reason: HOSPADM

## 2023-10-04 RX ORDER — ATORVASTATIN CALCIUM 40 MG/1
80 TABLET, FILM COATED ORAL DAILY
Status: DISCONTINUED | OUTPATIENT
Start: 2023-10-05 | End: 2023-10-07 | Stop reason: HOSPADM

## 2023-10-04 RX ORDER — ACETAMINOPHEN 325 MG/1
650 TABLET ORAL EVERY 4 HOURS PRN
Status: DISCONTINUED | OUTPATIENT
Start: 2023-10-04 | End: 2023-10-07 | Stop reason: HOSPADM

## 2023-10-04 RX ORDER — ALLOPURINOL 100 MG/1
100 TABLET ORAL DAILY
Status: DISCONTINUED | OUTPATIENT
Start: 2023-10-05 | End: 2023-10-07 | Stop reason: HOSPADM

## 2023-10-04 RX ORDER — DIPHENHYDRAMINE HYDROCHLORIDE 50 MG/ML
25 INJECTION INTRAMUSCULAR; INTRAVENOUS EVERY 6 HOURS PRN
Status: DISCONTINUED | OUTPATIENT
Start: 2023-10-04 | End: 2023-10-04 | Stop reason: HOSPADM

## 2023-10-04 RX ORDER — VALSARTAN 160 MG/1
320 TABLET ORAL DAILY
Status: DISCONTINUED | OUTPATIENT
Start: 2023-10-05 | End: 2023-10-05

## 2023-10-04 RX ORDER — SODIUM CHLORIDE 0.9 % (FLUSH) 0.9 %
3 SYRINGE (ML) INJECTION
Status: DISCONTINUED | OUTPATIENT
Start: 2023-10-04 | End: 2023-10-04 | Stop reason: HOSPADM

## 2023-10-04 RX ORDER — ONDANSETRON 8 MG/1
8 TABLET, ORALLY DISINTEGRATING ORAL EVERY 8 HOURS PRN
Status: DISCONTINUED | OUTPATIENT
Start: 2023-10-04 | End: 2023-10-07 | Stop reason: HOSPADM

## 2023-10-04 RX ORDER — PROCHLORPERAZINE EDISYLATE 5 MG/ML
5 INJECTION INTRAMUSCULAR; INTRAVENOUS EVERY 30 MIN PRN
Status: DISCONTINUED | OUTPATIENT
Start: 2023-10-04 | End: 2023-10-04 | Stop reason: HOSPADM

## 2023-10-04 RX ADMIN — SUGAMMADEX 200 MG: 100 INJECTION, SOLUTION INTRAVENOUS at 04:10

## 2023-10-04 RX ADMIN — SENNOSIDES AND DOCUSATE SODIUM 1 TABLET: 50; 8.6 TABLET ORAL at 09:10

## 2023-10-04 RX ADMIN — PHENYLEPHRINE HYDROCHLORIDE 100 MCG: 10 INJECTION INTRAVENOUS at 03:10

## 2023-10-04 RX ADMIN — HYDROMORPHONE HYDROCHLORIDE 0.2 MG: 1 INJECTION, SOLUTION INTRAMUSCULAR; INTRAVENOUS; SUBCUTANEOUS at 05:10

## 2023-10-04 RX ADMIN — VANCOMYCIN HYDROCHLORIDE 1.5 G: 1 INJECTION, POWDER, LYOPHILIZED, FOR SOLUTION INTRAVENOUS at 03:10

## 2023-10-04 RX ADMIN — FENTANYL CITRATE 100 MCG: 50 INJECTION, SOLUTION INTRAMUSCULAR; INTRAVENOUS at 03:10

## 2023-10-04 RX ADMIN — MUPIROCIN: 20 OINTMENT TOPICAL at 09:10

## 2023-10-04 RX ADMIN — Medication 20 MG: at 03:10

## 2023-10-04 RX ADMIN — CARVEDILOL 3.12 MG: 3.12 TABLET, FILM COATED ORAL at 09:10

## 2023-10-04 RX ADMIN — PHENYLEPHRINE HYDROCHLORIDE 200 MCG: 10 INJECTION INTRAVENOUS at 03:10

## 2023-10-04 RX ADMIN — OXYCODONE HYDROCHLORIDE 10 MG: 10 TABLET ORAL at 09:10

## 2023-10-04 RX ADMIN — LIDOCAINE HYDROCHLORIDE 100 MG: 20 INJECTION INTRAVENOUS at 03:10

## 2023-10-04 RX ADMIN — ROCURONIUM BROMIDE 50 MG: 10 INJECTION INTRAVENOUS at 03:10

## 2023-10-04 RX ADMIN — DEXMEDETOMIDINE 4 MCG: 100 INJECTION, SOLUTION, CONCENTRATE INTRAVENOUS at 04:10

## 2023-10-04 RX ADMIN — SODIUM CHLORIDE: 9 INJECTION, SOLUTION INTRAVENOUS at 12:10

## 2023-10-04 RX ADMIN — SODIUM CHLORIDE 0.5 MCG/KG/MIN: 9 INJECTION, SOLUTION INTRAVENOUS at 03:10

## 2023-10-04 RX ADMIN — PROPOFOL 150 MG: 10 INJECTION, EMULSION INTRAVENOUS at 03:10

## 2023-10-04 RX ADMIN — FENTANYL CITRATE 25 MCG: 50 INJECTION, SOLUTION INTRAMUSCULAR; INTRAVENOUS at 04:10

## 2023-10-04 RX ADMIN — SODIUM CHLORIDE: 9 INJECTION, SOLUTION INTRAVENOUS at 06:10

## 2023-10-04 RX ADMIN — DEXAMETHASONE SODIUM PHOSPHATE 4 MG: 4 INJECTION, SOLUTION INTRAMUSCULAR; INTRAVENOUS at 03:10

## 2023-10-04 RX ADMIN — LIDOCAINE HYDROCHLORIDE 2 MG: 10 INJECTION, SOLUTION EPIDURAL; INFILTRATION; INTRACAUDAL; PERINEURAL at 12:10

## 2023-10-04 RX ADMIN — SODIUM CHLORIDE: 9 INJECTION, SOLUTION INTRAVENOUS at 03:10

## 2023-10-04 RX ADMIN — CEFAZOLIN 2 G: 2 INJECTION, POWDER, FOR SOLUTION INTRAMUSCULAR; INTRAVENOUS at 07:10

## 2023-10-04 RX ADMIN — GABAPENTIN 100 MG: 100 CAPSULE ORAL at 09:10

## 2023-10-04 RX ADMIN — FAMOTIDINE 20 MG: 20 TABLET ORAL at 09:10

## 2023-10-04 RX ADMIN — OXYCODONE HYDROCHLORIDE 10 MG: 10 TABLET ORAL at 05:10

## 2023-10-04 RX ADMIN — PHENYLEPHRINE HYDROCHLORIDE 300 MCG: 10 INJECTION INTRAVENOUS at 03:10

## 2023-10-04 RX ADMIN — ONDANSETRON 4 MG: 2 INJECTION INTRAMUSCULAR; INTRAVENOUS at 04:10

## 2023-10-04 RX ADMIN — SODIUM CHLORIDE, SODIUM GLUCONATE, SODIUM ACETATE, POTASSIUM CHLORIDE, MAGNESIUM CHLORIDE, SODIUM PHOSPHATE, DIBASIC, AND POTASSIUM PHOSPHATE: .53; .5; .37; .037; .03; .012; .00082 INJECTION, SOLUTION INTRAVENOUS at 04:10

## 2023-10-04 NOTE — HOSPITAL COURSE
On 10/4/2023, the patient arrived to the [facility] for proper pre-operative management.  Upon completion of pre-operative preparation, the patient was taken back to the operative theatre. I&D of the L spine with plastic surgery closure was performed without complication and the patient was transported to the post anesthesia care unit in stable condition.  After appropriate recovery from the anaesthetic agents used during the surgery, the patient was then transported to the hospital inpatient floor.  The interim of the hospital stay from arrival on the floor up to discharge has been uncomplicated. The patient has tolerated regular diet.  The patient's pain has been controlled using a multimodal approach. Currently, the patient's pain is well controlled on an oral regimen.  The patient has been voiding without difficulty.  The patient began participation in physical therapy after surgery and has progressed throughout the entire hospital stay.  Currently, the patient's progress is sufficient to allow the them to be discharged to home safely.  The patient agrees with this assessment and desires a discharge today.

## 2023-10-04 NOTE — OP NOTE
[facility] ([facility])  Plastic Surgery  Operative Note    SUMMARY     Date of Procedure: 10/4/2023     Location:  [facility]    Procedure(s):   Secondary closure dehisced, infected lumbar spine surgical wound     Surgeon(s) and Role:  Panel 1:     * Jesus Alberto Sharp MD - Primary  Panel 2:     * Benigno Goodman DO - Primary     * Segundo Vance MD - Fellow    Assistant: CIARRA Quiñones    Pre-Operative Diagnosis: S/P spinal surgery [Z98.890]  Open wound of lumbar region [S31.000A]    Post-Operative Diagnosis: same    Anesthesia: General    Indications for Procedure:  77-year-old woman with a history of multiple lumbar spine surgeries.  After most recent surgery she had some wound breakdown.  She had foul-smelling drainage.  I was consulted by Dr. Sharp to assist with spine closure and potential muscle flaps to obliterate dead space.    Operative Findings (including complications, if any):  Cultures were taken, the wound was completely debrided.  That measured 7 x 4 cm.  The total closure was 8 cm long.  The deepest extent of the wound was to the midline fascia and muscle closure.  No hardware was exposed.    Description of Procedures:  Patient was seen in the preoperative holding area.  The wound was examined.  Surgical consent was signed.  The patient was taken the operating room.  General anesthesia was induced.  She was placed in the prone position on spine bed.  She was prepped and draped in sterile fashion.  Began the case with Dr. Sharp.  We reopened the wound of the midline.  Two culture swabs were taken.  The wound was probed.  The deepest extent of the wound was the midline fascial and muscle closure.  All of the wound edges were then sharply debrided with the 15 blade scalpel.  Those chronic wound edges were sent for culture as well.  The wound was then copiously irrigated with the Pulsavac.    I elevated flaps of skin and subcutaneous fat off of the deep fascia of the back.  Next  the defect in the midline fascial closure was reclosed using buried 0 PDS figure-of-eight sutures.  Next a 15 [place] Uri drain was placed and brought out of the right side.  Vancomycin powder was placed in the wound bed.  Because of the previous wide undermining we were able to reclosed the wound in the midline.  Jordin's layer was closed with interrupted 2-0 PDS suture.  Skin was closed with buried 3-0 Monocryl and 3-0 nylon vertical mattress sutures.  The wound was dressed with an Aquacel Ag dressing.  The drain had good suction.    Patient tolerated procedure well taken to recovery in stable condition.    Significant Surgical Tasks Conducted by the Assistant(s), if Applicable: The indicated resident served as first assistant for this procedure.    Estimated Blood Loss (EBL):  20 mL           Implants: * No implants in log *    Specimens:   Specimen (24h ago, onward)      None                    Condition: Good    Disposition: PACU - hemodynamically stable., admitted overnight for pain control    Attestation: I was present and scrubbed for the entire procedure.

## 2023-10-04 NOTE — PROGRESS NOTES
MD at bedside; notified pt states she has yeast rash in her skin folds on abdomen. Pt states she uses a steroid cream for this. Verbalizes understanding, no new orders.

## 2023-10-04 NOTE — ANESTHESIA PROCEDURE NOTES
Intubation    Date/Time: 10/4/2023 3:16 PM    Performed by: Janeth Darden CRNA  Authorized by: Juan R Ramos MD    Intubation:     Induction:  Intravenous    Intubated:  Postinduction    Mask Ventilation:  Easy mask    Attempts:  1    Attempted By:  Student    Method of Intubation:  Direct    Blade:  Marian 3    Laryngeal View Grade: Grade I - full view of cords      Difficult Airway Encountered?: No      Complications:  None    Airway Device:  Oral endotracheal tube    Airway Device Size:  7.0    Style/Cuff Inflation:  Cuffed (inflated to minimal occlusive pressure)    Tube secured:  22    Secured at:  The teeth    Placement Verified By:  Capnometry    Complicating Factors:  None    Findings Post-Intubation:  BS equal bilateral and atraumatic/condition of teeth unchanged

## 2023-10-04 NOTE — ANESTHESIA PREPROCEDURE EVALUATION
10/04/2023  Pre-operative evaluation for Procedure(s) (LRB):  IRRIGATION AND DEBRIDEMENT **CO CASE DR. JULISSA RODRIGUEZ** LUMBAR (N/A)    Nilsa Curiel is a 77 y.o. female     Patient Active Problem List   Diagnosis    Mixed hyperlipidemia    GERD (gastroesophageal reflux disease)    Atopic dermatitis    Hyperparathyroidism    Decreased strength    Impaired mobility    Right-sided carotid artery disease    Neurogenic claudication due to lumbar spinal stenosis    Spondylolisthesis of lumbosacral region    MONE (obstructive sleep apnea)    Primary osteoarthritis of both shoulders    Chronic right-sided low back pain without sciatica    Headache disorder    Chronic kidney disease, stage III (moderate)    Cervical stenosis of spine    Posture imbalance    Decreased strength of trunk and back    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    Essential hypertension    Right hip pain    Ankle swelling    Intertrigo    Severe obesity (BMI >= 40)    Muscle spasms of both lower extremities    Hyperuricemia    Chronic heart failure with preserved ejection fraction    Nonrheumatic aortic valve stenosis    Hair loss    Cervical spine arthritis with nerve pain    Chronic right hip pain    Chronic neck pain    Lumbar spondylosis    Fatigue    Atherosclerosis of aorta    Acute blood loss anemia       Review of patient's allergies indicates:  No Known Allergies    No current facility-administered medications on file prior to encounter.     Current Outpatient Medications on File Prior to Encounter   Medication Sig Dispense Refill    acetaminophen (TYLENOL) 500 MG tablet Take 1 tablet (500 mg total) by mouth 3 (three) times daily. 90 tablet 0    allopurinoL (ZYLOPRIM) 100 MG tablet Take 1 tablet (100 mg total) by mouth once daily. 90 tablet 11    amLODIPine (NORVASC) 10  MG tablet Take 1 tablet (10 mg total) by mouth once daily. 90 tablet 3    carvediloL (COREG) 3.125 MG tablet Take 1 tablet (3.125 mg total) by mouth 2 (two) times daily with meals. 180 tablet 3    ketoconazole (NIZORAL) 2 % cream Apply to affected areas of body BID prn irritation. 15 g 1    methocarbamoL (ROBAXIN) 500 MG Tab Take 2 tablets (1,000 mg total) by mouth 3 (three) times daily. 90 tablet 0    olmesartan (BENICAR) 40 MG tablet TAKE 1 TABLET ONE TIME DAILY 90 tablet 3    oxyCODONE (ROXICODONE) 5 MG immediate release tablet Take 1 tablet (5 mg total) by mouth every 8 (eight) hours as needed for Pain (for breakthrough pain). 21 tablet 0    potassium chloride SA (K-DUR,KLOR-CON) 20 MEQ tablet Take 1 tablet (20 mEq total) by mouth 2 (two) times daily. 180 tablet 3    rosuvastatin (CRESTOR) 20 MG tablet Take 1 tablet (20 mg total) by mouth once daily. 90 tablet 3    aspirin (ECOTRIN) 81 MG EC tablet Take 1 tablet (81 mg total) by mouth once daily. 90 tablet 3    betamethasone dipropionate (DIPROLENE) 0.05 % ointment Apply topically 2 (two) times daily. 45 g 11    biotin 300 mcg Tab Take 1 tablet by mouth once daily.      celecoxib (CELEBREX) 100 MG capsule Take 1 capsule (100 mg total) by mouth 2 (two) times daily. 28 capsule 0    mometasone (ELOCON) 0.1 % ointment Apply twice daily to hands prn eczema. (Patient not taking: Reported on 2023) 45 g 3    MULTIVIT-MIN/FA/CA CARB/VIT K (WOMEN'S 50+ DAILY FORMULA ORAL) Take by mouth.      omega-3 fatty acids 300 mg Cap Take by mouth.      riboflavin, vitamin B2, (VITAMIN B-2 ORAL) Take 1 capsule by mouth once daily.      TURMERIC ORAL Take by mouth.      ubidecarenone (COENZYME Q10) 100 mg Tab Take 1 tablet by mouth once daily.          Past Surgical History:   Procedure Laterality Date     SECTION      x3    COLONOSCOPY N/A 10/04/2021    Procedure: COLONOSCOPY;  Surgeon: Taran Galvez MD;  Location: [facility] ([address] [place] [address]);  Service:  Endoscopy;  Laterality: N/A;    COLONOSCOPY N/A 01/31/2022    Procedure: COLONOSCOPY--positive fit kit;  Surgeon: Tani Lara MD;  Location: [facility] (4TH FLR);  Service: Endoscopy;  Laterality: N/A;    EPIDURAL STEROID INJECTION N/A 4/26/2023    Procedure: INJECTION, STEROID, EPIDURAL, L5-S1;  Surgeon: Majo Meyers MD;  Location: [facility] PAIN MGT;  Service: Pain Management;  Laterality: N/A;    ESOPHAGOGASTRODUODENOSCOPY N/A 10/04/2021    Procedure: EGD (ESOPHAGOGASTRODUODENOSCOPY);  Surgeon: Taran Galvez MD;  Location: [facility] ENDO (4TH FLR);  Service: Endoscopy;  Laterality: N/A;  covid test 10/1-[facility]    10/1-LVM about COVID test-GT    ESOPHAGOGASTRODUODENOSCOPY N/A 01/31/2022    Procedure: EGD (ESOPHAGOGASTRODUODENOSCOPY);  Surgeon: Tani Lara MD;  Location: [facility] (4TH FLR);  Service: Endoscopy;  Laterality: N/A;  12/15 fully vaccinated; instructions to portal-st  1/28-covid [facility]-tb    FUSION, SPINE, LUMBAR, TLIF, POSTERIOR APPROACH, USING PEDICLE SCREW N/A 8/16/2023    Procedure: FUSION, SPINE, LUMBAR, TLIF, POSTERIOR APPROACH, USING PEDICLE SCREW L2-5 PLDF/TLIF GLOBUS ROBOT SNS:SSEP/EMG cell saver;  Surgeon: Jesus Alberto Sharp MD;  Location: [facility] 2ND FLR;  Service: Neurosurgery;  Laterality: N/A;    TRANSFORAMINAL EPIDURAL INJECTION OF STEROID N/A 03/16/2023    Procedure: b/l L4-5 TFESI;  Surgeon: Gato Delatorre DO;  Location: [facility] OR;  Service: Pain Management;  Laterality: N/A;    TUBAL LIGATION         Social History     Socioeconomic History    Marital status:    Occupational History    Occupation: retired medical records    Tobacco Use    Smoking status: Never    Smokeless tobacco: Never   Substance and Sexual Activity    Alcohol use: Never    Drug use: No    Sexual activity: Not Currently     Partners: Male     Birth control/protection: None   Other Topics Concern    Are you pregnant or think you may be? No    Breast-feeding No   Social History  Narrative    , lives with 1 daughter(Toreal), Walking some     Social Determinants of Health     Financial Resource Strain: Low Risk  (9/29/2023)    Overall Financial Resource Strain (CARDIA)     Difficulty of Paying Living Expenses: Not very hard   Food Insecurity: No Food Insecurity (9/15/2023)    Hunger Vital Sign     Worried About Running Out of Food in the Last Year: Never true     Ran Out of Food in the Last Year: Never true   Transportation Needs: Unmet Transportation Needs (9/15/2023)    PRAPARE - Transportation     Lack of Transportation (Medical): Yes     Lack of Transportation (Non-Medical): No   Physical Activity: Inactive (9/29/2023)    Exercise Vital Sign     Days of Exercise per Week: 0 days     Minutes of Exercise per Session: 0 min   Stress: No Stress Concern Present (9/12/2023)    [employer] - Occupational Stress Questionnaire     Feeling of Stress : Not at all   Social Connections: Moderately Integrated (9/29/2023)    Social Connection and Isolation Panel [NHANES]     Frequency of Communication with Friends and Family: More than three times a week     Frequency of Social Gatherings with Friends and Family: Once a week     Attends Evangelical Services: More than 4 times per year     Active Member of Clubs or Organizations: Yes     Attends Club or Organization Meetings: More than 4 times per year     Marital Status:    Housing Stability: Low Risk  (9/29/2023)    Housing Stability Vital Sign     Unable to Pay for Housing in the Last Year: No     Number of Places Lived in the Last Year: 1     Unstable Housing in the Last Year: No   Recent Concern: Housing Stability - High Risk (8/26/2023)    Housing Stability Vital Sign     Unable to Pay for Housing in the Last Year: Yes     Number of Places Lived in the Last Year: 1     Unstable Housing in the Last Year: No             2D Echo:  Results for orders placed or performed in visit on 05/08/18    2D Echo w/ Color Flow Doppler   Result Value Ref Range    EF + QEF 73 55 - 65    Diastolic Dysfunction Yes (A)     Est. PA Systolic Pressure 35.95     Tricuspid Valve Regurgitation TRIVIAL            Pre-op Assessment    I have reviewed the Patient Summary Reports.     I have reviewed the Nursing Notes. I have reviewed the NPO Status.      Review of Systems  Anesthesia Hx:  No problems with previous Anesthesia    Cardiovascular:   Hypertension    Pulmonary:   Sleep Apnea    Renal/:   Chronic Renal Disease    Hepatic/GI:   GERD    Musculoskeletal:   Arthritis     Neurological:   Headaches        Physical Exam    Airway:  Mouth Opening: Normal  Tongue: Normal    Chest/Lungs:  Normal Respiratory Rate    Heart:  Rhythm: Regular Rhythm        Anesthesia Plan  Type of Anesthesia, risks & benefits discussed:    Anesthesia Type: Gen ETT  Intra-op Monitoring Plan: Standard ASA Monitors  Induction:  IV  Informed Consent: Informed consent signed with the Patient and all parties understand the risks and agree with anesthesia plan.  All questions answered.   ASA Score: 3    Ready For Surgery From Anesthesia Perspective.     .

## 2023-10-04 NOTE — OP NOTE
DATE OF PROCEDURE: 10/4/2023.     PRIMARY SURGEON: Jesus Alberto Sharp M.D.     CO-SURGEON: Basilio Goodman DO Plastics    FIRST ASSISTANT: Hali Daigle PA-C, no resident was available.     PREOPERATIVE DIAGNOSIS:   1. Lumbar wound infection  2. History of L2-5 PLDF/TLIF     POSTOPERATIVE DIAGNOSIS: Same     PROCEDURES PERFORMED:  1. Lumbar wound incision and drainage     ANESTHESIA: General endotracheal anesthesia.     ESTIMATED BLOOD LOSS: 50 mL.     SPECIMENS: None.     FINDINGS: superficial wound dehiscence     DRAINS: 1 GENEVA     COMPLICATIONS: None.     SPONGE AND NEEDLE COUNT: Correct x2.     REASON FOR OPERATION AND BRIEF HISTORY AND PHYSICAL: Nilsa Curiel is a 77 y.o. female with a hx of L2-5 PLDF/TLIF who had persistent wound dehiscence. The patient has failed conservative management and is here for surgery today.     DESCRIPTION OF INFORMED CONSENT:   I had a sit down discussion with the patient and  regarding the above procedure. We specifically discussed the risks, benefits, and alternatives to surgery. We discussed the surgical procedure including the skin incision, irrigation and debridement: they understand the risks include but are not limited to bleeding, infection, damage to arteries, veins and nerves, death, paralysis, blindness, spinal fluid leak, continued or worsening pain, the possible need for more surgery in the future as well as the possibility other unforseen and unknown complications. We talked about expected hospital stay and recovery period. All questions were answered; they understand and wish to proceed.     DESCRIPTION OF PROCEDURE:   The patient was met in the preoperative area where her low back was marked in the midline by me personally. Subsequently, she was brought to the Operating Room where sequential compression devices were placed on the patient's bilateral calves and run throughout the case. The patient was then intubated via general endotracheal anesthesia. They were  then flipped prone onto a Shashi table with Himanshu frame. The head was secured on a facial pillow. The eyes were personally checked by meand found to be acceptable. The arms were held in a 90-90 position. All bony prominences were padded paying special attention to the axilla, elbows, hands, knees, and feet. Being satisfied with positioning and confirming this to be adequate with Anesthesia, the patient's lower back was prepped and draped in normal sterile fashion.     A full timeout was then called identifying the patient, the procedural site and level, the availability of all instruments and no specific nursing, surgical, or anesthetic concerns.      I opened the previous lumbar incision distally and carried dissection down through the skin and subcutaneous tissues using combination of sharp dissection and electrocautery where necessary. There was no identifiable fascial defect. Cultures x2 were taken. Then, the patient was given a weight-appropriate dose of antibiotics by the Anesthesia Service. The skin edges were excised and sent for pathology. We used pulsatile lavage with NS to thoroughly washout the wound.      Next, the wound was handed over to Plastic Surgery team for muscle/flap advancement and closure. Please see Dr. Goodman's OP note for details.     After the wound was closed and the drains were placed, the patient was then flipped supine, extubated and transferred to the Recovery Room in stable condition.    Jesus Alberto Sharp MD  Orthopaedic Spine Surgeon  Department of Orthopaedic Surgery  [phone]

## 2023-10-04 NOTE — TRANSFER OF CARE
"Anesthesia Transfer of Care Note    Patient: Nilsa Curiel    Procedure(s) Performed: Procedure(s) (LRB):  IRRIGATION AND DEBRIDEMENT **CO CASE DR. JULISSA RODRIGUEZ** LUMBAR (N/A)  CLOSURE, WOUND    Patient location: PACU    Anesthesia Type: general    Transport from OR: Transported from OR on 6-10 L/min O2 by face mask with adequate spontaneous ventilation    Post pain: adequate analgesia    Post assessment: tolerated procedure well and no apparent anesthetic complications    Post vital signs: stable    Level of consciousness: awake, alert and oriented    Nausea/Vomiting: no nausea/vomiting    Complications: none    Transfer of care protocol was followed      Last vitals:   Visit Vitals  BP (!) 141/65 (BP Location: Right arm, Patient Position: Lying)   Pulse 87   Temp 36.9 °C (98.4 °F) (Temporal)   Resp 18   Ht 5' 1" (1.549 m)   Wt 92.9 kg (204 lb 12.9 oz)   SpO2 100%   Breastfeeding No   BMI 38.70 kg/m²     "

## 2023-10-04 NOTE — HPI
Nilsa Curiel is a 77 y.o. female with a hx of L2-5 PLDF/TLIF who had persistent wound dehiscence. The patient has failed conservative management and is here for surgery today.

## 2023-10-04 NOTE — DISCHARGE INSTRUCTIONS
DR. CARYL MASON'S POSTOPERATIVE INSTRUCTIONS -   LUMBAR I&D      Antibiotics: You will take antibiotics according to ID's recommendations.    NSAIDs: Please refrain from taking ibuprofen (Advil), naproxen (Aleve), and other non steroidal anti-inflammatory medications other than the Celebrex that will be prescribed to you after surgery.    Wound Care: Please follow Plastic Surgery's recommendation for drain management and wound care.    Brace: You may be prescribed a brace, please wear this when up and walking, it is not necessary to wear at night when sleeping.    Pain: We will use a multimodal approach for pain management after your surgery.  You will be given a prescription for pain medicine when you are discharged from the hospital.  You will also be given prescriptions for Robaxin (a muscle relaxer), Gabapentin, Celebrex and Tylenol.  Please note: you will only be given ONE prescription for narcotics when you are discharged from the hospital.  This medication is for breakthrough pain only. This medication will not be refilled.  The other medications given to you may be refilled if needed.      Infection: Signs of infection include increasing wound drainage and redness around the wound, as well as a temperature over 101.5 degrees. It is unnecessary to take your temperature on a routine basis. Please call the below number if you are concerned about an infection.    Driving and Work: It is ok to return to driving and work as long as you are not taking narcotic pain medications and can walk greater than 100 feet. Please do not lift over 10 pounds or participate in exercise or sports until cleared by Dr. Mason.    Deep Venous Thrombosis (Blood Clots): Symptoms include swelling in the legs and shortness of breath. Please call the office or proceed to the nearest emergency room if you have any of these symptoms.    Physical Therapy: The best physical therapy immediately after surgery is walking. Please try to walk as much  as possible.    Follow-up: You will be scheduled for a follow-up appointment in 4 weeks with either Dr. Sharp or his physician assistant, Hali Daigle PA-C.    Questions: During business hours please call [phone] for routine questions. For after hours questions please call [phone] and ask to speak with the Orthopaedic resident on call.    Disability: If you submitted short term disability paperwork for us to complete and would like to check the status, please call the Disability Department at [phone].  You may fax any necessary paperwork to [phone].

## 2023-10-05 PROBLEM — D64.9 ACUTE ON CHRONIC ANEMIA: Status: ACTIVE | Noted: 2023-10-05

## 2023-10-05 PROBLEM — E66.812 CLASS 2 SEVERE OBESITY DUE TO EXCESS CALORIES WITH SERIOUS COMORBIDITY AND BODY MASS INDEX (BMI) OF 38.0 TO 38.9 IN ADULT: Status: ACTIVE | Noted: 2023-10-05

## 2023-10-05 PROBLEM — T81.31XA SURGICAL WOUND DEHISCENCE: Status: ACTIVE | Noted: 2023-10-05

## 2023-10-05 PROBLEM — E66.01 CLASS 2 SEVERE OBESITY DUE TO EXCESS CALORIES WITH SERIOUS COMORBIDITY AND BODY MASS INDEX (BMI) OF 38.0 TO 38.9 IN ADULT: Status: ACTIVE | Noted: 2023-10-05

## 2023-10-05 LAB
BASOPHILS # BLD AUTO: 0 K/UL (ref 0–0.2)
BASOPHILS NFR BLD: 0 % (ref 0–1.9)
DIFFERENTIAL METHOD: ABNORMAL
EOSINOPHIL # BLD AUTO: 0 K/UL (ref 0–0.5)
EOSINOPHIL NFR BLD: 0 % (ref 0–8)
ERYTHROCYTE [DISTWIDTH] IN BLOOD BY AUTOMATED COUNT: 13.6 % (ref 11.5–14.5)
HCT VFR BLD AUTO: 27.3 % (ref 37–48.5)
HGB BLD-MCNC: 8.1 G/DL (ref 12–16)
IMM GRANULOCYTES # BLD AUTO: 0.02 K/UL (ref 0–0.04)
IMM GRANULOCYTES NFR BLD AUTO: 0.3 % (ref 0–0.5)
LYMPHOCYTES # BLD AUTO: 0.7 K/UL (ref 1–4.8)
LYMPHOCYTES NFR BLD: 10 % (ref 18–48)
MCH RBC QN AUTO: 23.8 PG (ref 27–31)
MCHC RBC AUTO-ENTMCNC: 29.7 G/DL (ref 32–36)
MCV RBC AUTO: 80 FL (ref 82–98)
MONOCYTES # BLD AUTO: 0.2 K/UL (ref 0.3–1)
MONOCYTES NFR BLD: 3 % (ref 4–15)
NEUTROPHILS # BLD AUTO: 5.8 K/UL (ref 1.8–7.7)
NEUTROPHILS NFR BLD: 86.7 % (ref 38–73)
NRBC BLD-RTO: 0 /100 WBC
PLATELET # BLD AUTO: 378 K/UL (ref 150–450)
PMV BLD AUTO: 9.8 FL (ref 9.2–12.9)
RBC # BLD AUTO: 3.4 M/UL (ref 4–5.4)
VANCOMYCIN SERPL-MCNC: 11 UG/ML
WBC # BLD AUTO: 6.7 K/UL (ref 3.9–12.7)

## 2023-10-05 PROCEDURE — 97116 GAIT TRAINING THERAPY: CPT

## 2023-10-05 PROCEDURE — 97161 PT EVAL LOW COMPLEX 20 MIN: CPT

## 2023-10-05 PROCEDURE — 25000003 PHARM REV CODE 250: Performed by: PHYSICIAN ASSISTANT

## 2023-10-05 PROCEDURE — 99223 1ST HOSP IP/OBS HIGH 75: CPT | Mod: ,,, | Performed by: PHYSICIAN ASSISTANT

## 2023-10-05 PROCEDURE — 97165 OT EVAL LOW COMPLEX 30 MIN: CPT

## 2023-10-05 PROCEDURE — 25000003 PHARM REV CODE 250: Performed by: ORTHOPAEDIC SURGERY

## 2023-10-05 PROCEDURE — 80202 ASSAY OF VANCOMYCIN: CPT | Performed by: ORTHOPAEDIC SURGERY

## 2023-10-05 PROCEDURE — 63600175 PHARM REV CODE 636 W HCPCS: Performed by: ORTHOPAEDIC SURGERY

## 2023-10-05 PROCEDURE — 97530 THERAPEUTIC ACTIVITIES: CPT

## 2023-10-05 PROCEDURE — 36415 COLL VENOUS BLD VENIPUNCTURE: CPT | Performed by: ORTHOPAEDIC SURGERY

## 2023-10-05 PROCEDURE — 11000001 HC ACUTE MED/SURG PRIVATE ROOM

## 2023-10-05 PROCEDURE — 63600175 PHARM REV CODE 636 W HCPCS: Performed by: PHYSICIAN ASSISTANT

## 2023-10-05 PROCEDURE — 85025 COMPLETE CBC W/AUTO DIFF WBC: CPT | Performed by: ORTHOPAEDIC SURGERY

## 2023-10-05 PROCEDURE — 99223 PR INITIAL HOSPITAL CARE,LEVL III: ICD-10-PCS | Mod: ,,, | Performed by: PHYSICIAN ASSISTANT

## 2023-10-05 RX ADMIN — POLYETHYLENE GLYCOL 3350 17 G: 17 POWDER, FOR SOLUTION ORAL at 08:10

## 2023-10-05 RX ADMIN — MUPIROCIN: 20 OINTMENT TOPICAL at 08:10

## 2023-10-05 RX ADMIN — SENNOSIDES AND DOCUSATE SODIUM 1 TABLET: 50; 8.6 TABLET ORAL at 09:10

## 2023-10-05 RX ADMIN — OXYCODONE HYDROCHLORIDE 5 MG: 5 TABLET ORAL at 09:10

## 2023-10-05 RX ADMIN — CARVEDILOL 3.12 MG: 3.12 TABLET, FILM COATED ORAL at 08:10

## 2023-10-05 RX ADMIN — ALLOPURINOL 100 MG: 100 TABLET ORAL at 08:10

## 2023-10-05 RX ADMIN — OXYCODONE HYDROCHLORIDE 10 MG: 10 TABLET ORAL at 08:10

## 2023-10-05 RX ADMIN — ACETAMINOPHEN 650 MG: 325 TABLET ORAL at 05:10

## 2023-10-05 RX ADMIN — ATORVASTATIN CALCIUM 80 MG: 40 TABLET, FILM COATED ORAL at 08:10

## 2023-10-05 RX ADMIN — MUPIROCIN: 20 OINTMENT TOPICAL at 09:10

## 2023-10-05 RX ADMIN — OXYCODONE HYDROCHLORIDE 10 MG: 10 TABLET ORAL at 02:10

## 2023-10-05 RX ADMIN — CEFEPIME 2 G: 2 INJECTION, POWDER, FOR SOLUTION INTRAVENOUS at 10:10

## 2023-10-05 RX ADMIN — CEFAZOLIN 2 G: 2 INJECTION, POWDER, FOR SOLUTION INTRAMUSCULAR; INTRAVENOUS at 12:10

## 2023-10-05 RX ADMIN — SODIUM CHLORIDE: 9 INJECTION, SOLUTION INTRAVENOUS at 11:10

## 2023-10-05 RX ADMIN — FAMOTIDINE 20 MG: 20 TABLET ORAL at 08:10

## 2023-10-05 RX ADMIN — CEFEPIME 2 G: 2 INJECTION, POWDER, FOR SOLUTION INTRAVENOUS at 11:10

## 2023-10-05 RX ADMIN — AMLODIPINE BESYLATE 10 MG: 5 TABLET ORAL at 08:10

## 2023-10-05 RX ADMIN — CARVEDILOL 3.12 MG: 3.12 TABLET, FILM COATED ORAL at 05:10

## 2023-10-05 RX ADMIN — VANCOMYCIN HYDROCHLORIDE 1500 MG: 1.5 INJECTION, POWDER, LYOPHILIZED, FOR SOLUTION INTRAVENOUS at 02:10

## 2023-10-05 NOTE — PLAN OF CARE
Problem: Pain Acute  Goal: Acceptable Pain Control and Functional Ability  Outcome: Ongoing, Progressing     Problem: Adult Inpatient Plan of Care  Goal: Plan of Care Review  Outcome: Ongoing, Progressing  Goal: Patient-Specific Goal (Individualized)  Outcome: Ongoing, Progressing  Goal: Absence of Hospital-Acquired Illness or Injury  Outcome: Ongoing, Progressing  Goal: Optimal Comfort and Wellbeing  Outcome: Ongoing, Progressing  Goal: Readiness for Transition of Care  Outcome: Ongoing, Progressing     Problem: Skin Injury Risk Increased  Goal: Skin Health and Integrity  Outcome: Ongoing, Progressing     Problem: Infection  Goal: Absence of Infection Signs and Symptoms  Outcome: Ongoing, Progressing     Problem: Fall Injury Risk  Goal: Absence of Fall and Fall-Related Injury  Outcome: Ongoing, Progressing

## 2023-10-05 NOTE — PLAN OF CARE
Problem: Adult Inpatient Plan of Care  Goal: Plan of Care Review  Outcome: Ongoing, Progressing     Problem: Skin Injury Risk Increased  Goal: Skin Health and Integrity  Outcome: Ongoing, Progressing     Problem: Pain Acute  Goal: Acceptable Pain Control and Functional Ability  Outcome: Ongoing, Progressing     Problem: Infection  Goal: Absence of Infection Signs and Symptoms  Outcome: Ongoing, Progressing     Problem: Fall Injury Risk  Goal: Absence of Fall and Fall-Related Injury  Outcome: Ongoing, Progressing

## 2023-10-05 NOTE — PLAN OF CARE
I have reviewed the chart of Nilsa Curiel and collaborated with Jesus Alberto Sharp MD in the care of the patient who is hospitalized for the following:    Active Hospital Problems    Diagnosis    *Surgical wound dehiscence    Acute on chronic anemia     Stable from prior admission      Class 2 severe obesity due to excess calories with serious comorbidity and body mass index (BMI) of 38.0 to 38.9 in adult    Chronic heart failure with preserved ejection fraction    Essential hypertension    MONE (obstructive sleep apnea)          I have reviewed the Nilsa Curiel with the multidisciplinary team during discharge huddle.       Dotty Donald PA-C  [facility]

## 2023-10-05 NOTE — PLAN OF CARE
Patient tolerated PT session well. She ambulated 40ft with RW and stand by assistance. She was educated in spinal precautions. She is okay to ambulate with RW and 1 person assistance.     Problem: Physical Therapy  Goal: Physical Therapy Goal  Description: Goals to be met by: 10/19/2023    Patient will increase functional independence with mobility by performin. Supine to sit with supervision   2. Rolling to Left with supervision  3. Sit to stand transfer with Supervision  4. Gait x150 feet with Supervision using Rolling Walker  5. Ascend/Descend 6 inch curb step with Supervision using Rolling Walker    Outcome: Ongoing, Progressing

## 2023-10-05 NOTE — PT/OT/SLP EVAL
Physical Therapy Evaluation and Treatment    Patient Name: Nilsa Curiel   MRN: [id number]  Recent Surgery: Procedure(s) (LRB):  IRRIGATION AND DEBRIDEMENT **CO CASE DR. JULISSA RODRIGUEZ** LUMBAR (N/A)  CLOSURE, WOUND 1 Day Post-Op    Recommendations:     Discharge Recommendations: other   Discharge Equipment Recommendations: none   Barriers to discharge: None    Assessment:     Nilsa Curiel is a 77 y.o. female admitted with a medical diagnosis of Surgical wound dehiscence. She presents with the following impairments/functional limitations: weakness, impaired endurance, impaired functional mobility, impaired balance, gait instability, decreased lower extremity function, pain, orthopedic precautions. Patient tolerated PT session well. She ambulated 40ft with RW and stand by assistance. She was educated in spinal precautions. She is okay to ambulate with RW and 1 person assistance.     Rehab Prognosis: Good; patient would benefit from acute PT services to address these deficits and reach maximum level of function.    Plan:     During this hospitalization, patient to be seen 4 x/week to address the above listed problems via gait training, therapeutic activities, therapeutic exercises, neuromuscular re-education    Plan of Care Expires: 11/05/23    Subjective     Chief Complaint: Back pain.   Patient Comments/Goals: To go to OPPT and get stronger.   Pain/Comfort:  Pain Rating 1: 5/10  Location 1: back  Pain Addressed 1: Pre-medicate for activity, Reposition, Distraction, Cessation of Activity    Social History:  Living Environment: Patient lives with her daughter in a 1st floor apartment with 1 step to enter.   Prior Level of Function: Prior to admission, patient was modified independent with ADLs using rolling walker for mobility since initial surgery 8/16/23.  Equipment at Home: cane, quad, cane, straight, wheelchair, walker, rolling, bedside commode  Assistance Upon Discharge:  daughter when not at work    Objective:      Communicated with RN prior to session. Patient found up in chair with SCD, GENEVA drain upon PT entry to room.    General Precautions: Standard, fall   Orthopedic Precautions: spinal precautions   Braces: N/A    Respiratory Status: Room air    Exams:  RLE ROM: WFL  RLE Strength: WFL  LLE ROM: WFL  LLE Strength: WFL  Cognitive: Patient is oriented to Person, Place, Time, Situation    Functional Mobility:  Bed Mobility  Seated in chair at start of session and returned to chair  Transfers  Sit to Stand: stand by assistance with rolling walker x1 from bedside chair   Gait  Patient ambulated 40ft with rolling walker and stand by assistance. Limited in ambulation distance due to back pain.     Therapeutic Activities and Exercises:  Patient educated in:  -PT role and POC  -spinal precautions (no bending, lifting, or twisting)  -safety with transfers including hand placement  -gait sequence and RW management  -OOB activity to maximize recovery including getting on a daily walking program at home   -ambulating in hallway 3x's/day with nursing staff assistance     AM-PAC 6 CLICK MOBILITY  Total Score:17    Patient left up in chair with all lines intact, call button in reach, and RN notified.    GOALS:   Multidisciplinary Problems       Physical Therapy Goals          Problem: Physical Therapy    Goal Priority Disciplines Outcome Goal Variances Interventions   Physical Therapy Goal     PT, PT/OT Ongoing, Progressing     Description: Goals to be met by: 10/19/2023    Patient will increase functional independence with mobility by performin. Supine to sit with supervision   2. Rolling to Left with supervision  3. Sit to stand transfer with Supervision  4. Gait x150 feet with Supervision using Rolling Walker  5. Ascend/Descend 6 inch curb step with Supervision using Rolling Walker                         History:     Past Medical History:   Diagnosis Date    Abnormal fasting glucose 2015    Acute bilateral low back  pain without sciatica 10/10/2017    Anemia 2018    Arthritis     Atopic dermatitis     Central stenosis of spinal canal 2022    Chronic diastolic heart failure 2022    Chronic kidney disease, stage III (moderate) 2020    Dermatitis 2023    GERD (gastroesophageal reflux disease)     Hyperlipidemia     Hypertension     Hypokalemia 2022    Joint pain     Left ventricular hypertrophy 2016    Microcytic hypochromic anemia 2018    Multifactoral. See hematology consult. Some nutritional , some kidney related    Mild aortic sclerosis 2019    Mobility poor 2022    Morbid obesity with BMI of 40.0-44.9, adult 2016    Nonintractable episodic headache 2019    Normocytic normochromic anemia 2022    MONE (obstructive sleep apnea) 2018    Prediabetes 2023    Severe obesity (BMI >= 40) 06/10/2022       Past Surgical History:   Procedure Laterality Date     SECTION      x3    CLOSURE OF WOUND  10/4/2023    Procedure: CLOSURE, WOUND;  Surgeon: Benigno Goodman DO;  [facility];  Service: Plastics;;    COLONOSCOPY N/A 10/04/2021    Procedure: COLONOSCOPY;  Surgeon: Taran Galvez MD;  [facility]  Service: Endoscopy;  Laterality: N/A;    COLONOSCOPY N/A 2022    Procedure: COLONOSCOPY--positive fit kit;  Surgeon: Tani Lara MD;  Location: [facility] ENDO (4TH FLR);  Service: Endoscopy;  Laterality: N/A;    EPIDURAL STEROID INJECTION N/A 2023    Procedure: INJECTION, STEROID, EPIDURAL, L5-S1;  Surgeon: Majo Meyers MD;  Location: [facility] PAIN MGT;  Service: Pain Management;  Laterality: N/A;    ESOPHAGOGASTRODUODENOSCOPY N/A 10/04/2021    Procedure: EGD (ESOPHAGOGASTRODUODENOSCOPY);  Surgeon: Taran Galvez MD;  Location: [facility] ENDO 4TH FLR);  Service: Endoscopy;  Laterality: N/A;  covid test 10/1-st [facility]    10/1-LVM about COVID test-GT    ESOPHAGOGASTRODUODENOSCOPY N/A 2022     Procedure: EGD (ESOPHAGOGASTRODUODENOSCOPY);  Surgeon: Tani Lara MD;  Location: [facility] ENDO (4TH FLR);  Service: Endoscopy;  Laterality: N/A;  12/15 fully vaccinated; instructions to portal-st  1/28-covid  [facility]-    FUSION, SPINE, LUMBAR, TLIF, POSTERIOR APPROACH, USING PEDICLE SCREW N/A 8/16/2023    Procedure: FUSION, SPINE, LUMBAR, TLIF, POSTERIOR APPROACH, USING PEDICLE SCREW L2-5 PLDF/TLIF GLOBUS ROBOT SNS:SSEP/EMG cell saver;  Surgeon: Jesus Alberto Sharp MD;  Location: [facility] OR 2ND FLR;  Service: Neurosurgery;  Laterality: N/A;    IRRIGATION AND DEBRIDEMENT N/A 10/4/2023    Procedure: IRRIGATION AND DEBRIDEMENT **CO CASE DR. JULISSA RODRIGUEZ** LUMBAR;  Surgeon: Jesus Alberto Sharp MD;  Location: [facility] OR 2ND FLR;  Service: Orthopedics;  Laterality: N/A;    TRANSFORAMINAL EPIDURAL INJECTION OF STEROID N/A 03/16/2023    Procedure: b/l L4-5 TFESI;  Surgeon: Gato Delatorre DO;  Location: [facility];  Service: Pain Management;  Laterality: N/A;    TUBAL LIGATION         Time Tracking:     PT Received On: 10/05/23  PT Start Time: 1430  PT Stop Time: 1445  PT Total Time (min): 15 min     Billable Minutes: Evaluation 7 and Gait Training 8    10/5/2023

## 2023-10-05 NOTE — CONSULTS
[facility]  Infectious Disease  Consult Note    Patient Name: Nilsa Curiel  MRN: [id number]  Admission Date: 10/4/2023  Hospital Length of Stay: 1 days  Attending Physician: Jesus Alberto Sharp MD  Primary Care Provider: Gaurav Allison MD     Isolation Status: No active isolations      Inpatient consult to Infectious Diseases  Consult performed by: Sebastien Das PA-C  Consult ordered by: Hali Daigle PA-C        Assessment/Plan:     Orthopedic  * Surgical wound dehiscence  Ms. Curiel is a 76 y/o female s/p L2-5 fusion 8/16/23 admitted for wound dehiscence s/p wound washout and flap closure 10/4. ID consulted abx recommendations.    Per op note, no fascia defect. Cultures obtained +GNR and Staph aureus. She remains afebrile, stable,no leukocytosis.    Recommendations  1. Started IV vancomycin and cefepime today pending final culture data.   2. Discussed with orthopedics, wound defect superficial. Anticipate 14 days of abx for SSTI.   3. ID will follow with you closely   3. Agree with PT evaluation/ dispo pending recommendations/wound follow up                Thank you for your consult. I will follow-up with patient. Please contact us if you have any additional questions.    Sebastien Das PA-C  Infectious Disease  [facility]    Subjective:     Principal Problem: Surgical wound dehiscence    HPI: Ms. Curiel is a 76 y/o female s/p L2-5 fusion 8/16/23 admitted for wound dehiscence s/p wound washout and flap closure 10/4. ID consulted abx recommendations.    Per op note, no fascia defect. Cultures obtained +GNR and Staph aureus. She remains afebrile, stable,no leukocytosis. Drain in place. No acute complaints or concerns at this time.       Past Medical History:   Diagnosis Date    Abnormal fasting glucose 09/04/2015    Acute bilateral low back pain without sciatica 10/10/2017    Anemia 04/24/2018    Arthritis     Atopic dermatitis     Central stenosis of spinal canal 12/23/2022    Chronic  diastolic heart failure 2022    Chronic kidney disease, stage III (moderate) 2020    Dermatitis 2023    GERD (gastroesophageal reflux disease)     Hyperlipidemia     Hypertension     Hypokalemia 2022    Joint pain     Left ventricular hypertrophy 2016    Microcytic hypochromic anemia 2018    Multifactoral. See hematology consult. Some nutritional , some kidney related    Mild aortic sclerosis 2019    Mobility poor 2022    Morbid obesity with BMI of 40.0-44.9, adult 2016    Nonintractable episodic headache 2019    Normocytic normochromic anemia 2022    MONE (obstructive sleep apnea) 2018    Prediabetes 2023    Severe obesity (BMI >= 40) 06/10/2022       Past Surgical History:   Procedure Laterality Date     SECTION      x3    CLOSURE OF WOUND  10/4/2023    Procedure: CLOSURE, WOUND;  Surgeon: Benigno Goodman DO;  [facility];  Service: Plastics;;    COLONOSCOPY N/A 10/04/2021    Procedure: COLONOSCOPY;  Surgeon: Taran Galvez MD;  Location: [facility] (4TH FLR);  Service: Endoscopy;  Laterality: N/A;    COLONOSCOPY N/A 2022    Procedure: COLONOSCOPY--positive fit kit;  Surgeon: Tani Lara MD;  Location: [facility] (4TH FLR);  Service: Endoscopy;  Laterality: N/A;    EPIDURAL STEROID INJECTION N/A 2023    Procedure: INJECTION, STEROID, EPIDURAL, L5-S1;  Surgeon: Majo Meyers MD;  [facility];  Service: Pain Management;  Laterality: N/A;    ESOPHAGOGASTRODUODENOSCOPY N/A 10/04/2021    Procedure: EGD (ESOPHAGOGASTRODUODENOSCOPY);  Surgeon: Taran Galvez MD;  Location: [facility] LIVE (4TH FLR);  Service: Endoscopy;  Laterality: N/A;  covid test 10/1-st cnonor    10/1-LVM about COVID test-GT    ESOPHAGOGASTRODUODENOSCOPY N/A 2022    Procedure: EGD (ESOPHAGOGASTRODUODENOSCOPY);  Surgeon: Tani Lara MD;  Location: [facility] (4TH FLR);  Service: Endoscopy;   Laterality: N/A;  12/15 fully vaccinated; instructions to portal-st  1/28-covid [facility]-tb    FUSION, SPINE, LUMBAR, TLIF, POSTERIOR APPROACH, USING PEDICLE SCREW N/A 8/16/2023    Procedure: FUSION, SPINE, LUMBAR, TLIF, POSTERIOR APPROACH, USING PEDICLE SCREW L2-5 PLDF/TLIF GLOBUS ROBOT SNS:SSEP/EMG cell saver;  Surgeon: Jesus Alberto Sharp MD;  [facility];  Service: Neurosurgery;  Laterality: N/A;    IRRIGATION AND DEBRIDEMENT N/A 10/4/2023    Procedure: IRRIGATION AND DEBRIDEMENT **CO CASE DR. JULISSA RODRIGUEZ** LUMBAR;  Surgeon: Jesus Alberto Sharp MD;  [facility];  Service: Orthopedics;  Laterality: N/A;    TRANSFORAMINAL EPIDURAL INJECTION OF STEROID N/A 03/16/2023    Procedure: b/l L4-5 TFESI;  Surgeon: Gato Delatorre DO;  Location: [facility] OR;  Service: Pain Management;  Laterality: N/A;    TUBAL LIGATION         Review of patient's allergies indicates:  No Known Allergies    Medications:  Medications Prior to Admission   Medication Sig    allopurinoL (ZYLOPRIM) 100 MG tablet Take 1 tablet (100 mg total) by mouth once daily.    amLODIPine (NORVASC) 10 MG tablet Take 1 tablet (10 mg total) by mouth once daily.    carvediloL (COREG) 3.125 MG tablet Take 1 tablet (3.125 mg total) by mouth 2 (two) times daily with meals.    ketoconazole (NIZORAL) 2 % cream Apply to affected areas of body BID prn irritation.    olmesartan (BENICAR) 40 MG tablet TAKE 1 TABLET ONE TIME DAILY    potassium chloride SA (K-DUR,KLOR-CON) 20 MEQ tablet Take 1 tablet (20 mEq total) by mouth 2 (two) times daily.    rosuvastatin (CRESTOR) 20 MG tablet Take 1 tablet (20 mg total) by mouth once daily.    [DISCONTINUED] acetaminophen (TYLENOL) 500 MG tablet Take 1 tablet (500 mg total) by mouth 3 (three) times daily.    [DISCONTINUED] methocarbamoL (ROBAXIN) 500 MG Tab Take 2 tablets (1,000 mg total) by mouth 3 (three) times daily.    [DISCONTINUED] oxyCODONE (ROXICODONE) 5 MG immediate release tablet Take  1 tablet (5 mg total) by mouth every 8 (eight) hours as needed for Pain (for breakthrough pain).    aspirin (ECOTRIN) 81 MG EC tablet Take 1 tablet (81 mg total) by mouth once daily.    betamethasone dipropionate (DIPROLENE) 0.05 % ointment Apply topically 2 (two) times daily.    biotin 300 mcg Tab Take 1 tablet by mouth once daily.    mometasone (ELOCON) 0.1 % ointment Apply twice daily to hands prn eczema. (Patient not taking: Reported on 8/21/2023)    MULTIVIT-MIN/FA/CA CARB/VIT K (WOMEN'S 50+ DAILY FORMULA ORAL) Take by mouth.    omega-3 fatty acids 300 mg Cap Take by mouth.    riboflavin, vitamin B2, (VITAMIN B-2 ORAL) Take 1 capsule by mouth once daily.    TURMERIC ORAL Take by mouth.    ubidecarenone (COENZYME Q10) 100 mg Tab Take 1 tablet by mouth once daily.     [DISCONTINUED] celecoxib (CELEBREX) 100 MG capsule Take 1 capsule (100 mg total) by mouth 2 (two) times daily.     Antibiotics (From admission, onward)      Start     Stop Route Frequency Ordered    10/05/23 1100  ceFEPIme (MAXIPIME) 2 g in dextrose 5 % in water (D5W) 100 mL IVPB (MB+)         -- IV Every 12 hours (non-standard times) 10/05/23 0946    10/05/23 1045  vancomycin - pharmacy to dose  (vancomycin IVPB (PEDS and ADULTS))        See Hyperspace for full Linked Orders Report.    -- IV pharmacy to manage frequency 10/05/23 0946    10/04/23 2100  mupirocin 2 % ointment         10/09/23 2059 Nasl 2 times daily 10/04/23 1642          Antifungals (From admission, onward)      None          Antivirals (From admission, onward)      None             Immunization History   Administered Date(s) Administered    COVID-19, MRNA, LN-S, PF (MODERNA FULL 0.5 ML DOSE) 03/04/2021, 04/01/2021    Influenza 11/10/2011, 12/05/2012    Influenza (FLUAD) - Quadrivalent - Adjuvanted - PF *Preferred* (65+) 11/12/2020, 12/29/2021, 11/02/2022    Influenza - High Dose - PF (65 years and older) 12/28/2015, 12/01/2016, 09/26/2017, 11/06/2018    Influenza -  Quadrivalent - PF *Preferred* (6 months and older) 12/05/2012, 09/27/2019    Influenza - Trivalent - PF (ADULT) 11/10/2011    Pneumococcal Conjugate - 13 Valent 02/02/2015    Pneumococcal Polysaccharide - 23 Valent 04/10/2017    Td (ADULT) 01/01/2012    Tdap 06/08/2022    Zoster Recombinant 01/03/2023, 03/09/2023       Family History       Problem Relation (Age of Onset)    Breast cancer Mother (91)    Cancer Mother (91), Sister    Cataracts Brother    Dementia Mother    Diabetes Mother, Sister, Brother    Gout Sister    Heart disease Sister    Hyperlipidemia Sister    Hypertension Mother, Sister, Son, Sister    Kidney disease Sister, Sister    Multiple sclerosis Daughter    No Known Problems Daughter, Brother    Other Father, Brother    Stroke Brother          Social History     Socioeconomic History    Marital status:    Occupational History    Occupation: retired medical records    Tobacco Use    Smoking status: Never    Smokeless tobacco: Never   Substance and Sexual Activity    Alcohol use: Never    Drug use: No    Sexual activity: Not Currently     Partners: Male     Birth control/protection: None   Other Topics Concern    Are you pregnant or think you may be? No    Breast-feeding No   Social History Narrative    , lives with 1 daughter(Toreal), Walking some     Social Determinants of Health     Financial Resource Strain: Low Risk  (10/5/2023)    Overall Financial Resource Strain (CARDIA)     Difficulty of Paying Living Expenses: Not hard at all   Food Insecurity: No Food Insecurity (10/5/2023)    Hunger Vital Sign     Worried About Running Out of Food in the Last Year: Never true     Ran Out of Food in the Last Year: Never true   Transportation Needs: Unmet Transportation Needs (9/15/2023)    PRAPARE - Transportation     Lack of Transportation (Medical): Yes     Lack of Transportation (Non-Medical): No   Physical Activity: Inactive (10/5/2023)    Exercise Vital Sign      Days of Exercise per Week: 0 days     Minutes of Exercise per Session: 0 min   Stress: No Stress Concern Present (10/5/2023)    [employer] [place] [employer] - Occupational Stress Questionnaire     Feeling of Stress : Not at all   Social Connections: Moderately Isolated (10/5/2023)    Social Connection and Isolation Panel [NHANES]     Frequency of Communication with Friends and Family: More than three times a week     Frequency of Social Gatherings with Friends and Family: More than three times a week     Attends Restoration Services: More than 4 times per year     Active Member of Clubs or Organizations: No     Attends Club or Organization Meetings: Never     Marital Status:    Housing Stability: Low Risk  (10/5/2023)    Housing Stability Vital Sign     Unable to Pay for Housing in the Last Year: No     Number of Places Lived in the Last Year: 1     Unstable Housing in the Last Year: No   Recent Concern: Housing Stability - High Risk (8/26/2023)    Housing Stability Vital Sign     Unable to Pay for Housing in the Last Year: Yes     Number of Places Lived in the Last Year: 1     Unstable Housing in the Last Year: No     Review of Systems   Constitutional:  Positive for fatigue. Negative for appetite change, chills, diaphoresis and fever.   Respiratory:  Negative for cough and shortness of breath.    Cardiovascular:  Negative for chest pain and leg swelling.   Gastrointestinal:  Negative for abdominal pain, diarrhea, nausea and vomiting.   Genitourinary:  Negative for dysuria.   Musculoskeletal:  Positive for back pain. Negative for arthralgias.   Skin:  Positive for wound. Negative for rash.   All other systems reviewed and are negative.    Objective:     Vital Signs (Most Recent):  Temp: 98.3 °F (36.8 °C) (10/05/23 1157)  Pulse: 87 (10/05/23 1157)  Resp: 18 (10/05/23 1157)  BP: (!) 124/56 (10/05/23 1157)  SpO2: 96 % (10/05/23 1157) Vital Signs (24h Range):  Temp:  [96.4 °F (35.8  °C)-98.4 °F (36.9 °C)] 98.3 °F (36.8 °C)  Pulse:  [74-89] 87  Resp:  [12-27] 18  SpO2:  [92 %-100 %] 96 %  BP: (100-141)/(53-83) 124/56     Weight: 93.1 kg (205 lb 4 oz)  Body mass index is 38.78 kg/m².    Estimated Creatinine Clearance: 50.6 mL/min (based on SCr of 1 mg/dL).     Physical Exam  Vitals and nursing note reviewed.   Constitutional:       General: She is not in acute distress.     Appearance: She is well-developed. She is not diaphoretic.   HENT:      Head: Normocephalic and atraumatic.   Eyes:      Pupils: Pupils are equal, round, and reactive to light.   Cardiovascular:      Rate and Rhythm: Normal rate and regular rhythm.      Heart sounds: Normal heart sounds. No murmur heard.     No friction rub. No gallop.   Pulmonary:      Effort: Pulmonary effort is normal. No respiratory distress.      Breath sounds: Normal breath sounds. No wheezing or rales.   Chest:      Chest wall: No tenderness.   Abdominal:      General: Bowel sounds are normal. There is no distension.      Palpations: Abdomen is soft. There is no mass.      Tenderness: There is no abdominal tenderness. There is no guarding or rebound.      Hernia: No hernia is present.   Musculoskeletal:         General: No tenderness or deformity. Normal range of motion.      Cervical back: Normal range of motion and neck supple.   Skin:     General: Skin is warm and dry.      Coloration: Skin is not pale.      Findings: No erythema.      Comments: Drain in place  Surgical wound examination deferred today   Neurological:      Mental Status: She is alert and oriented to person, place, and time.      Cranial Nerves: No cranial nerve deficit.      Coordination: Coordination normal.   Psychiatric:         Behavior: Behavior normal.         Thought Content: Thought content normal.          Significant Labs: All pertinent labs within the past 24 hours have been reviewed.    Significant Imaging: I have reviewed all pertinent imaging results/findings within the  past 24 hours.

## 2023-10-05 NOTE — HPI
Ms. Curiel is a 76 y/o female s/p L2-5 fusion 8/16/23 admitted for wound dehiscence s/p wound washout and flap closure 10/4. ID consulted abx recommendations.    Per op note, no fascia defect. Cultures obtained +GNR and Staph aureus. She remains afebrile, stable,no leukocytosis. Drain in place. No acute complaints or concerns at this time.

## 2023-10-05 NOTE — NURSING TRANSFER
Nursing Transfer Note      10/4/2023   7:26 PM  Reason patient is being transferred: post-procedure    Transfer To: 504    Transfer via stretcher    Transfer with none    Transported by RN and PCT    Order for Tele Monitor? No    Additional Lines: no    Medicines sent: NS gtt, Ancef IVPB    Any special needs or follow-up needed: routine    Patient belongings transferred with patient: No    Chart send with patient: Yes    Notified: daughter    *Coreg not available in PACU pyxis, unable to give*

## 2023-10-05 NOTE — SUBJECTIVE & OBJECTIVE
"Principal Problem:Surgical wound dehiscence    Principal Orthopedic Problem: Same    Interval History: NAEON, patient stable, pain controlled. No acute complaints this morning.       Review of patient's allergies indicates:  No Known Allergies    Current Facility-Administered Medications   Medication    0.9%  NaCl infusion    0.9%  NaCl infusion    acetaminophen tablet 650 mg    allopurinoL tablet 100 mg    amLODIPine tablet 10 mg    atorvastatin tablet 80 mg    carvediloL tablet 3.125 mg    famotidine tablet 20 mg    gabapentin capsule 100 mg    methocarbamoL tablet 1,000 mg    mupirocin 2 % ointment    ondansetron disintegrating tablet 8 mg    ondansetron injection 4 mg    oxyCODONE immediate release tablet 5 mg    oxyCODONE immediate release tablet Tab 10 mg    polyethylene glycol packet 17 g    senna-docusate 8.6-50 mg per tablet 1 tablet    valsartan tablet 320 mg     Objective:     Vital Signs (Most Recent):  Temp: 96.4 °F (35.8 °C) (10/05/23 0352)  Pulse: 82 (10/05/23 0352)  Resp: 18 (10/05/23 0352)  BP: (!) 113/53 (10/05/23 0352)  SpO2: (!) 92 % (10/05/23 0352) Vital Signs (24h Range):  Temp:  [96.4 °F (35.8 °C)-98.4 °F (36.9 °C)] 96.4 °F (35.8 °C)  Pulse:  [74-89] 82  Resp:  [12-27] 18  SpO2:  [92 %-100 %] 92 %  BP: (100-141)/(53-83) 113/53     Weight: 93.1 kg (205 lb 4 oz)  Height: 5' 1" (154.9 cm)  Body mass index is 38.78 kg/m².      Intake/Output Summary (Last 24 hours) at 10/5/2023 0626  Last data filed at 10/5/2023 0530  Gross per 24 hour   Intake 1000 ml   Output 830 ml   Net 170 ml        Ortho/SPM Exam  Lumbar spine exam   EHL/FHL/TA/Gastroc intact   SILT   2+DP/PT   Dressings CDI   Drains x1       Significant Labs: CBC:   Recent Labs   Lab 10/05/23  0418   WBC 6.70   HGB 8.1*   HCT 27.3*        CMP:   Recent Labs   Lab 10/04/23  1810      K 5.3*   *   CO2 20*      BUN 12   CREATININE 1.0  1.0   CALCIUM 9.6   PROT 7.6   ALBUMIN 3.4*   BILITOT 0.3   ALKPHOS 118   AST 24 "   ALT 13   ANIONGAP 6*     All pertinent labs within the past 24 hours have been reviewed.    Significant Imaging: I have reviewed and interpreted all pertinent imaging results/findings.

## 2023-10-05 NOTE — PLAN OF CARE
[facility] - [facility]  Initial Discharge Assessment       Primary Care Provider: Gaurav Allison MD    Admission Diagnosis: S/P spinal surgery [Z98.890]  Open wound of lumbar region [S31.000A]    Admission Date: 10/4/2023  Expected Discharge Date: 10/6/2023         Payor: HUMANA MANAGED MEDICARE / Plan: HU[employer]NA MEDICARE HMO / Product Type: Capitation /     Extended Emergency Contact Information  Primary Emergency Contact: Germain Curiel   [employer]  Home Phone: [phone]  Relation: Daughter    Discharge Plan A: Home with family, Home Health         [id number] - [place], [place] - [address] AT [facility]  [address]  [facility] [address]  Phone: [phone] Fax: [phone]    [facility] - [facility] [address]  [address] [facility] [address]  Phone: [phone] Fax: [phone]      Initial Assessment (most recent)       Adult Discharge Assessment - 10/05/23 1105          Discharge Assessment    Assessment Type Discharge Planning Assessment     Confirmed/corrected address, phone number and insurance Yes     Confirmed Demographics Correct on Facesheet     Source of Information patient     Does patient/caregiver understand observation status Yes     Communicated MOY with patient/caregiver Yes     People in Home child(danna), adult     Do you expect to return to your current living situation? Yes     Do you have help at home or someone to help you manage your care at home? Yes     Who are your caregiver(s) and their phone number(s)? Germain Curiel (Daughter) [phone]     Prior to hospitilization cognitive status: Alert/Oriented     Current cognitive status: Alert/Oriented     Equipment Currently Used at Home CPAP;walker, rolling;wheelchair;cane, quad;bedside commode     Readmission within 30 days? No     Patient currently being followed by outpatient case management? No     Do you currently have  service(s) that help you manage your care at home? No     Is the pt/caregiver preference to resume services with current agency Yes     Do you take prescription medications? Yes     Do you have prescription coverage? Yes     Do you have any problems affording any of your prescribed medications? No     Is the patient taking medications as prescribed? yes     Who is going to help you get home at discharge? Daughter and son-in-law     How do you get to doctors appointments? family or friend will provide     Are you on dialysis? No     Do you take coumadin? No     DME Needed Upon Discharge  none     Discharge Plan A Home with family;Home Health        Physical Activity    On average, how many days per week do you engage in moderate to strenuous exercise (like a brisk walk)? 0 days     On average, how many minutes do you engage in exercise at this level? 0 min        Financial Resource Strain    How hard is it for you to pay for the very basics like food, housing, medical care, and heating? Not hard at all        Housing Stability    In the last 12 months, was there a time when you were not able to pay the mortgage or rent on time? No     In the last 12 months, how many places have you lived? 1     In the last 12 months, was there a time when you did not have a steady place to sleep or slept in a shelter (including now)? No        Food Insecurity    Within the past 12 months, you worried that your food would run out before you got the money to buy more. Never true     Within the past 12 months, the food you bought just didn't last and you didn't have money to get more. Never true        Stress    Do you feel stress - tense, restless, nervous, or anxious, or unable to sleep at night because your mind is troubled all the time - these days? Not at all        Social Connections    In a typical week, how many times do you talk on the phone with family, friends, or neighbors? More than three times a week     How often do you  get together with friends or relatives? More than three times a week     How often do you attend Caodaism or Druze services? More than 4 times per year     Do you belong to any clubs or organizations such as Caodaism groups, unions, fraternal or athletic groups, or school groups? No     How often do you attend meetings of the clubs or organizations you belong to? Never     Are you , , , , never , or living with a partner?         Alcohol Use    Q1: How often do you have a drink containing alcohol? Never     Q2: How many drinks containing alcohol do you have on a typical day when you are drinking? Patient does not drink     Q3: How often do you have six or more drinks on one occasion? Never                   Spoke with patient at bedside to complete d/c planning assessment. Patient lives with her daughter and son-in-law in a single story home without steps to enter. She is Independent with ADLs and has the following DME in home: CPAP, walker, wheelchair, cane and bedside commode. Verified PCP, Pharmacy and health insurance. PT/OT eval pending. If HH recommended patient expressed her preference as [facility]. Will continue to follow for needs.      Manjula MCCORMACK  Case Management  [facility]  [phone]

## 2023-10-05 NOTE — PROGRESS NOTES
Plastic and Reconstructive Surgery   Progress Note    Subjective:      Patient seen and examined at bedside. No acute event overnight. Patient s/p I&D and washout with complex closure POD#1. Pain controlled. Patient has not complaints. 30 cc of serosanguinous output from drain.    Objective:  Vital signs in last 24 hours:  Temp:  [96.4 °F (35.8 °C)-98.4 °F (36.9 °C)] 97.5 °F (36.4 °C)  Pulse:  [74-89] 82  Resp:  [12-27] 18  SpO2:  [92 %-100 %] 99 %  BP: (100-141)/(53-83) 136/60    Intake/Output last 3 shifts:  I/O last 3 completed shifts:  In: 1000 [I.V.:1000]  Out: 830 [Urine:800; Drains:30]    Intake/Output this shift:  No intake/output data recorded.        Physical Exam:  VITAL SIGNS:   Vitals:    10/04/23 2344 10/05/23 0352 10/05/23 0752 10/05/23 0811   BP: (!) 105/54 (!) 113/53 136/60    BP Location: Right arm Right arm Right arm    Patient Position: Lying Lying Lying    Pulse: 82 82 82    Resp: 18 18 18 18   Temp: 97.7 °F (36.5 °C) 96.4 °F (35.8 °C) 97.5 °F (36.4 °C)    TempSrc: Oral Oral Oral    SpO2: 95% (!) 92% 99%    Weight:       Height:         TMAX: Temp (24hrs), Av.6 °F (36.4 °C), Min:96.4 °F (35.8 °C), Max:98.4 °F (36.9 °C)    General: Alert; No acute distress  Cardiovascular: Regular rate   Respiratory: Normal respiratory effort. Chest rise symmetric.   Abdomen: Soft, nontender, nondistended  Extremity: Moves all extremities equally.  Neurologic: No focal deficit. Speech normal  Back: lumbar midline incision with aquacel and tommy drain with serosanguinous output      Scheduled Medications allopurinoL, 100 mg, Daily  amLODIPine, 10 mg, Daily  atorvastatin, 80 mg, Daily  carvediloL, 3.125 mg, BID WM  ceFEPime (MAXIPIME) IVPB, 2 g, Q12H  famotidine, 20 mg, Daily  gabapentin, 100 mg, TID  mupirocin, , BID  polyethylene glycol, 17 g, Daily  senna-docusate 8.6-50 mg, 1 tablet, BID      PRN Medications acetaminophen, methocarbamoL, ondansetron, ondansetron, oxyCODONE, oxyCODONE, Pharmacy to dose  Vancomycin consult **AND** vancomycin - pharmacy to dose    Recent Labs:   Lab Results   Component Value Date    WBC 6.70 10/05/2023    HGB 8.1 (L) 10/05/2023    HCT 27.3 (L) 10/05/2023    MCV 80 (L) 10/05/2023     10/05/2023     Lab Results   Component Value Date     10/04/2023     10/04/2023    K 5.3 (H) 10/04/2023     (H) 10/04/2023    BUN 12 10/04/2023         Assessment: 77 y.o. y/o female 1 Day Post-Op s/p Procedure(s):  IRRIGATION AND DEBRIDEMENT **CO CASE DR. JULISSA RODRIGUEZ** LUMBAR  CLOSURE, WOUND Doing well postoperatively.    Plan  Reg diet  Pain control  Monitor drain output  F/u neurosurgery recs  Discussed with Dr. Rex Vance DO -  Fellow  Department of Plastic and Reconstructive Surgery  [phone] (office)

## 2023-10-05 NOTE — ANESTHESIA POSTPROCEDURE EVALUATION
Anesthesia Post Evaluation    Patient: Nilsa Curiel    Procedure(s) Performed: Procedure(s) (LRB):  IRRIGATION AND DEBRIDEMENT **CO CASE DR. JULISSA RODRIGUEZ** LUMBAR (N/A)  CLOSURE, WOUND    Final Anesthesia Type: general      Patient location during evaluation: PACU  Patient participation: Yes- Able to Participate  Level of consciousness: awake and alert  Post-procedure vital signs: reviewed and stable  Pain management: adequate  Airway patency: patent    PONV status at discharge: No PONV  Anesthetic complications: no      Cardiovascular status: blood pressure returned to baseline  Respiratory status: unassisted  Follow-up not needed.          Event Time   Out of Recovery 17:30:00         Pain/Jon Score: Pain Rating Prior to Med Admin: 8 (10/5/2023  8:11 AM)  Pain Rating Post Med Admin: 2 (10/4/2023 10:11 PM)  Jon Score: 9 (10/4/2023  5:30 PM)

## 2023-10-05 NOTE — PLAN OF CARE
Problem: Occupational Therapy  Goal: Occupational Therapy Goal  Description: Goals to be met by: 11/1/23     Patient will increase functional independence with ADLs by performing:    UE Dressing with Modified [place].  LE Dressing with Modified [place].  Grooming while standing with Modified [place].  Toileting from toilet/bedside commode with Modified [place] for hygiene and clothing management.   Toilet transfer to toilet/bedside commode with Supervision and LRAD as needed.    Outcome: Ongoing, Progressing   OT eval complete with goals established.

## 2023-10-05 NOTE — NURSING
Nurses Note -- 4 Eyes      10/4/2023   10:30 PM      Skin assessed during: Admit      [x] No Altered Skin Integrity Present    []Prevention Measures Documented      [] Yes- Altered Skin Integrity Present or Discovered   [] LDA Added if Not in Epic (Describe Wound)   [] New Altered Skin Integrity was Present on Admit and Documented in LDA   [] Wound Image Taken    Wound Care Consulted? No    Attending Nurse:  ZACK Bailey    Second RN/Staff Member:  Chasity DIXON

## 2023-10-05 NOTE — SUBJECTIVE & OBJECTIVE
Past Medical History:   Diagnosis Date    Abnormal fasting glucose 2015    Acute bilateral low back pain without sciatica 10/10/2017    Anemia 2018    Arthritis     Atopic dermatitis     Central stenosis of spinal canal 2022    Chronic diastolic heart failure 2022    Chronic kidney disease, stage III (moderate) 2020    Dermatitis 2023    GERD (gastroesophageal reflux disease)     Hyperlipidemia     Hypertension     Hypokalemia 2022    Joint pain     Left ventricular hypertrophy 2016    Microcytic hypochromic anemia 2018    Multifactoral. See hematology consult. Some nutritional , some kidney related    Mild aortic sclerosis 2019    Mobility poor 2022    Morbid obesity with BMI of 40.0-44.9, adult 2016    Nonintractable episodic headache 2019    Normocytic normochromic anemia 2022    MONE (obstructive sleep apnea) 2018    Prediabetes 2023    Severe obesity (BMI >= 40) 06/10/2022       Past Surgical History:   Procedure Laterality Date     SECTION      x3    CLOSURE OF WOUND  10/4/2023    Procedure: CLOSURE, WOUND;  Surgeon: Benigno Goodman DO;  [facility];  Service: Plastics;;    COLONOSCOPY N/A 10/04/2021    Procedure: COLONOSCOPY;  Surgeon: Taran Galvez MD;  Location: [facility] (4TH FLR);  Service: Endoscopy;  Laterality: N/A;    COLONOSCOPY N/A 2022    Procedure: COLONOSCOPY--positive fit kit;  Surgeon: Tani Lara MD;  Location: [facility] (4TH FLR);  Service: Endoscopy;  Laterality: N/A;    EPIDURAL STEROID INJECTION N/A 2023    Procedure: INJECTION, STEROID, EPIDURAL, L5-S1;  Surgeon: Majo Meyers MD;  Location: [facility] PAIN MGT;  Service: Pain Management;  Laterality: N/A;    ESOPHAGOGASTRODUODENOSCOPY N/A 10/04/2021    Procedure: EGD (ESOPHAGOGASTRODUODENOSCOPY);  Surgeon: Taran Galvez MD;  Location: [facility] ENDO (4TH FLR);  Service: Endoscopy;  Laterality: N/A;   covid test 10/1-[facility]    10/1-LVM about COVID test-GT    ESOPHAGOGASTRODUODENOSCOPY N/A 01/31/2022    Procedure: EGD (ESOPHAGOGASTRODUODENOSCOPY);  Surgeon: Tani Lara MD;  Location: [facility] (4TH FLR);  Service: Endoscopy;  Laterality: N/A;  12/15 fully vaccinated; instructions to portal-st  1/28-covid [facility]-tb    FUSION, SPINE, LUMBAR, TLIF, POSTERIOR APPROACH, USING PEDICLE SCREW N/A 8/16/2023    Procedure: FUSION, SPINE, LUMBAR, TLIF, POSTERIOR APPROACH, USING PEDICLE SCREW L2-5 PLDF/TLIF GLOBUS ROBOT SNS:SSEP/EMG cell saver;  Surgeon: Jesus Alberto Sharp MD;  Location: [facility] OR 2ND FLR;  Service: Neurosurgery;  Laterality: N/A;    IRRIGATION AND DEBRIDEMENT N/A 10/4/2023    Procedure: IRRIGATION AND DEBRIDEMENT **CO CASE DR. JULISSA RODRIGUEZ** LUMBAR;  Surgeon: Jesus Alberto Sharp MD;  [facility];  Service: Orthopedics;  Laterality: N/A;    TRANSFORAMINAL EPIDURAL INJECTION OF STEROID N/A 03/16/2023    Procedure: b/l L4-5 TFESI;  Surgeon: Gato Delatorre DO;  Location: [facility] OR;  Service: Pain Management;  Laterality: N/A;    TUBAL LIGATION         Review of patient's allergies indicates:  No Known Allergies    Medications:  Medications Prior to Admission   Medication Sig    allopurinoL (ZYLOPRIM) 100 MG tablet Take 1 tablet (100 mg total) by mouth once daily.    amLODIPine (NORVASC) 10 MG tablet Take 1 tablet (10 mg total) by mouth once daily.    carvediloL (COREG) 3.125 MG tablet Take 1 tablet (3.125 mg total) by mouth 2 (two) times daily with meals.    ketoconazole (NIZORAL) 2 % cream Apply to affected areas of body BID prn irritation.    olmesartan (BENICAR) 40 MG tablet TAKE 1 TABLET ONE TIME DAILY    potassium chloride SA (K-DUR,KLOR-CON) 20 MEQ tablet Take 1 tablet (20 mEq total) by mouth 2 (two) times daily.    rosuvastatin (CRESTOR) 20 MG tablet Take 1 tablet (20 mg total) by mouth once daily.    [DISCONTINUED] acetaminophen (TYLENOL) 500 MG tablet Take 1 tablet (500 mg total) by  mouth 3 (three) times daily.    [DISCONTINUED] methocarbamoL (ROBAXIN) 500 MG Tab Take 2 tablets (1,000 mg total) by mouth 3 (three) times daily.    [DISCONTINUED] oxyCODONE (ROXICODONE) 5 MG immediate release tablet Take 1 tablet (5 mg total) by mouth every 8 (eight) hours as needed for Pain (for breakthrough pain).    aspirin (ECOTRIN) 81 MG EC tablet Take 1 tablet (81 mg total) by mouth once daily.    betamethasone dipropionate (DIPROLENE) 0.05 % ointment Apply topically 2 (two) times daily.    biotin 300 mcg Tab Take 1 tablet by mouth once daily.    mometasone (ELOCON) 0.1 % ointment Apply twice daily to hands prn eczema. (Patient not taking: Reported on 8/21/2023)    MULTIVIT-MIN/FA/CA CARB/VIT K (WOMEN'S 50+ DAILY FORMULA ORAL) Take by mouth.    omega-3 fatty acids 300 mg Cap Take by mouth.    riboflavin, vitamin B2, (VITAMIN B-2 ORAL) Take 1 capsule by mouth once daily.    TURMERIC ORAL Take by mouth.    ubidecarenone (COENZYME Q10) 100 mg Tab Take 1 tablet by mouth once daily.     [DISCONTINUED] celecoxib (CELEBREX) 100 MG capsule Take 1 capsule (100 mg total) by mouth 2 (two) times daily.     Antibiotics (From admission, onward)      Start     Stop Route Frequency Ordered    10/05/23 1100  ceFEPIme (MAXIPIME) 2 g in dextrose 5 % in water (D5W) 100 mL IVPB (MB+)         -- IV Every 12 hours (non-standard times) 10/05/23 0946    10/05/23 1045  vancomycin - pharmacy to dose  (vancomycin IVPB (PEDS and ADULTS))        See Hyperspace for full Linked Orders Report.    -- IV pharmacy to manage frequency 10/05/23 0946    10/04/23 2100  mupirocin 2 % ointment         10/09/23 2059 Nasl 2 times daily 10/04/23 1642          Antifungals (From admission, onward)      None          Antivirals (From admission, onward)      None             Immunization History   Administered Date(s) Administered    COVID-19, MRNA, LN-S, PF (MODERNA FULL 0.5 ML DOSE) 03/04/2021, 04/01/2021    Influenza 11/10/2011, 12/05/2012    Influenza  (FLUAD) - Quadrivalent - Adjuvanted - PF *Preferred* (65+) 11/12/2020, 12/29/2021, 11/02/2022    Influenza - High Dose - PF (65 years and older) 12/28/2015, 12/01/2016, 09/26/2017, 11/06/2018    Influenza - Quadrivalent - PF *Preferred* (6 months and older) 12/05/2012, 09/27/2019    Influenza - Trivalent - PF (ADULT) 11/10/2011    Pneumococcal Conjugate - 13 Valent 02/02/2015    Pneumococcal Polysaccharide - 23 Valent 04/10/2017    Td (ADULT) 01/01/2012    Tdap 06/08/2022    Zoster Recombinant 01/03/2023, 03/09/2023       Family History       Problem Relation (Age of Onset)    Breast cancer Mother (91)    Cancer Mother (91), Sister    Cataracts Brother    Dementia Mother    Diabetes Mother, Sister, Brother    Gout Sister    Heart disease Sister    Hyperlipidemia Sister    Hypertension Mother, Sister, Son, Sister    Kidney disease Sister, Sister    Multiple sclerosis Daughter    No Known Problems Daughter, Brother    Other Father, Brother    Stroke Brother          Social History     Socioeconomic History    Marital status:    Occupational History    Occupation: retired medical records    Tobacco Use    Smoking status: Never    Smokeless tobacco: Never   Substance and Sexual Activity    Alcohol use: Never    Drug use: No    Sexual activity: Not Currently     Partners: Male     Birth control/protection: None   Other Topics Concern    Are you pregnant or think you may be? No    Breast-feeding No   Social History Narrative    , lives with 1 daughter(Toreal), Walking some     Social Determinants of Health     Financial Resource Strain: Low Risk  (10/5/2023)    Overall Financial Resource Strain (CARDIA)     Difficulty of Paying Living Expenses: Not hard at all   Food Insecurity: No Food Insecurity (10/5/2023)    Hunger Vital Sign     Worried About Running Out of Food in the Last Year: Never true     Ran Out of Food in the Last Year: Never true   Transportation Needs: Unmet Transportation Needs (9/15/2023)     PRAPARE - Transportation     Lack of Transportation (Medical): Yes     Lack of Transportation (Non-Medical): No   Physical Activity: Inactive (10/5/2023)    Exercise Vital Sign     Days of Exercise per Week: 0 days     Minutes of Exercise per Session: 0 min   Stress: No Stress Concern Present (10/5/2023)    Citizen of [place] of Occupational Health - Occupational Stress Questionnaire     Feeling of Stress : Not at all   Social Connections: Moderately Isolated (10/5/2023)    Social Connection and Isolation Panel [NHANES]     Frequency of Communication with Friends and Family: More than three times a week     Frequency of Social Gatherings with Friends and Family: More than three times a week     Attends Islam Services: More than 4 times per year     Active Member of Clubs or Organizations: No     Attends Club or Organization Meetings: Never     Marital Status:    Housing Stability: Low Risk  (10/5/2023)    Housing Stability Vital Sign     Unable to Pay for Housing in the Last Year: No     Number of Places Lived in the Last Year: 1     Unstable Housing in the Last Year: No   Recent Concern: Housing Stability - High Risk (8/26/2023)    Housing Stability Vital Sign     Unable to Pay for Housing in the Last Year: Yes     Number of Places Lived in the Last Year: 1     Unstable Housing in the Last Year: No     Review of Systems   Constitutional:  Positive for fatigue. Negative for appetite change, chills, diaphoresis and fever.   Respiratory:  Negative for cough and shortness of breath.    Cardiovascular:  Negative for chest pain and leg swelling.   Gastrointestinal:  Negative for abdominal pain, diarrhea, nausea and vomiting.   Genitourinary:  Negative for dysuria.   Musculoskeletal:  Positive for back pain. Negative for arthralgias.   Skin:  Positive for wound. Negative for rash.   All other systems reviewed and are negative.    Objective:     Vital Signs (Most Recent):  Temp: 98.3 °F (36.8 °C) (10/05/23  1157)  Pulse: 87 (10/05/23 1157)  Resp: 18 (10/05/23 1157)  BP: (!) 124/56 (10/05/23 1157)  SpO2: 96 % (10/05/23 1157) Vital Signs (24h Range):  Temp:  [96.4 °F (35.8 °C)-98.4 °F (36.9 °C)] 98.3 °F (36.8 °C)  Pulse:  [74-89] 87  Resp:  [12-27] 18  SpO2:  [92 %-100 %] 96 %  BP: (100-141)/(53-83) 124/56     Weight: 93.1 kg (205 lb 4 oz)  Body mass index is 38.78 kg/m².    Estimated Creatinine Clearance: 50.6 mL/min (based on SCr of 1 mg/dL).     Physical Exam  Vitals and nursing note reviewed.   Constitutional:       General: She is not in acute distress.     Appearance: She is well-developed. She is not diaphoretic.   HENT:      Head: Normocephalic and atraumatic.   Eyes:      Pupils: Pupils are equal, round, and reactive to light.   Cardiovascular:      Rate and Rhythm: Normal rate and regular rhythm.      Heart sounds: Normal heart sounds. No murmur heard.     No friction rub. No gallop.   Pulmonary:      Effort: Pulmonary effort is normal. No respiratory distress.      Breath sounds: Normal breath sounds. No wheezing or rales.   Chest:      Chest wall: No tenderness.   Abdominal:      General: Bowel sounds are normal. There is no distension.      Palpations: Abdomen is soft. There is no mass.      Tenderness: There is no abdominal tenderness. There is no guarding or rebound.      Hernia: No hernia is present.   Musculoskeletal:         General: No tenderness or deformity. Normal range of motion.      Cervical back: Normal range of motion and neck supple.   Skin:     General: Skin is warm and dry.      Coloration: Skin is not pale.      Findings: No erythema.      Comments: Drain in place  Surgical wound examination deferred today   Neurological:      Mental Status: She is alert and oriented to person, place, and time.      Cranial Nerves: No cranial nerve deficit.      Coordination: Coordination normal.   Psychiatric:         Behavior: Behavior normal.         Thought Content: Thought content normal.           Significant Labs: All pertinent labs within the past 24 hours have been reviewed.    Significant Imaging: I have reviewed all pertinent imaging results/findings within the past 24 hours.

## 2023-10-05 NOTE — NURSING
Moderate pain at the beginning of shift with relief of Oxycodone; dressing to lower back intact without drainage; GENEVA drain intact and draining serous drainage; good urine output; used home cpap.

## 2023-10-05 NOTE — PROGRESS NOTES
Nurses Note -- 4 Eyes      10/5/2023   10:00 AM      Skin assessed during: Daily Assessment      [x] No Altered Skin Integrity Present    []Prevention Measures Documented      [] Yes- Altered Skin Integrity Present or Discovered   [] LDA Added if Not in Epic (Describe Wound)   [] New Altered Skin Integrity was Present on Admit and Documented in LDA   [] Wound Image Taken    Wound Care Consulted? No  First Staff Member:          Shauna Potter RN    Second RN/Staff Member:  Shayan Kumar RN

## 2023-10-05 NOTE — ASSESSMENT & PLAN NOTE
Ms. Curiel is a 76 y/o female s/p L2-5 fusion 8/16/23 admitted for wound dehiscence s/p wound washout and flap closure 10/4. ID consulted abx recommendations.    Per op note, no fascia defect. Cultures obtained +GNR and Staph aureus. She remains afebrile, stable,no leukocytosis.    Recommendations  1. Started IV vancomycin and cefepime today pending final culture data.   2. Discussed with orthopedics, wound defect superficial. Anticipate 14 days of abx for SSTI.   3. ID will follow with you closely   3. Agree with PT evaluation/ dispo pending recommendations/wound follow up

## 2023-10-05 NOTE — ASSESSMENT & PLAN NOTE
Nilsa Curiel is a 77 y.o. female is s/p I&D with plastic surgery closure of the lumbar spine on 10/3/23    Surgical dressing C/D/I  Pain control: multimodal  PT/OT: WBAT BLE  DVT PPx: SQH TID, FCDs at all times when not ambulating  Abx: postop Ancef x 24hrs   Drain: x1 per plastic surgeyr  Mayer: None   Nursing: Incentive spirometry, Monitor and record drain output each shift     Dispo: plan to dc pending plastic surgery recs and PT     Output by Drain (mL) 10/03/23 0701 - 10/03/23 1900 10/03/23 1901 - 10/04/23 0700 10/04/23 0701 - 10/04/23 1900 10/04/23 1901 - 10/05/23 0624        Closed/Suction Drain 10/04/23 1606 Tube - 1 Back Bulb 15 Fr.   20 10

## 2023-10-05 NOTE — PROGRESS NOTES
Pharmacokinetic Initial Assessment: IV Vancomycin    Assessment/Plan:    Vancomycin 1500 mg IV given on 10/04 in surgery followed by a maintenance dose of vancomycin 1500 mg IV every 24 hours.  Desired empiric serum trough concentration is 10 to 20 mcg/mL.  Draw vancomycin trough level 60 min prior to third dose on 10/07 at approximately 1300.  Pharmacy will continue to follow and monitor vancomycin.      Please contact pharmacy at extension [phone] with any questions regarding this assessment.     Thank you for the consult,   Janeth Ronquillo       Patient brief summary:  Nilsa Curiel is a 77 y.o. female initiated on antimicrobial therapy with IV Vancomycin for treatment of suspected skin & soft tissue infection.    Drug Allergies:   Review of patient's allergies indicates:  No Known Allergies    Actual Body Weight:   93.1 kg    Renal Function:   Estimated Creatinine Clearance: 50.6 mL/min (based on SCr of 1 mg/dL).,     Dialysis Method (if applicable):  N/A    CBC (last 72 hours):  Recent Labs   Lab Result Units 10/05/23  0418   WBC K/uL 6.70   Hemoglobin g/dL 8.1*   Hematocrit % 27.3*   Platelets K/uL 378   Gran % % 86.7*   Lymph % % 10.0*   Mono % % 3.0*   Eosinophil % % 0.0   Basophil % % 0.0   Differential Method  Automated       Metabolic Panel (last 72 hours):  Recent Labs   Lab Result Units 10/04/23  1810   Sodium mmol/L 138   Potassium mmol/L 5.3*   Chloride mmol/L 112*   CO2 mmol/L 20*   Glucose mg/dL 108   BUN mg/dL 12   Creatinine mg/dL 1.0  1.0   Albumin g/dL 3.4*   Total Bilirubin mg/dL 0.3   Alkaline Phosphatase U/L 118   AST U/L 24   ALT U/L 13       Drug levels (last 3 results):  Recent Labs   Lab Result Units 10/05/23  1104   Vancomycin, Random ug/mL 11.0       Microbiologic Results:  Microbiology Results (last 7 days)       Procedure Component Value Units Date/Time    Aerobic culture [[id number]]  (Abnormal) Collected: 10/04/23 1602    Order Status: Completed Specimen: Wound from Back Updated:  10/05/23 1300     Aerobic Bacterial Culture STAPHYLOCOCCUS AUREUS  Rare  Susceptibility pending      Narrative:      Lumbar wound #1    Aerobic culture [[id number]]  (Abnormal) Collected: 10/04/23 1602    Order Status: Completed Specimen: Wound from Back Updated: 10/05/23 1255     Aerobic Bacterial Culture STAPHYLOCOCCUS AUREUS  Rare  Susceptibility pending      Narrative:      Lumbar wound #2    AFB Culture & Smear [[id number]] Collected: 10/04/23 1602    Order Status: Completed Specimen: Wound from Back Updated: 10/05/23 1009     AFB CULTURE STAIN No acid fast bacilli seen.    Narrative:      Lumbar wound tissue    AFB Culture & Smear [[id number]] Collected: 10/04/23 1602    Order Status: Completed Specimen: Wound from Back Updated: 10/05/23 0949     AFB CULTURE STAIN No acid fast bacilli seen.    Narrative:      Lumbar wound #2    AFB Culture & Smear [[id number]] Collected: 10/04/23 1602    Order Status: Completed Specimen: Wound from Back Updated: 10/05/23 0949     AFB CULTURE STAIN No acid fast bacilli seen.    Narrative:      Lumbar wound #1    Aerobic culture [[id number]]  (Abnormal) Collected: 10/04/23 1602    Order Status: Completed Specimen: Wound from Back Updated: 10/05/23 0850     Aerobic Bacterial Culture GRAM NEGATIVE ELOISE  Few  Identification and susceptibility pending        STAPHYLOCOCCUS AUREUS  Few  Susceptibility pending      Narrative:      Lumbar wound tissue    Culture, Anaerobe [[id number]] Collected: 10/04/23 1602    Order Status: Completed Specimen: Wound from Back Updated: 10/05/23 0717     Anaerobic Culture Culture in progress    Narrative:      Lumbar wound #1    Culture, Anaerobe [[id number]] Collected: 10/04/23 1602    Order Status: Completed Specimen: Wound from Back Updated: 10/05/23 0717     Anaerobic Culture Culture in progress    Narrative:      Lumbar wound tissue    Culture, Anaerobe [[id number]] Collected: 10/04/23 1602    Order Status: Completed Specimen: Wound from Back  Updated: 10/05/23 0717     Anaerobic Culture Culture in progress    Narrative:      Lumbar wound #2    Gram stain [[id number]] Collected: 10/04/23 1602    Order Status: Completed Specimen: Wound from Back Updated: 10/04/23 1841     Gram Stain Result Rare WBC's      Rare Gram positive cocci    Narrative:      Lumbar wound tissue    Gram stain [[id number]] Collected: 10/04/23 1602    Order Status: Completed Specimen: Wound from Back Updated: 10/04/23 1838     Gram Stain Result Rare WBC's      No organisms seen    Narrative:      Lumbar wound #2    Gram stain [[id number]] Collected: 10/04/23 1602    Order Status: Completed Specimen: Wound from Back Updated: 10/04/23 1837     Gram Stain Result Rare WBC's      No organisms seen    Narrative:      Lumbar wound #1    Fungus culture [[id number]] Collected: 10/04/23 1602    Order Status: Sent Specimen: Wound from Back Updated: 10/04/23 1642    Fungus culture [[id number]] Collected: 10/04/23 1602    Order Status: Sent Specimen: Wound from Back Updated: 10/04/23 1638    Fungus culture [[id number]] Collected: 10/04/23 1602    Order Status: Sent Specimen: Wound from Back Updated: 10/04/23 1622

## 2023-10-05 NOTE — PT/OT/SLP EVAL
Occupational Therapy   Evaluation    Name: Nilsa Curiel  MRN: [id number]  Admitting Diagnosis: Surgical wound dehiscence  Recent Surgery: Procedure(s) (LRB):  IRRIGATION AND DEBRIDEMENT **CO CASE DR. JULISSA RODRIGUEZ** LUMBAR (N/A)  CLOSURE, WOUND 1 Day Post-Op    Recommendations:     Discharge Recommendations: other (see comments)  Discharge Equipment Recommendations:  none  Barriers to discharge:  None    Assessment:     Nilsa Curiel is a 77 y.o. female with a medical diagnosis of Surgical wound dehiscence.  She presents with the following performance deficits affecting function: weakness, impaired endurance, impaired self care skills, impaired functional mobility, gait instability, impaired balance, pain, impaired skin.  Pt agreeable to therapy and tolerated well. Pt would continue to benefit from skilled OT services to maximize functional independence with ADLs and functional mobility, reduce caregiver burden, and facilitate safe discharge in the least restrictive environment. OT recommending further therapy services post hospitalization once medically appropriate for discharge.      Rehab Prognosis: Good; patient would benefit from acute skilled OT services to address these deficits and reach maximum level of function.       Plan:     Patient to be seen 3 x/week to address the above listed problems via self-care/home management, therapeutic activities, therapeutic exercises, neuromuscular re-education  Plan of Care Expires: 11/04/23  Plan of Care Reviewed with: patient    Subjective     Chief Complaint: back pain  Patient/Family Comments/goals: return to PLOF    Occupational Profile:  Living Environment: Pt's daughter lives with her in a 1st floor apartment with no steps. T/s combo  Previous level of function: Modified independent with ADLs and RW for fx mobility  Equipment Used at Home: cane, quad, bedside commode, walker, rolling, wheelchair, cane, straight  Assistance upon Discharge:  daughter    Pain/Comfort:  Pain Rating 1: 5/10  Location - Orientation 1: generalized  Location 1: back  Pain Addressed 1: Reposition, Distraction, Nurse notified, Cessation of Activity  Pain Rating Post-Intervention 1: 7/10    Patients cultural, spiritual, Synagogue conflicts given the current situation: no    Objective:     Communicated with: RN prior to session.  Patient found HOB elevated with SCD, GENEVA drain, PureWick upon OT entry to room.    General Precautions: Standard, fall  Orthopedic Precautions: RLE weight bearing as tolerated, LLE weight bearing as tolerated, spinal precautions  Braces: N/A  Respiratory Status: Room air    Occupational Performance:    Bed Mobility:    Patient completed Scooting/Bridging with stand by assistance  Patient completed Supine to Sit with stand by assistance    Functional Mobility/Transfers:  Patient completed Sit <> Stand Transfer with contact guard assistance  with  rolling walker   Patient completed Toilet Transfer Step Transfer technique with contact guard assistance with  rolling walker  Functional Mobility: Pt engaged in functional mobility to simulate household/community distances 12 ft x2 trials with CGA and RW in order to maximize functional endurance and standing balance required for engagement in occupations of choice   No LOB or SOB noted    Activities of Daily Living:  Upper Body Dressing: minimum assistance to don hospital gown seated EOB    Cognitive/Visual Perceptual:  Cognitive/Psychosocial Skills:     -       Oriented to: Person, Place, Time, and Situation   -       Follows Commands/attention:Follows multistep  commands  -       Safety awareness/insight to disability: intact   -       Mood/Affect/Coping skills/emotional control: Cooperative    Physical Exam:  Sensation:    -       Intact  light/touch B UE  Upper Extremity Range of Motion:     -       Right Upper Extremity: WFL  -       Left Upper Extremity: WFL  Upper Extremity Strength:    -       Right  Upper Extremity: WFL  -       Left Upper Extremity: WFL    AMPA 6 Click ADL:  AMPA Total Score: 17    Treatment & Education:  -Education on energy conservation and task modification to maximize safety and (I) during ADLs and mobility  -Education on importance of OOB activity to improve overall activity tolerance and promote recovery  -Pt educated to call for assistance and to transfer with hospital staff only  -Provided education regarding role of OT, POC, & discharge recommendations with pt verbalizing understanding.  Pt had no further questions & when asked whether there were any concerns pt reported none.     Patient left up in chair with all lines intact, call button in reach, and RN notified    GOALS:   Multidisciplinary Problems       Occupational Therapy Goals          Problem: Occupational Therapy    Goal Priority Disciplines Outcome Interventions   Occupational Therapy Goal     OT, PT/OT Ongoing, Progressing    Description: Goals to be met by: 11/1/23     Patient will increase functional independence with ADLs by performing:    UE Dressing with Modified [place].  LE Dressing with Modified [place].  Grooming while standing with Modified [place].  Toileting from toilet/bedside commode with Modified [place] for hygiene and clothing management.   Toilet transfer to toilet/bedside commode with Supervision and LRAD as needed.                         History:     Past Medical History:   Diagnosis Date    Abnormal fasting glucose 09/04/2015    Acute bilateral low back pain without sciatica 10/10/2017    Anemia 04/24/2018    Arthritis     Atopic dermatitis     Central stenosis of spinal canal 12/23/2022    Chronic diastolic heart failure 04/06/2022    Chronic kidney disease, stage III (moderate) 08/18/2020    Dermatitis 01/04/2023    GERD (gastroesophageal reflux disease)     Hyperlipidemia     Hypertension     Hypokalemia 08/18/2022    Joint pain     Left ventricular hypertrophy 08/16/2016     Microcytic hypochromic anemia 2018    Multifactoral. See hematology consult. Some nutritional , some kidney related    Mild aortic sclerosis 2019    Mobility poor 2022    Morbid obesity with BMI of 40.0-44.9, adult 2016    Nonintractable episodic headache 2019    Normocytic normochromic anemia 2022    MONE (obstructive sleep apnea) 2018    Prediabetes 2023    Severe obesity (BMI >= 40) 06/10/2022         Past Surgical History:   Procedure Laterality Date     SECTION      x3    CLOSURE OF WOUND  10/4/2023    Procedure: CLOSURE, WOUND;  Surgeon: Benigno Goodman DO;  Location: [facility] OR 2ND FLR;  Service: Plastics;;    COLONOSCOPY N/A 10/04/2021    Procedure: COLONOSCOPY;  Surgeon: Taran Galvez MD;  Location: [facility] (4TH FLR);  Service: Endoscopy;  Laterality: N/A;    COLONOSCOPY N/A 2022    Procedure: COLONOSCOPY--positive fit kit;  Surgeon: Tani Lara MD;  Location: [facility] (4TH FLR);  Service: Endoscopy;  Laterality: N/A;    EPIDURAL STEROID INJECTION N/A 2023    Procedure: INJECTION, STEROID, EPIDURAL, L5-S1;  Surgeon: Majo Meyers MD;  Location: [facility] PAIN MGT;  Service: Pain Management;  Laterality: N/A;    ESOPHAGOGASTRODUODENOSCOPY N/A 10/04/2021    Procedure: EGD (ESOPHAGOGASTRODUODENOSCOPY);  Surgeon: Taran Galvez MD;  Location: [facility] (4TH FLR);  Service: Endoscopy;  Laterality: N/A;  covid test 10/1-[facility]    10/1-LVM about COVID test-GT    ESOPHAGOGASTRODUODENOSCOPY N/A 2022    Procedure: EGD (ESOPHAGOGASTRODUODENOSCOPY);  Surgeon: Tani Lara MD;  Location: [facility] (4TH FLR);  Service: Endoscopy;  Laterality: N/A;  12/15 fully vaccinated; instructions to portal-st  -covid [facility]-tb    FUSION, SPINE, LUMBAR, TLIF, POSTERIOR APPROACH, USING PEDICLE SCREW N/A 2023    Procedure: FUSION, SPINE, LUMBAR, TLIF, POSTERIOR APPROACH, USING PEDICLE SCREW L2-5 PLDF/TLIF GLOBUS ROBOT SNS:SSEP/EMG cell  saver;  Surgeon: Jesus Alberto Sharp MD;  Location: [facility] OR [address] [place] [address];  Service: Neurosurgery;  Laterality: N/A;    IRRIGATION AND DEBRIDEMENT N/A 10/4/2023    Procedure: IRRIGATION AND DEBRIDEMENT **CO CASE DR. JULISSA RODRIGUEZ** LUMBAR;  Surgeon: Jesus Alberto Sharp MD;  Location: [facility] OR [address] [place] [address];  Service: Orthopedics;  Laterality: N/A;    TRANSFORAMINAL EPIDURAL INJECTION OF STEROID N/A 03/16/2023    Procedure: b/l L4-5 TFESI;  Surgeon: Gato Delatorre DO;  Location: [facility];  Service: Pain Management;  Laterality: N/A;    TUBAL LIGATION         Time Tracking:     OT Date of Treatment: 10/05/23  OT Start Time: 1315  OT Stop Time: 1337  OT Total Time (min): 22 min    Billable Minutes:Evaluation 12  Therapeutic Activity 10    10/5/2023

## 2023-10-05 NOTE — PROGRESS NOTES
Pharmacist Renal Dose Adjustment Note    Nilsa Curiel is a 77 y.o. female being treated with the medication Pepcid    Patient Data:    Vital Signs (Most Recent):  Temp: 97.5 °F (36.4 °C) (10/04/23 1830)  Pulse: 83 (10/04/23 1900)  Resp: 20 (10/04/23 1900)  BP: (!) 100/54 (10/04/23 1845)  SpO2: 100 % (10/04/23 1900) Vital Signs (72h Range):  Temp:  [97.5 °F (36.4 °C)-98.4 °F (36.9 °C)]   Pulse:  [74-87]   Resp:  [12-27]   BP: (100-141)/(53-83)   SpO2:  [93 %-100 %]      Recent Labs   Lab 10/04/23  1810   CREATININE 1.0  1.0     Serum creatinine: 1 mg/dL 10/04/23 1810  Estimated creatinine clearance: 50.5 mL/min    Medication:Pepcid 20mg Twice daily will be changed to Pepcid 20mg daily     Pharmacist's Name: Clem Taylor  Pharmacist's Extension: [phone]

## 2023-10-05 NOTE — PROGRESS NOTES
"[facility] - Surgery  Orthopedics  Progress Note    Patient Name: Nilsa Curiel  MRN: [id number]  Admission Date: 10/4/2023  Hospital Length of Stay: 1 days  Attending Provider: Jesus Alberto Sharp MD  Primary Care Provider: Gaurav Allison MD  Follow-up For: Procedure(s) (LRB):  IRRIGATION AND DEBRIDEMENT **CO CASE DR. JULISSA RODRIGUEZ** LUMBAR (N/A)  CLOSURE, WOUND    Post-Operative Day: 1 Day Post-Op  Subjective:     Principal Problem:Surgical wound dehiscence    Principal Orthopedic Problem: Same    Interval History: NAEON, patient stable, pain controlled. No acute complaints this morning.       Review of patient's allergies indicates:  No Known Allergies    Current Facility-Administered Medications   Medication    0.9%  NaCl infusion    0.9%  NaCl infusion    acetaminophen tablet 650 mg    allopurinoL tablet 100 mg    amLODIPine tablet 10 mg    atorvastatin tablet 80 mg    carvediloL tablet 3.125 mg    famotidine tablet 20 mg    gabapentin capsule 100 mg    methocarbamoL tablet 1,000 mg    mupirocin 2 % ointment    ondansetron disintegrating tablet 8 mg    ondansetron injection 4 mg    oxyCODONE immediate release tablet 5 mg    oxyCODONE immediate release tablet Tab 10 mg    polyethylene glycol packet 17 g    senna-docusate 8.6-50 mg per tablet 1 tablet    valsartan tablet 320 mg     Objective:     Vital Signs (Most Recent):  Temp: 96.4 °F (35.8 °C) (10/05/23 0352)  Pulse: 82 (10/05/23 0352)  Resp: 18 (10/05/23 0352)  BP: (!) 113/53 (10/05/23 0352)  SpO2: (!) 92 % (10/05/23 0352) Vital Signs (24h Range):  Temp:  [96.4 °F (35.8 °C)-98.4 °F (36.9 °C)] 96.4 °F (35.8 °C)  Pulse:  [74-89] 82  Resp:  [12-27] 18  SpO2:  [92 %-100 %] 92 %  BP: (100-141)/(53-83) 113/53     Weight: 93.1 kg (205 lb 4 oz)  Height: 5' 1" (154.9 cm)  Body mass index is 38.78 kg/m².      Intake/Output Summary (Last 24 hours) at 10/5/2023 0626  Last data filed at 10/5/2023 0530  Gross per 24 hour   Intake 1000 ml   Output 830 ml   Net " 170 ml        Ortho/SPM Exam  Lumbar spine exam   EHL/FHL/TA/Gastroc intact   SILT   2+DP/PT   Dressings CDI   Drains x1       Significant Labs: CBC:   Recent Labs   Lab 10/05/23  0418   WBC 6.70   HGB 8.1*   HCT 27.3*        CMP:   Recent Labs   Lab 10/04/23  1810      K 5.3*   *   CO2 20*      BUN 12   CREATININE 1.0  1.0   CALCIUM 9.6   PROT 7.6   ALBUMIN 3.4*   BILITOT 0.3   ALKPHOS 118   AST 24   ALT 13   ANIONGAP 6*     All pertinent labs within the past 24 hours have been reviewed.    Significant Imaging: I have reviewed and interpreted all pertinent imaging results/findings.    Assessment/Plan:     * Surgical wound dehiscence  Nilsa Curiel is a 77 y.o. female is s/p I&D with plastic surgery closure of the lumbar spine on 10/3/23    Surgical dressing C/D/I  Pain control: multimodal  PT/OT: WBAT BLE  DVT PPx: SQH TID, FCDs at all times when not ambulating  Abx: postop Ancef x 24hrs   Drain: x1 per plastic surgeyr  Mayer: None   Nursing: Incentive spirometry, Monitor and record drain output each shift     Dispo: plan to dc pending plastic surgery recs and PT     Output by Drain (mL) 10/03/23 0701 - 10/03/23 1900 10/03/23 1901 - 10/04/23 0700 10/04/23 0701 - 10/04/23 1900 10/04/23 1901 - 10/05/23 0624        Closed/Suction Drain 10/04/23 1606 Tube - 1 Back Bulb 15 Fr.   20 10                Dashawn Cyr MD  Orthopedics  [facility] - Surgery

## 2023-10-06 LAB
BACTERIA SPEC AEROBE CULT: ABNORMAL

## 2023-10-06 PROCEDURE — 99233 PR SUBSEQUENT HOSPITAL CARE,LEVL III: ICD-10-PCS | Mod: ,,, | Performed by: PHYSICIAN ASSISTANT

## 2023-10-06 PROCEDURE — 63600175 PHARM REV CODE 636 W HCPCS: Performed by: PHYSICIAN ASSISTANT

## 2023-10-06 PROCEDURE — 27000207 HC ISOLATION

## 2023-10-06 PROCEDURE — 93010 EKG 12-LEAD: ICD-10-PCS | Mod: ,,, | Performed by: INTERNAL MEDICINE

## 2023-10-06 PROCEDURE — 63600175 PHARM REV CODE 636 W HCPCS: Performed by: STUDENT IN AN ORGANIZED HEALTH CARE EDUCATION/TRAINING PROGRAM

## 2023-10-06 PROCEDURE — 93010 ELECTROCARDIOGRAM REPORT: CPT | Mod: ,,, | Performed by: INTERNAL MEDICINE

## 2023-10-06 PROCEDURE — 63600175 PHARM REV CODE 636 W HCPCS: Performed by: ORTHOPAEDIC SURGERY

## 2023-10-06 PROCEDURE — 25000003 PHARM REV CODE 250: Performed by: ORTHOPAEDIC SURGERY

## 2023-10-06 PROCEDURE — 11000001 HC ACUTE MED/SURG PRIVATE ROOM

## 2023-10-06 PROCEDURE — 25000003 PHARM REV CODE 250: Performed by: PHYSICIAN ASSISTANT

## 2023-10-06 PROCEDURE — 97116 GAIT TRAINING THERAPY: CPT

## 2023-10-06 PROCEDURE — 99233 SBSQ HOSP IP/OBS HIGH 50: CPT | Mod: ,,, | Performed by: PHYSICIAN ASSISTANT

## 2023-10-06 PROCEDURE — 93005 ELECTROCARDIOGRAM TRACING: CPT

## 2023-10-06 PROCEDURE — 97530 THERAPEUTIC ACTIVITIES: CPT

## 2023-10-06 RX ORDER — HEPARIN SODIUM 5000 [USP'U]/ML
5000 INJECTION, SOLUTION INTRAVENOUS; SUBCUTANEOUS EVERY 8 HOURS
Status: DISCONTINUED | OUTPATIENT
Start: 2023-10-06 | End: 2023-10-07 | Stop reason: HOSPADM

## 2023-10-06 RX ADMIN — OXYCODONE HYDROCHLORIDE 5 MG: 5 TABLET ORAL at 09:10

## 2023-10-06 RX ADMIN — SENNOSIDES AND DOCUSATE SODIUM 1 TABLET: 50; 8.6 TABLET ORAL at 09:10

## 2023-10-06 RX ADMIN — FAMOTIDINE 20 MG: 20 TABLET ORAL at 09:10

## 2023-10-06 RX ADMIN — CEFEPIME 2 G: 2 INJECTION, POWDER, FOR SOLUTION INTRAVENOUS at 11:10

## 2023-10-06 RX ADMIN — CARVEDILOL 3.12 MG: 3.12 TABLET, FILM COATED ORAL at 05:10

## 2023-10-06 RX ADMIN — OXYCODONE HYDROCHLORIDE 10 MG: 10 TABLET ORAL at 10:10

## 2023-10-06 RX ADMIN — ATORVASTATIN CALCIUM 80 MG: 40 TABLET, FILM COATED ORAL at 09:10

## 2023-10-06 RX ADMIN — MUPIROCIN: 20 OINTMENT TOPICAL at 09:10

## 2023-10-06 RX ADMIN — ONDANSETRON 8 MG: 8 TABLET, ORALLY DISINTEGRATING ORAL at 04:10

## 2023-10-06 RX ADMIN — CARVEDILOL 3.12 MG: 3.12 TABLET, FILM COATED ORAL at 09:10

## 2023-10-06 RX ADMIN — POLYETHYLENE GLYCOL 3350 17 G: 17 POWDER, FOR SOLUTION ORAL at 09:10

## 2023-10-06 RX ADMIN — ALLOPURINOL 100 MG: 100 TABLET ORAL at 09:10

## 2023-10-06 RX ADMIN — AMLODIPINE BESYLATE 10 MG: 5 TABLET ORAL at 09:10

## 2023-10-06 RX ADMIN — OXYCODONE HYDROCHLORIDE 10 MG: 10 TABLET ORAL at 05:10

## 2023-10-06 RX ADMIN — VANCOMYCIN HYDROCHLORIDE 1500 MG: 1.5 INJECTION, POWDER, LYOPHILIZED, FOR SOLUTION INTRAVENOUS at 01:10

## 2023-10-06 RX ADMIN — HEPARIN SODIUM 5000 UNITS: 5000 INJECTION, SOLUTION INTRAVENOUS; SUBCUTANEOUS at 09:10

## 2023-10-06 NOTE — NURSING
Pt is alert and oriented in bed with no needs at this time. SCDs on, drain output charted, bed lowered.

## 2023-10-06 NOTE — PLAN OF CARE
[facility]    HOME HEALTH ORDERS  FACE TO FACE ENCOUNTER    Patient Name: Nilsa Curiel  YOB: 1945    PCP: Gaurav Allison MD   PCP Address: [address]  / [facility] [address]  PCP Phone Number: [phone]  PCP Fax: [phone]    Encounter Date: 10/06/2023    Admit to Home Health    Diagnoses:  Active Hospital Problems    Diagnosis  POA    *Surgical wound dehiscence [T81.31XA]  Yes    Acute on chronic anemia [D64.9]  Yes     Stable from prior admission      Class 2 severe obesity due to excess calories with serious comorbidity and body mass index (BMI) of 38.0 to 38.9 in adult [E66.01, Z68.38]  Not Applicable    Chronic heart failure with preserved ejection fraction [I50.32]  Yes    Essential hypertension [I10]  Yes    MONE (obstructive sleep apnea) [G47.33]  Yes      Resolved Hospital Problems   No resolved problems to display.       Future Appointments   Date Time Provider Department Center   10/10/2023  2:45 PM Benigno Goodman DO [facility] PLASTIC [place]   10/19/2023  8:00 AM Hali Daigle PA-C [facility] SPINE [facility]   12/6/2023 10:15 AM LAB, [facility]   12/12/2023  9:20 AM Amando Das MD [facility] CARDIO [facility]   12/12/2023 10:15 AM Brittany Simeon MD [facility] DERM [facility]   1/2/2024  2:30 PM Zenia Stokes MD [facility] OBGYN [place]   2/23/2024 10:00 AM Dina Catherine MD [facility] OBGYN [place]           I have seen and examined this patient face to face today. My clinical findings that support the need for the home health skilled services and home bound status are the following:  Weakness/numbness causing balance and gait disturbance due to Surgery making it taxing to leave home.    Allergies:Review of patient's allergies indicates:  No Known Allergies    Diet: regular diet    Activities: activity as tolerated    Nursing:   SN to complete comprehensive assessment including routine vital signs. Instruct on disease process  and s/s of complications to report to MD. Follow specific home health arthoplasty protocol. Review/verify medication list sent home with the patient at time of discharge  and instruct patient/caregiver as needed. If coumadin ordered, coumadin clinic to manage INR with INR draws 2x per week with a goal to maintain INR between 1.8 and 2.2. Frequency may be adjusted depending on start of care date.    Notify MD if SBP > 160 or < 90; DBP > 90 or < 50; HR > 120 or < 50; Temp > 101    Home Medical Equipment:  Walker, 3-1 bedside commode, transfer tub bench    CONSULTS:    Physical Therapy to evaluate and treat. Evaluate for home safety and equipment needs; Establish/upgrade home exercise program. Perform / instruct on therapeutic exercises, gait training, transfer training, and Range of Motion.      WOUND CARE ORDERS  Keep dressings clean and dry   Empty drain daily and record output       Medications: Review discharge medications with patient and family and provide education.      Current Discharge Medication List        START taking these medications    Details   gabapentin (NEURONTIN) 100 MG capsule Take 1 capsule (100 mg total) by mouth 3 (three) times daily.  Qty: 45 capsule, Refills: 0    Comments: Bedside delivery           CONTINUE these medications which have CHANGED    Details   acetaminophen (TYLENOL) 500 MG tablet Take 1 tablet (500 mg total) by mouth 3 (three) times daily.  Qty: 90 tablet, Refills: 0    Comments: Bedside delivery      celecoxib (CELEBREX) 100 MG capsule Take 1 capsule (100 mg total) by mouth 2 (two) times daily.  Qty: 28 capsule, Refills: 0    Comments: Bedside delivery      methocarbamoL (ROBAXIN) 500 MG Tab Take 2 tablets (1,000 mg total) by mouth 3 (three) times daily.  Qty: 90 tablet, Refills: 0    Comments: Bedside delivery      oxyCODONE (ROXICODONE) 5 MG immediate release tablet Take 1 tablet (5 mg total) by mouth every 6 (six) hours as needed for Pain (for breakthrough pain).  Qty: 21  tablet, Refills: 0    Comments: Quantity prescribed more than 7 day supply? NoBedside delivery           CONTINUE these medications which have NOT CHANGED    Details   allopurinoL (ZYLOPRIM) 100 MG tablet Take 1 tablet (100 mg total) by mouth once daily.  Qty: 90 tablet, Refills: 11      amLODIPine (NORVASC) 10 MG tablet Take 1 tablet (10 mg total) by mouth once daily.  Qty: 90 tablet, Refills: 3    Comments: .      carvediloL (COREG) 3.125 MG tablet Take 1 tablet (3.125 mg total) by mouth 2 (two) times daily with meals.  Qty: 180 tablet, Refills: 3    Comments: .      ketoconazole (NIZORAL) 2 % cream Apply to affected areas of body BID prn irritation.  Qty: 15 g, Refills: 1    Associated Diagnoses: Candidal intertrigo      olmesartan (BENICAR) 40 MG tablet TAKE 1 TABLET ONE TIME DAILY  Qty: 90 tablet, Refills: 3    Associated Diagnoses: Essential hypertension      potassium chloride SA (K-DUR,KLOR-CON) 20 MEQ tablet Take 1 tablet (20 mEq total) by mouth 2 (two) times daily.  Qty: 180 tablet, Refills: 3      rosuvastatin (CRESTOR) 20 MG tablet Take 1 tablet (20 mg total) by mouth once daily.  Qty: 90 tablet, Refills: 3      aspirin (ECOTRIN) 81 MG EC tablet Take 1 tablet (81 mg total) by mouth once daily.  Qty: 90 tablet, Refills: 3      betamethasone dipropionate (DIPROLENE) 0.05 % ointment Apply topically 2 (two) times daily.  Qty: 45 g, Refills: 11    Associated Diagnoses: Flexural atopic dermatitis      biotin 300 mcg Tab Take 1 tablet by mouth once daily.      mometasone (ELOCON) 0.1 % ointment Apply twice daily to hands prn eczema.  Qty: 45 g, Refills: 3    Associated Diagnoses: Hand eczema      MULTIVIT-MIN/FA/CA CARB/VIT K (WOMEN'S 50+ DAILY FORMULA ORAL) Take by mouth.      omega-3 fatty acids 300 mg Cap Take by mouth.      riboflavin, vitamin B2, (VITAMIN B-2 ORAL) Take 1 capsule by mouth once daily.    Associated Diagnoses: Neurogenic claudication due to lumbar spinal stenosis      TURMERIC ORAL Take by  mouth.      ubidecarenone (COENZYME Q10) 100 mg Tab Take 1 tablet by mouth once daily.              I certify that this patient is confined to her home and needs physical therapy.

## 2023-10-06 NOTE — PLAN OF CARE
Pt resting in chair comfortably. PIV line intact and free of infection and irritation. Fall precautions maintained, no falls noted. Call light within reach, bed locked and in lowest position. Non-skid socks on while out of bed. Patient instructed to call for assistance. Skin integrity maintained as patient is independent with frequent repositioning. C/o pain and intermittent nausea, managed with PRN meds, no other complaints or concerns. GENEVA drain in place to surgical site, outputs recorded. Progressing towards goals. Will continue to monitor and follow plan of care.

## 2023-10-06 NOTE — NURSING
Nurses Note -- 4 Eyes      10/6/2023   12:17 AM      Skin assessed during: Q Shift Change      [] No Altered Skin Integrity Present    []Prevention Measures Documented      [] Yes- Altered Skin Integrity Present or Discovered   [] LDA Added if Not in Epic (Describe Wound)   [] New Altered Skin Integrity was Present on Admit and Documented in LDA   [] Wound Image Taken    Wound Care Consulted? No    Attending Nurse:  ZACK Gordillo     Second RN/Staff Member:   ZACK Sullivan

## 2023-10-06 NOTE — ASSESSMENT & PLAN NOTE
Nilsa Curiel is a 77 y.o. female is s/p I&D with plastic surgery closure of the lumbar spine on 10/3/23    Surgical dressing C/D/I  Pain control: multimodal  PT/OT: WBAT BLE  DVT PPx: SQH TID, FCDs at all times when not ambulating  Abx: postop Ancef x 24hrs   Drain: x1 per plastic surgery  Mayer: None   Nursing: Incentive spirometry, Monitor and record drain output each shift     Dispo: pending ID recs, Cx growing Staph SS pending     Output by Drain (mL) 10/04/23 0701 - 10/04/23 1900 10/04/23 1901 - 10/05/23 0700 10/05/23 0701 - 10/05/23 1900 10/05/23 1901 - 10/06/23 0611        Closed/Suction Drain 10/04/23 1606 Tube - 1 Back Bulb 15 Fr. 20 10 15 30

## 2023-10-06 NOTE — SUBJECTIVE & OBJECTIVE
Interval History:   Cultures +MRSA and E.coli  Feels well  No acute complaints or concerns today     Review of Systems   Constitutional:  Negative for chills, diaphoresis, fatigue and fever.   Respiratory:  Negative for cough and shortness of breath.    Gastrointestinal:  Negative for abdominal pain, diarrhea, nausea and vomiting.   Genitourinary:  Negative for dysuria.   Musculoskeletal:  Positive for arthralgias and back pain.   Skin:  Positive for wound. Negative for color change, pallor and rash.   Neurological:  Negative for dizziness and headaches.   All other systems reviewed and are negative.    Objective:     Vital Signs (Most Recent):  Temp: 97.7 °F (36.5 °C) (10/06/23 1151)  Pulse: 82 (10/06/23 1151)  Resp: 18 (10/06/23 1151)  BP: 137/64 (10/06/23 1151)  SpO2: 98 % (10/06/23 1151) Vital Signs (24h Range):  Temp:  [97.7 °F (36.5 °C)-98.2 °F (36.8 °C)] 97.7 °F (36.5 °C)  Pulse:  [75-88] 82  Resp:  [18] 18  SpO2:  [94 %-100 %] 98 %  BP: (117-148)/(57-66) 137/64     Weight: 93.1 kg (205 lb 4 oz)  Body mass index is 38.78 kg/m².    Estimated Creatinine Clearance: 50.6 mL/min (based on SCr of 1 mg/dL).     Physical Exam  Vitals and nursing note reviewed.   Constitutional:       General: She is not in acute distress.     Appearance: She is well-developed. She is not diaphoretic.   HENT:      Head: Normocephalic and atraumatic.   Eyes:      Pupils: Pupils are equal, round, and reactive to light.   Cardiovascular:      Rate and Rhythm: Normal rate and regular rhythm.      Heart sounds: Normal heart sounds. No murmur heard.     No friction rub. No gallop.   Pulmonary:      Effort: Pulmonary effort is normal. No respiratory distress.      Breath sounds: Normal breath sounds. No wheezing or rales.   Chest:      Chest wall: No tenderness.   Abdominal:      General: Bowel sounds are normal. There is no distension.      Palpations: Abdomen is soft. There is no mass.      Tenderness: There is no abdominal tenderness.  There is no guarding or rebound.      Hernia: No hernia is present.   Musculoskeletal:         General: No tenderness or deformity. Normal range of motion.      Cervical back: Normal range of motion and neck supple.   Skin:     General: Skin is warm and dry.      Coloration: Skin is not pale.      Findings: No erythema.      Comments: Surgical incision dressed. Seen by ortho spine this AM   Neurological:      Mental Status: She is alert and oriented to person, place, and time.      Cranial Nerves: No cranial nerve deficit.      Coordination: Coordination normal.   Psychiatric:         Behavior: Behavior normal.         Thought Content: Thought content normal.          Significant Labs: All pertinent labs within the past 24 hours have been reviewed.    Significant Imaging: I have reviewed all pertinent imaging results/findings within the past 24 hours.

## 2023-10-06 NOTE — PROGRESS NOTES
[facility]  Infectious Disease  Progress Note    Patient Name: Nilsa Curiel  MRN: [id number]  Admission Date: 10/4/2023  Length of Stay: 2 days  Attending Physician: Jesus Alberto Sharp MD  Primary Care Provider: Gaurav Allison MD    Isolation Status: Contact  Assessment/Plan:      Orthopedic  * Surgical wound dehiscence  Ms. Curiel is a 78 y/o female s/p L2-5 fusion 8/16/23 admitted for wound dehiscence s/p wound washout and flap closure 10/4. ID consulted abx recommendations.    Per op note, no fascia defect. Cultures obtained +E.coli and MRSA. She remains afebrile, stable,no leukocytosis.    Recommendations  1. Continue IV vancomycin and cefepime pending final culture data. Trough goal 15-20   2. Discussed with orthopedics, wound defect superficial. Anticipate 14 days of abx for SSTI.   3. Likely discharge on linezolid 600 mg po bid and ciprofloxacin 500 mg po bid x 14 days. Repeat EKG to assess qtc   3. Agree with PT evaluation/ dispo pending recommendations/wound follow up  4. F/u with ID in two weeks, may need to extend po abx pending wound healing              Thank you for your consult. I will sign off. Please contact us if you have any additional questions.    Sebastien Das PA-C  Infectious Disease  [facility]    Subjective:     Principal Problem:Surgical wound dehiscence    HPI: Ms. Curiel is a 78 y/o female s/p L2-5 fusion 8/16/23 admitted for wound dehiscence s/p wound washout and flap closure 10/4. ID consulted abx recommendations.    Per op note, no fascia defect. Cultures obtained +GNR and Staph aureus. She remains afebrile, stable,no leukocytosis. Drain in place. No acute complaints or concerns at this time.     Interval History:   Cultures +MRSA and E.coli  Feels well  No acute complaints or concerns today     Review of Systems   Constitutional:  Negative for chills, diaphoresis, fatigue and fever.   Respiratory:  Negative for cough and shortness of breath.    Gastrointestinal:   Negative for abdominal pain, diarrhea, nausea and vomiting.   Genitourinary:  Negative for dysuria.   Musculoskeletal:  Positive for arthralgias and back pain.   Skin:  Positive for wound. Negative for color change, pallor and rash.   Neurological:  Negative for dizziness and headaches.   All other systems reviewed and are negative.    Objective:     Vital Signs (Most Recent):  Temp: 97.7 °F (36.5 °C) (10/06/23 1151)  Pulse: 82 (10/06/23 1151)  Resp: 18 (10/06/23 1151)  BP: 137/64 (10/06/23 1151)  SpO2: 98 % (10/06/23 1151) Vital Signs (24h Range):  Temp:  [97.7 °F (36.5 °C)-98.2 °F (36.8 °C)] 97.7 °F (36.5 °C)  Pulse:  [75-88] 82  Resp:  [18] 18  SpO2:  [94 %-100 %] 98 %  BP: (117-148)/(57-66) 137/64     Weight: 93.1 kg (205 lb 4 oz)  Body mass index is 38.78 kg/m².    Estimated Creatinine Clearance: 50.6 mL/min (based on SCr of 1 mg/dL).     Physical Exam  Vitals and nursing note reviewed.   Constitutional:       General: She is not in acute distress.     Appearance: She is well-developed. She is not diaphoretic.   HENT:      Head: Normocephalic and atraumatic.   Eyes:      Pupils: Pupils are equal, round, and reactive to light.   Cardiovascular:      Rate and Rhythm: Normal rate and regular rhythm.      Heart sounds: Normal heart sounds. No murmur heard.     No friction rub. No gallop.   Pulmonary:      Effort: Pulmonary effort is normal. No respiratory distress.      Breath sounds: Normal breath sounds. No wheezing or rales.   Chest:      Chest wall: No tenderness.   Abdominal:      General: Bowel sounds are normal. There is no distension.      Palpations: Abdomen is soft. There is no mass.      Tenderness: There is no abdominal tenderness. There is no guarding or rebound.      Hernia: No hernia is present.   Musculoskeletal:         General: No tenderness or deformity. Normal range of motion.      Cervical back: Normal range of motion and neck supple.   Skin:     General: Skin is warm and dry.      Coloration:  Skin is not pale.      Findings: No erythema.      Comments: Surgical incision dressed. Seen by ortho spine this AM   Neurological:      Mental Status: She is alert and oriented to person, place, and time.      Cranial Nerves: No cranial nerve deficit.      Coordination: Coordination normal.   Psychiatric:         Behavior: Behavior normal.         Thought Content: Thought content normal.          Significant Labs: All pertinent labs within the past 24 hours have been reviewed.    Significant Imaging: I have reviewed all pertinent imaging results/findings within the past 24 hours.

## 2023-10-06 NOTE — SUBJECTIVE & OBJECTIVE
"Principal Problem:Surgical wound dehiscence    Principal Orthopedic Problem: Same    Interval History: NAEON, patient stable, pain controlled. No acute complaints this morning.       Review of patient's allergies indicates:  No Known Allergies    Current Facility-Administered Medications   Medication    0.9%  NaCl infusion    acetaminophen tablet 650 mg    allopurinoL tablet 100 mg    amLODIPine tablet 10 mg    atorvastatin tablet 80 mg    carvediloL tablet 3.125 mg    ceFEPIme (MAXIPIME) 2 g in dextrose 5 % in water (D5W) 100 mL IVPB (MB+)    famotidine tablet 20 mg    gabapentin capsule 100 mg    methocarbamoL tablet 1,000 mg    mupirocin 2 % ointment    ondansetron disintegrating tablet 8 mg    ondansetron injection 4 mg    oxyCODONE immediate release tablet 5 mg    oxyCODONE immediate release tablet Tab 10 mg    polyethylene glycol packet 17 g    senna-docusate 8.6-50 mg per tablet 1 tablet    vancomycin - pharmacy to dose    vancomycin 1,500 mg in dextrose 5 % (D5W) 250 mL IVPB (Vial-Mate)     Objective:     Vital Signs (Most Recent):  Temp: 97.7 °F (36.5 °C) (10/06/23 0512)  Pulse: 80 (10/06/23 0512)  Resp: 18 (10/06/23 0547)  BP: (!) 148/65 (10/06/23 0512)  SpO2: 100 % (10/06/23 0512) Vital Signs (24h Range):  Temp:  [97.5 °F (36.4 °C)-98.3 °F (36.8 °C)] 97.7 °F (36.5 °C)  Pulse:  [80-88] 80  Resp:  [18] 18  SpO2:  [94 %-100 %] 100 %  BP: (117-148)/(56-65) 148/65     Weight: 93.1 kg (205 lb 4 oz)  Height: 5' 1" (154.9 cm)  Body mass index is 38.78 kg/m².      Intake/Output Summary (Last 24 hours) at 10/6/2023 0611  Last data filed at 10/6/2023 0549  Gross per 24 hour   Intake 960 ml   Output 645 ml   Net 315 ml          Ortho/SPM Exam  Lumbar spine exam   EHL/FHL/TA/Gastroc intact   SILT   2+DP/PT   Dressings CDI   Drains x1       Significant Labs: CBC:   Recent Labs   Lab 10/05/23  0418   WBC 6.70   HGB 8.1*   HCT 27.3*          CMP:   Recent Labs   Lab 10/04/23  1810      K 5.3*   *   CO2 " 20*      BUN 12   CREATININE 1.0  1.0   CALCIUM 9.6   PROT 7.6   ALBUMIN 3.4*   BILITOT 0.3   ALKPHOS 118   AST 24   ALT 13   ANIONGAP 6*       All pertinent labs within the past 24 hours have been reviewed.    Significant Imaging: I have reviewed and interpreted all pertinent imaging results/findings.

## 2023-10-06 NOTE — PLAN OF CARE
[facility]  Discharge Reassessment    Primary Care Provider: Gaurav Allison MD    Expected Discharge Date: 10/7/2023    Reassessment (most recent)       Discharge Reassessment - 10/06/23 1438          Discharge Reassessment    Assessment Type Discharge Planning Reassessment     Did the patient's condition or plan change since previous assessment? No     Discharge Plan discussed with: Patient     Communicated MOY with patient/caregiver No     Discharge Plan A Home with family;Home Health                     Patient to d/c home with [facility]  once medically stable. No DME needed.      Manjula Bond RNCM  Case Management  [facility]  [phone]

## 2023-10-06 NOTE — NURSING
Nurses Note -- 4 Eyes      10/6/2023   4:18 PM      Skin assessed during: Daily Assessment      [x] No Altered Skin Integrity Present    []Prevention Measures Documented      [] Yes- Altered Skin Integrity Present or Discovered   [] LDA Added if Not in Epic (Describe Wound)   [] New Altered Skin Integrity was Present on Admit and Documented in LDA   [] Wound Image Taken    Wound Care Consulted? No    Attending Nurse:  Dina Ghosh RN    Second RN/Staff Member:   Gallito Abraham RN

## 2023-10-06 NOTE — PT/OT/SLP PROGRESS
Physical Therapy Treatment    Patient Name:  Nilsa Curiel   MRN:  [id number]    Recommendations:     Discharge Recommendations: other   Discharge Equipment Recommendations: none  Barriers to discharge: None    Assessment:     Nilsa Curiel is a 77 y.o. female admitted with a medical diagnosis of Surgical wound dehiscence.  She presents with the following impairments/functional limitations: weakness, impaired endurance, impaired functional mobility, gait instability, impaired balance, pain, impaired skin, orthopedic precautions. Patient tolerated PT session well. She ambulated 80ft and 12ft with RW and supervision. No LOB or SOB noted. She performed toilet transfer with supervision and RW. She would continue to benefit from PT in order to increase strength and endurance for functional mobility.     Rehab Prognosis: Good; patient would benefit from acute skilled PT services to address these deficits and reach maximum level of function.    Recent Surgery: Procedure(s) (LRB):  IRRIGATION AND DEBRIDEMENT **CO CASE DR. JULISSA RODRIGUEZ** LUMBAR (N/A)  CLOSURE, WOUND 2 Days Post-Op    Plan:     During this hospitalization, patient to be seen 4 x/week to address the identified rehab impairments via gait training, therapeutic activities, therapeutic exercises, neuromuscular re-education and progress toward the following goals:    Plan of Care Expires:  11/05/23    Subjective     Chief Complaint: Left posterior back pain.   Patient/Family Comments/goals: To be able to go to [employer].  Pain/Comfort:  Pain Rating 1: 5/10  Location - Side 1: Left  Location - Orientation 1: posterior  Location 1: back  Pain Addressed 1: Reposition, Distraction, Cessation of Activity, Nurse notified      Objective:     Communicated with RN prior to session.  Patient found HOB elevated with GENEVA drain, SCD upon PT entry to room.     General Precautions: Standard, fall  Orthopedic Precautions: spinal precautions  Braces: N/A  Respiratory Status: Room air      Functional Mobility:  Bed Mobility:     Rolling Left:  supervision  Scooting: supervision  Supine to Sit: supervision  Transfers:     Sit to Stand:  supervision with rolling walker  Toilet Transfer: supervision with rolling walker    Gait: Patient ambulated 80ft and 12ft with supervision and rolling walker.   Balance: Patient stood at the sink to wash her hands with supervision and rolling walker.       AM-PAC 6 CLICK MOBILITY  Turning over in bed (including adjusting bedclothes, sheets and blankets)?: 4  Sitting down on and standing up from a chair with arms (e.g., wheelchair, bedside commode, etc.): 4  Moving from lying on back to sitting on the side of the bed?: 4  Moving to and from a bed to a chair (including a wheelchair)?: 4  Need to walk in hospital room?: 4  Climbing 3-5 steps with a railing?: 3  Basic Mobility Total Score: 23       Treatment & Education:  Patient educated in:  -PT role and POC  -spinal precautions (no bending, lifting, or twisting)  -log rolling during bed mobility  -safety with transfers including hand placement  -gait sequence and RW management  -OOB activity to maximize recovery including getting on a daily walking program at home   -ambulating in hallway 3x's/day with nursing staff assistance     Patient left up in chair with all lines intact, call button in reach, and RN notified.    GOALS:   Multidisciplinary Problems       Physical Therapy Goals          Problem: Physical Therapy    Goal Priority Disciplines Outcome Goal Variances Interventions   Physical Therapy Goal     PT, PT/OT Ongoing, Progressing     Description: Goals to be met by: 10/19/2023    Patient will increase functional independence with mobility by performin. Supine to sit with supervision   2. Rolling to Left with supervision  3. Sit to stand transfer with Supervision  4. Gait x150 feet with Supervision using Rolling Walker  5. Ascend/Descend 6 inch curb step with Supervision using Rolling Walker                          Time Tracking:     PT Received On: 10/06/23  PT Start Time: 0957     PT Stop Time: 1020  PT Total Time (min): 23 min     Billable Minutes: Gait Training 15 and Therapeutic Activity 8    Treatment Type: Treatment  PT/PTA: PT     Number of PTA visits since last PT visit: 0     10/06/2023

## 2023-10-06 NOTE — PROGRESS NOTES
"[facility]  Orthopedics  Progress Note    Patient Name: Nilsa Curiel  MRN: [id number]  Admission Date: 10/4/2023  Hospital Length of Stay: 2 days  Attending Provider: Jesus Alberto Sharp MD  Primary Care Provider: Gaurav Allison MD  Follow-up For: Procedure(s) (LRB):  IRRIGATION AND DEBRIDEMENT **CO CASE DR. JULISSA RODRIGUEZ** LUMBAR (N/A)  CLOSURE, WOUND    Post-Operative Day: 2 Days Post-Op  Subjective:     Principal Problem:Surgical wound dehiscence    Principal Orthopedic Problem: Same    Interval History: NAEON, patient stable, pain controlled. No acute complaints this morning.       Review of patient's allergies indicates:  No Known Allergies    Current Facility-Administered Medications   Medication    0.9%  NaCl infusion    acetaminophen tablet 650 mg    allopurinoL tablet 100 mg    amLODIPine tablet 10 mg    atorvastatin tablet 80 mg    carvediloL tablet 3.125 mg    ceFEPIme (MAXIPIME) 2 g in dextrose 5 % in water (D5W) 100 mL IVPB (MB+)    famotidine tablet 20 mg    gabapentin capsule 100 mg    methocarbamoL tablet 1,000 mg    mupirocin 2 % ointment    ondansetron disintegrating tablet 8 mg    ondansetron injection 4 mg    oxyCODONE immediate release tablet 5 mg    oxyCODONE immediate release tablet Tab 10 mg    polyethylene glycol packet 17 g    senna-docusate 8.6-50 mg per tablet 1 tablet    vancomycin - pharmacy to dose    vancomycin 1,500 mg in dextrose 5 % (D5W) 250 mL IVPB (Vial-Mate)     Objective:     Vital Signs (Most Recent):  Temp: 97.7 °F (36.5 °C) (10/06/23 0512)  Pulse: 80 (10/06/23 0512)  Resp: 18 (10/06/23 0547)  BP: (!) 148/65 (10/06/23 0512)  SpO2: 100 % (10/06/23 0512) Vital Signs (24h Range):  Temp:  [97.5 °F (36.4 °C)-98.3 °F (36.8 °C)] 97.7 °F (36.5 °C)  Pulse:  [80-88] 80  Resp:  [18] 18  SpO2:  [94 %-100 %] 100 %  BP: (117-148)/(56-65) 148/65     Weight: 93.1 kg (205 lb 4 oz)  Height: 5' 1" (154.9 cm)  Body mass index is 38.78 kg/m².      Intake/Output Summary " (Last 24 hours) at 10/6/2023 0611  Last data filed at 10/6/2023 0549  Gross per 24 hour   Intake 960 ml   Output 645 ml   Net 315 ml         Ortho/SPM Exam  Lumbar spine exam   EHL/FHL/TA/Gastroc intact   SILT   2+DP/PT   Dressings CDI   Drains x1       Significant Labs: CBC:   Recent Labs   Lab 10/05/23  0418   WBC 6.70   HGB 8.1*   HCT 27.3*          CMP:   Recent Labs   Lab 10/04/23  1810      K 5.3*   *   CO2 20*      BUN 12   CREATININE 1.0  1.0   CALCIUM 9.6   PROT 7.6   ALBUMIN 3.4*   BILITOT 0.3   ALKPHOS 118   AST 24   ALT 13   ANIONGAP 6*       All pertinent labs within the past 24 hours have been reviewed.    Significant Imaging: I have reviewed and interpreted all pertinent imaging results/findings.    Assessment/Plan:     * Surgical wound dehiscence  Nilsa Curiel is a 77 y.o. female is s/p I&D with plastic surgery closure of the lumbar spine on 10/3/23    Surgical dressing C/D/I  Pain control: multimodal  PT/OT: WBAT BLE  DVT PPx: SQH TID, FCDs at all times when not ambulating  Abx: postop Ancef x 24hrs   Drain: x1 per plastic surgery  Mayer: None   Nursing: Incentive spirometry, Monitor and record drain output each shift     Dispo: pending ID recs, Cx growing Staph SS pending     Output by Drain (mL) 10/04/23 0701 - 10/04/23 1900 10/04/23 1901 - 10/05/23 0700 10/05/23 0701 - 10/05/23 1900 10/05/23 1901 - 10/06/23 0611        Closed/Suction Drain 10/04/23 1606 Tube - 1 Back Bulb 15 Fr. 20 10 15 30                Dashawn Cyr MD  Orthopedics  [facility] - Surgery

## 2023-10-06 NOTE — PROGRESS NOTES
Plastic and Reconstructive Surgery   Progress Note    Subjective:      No acute events overnight  Pain controlled.   30 cc of serosanguinous output from drain.    Objective:  Vital signs in last 24 hours:  Temp:  [97.7 °F (36.5 °C)-98.3 °F (36.8 °C)] 97.7 °F (36.5 °C)  Pulse:  [75-88] 75  Resp:  [18] 18  SpO2:  [94 %-100 %] 97 %  BP: (117-148)/(56-66) 138/66    Intake/Output last 3 shifts:  I/O last 3 completed shifts:  In: 960 [P.O.:960]  Out: 1455 [Urine:1400; Drains:55]    Intake/Output this shift:  I/O this shift:  In: 300 [P.O.:300]  Out: 5 [Drains:5]        Physical Exam:  VITAL SIGNS:   Vitals:    10/06/23 0512 10/06/23 0547 10/06/23 0753 10/06/23 1014   BP: (!) 148/65  138/66    BP Location: Right arm  Right arm    Patient Position: Sitting  Sitting    Pulse: 80  75    Resp: 18 18 18 18   Temp: 97.7 °F (36.5 °C)  97.7 °F (36.5 °C)    TempSrc: Oral  Oral    SpO2: 100%  97%    Weight:       Height:         TMAX: Temp (24hrs), Av °F (36.7 °C), Min:97.7 °F (36.5 °C), Max:98.3 °F (36.8 °C)    General: Alert; No acute distress  Cardiovascular: Regular rate   Respiratory: Normal respiratory effort. Chest rise symmetric.   Abdomen: Soft, nontender, nondistended  Extremity: Moves all extremities equally.  Neurologic: No focal deficit. Speech normal  Back: lumbar midline incision with aquacel and tommy drain with serosanguinous output, c/d/i      Scheduled Medications allopurinoL, 100 mg, Daily  amLODIPine, 10 mg, Daily  atorvastatin, 80 mg, Daily  carvediloL, 3.125 mg, BID WM  ceFEPime (MAXIPIME) IVPB, 2 g, Q12H  famotidine, 20 mg, Daily  gabapentin, 100 mg, TID  mupirocin, , BID  polyethylene glycol, 17 g, Daily  senna-docusate 8.6-50 mg, 1 tablet, BID  vancomycin (VANCOCIN) IV (PEDS and ADULTS), 1,500 mg, Q24H      PRN Medications acetaminophen, methocarbamoL, ondansetron, ondansetron, oxyCODONE, oxyCODONE, Pharmacy to dose Vancomycin consult **AND** vancomycin - pharmacy to dose    Recent Labs:   Lab Results    Component Value Date    WBC 6.70 10/05/2023    HGB 8.1 (L) 10/05/2023    HCT 27.3 (L) 10/05/2023    MCV 80 (L) 10/05/2023     10/05/2023     Lab Results   Component Value Date     10/04/2023     10/04/2023    K 5.3 (H) 10/04/2023     (H) 10/04/2023    BUN 12 10/04/2023         Assessment: 77 y.o. y/o female 2 Days Post-Op s/p Procedure(s):  IRRIGATION AND DEBRIDEMENT **CO CASE DR. JULISSA RODRIGUEZ** LUMBAR  CLOSURE, WOUND Doing well postoperatively.    Plan  Reg diet  Pain control  Monitor drain output  F/u neurosurgery recs  DVT ppx        Matheus Melgoza MD-  Fellow  Department of Plastic and Reconstructive Surgery  [phone] (office)

## 2023-10-06 NOTE — ASSESSMENT & PLAN NOTE
Ms. Curiel is a 78 y/o female s/p L2-5 fusion 8/16/23 admitted for wound dehiscence s/p wound washout and flap closure 10/4. ID consulted abx recommendations.    Per op note, no fascia defect. Cultures obtained +E.coli and MRSA. She remains afebrile, stable,no leukocytosis.    Recommendations  1. Continue IV vancomycin and cefepime pending final culture data. Trough goal 15-20   2. Discussed with orthopedics, wound defect superficial. Anticipate 14 days of abx for SSTI.   3. Likely discharge on linezolid 600 mg po bid and ciprofloxacin 500 mg po bid x 14 days. Repeat EKG to assess qtc   3. Agree with PT evaluation/ dispo pending recommendations/wound follow up  4. F/u with ID in two weeks, may need to extend po abx pending wound healing

## 2023-10-07 VITALS
HEIGHT: 61 IN | RESPIRATION RATE: 18 BRPM | SYSTOLIC BLOOD PRESSURE: 125 MMHG | WEIGHT: 205.25 LBS | OXYGEN SATURATION: 95 % | HEART RATE: 87 BPM | BODY MASS INDEX: 38.75 KG/M2 | DIASTOLIC BLOOD PRESSURE: 58 MMHG | TEMPERATURE: 99 F

## 2023-10-07 LAB
BASOPHILS # BLD AUTO: 0.06 K/UL (ref 0–0.2)
BASOPHILS NFR BLD: 0.8 % (ref 0–1.9)
DIFFERENTIAL METHOD: ABNORMAL
EOSINOPHIL # BLD AUTO: 0.5 K/UL (ref 0–0.5)
EOSINOPHIL NFR BLD: 7.1 % (ref 0–8)
ERYTHROCYTE [DISTWIDTH] IN BLOOD BY AUTOMATED COUNT: 13.8 % (ref 11.5–14.5)
HCT VFR BLD AUTO: 27.1 % (ref 37–48.5)
HGB BLD-MCNC: 7.9 G/DL (ref 12–16)
IMM GRANULOCYTES # BLD AUTO: 0.01 K/UL (ref 0–0.04)
IMM GRANULOCYTES NFR BLD AUTO: 0.1 % (ref 0–0.5)
LYMPHOCYTES # BLD AUTO: 2.6 K/UL (ref 1–4.8)
LYMPHOCYTES NFR BLD: 35.7 % (ref 18–48)
MCH RBC QN AUTO: 23.7 PG (ref 27–31)
MCHC RBC AUTO-ENTMCNC: 29.2 G/DL (ref 32–36)
MCV RBC AUTO: 81 FL (ref 82–98)
MONOCYTES # BLD AUTO: 0.7 K/UL (ref 0.3–1)
MONOCYTES NFR BLD: 9.9 % (ref 4–15)
NEUTROPHILS # BLD AUTO: 3.4 K/UL (ref 1.8–7.7)
NEUTROPHILS NFR BLD: 46.4 % (ref 38–73)
NRBC BLD-RTO: 0 /100 WBC
PLATELET # BLD AUTO: 335 K/UL (ref 150–450)
PMV BLD AUTO: 10.5 FL (ref 9.2–12.9)
RBC # BLD AUTO: 3.33 M/UL (ref 4–5.4)
VANCOMYCIN TROUGH SERPL-MCNC: 17.9 UG/ML (ref 10–22)
WBC # BLD AUTO: 7.37 K/UL (ref 3.9–12.7)

## 2023-10-07 PROCEDURE — 25000003 PHARM REV CODE 250: Performed by: PHYSICIAN ASSISTANT

## 2023-10-07 PROCEDURE — 93010 EKG 12-LEAD: ICD-10-PCS | Mod: ,,, | Performed by: INTERNAL MEDICINE

## 2023-10-07 PROCEDURE — 93005 ELECTROCARDIOGRAM TRACING: CPT

## 2023-10-07 PROCEDURE — 93010 ELECTROCARDIOGRAM REPORT: CPT | Mod: ,,, | Performed by: INTERNAL MEDICINE

## 2023-10-07 PROCEDURE — 63600175 PHARM REV CODE 636 W HCPCS: Performed by: STUDENT IN AN ORGANIZED HEALTH CARE EDUCATION/TRAINING PROGRAM

## 2023-10-07 PROCEDURE — 63600175 PHARM REV CODE 636 W HCPCS: Performed by: PHYSICIAN ASSISTANT

## 2023-10-07 PROCEDURE — 80202 ASSAY OF VANCOMYCIN: CPT | Performed by: ORTHOPAEDIC SURGERY

## 2023-10-07 PROCEDURE — 25000003 PHARM REV CODE 250: Performed by: ORTHOPAEDIC SURGERY

## 2023-10-07 PROCEDURE — 97535 SELF CARE MNGMENT TRAINING: CPT

## 2023-10-07 PROCEDURE — 36415 COLL VENOUS BLD VENIPUNCTURE: CPT | Performed by: ORTHOPAEDIC SURGERY

## 2023-10-07 PROCEDURE — 85025 COMPLETE CBC W/AUTO DIFF WBC: CPT | Performed by: ORTHOPAEDIC SURGERY

## 2023-10-07 PROCEDURE — 97110 THERAPEUTIC EXERCISES: CPT

## 2023-10-07 RX ORDER — LINEZOLID 600 MG/1
600 TABLET, FILM COATED ORAL 2 TIMES DAILY
Qty: 28 TABLET | Refills: 0 | Status: SHIPPED | OUTPATIENT
Start: 2023-10-07 | End: 2023-10-18 | Stop reason: SDUPTHER

## 2023-10-07 RX ORDER — CIPROFLOXACIN 500 MG/1
500 TABLET ORAL 2 TIMES DAILY
Qty: 28 TABLET | Refills: 0 | Status: SHIPPED | OUTPATIENT
Start: 2023-10-07 | End: 2023-10-18 | Stop reason: SDUPTHER

## 2023-10-07 RX ADMIN — ATORVASTATIN CALCIUM 80 MG: 40 TABLET, FILM COATED ORAL at 09:10

## 2023-10-07 RX ADMIN — POLYETHYLENE GLYCOL 3350 17 G: 17 POWDER, FOR SOLUTION ORAL at 09:10

## 2023-10-07 RX ADMIN — ALLOPURINOL 100 MG: 100 TABLET ORAL at 09:10

## 2023-10-07 RX ADMIN — OXYCODONE HYDROCHLORIDE 5 MG: 5 TABLET ORAL at 05:10

## 2023-10-07 RX ADMIN — FAMOTIDINE 20 MG: 20 TABLET ORAL at 09:10

## 2023-10-07 RX ADMIN — CARVEDILOL 3.12 MG: 3.12 TABLET, FILM COATED ORAL at 09:10

## 2023-10-07 RX ADMIN — AMLODIPINE BESYLATE 10 MG: 5 TABLET ORAL at 09:10

## 2023-10-07 RX ADMIN — CEFEPIME 2 G: 2 INJECTION, POWDER, FOR SOLUTION INTRAVENOUS at 11:10

## 2023-10-07 RX ADMIN — MUPIROCIN: 20 OINTMENT TOPICAL at 09:10

## 2023-10-07 RX ADMIN — HEPARIN SODIUM 5000 UNITS: 5000 INJECTION, SOLUTION INTRAVENOUS; SUBCUTANEOUS at 05:10

## 2023-10-07 RX ADMIN — SENNOSIDES AND DOCUSATE SODIUM 1 TABLET: 50; 8.6 TABLET ORAL at 09:10

## 2023-10-07 NOTE — SUBJECTIVE & OBJECTIVE
"Principal Problem:Surgical wound dehiscence    Principal Orthopedic Problem: Same    Interval History: NAEON, patient stable, pain controlled. No acute complaints this morning.  Drain with 50cc's output overnight; okay for dc per plastics.  ID recommending dc on oral linezolid and cipro.  EKG pending to evaluate baseline qtc prior to initiation of cipro.        Review of patient's allergies indicates:  No Known Allergies    Current Facility-Administered Medications   Medication    0.9%  NaCl infusion    acetaminophen tablet 650 mg    allopurinoL tablet 100 mg    amLODIPine tablet 10 mg    atorvastatin tablet 80 mg    carvediloL tablet 3.125 mg    ceFEPIme (MAXIPIME) 2 g in dextrose 5 % in water (D5W) 100 mL IVPB (MB+)    famotidine tablet 20 mg    gabapentin capsule 100 mg    heparin (porcine) injection 5,000 Units    methocarbamoL tablet 1,000 mg    mupirocin 2 % ointment    ondansetron disintegrating tablet 8 mg    ondansetron injection 4 mg    oxyCODONE immediate release tablet 5 mg    oxyCODONE immediate release tablet Tab 10 mg    polyethylene glycol packet 17 g    senna-docusate 8.6-50 mg per tablet 1 tablet    vancomycin - pharmacy to dose    vancomycin 1,500 mg in dextrose 5 % (D5W) 250 mL IVPB (Vial-Mate)     Objective:     Vital Signs (Most Recent):  Temp: 98.3 °F (36.8 °C) (10/07/23 0746)  Pulse: 85 (10/07/23 0746)  Resp: 18 (10/07/23 0746)  BP: (!) 119/58 (10/07/23 0746)  SpO2: (!) 93 % (10/07/23 0746) Vital Signs (24h Range):  Temp:  [97.5 °F (36.4 °C)-98.4 °F (36.9 °C)] 98.3 °F (36.8 °C)  Pulse:  [75-85] 85  Resp:  [16-20] 18  SpO2:  [93 %-98 %] 93 %  BP: (108-142)/(52-70) 119/58     Weight: 93.1 kg (205 lb 4 oz)  Height: 5' 1" (154.9 cm)  Body mass index is 38.78 kg/m².      Intake/Output Summary (Last 24 hours) at 10/7/2023 1003  Last data filed at 10/7/2023 0521  Gross per 24 hour   Intake 720 ml   Output 20 ml   Net 700 ml          Ortho/SPM Exam  Lumbar spine exam   EHL/FHL/TA/Gastroc intact "   SILT   2+DP/PT   Dressings CDI   Drains x1       Significant Labs: CBC:   Recent Labs   Lab 10/07/23  0326   WBC 7.37   HGB 7.9*   HCT 27.1*            All pertinent labs within the past 24 hours have been reviewed.    Significant Imaging: I have reviewed and interpreted all pertinent imaging results/findings.

## 2023-10-07 NOTE — PROGRESS NOTES
Pharmacokinetic Assessment Follow Up: IV Vancomycin    Vancomycin serum concentration assessment(s):    The trough level was drawn correctly and can be used to guide therapy at this time. The measurement is within the desired definitive target range of 15 to 20 mcg/mL.    Vancomycin Regimen Plan:    Continue regimen to Vancomycin 1500 mg IV every 24 hours with next serum trough concentration measured at 1300 prior to 3th dose on 10/9.    Drug levels (last 3 results):  Recent Labs   Lab Result Units 10/05/23  1104 10/07/23  1259   Vancomycin, Random ug/mL 11.0  --    Vancomycin-Trough ug/mL  --  17.9       Pharmacy will continue to follow and monitor vancomycin.    Please contact pharmacy at extension [phone] for questions regarding this assessment.    Thank you for the consult,   Alessia Contreras, PharmD  PGY-2 Oncology Pharmacy Resident  Spectra link: [phone]         Patient brief summary:  Nilsa Curiel is a 77 y.o. female initiated on antimicrobial therapy with IV Vancomycin for treatment of skin & soft tissue infection    The patient's current regimen is 1500 mg Q24H    Drug Allergies:   Review of patient's allergies indicates:  No Known Allergies    Actual Body Weight:   93.1 kg    Renal Function:   Estimated Creatinine Clearance: 50.6 mL/min (based on SCr of 1 mg/dL).,         CBC (last 72 hours):  Recent Labs   Lab Result Units 10/05/23  0418 10/07/23  0326   WBC K/uL 6.70 7.37   Hemoglobin g/dL 8.1* 7.9*   Hematocrit % 27.3* 27.1*   Platelets K/uL 378 335   Gran % % 86.7* 46.4   Lymph % % 10.0* 35.7   Mono % % 3.0* 9.9   Eosinophil % % 0.0 7.1   Basophil % % 0.0 0.8   Differential Method  Automated Automated       Metabolic Panel (last 72 hours):  Recent Labs   Lab Result Units 10/04/23  1810   Sodium mmol/L 138   Potassium mmol/L 5.3*   Chloride mmol/L 112*   CO2 mmol/L 20*   Glucose mg/dL 108   BUN mg/dL 12   Creatinine mg/dL 1.0  1.0   Albumin g/dL 3.4*   Total Bilirubin mg/dL 0.3   Alkaline  Phosphatase U/L 118   AST U/L 24   ALT U/L 13       Vancomycin Administrations:  vancomycin given in the last 96 hours                     vancomycin 1,500 mg in dextrose 5 % (D5W) 250 mL IVPB (Vial-Mate) (mg) 1,500 mg New Bag 10/06/23 1355     1,500 mg New Bag 10/05/23 1412    vancomycin (VANCOCIN) injection 1 g (g) 1.5 g Given 10/04/23 1555    vancomycin injection (g) 1 g Given 10/04/23 1530                    Microbiologic Results:  Microbiology Results (last 7 days)       Procedure Component Value Units Date/Time    Culture, Anaerobe [[id number]] Collected: 10/04/23 1602    Order Status: Completed Specimen: Wound from Back Updated: 10/06/23 1335     Anaerobic Culture Culture in progress    Narrative:      Lumbar wound tissue    Culture, Anaerobe [[id number]] Collected: 10/04/23 1602    Order Status: Completed Specimen: Wound from Back Updated: 10/06/23 1334     Anaerobic Culture Culture in progress    Narrative:      Lumbar wound #2    Culture, Anaerobe [[id number]] Collected: 10/04/23 1602    Order Status: Completed Specimen: Wound from Back Updated: 10/06/23 1334     Anaerobic Culture Culture in progress    Narrative:      Lumbar wound #1    Aerobic culture [[id number]]  (Abnormal)  (Susceptibility) Collected: 10/04/23 1602    Order Status: Completed Specimen: Wound from Back Updated: 10/06/23 1249     Aerobic Bacterial Culture METHICILLIN RESISTANT STAPHYLOCOCCUS AUREUS  Rare      Narrative:      Lumbar wound #2    Aerobic culture [[id number]]  (Abnormal)  (Susceptibility) Collected: 10/04/23 1602    Order Status: Completed Specimen: Wound from Back Updated: 10/06/23 1242     Aerobic Bacterial Culture METHICILLIN RESISTANT STAPHYLOCOCCUS AUREUS  Rare      Narrative:      Lumbar wound #1    Aerobic culture [[id number]]  (Abnormal)  (Susceptibility) Collected: 10/04/23 1602    Order Status: Completed Specimen: Wound from Back Updated: 10/06/23 1225     Aerobic Bacterial Culture ESCHERICHIA COLI  Few         METHICILLIN RESISTANT STAPHYLOCOCCUS AUREUS  Few      Narrative:      Lumbar wound tissue    AFB Culture & Smear [[id number]] Collected: 10/04/23 1602    Order Status: Completed Specimen: Wound from Back Updated: 10/05/23 2127     AFB Culture & Smear Culture in progress     AFB CULTURE STAIN No acid fast bacilli seen.    Narrative:      Lumbar wound #2    AFB Culture & Smear [[id number]] Collected: 10/04/23 1602    Order Status: Completed Specimen: Wound from Back Updated: 10/05/23 2127     AFB Culture & Smear Culture in progress     AFB CULTURE STAIN No acid fast bacilli seen.    Narrative:      Lumbar wound #1    AFB Culture & Smear [[id number]] Collected: 10/04/23 1602    Order Status: Completed Specimen: Wound from Back Updated: 10/05/23 2127     AFB Culture & Smear Culture in progress     AFB CULTURE STAIN No acid fast bacilli seen.    Narrative:      Lumbar wound tissue    Gram stain [[id number]] Collected: 10/04/23 1602    Order Status: Completed Specimen: Wound from Back Updated: 10/04/23 1841     Gram Stain Result Rare WBC's      Rare Gram positive cocci    Narrative:      Lumbar wound tissue    Gram stain [[id number]] Collected: 10/04/23 1602    Order Status: Completed Specimen: Wound from Back Updated: 10/04/23 1838     Gram Stain Result Rare WBC's      No organisms seen    Narrative:      Lumbar wound #2    Gram stain [[id number]] Collected: 10/04/23 1602    Order Status: Completed Specimen: Wound from Back Updated: 10/04/23 1837     Gram Stain Result Rare WBC's      No organisms seen    Narrative:      Lumbar wound #1    Fungus culture [[id number]] Collected: 10/04/23 1602    Order Status: Sent Specimen: Wound from Back Updated: 10/04/23 1642    Fungus culture [[id number]] Collected: 10/04/23 1602    Order Status: Sent Specimen: Wound from Back Updated: 10/04/23 1638    Fungus culture [[id number]] Collected: 10/04/23 1602    Order Status: Sent Specimen: Wound from Back Updated: 10/04/23 1622

## 2023-10-07 NOTE — PLAN OF CARE
[facility]  Discharge Final Note    Primary Care Provider: Gaurav Allison MD    Expected Discharge Date: 10/7/2023    Final Discharge Note (most recent)       Final Note - 10/07/23 1356          Final Note    Assessment Type Final Discharge Note (P)      Anticipated Discharge Disposition Home or Self Care (P)         Post-Acute Status    Discharge Delays None known at this time (P)                      Important Message from Medicare             Patient discharged home with [facility]. No other post acute needs noted.      Cisco Alexander LMSW   Pediatric/PICU    [facility]  [phone]

## 2023-10-07 NOTE — PROGRESS NOTES
Discharge instructions and AVS given to and reviewed with patient. Patient verbalized understanding. Lines removed. GENEVA drain remains in place. Drain care instructions provided. Medications delivered to  bedside. Awaiting transport

## 2023-10-07 NOTE — DISCHARGE SUMMARY
[facility] - [facility]  Orthopedics  Discharge Summary      Patient Name: Nilsa Curiel  MRN: [id number]  Admission Date: 10/4/2023  Hospital Length of Stay: 3 days  Discharge Date and Time:  10/07/2023 12:40 PM  Attending Physician: Jesus Alberto Sharp MD   Discharging Provider: AGUSTIN Rivas MD  Primary Care Provider: Gaurav Allison MD    HPI:   Nilsa Curiel is a 77 y.o. female with a hx of L2-5 PLDF/TLIF who had persistent wound dehiscence. The patient has failed conservative management and is here for surgery today.      Procedure(s) (LRB):  IRRIGATION AND DEBRIDEMENT **CO CASE DR. JULISSA RODRIGUEZ** LUMBAR (N/A)  CLOSURE, WOUND      Hospital Course:  On 10/4/2023, the patient arrived to the [facility] for proper pre-operative management.  Upon completion of pre-operative preparation, the patient was taken back to the operative theatre. I&D of the L spine with plastic surgery closure was performed without complication and the patient was transported to the post anesthesia care unit in stable condition.  After appropriate recovery from the anaesthetic agents used during the surgery, the patient was then transported to the hospital inpatient floor.  The interim of the hospital stay from arrival on the floor up to discharge has been uncomplicated. The patient has tolerated regular diet.  The patient's pain has been controlled using a multimodal approach. Currently, the patient's pain is well controlled on an oral regimen.  The patient has been voiding without difficulty.  The patient began participation in physical therapy after surgery and has progressed throughout the entire hospital stay.  Currently, the patient's progress is sufficient to allow the them to be discharged to home safely.  The patient agrees with this assessment and desires a discharge today.        Goals of Care Treatment Preferences:  Code Status: Full Code    Living Will: Yes              Consults (From admission, onward)        Status Ordering Provider      Pharmacy to dose Vancomycin consult  Once        Provider:  (Not yet assigned)   See Asif for full Linked Orders Report.    Acknowledged TONIE LADD     IP consult case management/social work  Once        Provider:  (Not yet assigned)    Acknowledged PEDRO PABLO HASSAN     Inpatient consult to Infectious Diseases  Once        Provider:  (Not yet assigned)    Completed PEDRO PABLO HASSAN          Significant Diagnostic Studies:     Pending Diagnostic Studies:     Procedure Component Value Units Date/Time    VANCOMYCIN, TROUGH [[id number]]     Order Status: Sent Lab Status: No result     Specimen: Blood         Final Active Diagnoses:    Diagnosis Date Noted POA    PRINCIPAL PROBLEM:  Surgical wound dehiscence [T81.31XA] 10/05/2023 Yes    Acute on chronic anemia [D64.9] 10/05/2023 Yes    Class 2 severe obesity due to excess calories with serious comorbidity and body mass index (BMI) of 38.0 to 38.9 in adult [E66.01, Z68.38] 10/05/2023 Not Applicable    Chronic heart failure with preserved ejection fraction [I50.32] 08/18/2022 Yes    Essential hypertension [I10] 04/06/2022 Yes    MONE (obstructive sleep apnea) [G47.33] 07/25/2018 Yes      Problems Resolved During this Admission:      Discharged Condition: good    Disposition: Home-Health Care [facility] [place]    Follow Up:  Upcoming Appointments     Visit Type Date Time Department    POST-OP 10/19/2023  8:00 AM [facility]            Patient Instructions:   No discharge procedures on file.  Medications:  Reconciled Home Medications:      Medication List      START taking these medications    ciprofloxacin HCl 500 MG tablet  Commonly known as: CIPRO  Take 1 tablet (500 mg total) by mouth 2 (two) times daily. for 14 days     gabapentin 100 MG capsule  Commonly known as: NEURONTIN  Take 1 capsule (100 mg total) by mouth 3 (three) times daily.     linezolid 600 mg Tab  Commonly known as: ZYVOX  Take 1 tablet (600 mg total) by mouth 2 (two) times a day.  for 14 days        CHANGE how you take these medications    oxyCODONE 5 MG immediate release tablet  Commonly known as: ROXICODONE  Take 1 tablet (5 mg total) by mouth every 6 (six) hours as needed for Pain (for breakthrough pain).  What changed: when to take this        CONTINUE taking these medications    acetaminophen 500 MG tablet  Commonly known as: TYLENOL  Take 1 tablet (500 mg total) by mouth 3 (three) times daily.     allopurinoL 100 MG tablet  Commonly known as: ZYLOPRIM  Take 1 tablet (100 mg total) by mouth once daily.     amLODIPine 10 MG tablet  Commonly known as: NORVASC  Take 1 tablet (10 mg total) by mouth once daily.     aspirin 81 MG EC tablet  Commonly known as: ECOTRIN  Take 1 tablet (81 mg total) by mouth once daily.     betamethasone dipropionate 0.05 % ointment  Commonly known as: DIPROLENE  Apply topically 2 (two) times daily.     biotin 300 mcg Tab  Take 1 tablet by mouth once daily.     carvediloL 3.125 MG tablet  Commonly known as: COREG  Take 1 tablet (3.125 mg total) by mouth 2 (two) times daily with meals.     celecoxib 100 MG capsule  Commonly known as: CeleBREX  Take 1 capsule (100 mg total) by mouth 2 (two) times daily.     coenzyme Q10 100 mg Tab  Take 1 tablet by mouth once daily.     ketoconazole 2 % cream  Commonly known as: NIZORAL  Apply to affected areas of body BID prn irritation.     methocarbamoL 500 MG Tab  Commonly known as: ROBAXIN  Take 2 tablets (1,000 mg total) by mouth 3 (three) times daily.     olmesartan 40 MG tablet  Commonly known as: BENICAR  TAKE 1 TABLET ONE TIME DAILY     omega-3 fatty acids 300 mg Cap  Take by mouth.     potassium chloride SA 20 MEQ tablet  Commonly known as: K-DUR,KLOR-CON  Take 1 tablet (20 mEq total) by mouth 2 (two) times daily.     rosuvastatin 20 MG tablet  Commonly known as: CRESTOR  Take 1 tablet (20 mg total) by mouth once daily.     TURMERIC ORAL  Take by mouth.     VITAMIN B-2 ORAL  Take 1 capsule by mouth once daily.     WOMEN'S  50+ DAILY FORMULA ORAL  Take by mouth.        ASK your doctor about these medications    mometasone 0.1 % ointment  Commonly known as: ELOCON  Apply twice daily to hands prn eczema.            AGUSTIN Rivas MD  Orthopedics  [facility] - Surgery

## 2023-10-07 NOTE — ASSESSMENT & PLAN NOTE
Nilsa Curiel is a 77 y.o. female is s/p I&D with plastic surgery closure of the lumbar spine on 10/3/23    Surgical dressing C/D/I  Pain control: multimodal  PT/OT: WBAT BLE  DVT PPx: SQH TID, FCDs at all times when not ambulating  Abx: per ID recs  · Dc on linezolid 600 mg po bid & ciprofloxacin 500mg po bid (x14 days)  · EKG ordered to assess qtc   Drain: OK for dc with drain (x1) per plastics   Mayer: None   Nursing: Incentive spirometry, Monitor and record drain output each shift     Dispo: likely dc today following EKG     Output by Drain (mL) 10/05/23 0701 - 10/05/23 1900 10/05/23 1901 - 10/06/23 0700 10/06/23 0701 - 10/06/23 1900 10/06/23 1901 - 10/07/23 0700 10/07/23 0701 - 10/07/23 1007        Closed/Suction Drain 10/04/23 1606 Tube - 1 Back Bulb 15 Fr. 15 30 15 10           Upcoming Appointments     Visit Type Date Time Department    POST-OP 10/19/2023  8:00 AM [facility]

## 2023-10-07 NOTE — PROGRESS NOTES
Plastic and Reconstructive Surgery   Progress Note    Subjective:      No acute events overnight  Pain controlled.   20 cc of serosanguinous output from drain.    Objective:  Vital signs in last 24 hours:  Temp:  [97.5 °F (36.4 °C)-98.4 °F (36.9 °C)] 98.3 °F (36.8 °C)  Pulse:  [75-85] 85  Resp:  [16-20] 18  SpO2:  [93 %-98 %] 93 %  BP: (108-142)/(52-70) 119/58    Intake/Output last 3 shifts:  I/O last 3 completed shifts:  In: 1020 [P.O.:1020]  Out: 55 [Drains:55]    Intake/Output this shift:  No intake/output data recorded.        Physical Exam:  VITAL SIGNS:   Vitals:    10/06/23 2351 10/07/23 0347 10/07/23 0516 10/07/23 0746   BP: (!) 108/52 125/70  (!) 119/58   BP Location: Left arm Left arm     Patient Position: Lying Lying     Pulse: 80 75  85   Resp: 16 16 18 18   Temp: 97.5 °F (36.4 °C) 98.4 °F (36.9 °C)  98.3 °F (36.8 °C)   TempSrc: Oral Oral     SpO2: (!) 93% 97%  (!) 93%   Weight:       Height:         TMAX: Temp (24hrs), Av °F (36.7 °C), Min:97.5 °F (36.4 °C), Max:98.4 °F (36.9 °C)    General: Alert; No acute distress  Cardiovascular: Regular rate   Respiratory: Normal respiratory effort. Chest rise symmetric.   Abdomen: Soft, nontender, nondistended  Extremity: Moves all extremities equally.  Neurologic: No focal deficit. Speech normal  Back: lumbar midline incision with aquacel and tommy drain with serosanguinous output, c/d/i      Scheduled Medications allopurinoL, 100 mg, Daily  amLODIPine, 10 mg, Daily  atorvastatin, 80 mg, Daily  carvediloL, 3.125 mg, BID WM  ceFEPime (MAXIPIME) IVPB, 2 g, Q12H  famotidine, 20 mg, Daily  gabapentin, 100 mg, TID  heparin (porcine), 5,000 Units, Q8H  mupirocin, , BID  polyethylene glycol, 17 g, Daily  senna-docusate 8.6-50 mg, 1 tablet, BID  vancomycin (VANCOCIN) IV (PEDS and ADULTS), 1,500 mg, Q24H      PRN Medications acetaminophen, methocarbamoL, ondansetron, ondansetron, oxyCODONE, oxyCODONE, Pharmacy to dose Vancomycin consult **AND** vancomycin - pharmacy to  dose    Recent Labs:   Lab Results   Component Value Date    WBC 7.37 10/07/2023    HGB 7.9 (L) 10/07/2023    HCT 27.1 (L) 10/07/2023    MCV 81 (L) 10/07/2023     10/07/2023     Lab Results   Component Value Date     10/04/2023     10/04/2023    K 5.3 (H) 10/04/2023     (H) 10/04/2023    BUN 12 10/04/2023         Assessment: 77 y.o. y/o female 3 Days Post-Op s/p Procedure(s):  IRRIGATION AND DEBRIDEMENT **CO CASE DR. JULISSA RODRIGUEZ** LUMBAR  CLOSURE, WOUND Doing well postoperatively.    Plan  Reg diet  Pain control  Monitor drain output  Stable for DC from plastics standpoint  DVT ppx        Alok Quick MD-  Fellow  Department of Plastic and Reconstructive Surgery  [phone] (office)

## 2023-10-07 NOTE — PT/OT/SLP PROGRESS
Occupational Therapy   Treatment    Name: Nilsa Curiel  MRN: [id number]  Admitting Diagnosis:  Surgical wound dehiscence  3 Days Post-Op    Recommendations:     Discharge Recommendations: other (see comments)  Discharge Equipment Recommendations:  none  Barriers to discharge:  None    Assessment:     Nilsa Curiel is a 77 y.o. female with a medical diagnosis of Surgical wound dehiscence.  She presents with good motivation and participation. Pt is showing great progress and demonstrating increased independence with ADLs and ambulation. Performance deficits affecting function are weakness, impaired endurance, impaired functional mobility, gait instability, impaired balance, pain, impaired skin, orthopedic precautions. Pt would continue to benefit from skilled OT services to maximize functional independence with ADLs and functional mobility, reduce caregiver burden, and facilitate safe discharge in the least restrictive environment.       Rehab Prognosis:  Good; patient would benefit from acute skilled OT services to address these deficits and reach maximum level of function.       Plan:     Patient to be seen 3 x/week to address the above listed problems via self-care/home management, community/work re-entry, therapeutic activities, neuromuscular re-education  Plan of Care Expires: 11/04/23  Plan of Care Reviewed with: patient    Subjective     Chief Complaint: n/a   Patient/Family Comments/goals: They never brought me toothpaste to brush my teeth  Pain/Comfort:  Pain Rating 1: 0/10  Pain Rating Post-Intervention 1: 3/10 (3/10 R shldr discomfort)    Objective:     Communicated with: RN prior to session.  Patient found HOB elevated with SCD, GENEVA drain upon OT entry to room.    General Precautions: Standard, fall    Orthopedic Precautions:spinal precautions  Braces: N/A  Respiratory Status: Room air     Occupational Performance:     Bed Mobility:    Patient completed Scooting/Bridging with supervision  Patient completed  Supine to Sit with supervision     Functional Mobility/Transfers:  Patient completed Sit <> Stand Transfer with supervision and stand by assistance  with  rolling walker   Patient completed bathroom to Chair Transfer using Step Transfer technique with stand by assistance with rolling walker  Patient completed Toilet Transfer Step Transfer technique with stand by assistance with  rolling walker  Functional Mobility: Pt engaged in functional mobility to simulate household/community distances ~20 ft with RW and SBA in order to maximize functional endurance and standing balance required for engagement in occupations of choice      Activities of Daily Living:  Grooming: supervision - completed oral hygiene and face grooming standing at sink  Toileting: supervision - pt received toilet paper while maintaining spinal px      [facility] 6 Click ADL: 21    Treatment & Education:  -SHLDR ROM: flex/ext/abd/add (1x5)  -UE EXERCISES: bicep curls, horizontal elbow flexion, shldr press, sagittal plane triceps pushes with minimum resistance (1x10 ea)  -Education on spinal px during ADLs and mobility  -Education on importance of OOB activity to improve overall activity tolerance and promote recovery  -Pt educated to call for assistance and to transfer with hospital staff only  -Provided education regarding role of OT. Pt had no further questions & when asked whether there were any concerns pt reported none.        Patient left up in chair with all lines intact and call button in reach    GOALS:   Multidisciplinary Problems       Occupational Therapy Goals          Problem: Occupational Therapy    Goal Priority Disciplines Outcome Interventions   Occupational Therapy Goal     OT, PT/OT Ongoing, Progressing    Description: Goals to be met by: 11/1/23     Patient will increase functional independence with ADLs by performing:    UE Dressing with Modified [place].  LE Dressing with Modified [place].  Grooming while standing with  Modified [place].  Toileting from toilet/bedside commode with Modified [place] for hygiene and clothing management.   Toilet transfer to toilet/bedside commode with Supervision and LRAD as needed.                         Time Tracking:     OT Date of Treatment: 10/07/23  OT Start Time: 0941  OT Stop Time: 1006  OT Total Time (min): 25 min    Billable Minutes:Self Care/Home Management 15  Therapeutic Exercise 10    OT/LESLIE: OT          10/7/2023

## 2023-10-07 NOTE — PROGRESS NOTES
[facility] - [facility]  Orthopedics  Progress Note    Patient Name: Nilsa Curiel  MRN: [id number]  Admission Date: 10/4/2023  Hospital Length of Stay: 3 days  Attending Provider: Jesus Alberto Sharp MD  Primary Care Provider: Gaurav Allison MD  Follow-up For: Procedure(s) (LRB):  IRRIGATION AND DEBRIDEMENT **CO CASE DR. JULISSA RODRIGUEZ** LUMBAR (N/A)  CLOSURE, WOUND    Post-Operative Day: 3 Days Post-Op  Subjective:     Principal Problem:Surgical wound dehiscence    Principal Orthopedic Problem: Same    Interval History: NAEON, patient stable, pain controlled. No acute complaints this morning.  Ambulated 80ft w/PT yesterday.  Drain with 10cc's output overnight; okay for dc per plastics.  ID recommending dc on oral linezolid and cipro.  EKG pending to evaluate baseline qtc prior to initiation of cipro.        Review of patient's allergies indicates:  No Known Allergies    Current Facility-Administered Medications   Medication    0.9%  NaCl infusion    acetaminophen tablet 650 mg    allopurinoL tablet 100 mg    amLODIPine tablet 10 mg    atorvastatin tablet 80 mg    carvediloL tablet 3.125 mg    ceFEPIme (MAXIPIME) 2 g in dextrose 5 % in water (D5W) 100 mL IVPB (MB+)    famotidine tablet 20 mg    gabapentin capsule 100 mg    heparin (porcine) injection 5,000 Units    methocarbamoL tablet 1,000 mg    mupirocin 2 % ointment    ondansetron disintegrating tablet 8 mg    ondansetron injection 4 mg    oxyCODONE immediate release tablet 5 mg    oxyCODONE immediate release tablet Tab 10 mg    polyethylene glycol packet 17 g    senna-docusate 8.6-50 mg per tablet 1 tablet    vancomycin - pharmacy to dose    vancomycin 1,500 mg in dextrose 5 % (D5W) 250 mL IVPB (Vial-Mate)     Objective:     Vital Signs (Most Recent):  Temp: 98.3 °F (36.8 °C) (10/07/23 0746)  Pulse: 85 (10/07/23 0746)  Resp: 18 (10/07/23 0746)  BP: (!) 119/58 (10/07/23 0746)  SpO2: (!) 93 % (10/07/23 0746) Vital Signs (24h Range):  Temp:  [97.5 °F  "(36.4 °C)-98.4 °F (36.9 °C)] 98.3 °F (36.8 °C)  Pulse:  [75-85] 85  Resp:  [16-20] 18  SpO2:  [93 %-98 %] 93 %  BP: (108-142)/(52-70) 119/58     Weight: 93.1 kg (205 lb 4 oz)  Height: 5' 1" (154.9 cm)  Body mass index is 38.78 kg/m².      Intake/Output Summary (Last 24 hours) at 10/7/2023 1003  Last data filed at 10/7/2023 0521  Gross per 24 hour   Intake 720 ml   Output 20 ml   Net 700 ml         Ortho/SPM Exam  Lumbar spine exam   EHL/FHL/TA/Gastroc intact   SILT   2+DP/PT   Dressings CDI   Drains x1       Significant Labs: CBC:   Recent Labs   Lab 10/07/23  0326   WBC 7.37   HGB 7.9*   HCT 27.1*            All pertinent labs within the past 24 hours have been reviewed.    Significant Imaging: I have reviewed and interpreted all pertinent imaging results/findings.    Assessment/Plan:     * Surgical wound dehiscence  Nilsa Curiel is a 77 y.o. female is s/p I&D with plastic surgery closure of the lumbar spine on 10/3/23    Surgical dressing C/D/I  Pain control: multimodal  PT/OT: WBAT BLE  DVT PPx: SQH TID, FCDs at all times when not ambulating  Abx: per ID recs  · Dc on linezolid 600 mg po bid & ciprofloxacin 500mg po bid (x14 days)  · EKG ordered to assess qtc   Drain: OK for dc with drain (x1) per plastics   Mayer: None   Nursing: Incentive spirometry, Monitor and record drain output each shift     Dispo: likely dc today following EKG     Output by Drain (mL) 10/05/23 0701 - 10/05/23 1900 10/05/23 1901 - 10/06/23 0700 10/06/23 0701 - 10/06/23 1900 10/06/23 1901 - 10/07/23 0700 10/07/23 0701 - 10/07/23 1007        Closed/Suction Drain 10/04/23 1606 Tube - 1 Back Bulb 15 Fr. 15 30 15 10           Upcoming Appointments     Visit Type Date Time Department    POST-OP 10/19/2023  8:00 AM [facility]                    AGUSTIN Rivas MD  Orthopedics  [facility] - Surgery  "

## 2023-10-09 ENCOUNTER — PATIENT OUTREACH (OUTPATIENT)
Dept: ADMINISTRATIVE | Facility: CLINIC | Age: 78
End: 2023-10-09
Payer: MEDICARE

## 2023-10-09 ENCOUNTER — NURSE TRIAGE (OUTPATIENT)
Dept: ADMINISTRATIVE | Facility: CLINIC | Age: 78
End: 2023-10-09
Payer: MEDICARE

## 2023-10-09 LAB
BACTERIA SPEC ANAEROBE CULT: NORMAL

## 2023-10-09 NOTE — TELEPHONE ENCOUNTER
Left a voicemail. Received a message from a triage nurse about the patient not having a bowel movement in 6 days. Dr. Allison recommends she go to an urgent care or have an evaluation with him or a colleague. Portal message sent.

## 2023-10-09 NOTE — TELEPHONE ENCOUNTER
"LOV 7/24/23  Annual 12/8/22  RTC 10/11/23    Please see message below from a triage nurse:    "Pt calling states she was discharged from hospital 10/7//23 and had I&D on 10/4/23. States that she had BM last on 10/3/23. Denies any abd pain or vomiting. Per protocol advised to SEE HCP TODAY. verbalized understanding. Unable to schedule appt. verbalized understanding. Advised ill send message but if unable to be seen at PCP to [place] [facility] verbalized understanding. Denies any further questions or concerns at this time, advised to call back if they have any that come up. Advised pt to call back with any other concerns or worsening symptoms. Verbalized understanding and will route message to provider."    You do not have any appointment today. She is scheduled to see you Wednesday for a hospital follow-up. She is also scheduled to see surgeon tomorrow.   "

## 2023-10-09 NOTE — PLAN OF CARE
SW followed for d/c needs on Saturday. CM team notes stated [facility] covering pt. SW connected LPN Megan Sauceda with covering CM to assist with pt needs. [facility] referral was submitted per CM.           Cisco Alexander LMSW   Pediatric/PICU    [facility]  [phone]

## 2023-10-09 NOTE — PROGRESS NOTES
"Secure chat message received from Cisco Alexander LMSW stating "Hello, I'm not covering this patient.".  TCC nurse response via  secure chat "There is a signed note by you on 10/7/2023 at 1:57 PM stating "Patient discharged home with [facility]. No other post acute needs noted".  I have contacted [facility], they informed me they have not received a referral on the patient and the new  [facility]  Thank you"  "

## 2023-10-09 NOTE — TELEPHONE ENCOUNTER
Pt calling states she was discharged from hospital 10/7//23 and had I&D on 10/4/23. States that she had BM last on 10/3/23. Denies any abd pain or vomiting. Per protocol advised to SEE HCP TODAY. verbalized understanding. Unable to schedule appt. verbalized understanding. Advised ill send message but if unable to be seen at PCP to [place] [facility] verbalized understanding. Denies any further questions or concerns at this time, advised to call back if they have any that come up. Advised pt to call back with any other concerns or worsening symptoms. Verbalized understanding and will route message to provider.     Reason for Disposition   Last bowel movement (BM) > 4 days ago    Additional Information   Negative: Vomiting bile (green color)   Negative: Patient sounds very sick or weak to the triager   Negative: Constant abdominal pain lasting > 2 hours   Negative: Vomiting and abdomen looks much more swollen than usual   Negative: Rectal pain or fullness from fecal impaction (rectum full of stool) and NOT better after SITZ bath, suppository or enema   Negative: Abdomen is more swollen than usual    Protocols used: Constipation-A-OH

## 2023-10-09 NOTE — PROGRESS NOTES
"C3 nurse spoke with Nilsa Curiel  for a TCC post hospital discharge follow up call. The patient has a scheduled HOSFU appointment with Gaurav Allison MD  on 10/11/2023 @ 9AM.  Secure chat sent to Cisco Alexander LMSW "TCC nurse spoke to patient, she has not heard from any Home Health agency regarding starting her care.  Contacted [facility] ([phone]), spoke to Mirian, she informed me that patient was discharged from the agency on 9/15/2023, new Home Health orders would need to be sent via [facility]. Patient would be considered a new patient and will have be reviewed prior to accepting of patient."        "

## 2023-10-10 ENCOUNTER — OFFICE VISIT (OUTPATIENT)
Dept: PLASTIC SURGERY | Facility: CLINIC | Age: 78
End: 2023-10-10
Payer: MEDICARE

## 2023-10-10 VITALS — SYSTOLIC BLOOD PRESSURE: 135 MMHG | HEART RATE: 83 BPM | DIASTOLIC BLOOD PRESSURE: 77 MMHG

## 2023-10-10 DIAGNOSIS — Z09 SURGERY FOLLOW-UP: Primary | ICD-10-CM

## 2023-10-10 PROCEDURE — 3078F PR MOST RECENT DIASTOLIC BLOOD PRESSURE < 80 MM HG: ICD-10-PCS | Mod: CPTII,S$GLB,, | Performed by: SURGERY

## 2023-10-10 PROCEDURE — 99024 PR POST-OP FOLLOW-UP VISIT: ICD-10-PCS | Mod: S$GLB,,, | Performed by: SURGERY

## 2023-10-10 PROCEDURE — 99999 PR PBB SHADOW E&M-EST. PATIENT-LVL II: ICD-10-PCS | Mod: PBBFAC,,, | Performed by: SURGERY

## 2023-10-10 PROCEDURE — 3078F DIAST BP <80 MM HG: CPT | Mod: CPTII,S$GLB,, | Performed by: SURGERY

## 2023-10-10 PROCEDURE — 1157F PR ADVANCE CARE PLAN OR EQUIV PRESENT IN MEDICAL RECORD: ICD-10-PCS | Mod: CPTII,S$GLB,, | Performed by: SURGERY

## 2023-10-10 PROCEDURE — 99024 POSTOP FOLLOW-UP VISIT: CPT | Mod: S$GLB,,, | Performed by: SURGERY

## 2023-10-10 PROCEDURE — 1157F ADVNC CARE PLAN IN RCRD: CPT | Mod: CPTII,S$GLB,, | Performed by: SURGERY

## 2023-10-10 PROCEDURE — 1126F PR PAIN SEVERITY QUANTIFIED, NO PAIN PRESENT: ICD-10-PCS | Mod: CPTII,S$GLB,, | Performed by: SURGERY

## 2023-10-10 PROCEDURE — 3075F PR MOST RECENT SYSTOLIC BLOOD PRESS GE 130-139MM HG: ICD-10-PCS | Mod: CPTII,S$GLB,, | Performed by: SURGERY

## 2023-10-10 PROCEDURE — G0180 PR HOME HEALTH MD CERTIFICATION: ICD-10-PCS | Mod: ,,, | Performed by: ORTHOPAEDIC SURGERY

## 2023-10-10 PROCEDURE — 3075F SYST BP GE 130 - 139MM HG: CPT | Mod: CPTII,S$GLB,, | Performed by: SURGERY

## 2023-10-10 PROCEDURE — 1126F AMNT PAIN NOTED NONE PRSNT: CPT | Mod: CPTII,S$GLB,, | Performed by: SURGERY

## 2023-10-10 PROCEDURE — 3044F HG A1C LEVEL LT 7.0%: CPT | Mod: CPTII,S$GLB,, | Performed by: SURGERY

## 2023-10-10 PROCEDURE — 99999 PR PBB SHADOW E&M-EST. PATIENT-LVL II: CPT | Mod: PBBFAC,,, | Performed by: SURGERY

## 2023-10-10 PROCEDURE — G0180 MD CERTIFICATION HHA PATIENT: HCPCS | Mod: ,,, | Performed by: ORTHOPAEDIC SURGERY

## 2023-10-10 PROCEDURE — 3044F PR MOST RECENT HEMOGLOBIN A1C LEVEL <7.0%: ICD-10-PCS | Mod: CPTII,S$GLB,, | Performed by: SURGERY

## 2023-10-11 ENCOUNTER — TELEPHONE (OUTPATIENT)
Dept: ORTHOPEDICS | Facility: CLINIC | Age: 78
End: 2023-10-11
Payer: MEDICARE

## 2023-10-11 ENCOUNTER — OFFICE VISIT (OUTPATIENT)
Dept: PRIMARY CARE CLINIC | Facility: CLINIC | Age: 78
End: 2023-10-11
Payer: MEDICARE

## 2023-10-11 VITALS
OXYGEN SATURATION: 97 % | TEMPERATURE: 98 F | WEIGHT: 205.25 LBS | SYSTOLIC BLOOD PRESSURE: 120 MMHG | DIASTOLIC BLOOD PRESSURE: 60 MMHG | HEIGHT: 61 IN | HEART RATE: 81 BPM | BODY MASS INDEX: 38.75 KG/M2

## 2023-10-11 DIAGNOSIS — T81.30XA WOUND DEHISCENCE: ICD-10-CM

## 2023-10-11 DIAGNOSIS — N17.9 AKI (ACUTE KIDNEY INJURY): ICD-10-CM

## 2023-10-11 DIAGNOSIS — Z09 HOSPITAL DISCHARGE FOLLOW-UP: Primary | ICD-10-CM

## 2023-10-11 DIAGNOSIS — D62 ACUTE BLOOD LOSS ANEMIA: ICD-10-CM

## 2023-10-11 PROCEDURE — 99214 PR OFFICE/OUTPT VISIT, EST, LEVL IV, 30-39 MIN: ICD-10-PCS | Mod: S$GLB,,, | Performed by: FAMILY MEDICINE

## 2023-10-11 PROCEDURE — 1101F PT FALLS ASSESS-DOCD LE1/YR: CPT | Mod: CPTII,S$GLB,, | Performed by: FAMILY MEDICINE

## 2023-10-11 PROCEDURE — 1159F PR MEDICATION LIST DOCUMENTED IN MEDICAL RECORD: ICD-10-PCS | Mod: CPTII,S$GLB,, | Performed by: FAMILY MEDICINE

## 2023-10-11 PROCEDURE — 3044F HG A1C LEVEL LT 7.0%: CPT | Mod: CPTII,S$GLB,, | Performed by: FAMILY MEDICINE

## 2023-10-11 PROCEDURE — 1111F PR DISCHARGE MEDS RECONCILED W/ CURRENT OUTPATIENT MED LIST: ICD-10-PCS | Mod: CPTII,S$GLB,, | Performed by: FAMILY MEDICINE

## 2023-10-11 PROCEDURE — 3074F SYST BP LT 130 MM HG: CPT | Mod: CPTII,S$GLB,, | Performed by: FAMILY MEDICINE

## 2023-10-11 PROCEDURE — 3288F FALL RISK ASSESSMENT DOCD: CPT | Mod: CPTII,S$GLB,, | Performed by: FAMILY MEDICINE

## 2023-10-11 PROCEDURE — 1126F PR PAIN SEVERITY QUANTIFIED, NO PAIN PRESENT: ICD-10-PCS | Mod: CPTII,S$GLB,, | Performed by: FAMILY MEDICINE

## 2023-10-11 PROCEDURE — 3078F PR MOST RECENT DIASTOLIC BLOOD PRESSURE < 80 MM HG: ICD-10-PCS | Mod: CPTII,S$GLB,, | Performed by: FAMILY MEDICINE

## 2023-10-11 PROCEDURE — 1157F ADVNC CARE PLAN IN RCRD: CPT | Mod: CPTII,S$GLB,, | Performed by: FAMILY MEDICINE

## 2023-10-11 PROCEDURE — 99999 PR PBB SHADOW E&M-EST. PATIENT-LVL V: CPT | Mod: PBBFAC,,, | Performed by: FAMILY MEDICINE

## 2023-10-11 PROCEDURE — 1101F PR PT FALLS ASSESS DOC 0-1 FALLS W/OUT INJ PAST YR: ICD-10-PCS | Mod: CPTII,S$GLB,, | Performed by: FAMILY MEDICINE

## 2023-10-11 PROCEDURE — 99999 PR PBB SHADOW E&M-EST. PATIENT-LVL V: ICD-10-PCS | Mod: PBBFAC,,, | Performed by: FAMILY MEDICINE

## 2023-10-11 PROCEDURE — 3288F PR FALLS RISK ASSESSMENT DOCUMENTED: ICD-10-PCS | Mod: CPTII,S$GLB,, | Performed by: FAMILY MEDICINE

## 2023-10-11 PROCEDURE — 1126F AMNT PAIN NOTED NONE PRSNT: CPT | Mod: CPTII,S$GLB,, | Performed by: FAMILY MEDICINE

## 2023-10-11 PROCEDURE — 3078F DIAST BP <80 MM HG: CPT | Mod: CPTII,S$GLB,, | Performed by: FAMILY MEDICINE

## 2023-10-11 PROCEDURE — 1159F MED LIST DOCD IN RCRD: CPT | Mod: CPTII,S$GLB,, | Performed by: FAMILY MEDICINE

## 2023-10-11 PROCEDURE — 3044F PR MOST RECENT HEMOGLOBIN A1C LEVEL <7.0%: ICD-10-PCS | Mod: CPTII,S$GLB,, | Performed by: FAMILY MEDICINE

## 2023-10-11 PROCEDURE — 1160F RVW MEDS BY RX/DR IN RCRD: CPT | Mod: CPTII,S$GLB,, | Performed by: FAMILY MEDICINE

## 2023-10-11 PROCEDURE — 1157F PR ADVANCE CARE PLAN OR EQUIV PRESENT IN MEDICAL RECORD: ICD-10-PCS | Mod: CPTII,S$GLB,, | Performed by: FAMILY MEDICINE

## 2023-10-11 PROCEDURE — 99214 OFFICE O/P EST MOD 30 MIN: CPT | Mod: S$GLB,,, | Performed by: FAMILY MEDICINE

## 2023-10-11 PROCEDURE — 1160F PR REVIEW ALL MEDS BY PRESCRIBER/CLIN PHARMACIST DOCUMENTED: ICD-10-PCS | Mod: CPTII,S$GLB,, | Performed by: FAMILY MEDICINE

## 2023-10-11 PROCEDURE — 3074F PR MOST RECENT SYSTOLIC BLOOD PRESSURE < 130 MM HG: ICD-10-PCS | Mod: CPTII,S$GLB,, | Performed by: FAMILY MEDICINE

## 2023-10-11 PROCEDURE — 1111F DSCHRG MED/CURRENT MED MERGE: CPT | Mod: CPTII,S$GLB,, | Performed by: FAMILY MEDICINE

## 2023-10-11 NOTE — TELEPHONE ENCOUNTER
"Returned call to [facility] will have nurse  return my call regarding message from Hemant.    ----- Message from Tequila Thayer sent at 10/11/2023  1:11 PM CDT -----  Regarding: pt advice  Contact: [phone]  Name Of Caller: Hemant(PT)     Contact Preference?: Call     What is the nature of the call?: Inform clinic that medications Imitrex and linezolid will interact     Additional Notes:  "Thank you for all that you do for our patients"        "

## 2023-10-11 NOTE — PROGRESS NOTES
Nilsa Curiel presents to [facility] for a follow up visit status post I&D with  and complex spine closure with  on 10/4/23. She is doing well today with no issues since surgery. She reports low drain output from her GENEVA drain. She denies fever, chills, nausea, vomiting or other systemic signs of infection.       ROS - negative, other than stated above    PHYSICAL EXAMINATION  Lumbar incision CDI with nylon sutures in place. GENEVA drain with scant serous drainage.     ASSESSMENT/PLAN  77 y.o. F s/p complex spine closure  - Doing well, no issues.   - GENEVA drain removed  - Advised pt to shower daily and wash the incision with soap and water, apply light film of antibiotic ointment to keep sutures soft  - RTC x 2 week(s) for suture removal, appointment scheduled.      All questions were answered. The patient was advised to contact the clinic with any questions or concerns prior to their next visit.       Kate Rehman PA-C  Plastic and Reconstructive Surgery

## 2023-10-16 NOTE — PHYSICIAN QUERY
"PT Name: Nilsa Curiel  MR #: [id number]    DOCUMENTATION CLARIFICATION      CDI : Cary Dennis RN, CDS              Contact information: [email or web address]   This form is a permanent document in the medical record.    Query Date: October 16, 2023  By submitting this query, we are merely seeking further clarification of documentation. Please utilize your independent clinical judgment when addressing the question(s) below.    The Medical Record contains the following:   Indicator Supporting Clinical Findings Location in Medical Record    Documentation of "Debridement"     x Documentation of "I&D" Lumbar wound infection  2. History of L2-5 PLDF/TLIF  -Lumbar wound incision and drainage  -I opened the previous lumbar incision distally and carried dissection down through the skin and subcutaneous tissues using combination of sharp dissection and electrocautery where necessary.   -There was no identifiable fascial defect.   -Cultures x2 were taken.   -Then, the patient was given a weight-appropriate dose of antibiotics by the Anesthesia Service.   -The skin edges were excised and sent for pathology. We used pulsatile lavage with NS to thoroughly washout the wound.     Aerobic culture: MRSA and E-coli   Orthopedic Surgery OP note 10/4                        Lumbar back wound cultures taken 10/4           Excisional debridement is the surgical removal or cutting away of such tissue, necrosis, or slough and is classified to the root operation "Excision." Use of a sharp instrument does not always indicate that an excisional debridement was performed. Minor removal of loose fragments with scissors or using a sharp instrument to scrape away tissue is not an excisional debridement. Excisional debridement involves the use of a scalpel to remove devitalized tissue.  Nonexcisional debridement is the nonoperative brushing, irrigating, scrubbing, or washing of devitalized tissue, necrosis, slough, or foreign material. Most " "nonexcisional debridement procedures are classified to the root operation "Extraction" (pulling or stripping out or off all or a portion of a body part by the use of force).     Provider, please provide further clarification on the procedure performed on the  __Lumbar wound        :    [   ] Excisional Debridement of skin   [x] Excisional Debridement of subcutaneous tissue and/or fascia        [   ] Non-excisional Debridement of skin   [   ] Non-excisional Debridement of subcutaneous tissue and/or fascia     [   ] Incision and Drainage to depth of skin only   [x] Incision and Drainage to depth of subcutaneous and fascia   [   ] Incision and Drainage to other depth (please specify): _____________     [   ] Other Procedure (please specify): _____________     Reference:    ICD-10-CM/PCS Coding Clinic Third Quarter ICD-10, Effective with discharges: October 7, 2015 [facility] [employer] § Excisional and nonexcisional debridement (2015).    Form No. 95279   "

## 2023-10-16 NOTE — PHYSICIAN QUERY
"PT Name: Nilsa Curiel  MR #: [id number]    DOCUMENTATION CLARIFICATION    CDI : Cary Dennis RN, CDS              Contact information: [email or web address]   This form is a permanent document in the medical record.    Query Date: October 16, 2023  By submitting this query, we are merely seeking further clarification of documentation. Please utilize your independent clinical judgment when addressing the question(s) below.    The Medical Record contains the following:   Indicator Supporting Clinical Findings Location in Medical Record   x Documentation of "Debridement" -the wound was completely debrided.    That measured 7 x 4 cm.  The total closure was 8 cm long.  The deepest extent of the wound was to the midline fascia and muscle closure.  No hardware was exposed. OP note 10/4   x Documentation of "I&D" -Open wound of lumbar region   -Secondary closure dehisced, infected lumbar spine surgical wound   -The total closure was 8 cm long.  The deepest extent of the wound was to the midline fascia and muscle closure.  No hardware was exposed.  -I elevated flaps of skin and subcutaneous fat off of the deep fascia of the back.  Next the defect in the midline fascial closure was reclosed using buried 0 PDS figure-of-eight sutures.  Next a 15 [place] Uri drain was placed and brought out of the right side.  Vancomycin powder was placed in the wound bed.  Because of the previous wide   undermining we were able to reclosed the wound in the midline.   OP note 10/4    Other       Excisional debridement is the surgical removal or cutting away of such tissue, necrosis, or slough and is classified to the root operation "Excision." Use of a sharp instrument does not always indicate that an excisional debridement was performed. Minor removal of loose fragments with scissors or using a sharp instrument to scrape away tissue is not an excisional debridement. Excisional debridement involves the use of a scalpel to remove devitalized " "tissue.  Nonexcisional debridement is the nonoperative brushing, irrigating, scrubbing, or washing of devitalized tissue, necrosis, slough, or foreign material. Most nonexcisional debridement procedures are classified to the root operation "Extraction" (pulling or stripping out or off all or a portion of a body part by the use of force).     Provider, please provide further clarification on the procedure performed on the ____Lumbar wound            :    [   x] Excisional Debridement of subcutaneous tissue and/or fascia: excision done using knife and cautery   [   ] Excisional Debridement of muscle        [   ] Non-excisional Debridement of subcutaneous tissue and/or fascia   [   ] Non-excisional Debridement of muscle     [   ] Other Procedure (please specify): _____________     Reference:    ICD-10-CM/PCS Coding Clinic Third Quarter ICD-10, Effective with discharges: October 7, 2015 [facility] [employer] § Excisional and nonexcisional debridement (2015).    Form No. 28497   "

## 2023-10-17 ENCOUNTER — PATIENT MESSAGE (OUTPATIENT)
Dept: INFECTIOUS DISEASES | Facility: CLINIC | Age: 78
End: 2023-10-17
Payer: MEDICARE

## 2023-10-17 NOTE — PROGRESS NOTES
Subjective     Patient ID: Nilsa Curiel is a 77 y.o. female.    Chief Complaint: Follow-up    HPI    Ms. Curiel is a 76 y/o female s/p L2-5 fusion 8/16/23 admitted for wound dehiscence s/p wound washout and flap closure 10/4.     Per op note, no fascia defect. Cultures obtained +E.coli and MRSA. She remains afebrile, stable,no leukocytosis. She was discharged on linezolid and ciprofloxacin x 14 days. Repeat EKG WNL.     She is seen today with her daughter. Doing well. Working with PT. Sutures remain in place. No drainage, swelling, or erythema. No fever chills or night sweats     Review of Systems   Constitutional:  Negative for activity change, appetite change, chills, diaphoresis, fatigue and fever.   Respiratory:  Negative for cough and shortness of breath.    Gastrointestinal:  Positive for nausea. Negative for abdominal pain, diarrhea and vomiting.   Genitourinary:  Negative for dysuria.   Musculoskeletal:  Negative for arthralgias, back pain and joint swelling.   Integumentary:  Positive for wound. Negative for rash.   Neurological:  Negative for dizziness and headaches.          Objective     Physical Exam  Vitals and nursing note reviewed.   Constitutional:       General: She is not in acute distress.     Appearance: She is well-developed. She is not diaphoretic.   HENT:      Head: Normocephalic and atraumatic.   Eyes:      Pupils: Pupils are equal, round, and reactive to light.   Cardiovascular:      Rate and Rhythm: Normal rate and regular rhythm.      Heart sounds: Normal heart sounds. No murmur heard.     No friction rub. No gallop.   Pulmonary:      Effort: Pulmonary effort is normal. No respiratory distress.      Breath sounds: Normal breath sounds. No wheezing or rales.   Chest:      Chest wall: No tenderness.   Abdominal:      General: Bowel sounds are normal. There is no distension.      Palpations: Abdomen is soft. There is no mass.      Tenderness: There is no abdominal tenderness. There is no  guarding or rebound.      Hernia: No hernia is present.   Musculoskeletal:         General: No tenderness or deformity. Normal range of motion.      Cervical back: Normal range of motion and neck supple.   Skin:     General: Skin is warm and dry.      Coloration: Skin is not pale.      Findings: No erythema.      Comments: Wound with sutures in place  Healing well. Some small dehiscence, no drainage.    Neurological:      Mental Status: She is alert and oriented to person, place, and time.      Cranial Nerves: No cranial nerve deficit.      Coordination: Coordination normal.   Psychiatric:         Behavior: Behavior normal.         Thought Content: Thought content normal.            Assessment and Plan     1. Nausea  -     ondansetron (ZOFRAN) 4 MG tablet; Take 1 tablet (4 mg total) by mouth every 12 (twelve) hours as needed for Nausea.  Dispense: 30 tablet; Refill: 1    2. Dehiscence of operative wound, subsequent encounter  -     ciprofloxacin HCl (CIPRO) 500 MG tablet; Take 1 tablet (500 mg total) by mouth 2 (two) times daily. for 7 days  Dispense: 14 tablet; Refill: 0  -     linezolid (ZYVOX) 600 mg Tab; Take 1 tablet (600 mg total) by mouth 2 (two) times a day. for 7 days  Dispense: 14 tablet; Refill: 0        Continue ciprofloxacin and linezolid, extend for one week until plastic surgery follow up  Ondansetron given for nausea  Continue local wound care  RTC as needed   >35 minutes of total time spent on the encounter, which includes face to face time and non-face to face time preparing to see the patient (eg, review of tests), Obtaining and/or reviewing separately obtained history, Documenting clinical information in the electronic or other health record, Independently interpreting results (not separately reported) and communicating results to the patient/family/caregiver, or Care coordination (not separately reported).          No follow-ups on file.

## 2023-10-17 NOTE — PROGRESS NOTES
"    /60 (BP Location: Right arm, Patient Position: Sitting, BP Method: Large (Manual))   Pulse 81   Temp 97.8 °F (36.6 °C) (Oral)   Ht 5' 1" (1.549 m)   Wt 93.1 kg (205 lb 4 oz)   SpO2 97%   BMI 38.78 kg/m²       ===========    Chief Complaint: No chief complaint on file.          HPI    Nilsa Curiel is a 77 y.o. female     here for    Hospital discharge follow-up     Discharge summary as follows       Patient Name: Nilsa Curiel  MRN: [id number]  Admission Date: 10/4/2023  Hospital Length of Stay: 3 days  Discharge Date and Time:  10/07/2023 12:40 PM  Attending Physician: Jesus Alberto Sharp MD   Discharging Provider: AGUSTIN Rivas MD  Primary Care Provider: Gaurav Allison MD     HPI:   Nilsa Curiel is a 77 y.o. female with a hx of L2-5 PLDF/TLIF who had persistent wound dehiscence. The patient has failed conservative management and is here for surgery today.        Procedure(s) (LRB):  IRRIGATION AND DEBRIDEMENT **CO CASE DR. JULISSA RODRIGUEZ** LUMBAR (N/A)  CLOSURE, WOUND      Hospital Course:  On 10/4/2023, the patient arrived to the [facility] for proper pre-operative management.  Upon completion of pre-operative preparation, the patient was taken back to the operative theatre. I&D of the L spine with plastic surgery closure was performed without complication and the patient was transported to the post anesthesia care unit in stable condition.  After appropriate recovery from the anaesthetic agents used during the surgery, the patient was then transported to the hospital inpatient floor.  The interim of the hospital stay from arrival on the floor up to discharge has been uncomplicated. The patient has tolerated regular diet.  The patient's pain has been controlled using a multimodal approach. Currently, the patient's pain is well controlled on an oral regimen.  The patient has been voiding without difficulty.  The patient began participation in physical therapy after surgery and has progressed " throughout the entire hospital stay.  Currently, the patient's progress is sufficient to allow the them to be discharged to home safely.  The patient agrees with this assessment and desires a discharge today.           Goals of Care Treatment Preferences:  Code Status: Full Code     Living Will: Yes                    Consults (From admission, onward)         Status Ordering Provider       Pharmacy to dose Vancomycin consult  Once        Provider:  (Not yet assigned)   See [facility] for full Linked Orders Report.    Acknowledged TONIE LADD       IP consult case management/social work  Once        Provider:  (Not yet assigned)    Acknowledged PEDRO PABLO HASSAN       Inpatient consult to Infectious Diseases  Once        Provider:  (Not yet assigned)    Completed PEDRO PABLO HASSAN             Significant Diagnostic Studies:              Pending Diagnostic Studies:      Procedure Component Value Units Date/Time     VANCOMYCIN, TROUGH [[id number]]       Order Status: Sent Lab Status: No result       Specimen: Blood                  Final Active Diagnoses:     Diagnosis Date Noted POA    PRINCIPAL PROBLEM:  Surgical wound dehiscence [T81.31XA] 10/05/2023 Yes    Acute on chronic anemia [D64.9] 10/05/2023 Yes    Class 2 severe obesity due to excess calories with serious comorbidity and body mass index (BMI) of 38.0 to 38.9 in adult [E66.01, Z68.38] 10/05/2023 Not Applicable    Chronic heart failure with preserved ejection fraction [I50.32] 08/18/2022 Yes    Essential hypertension [I10] 04/06/2022 Yes    MONE (obstructive sleep apnea) [G47.33] 07/25/2018 Yes       Problems Resolved During this Admission:      Discharged Condition: good     Disposition: Home-Health Care c     Follow Up:         Upcoming Appointments      Visit Type Date Time Department     POST-OP 10/19/2023  8:00 AM [facility]           Patient Instructions:   No discharge procedures on file.  Medications:  Reconciled Home Medications:        Medication List       START taking these medications    ciprofloxacin HCl 500 MG tablet  Commonly known as: CIPRO  Take 1 tablet (500 mg total) by mouth 2 (two) times daily. for 14 days      gabapentin 100 MG capsule  Commonly known as: NEURONTIN  Take 1 capsule (100 mg total) by mouth 3 (three) times daily.      linezolid 600 mg Tab  Commonly known as: ZYVOX  Take 1 tablet (600 mg total) by mouth 2 (two) times a day. for 14 days          CHANGE how you take these medications    oxyCODONE 5 MG immediate release tablet  Commonly known as: ROXICODONE  Take 1 tablet (5 mg total) by mouth every 6 (six) hours as needed for Pain (for breakthrough pain).  What changed: when to take this          CONTINUE taking these medications    acetaminophen 500 MG tablet  Commonly known as: TYLENOL  Take 1 tablet (500 mg total) by mouth 3 (three) times daily.      allopurinoL 100 MG tablet  Commonly known as: ZYLOPRIM  Take 1 tablet (100 mg total) by mouth once daily.      amLODIPine 10 MG tablet  Commonly known as: NORVASC  Take 1 tablet (10 mg total) by mouth once daily.      aspirin 81 MG EC tablet  Commonly known as: ECOTRIN  Take 1 tablet (81 mg total) by mouth once daily.      betamethasone dipropionate 0.05 % ointment  Commonly known as: DIPROLENE  Apply topically 2 (two) times daily.      biotin 300 mcg Tab  Take 1 tablet by mouth once daily.      carvediloL 3.125 MG tablet  Commonly known as: COREG  Take 1 tablet (3.125 mg total) by mouth 2 (two) times daily with meals.      celecoxib 100 MG capsule  Commonly known as: CeleBREX  Take 1 capsule (100 mg total) by mouth 2 (two) times daily.      coenzyme Q10 100 mg Tab  Take 1 tablet by mouth once daily.      ketoconazole 2 % cream  Commonly known as: NIZORAL  Apply to affected areas of body BID prn irritation.      methocarbamoL 500 MG Tab  Commonly known as: ROBAXIN  Take 2 tablets (1,000 mg total) by mouth 3 (three) times daily.      olmesartan 40 MG tablet  Commonly  known as: BENICAR  TAKE 1 TABLET ONE TIME DAILY      omega-3 fatty acids 300 mg Cap  Take by mouth.      potassium chloride SA 20 MEQ tablet  Commonly known as: K-DUR,KLOR-CON  Take 1 tablet (20 mEq total) by mouth 2 (two) times daily.      rosuvastatin 20 MG tablet  Commonly known as: CRESTOR  Take 1 tablet (20 mg total) by mouth once daily.      TURMERIC ORAL  Take by mouth.      VITAMIN B-2 ORAL  Take 1 capsule by mouth once daily.      WOMEN'S 50+ DAILY FORMULA ORAL  Take by mouth.          ASK your doctor about these medications    mometasone 0.1 % ointment  Commonly known as: ELOCON  Apply twice daily to hands prn eczema.                Feeling back to herself overall.  Patient queried and denies any further complaints      Patient Active Problem List   Diagnosis    Mixed hyperlipidemia    GERD (gastroesophageal reflux disease)    Atopic dermatitis    Hyperparathyroidism    Decreased strength    Impaired mobility    Right-sided carotid artery disease    Neurogenic claudication due to lumbar spinal stenosis    Spondylolisthesis of lumbosacral region    MONE (obstructive sleep apnea)    Primary osteoarthritis of both shoulders    Chronic right-sided low back pain without sciatica    Headache disorder    Chronic kidney disease, stage III (moderate)    Cervical stenosis of spine    Posture imbalance    Decreased strength of trunk and back    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    Essential hypertension    Right hip pain    Ankle swelling    Intertrigo    Severe obesity (BMI >= 40)    Muscle spasms of both lower extremities    Hyperuricemia    Chronic heart failure with preserved ejection fraction    Nonrheumatic aortic valve stenosis    Hair loss    Cervical spine arthritis with nerve pain    Chronic right hip pain    Chronic neck pain    Lumbar spondylosis    Fatigue    Atherosclerosis of aorta    Acute blood loss anemia    Surgical wound dehiscence    Acute on chronic anemia     Class 2 severe obesity due to excess calories with serious comorbidity and body mass index (BMI) of 38.0 to 38.9 in adult       SURGICAL AND MEDICAL HISTORY: updated and reviewed.  Past Surgical History:   Procedure Laterality Date     SECTION      x3    CLOSURE OF WOUND  10/4/2023    Procedure: CLOSURE, WOUND;  Surgeon: Benigno Goodman DO;  [facility];  Service: Plastics;;    COLONOSCOPY N/A 10/04/2021    Procedure: COLONOSCOPY;  Surgeon: Taran Galvez MD;  Location: [facility] ENDO (4TH FLR);  Service: Endoscopy;  Laterality: N/A;    COLONOSCOPY N/A 2022    Procedure: COLONOSCOPY--positive fit kit;  Surgeon: Tani Lara MD;  Location: [facility] (4TH FLR);  Service: Endoscopy;  Laterality: N/A;    EPIDURAL STEROID INJECTION N/A 2023    Procedure: INJECTION, STEROID, EPIDURAL, L5-S1;  Surgeon: Majo Meyers MD;  Location: [facility] PAIN MGT;  Service: Pain Management;  Laterality: N/A;    ESOPHAGOGASTRODUODENOSCOPY N/A 10/04/2021    Procedure: EGD (ESOPHAGOGASTRODUODENOSCOPY);  Surgeon: Taran Galvez MD;  Location: [facility] (4TH FLR);  Service: Endoscopy;  Laterality: N/A;  covid test 10/1-[facility]    10/1-LVM about COVID test-GT    ESOPHAGOGASTRODUODENOSCOPY N/A 2022    Procedure: EGD (ESOPHAGOGASTRODUODENOSCOPY);  Surgeon: Tani Lara MD;  Location: [facility] (4TH FLR);  Service: Endoscopy;  Laterality: N/A;  12/15 fully vaccinated; instructions to portal-st  -covid [facility]-tb    FUSION, SPINE, LUMBAR, TLIF, POSTERIOR APPROACH, USING PEDICLE SCREW N/A 2023    Procedure: FUSION, SPINE, LUMBAR, TLIF, POSTERIOR APPROACH, USING PEDICLE SCREW L2-5 PLDF/TLIF GLOBUS ROBOT SNS:SSEP/EMG cell saver;  Surgeon: Jesus Alberto Sharp MD;  [facility];  Service: Neurosurgery;  Laterality: N/A;    IRRIGATION AND DEBRIDEMENT N/A 10/4/2023    Procedure: IRRIGATION AND DEBRIDEMENT **CO CASE DR. JULISSA GOODMAN** LUMBAR;  Surgeon: Jesus Alberto Sharp MD;  Location: [facility]  OR 2ND FLR;  Service: Orthopedics;  Laterality: N/A;    TRANSFORAMINAL EPIDURAL INJECTION OF STEROID N/A 03/16/2023    Procedure: b/l L4-5 TFESI;  Surgeon: Gato Delatorre DO;  Location: [facility];  Service: Pain Management;  Laterality: N/A;    TUBAL LIGATION       ALLERGIES updated and reviewed.  Review of patient's allergies indicates:  No Known Allergies    CURRENT OUTPATIENT MEDICATIONS updated and reviewed    Current Outpatient Medications:     acetaminophen (TYLENOL) 500 MG tablet, Take 1 tablet (500 mg total) by mouth 3 (three) times daily., Disp: 90 tablet, Rfl: 0    allopurinoL (ZYLOPRIM) 100 MG tablet, Take 1 tablet (100 mg total) by mouth once daily., Disp: 90 tablet, Rfl: 11    amLODIPine (NORVASC) 10 MG tablet, Take 1 tablet (10 mg total) by mouth once daily., Disp: 90 tablet, Rfl: 3    biotin 300 mcg Tab, Take 1 tablet by mouth once daily., Disp: , Rfl:     linezolid (ZYVOX) 600 mg Tab, Take 1 tablet (600 mg total) by mouth 2 (two) times a day. for 14 days, Disp: 28 tablet, Rfl: 0    methocarbamoL (ROBAXIN) 500 MG Tab, Take 2 tablets (1,000 mg total) by mouth 3 (three) times daily., Disp: 90 tablet, Rfl: 0    MULTIVIT-MIN/FA/CA CARB/VIT K (WOMEN'S 50+ DAILY FORMULA ORAL), Take by mouth., Disp: , Rfl:     olmesartan (BENICAR) 40 MG tablet, TAKE 1 TABLET ONE TIME DAILY, Disp: 90 tablet, Rfl: 3    omega-3 fatty acids 300 mg Cap, Take by mouth., Disp: , Rfl:     oxyCODONE (ROXICODONE) 5 MG immediate release tablet, Take 1 tablet (5 mg total) by mouth every 6 (six) hours as needed for Pain (for breakthrough pain)., Disp: 21 tablet, Rfl: 0    potassium chloride SA (K-DUR,KLOR-CON) 20 MEQ tablet, Take 1 tablet (20 mEq total) by mouth 2 (two) times daily., Disp: 180 tablet, Rfl: 3    riboflavin, vitamin B2, (VITAMIN B-2 ORAL), Take 1 capsule by mouth once daily., Disp: , Rfl:     rosuvastatin (CRESTOR) 20 MG tablet, Take 1 tablet (20 mg total) by mouth once daily., Disp: 90 tablet, Rfl: 3    TURMERIC  ORAL, Take by mouth., Disp: , Rfl:     ubidecarenone (COENZYME Q10) 100 mg Tab, Take 1 tablet by mouth once daily. , Disp: , Rfl:     aspirin (ECOTRIN) 81 MG EC tablet, Take 1 tablet (81 mg total) by mouth once daily., Disp: 90 tablet, Rfl: 3    betamethasone dipropionate (DIPROLENE) 0.05 % ointment, Apply topically 2 (two) times daily. (Patient not taking: Reported on 10/9/2023), Disp: 45 g, Rfl: 11    carvediloL (COREG) 3.125 MG tablet, Take 1 tablet (3.125 mg total) by mouth 2 (two) times daily with meals., Disp: 180 tablet, Rfl: 3    celecoxib (CELEBREX) 100 MG capsule, Take 1 capsule (100 mg total) by mouth 2 (two) times daily., Disp: 28 capsule, Rfl: 0    ciprofloxacin HCl (CIPRO) 500 MG tablet, Take 1 tablet (500 mg total) by mouth 2 (two) times daily. for 14 days, Disp: 28 tablet, Rfl: 0    gabapentin (NEURONTIN) 100 MG capsule, Take 1 capsule (100 mg total) by mouth 3 (three) times daily. (Patient not taking: Reported on 10/9/2023), Disp: 45 capsule, Rfl: 0    ketoconazole (NIZORAL) 2 % cream, Apply to affected areas of body BID prn irritation. (Patient not taking: Reported on 10/9/2023), Disp: 15 g, Rfl: 1    mometasone (ELOCON) 0.1 % ointment, Apply twice daily to hands prn eczema. (Patient not taking: Reported on 8/21/2023), Disp: 45 g, Rfl: 3    Review of Systems   Constitutional:  Negative for activity change, appetite change, chills, diaphoresis, fatigue, fever and unexpected weight change.   HENT:  Negative for congestion, ear discharge, ear pain, facial swelling, hearing loss, nosebleeds, postnasal drip, rhinorrhea, sinus pressure, sneezing, sore throat, tinnitus, trouble swallowing and voice change.    Eyes:  Negative for photophobia, pain, discharge, redness, itching and visual disturbance.   Respiratory:  Negative for cough, chest tightness, shortness of breath and wheezing.    Cardiovascular:  Negative for chest pain, palpitations and leg swelling.   Gastrointestinal:  Negative for abdominal  "distention, abdominal pain, anal bleeding, blood in stool, constipation, diarrhea, nausea, rectal pain and vomiting.   Endocrine: Negative for cold intolerance, heat intolerance, polydipsia, polyphagia and polyuria.   Genitourinary:  Negative for difficulty urinating, dysuria and flank pain.   Musculoskeletal:  Negative for arthralgias, back pain, joint swelling, myalgias and neck pain.   Skin:  Negative for rash.   Neurological:  Negative for dizziness, tremors, seizures, syncope, speech difficulty, weakness, light-headedness, numbness and headaches.   Psychiatric/Behavioral:  Negative for behavioral problems, confusion, decreased concentration, dysphoric mood, sleep disturbance and suicidal ideas. The patient is not nervous/anxious and is not hyperactive.        /60 (BP Location: Right arm, Patient Position: Sitting, BP Method: Large (Manual))   Pulse 81   Temp 97.8 °F (36.6 °C) (Oral)   Ht 5' 1" (1.549 m)   Wt 93.1 kg (205 lb 4 oz)   SpO2 97%   BMI 38.78 kg/m²   Physical Exam  Vitals and nursing note reviewed.   Constitutional:       General: She is not in acute distress.     Appearance: Normal appearance. She is well-developed. She is not ill-appearing or toxic-appearing.   HENT:      Head: Normocephalic and atraumatic.      Right Ear: Tympanic membrane, ear canal and external ear normal.      Left Ear: Tympanic membrane, ear canal and external ear normal.      Nose: Nose normal.      Mouth/Throat:      Lips: Pink.      Mouth: Mucous membranes are moist.      Pharynx: No oropharyngeal exudate or posterior oropharyngeal erythema.   Eyes:      General: No scleral icterus.        Right eye: No discharge.         Left eye: No discharge.      Extraocular Movements: Extraocular movements intact.      Conjunctiva/sclera: Conjunctivae normal.   Cardiovascular:      Rate and Rhythm: Normal rate and regular rhythm.      Pulses: Normal pulses.      Heart sounds: Normal heart sounds. No murmur heard.  Pulmonary: "      Effort: Pulmonary effort is normal. No respiratory distress.      Breath sounds: Normal breath sounds. No wheezing or rales.   Abdominal:      General: Bowel sounds are normal. There is no distension.      Palpations: Abdomen is soft. There is no mass.      Tenderness: There is no abdominal tenderness. There is no right CVA tenderness, left CVA tenderness, guarding or rebound.      Hernia: No hernia is present.   Musculoskeletal:      Cervical back: Normal range of motion and neck supple. No rigidity or tenderness.   Lymphadenopathy:      Cervical: No cervical adenopathy.   Skin:     General: Skin is warm and dry.   Neurological:      General: No focal deficit present.      Mental Status: She is alert. Mental status is at baseline.   Psychiatric:         Mood and Affect: Mood normal.         Behavior: Behavior normal. Behavior is cooperative.         ASSESSMENT/PLAN  Diagnoses and all orders for this visit:    Hospital discharge follow-up    JULIO (acute kidney injury)  -     Basic Metabolic Panel; Future    Wound dehiscence    Acute blood loss anemia  -     CBC Auto Differential; Future    Reviewed discharge summary with patient in detail.  Went over last few labs with patient in detail.  Went over meds in detail.    Check BNP for JULIO.  Check CBC for acute blood loss anemia.  Follow-up PRN or at least in 3 months    Most recent some lab results reviewed, if available.  Any new prescription medications gone over in detail including reason for taking the medication, the general mechanism of action, most common possible side effects and possible costs, etcetera.    Chronic conditions updated. Other than changes or additions as above, cont current medications and maintain follow-up with specialists if indicated.     Gaurav Allison MD  A dictation device was used to produce this document. Use of such devices sometimes results in grammatical errors or replacement of words that sound similarly.

## 2023-10-18 ENCOUNTER — LAB VISIT (OUTPATIENT)
Dept: LAB | Facility: HOSPITAL | Age: 78
End: 2023-10-18
Attending: FAMILY MEDICINE
Payer: MEDICARE

## 2023-10-18 ENCOUNTER — OFFICE VISIT (OUTPATIENT)
Dept: INFECTIOUS DISEASES | Facility: CLINIC | Age: 78
End: 2023-10-18
Payer: MEDICARE

## 2023-10-18 ENCOUNTER — OFFICE VISIT (OUTPATIENT)
Dept: ORTHOPEDICS | Facility: CLINIC | Age: 78
End: 2023-10-18
Payer: MEDICARE

## 2023-10-18 VITALS
WEIGHT: 192 LBS | SYSTOLIC BLOOD PRESSURE: 152 MMHG | HEART RATE: 89 BPM | DIASTOLIC BLOOD PRESSURE: 68 MMHG | BODY MASS INDEX: 36.25 KG/M2 | TEMPERATURE: 98 F | HEIGHT: 61 IN

## 2023-10-18 VITALS — WEIGHT: 192 LBS | BODY MASS INDEX: 36.25 KG/M2 | HEIGHT: 61 IN

## 2023-10-18 DIAGNOSIS — D62 ACUTE BLOOD LOSS ANEMIA: ICD-10-CM

## 2023-10-18 DIAGNOSIS — T81.31XD DEHISCENCE OF OPERATIVE WOUND, SUBSEQUENT ENCOUNTER: ICD-10-CM

## 2023-10-18 DIAGNOSIS — Z98.890 S/P SPINAL SURGERY: Primary | ICD-10-CM

## 2023-10-18 DIAGNOSIS — M51.36 DDD (DEGENERATIVE DISC DISEASE), LUMBAR: Primary | ICD-10-CM

## 2023-10-18 DIAGNOSIS — N17.9 AKI (ACUTE KIDNEY INJURY): ICD-10-CM

## 2023-10-18 DIAGNOSIS — R11.0 NAUSEA: Primary | ICD-10-CM

## 2023-10-18 LAB
ANION GAP SERPL CALC-SCNC: 10 MMOL/L (ref 8–16)
BASOPHILS # BLD AUTO: 0.04 K/UL (ref 0–0.2)
BASOPHILS NFR BLD: 0.7 % (ref 0–1.9)
BUN SERPL-MCNC: 16 MG/DL (ref 8–23)
CALCIUM SERPL-MCNC: 10.6 MG/DL (ref 8.7–10.5)
CHLORIDE SERPL-SCNC: 108 MMOL/L (ref 95–110)
CO2 SERPL-SCNC: 23 MMOL/L (ref 23–29)
CREAT SERPL-MCNC: 1 MG/DL (ref 0.5–1.4)
DIFFERENTIAL METHOD: ABNORMAL
EOSINOPHIL # BLD AUTO: 0.2 K/UL (ref 0–0.5)
EOSINOPHIL NFR BLD: 3.3 % (ref 0–8)
ERYTHROCYTE [DISTWIDTH] IN BLOOD BY AUTOMATED COUNT: 14.4 % (ref 11.5–14.5)
EST. GFR  (NO RACE VARIABLE): 58.8 ML/MIN/1.73 M^2
GLUCOSE SERPL-MCNC: 90 MG/DL (ref 70–110)
HCT VFR BLD AUTO: 26.8 % (ref 37–48.5)
HGB BLD-MCNC: 8.2 G/DL (ref 12–16)
IMM GRANULOCYTES # BLD AUTO: 0.01 K/UL (ref 0–0.04)
IMM GRANULOCYTES NFR BLD AUTO: 0.2 % (ref 0–0.5)
LYMPHOCYTES # BLD AUTO: 1.6 K/UL (ref 1–4.8)
LYMPHOCYTES NFR BLD: 28.4 % (ref 18–48)
MCH RBC QN AUTO: 24 PG (ref 27–31)
MCHC RBC AUTO-ENTMCNC: 30.6 G/DL (ref 32–36)
MCV RBC AUTO: 79 FL (ref 82–98)
MONOCYTES # BLD AUTO: 0.4 K/UL (ref 0.3–1)
MONOCYTES NFR BLD: 6.7 % (ref 4–15)
NEUTROPHILS # BLD AUTO: 3.4 K/UL (ref 1.8–7.7)
NEUTROPHILS NFR BLD: 60.7 % (ref 38–73)
NRBC BLD-RTO: 0 /100 WBC
PLATELET # BLD AUTO: 253 K/UL (ref 150–450)
PMV BLD AUTO: 10 FL (ref 9.2–12.9)
POTASSIUM SERPL-SCNC: 3.7 MMOL/L (ref 3.5–5.1)
RBC # BLD AUTO: 3.41 M/UL (ref 4–5.4)
SODIUM SERPL-SCNC: 141 MMOL/L (ref 136–145)
WBC # BLD AUTO: 5.52 K/UL (ref 3.9–12.7)

## 2023-10-18 PROCEDURE — 3077F SYST BP >= 140 MM HG: CPT | Mod: CPTII,S$GLB,, | Performed by: PHYSICIAN ASSISTANT

## 2023-10-18 PROCEDURE — 36415 COLL VENOUS BLD VENIPUNCTURE: CPT | Performed by: FAMILY MEDICINE

## 2023-10-18 PROCEDURE — 1125F AMNT PAIN NOTED PAIN PRSNT: CPT | Mod: CPTII,S$GLB,, | Performed by: PHYSICIAN ASSISTANT

## 2023-10-18 PROCEDURE — 3078F DIAST BP <80 MM HG: CPT | Mod: CPTII,S$GLB,, | Performed by: PHYSICIAN ASSISTANT

## 2023-10-18 PROCEDURE — 1111F PR DISCHARGE MEDS RECONCILED W/ CURRENT OUTPATIENT MED LIST: ICD-10-PCS | Mod: CPTII,S$GLB,, | Performed by: PHYSICIAN ASSISTANT

## 2023-10-18 PROCEDURE — 1157F PR ADVANCE CARE PLAN OR EQUIV PRESENT IN MEDICAL RECORD: ICD-10-PCS | Mod: CPTII,S$GLB,, | Performed by: PHYSICIAN ASSISTANT

## 2023-10-18 PROCEDURE — 1159F MED LIST DOCD IN RCRD: CPT | Mod: CPTII,S$GLB,, | Performed by: PHYSICIAN ASSISTANT

## 2023-10-18 PROCEDURE — 1125F PR PAIN SEVERITY QUANTIFIED, PAIN PRESENT: ICD-10-PCS | Mod: CPTII,S$GLB,, | Performed by: PHYSICIAN ASSISTANT

## 2023-10-18 PROCEDURE — 3044F PR MOST RECENT HEMOGLOBIN A1C LEVEL <7.0%: ICD-10-PCS | Mod: CPTII,S$GLB,, | Performed by: PHYSICIAN ASSISTANT

## 2023-10-18 PROCEDURE — 99214 PR OFFICE/OUTPT VISIT, EST, LEVL IV, 30-39 MIN: ICD-10-PCS | Mod: S$GLB,,, | Performed by: PHYSICIAN ASSISTANT

## 2023-10-18 PROCEDURE — 99214 OFFICE O/P EST MOD 30 MIN: CPT | Mod: S$GLB,,, | Performed by: PHYSICIAN ASSISTANT

## 2023-10-18 PROCEDURE — 3288F FALL RISK ASSESSMENT DOCD: CPT | Mod: CPTII,S$GLB,, | Performed by: PHYSICIAN ASSISTANT

## 2023-10-18 PROCEDURE — 3077F PR MOST RECENT SYSTOLIC BLOOD PRESSURE >= 140 MM HG: ICD-10-PCS | Mod: CPTII,S$GLB,, | Performed by: PHYSICIAN ASSISTANT

## 2023-10-18 PROCEDURE — 3044F HG A1C LEVEL LT 7.0%: CPT | Mod: CPTII,S$GLB,, | Performed by: PHYSICIAN ASSISTANT

## 2023-10-18 PROCEDURE — 99024 PR POST-OP FOLLOW-UP VISIT: ICD-10-PCS | Mod: S$GLB,,, | Performed by: PHYSICIAN ASSISTANT

## 2023-10-18 PROCEDURE — 80048 BASIC METABOLIC PNL TOTAL CA: CPT | Performed by: FAMILY MEDICINE

## 2023-10-18 PROCEDURE — 1101F PT FALLS ASSESS-DOCD LE1/YR: CPT | Mod: CPTII,S$GLB,, | Performed by: PHYSICIAN ASSISTANT

## 2023-10-18 PROCEDURE — 99999 PR PBB SHADOW E&M-EST. PATIENT-LVL IV: ICD-10-PCS | Mod: PBBFAC,,, | Performed by: PHYSICIAN ASSISTANT

## 2023-10-18 PROCEDURE — 1101F PR PT FALLS ASSESS DOC 0-1 FALLS W/OUT INJ PAST YR: ICD-10-PCS | Mod: CPTII,S$GLB,, | Performed by: PHYSICIAN ASSISTANT

## 2023-10-18 PROCEDURE — 99999 PR PBB SHADOW E&M-EST. PATIENT-LVL IV: CPT | Mod: PBBFAC,,, | Performed by: PHYSICIAN ASSISTANT

## 2023-10-18 PROCEDURE — 1157F ADVNC CARE PLAN IN RCRD: CPT | Mod: CPTII,S$GLB,, | Performed by: PHYSICIAN ASSISTANT

## 2023-10-18 PROCEDURE — 1111F DSCHRG MED/CURRENT MED MERGE: CPT | Mod: CPTII,S$GLB,, | Performed by: PHYSICIAN ASSISTANT

## 2023-10-18 PROCEDURE — 85025 COMPLETE CBC W/AUTO DIFF WBC: CPT | Performed by: FAMILY MEDICINE

## 2023-10-18 PROCEDURE — 3078F PR MOST RECENT DIASTOLIC BLOOD PRESSURE < 80 MM HG: ICD-10-PCS | Mod: CPTII,S$GLB,, | Performed by: PHYSICIAN ASSISTANT

## 2023-10-18 PROCEDURE — 3288F PR FALLS RISK ASSESSMENT DOCUMENTED: ICD-10-PCS | Mod: CPTII,S$GLB,, | Performed by: PHYSICIAN ASSISTANT

## 2023-10-18 PROCEDURE — 1159F PR MEDICATION LIST DOCUMENTED IN MEDICAL RECORD: ICD-10-PCS | Mod: CPTII,S$GLB,, | Performed by: PHYSICIAN ASSISTANT

## 2023-10-18 PROCEDURE — 99024 POSTOP FOLLOW-UP VISIT: CPT | Mod: S$GLB,,, | Performed by: PHYSICIAN ASSISTANT

## 2023-10-18 RX ORDER — OXYCODONE HYDROCHLORIDE 5 MG/1
5 TABLET ORAL EVERY 6 HOURS PRN
Qty: 21 TABLET | Refills: 0 | Status: SHIPPED | OUTPATIENT
Start: 2023-10-18

## 2023-10-18 RX ORDER — LINEZOLID 600 MG/1
600 TABLET, FILM COATED ORAL 2 TIMES DAILY
Qty: 14 TABLET | Refills: 0 | Status: SHIPPED | OUTPATIENT
Start: 2023-10-18 | End: 2023-10-25

## 2023-10-18 RX ORDER — CIPROFLOXACIN 500 MG/1
500 TABLET ORAL 2 TIMES DAILY
Qty: 14 TABLET | Refills: 0 | Status: SHIPPED | OUTPATIENT
Start: 2023-10-18 | End: 2023-10-25

## 2023-10-18 RX ORDER — ONDANSETRON 4 MG/1
4 TABLET, FILM COATED ORAL EVERY 12 HOURS PRN
Qty: 30 TABLET | Refills: 1 | Status: SHIPPED | OUTPATIENT
Start: 2023-10-18 | End: 2024-01-21

## 2023-10-18 NOTE — PROGRESS NOTES
Date: 10/18/2023    Supervising Physician: Jesus Alberto hSarp M.D.    Date of Surgery: 10/4/2023    Procedure: I&D wound    History: Nilsa Curiel is seen today for follow-up following the above listed procedure. Overall the patient is doing well but today notes her pain is significantly improved compared to before surgery.  She is seeing plastics and ID.   Pain is well controlled with current pain medication.  She is taking oxycodone, robaxin and gabapentin for pain.  she denies fever, chills, and sweats since the time of the surgery.       Exam: Incision is healing well, clean, dry and intact.  Sutures in placed.  There is no sign of infection. Neuro exam is stable. No signs of DVT.    Radiographs: no new imaging today.     Assessment/Plan: 2 weeks post op.    Doing well postoperatively.  reviewed.  Refill of oxycodone sent to the pharmacy.     I will plan to see the patient back for the next postop visit in 4 weeks.     Thank you for the opportunity to participate in this patient's care. Please give me a call if there are any concerns or questions.

## 2023-10-25 ENCOUNTER — TELEPHONE (OUTPATIENT)
Dept: INFECTIOUS DISEASES | Facility: CLINIC | Age: 78
End: 2023-10-25
Payer: MEDICARE

## 2023-10-25 NOTE — TELEPHONE ENCOUNTER
----- Message from Sebastien Das PA-C sent at 10/24/2023  9:57 AM CDT -----  Hii can you schedule this patient to [facility] for follow up next week   Thank you!

## 2023-10-26 ENCOUNTER — OFFICE VISIT (OUTPATIENT)
Dept: SURGERY | Facility: CLINIC | Age: 78
End: 2023-10-26
Payer: MEDICARE

## 2023-10-26 VITALS
HEIGHT: 61 IN | BODY MASS INDEX: 36.25 KG/M2 | DIASTOLIC BLOOD PRESSURE: 65 MMHG | WEIGHT: 192 LBS | HEART RATE: 76 BPM | SYSTOLIC BLOOD PRESSURE: 148 MMHG

## 2023-10-26 DIAGNOSIS — Z09 SURGERY FOLLOW-UP: Primary | ICD-10-CM

## 2023-10-26 PROCEDURE — 99024 POSTOP FOLLOW-UP VISIT: CPT | Mod: S$GLB,,, | Performed by: SURGERY

## 2023-10-26 PROCEDURE — 99024 PR POST-OP FOLLOW-UP VISIT: ICD-10-PCS | Mod: S$GLB,,, | Performed by: SURGERY

## 2023-10-26 PROCEDURE — 1157F PR ADVANCE CARE PLAN OR EQUIV PRESENT IN MEDICAL RECORD: ICD-10-PCS | Mod: CPTII,S$GLB,, | Performed by: SURGERY

## 2023-10-26 PROCEDURE — 99999 PR PBB SHADOW E&M-EST. PATIENT-LVL IV: CPT | Mod: PBBFAC,,, | Performed by: SURGERY

## 2023-10-26 PROCEDURE — 3078F PR MOST RECENT DIASTOLIC BLOOD PRESSURE < 80 MM HG: ICD-10-PCS | Mod: CPTII,S$GLB,, | Performed by: SURGERY

## 2023-10-26 PROCEDURE — 3044F PR MOST RECENT HEMOGLOBIN A1C LEVEL <7.0%: ICD-10-PCS | Mod: CPTII,S$GLB,, | Performed by: SURGERY

## 2023-10-26 PROCEDURE — 99999 PR PBB SHADOW E&M-EST. PATIENT-LVL IV: ICD-10-PCS | Mod: PBBFAC,,, | Performed by: SURGERY

## 2023-10-26 PROCEDURE — 1101F PR PT FALLS ASSESS DOC 0-1 FALLS W/OUT INJ PAST YR: ICD-10-PCS | Mod: CPTII,S$GLB,, | Performed by: SURGERY

## 2023-10-26 PROCEDURE — 3288F PR FALLS RISK ASSESSMENT DOCUMENTED: ICD-10-PCS | Mod: CPTII,S$GLB,, | Performed by: SURGERY

## 2023-10-26 PROCEDURE — 3288F FALL RISK ASSESSMENT DOCD: CPT | Mod: CPTII,S$GLB,, | Performed by: SURGERY

## 2023-10-26 PROCEDURE — 3077F PR MOST RECENT SYSTOLIC BLOOD PRESSURE >= 140 MM HG: ICD-10-PCS | Mod: CPTII,S$GLB,, | Performed by: SURGERY

## 2023-10-26 PROCEDURE — 1126F AMNT PAIN NOTED NONE PRSNT: CPT | Mod: CPTII,S$GLB,, | Performed by: SURGERY

## 2023-10-26 PROCEDURE — 1159F PR MEDICATION LIST DOCUMENTED IN MEDICAL RECORD: ICD-10-PCS | Mod: CPTII,S$GLB,, | Performed by: SURGERY

## 2023-10-26 PROCEDURE — 1159F MED LIST DOCD IN RCRD: CPT | Mod: CPTII,S$GLB,, | Performed by: SURGERY

## 2023-10-26 PROCEDURE — 3078F DIAST BP <80 MM HG: CPT | Mod: CPTII,S$GLB,, | Performed by: SURGERY

## 2023-10-26 PROCEDURE — 1101F PT FALLS ASSESS-DOCD LE1/YR: CPT | Mod: CPTII,S$GLB,, | Performed by: SURGERY

## 2023-10-26 PROCEDURE — 3077F SYST BP >= 140 MM HG: CPT | Mod: CPTII,S$GLB,, | Performed by: SURGERY

## 2023-10-26 PROCEDURE — 1157F ADVNC CARE PLAN IN RCRD: CPT | Mod: CPTII,S$GLB,, | Performed by: SURGERY

## 2023-10-26 PROCEDURE — 3044F HG A1C LEVEL LT 7.0%: CPT | Mod: CPTII,S$GLB,, | Performed by: SURGERY

## 2023-10-26 PROCEDURE — 1126F PR PAIN SEVERITY QUANTIFIED, NO PAIN PRESENT: ICD-10-PCS | Mod: CPTII,S$GLB,, | Performed by: SURGERY

## 2023-10-26 NOTE — PROGRESS NOTES
Nilsa Curiel [facility] for a follow up visit status post complex lumbar spine closure on 10/4/23. She is doing well today with no issues since her last visit. Here today for wound check and suture removal. She denies fever, chills, nausea, vomiting or other systemic signs of infection.       ROS - negative, other than stated above    PHYSICAL EXAMINATION  Lumbar incision CDI with nylon sutures in place    ASSESSMENT/PLAN  77 y.o. F s/p lumbar spine closure  - Doing well, no issues.   - Nylon sutures removed  - F/u as needed      All questions were answered. The patient was advised to contact the clinic with any questions or concerns prior to their next visit.       Kate Rehman PA-C  Plastic and Reconstructive Surgery

## 2023-10-31 ENCOUNTER — OFFICE VISIT (OUTPATIENT)
Dept: INFECTIOUS DISEASES | Facility: CLINIC | Age: 78
End: 2023-10-31
Payer: MEDICARE

## 2023-10-31 VITALS
TEMPERATURE: 98 F | SYSTOLIC BLOOD PRESSURE: 163 MMHG | HEIGHT: 61 IN | HEART RATE: 97 BPM | WEIGHT: 192.25 LBS | DIASTOLIC BLOOD PRESSURE: 72 MMHG | BODY MASS INDEX: 36.3 KG/M2

## 2023-10-31 DIAGNOSIS — T81.31XD DEHISCENCE OF OPERATIVE WOUND, SUBSEQUENT ENCOUNTER: Primary | ICD-10-CM

## 2023-10-31 LAB
FUNGUS SPEC CULT: NORMAL

## 2023-10-31 PROCEDURE — 3077F PR MOST RECENT SYSTOLIC BLOOD PRESSURE >= 140 MM HG: ICD-10-PCS | Mod: CPTII,S$GLB,, | Performed by: PHYSICIAN ASSISTANT

## 2023-10-31 PROCEDURE — 1111F PR DISCHARGE MEDS RECONCILED W/ CURRENT OUTPATIENT MED LIST: ICD-10-PCS | Mod: CPTII,S$GLB,, | Performed by: PHYSICIAN ASSISTANT

## 2023-10-31 PROCEDURE — 99214 OFFICE O/P EST MOD 30 MIN: CPT | Mod: S$GLB,,, | Performed by: PHYSICIAN ASSISTANT

## 2023-10-31 PROCEDURE — 3288F PR FALLS RISK ASSESSMENT DOCUMENTED: ICD-10-PCS | Mod: CPTII,S$GLB,, | Performed by: PHYSICIAN ASSISTANT

## 2023-10-31 PROCEDURE — 1157F ADVNC CARE PLAN IN RCRD: CPT | Mod: CPTII,S$GLB,, | Performed by: PHYSICIAN ASSISTANT

## 2023-10-31 PROCEDURE — 1159F PR MEDICATION LIST DOCUMENTED IN MEDICAL RECORD: ICD-10-PCS | Mod: CPTII,S$GLB,, | Performed by: PHYSICIAN ASSISTANT

## 2023-10-31 PROCEDURE — 99214 PR OFFICE/OUTPT VISIT, EST, LEVL IV, 30-39 MIN: ICD-10-PCS | Mod: S$GLB,,, | Performed by: PHYSICIAN ASSISTANT

## 2023-10-31 PROCEDURE — 3288F FALL RISK ASSESSMENT DOCD: CPT | Mod: CPTII,S$GLB,, | Performed by: PHYSICIAN ASSISTANT

## 2023-10-31 PROCEDURE — 1100F PR PT FALLS ASSESS DOC 2+ FALLS/FALL W/INJURY/YR: ICD-10-PCS | Mod: CPTII,S$GLB,, | Performed by: PHYSICIAN ASSISTANT

## 2023-10-31 PROCEDURE — 99999 PR PBB SHADOW E&M-EST. PATIENT-LVL IV: ICD-10-PCS | Mod: PBBFAC,,, | Performed by: PHYSICIAN ASSISTANT

## 2023-10-31 PROCEDURE — 1125F AMNT PAIN NOTED PAIN PRSNT: CPT | Mod: CPTII,S$GLB,, | Performed by: PHYSICIAN ASSISTANT

## 2023-10-31 PROCEDURE — 1100F PTFALLS ASSESS-DOCD GE2>/YR: CPT | Mod: CPTII,S$GLB,, | Performed by: PHYSICIAN ASSISTANT

## 2023-10-31 PROCEDURE — 3078F DIAST BP <80 MM HG: CPT | Mod: CPTII,S$GLB,, | Performed by: PHYSICIAN ASSISTANT

## 2023-10-31 PROCEDURE — 3044F PR MOST RECENT HEMOGLOBIN A1C LEVEL <7.0%: ICD-10-PCS | Mod: CPTII,S$GLB,, | Performed by: PHYSICIAN ASSISTANT

## 2023-10-31 PROCEDURE — 3077F SYST BP >= 140 MM HG: CPT | Mod: CPTII,S$GLB,, | Performed by: PHYSICIAN ASSISTANT

## 2023-10-31 PROCEDURE — 99999 PR PBB SHADOW E&M-EST. PATIENT-LVL IV: CPT | Mod: PBBFAC,,, | Performed by: PHYSICIAN ASSISTANT

## 2023-10-31 PROCEDURE — 1111F DSCHRG MED/CURRENT MED MERGE: CPT | Mod: CPTII,S$GLB,, | Performed by: PHYSICIAN ASSISTANT

## 2023-10-31 PROCEDURE — 3044F HG A1C LEVEL LT 7.0%: CPT | Mod: CPTII,S$GLB,, | Performed by: PHYSICIAN ASSISTANT

## 2023-10-31 PROCEDURE — 1159F MED LIST DOCD IN RCRD: CPT | Mod: CPTII,S$GLB,, | Performed by: PHYSICIAN ASSISTANT

## 2023-10-31 PROCEDURE — 1125F PR PAIN SEVERITY QUANTIFIED, PAIN PRESENT: ICD-10-PCS | Mod: CPTII,S$GLB,, | Performed by: PHYSICIAN ASSISTANT

## 2023-10-31 PROCEDURE — 3078F PR MOST RECENT DIASTOLIC BLOOD PRESSURE < 80 MM HG: ICD-10-PCS | Mod: CPTII,S$GLB,, | Performed by: PHYSICIAN ASSISTANT

## 2023-10-31 PROCEDURE — 1157F PR ADVANCE CARE PLAN OR EQUIV PRESENT IN MEDICAL RECORD: ICD-10-PCS | Mod: CPTII,S$GLB,, | Performed by: PHYSICIAN ASSISTANT

## 2023-10-31 NOTE — PROGRESS NOTES
Subjective     Patient ID: Nilsa Curiel is a 77 y.o. female.    Chief Complaint: Follow-up    HPI    Ms. Curiel is a 76 y/o female s/p L2-5 fusion 8/16/23 admitted for wound dehiscence s/p wound washout and flap closure 10/4.     Per op note, no fascia defect. Cultures obtained +E.coli and MRSA. She remains afebrile, stable,no leukocytosis. She was discharged on linezolid and ciprofloxacin x 14 days. Repeat EKG WNL.     She is seen today with her daughter. Doing well. Working with PT. Saw plastic surgery one week ago. Sutures removed.No drainage, swelling, or erythema. No fever chills or night sweats. Wounds healing well. She completed linezolid and ciprofloxacin on 10/25    Review of Systems   Constitutional:  Negative for activity change, appetite change, chills, diaphoresis, fatigue and fever.   Respiratory:  Negative for cough and shortness of breath.    Gastrointestinal:  Negative for abdominal pain, diarrhea, nausea and vomiting.   Genitourinary:  Negative for dysuria.   Musculoskeletal:  Negative for arthralgias, back pain and joint swelling.   Integumentary:  Positive for wound (healed well). Negative for rash.   Neurological:  Negative for dizziness and headaches.          Objective     Physical Exam  Vitals and nursing note reviewed.   Constitutional:       General: She is not in acute distress.     Appearance: She is well-developed. She is not diaphoretic.   HENT:      Head: Normocephalic and atraumatic.   Eyes:      Pupils: Pupils are equal, round, and reactive to light.   Cardiovascular:      Rate and Rhythm: Normal rate and regular rhythm.      Heart sounds: Normal heart sounds. No murmur heard.     No friction rub. No gallop.   Pulmonary:      Effort: Pulmonary effort is normal. No respiratory distress.      Breath sounds: Normal breath sounds. No wheezing or rales.   Chest:      Chest wall: No tenderness.   Abdominal:      General: Bowel sounds are normal. There is no distension.      Palpations:  Abdomen is soft. There is no mass.      Tenderness: There is no abdominal tenderness. There is no guarding or rebound.      Hernia: No hernia is present.   Musculoskeletal:         General: No tenderness or deformity. Normal range of motion.      Cervical back: Normal range of motion and neck supple.   Skin:     General: Skin is warm and dry.      Coloration: Skin is not pale.      Findings: No erythema.      Comments: Wound with sutures in place  Healing well. Some small dehiscence, no drainage.    Neurological:      Mental Status: She is alert and oriented to person, place, and time.      Cranial Nerves: No cranial nerve deficit.      Coordination: Coordination normal.   Psychiatric:         Behavior: Behavior normal.         Thought Content: Thought content normal.            Assessment and Plan     1. Dehiscence of operative wound, subsequent encounter          Completed ciprofloxacin and linezolid. No further abx at this time.   Continue local wound care  RTC as needed   F/u with orthospine as scheduled     >35 minutes of total time spent on the encounter, which includes face to face time and non-face to face time preparing to see the patient (eg, review of tests), Obtaining and/or reviewing separately obtained history, Documenting clinical information in the electronic or other health record, Independently interpreting results (not separately reported) and communicating results to the patient/family/caregiver, or Care coordination (not separately reported).          No follow-ups on file.

## 2023-11-01 ENCOUNTER — PATIENT MESSAGE (OUTPATIENT)
Dept: ORTHOPEDICS | Facility: CLINIC | Age: 78
End: 2023-11-01
Payer: MEDICARE

## 2023-11-01 ENCOUNTER — TELEPHONE (OUTPATIENT)
Dept: ORTHOPEDICS | Facility: CLINIC | Age: 78
End: 2023-11-01
Payer: MEDICARE

## 2023-11-01 NOTE — TELEPHONE ENCOUNTER
Called patient, no answer. Left message on voicemail to return call to clinic.    ----- Message from Micki Sellers sent at 2023  1:54 PM CDT -----  Regarding: confirming appt change  Contact: pt @[phone]  Pt called in to schedule an appt; no available appts in Epic. Pt is asking for a call back soon to schedule. Thanks.         Reason appt not schedule: pt confirming appt time and date change  to 23 @ 12pm         Patient's DX:n/a         Patient requesting call back or [facility] [phone]

## 2023-11-02 DIAGNOSIS — Z98.890 S/P SPINAL SURGERY: Primary | ICD-10-CM

## 2023-11-09 PROBLEM — M62.81 WEAKNESS OF TRUNK MUSCULATURE: Status: ACTIVE | Noted: 2023-11-09

## 2023-11-09 PROBLEM — R26.9 ABNORMAL GAIT: Status: ACTIVE | Noted: 2023-11-09

## 2023-11-09 PROBLEM — R29.898 WEAKNESS OF BOTH LOWER EXTREMITIES: Status: ACTIVE | Noted: 2023-11-09

## 2023-11-09 PROBLEM — M62.9 HAMSTRING TIGHTNESS OF BOTH LOWER EXTREMITIES: Status: ACTIVE | Noted: 2023-11-09

## 2023-11-16 LAB
ACID FAST MOD KINY STN SPEC: NORMAL
MYCOBACTERIUM SPEC QL CULT: NORMAL

## 2023-11-20 ENCOUNTER — OFFICE VISIT (OUTPATIENT)
Dept: ORTHOPEDICS | Facility: CLINIC | Age: 78
End: 2023-11-20
Attending: ORTHOPAEDIC SURGERY
Payer: MEDICARE

## 2023-11-20 ENCOUNTER — HOSPITAL ENCOUNTER (OUTPATIENT)
Dept: RADIOLOGY | Facility: HOSPITAL | Age: 78
Discharge: HOME | End: 2023-11-20
Attending: ORTHOPAEDIC SURGERY
Payer: MEDICARE

## 2023-11-20 VITALS — BODY MASS INDEX: 36.3 KG/M2 | HEIGHT: 61 IN | WEIGHT: 192.25 LBS

## 2023-11-20 DIAGNOSIS — M51.36 DDD (DEGENERATIVE DISC DISEASE), LUMBAR: ICD-10-CM

## 2023-11-20 DIAGNOSIS — Z98.890 S/P SPINAL SURGERY: Primary | ICD-10-CM

## 2023-11-20 PROCEDURE — 1159F MED LIST DOCD IN RCRD: CPT | Mod: CPTII,S$GLB,, | Performed by: ORTHOPAEDIC SURGERY

## 2023-11-20 PROCEDURE — 1157F ADVNC CARE PLAN IN RCRD: CPT | Mod: CPTII,S$GLB,, | Performed by: ORTHOPAEDIC SURGERY

## 2023-11-20 PROCEDURE — 1101F PT FALLS ASSESS-DOCD LE1/YR: CPT | Mod: CPTII,S$GLB,, | Performed by: ORTHOPAEDIC SURGERY

## 2023-11-20 PROCEDURE — 72100 XR LUMBAR SPINE AP AND LATERAL: ICD-10-PCS | Mod: 26,,, | Performed by: RADIOLOGY

## 2023-11-20 PROCEDURE — 1157F PR ADVANCE CARE PLAN OR EQUIV PRESENT IN MEDICAL RECORD: ICD-10-PCS | Mod: CPTII,S$GLB,, | Performed by: ORTHOPAEDIC SURGERY

## 2023-11-20 PROCEDURE — 1125F AMNT PAIN NOTED PAIN PRSNT: CPT | Mod: CPTII,S$GLB,, | Performed by: ORTHOPAEDIC SURGERY

## 2023-11-20 PROCEDURE — 99024 POSTOP FOLLOW-UP VISIT: CPT | Mod: S$GLB,,, | Performed by: ORTHOPAEDIC SURGERY

## 2023-11-20 PROCEDURE — 1125F PR PAIN SEVERITY QUANTIFIED, PAIN PRESENT: ICD-10-PCS | Mod: CPTII,S$GLB,, | Performed by: ORTHOPAEDIC SURGERY

## 2023-11-20 PROCEDURE — 99024 PR POST-OP FOLLOW-UP VISIT: ICD-10-PCS | Mod: S$GLB,,, | Performed by: ORTHOPAEDIC SURGERY

## 2023-11-20 PROCEDURE — 99999 PR PBB SHADOW E&M-EST. PATIENT-LVL III: CPT | Mod: PBBFAC,,, | Performed by: ORTHOPAEDIC SURGERY

## 2023-11-20 PROCEDURE — 1101F PR PT FALLS ASSESS DOC 0-1 FALLS W/OUT INJ PAST YR: ICD-10-PCS | Mod: CPTII,S$GLB,, | Performed by: ORTHOPAEDIC SURGERY

## 2023-11-20 PROCEDURE — 72100 X-RAY EXAM L-S SPINE 2/3 VWS: CPT | Mod: TC

## 2023-11-20 PROCEDURE — 1159F PR MEDICATION LIST DOCUMENTED IN MEDICAL RECORD: ICD-10-PCS | Mod: CPTII,S$GLB,, | Performed by: ORTHOPAEDIC SURGERY

## 2023-11-20 PROCEDURE — 72100 X-RAY EXAM L-S SPINE 2/3 VWS: CPT | Mod: 26,,, | Performed by: RADIOLOGY

## 2023-11-20 PROCEDURE — 3288F PR FALLS RISK ASSESSMENT DOCUMENTED: ICD-10-PCS | Mod: CPTII,S$GLB,, | Performed by: ORTHOPAEDIC SURGERY

## 2023-11-20 PROCEDURE — 3288F FALL RISK ASSESSMENT DOCD: CPT | Mod: CPTII,S$GLB,, | Performed by: ORTHOPAEDIC SURGERY

## 2023-11-20 PROCEDURE — 99999 PR PBB SHADOW E&M-EST. PATIENT-LVL III: ICD-10-PCS | Mod: PBBFAC,,, | Performed by: ORTHOPAEDIC SURGERY

## 2023-11-20 NOTE — PROGRESS NOTES
Date: 11/20/2023    Supervising Physician: Jesus Alberto Sharp M.D.    Surgery: 10/4/2023 I&D wound and 8/16/23 L2-5 TLIF    History: Nilsa Curiel is seen today for follow-up following the above listed procedure. Overall the patient is doing well but today notes she has been doing well. Her pain has significantly improved since surgery.   Pain is well controlled with current pain medication.  she denies fever, chills, and sweats since the time of the surgery. She just started outpatient PT.      Exam: Post op dressing taken down.  Incision is healing well, clean, dry and intact.   There is no sign of infection. Neuro exam is stable. No signs of DVT.    Radiographs: hardware in place no failure    Assessment/Plan: 3 months post op.    Doing well postoperatively.  reviewed.    I will plan to see the patient back for the next postop visit in 1 years.     Thank you for the opportunity to participate in this patient's care. Please give me a call if there are any concerns or questions.    Dr. Jesus Alberto Sharp has personally examined the patient, answered all questions, and agreed with the above plan.

## 2023-11-22 LAB
ACID FAST MOD KINY STN SPEC: NORMAL
ACID FAST MOD KINY STN SPEC: NORMAL
MYCOBACTERIUM SPEC QL CULT: NORMAL
MYCOBACTERIUM SPEC QL CULT: NORMAL

## 2023-11-27 DIAGNOSIS — I10 ESSENTIAL HYPERTENSION: ICD-10-CM

## 2023-11-27 NOTE — TELEPHONE ENCOUNTER
Care Due:                  Date            Visit Type   Department     Provider  --------------------------------------------------------------------------------                                HOSPITAL     [facility] PRIMARY  Last Visit: 10-      FOLLOW UP    CARE           Gaurav Allison  Next Visit: None Scheduled  None         None Found                                                            Last  Test          Frequency    Reason                     Performed    Due Date  --------------------------------------------------------------------------------    Uric Acid...  12 months..  allopurinoL..............  01- 12-    [facility] Embedded Care Due Messages. Reference number: [id number].   11/27/2023 5:37:36 PM CST

## 2023-11-28 RX ORDER — POTASSIUM CHLORIDE 20 MEQ/1
20 TABLET, EXTENDED RELEASE ORAL 2 TIMES DAILY
Qty: 180 TABLET | Refills: 3 | Status: SHIPPED | OUTPATIENT
Start: 2023-11-28

## 2023-11-28 RX ORDER — OLMESARTAN MEDOXOMIL 40 MG/1
40 TABLET ORAL
Qty: 90 TABLET | Refills: 3 | Status: SHIPPED | OUTPATIENT
Start: 2023-11-28

## 2023-11-28 RX ORDER — AMLODIPINE BESYLATE 10 MG/1
10 TABLET ORAL
Qty: 90 TABLET | Refills: 3 | Status: SHIPPED | OUTPATIENT
Start: 2023-11-28

## 2023-11-28 NOTE — TELEPHONE ENCOUNTER
Refill Routing Note   Medication(s) are not appropriate for processing by [facility] for the following reason(s):        Required vitals abnormal    [facility] action(s):  Defer  Approve     Requires labs : Yes             Appointments  past 12m or future 3m with PCP    Date Provider   Last Visit   10/11/2023 Gaurav Allison MD   Next Visit   Visit date not found Gaurav Allison MD   ED visits in past 90 days: 0        Note composed:11:48 AM 11/28/2023

## 2023-11-29 ENCOUNTER — EXTERNAL HOME HEALTH (OUTPATIENT)
Dept: HOME HEALTH SERVICES | Facility: HOSPITAL | Age: 78
End: 2023-11-29
Payer: MEDICARE

## 2023-12-12 ENCOUNTER — OFFICE VISIT (OUTPATIENT)
Dept: DERMATOLOGY | Facility: CLINIC | Age: 78
End: 2023-12-12
Payer: MEDICARE

## 2023-12-12 ENCOUNTER — OFFICE VISIT (OUTPATIENT)
Dept: CARDIOLOGY | Facility: CLINIC | Age: 78
End: 2023-12-12
Payer: MEDICARE

## 2023-12-12 VITALS
OXYGEN SATURATION: 98 % | HEART RATE: 90 BPM | WEIGHT: 192.69 LBS | DIASTOLIC BLOOD PRESSURE: 58 MMHG | BODY MASS INDEX: 36.41 KG/M2 | SYSTOLIC BLOOD PRESSURE: 130 MMHG

## 2023-12-12 DIAGNOSIS — L30.9 HAND ECZEMA: ICD-10-CM

## 2023-12-12 DIAGNOSIS — I35.0 NONRHEUMATIC AORTIC VALVE STENOSIS: ICD-10-CM

## 2023-12-12 DIAGNOSIS — I42.1 HYPERTROPHIC OBSTRUCTIVE CARDIOMYOPATHY WITH DIASTOLIC HEART FAILURE: ICD-10-CM

## 2023-12-12 DIAGNOSIS — I50.30 HYPERTROPHIC OBSTRUCTIVE CARDIOMYOPATHY WITH DIASTOLIC HEART FAILURE: ICD-10-CM

## 2023-12-12 DIAGNOSIS — E78.2 MIXED HYPERLIPIDEMIA: Primary | ICD-10-CM

## 2023-12-12 DIAGNOSIS — B37.2 CANDIDAL INTERTRIGO: Primary | ICD-10-CM

## 2023-12-12 DIAGNOSIS — I10 ESSENTIAL HYPERTENSION: ICD-10-CM

## 2023-12-12 PROCEDURE — 1157F PR ADVANCE CARE PLAN OR EQUIV PRESENT IN MEDICAL RECORD: ICD-10-PCS | Mod: CPTII,S$GLB,, | Performed by: INTERNAL MEDICINE

## 2023-12-12 PROCEDURE — 1101F PT FALLS ASSESS-DOCD LE1/YR: CPT | Mod: CPTII,S$GLB,, | Performed by: DERMATOLOGY

## 2023-12-12 PROCEDURE — 1160F PR REVIEW ALL MEDS BY PRESCRIBER/CLIN PHARMACIST DOCUMENTED: ICD-10-PCS | Mod: CPTII,S$GLB,, | Performed by: INTERNAL MEDICINE

## 2023-12-12 PROCEDURE — 1125F PR PAIN SEVERITY QUANTIFIED, PAIN PRESENT: ICD-10-PCS | Mod: CPTII,S$GLB,, | Performed by: INTERNAL MEDICINE

## 2023-12-12 PROCEDURE — 99999 PR PBB SHADOW E&M-EST. PATIENT-LVL IV: ICD-10-PCS | Mod: PBBFAC,,, | Performed by: INTERNAL MEDICINE

## 2023-12-12 PROCEDURE — 3075F PR MOST RECENT SYSTOLIC BLOOD PRESS GE 130-139MM HG: ICD-10-PCS | Mod: CPTII,S$GLB,, | Performed by: INTERNAL MEDICINE

## 2023-12-12 PROCEDURE — 99999 PR PBB SHADOW E&M-EST. PATIENT-LVL IV: CPT | Mod: PBBFAC,,, | Performed by: INTERNAL MEDICINE

## 2023-12-12 PROCEDURE — 1159F MED LIST DOCD IN RCRD: CPT | Mod: CPTII,S$GLB,, | Performed by: INTERNAL MEDICINE

## 2023-12-12 PROCEDURE — 99214 PR OFFICE/OUTPT VISIT, EST, LEVL IV, 30-39 MIN: ICD-10-PCS | Mod: S$GLB,,, | Performed by: DERMATOLOGY

## 2023-12-12 PROCEDURE — 1159F PR MEDICATION LIST DOCUMENTED IN MEDICAL RECORD: ICD-10-PCS | Mod: CPTII,S$GLB,, | Performed by: INTERNAL MEDICINE

## 2023-12-12 PROCEDURE — 1157F ADVNC CARE PLAN IN RCRD: CPT | Mod: CPTII,S$GLB,, | Performed by: DERMATOLOGY

## 2023-12-12 PROCEDURE — 1126F AMNT PAIN NOTED NONE PRSNT: CPT | Mod: CPTII,S$GLB,, | Performed by: DERMATOLOGY

## 2023-12-12 PROCEDURE — 99214 OFFICE O/P EST MOD 30 MIN: CPT | Mod: S$GLB,,, | Performed by: INTERNAL MEDICINE

## 2023-12-12 PROCEDURE — 1159F MED LIST DOCD IN RCRD: CPT | Mod: CPTII,S$GLB,, | Performed by: DERMATOLOGY

## 2023-12-12 PROCEDURE — 3075F SYST BP GE 130 - 139MM HG: CPT | Mod: CPTII,S$GLB,, | Performed by: INTERNAL MEDICINE

## 2023-12-12 PROCEDURE — 1160F PR REVIEW ALL MEDS BY PRESCRIBER/CLIN PHARMACIST DOCUMENTED: ICD-10-PCS | Mod: CPTII,S$GLB,, | Performed by: DERMATOLOGY

## 2023-12-12 PROCEDURE — 99214 OFFICE O/P EST MOD 30 MIN: CPT | Mod: S$GLB,,, | Performed by: DERMATOLOGY

## 2023-12-12 PROCEDURE — 3078F DIAST BP <80 MM HG: CPT | Mod: CPTII,S$GLB,, | Performed by: INTERNAL MEDICINE

## 2023-12-12 PROCEDURE — 3288F FALL RISK ASSESSMENT DOCD: CPT | Mod: CPTII,S$GLB,, | Performed by: DERMATOLOGY

## 2023-12-12 PROCEDURE — 1157F PR ADVANCE CARE PLAN OR EQUIV PRESENT IN MEDICAL RECORD: ICD-10-PCS | Mod: CPTII,S$GLB,, | Performed by: DERMATOLOGY

## 2023-12-12 PROCEDURE — 1160F RVW MEDS BY RX/DR IN RCRD: CPT | Mod: CPTII,S$GLB,, | Performed by: DERMATOLOGY

## 2023-12-12 PROCEDURE — 1125F AMNT PAIN NOTED PAIN PRSNT: CPT | Mod: CPTII,S$GLB,, | Performed by: INTERNAL MEDICINE

## 2023-12-12 PROCEDURE — 1157F ADVNC CARE PLAN IN RCRD: CPT | Mod: CPTII,S$GLB,, | Performed by: INTERNAL MEDICINE

## 2023-12-12 PROCEDURE — 3288F PR FALLS RISK ASSESSMENT DOCUMENTED: ICD-10-PCS | Mod: CPTII,S$GLB,, | Performed by: DERMATOLOGY

## 2023-12-12 PROCEDURE — 99214 PR OFFICE/OUTPT VISIT, EST, LEVL IV, 30-39 MIN: ICD-10-PCS | Mod: S$GLB,,, | Performed by: INTERNAL MEDICINE

## 2023-12-12 PROCEDURE — 3288F PR FALLS RISK ASSESSMENT DOCUMENTED: ICD-10-PCS | Mod: CPTII,S$GLB,, | Performed by: INTERNAL MEDICINE

## 2023-12-12 PROCEDURE — 1101F PR PT FALLS ASSESS DOC 0-1 FALLS W/OUT INJ PAST YR: ICD-10-PCS | Mod: CPTII,S$GLB,, | Performed by: INTERNAL MEDICINE

## 2023-12-12 PROCEDURE — 1159F PR MEDICATION LIST DOCUMENTED IN MEDICAL RECORD: ICD-10-PCS | Mod: CPTII,S$GLB,, | Performed by: DERMATOLOGY

## 2023-12-12 PROCEDURE — 1101F PR PT FALLS ASSESS DOC 0-1 FALLS W/OUT INJ PAST YR: ICD-10-PCS | Mod: CPTII,S$GLB,, | Performed by: DERMATOLOGY

## 2023-12-12 PROCEDURE — 3078F PR MOST RECENT DIASTOLIC BLOOD PRESSURE < 80 MM HG: ICD-10-PCS | Mod: CPTII,S$GLB,, | Performed by: INTERNAL MEDICINE

## 2023-12-12 PROCEDURE — 1160F RVW MEDS BY RX/DR IN RCRD: CPT | Mod: CPTII,S$GLB,, | Performed by: INTERNAL MEDICINE

## 2023-12-12 PROCEDURE — 1126F PR PAIN SEVERITY QUANTIFIED, NO PAIN PRESENT: ICD-10-PCS | Mod: CPTII,S$GLB,, | Performed by: DERMATOLOGY

## 2023-12-12 PROCEDURE — 3288F FALL RISK ASSESSMENT DOCD: CPT | Mod: CPTII,S$GLB,, | Performed by: INTERNAL MEDICINE

## 2023-12-12 PROCEDURE — 1101F PT FALLS ASSESS-DOCD LE1/YR: CPT | Mod: CPTII,S$GLB,, | Performed by: INTERNAL MEDICINE

## 2023-12-12 RX ORDER — TRIAMCINOLONE ACETONIDE 0.25 MG/G
CREAM TOPICAL
Qty: 15 G | Refills: 1 | Status: SHIPPED | OUTPATIENT
Start: 2023-12-12 | End: 2024-02-07 | Stop reason: SDUPTHER

## 2023-12-12 RX ORDER — MOMETASONE FUROATE 1 MG/G
OINTMENT TOPICAL
Qty: 45 G | Refills: 3 | Status: SHIPPED | OUTPATIENT
Start: 2023-12-12 | End: 2024-02-07 | Stop reason: SDUPTHER

## 2023-12-12 RX ORDER — KETOCONAZOLE 20 MG/G
CREAM TOPICAL
Qty: 15 G | Refills: 1 | Status: SHIPPED | OUTPATIENT
Start: 2023-12-12 | End: 2024-02-07 | Stop reason: SDUPTHER

## 2023-12-12 NOTE — PROGRESS NOTES
Cardiology    12/12/2023  9:33 AM    Problem list  Patient Active Problem List   Diagnosis    Mixed hyperlipidemia    GERD (gastroesophageal reflux disease)    Atopic dermatitis    Hyperparathyroidism    Decreased strength    Impaired mobility    Right-sided carotid artery disease    Neurogenic claudication due to lumbar spinal stenosis    Spondylolisthesis of lumbosacral region    MONE (obstructive sleep apnea)    Primary osteoarthritis of both shoulders    Chronic right-sided low back pain without sciatica    Headache disorder    Chronic kidney disease, stage III (moderate)    Cervical stenosis of spine    Posture imbalance    Decreased strength of trunk and back    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    Essential hypertension    Right hip pain    Ankle swelling    Intertrigo    Severe obesity (BMI >= 40)    Muscle spasms of both lower extremities    Hyperuricemia    Chronic heart failure with preserved ejection fraction    Nonrheumatic aortic valve stenosis    Hair loss    Cervical spine arthritis with nerve pain    Chronic right hip pain    Chronic neck pain    Lumbar spondylosis    Fatigue    Atherosclerosis of aorta    Acute blood loss anemia    Surgical wound dehiscence    Acute on chronic anemia    Class 2 severe obesity due to excess calories with serious comorbidity and body mass index (BMI) of 38.0 to 38.9 in adult    Weakness of both lower extremities    Weakness of trunk musculature    Abnormal gait    Hamstring tightness of both lower extremities       CC:  Follow-up    HPI:  Since our last visit, she had a back surgery.  She is doing better.  She denies any cardiac complaints.  She denies any chest pain, shortness of breath, palpitation or syncope.  She is tolerating her medications.    Medications  Current Outpatient Medications   Medication Sig Dispense Refill    acetaminophen (TYLENOL) 500 MG tablet Take 1 tablet (500 mg total) by mouth 3 (three) times daily.  90 tablet 0    allopurinoL (ZYLOPRIM) 100 MG tablet Take 1 tablet (100 mg total) by mouth once daily. 90 tablet 11    amLODIPine (NORVASC) 10 MG tablet TAKE 1 TABLET ONE TIME DAILY 90 tablet 3    betamethasone dipropionate (DIPROLENE) 0.05 % ointment Apply topically 2 (two) times daily. 45 g 11    biotin 300 mcg Tab Take 1 tablet by mouth once daily.      carvediloL (COREG) 3.125 MG tablet Take 1 tablet (3.125 mg total) by mouth 2 (two) times daily with meals. 180 tablet 3    celecoxib (CELEBREX) 100 MG capsule Take 1 capsule (100 mg total) by mouth 2 (two) times daily. 28 capsule 0    ketoconazole (NIZORAL) 2 % cream Apply to affected areas of body BID prn irritation. 15 g 1    methocarbamoL (ROBAXIN) 500 MG Tab Take 2 tablets (1,000 mg total) by mouth 3 (three) times daily. 90 tablet 0    mometasone (ELOCON) 0.1 % ointment Apply twice daily to hands prn eczema. 45 g 3    MULTIVIT-MIN/FA/CA CARB/VIT K (WOMEN'S 50+ DAILY FORMULA ORAL) Take by mouth.      olmesartan (BENICAR) 40 MG tablet TAKE 1 TABLET EVERY DAY 90 tablet 3    omega-3 fatty acids 300 mg Cap Take by mouth.      ondansetron (ZOFRAN) 4 MG tablet Take 1 tablet (4 mg total) by mouth every 12 (twelve) hours as needed for Nausea. 30 tablet 1    oxyCODONE (ROXICODONE) 5 MG immediate release tablet Take 1 tablet (5 mg total) by mouth every 6 (six) hours as needed for Pain (for breakthrough pain). 21 tablet 0    potassium chloride SA (K-DUR,KLOR-CON) 20 MEQ tablet TAKE 1 TABLET TWICE DAILY 180 tablet 3    riboflavin, vitamin B2, (VITAMIN B-2 ORAL) Take 1 capsule by mouth once daily.      rosuvastatin (CRESTOR) 20 MG tablet Take 1 tablet (20 mg total) by mouth once daily. 90 tablet 3    TURMERIC ORAL Take by mouth.      ubidecarenone (COENZYME Q10) 100 mg Tab Take 1 tablet by mouth once daily.       aspirin (ECOTRIN) 81 MG EC tablet Take 1 tablet (81 mg total) by mouth once daily. 90 tablet 3     No current facility-administered medications for this visit.       Prior to Admission medications    Medication Sig Start Date End Date Taking? Authorizing Provider   acetaminophen (TYLENOL) 500 MG tablet Take 1 tablet (500 mg total) by mouth 3 (three) times daily. 10/4/23  Yes Hali Daigle PA-C   allopurinoL (ZYLOPRIM) 100 MG tablet Take 1 tablet (100 mg total) by mouth once daily. 1/3/23  Yes Gaurav Allison MD   amLODIPine (NORVASC) 10 MG tablet TAKE 1 TABLET ONE TIME DAILY 11/28/23  Yes Gaurav Allison MD   betamethasone dipropionate (DIPROLENE) 0.05 % ointment Apply topically 2 (two) times daily. 7/24/23  Yes Gaurav Allison MD   biotin 300 mcg Tab Take 1 tablet by mouth once daily.   Yes Provider, Historical   carvediloL (COREG) 3.125 MG tablet Take 1 tablet (3.125 mg total) by mouth 2 (two) times daily with meals. 1/3/23 1/3/24 Yes Gaurav Allison MD   celecoxib (CELEBREX) 100 MG capsule Take 1 capsule (100 mg total) by mouth 2 (two) times daily. 10/4/23  Yes Hali Daigle PA-C   ketoconazole (NIZORAL) 2 % cream Apply to affected areas of body BID prn irritation. 4/18/23  Yes Brittany Simeon MD   methocarbamoL (ROBAXIN) 500 MG Tab Take 2 tablets (1,000 mg total) by mouth 3 (three) times daily. 10/4/23  Yes Hali Daigle PA-C   mometasone (ELOCON) 0.1 % ointment Apply twice daily to hands prn eczema. 4/18/23  Yes Brittany Simeon MD   MULTIVIT-MIN/FA/CA CARB/VIT K (WOMEN'S 50+ DAILY FORMULA ORAL) Take by mouth.   Yes Provider, Historical   olmesartan (BENICAR) 40 MG tablet TAKE 1 TABLET EVERY DAY 11/28/23  Yes Gaurav Allison MD   omega-3 fatty acids 300 mg Cap Take by mouth.   Yes Provider, Historical   ondansetron (ZOFRAN) 4 MG tablet Take 1 tablet (4 mg total) by mouth every 12 (twelve) hours as needed for Nausea. 10/18/23  Yes Sebastien Das PA-C   oxyCODONE (ROXICODONE) 5 MG immediate release tablet Take 1 tablet (5 mg total) by mouth every 6 (six) hours as needed for Pain (for breakthrough pain). 10/18/23  Yes  Aleshia Vargas PA-C   potassium chloride SA (K-DUR,KLOR-CON) 20 MEQ tablet TAKE 1 TABLET TWICE DAILY 23  Yes Gaurav Allison MD   riboflavin, vitamin B2, (VITAMIN B-2 ORAL) Take 1 capsule by mouth once daily.   Yes Provider, Historical   rosuvastatin (CRESTOR) 20 MG tablet Take 1 tablet (20 mg total) by mouth once daily. 23 Yes Amando Das MD   TURMERIC ORAL Take by mouth.   Yes Provider, Historical   ubidecarenone (COENZYME Q10) 100 mg Tab Take 1 tablet by mouth once daily.  10/23/19  Yes Provider, Historical   aspirin (ECOTRIN) 81 MG EC tablet Take 1 tablet (81 mg total) by mouth once daily. 22  Gaurav Allison MD   gabapentin (NEURONTIN) 100 MG capsule Take 1 capsule (100 mg total) by mouth 3 (three) times daily. 10/4/23 12/12/23  Hali Daigle PA-C         History  Past Medical History:   Diagnosis Date    Abnormal fasting glucose 2015    Acute bilateral low back pain without sciatica 10/10/2017    Anemia 2018    Arthritis     Atopic dermatitis     Central stenosis of spinal canal 2022    Chronic diastolic heart failure 2022    Chronic kidney disease, stage III (moderate) 2020    Dermatitis 2023    GERD (gastroesophageal reflux disease)     Hyperlipidemia     Hypertension     Hypokalemia 2022    Joint pain     Left ventricular hypertrophy 2016    Microcytic hypochromic anemia 2018    Multifactoral. See hematology consult. Some nutritional , some kidney related    Mild aortic sclerosis 2019    Mobility poor 2022    Morbid obesity with BMI of 40.0-44.9, adult 2016    Nonintractable episodic headache 2019    Normocytic normochromic anemia 2022    MONE (obstructive sleep apnea) 2018    Prediabetes 2023    Severe obesity (BMI >= 40) 06/10/2022     Past Surgical History:   Procedure Laterality Date     SECTION      x3    CLOSURE OF WOUND  10/4/2023     Procedure: CLOSURE, WOUND;  Surgeon: Benigno Goodman DO;  Location: [facility] OR 2ND FLR;  Service: Plastics;;    COLONOSCOPY N/A 10/04/2021    Procedure: COLONOSCOPY;  Surgeon: Taran Galvez MD;  Location: [facility] ENDO (4TH FLR);  Service: Endoscopy;  Laterality: N/A;    COLONOSCOPY N/A 01/31/2022    Procedure: COLONOSCOPY--positive fit kit;  Surgeon: Tani Lara MD;  Location: [facility] ENDO (4TH FLR);  Service: Endoscopy;  Laterality: N/A;    EPIDURAL STEROID INJECTION N/A 4/26/2023    Procedure: INJECTION, STEROID, EPIDURAL, L5-S1;  Surgeon: Majo Meyers MD;  Location: [facility] PAIN MGT;  Service: Pain Management;  Laterality: N/A;    ESOPHAGOGASTRODUODENOSCOPY N/A 10/04/2021    Procedure: EGD (ESOPHAGOGASTRODUODENOSCOPY);  Surgeon: Taran Galvez MD;  Location: [facility] (4TH FLR);  Service: Endoscopy;  Laterality: N/A;  covid test 10/1-[facility]    10/1-LVM about COVID test-GT    ESOPHAGOGASTRODUODENOSCOPY N/A 01/31/2022    Procedure: EGD (ESOPHAGOGASTRODUODENOSCOPY);  Surgeon: Tani Lara MD;  Location: [facility] (4TH FLR);  Service: Endoscopy;  Laterality: N/A;  12/15 fully vaccinated; instructions to portal-st  1/28-covid [facility]-tb    FUSION, SPINE, LUMBAR, TLIF, POSTERIOR APPROACH, USING PEDICLE SCREW N/A 8/16/2023    Procedure: FUSION, SPINE, LUMBAR, TLIF, POSTERIOR APPROACH, USING PEDICLE SCREW L2-5 PLDF/TLIF GLOBUS ROBOT SNS:SSEP/EMG cell saver;  Surgeon: Jesus Alberto Sharp MD;  [facility];  Service: Neurosurgery;  Laterality: N/A;    IRRIGATION AND DEBRIDEMENT N/A 10/4/2023    Procedure: IRRIGATION AND DEBRIDEMENT **CO CASE DR. JULISSA GOODMAN** LUMBAR;  Surgeon: Jesus Alberto Sharp MD;  [facility];  Service: Orthopedics;  Laterality: N/A;    TRANSFORAMINAL EPIDURAL INJECTION OF STEROID N/A 03/16/2023    Procedure: b/l L4-5 TFESI;  Surgeon: Gato Delatorre DO;  Location: [facility] OR;  Service: Pain Management;  Laterality: N/A;    TUBAL LIGATION       Social History      Socioeconomic History    Marital status:    Occupational History    Occupation: retired medical records    Tobacco Use    Smoking status: Never    Smokeless tobacco: Never   Substance and Sexual Activity    Alcohol use: Never    Drug use: No    Sexual activity: Not Currently     Partners: Male     Birth control/protection: None   Other Topics Concern    Are you pregnant or think you may be? No    Breast-feeding No   Social History Narrative    , lives with 1 daughter(Toreal), Walking some     Social Determinants of Health     Financial Resource Strain: Low Risk  (11/18/2023)    Overall Financial Resource Strain (CARDIA)     Difficulty of Paying Living Expenses: Not very hard   Food Insecurity: No Food Insecurity (10/30/2023)    Hunger Vital Sign     Worried About Running Out of Food in the Last Year: Never true     Ran Out of Food in the Last Year: Never true   Transportation Needs: Unmet Transportation Needs (11/18/2023)    PRAPARE - Transportation     Lack of Transportation (Medical): Yes     Lack of Transportation (Non-Medical): Yes   Physical Activity: Unknown (11/18/2023)    Exercise Vital Sign     Days of Exercise per Week: Patient refused     Minutes of Exercise per Session: 0 min   Recent Concern: Physical Activity - Inactive (10/5/2023)    Exercise Vital Sign     Days of Exercise per Week: 0 days     Minutes of Exercise per Session: 0 min   Stress: No Stress Concern Present (10/5/2023)    [employer] - Occupational Stress Questionnaire     Feeling of Stress : Not at all   Social Connections: Moderately Integrated (11/18/2023)    Social Connection and Isolation Panel [NHANES]     Frequency of Communication with Friends and Family: More than three times a week     Frequency of Social Gatherings with Friends and Family: More than three times a week     Attends Yarsani Services: More than 4 times per year     Active Member of Clubs or Organizations: Yes      Attends Club or Organization Meetings: More than 4 times per year     Marital Status:    Recent Concern: Social Connections - Moderately Isolated (10/5/2023)    Social Connection and Isolation Panel [NHANES]     Frequency of Communication with Friends and Family: More than three times a week     Frequency of Social Gatherings with Friends and Family: More than three times a week     Attends Islam Services: More than 4 times per year     Active Member of Clubs or Organizations: No     Attends Club or Organization Meetings: Never     Marital Status:    Housing Stability: Low Risk  (11/18/2023)    Housing Stability Vital Sign     Unable to Pay for Housing in the Last Year: No     Number of Places Lived in the Last Year: 1     Unstable Housing in the Last Year: No   Recent Concern: Housing Stability - High Risk (10/17/2023)    Housing Stability Vital Sign     Unable to Pay for Housing in the Last Year: Yes     Number of Places Lived in the Last Year: 1     Unstable Housing in the Last Year: No         Allergies  Review of patient's allergies indicates:  No Known Allergies      Review of Systems   Review of Systems   Constitutional: Negative for decreased appetite, fever and weight loss.   HENT:  Negative for congestion and nosebleeds.    Eyes:  Negative for double vision, vision loss in left eye, vision loss in right eye and visual disturbance.   Cardiovascular:  Negative for chest pain, claudication, cyanosis, dyspnea on exertion, irregular heartbeat, leg swelling, near-syncope, orthopnea, palpitations, paroxysmal nocturnal dyspnea and syncope.   Respiratory:  Negative for cough, hemoptysis, shortness of breath, sleep disturbances due to breathing, snoring, sputum production and wheezing.    Endocrine: Negative for cold intolerance and heat intolerance.   Skin:  Negative for nail changes and rash.   Musculoskeletal:  Positive for back pain. Negative for joint pain, muscle cramps, muscle weakness and  myalgias.   Gastrointestinal:  Negative for change in bowel habit, heartburn, hematemesis, hematochezia, hemorrhoids and melena.   Neurological:  Negative for dizziness, focal weakness and headaches.         Physical Exam  Wt Readings from Last 1 Encounters:   12/12/23 87.4 kg (192 lb 10.9 oz)     BP Readings from Last 3 Encounters:   12/12/23 (!) 130/58   10/31/23 (!) 163/72   10/26/23 (!) 148/65     Pulse Readings from Last 1 Encounters:   12/12/23 90     Body mass index is 36.41 kg/m².    Physical Exam  Vitals reviewed.   Constitutional:       Appearance: She is obese.   Neck:      Vascular: No JVD.   Cardiovascular:      Rate and Rhythm: Normal rate and regular rhythm.      Pulses:           Carotid pulses are 2+ on the right side and 2+ on the left side.       Radial pulses are 2+ on the right side and 2+ on the left side.      Heart sounds: S1 normal and S2 normal. Murmur heard.      Harsh midsystolic murmur is present with a grade of 2/6.   Pulmonary:      Breath sounds: Normal breath sounds and air entry.   Musculoskeletal:      Right lower leg: No edema.      Left lower leg: No edema.   Neurological:      Mental Status: She is alert.             Assessment  1. Mixed hyperlipidemia  Stable    2. 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 Essential hypertension  Well controlled on current medications    4. Nonrheumatic aortic valve stenosis  Mild AS is stable        Plan and Discussion  Discussed her blood pressure is controlled and recommend to continue current medications.    Follow Up  Six-month w labs      Amando Das MD, F.A.C.C, F.S.C.A.I.        30 minutes were spent in chart review, documentation and review of results, and evaluation, treatment, and counseling of patient on the same day of service.    Disclaimer: This document was created using voice recognition software (M*Modal Fluency Direct). Although it may be edited, this document may contain errors  related to incorrect recognition of the spoken word. Please call the physician if clarification is needed.

## 2023-12-12 NOTE — PROGRESS NOTES
Patient Information  Name: Nilsa Curiel  : 1945  MRN: [id number]     Referring Physician:  Self   Primary Care Physician:  Gaurav Allison MD   Date of Visit: 2023      Subjective:     History of Present lllness:    Nilsa Curiel is a 78 y.o. female who presents with a chief complaint of rash.    Hand Eczema  Location: hands  Signs/Symptoms: comes and goes  Symptom course: improving  Current treatment: mometasone, helps it clear up for a couple of weeks or months at a time; uses Eucerin lotion, washes with Dove, wears gloves doing dishes     Candidal Intertrigo  Location: abdominal folds and groin  Signs/Symptoms: comes and goes  Symptom course: improving  Current treatment: ketoconazole cream and triamcinolone, clears up rash for few weeks or months at a time; she does not use anything between flares    Patient was last seen: 2023.  Prior notes by myself reviewed.   Clinical documentation obtained by nursing staff reviewed.    Review of Systems    Objective:   Physical Exam   Constitutional: She appears well-developed and well-nourished. No distress.   Neurological: She is alert and oriented to person, place, and time. She is not disoriented.   Psychiatric: She has a normal mood and affect.   Skin:   Areas Examined (abnormalities noted in diagram):   Genitals / Buttocks / Groin Inspection Performed  RLE Inspected  LLE Inspection Performed  Nails and Digits Inspection Performed                Diagram Legend     Erythematous scaling macule/papule c/w actinic keratosis       Vascular papule c/w angioma      Pigmented verrucoid papule/plaque c/w seborrheic keratosis      Yellow umbilicated papule c/w sebaceous hyperplasia      Irregularly shaped tan macule c/w lentigo     1-2 mm smooth white papules consistent with Milia      Movable subcutaneous cyst with punctum c/w epidermal inclusion cyst      Subcutaneous movable cyst c/w pilar cyst      Firm pink to brown papule c/w dermatofibroma       Pedunculated fleshy papule(s) c/w skin tag(s)      Evenly pigmented macule c/w junctional nevus     Mildly variegated pigmented, slightly irregular-bordered macule c/w mildly atypical nevus      Flesh colored to evenly pigmented papule c/w intradermal nevus       Pink pearly papule/plaque c/w basal cell carcinoma      Erythematous hyperkeratotic cursted plaque c/w SCC      Surgical scar with no sign of skin cancer recurrence      Open and closed comedones      Inflammatory papules and pustules      Verrucoid papule consistent consistent with wart     Erythematous eczematous patches and plaques     Dystrophic onycholytic nail with subungual debris c/w onychomycosis     Umbilicated papule    Erythematous-base heme-crusted tan verrucoid plaque consistent with inflamed seborrheic keratosis     Erythematous Silvery Scaling Plaque c/w Psoriasis     See annotation    No images are attached to the encounter or orders placed in the encounter.      [] Data reviewed  [] Prior external notes reviewed  [] Independent review of test  [] Management discussed with another provider  [] Independent historian    Assessment / Plan:        Candidal intertrigo  - chronic problem, not at treatment goal  -     ketoconazole (NIZORAL) 2 % cream; Apply to affected areas of body BID prn irritation.  Dispense: 15 g; Refill: 1  -     triamcinolone acetonide 0.025% (KENALOG) 0.025 % cream; Apply to affected areas of body BID prn irritation.  Dispense: 15 g; Refill: 1    Combine in an airtight container and shake well:  15 g tube of triamcinolone 0.025% cream  15 g tube of ketoconazole 2% cream  ~6 tsp of Original Milk of Magnesia    Use twice daily on affected area of skin folds.   Use no longer than 2-3 weeks in a row.    Once improved, switch to OTC Zeasorb AF powder to affected areas daily for maintenance.    Hand eczema  - chronic problem,  stable  - Discussed the importance of frequent hand moisturization every hour and after every hand  washing. Wash hands with Dove Sensitive Skin Beauty Bar or other fragrance-free cleanser and moisturize with OTC CeraVe healing ointment or Neutrogena Norwegian Formula Hand Cream.   - Minimize hand washing unless visibly dirty, and use an alcohol-based hand  that contains moisturizers and lacks common allergens, such as Hello Forbes, SupplyAID, SanitizeRx, or Hydra Miley.  - Can apply the prescribed topical steroid followed by white cotton gloves or a warm, damp towel for severe flares.   -     mometasone (ELOCON) 0.1 % ointment; Apply twice daily to hands prn eczema.  Dispense: 45 g; Refill: 3      Follow up if symptoms worsen or fail to improve.      Brittany Simeon MD, FAAD  [facility]

## 2024-01-03 ENCOUNTER — TELEPHONE (OUTPATIENT)
Dept: OPTOMETRY | Facility: CLINIC | Age: 79
End: 2024-01-03
Payer: MEDICARE

## 2024-01-18 RX ORDER — CARVEDILOL 3.12 MG/1
3.12 TABLET ORAL 2 TIMES DAILY WITH MEALS
Qty: 180 TABLET | Refills: 2 | Status: SHIPPED | OUTPATIENT
Start: 2024-01-18

## 2024-01-18 NOTE — TELEPHONE ENCOUNTER
Refill Decision Note   Nilsa Painteraaron  is requesting a refill authorization.  Brief Assessment and Rationale for Refill:  Approve     Medication Therapy Plan:  LOV 10/11/23 ; Vitals 12/12/23: protocols met for 90 plus 2      Comments:     Note composed:7:33 AM 01/18/2024

## 2024-01-18 NOTE — TELEPHONE ENCOUNTER
No care due was identified.  [facility] Embedded Care Due Messages. Reference number: [id number].   1/18/2024 7:30:26 AM CST

## 2024-01-21 ENCOUNTER — OFFICE VISIT (OUTPATIENT)
Dept: URGENT CARE | Facility: CLINIC | Age: 79
End: 2024-01-21
Payer: MEDICARE

## 2024-01-21 VITALS
OXYGEN SATURATION: 96 % | HEART RATE: 112 BPM | RESPIRATION RATE: 18 BRPM | HEIGHT: 61 IN | WEIGHT: 192.69 LBS | DIASTOLIC BLOOD PRESSURE: 79 MMHG | BODY MASS INDEX: 36.38 KG/M2 | SYSTOLIC BLOOD PRESSURE: 149 MMHG | TEMPERATURE: 99 F

## 2024-01-21 DIAGNOSIS — R11.0 NAUSEA: ICD-10-CM

## 2024-01-21 DIAGNOSIS — U07.1 COVID-19: Primary | ICD-10-CM

## 2024-01-21 DIAGNOSIS — R10.13 EPIGASTRIC PAIN: ICD-10-CM

## 2024-01-21 DIAGNOSIS — R05.8 OTHER COUGH: ICD-10-CM

## 2024-01-21 DIAGNOSIS — U07.1 COVID-19 VIRUS DETECTED: ICD-10-CM

## 2024-01-21 DIAGNOSIS — R09.82 POST-NASAL DRIP: ICD-10-CM

## 2024-01-21 DIAGNOSIS — I10 ELEVATED BLOOD PRESSURE READING WITH DIAGNOSIS OF HYPERTENSION: ICD-10-CM

## 2024-01-21 LAB
CTP QC/QA: YES
SARS-COV-2 AG RESP QL IA.RAPID: POSITIVE

## 2024-01-21 PROCEDURE — 99213 OFFICE O/P EST LOW 20 MIN: CPT | Mod: 25,S$GLB,, | Performed by: PHYSICIAN ASSISTANT

## 2024-01-21 PROCEDURE — 87811 SARS-COV-2 COVID19 W/OPTIC: CPT | Mod: QW,S$GLB,, | Performed by: PHYSICIAN ASSISTANT

## 2024-01-21 RX ORDER — BENZONATATE 100 MG/1
100 CAPSULE ORAL 3 TIMES DAILY PRN
Qty: 30 CAPSULE | Refills: 0 | Status: SHIPPED | OUTPATIENT
Start: 2024-01-21 | End: 2024-02-07 | Stop reason: SDUPTHER

## 2024-01-21 RX ORDER — FLUTICASONE PROPIONATE 50 MCG
2 SPRAY, SUSPENSION (ML) NASAL DAILY PRN
Qty: 15.8 ML | Refills: 0 | Status: SHIPPED | OUTPATIENT
Start: 2024-01-21 | End: 2024-02-20

## 2024-01-21 RX ORDER — LIDOCAINE HYDROCHLORIDE 20 MG/ML
10 SOLUTION OROPHARYNGEAL ONCE
Status: COMPLETED | OUTPATIENT
Start: 2024-01-21 | End: 2024-01-21

## 2024-01-21 RX ORDER — DICYCLOMINE HYDROCHLORIDE 10 MG/1
20 CAPSULE ORAL
Status: COMPLETED | OUTPATIENT
Start: 2024-01-21 | End: 2024-01-21

## 2024-01-21 RX ORDER — ALUMINUM HYDROXIDE, MAGNESIUM HYDROXIDE, AND SIMETHICONE 1200; 120; 1200 MG/30ML; MG/30ML; MG/30ML
30 SUSPENSION ORAL
Status: COMPLETED | OUTPATIENT
Start: 2024-01-21 | End: 2024-01-21

## 2024-01-21 RX ORDER — ONDANSETRON 4 MG/1
4 TABLET, ORALLY DISINTEGRATING ORAL EVERY 6 HOURS PRN
Qty: 30 TABLET | Refills: 0 | Status: SHIPPED | OUTPATIENT
Start: 2024-01-21 | End: 2024-02-07 | Stop reason: SDUPTHER

## 2024-01-21 RX ORDER — ONDANSETRON 8 MG/1
8 TABLET, ORALLY DISINTEGRATING ORAL
Status: COMPLETED | OUTPATIENT
Start: 2024-01-21 | End: 2024-01-21

## 2024-01-21 RX ADMIN — ONDANSETRON 8 MG: 8 TABLET, ORALLY DISINTEGRATING ORAL at 12:01

## 2024-01-21 RX ADMIN — LIDOCAINE HYDROCHLORIDE 10 ML: 20 SOLUTION OROPHARYNGEAL at 12:01

## 2024-01-21 RX ADMIN — DICYCLOMINE HYDROCHLORIDE 20 MG: 10 CAPSULE ORAL at 12:01

## 2024-01-21 RX ADMIN — ALUMINUM HYDROXIDE, MAGNESIUM HYDROXIDE, AND SIMETHICONE 30 ML: 1200; 120; 1200 SUSPENSION ORAL at 12:01

## 2024-01-21 NOTE — PROGRESS NOTES
"Subjective:      Patient ID: Nilsa Curiel is a 78 y.o. female.    Vitals:  height is 5' 0.98" (1.549 m) and weight is 87.4 kg (192 lb 10.9 oz). Her oral temperature is 98.9 °F (37.2 °C). Her blood pressure is 149/79 (abnormal) and her pulse is 112 (abnormal). Her respiration is 18 and oxygen saturation is 96%.     Chief Complaint: Cough    Nilsa Curiel is a 78 y.o. female who complains of cough, stomach pain, headaches. Patient states that all her symptoms started around 6 yesterday morning. Patient reports nausea but no vomiting.     Cough  This is a new problem. The current episode started yesterday. The problem has been gradually worsening. The problem occurs constantly. The cough is Productive of sputum. Associated symptoms include chills, headaches, postnasal drip and sweats. Pertinent negatives include no chest pain, ear congestion, ear pain, eye redness, fever, myalgias, nasal congestion, rash, rhinorrhea, sore throat, shortness of breath or wheezing. Associated symptoms comments: Flatulence,gas. Nothing aggravates the symptoms. Treatments tried: peptobismol. The treatment provided no relief. Her past medical history is significant for environmental allergies. There is no history of asthma, bronchiectasis, bronchitis, COPD, emphysema or pneumonia.       Constitution: Positive for chills, sweating and fatigue. Negative for activity change, appetite change, fever and generalized weakness.   HENT:  Positive for postnasal drip. Negative for ear pain, congestion, sinus pain, sinus pressure, sore throat, trouble swallowing and voice change.    Neck: Positive for neck pain, degenerative disc disease and bulging disc disease. Negative for neck stiffness.   Cardiovascular:  Negative for chest pain, leg swelling, palpitations and sob on exertion.   Eyes:  Negative for eye itching, eye redness, double vision, blurred vision and eyelid swelling.   Respiratory:  Positive for sleep apnea, cough and sputum production. " Negative for chest tightness, COPD, shortness of breath, stridor, wheezing and asthma.    Gastrointestinal:  Positive for abdominal pain, abdominal bloating and nausea. Negative for vomiting, constipation and diarrhea.   Genitourinary:  Negative for dysuria, frequency, urgency and urine decreased.   Musculoskeletal:  Negative for pain, muscle cramps and muscle ache.   Skin:  Negative for rash.   Allergic/Immunologic: Positive for environmental allergies, seasonal allergies, eczema and sneezing. Negative for asthma and recurrent sinus infections.   Neurological:  Positive for headaches.   Psychiatric/Behavioral:  Negative for nervous/anxious. The patient is not nervous/anxious.       Objective:     Physical Exam   Constitutional: She is oriented to person, place, and time.      Comments:Patient is awake and alert, sitting up in exam chair, speaking and answering in complete sentences, in no signs of acute distress     normal  HENT:   Head: Normocephalic and atraumatic.   Ears:   Right Ear: Tympanic membrane, external ear and ear canal normal.   Left Ear: Tympanic membrane, external ear and ear canal normal.   Nose: No rhinorrhea or congestion.   Mouth/Throat: Uvula is midline, oropharynx is clear and moist and mucous membranes are normal. Mucous membranes are moist. No oropharyngeal exudate or posterior oropharyngeal erythema. Tonsils are 0 on the right. Tonsils are 0 on the left. Oropharynx is clear.      Comments:  postnasal discharge noted on the posterior pharyngeal wall    Eyes: Conjunctivae are normal. Pupils are equal, round, and reactive to light. Extraocular movement intact   Neck: Neck supple.   Cardiovascular: Normal rate, regular rhythm, normal heart sounds and normal pulses.   Pulmonary/Chest: Effort normal and breath sounds normal. No respiratory distress. She has no wheezes. She has no rhonchi. She has no rales.   Abdominal: Normal appearance. There is no abdominal tenderness. There is no rebound, no  guarding, no left CVA tenderness and no right CVA tenderness. No hernia.   Musculoskeletal: Normal range of motion.         General: Normal range of motion.      Cervical back: She exhibits no tenderness.      Comments: Patient able to ambulate with walker   Lymphadenopathy:     She has no cervical adenopathy.   Neurological: no focal deficit. She is alert and oriented to person, place, and time. She has normal sensation and intact cranial nerves (2-12). She displays no weakness. Gait and coordination normal. GCS eye subscore is 4. GCS verbal subscore is 5. GCS motor subscore is 6.   Skin: Skin is warm.   Psychiatric: Her behavior is normal. Mood, judgment and thought content normal.   Nursing note and vitals reviewed.      Assessment:     1. COVID-19    2. Other cough    3. Elevated blood pressure reading with diagnosis of hypertension    4. Epigastric pain    5. Nausea    6. Post-nasal drip      Patient presents with clinical exam findings and history consistent with above.      On exam, patient is nontoxic appearing and vitals are stable.      Diagnostic testing results were reviewed and discussed with patient/guardian.   Tests ordered in clinic:  Results for orders placed or performed in visit on 01/21/24   SARS Coronavirus 2 Antigen, POCT Manual Read   Result Value Ref Range    SARS Coronavirus 2 Antigen Positive (A) Negative     Acceptable Yes        Previous progress notes/admissions/labs and medications were reviewed. Reviewed CMP with GFR 52.1 from 12/4/23.     Plan:       COVID-19  -     nirmatrelvir-ritonavir 300 mg (150 mg x 2)-100 mg copackaged tablets (EUA); Take 3 tablets by mouth 2 (two) times daily for 5 days. Each dose contains 2 nirmatrelvir (pink tablets) and 1 ritonavir (white tablet). Take all 3 tablets together  Dispense: 30 tablet; Refill: 0  -     Ambulatory referral/consult to Internal Medicine    Other cough  -     SARS Coronavirus 2 Antigen, POCT Manual Read  -      "benzonatate (TESSALON) 100 MG capsule; Take 1 capsule (100 mg total) by mouth 3 (three) times daily as needed for Cough.  Dispense: 30 capsule; Refill: 0    Elevated blood pressure reading with diagnosis of hypertension  -     Ambulatory referral/consult to Internal Medicine    Epigastric pain  -     aluminum-magnesium hydroxide-simethicone 200-200-20 mg/5 mL suspension 30 mL  -     LIDOcaine viscous HCl 2% oral solution 10 mL  -     dicyclomine capsule 20 mg    Nausea  -     ondansetron disintegrating tablet 8 mg  -     ondansetron (ZOFRAN-ODT) 4 MG TbDL; Take 1 tablet (4 mg total) by mouth every 6 (six) hours as needed (nausea).  Dispense: 30 tablet; Refill: 0    Post-nasal drip  -     fluticasone propionate (FLONASE) 50 mcg/actuation nasal spray; 2 sprays (100 mcg total) by Each Nostril route daily as needed for Allergies or Rhinitis.  Dispense: 15.8 mL; Refill: 0             Additional MDM:     Heart Failure Score:   COPD = No            1) See orders for this visit as documented in the electronic medical record.  2) Symptomatic therapy suggested: use acetaminophen/ibuprofen every 6-8 hours prn pain or fever, push fluids.   3) Call or return to clinic prn if these symptoms worsen or fail to improve as anticipated.    Discussed results/diagnosis/plan with patient in clinic.  We had shared decision making for patient's treatment. Patient verbalized understanding and in agreement with current treatment plan.     Patient was instructed to return for re-evaluation with urgent care or PCP for continued outpatient workup and management if symptoms do not improve/worsening symptoms. Strict ED versus clinic precautions given in depth.    Discharge and follow-up instructions given verbally/printed with the patient who expressed understanding. The instructions and results are also available on [employer].              Tammy "Marci" NIDIA Das          Patient Instructions   Recommend Pepto bismol for abdominal pain, nausea, " flatuence.   Pepto-Bismol - One common side effect is your stool or your tongue turning black. This is harmless.This happens when bismuth (the active ingredient in this medicine) comes into contact with small amounts of sulphur in your saliva and digestive system. They combine to form bismuth sulfide, a black substance. As it slowly makes its way out of your body you may see black stools.This side effect usually goes away when you stop taking the medicine but it may take several days.   Simethicone(Gas ex) - for gas      Recommend oral antihistamine (claritin, zyrtec, allegra,xyzal)  for rhinorrhea, steroid nasal spray (flonase), OTC cough medicine (Coricidin HBP® Maximum Strength Cold & Flu Day,  Tylenol (Acetaminophen) and/or Motrin (Ibuprofen) as directed for control of pain and/or fever.    Please drink plenty of fluids.  Please get plenty of rest.  Nasal irrigation with a saline spray or Netti Pot like device per their directions is also recommended.  If you  smoke, please stop smoking.    To help ease a sore throat, you can:  Use a sore throat spray.  Suck on hard candy or throat lozenges.  Gargle with warm saltwater a few times each day. Mix of 1/4 teaspoon (1.25 grams) salt in 8 ounces (240 mL) of warm water.  Use a cool mist humidifier to help you breathe easier.    Patient positive for COVID-19.Counseled on CDC recommendations to stay home for at least 5 days and isolate from others in your home. Patient may end isolation after day 5 if asymptomatic or fever-free for 24 hours (without the use of fever-reducing medication). Isolation is over Thursday January 25, 2024.    Discussed Paxlovid and its use during COVID19. Reviewed medications with patient and possible drug interactions with Paxlovid. Patient is on rosuvastatin for high cholesterol; please hold medication for 10 days while taking Paxlovid.    Paxlovid is given twice daily for 5 days, starting as soon as possible and within 5 days of symptom  onset. The most common side effects of PAXLOVID include: altered sense of taste and diarrhea. Other possible side effects include: headache, vomiting, abdominal pain, nausea,high blood pressure, and feeling generally unwell. PAXLOVID may cause serious side effects, including:Allergic reactions, including severe allergic reactions (anaphylaxis),skin rash, hives, blisters or peeling  skin, painful sores or ulcers in the mouth, nose, throat or genital area, and/ swelling of the mouth, lips, tongue or face.      Discussed prescriptions and over-the-counter medicines to help with patient's symptoms:  A steroid nose spray (flonase) can help with a stuffy nose. It can also help with drainage down the back of your throat.  An antihistamine (loratadine,zyrtec,allegra, xyzal) can help with itching, sneezing, or runny nose.  An antihistamine eye drop can help with itchy eyes.  A decongestant (pseudoephedrine,  Phenylephrine) can help with a stuffy nose. Take <10 days for congestion and rhinorrhea. Once symptoms improve, proceed with loratadine/zyrtec once a day. These ingredients can keep you up all night, decrease appetite, feel jittery, and raise blood pressure with long term use.  OTC Coricidin can be used for patients with hypertension and palpitations because you cannot use ingredients such as pseudoephedrine and phenylephrine for oral decongestants.  Coricidin HBP Cough & Cold (Chlorpheniramine/Dextromethorphan)  Coricidin HBP Maximum Strength Multi-Symptom Flu  (Acetaminophen,Dextromethorphan, Chlorpheniramine)  Coricidin HBP® Maximum Strength Cold & Flu Day/Night (Acetaminophen,Dextromethorphan, Doxylamine, Guaifenesin)  Coricidin HBP Chest Congestion & Cough (Dextromethorphan + Guaifenesin)    Medications that control cough are suppressants and expectorants. Suppressants are tessalon pearls and dextromethorphan. If you have a productive cough with sputum, you need an expectorant called guaifenesin. Dextromethorphan  and Guaifenesin are active ingredients in many OTC cough/cold medications such as Dayquil/Nyquil, Mucinex, and Robitussin Mucus+Chest Congestion.            Common Cold Medicine Ingredients Cheat sheet  Acetaminophen (APAP) -pain reliever/fever reducer  Dextromethorphan - cough suppressant  Guaifenesin - expectorant/thins and loosens mucus  Phenylephrine - nasal decongestant  Diphenhydramine or Doxylamine succinate - antihistamine, helps you fall asleep  Promethazine or Brompheniramine - Prescription strength antihistamines      If not allergic, take Tylenol (Acetaminophen) 650 mg to  1 g every 6 hours as needed for fever and/or Motrin (Ibuprofen) 600 to 800 mg every 6 hours as needed for fever as directed for control of pain and/or fever    If your blood pressure was elevated during your visit and this was discussed with you, please obtain a home blood pressure cuff and take your blood pressure once daily for two weeks, record values and follow up with your PCP. If you were started on blood pressure medication today, ensure you obtain a follow up appointment with your PCP in 5-7 days for a re-check, if you develop shortness of breath, severe headache, weakness, chest pain, vision problems after leaving urgent care, call 911 and go to the ER immediately.         Please remember that you have received care at an urgent care today. Urgent cares are not emergency rooms and are not equipped to handle life threatening emergencies and cannot rule in or out certain medical conditions and you may be released before all of your medical problems are known or treated.     Please arrange follow up with your primary care physician or speciality clinic within 2-5 days if your signs and symptoms have not resolved or worsen.     Patient can call our Referral Hotline at [phone] to make an appointment.      Please return here or go to the Emergency Department for any concerns or worsening of condition.  Signs of infection.  These include a fever of 100.4°F (38°C) or higher, chills, cough, more sputum or change in color of sputum.  You are having so much trouble breathing that you can only say one or two words at a time.  You need to sit upright at all times to be able to breathe and or cannot lie down.  You have trouble breathing when talking or sitting still.  You have a fever of 100.4°F (38°C) or higher or chills.  You have chest pain when you cough, have trouble breathing but can still talk in full sentences, or cough up blood.

## 2024-01-21 NOTE — PATIENT INSTRUCTIONS
Recommend Pepto bismol for abdominal pain, nausea, flatuence.   Pepto-Bismol - One common side effect is your stool or your tongue turning black. This is harmless.This happens when bismuth (the active ingredient in this medicine) comes into contact with small amounts of sulphur in your saliva and digestive system. They combine to form bismuth sulfide, a black substance. As it slowly makes its way out of your body you may see black stools.This side effect usually goes away when you stop taking the medicine but it may take several days.   Simethicone(Gas ex) - for gas      Recommend oral antihistamine (claritin, zyrtec, allegra,xyzal)  for rhinorrhea, steroid nasal spray (flonase), OTC cough medicine (Coricidin HBP® Maximum Strength Cold & Flu Day,  Tylenol (Acetaminophen) and/or Motrin (Ibuprofen) as directed for control of pain and/or fever.    Please drink plenty of fluids.  Please get plenty of rest.  Nasal irrigation with a saline spray or Netti Pot like device per their directions is also recommended.  If you  smoke, please stop smoking.    To help ease a sore throat, you can:  Use a sore throat spray.  Suck on hard candy or throat lozenges.  Gargle with warm saltwater a few times each day. Mix of 1/4 teaspoon (1.25 grams) salt in 8 ounces (240 mL) of warm water.  Use a cool mist humidifier to help you breathe easier.    Patient positive for COVID-19.Counseled on CDC recommendations to stay home for at least 5 days and isolate from others in your home. Patient may end isolation after day 5 if asymptomatic or fever-free for 24 hours (without the use of fever-reducing medication). Isolation is over Thursday January 25, 2024.    Discussed Paxlovid and its use during COVID19. Reviewed medications with patient and possible drug interactions with Paxlovid. Patient is on rosuvastatin for high cholesterol; please hold medication for 10 days while taking Paxlovid.    Paxlovid is given twice daily for 5 days, starting as  soon as possible and within 5 days of symptom onset. The most common side effects of PAXLOVID include: altered sense of taste and diarrhea. Other possible side effects include: headache, vomiting, abdominal pain, nausea,high blood pressure, and feeling generally unwell. PAXLOVID may cause serious side effects, including:Allergic reactions, including severe allergic reactions (anaphylaxis),skin rash, hives, blisters or peeling  skin, painful sores or ulcers in the mouth, nose, throat or genital area, and/ swelling of the mouth, lips, tongue or face.      Discussed prescriptions and over-the-counter medicines to help with patient's symptoms:  A steroid nose spray (flonase) can help with a stuffy nose. It can also help with drainage down the back of your throat.  An antihistamine (loratadine,zyrtec,allegra, xyzal) can help with itching, sneezing, or runny nose.  An antihistamine eye drop can help with itchy eyes.  A decongestant (pseudoephedrine,  Phenylephrine) can help with a stuffy nose. Take <10 days for congestion and rhinorrhea. Once symptoms improve, proceed with loratadine/zyrtec once a day. These ingredients can keep you up all night, decrease appetite, feel jittery, and raise blood pressure with long term use.  OTC Coricidin can be used for patients with hypertension and palpitations because you cannot use ingredients such as pseudoephedrine and phenylephrine for oral decongestants.  Coricidin HBP Cough & Cold (Chlorpheniramine/Dextromethorphan)  Coricidin HBP Maximum Strength Multi-Symptom Flu  (Acetaminophen,Dextromethorphan, Chlorpheniramine)  Coricidin HBP® Maximum Strength Cold & Flu Day/Night (Acetaminophen,Dextromethorphan, Doxylamine, Guaifenesin)  Coricidin HBP Chest Congestion & Cough (Dextromethorphan + Guaifenesin)    Medications that control cough are suppressants and expectorants. Suppressants are tessalon pearls and dextromethorphan. If you have a productive cough with sputum, you need an  expectorant called guaifenesin. Dextromethorphan and Guaifenesin are active ingredients in many OTC cough/cold medications such as Dayquil/Nyquil, Mucinex, and Robitussin Mucus+Chest Congestion.            Common Cold Medicine Ingredients Cheat sheet  Acetaminophen (APAP) -pain reliever/fever reducer  Dextromethorphan - cough suppressant  Guaifenesin - expectorant/thins and loosens mucus  Phenylephrine - nasal decongestant  Diphenhydramine or Doxylamine succinate - antihistamine, helps you fall asleep  Promethazine or Brompheniramine - Prescription strength antihistamines      If not allergic, take Tylenol (Acetaminophen) 650 mg to  1 g every 6 hours as needed for fever and/or Motrin (Ibuprofen) 600 to 800 mg every 6 hours as needed for fever as directed for control of pain and/or fever    If your blood pressure was elevated during your visit and this was discussed with you, please obtain a home blood pressure cuff and take your blood pressure once daily for two weeks, record values and follow up with your PCP. If you were started on blood pressure medication today, ensure you obtain a follow up appointment with your PCP in 5-7 days for a re-check, if you develop shortness of breath, severe headache, weakness, chest pain, vision problems after leaving urgent care, call 911 and go to the ER immediately.         Please remember that you have received care at an urgent care today. Urgent cares are not emergency rooms and are not equipped to handle life threatening emergencies and cannot rule in or out certain medical conditions and you may be released before all of your medical problems are known or treated.     Please arrange follow up with your primary care physician or speciality clinic within 2-5 days if your signs and symptoms have not resolved or worsen.     Patient can call our Referral Hotline at [phone] to make an appointment.      Please return here or go to the Emergency Department for any concerns or  worsening of condition.  Signs of infection. These include a fever of 100.4°F (38°C) or higher, chills, cough, more sputum or change in color of sputum.  You are having so much trouble breathing that you can only say one or two words at a time.  You need to sit upright at all times to be able to breathe and or cannot lie down.  You have trouble breathing when talking or sitting still.  You have a fever of 100.4°F (38°C) or higher or chills.  You have chest pain when you cough, have trouble breathing but can still talk in full sentences, or cough up blood.

## 2024-02-07 DIAGNOSIS — B37.2 CANDIDAL INTERTRIGO: ICD-10-CM

## 2024-02-07 DIAGNOSIS — L30.9 HAND ECZEMA: ICD-10-CM

## 2024-02-07 DIAGNOSIS — R05.8 OTHER COUGH: ICD-10-CM

## 2024-02-07 DIAGNOSIS — R11.0 NAUSEA: ICD-10-CM

## 2024-02-07 RX ORDER — MOMETASONE FUROATE 1 MG/G
OINTMENT TOPICAL
Qty: 45 G | Refills: 3 | Status: SHIPPED | OUTPATIENT
Start: 2024-02-07

## 2024-02-07 RX ORDER — KETOCONAZOLE 20 MG/G
CREAM TOPICAL
Qty: 15 G | Refills: 1 | Status: SHIPPED | OUTPATIENT
Start: 2024-02-07

## 2024-02-07 RX ORDER — ASPIRIN 81 MG/1
81 TABLET ORAL DAILY
Qty: 90 TABLET | Refills: 3 | Status: SHIPPED | OUTPATIENT
Start: 2024-02-07 | End: 2025-02-06

## 2024-02-07 RX ORDER — TRIAMCINOLONE ACETONIDE 0.25 MG/G
CREAM TOPICAL
Qty: 15 G | Refills: 1 | Status: SHIPPED | OUTPATIENT
Start: 2024-02-07

## 2024-02-07 RX ORDER — ONDANSETRON 4 MG/1
4 TABLET, ORALLY DISINTEGRATING ORAL EVERY 6 HOURS PRN
Qty: 30 TABLET | Refills: 0 | Status: SHIPPED | OUTPATIENT
Start: 2024-02-07 | End: 2024-02-22

## 2024-02-07 RX ORDER — BENZONATATE 100 MG/1
100 CAPSULE ORAL 3 TIMES DAILY PRN
Qty: 30 CAPSULE | Refills: 0 | Status: SHIPPED | OUTPATIENT
Start: 2024-02-07 | End: 2024-02-17

## 2024-02-07 NOTE — TELEPHONE ENCOUNTER
Care Due:                  Date            Visit Type   Department     Provider  --------------------------------------------------------------------------------                                HOSPITAL     [facility] PRIMARY  Last Visit: 10-      FOLLOW UP    CARE           Gaurav Allison  Next Visit: None Scheduled  None         None Found                                                            Last  Test          Frequency    Reason                     Performed    Due Date  --------------------------------------------------------------------------------    Uric Acid...  12 months..  allopurinoL..............  01- 12-    [facility] Embedded Care Due Messages. Reference number: [id number].   2/07/2024 10:09:59 AM CST

## 2024-02-07 NOTE — TELEPHONE ENCOUNTER
LOV 10/11/23  Annual 12/8/22  RTC     Patient is requesting a refill for ondansetron and aspirin.

## 2024-02-07 NOTE — TELEPHONE ENCOUNTER
LOV 10/11/23  Annual 12/8/22  RT     Patient is requesting a prescription for benzonatate. This was prescribed by a provider at urgent care.

## 2024-02-09 NOTE — PROGRESS NOTES
[facility] Note    Chief Complaint      Chief Complaint   Patient presents with    Follow-up       History of Present Illness      Nilsa Curiel is a 78 y.o. female who presents today for   Chief Complaint   Patient presents with    Follow-up         Patient is new to me.  She presents to clinic today for follow up visit.  She underwent lumbar spine fusion on 08/17/2023.  Following this procedure she developed complications with wound closure and required debridement a wound.  She reports completing physical therapy last week in his no ambulating with a cane.  She states that she is feeling much better and that pain has resolved her lower back.  She will have follow up with Orthopedic surgeon on 02/27/2024.  There are no other complaints today.         Review of Systems   All 12 systems otherwise negative.       Family History:  family history includes Breast cancer (age of onset: 91) in her mother; Cancer in her sister; Cancer (age of onset: 91) in her mother; Cataracts in her brother; Dementia in her mother; Diabetes in her brother, mother, and sister; Gout in her sister; Heart disease in her sister; Hyperlipidemia in her sister; Hypertension in her mother, sister, sister, and son; Kidney disease in her sister and sister; Multiple sclerosis in her daughter; No Known Problems in her brother and daughter; Other in her brother and father; Stroke in her brother.   Family history was reviewed with patient.     Medications:  Outpatient Encounter Medications as of 2/22/2024   Medication Sig Note Dispense Refill    acetaminophen (TYLENOL) 500 MG tablet Take 1 tablet (500 mg total) by mouth 3 (three) times daily.  90 tablet 0    allopurinoL (ZYLOPRIM) 100 MG tablet TAKE 1 TABLET ONE TIME DAILY  90 tablet 0    amLODIPine (NORVASC) 10 MG tablet TAKE 1 TABLET ONE TIME DAILY  90 tablet 3    aspirin (ECOTRIN) 81 MG EC tablet Take 1 tablet (81 mg total) by mouth once daily.  90 tablet 3    betamethasone  dipropionate (DIPROLENE) 0.05 % ointment Apply topically 2 (two) times daily. 8/3/2023: Hold am of surgery     45 g 11    biotin 300 mcg Tab Take 1 tablet by mouth once daily. 2023: HOLD ONE WEEK PRIOR TO SURGERY.       carvediloL (COREG) 3.125 MG tablet TAKE 1 TABLET TWICE DAILY WITH MEALS  180 tablet 2    ketoconazole (NIZORAL) 2 % cream Apply to affected areas of body BID prn irritation.  15 g 1    methocarbamoL (ROBAXIN) 500 MG Tab Take 2 tablets (1,000 mg total) by mouth 3 (three) times daily.  90 tablet 0    mometasone (ELOCON) 0.1 % ointment Apply twice daily to hands prn eczema.  45 g 3    MULTIVIT-MIN/FA/CA CARB/VIT K (WOMEN'S 50+ DAILY FORMULA ORAL) Take by mouth. 2023: HOLD ONE WEEK PRIOR TO SURGERY.       olmesartan (BENICAR) 40 MG tablet TAKE 1 TABLET EVERY DAY  90 tablet 3    omega-3 fatty acids 300 mg Cap Take by mouth. 2023: HOLD ONE WEEK PRIOR TO SURGERY.       ondansetron (ZOFRAN-ODT) 4 MG TbDL DISSOLVE 1 TABLET ON THE TONGUE EVERY 6 HOURS AS NEEDED FOR NAUSEA  30 tablet 0    potassium chloride SA (K-DUR,KLOR-CON) 20 MEQ tablet TAKE 1 TABLET TWICE DAILY  180 tablet 3    riboflavin, vitamin B2, (VITAMIN B-2 ORAL) Take 1 capsule by mouth once daily. 2023: HOLD ONE WEEK PRIOR TO SURGERY.       rosuvastatin (CRESTOR) 20 MG tablet Take 1 tablet (20 mg total) by mouth once daily. 8/3/2023: Take am of surgery     90 tablet 3    triamcinolone acetonide 0.025% (KENALOG) 0.025 % cream Apply to affected areas of body BID prn irritation.  15 g 1    TURMERIC ORAL Take by mouth. 2023: HOLD ONE WEEK PRIOR TO SURGERY.       ubidecarenone (COENZYME Q10) 100 mg Tab Take 1 tablet by mouth once daily.  2023: HOLD ONE WEEK PRIOR TO SURGERY.       [] benzonatate (TESSALON) 100 MG capsule Take 1 capsule (100 mg total) by mouth 3 (three) times daily as needed for Cough.  30 capsule 0    celecoxib (CELEBREX) 100 MG capsule Take 1 capsule (100 mg total) by mouth 2 (two) times daily.  (Patient not taking: Reported on 2024)  28 capsule 0    [] fluticasone propionate (FLONASE) 50 mcg/actuation nasal spray 2 sprays (100 mcg total) by Each Nostril route daily as needed for Allergies or Rhinitis.  15.8 mL 0    oxyCODONE (ROXICODONE) 5 MG immediate release tablet Take 1 tablet (5 mg total) by mouth every 6 (six) hours as needed for Pain (for breakthrough pain). (Patient not taking: Reported on 2024)  21 tablet 0    [DISCONTINUED] allopurinoL (ZYLOPRIM) 100 MG tablet Take 1 tablet (100 mg total) by mouth once daily. 2023: Take am of surgery     90 tablet 11    [DISCONTINUED] ondansetron (ZOFRAN-ODT) 4 MG TbDL Take 1 tablet (4 mg total) by mouth every 6 (six) hours as needed (nausea).  30 tablet 0     No facility-administered encounter medications on file as of 2024.       Allergies:  Review of patient's allergies indicates:  No Known Allergies    Health Maintenance:  Health Maintenance   Topic Date Due    Mammogram  2023    DEXA Scan  2027    Lipid Panel  2028    TETANUS VACCINE  2032    Hepatitis C Screening  Completed    Shingles Vaccine  Completed     Health Maintenance Topics with due status: Not Due       Topic Last Completion Date    TETANUS VACCINE 2022    Hemoglobin A1c (Prediabetes) 2023    DEXA Scan 2023    Lipid Panel 2023       Physical Exam      Vital Signs  Temp: 99 °F (37.2 °C)  Temp Source: Oral  Pulse: 90  Resp: 18  SpO2: 99 %  BP: 130/72  BP Location: Right arm  Patient Position: Sitting  Pain Score:   6  Pain Loc: Back  Height and Weight  Height: 5' (152.4 cm)  Weight: 84.3 kg (185 lb 13.6 oz)  BSA (Calculated - sq m): 1.89 sq meters  BMI (Calculated): 36.3  Weight in (lb) to have BMI = 25: 127.7]    Physical Exam  Vitals reviewed.   Constitutional:       Appearance: Normal appearance. She is normal weight.   HENT:      Head: Normocephalic and atraumatic.      Right Ear: Tympanic membrane, ear canal and  external ear normal.      Left Ear: Tympanic membrane, ear canal and external ear normal.      Nose: Nose normal.      Mouth/Throat:      Mouth: Mucous membranes are moist.      Pharynx: Oropharynx is clear.   Eyes:      Extraocular Movements: Extraocular movements intact.      Conjunctiva/sclera: Conjunctivae normal.      Pupils: Pupils are equal, round, and reactive to light.   Cardiovascular:      Rate and Rhythm: Normal rate and regular rhythm.      Pulses: Normal pulses.      Heart sounds: Normal heart sounds.   Pulmonary:      Effort: Pulmonary effort is normal.      Breath sounds: Normal breath sounds.   Abdominal:      General: Abdomen is flat. Bowel sounds are normal.      Palpations: Abdomen is soft.   Musculoskeletal:         General: Normal range of motion.      Cervical back: Normal range of motion and neck supple.      Comments: + ambulating with the assistance of a cane.   Skin:     General: Skin is warm and dry.      Capillary Refill: Capillary refill takes less than 2 seconds.   Neurological:      General: No focal deficit present.      Mental Status: She is alert and oriented to person, place, and time. Mental status is at baseline.   Psychiatric:         Mood and Affect: Mood normal.         Behavior: Behavior normal.         Thought Content: Thought content normal.         Judgment: Judgment normal.            Assessment/Plan     Nilsa Curiel is a 78 y.o.female with:    Routine physical examination  -     CBC W/ AUTO DIFFERENTIAL; Future; Expected date: 02/22/2024  -     COMPREHENSIVE METABOLIC PANEL; Future; Expected date: 02/22/2024  -     HEMOGLOBIN A1C; Future; Expected date: 02/22/2024  -     TSH; Future; Expected date: 02/22/2024  -     T4, FREE; Future; Expected date: 02/22/2024  -     LIPID PANEL; Future; Expected date: 02/22/2024    Need for immunization against respiratory syncytial virus    Fatigue, unspecified type  -     CBC W/ AUTO DIFFERENTIAL; Future; Expected date: 02/22/2024  -      COMPREHENSIVE METABOLIC PANEL; Future; Expected date: 02/22/2024  -     TSH; Future; Expected date: 02/22/2024  -     T4, FREE; Future; Expected date: 02/22/2024    Pre-diabetes  -     HEMOGLOBIN A1C; Future; Expected date: 02/22/2024    Mixed hyperlipidemia  -     LIPID PANEL; Future; Expected date: 02/22/2024    Breast cancer screening by mammogram      - Patient will receive RSV vaccine today in [facility].    As above, continue current medications and maintain follow up with specialists.  Return to clinic as needed.    Greater than 50% of visit was spent face to face with patient.  All questions were answered to patient's satisfaction.          FELIX Delacruz  [facility]                Answers submitted by the patient for this visit:  Review of Systems Questionnaire (Submitted on 2/20/2024)  activity change: No  unexpected weight change: No  neck pain: No  hearing loss: No  rhinorrhea: No  trouble swallowing: No  eye discharge: No  chest tightness: No  wheezing: No  chest pain: No  palpitations: No  blood in stool: No  constipation: No  vomiting: No  diarrhea: No  polydipsia: No  polyuria: No  difficulty urinating: No  hematuria: No  menstrual problem: No  dysuria: No  joint swelling: No  arthralgias: No  headaches: No  weakness: No  confusion: No  dysphoric mood: No

## 2024-02-13 NOTE — TELEPHONE ENCOUNTER
No care due was identified.  [facility] Embedded Care Due Messages. Reference number: [id number].   2/13/2024 4:56:50 PM CST

## 2024-02-15 RX ORDER — ALLOPURINOL 100 MG/1
100 TABLET ORAL
Qty: 90 TABLET | Refills: 0 | Status: SHIPPED | OUTPATIENT
Start: 2024-02-15 | End: 2024-05-16

## 2024-02-15 NOTE — TELEPHONE ENCOUNTER
Refill Routing Note   Medication(s) are not appropriate for processing by [facility] for the following reason(s):        Required labs outdated    [facility] action(s):  Defer               Appointments  past 12m or future 3m with PCP    Date Provider   Last Visit   10/11/2023 Gaurav Allison MD   Next Visit   4/15/2024 Gaurav Allison MD   ED visits in past 90 days: 0        Note composed:4:51 AM 02/15/2024

## 2024-02-19 DIAGNOSIS — R11.0 NAUSEA: ICD-10-CM

## 2024-02-19 NOTE — TELEPHONE ENCOUNTER
No care due was identified.  [facility] Embedded Care Due Messages. Reference number: [id number].   2/19/2024 4:33:25 PM CST

## 2024-02-22 ENCOUNTER — OFFICE VISIT (OUTPATIENT)
Dept: PRIMARY CARE CLINIC | Facility: CLINIC | Age: 79
End: 2024-02-22
Payer: MEDICARE

## 2024-02-22 VITALS
BODY MASS INDEX: 36.49 KG/M2 | HEART RATE: 90 BPM | WEIGHT: 185.88 LBS | DIASTOLIC BLOOD PRESSURE: 72 MMHG | OXYGEN SATURATION: 99 % | SYSTOLIC BLOOD PRESSURE: 130 MMHG | HEIGHT: 60 IN | RESPIRATION RATE: 18 BRPM | TEMPERATURE: 99 F

## 2024-02-22 DIAGNOSIS — Z29.11 NEED FOR IMMUNIZATION AGAINST RESPIRATORY SYNCYTIAL VIRUS: ICD-10-CM

## 2024-02-22 DIAGNOSIS — E78.2 MIXED HYPERLIPIDEMIA: ICD-10-CM

## 2024-02-22 DIAGNOSIS — Z00.00 ROUTINE PHYSICAL EXAMINATION: Primary | ICD-10-CM

## 2024-02-22 DIAGNOSIS — R53.83 FATIGUE, UNSPECIFIED TYPE: ICD-10-CM

## 2024-02-22 DIAGNOSIS — Z12.31 BREAST CANCER SCREENING BY MAMMOGRAM: ICD-10-CM

## 2024-02-22 DIAGNOSIS — R73.03 PRE-DIABETES: ICD-10-CM

## 2024-02-22 PROCEDURE — 3078F DIAST BP <80 MM HG: CPT | Mod: CPTII,S$GLB,, | Performed by: NURSE PRACTITIONER

## 2024-02-22 PROCEDURE — 3075F SYST BP GE 130 - 139MM HG: CPT | Mod: CPTII,S$GLB,, | Performed by: NURSE PRACTITIONER

## 2024-02-22 PROCEDURE — 1160F RVW MEDS BY RX/DR IN RCRD: CPT | Mod: CPTII,S$GLB,, | Performed by: NURSE PRACTITIONER

## 2024-02-22 PROCEDURE — 3288F FALL RISK ASSESSMENT DOCD: CPT | Mod: CPTII,S$GLB,, | Performed by: NURSE PRACTITIONER

## 2024-02-22 PROCEDURE — 99214 OFFICE O/P EST MOD 30 MIN: CPT | Mod: S$GLB,,, | Performed by: NURSE PRACTITIONER

## 2024-02-22 PROCEDURE — 99999 PR PBB SHADOW E&M-EST. PATIENT-LVL V: CPT | Mod: PBBFAC,,, | Performed by: NURSE PRACTITIONER

## 2024-02-22 PROCEDURE — 1125F AMNT PAIN NOTED PAIN PRSNT: CPT | Mod: CPTII,S$GLB,, | Performed by: NURSE PRACTITIONER

## 2024-02-22 PROCEDURE — 1101F PT FALLS ASSESS-DOCD LE1/YR: CPT | Mod: CPTII,S$GLB,, | Performed by: NURSE PRACTITIONER

## 2024-02-22 PROCEDURE — 1159F MED LIST DOCD IN RCRD: CPT | Mod: CPTII,S$GLB,, | Performed by: NURSE PRACTITIONER

## 2024-02-22 RX ORDER — ONDANSETRON 4 MG/1
TABLET, ORALLY DISINTEGRATING ORAL
Qty: 30 TABLET | Refills: 0 | Status: SHIPPED | OUTPATIENT
Start: 2024-02-22

## 2024-02-23 ENCOUNTER — OFFICE VISIT (OUTPATIENT)
Dept: OBSTETRICS AND GYNECOLOGY | Facility: CLINIC | Age: 79
End: 2024-02-23
Payer: MEDICARE

## 2024-02-23 ENCOUNTER — LAB VISIT (OUTPATIENT)
Dept: LAB | Facility: HOSPITAL | Age: 79
End: 2024-02-23
Attending: NURSE PRACTITIONER
Payer: MEDICARE

## 2024-02-23 VITALS
HEIGHT: 60 IN | DIASTOLIC BLOOD PRESSURE: 62 MMHG | BODY MASS INDEX: 36.79 KG/M2 | WEIGHT: 187.38 LBS | SYSTOLIC BLOOD PRESSURE: 128 MMHG

## 2024-02-23 DIAGNOSIS — Z01.419 ENCOUNTER FOR WELL WOMAN EXAM WITH ROUTINE GYNECOLOGICAL EXAM: Primary | ICD-10-CM

## 2024-02-23 DIAGNOSIS — Z00.00 ROUTINE PHYSICAL EXAMINATION: ICD-10-CM

## 2024-02-23 DIAGNOSIS — Z12.31 SCREENING MAMMOGRAM FOR BREAST CANCER: ICD-10-CM

## 2024-02-23 DIAGNOSIS — R53.83 FATIGUE, UNSPECIFIED TYPE: ICD-10-CM

## 2024-02-23 DIAGNOSIS — R73.03 PRE-DIABETES: ICD-10-CM

## 2024-02-23 DIAGNOSIS — Z98.890 S/P SPINAL SURGERY: Primary | ICD-10-CM

## 2024-02-23 DIAGNOSIS — E78.2 MIXED HYPERLIPIDEMIA: ICD-10-CM

## 2024-02-23 LAB
ALBUMIN SERPL BCP-MCNC: 3.8 G/DL (ref 3.5–5.2)
ALP SERPL-CCNC: 131 U/L (ref 55–135)
ALT SERPL W/O P-5'-P-CCNC: 13 U/L (ref 10–44)
ANION GAP SERPL CALC-SCNC: 10 MMOL/L (ref 8–16)
AST SERPL-CCNC: 22 U/L (ref 10–40)
BASOPHILS # BLD AUTO: 0.06 K/UL (ref 0–0.2)
BASOPHILS NFR BLD: 0.9 % (ref 0–1.9)
BILIRUB SERPL-MCNC: 0.3 MG/DL (ref 0.1–1)
BUN SERPL-MCNC: 22 MG/DL (ref 8–23)
CALCIUM SERPL-MCNC: 11.1 MG/DL (ref 8.7–10.5)
CHLORIDE SERPL-SCNC: 111 MMOL/L (ref 95–110)
CHOLEST SERPL-MCNC: 135 MG/DL (ref 120–199)
CHOLEST/HDLC SERPL: 2.8 {RATIO} (ref 2–5)
CO2 SERPL-SCNC: 22 MMOL/L (ref 23–29)
CREAT SERPL-MCNC: 1.2 MG/DL (ref 0.5–1.4)
DIFFERENTIAL METHOD BLD: ABNORMAL
EOSINOPHIL # BLD AUTO: 0.2 K/UL (ref 0–0.5)
EOSINOPHIL NFR BLD: 3.3 % (ref 0–8)
ERYTHROCYTE [DISTWIDTH] IN BLOOD BY AUTOMATED COUNT: 15.9 % (ref 11.5–14.5)
EST. GFR  (NO RACE VARIABLE): 46.9 ML/MIN/1.73 M^2
ESTIMATED AVG GLUCOSE: 103 MG/DL (ref 68–131)
GLUCOSE SERPL-MCNC: 87 MG/DL (ref 70–110)
HBA1C MFR BLD: 5.2 % (ref 4–5.6)
HCT VFR BLD AUTO: 32.6 % (ref 37–48.5)
HDLC SERPL-MCNC: 49 MG/DL (ref 40–75)
HDLC SERPL: 36.3 % (ref 20–50)
HGB BLD-MCNC: 9.5 G/DL (ref 12–16)
IMM GRANULOCYTES # BLD AUTO: 0.01 K/UL (ref 0–0.04)
IMM GRANULOCYTES NFR BLD AUTO: 0.2 % (ref 0–0.5)
LDLC SERPL CALC-MCNC: 55.8 MG/DL (ref 63–159)
LYMPHOCYTES # BLD AUTO: 2.5 K/UL (ref 1–4.8)
LYMPHOCYTES NFR BLD: 38.6 % (ref 18–48)
MCH RBC QN AUTO: 23.8 PG (ref 27–31)
MCHC RBC AUTO-ENTMCNC: 29.1 G/DL (ref 32–36)
MCV RBC AUTO: 82 FL (ref 82–98)
MONOCYTES # BLD AUTO: 0.7 K/UL (ref 0.3–1)
MONOCYTES NFR BLD: 10.2 % (ref 4–15)
NEUTROPHILS # BLD AUTO: 3 K/UL (ref 1.8–7.7)
NEUTROPHILS NFR BLD: 46.8 % (ref 38–73)
NONHDLC SERPL-MCNC: 86 MG/DL
NRBC BLD-RTO: 0 /100 WBC
PLATELET # BLD AUTO: 327 K/UL (ref 150–450)
PMV BLD AUTO: 10 FL (ref 9.2–12.9)
POTASSIUM SERPL-SCNC: 4.6 MMOL/L (ref 3.5–5.1)
PROT SERPL-MCNC: 8 G/DL (ref 6–8.4)
RBC # BLD AUTO: 4 M/UL (ref 4–5.4)
SODIUM SERPL-SCNC: 143 MMOL/L (ref 136–145)
T4 FREE SERPL-MCNC: 0.88 NG/DL (ref 0.71–1.51)
TRIGL SERPL-MCNC: 151 MG/DL (ref 30–150)
TSH SERPL DL<=0.005 MIU/L-ACNC: 4.25 UIU/ML (ref 0.4–4)
WBC # BLD AUTO: 6.37 K/UL (ref 3.9–12.7)

## 2024-02-23 PROCEDURE — 80061 LIPID PANEL: CPT | Performed by: NURSE PRACTITIONER

## 2024-02-23 PROCEDURE — 85025 COMPLETE CBC W/AUTO DIFF WBC: CPT | Performed by: NURSE PRACTITIONER

## 2024-02-23 PROCEDURE — 1159F MED LIST DOCD IN RCRD: CPT | Mod: CPTII,S$GLB,, | Performed by: OBSTETRICS & GYNECOLOGY

## 2024-02-23 PROCEDURE — 83036 HEMOGLOBIN GLYCOSYLATED A1C: CPT | Performed by: NURSE PRACTITIONER

## 2024-02-23 PROCEDURE — 99397 PER PM REEVAL EST PAT 65+ YR: CPT | Mod: S$GLB,,, | Performed by: OBSTETRICS & GYNECOLOGY

## 2024-02-23 PROCEDURE — 1126F AMNT PAIN NOTED NONE PRSNT: CPT | Mod: CPTII,S$GLB,, | Performed by: OBSTETRICS & GYNECOLOGY

## 2024-02-23 PROCEDURE — 84439 ASSAY OF FREE THYROXINE: CPT | Performed by: NURSE PRACTITIONER

## 2024-02-23 PROCEDURE — 99999 PR PBB SHADOW E&M-EST. PATIENT-LVL IV: CPT | Mod: PBBFAC,,, | Performed by: OBSTETRICS & GYNECOLOGY

## 2024-02-23 PROCEDURE — 3074F SYST BP LT 130 MM HG: CPT | Mod: CPTII,S$GLB,, | Performed by: OBSTETRICS & GYNECOLOGY

## 2024-02-23 PROCEDURE — 36415 COLL VENOUS BLD VENIPUNCTURE: CPT | Mod: PN | Performed by: NURSE PRACTITIONER

## 2024-02-23 PROCEDURE — 3078F DIAST BP <80 MM HG: CPT | Mod: CPTII,S$GLB,, | Performed by: OBSTETRICS & GYNECOLOGY

## 2024-02-23 PROCEDURE — 80053 COMPREHEN METABOLIC PANEL: CPT | Performed by: NURSE PRACTITIONER

## 2024-02-23 PROCEDURE — 84443 ASSAY THYROID STIM HORMONE: CPT | Performed by: NURSE PRACTITIONER

## 2024-02-27 ENCOUNTER — OFFICE VISIT (OUTPATIENT)
Dept: ORTHOPEDICS | Facility: CLINIC | Age: 79
End: 2024-02-27
Payer: MEDICARE

## 2024-02-27 ENCOUNTER — HOSPITAL ENCOUNTER (OUTPATIENT)
Dept: RADIOLOGY | Facility: HOSPITAL | Age: 79
Discharge: HOME | End: 2024-02-27
Attending: ORTHOPAEDIC SURGERY
Payer: MEDICARE

## 2024-02-27 DIAGNOSIS — Z98.890 S/P SPINAL SURGERY: ICD-10-CM

## 2024-02-27 DIAGNOSIS — M47.816 LUMBAR SPONDYLOSIS: Primary | ICD-10-CM

## 2024-02-27 PROCEDURE — 99214 OFFICE O/P EST MOD 30 MIN: CPT | Mod: S$GLB,,, | Performed by: ORTHOPAEDIC SURGERY

## 2024-02-27 PROCEDURE — 72100 X-RAY EXAM L-S SPINE 2/3 VWS: CPT | Mod: TC

## 2024-02-27 PROCEDURE — 72100 X-RAY EXAM L-S SPINE 2/3 VWS: CPT | Mod: 26,,, | Performed by: RADIOLOGY

## 2024-02-27 PROCEDURE — 1101F PT FALLS ASSESS-DOCD LE1/YR: CPT | Mod: CPTII,S$GLB,, | Performed by: ORTHOPAEDIC SURGERY

## 2024-02-27 PROCEDURE — 99999 PR PBB SHADOW E&M-EST. PATIENT-LVL III: CPT | Mod: PBBFAC,,, | Performed by: ORTHOPAEDIC SURGERY

## 2024-02-27 PROCEDURE — 1160F RVW MEDS BY RX/DR IN RCRD: CPT | Mod: CPTII,S$GLB,, | Performed by: ORTHOPAEDIC SURGERY

## 2024-02-27 PROCEDURE — 1125F AMNT PAIN NOTED PAIN PRSNT: CPT | Mod: CPTII,S$GLB,, | Performed by: ORTHOPAEDIC SURGERY

## 2024-02-27 PROCEDURE — 3288F FALL RISK ASSESSMENT DOCD: CPT | Mod: CPTII,S$GLB,, | Performed by: ORTHOPAEDIC SURGERY

## 2024-02-27 PROCEDURE — 1159F MED LIST DOCD IN RCRD: CPT | Mod: CPTII,S$GLB,, | Performed by: ORTHOPAEDIC SURGERY

## 2024-02-27 RX ORDER — ACETAMINOPHEN 500 MG
500 TABLET ORAL 3 TIMES DAILY
Qty: 90 TABLET | Refills: 0 | Status: SHIPPED | OUTPATIENT
Start: 2024-02-27 | End: 2024-02-28 | Stop reason: SDUPTHER

## 2024-02-27 RX ORDER — METHOCARBAMOL 500 MG/1
1000 TABLET, FILM COATED ORAL 3 TIMES DAILY
Qty: 90 TABLET | Refills: 0 | Status: SHIPPED | OUTPATIENT
Start: 2024-02-27 | End: 2024-04-12 | Stop reason: SDUPTHER

## 2024-02-27 NOTE — PROGRESS NOTES
DATE: 2/27/2024  PATIENT: Nilsa Curiel    Attending Physician: Jesus Alberto Sharp M.D.    8/16/23: L3-5 PLDF/TLIF  10/4/23: lumbar I&D    HISTORY:  Nilsa Curiel is a 78 y.o. female who returns to me today for FU. Patient has been doing well. Her preop back and leg pain have significantly improved. However, one week ago, she started having LBP that radiated down RLE to the knee.    PMH/PSH/FamHx/SocHx:  Unchanged from prior visit    ROS:  Positive for LBP and RLE pain  Denies perineal paresthesias, bowel or bladder incontinence    EXAM:  There were no vitals taken for this visit.    My physical examination was notable for the following findings: motor intact BLE; SILT    IMAGING:  Today I independently reviewed the following images and my interpretations are as follows:    L-spine XRs showed L3-5 PLDF/TLIF without hardware complications.     ASSESSMENT/PLAN:  Patient has lumbar spondylosis, now 6 mos s/p L3-5 PLDF/TLIF. I educated the patient on the importance of core/back strengthening, correct posture, bending/lifting ergonomics, and low-impact aerobic exercises (walking, elliptical, and aquatherapy). I prescribed tyleno and robaxin. RTC in 6 mos for annual PO FU. Next step is a lumbar MRI.    I provided the patient with a home exercise program. It is the [employer] spine conditioning program. Exercises include head rolls, kneeling back extension, sitting rotation stretch, modified seated side straddle, knee to chest, bird dog, plank, modified seated plank, hip bridges, abdominal bracing, and abdominal crunch. Pt will complete each exercise 5 times daily for 6-8 weeks.    Jesus Alberto Sharp MD  Orthopaedic Spine Surgeon  Department of Orthopaedic Surgery  [phone]

## 2024-02-28 DIAGNOSIS — M47.816 LUMBAR SPONDYLOSIS: ICD-10-CM

## 2024-02-29 DIAGNOSIS — M47.816 LUMBAR SPONDYLOSIS: ICD-10-CM

## 2024-02-29 RX ORDER — ACETAMINOPHEN 500 MG
500 TABLET ORAL 3 TIMES DAILY
Qty: 90 TABLET | Refills: 0 | Status: SHIPPED | OUTPATIENT
Start: 2024-02-29 | End: 2024-03-16 | Stop reason: SDUPTHER

## 2024-02-29 RX ORDER — ACETAMINOPHEN 500 MG
500 TABLET ORAL 3 TIMES DAILY
Qty: 90 TABLET | Refills: 0 | Status: SHIPPED | OUTPATIENT
Start: 2024-02-29 | End: 2024-02-29 | Stop reason: SDUPTHER

## 2024-03-16 DIAGNOSIS — M47.816 LUMBAR SPONDYLOSIS: ICD-10-CM

## 2024-03-18 RX ORDER — ACETAMINOPHEN 500 MG
500 TABLET ORAL 3 TIMES DAILY
Qty: 90 TABLET | Refills: 0 | Status: SHIPPED | OUTPATIENT
Start: 2024-03-18 | End: 2024-04-16

## 2024-03-20 ENCOUNTER — OFFICE VISIT (OUTPATIENT)
Dept: OPTOMETRY | Facility: CLINIC | Age: 79
End: 2024-03-20
Payer: MEDICARE

## 2024-03-20 DIAGNOSIS — H25.813 COMBINED FORM OF SENILE CATARACT OF BOTH EYES: Primary | ICD-10-CM

## 2024-03-20 DIAGNOSIS — H52.4 HYPEROPIA WITH ASTIGMATISM AND PRESBYOPIA, BILATERAL: ICD-10-CM

## 2024-03-20 DIAGNOSIS — H40.013 OAG (OPEN ANGLE GLAUCOMA) SUSPECT, LOW RISK, BILATERAL: ICD-10-CM

## 2024-03-20 DIAGNOSIS — H04.123 DRY EYES: ICD-10-CM

## 2024-03-20 DIAGNOSIS — H52.203 HYPEROPIA WITH ASTIGMATISM AND PRESBYOPIA, BILATERAL: ICD-10-CM

## 2024-03-20 DIAGNOSIS — H52.03 HYPEROPIA WITH ASTIGMATISM AND PRESBYOPIA, BILATERAL: ICD-10-CM

## 2024-03-20 PROCEDURE — 1126F AMNT PAIN NOTED NONE PRSNT: CPT | Mod: CPTII,S$GLB,, | Performed by: OPTOMETRIST

## 2024-03-20 PROCEDURE — 99999 PR PBB SHADOW E&M-EST. PATIENT-LVL III: CPT | Mod: PBBFAC,,, | Performed by: OPTOMETRIST

## 2024-03-20 PROCEDURE — 1159F MED LIST DOCD IN RCRD: CPT | Mod: CPTII,S$GLB,, | Performed by: OPTOMETRIST

## 2024-03-20 PROCEDURE — 92014 COMPRE OPH EXAM EST PT 1/>: CPT | Mod: S$GLB,,, | Performed by: OPTOMETRIST

## 2024-03-20 PROCEDURE — 3288F FALL RISK ASSESSMENT DOCD: CPT | Mod: CPTII,S$GLB,, | Performed by: OPTOMETRIST

## 2024-03-20 PROCEDURE — 92015 DETERMINE REFRACTIVE STATE: CPT | Mod: S$GLB,,, | Performed by: OPTOMETRIST

## 2024-03-20 PROCEDURE — 1101F PT FALLS ASSESS-DOCD LE1/YR: CPT | Mod: CPTII,S$GLB,, | Performed by: OPTOMETRIST

## 2024-03-20 NOTE — PROGRESS NOTES
HPI    Nilsa Curiel is a 78 y.o. female who comes in for routine eye exam.   Patient states vision is mainly blurry at near OS is worse than OD.  Pt. Notices a shadow from her peripheral vision while reading with the   left eye.   Pt. Denies any other ocular complains today.  (+)blurred vision  (--)Headaches  (--)diplopia  (--)flashes  (--)floaters  (--)pain  (+)Itching  (--)tearing  (--)burning  (+)Dryness  (--) OTC Drops  (--)Photophobia      Last edited by Nenita Raya on 3/20/2024 11:13 AM.            Assessment /Plan     For exam results, see Encounter Report.    Combined form of senile cataract of both eyes    OAG (open angle glaucoma) suspect, low risk, bilateral  -     Gilbert Visual Field - OU - Extended - Both Eyes; Future  -     OCT, Optic Nerve - OU - Both Eyes; Future    Dry eyes    Hyperopia with astigmatism and presbyopia, bilateral      MONITOR. ED PT ON ALL EXAM FINDINGS  RX FINAL SPECS   H/O OAG SUSPECT OU; LOW RISK; DISCUSSED; OBTAIN TESTS AT F/U; MONITOR.  CONTINUE WITH AT'S PRN   COMBO CATS OS>OD; NOT INTERESTED IN SURGICAL INTERVENTIONS AT THIS TIME; MONITOR.   RTC 1 YR//PRN FOR REE/DFE

## 2024-04-12 DIAGNOSIS — M47.816 LUMBAR SPONDYLOSIS: ICD-10-CM

## 2024-04-13 ENCOUNTER — OFFICE VISIT (OUTPATIENT)
Dept: URGENT CARE | Facility: CLINIC | Age: 79
End: 2024-04-13
Payer: MEDICARE

## 2024-04-13 VITALS
RESPIRATION RATE: 16 BRPM | WEIGHT: 187 LBS | DIASTOLIC BLOOD PRESSURE: 71 MMHG | HEART RATE: 81 BPM | SYSTOLIC BLOOD PRESSURE: 149 MMHG | BODY MASS INDEX: 36.71 KG/M2 | OXYGEN SATURATION: 98 % | TEMPERATURE: 98 F | HEIGHT: 60 IN

## 2024-04-13 DIAGNOSIS — M54.41 ACUTE RIGHT-SIDED LOW BACK PAIN WITH RIGHT-SIDED SCIATICA: Primary | ICD-10-CM

## 2024-04-13 DIAGNOSIS — M48.062 NEUROGENIC CLAUDICATION DUE TO LUMBAR SPINAL STENOSIS: ICD-10-CM

## 2024-04-13 PROCEDURE — 96372 THER/PROPH/DIAG INJ SC/IM: CPT | Mod: S$GLB,,, | Performed by: SURGERY

## 2024-04-13 PROCEDURE — 99214 OFFICE O/P EST MOD 30 MIN: CPT | Mod: 25,S$GLB,, | Performed by: SURGERY

## 2024-04-13 RX ORDER — METHOCARBAMOL 500 MG/1
1000 TABLET, FILM COATED ORAL 3 TIMES DAILY PRN
Qty: 30 TABLET | Refills: 0 | Status: SHIPPED | OUTPATIENT
Start: 2024-04-13 | End: 2024-04-18

## 2024-04-13 RX ORDER — DEXAMETHASONE SODIUM PHOSPHATE 100 MG/10ML
10 INJECTION INTRAMUSCULAR; INTRAVENOUS ONCE
Status: COMPLETED | OUTPATIENT
Start: 2024-04-13 | End: 2024-04-13

## 2024-04-13 RX ADMIN — DEXAMETHASONE SODIUM PHOSPHATE 10 MG: 100 INJECTION INTRAMUSCULAR; INTRAVENOUS at 09:04

## 2024-04-13 NOTE — PROGRESS NOTES
Subjective:      Patient ID: Nilsa Curiel is a 78 y.o. female.    Vitals:  height is 5' (1.524 m) and weight is 84.8 kg (187 lb). Her oral temperature is 98.4 °F (36.9 °C). Her blood pressure is 149/71 (abnormal) and her pulse is 81. Her respiration is 16 and oxygen saturation is 98%.     Chief Complaint: Leg Pain    This is a 78 y.o. female who presents today with a chief complaint of siactic pain from the lower back to the the R foot that started yesterday.   She took tylenol and turmeric for it. Can't take NSAID due to GI bleed in past.   She is out of robaxin due to delay in delivery from [facility]. She has been out of it for 2 weeks.  Denies foot numbness. No loss of bowel or bladder control      Leg Pain   The incident occurred 12 to 24 hours ago. The incident occurred at home. There was no injury mechanism. The pain is present in the right foot, right hip, right thigh and right knee. The quality of the pain is described as aching. The pain is at a severity of 10/10. The pain is severe. The pain has been Constant since onset. Pertinent negatives include no inability to bear weight, loss of motion, loss of sensation, muscle weakness, numbness or tingling. She reports no foreign bodies present. The symptoms are aggravated by movement. She has tried acetaminophen for the symptoms. The treatment provided mild relief.     Neurological:  Negative for numbness.      Objective:     Physical Exam   Constitutional: She is oriented to person, place, and time. She appears well-developed. She is cooperative. No distress.   HENT:   Head: Normocephalic and atraumatic.   Nose: Nose normal.   Mouth/Throat: Oropharynx is clear and moist and mucous membranes are normal.   Eyes: Conjunctivae and lids are normal.   Neck: Trachea normal and phonation normal. Neck supple.   Cardiovascular: Normal rate, regular rhythm, normal heart sounds and normal pulses.   Pulmonary/Chest: Effort normal and breath sounds normal.   Abdominal:  Normal appearance and bowel sounds are normal. She exhibits no mass. Soft.   Musculoskeletal:         General: No deformity.   Neurological: She is alert and oriented to person, place, and time. She has normal strength and normal reflexes. No sensory deficit.   Skin: Skin is warm, dry, intact and not diaphoretic.   Psychiatric: Her speech is normal and behavior is normal. Judgment and thought content normal.   Nursing note and vitals reviewed.      Assessment:     1. Acute right-sided low back pain with right-sided sciatica    2. Neurogenic claudication due to lumbar spinal stenosis        Plan:       Acute right-sided low back pain with right-sided sciatica  -     dexAMETHasone injection 10 mg  -     methocarbamoL (ROBAXIN) 500 MG Tab; Take 2 tablets (1,000 mg total) by mouth 3 (three) times daily as needed (back pain).  Dispense: 30 tablet; Refill: 0    Neurogenic claudication due to lumbar spinal stenosis      Follow up with orthopedics if pain fails to improve. ER precautions given. Short term muscle relaxant rx given to bridge until her rx from ortho arrives. Max dose of tylenol discussed. Ice pack/rest recommended then slow progression of activity as tolerated as well as exercises

## 2024-04-15 ENCOUNTER — TELEPHONE (OUTPATIENT)
Dept: ORTHOPEDIC SURGERY | Facility: CLINIC | Age: 79
End: 2024-04-15
Payer: MEDICARE

## 2024-04-15 RX ORDER — METHOCARBAMOL 500 MG/1
1000 TABLET, FILM COATED ORAL 3 TIMES DAILY
Qty: 90 TABLET | Refills: 0 | Status: SHIPPED | OUTPATIENT
Start: 2024-04-15 | End: 2024-05-01

## 2024-04-15 NOTE — TELEPHONE ENCOUNTER
Returned patient's call, no answer. Left msg on voicemail.    ----- Message from Jabier Nash sent at 4/15/2024  1:48 PM CDT -----  Regarding: PT'S REQUESTING A CALL BACK FROM STAFF REGARDING A RECENT URGENT CARE VISIT  Contact: PT  Pt is wanting to get some advice from staff.. Confirmed contact info below:  Contact Name: Nilsa Curiel  Phone Number: [phone]

## 2024-04-16 ENCOUNTER — HOSPITAL ENCOUNTER (OUTPATIENT)
Dept: RADIOLOGY | Facility: OTHER | Age: 79
Discharge: HOME | End: 2024-04-16
Attending: OBSTETRICS & GYNECOLOGY
Payer: MEDICARE

## 2024-04-16 DIAGNOSIS — M47.816 LUMBAR SPONDYLOSIS: ICD-10-CM

## 2024-04-16 DIAGNOSIS — Z12.31 SCREENING MAMMOGRAM FOR BREAST CANCER: ICD-10-CM

## 2024-04-16 DIAGNOSIS — M54.9 DORSALGIA, UNSPECIFIED: Primary | ICD-10-CM

## 2024-04-16 DIAGNOSIS — Z98.890 S/P SPINAL SURGERY: ICD-10-CM

## 2024-04-16 PROCEDURE — 77067 SCR MAMMO BI INCL CAD: CPT | Mod: 26,,, | Performed by: RADIOLOGY

## 2024-04-16 PROCEDURE — 77067 SCR MAMMO BI INCL CAD: CPT | Mod: TC

## 2024-04-16 PROCEDURE — 77063 BREAST TOMOSYNTHESIS BI: CPT | Mod: 26,,, | Performed by: RADIOLOGY

## 2024-04-16 RX ORDER — ACETAMINOPHEN 500 MG
500 TABLET ORAL 3 TIMES DAILY
Qty: 90 TABLET | Refills: 11 | Status: SHIPPED | OUTPATIENT
Start: 2024-04-16

## 2024-04-16 NOTE — PROGRESS NOTES
Spoke with pt virtually. Pt was last seen 2/27/24 and continues to have low back and right leg pain. She is sp L3-5 TLIF  on 8/16/23 and lumbar I&D on 10/4/23. Pt has tried home exercises and robaxin and tylenol for 8 weeks with worsening of pain. Pain is 8/10. I provided the patient with a home exercise program. It is the [employer] spine conditioning program. Exercises include head rolls, kneeling back extension, sitting rotation stretch, modified seated side straddle, knee to chest, bird dog, plank, modified seated plank, hip bridges, abdominal bracing, and abdominal crunch. Pt completed each exercise daily for one hour with worsening of pain. Will obtain MRI to further evaluate and call with results.

## 2024-05-01 DIAGNOSIS — M47.816 LUMBAR SPONDYLOSIS: ICD-10-CM

## 2024-05-01 RX ORDER — METHOCARBAMOL 500 MG/1
1000 TABLET, FILM COATED ORAL 3 TIMES DAILY
Qty: 90 TABLET | Refills: 0 | Status: SHIPPED | OUTPATIENT
Start: 2024-05-01 | End: 2024-05-13

## 2024-05-02 NOTE — PROGRESS NOTES
Date: 05/02/2024    Supervising Physician: Jesus Alberto Sharp M.D.    8/16/23: L3-5 PLDF/TLIF  10/4/23: lumbar I&D    HISTORY:  Nilsa Curiel is a 78 y.o. female who returns to me today for MRI results.  She was last seen by me 11/20/2023.  Today she is doing well but notes she continues to have low back and right leg pain. She is sp L3-5 TLIF on 8/16/23 and lumbar I&D on 10/4/23. Pt has tried home exercises and robaxin and tylenol for 8 weeks with worsening of pain. Pain is 8/10. I provided the patient with a home exercise program. It is the AA[employer] spine conditioning program. Exercises include head rolls, kneeling back extension, sitting rotation stretch, modified seated side straddle, knee to chest, bird dog, plank, modified seated plank, hip bridges, abdominal bracing, and abdominal crunch. Pt completed each exercise daily for one hour with worsening of pain.     Exam: Post op dressing taken down.  Incision is healing well, clean, dry and intact.   There is no sign of infection. Neuro exam is stable. No signs of DVT.    Radiographs: hardware in place no failure    MRI lumbar demonstrates new right subarticular disc protrusion at L5-S1 that effaces the right lateral recess and compresses the right descending S1 nerve root. Additional lumbar spondylosis as detailed above.  Mild-to-moderate spinal canal and lateral recess stenosis at L1-L2.    Assessment/Plan:     Pt presents with new right L5-S1 disc herniation. Failure of conservative rx. Will send lyrica to pharmacy and order right L5-S1 TFESI with pain management. Pt will fu if pain persists.

## 2024-05-06 ENCOUNTER — OFFICE VISIT (OUTPATIENT)
Dept: ORTHOPEDICS | Facility: CLINIC | Age: 79
End: 2024-05-06
Payer: MEDICARE

## 2024-05-06 VITALS — WEIGHT: 186.94 LBS | HEIGHT: 61 IN | BODY MASS INDEX: 35.29 KG/M2

## 2024-05-06 DIAGNOSIS — M54.16 LUMBAR RADICULOPATHY: Primary | ICD-10-CM

## 2024-05-06 PROCEDURE — 99999 PR PBB SHADOW E&M-EST. PATIENT-LVL IV: CPT | Mod: PBBFAC,HCNC,, | Performed by: ORTHOPAEDIC SURGERY

## 2024-05-06 PROCEDURE — 1125F AMNT PAIN NOTED PAIN PRSNT: CPT | Mod: HCNC,CPTII,S$GLB, | Performed by: ORTHOPAEDIC SURGERY

## 2024-05-06 PROCEDURE — 1101F PT FALLS ASSESS-DOCD LE1/YR: CPT | Mod: HCNC,CPTII,S$GLB, | Performed by: ORTHOPAEDIC SURGERY

## 2024-05-06 PROCEDURE — 3288F FALL RISK ASSESSMENT DOCD: CPT | Mod: HCNC,CPTII,S$GLB, | Performed by: ORTHOPAEDIC SURGERY

## 2024-05-06 PROCEDURE — 99213 OFFICE O/P EST LOW 20 MIN: CPT | Mod: HCNC,S$GLB,, | Performed by: ORTHOPAEDIC SURGERY

## 2024-05-06 PROCEDURE — 1159F MED LIST DOCD IN RCRD: CPT | Mod: HCNC,CPTII,S$GLB, | Performed by: ORTHOPAEDIC SURGERY

## 2024-05-06 RX ORDER — PREGABALIN 75 MG/1
75 CAPSULE ORAL 2 TIMES DAILY
Qty: 60 CAPSULE | Refills: 5 | Status: SHIPPED | OUTPATIENT
Start: 2024-05-06 | End: 2024-11-04

## 2024-05-07 DIAGNOSIS — M54.16 LUMBAR RADICULOPATHY: Primary | ICD-10-CM

## 2024-05-08 PROBLEM — R26.9 ABNORMAL GAIT: Status: RESOLVED | Noted: 2023-11-09 | Resolved: 2024-05-08

## 2024-05-08 PROBLEM — M62.9 HAMSTRING TIGHTNESS OF BOTH LOWER EXTREMITIES: Status: RESOLVED | Noted: 2023-11-09 | Resolved: 2024-05-08

## 2024-05-08 PROBLEM — M62.81 WEAKNESS OF TRUNK MUSCULATURE: Status: RESOLVED | Noted: 2023-11-09 | Resolved: 2024-05-08

## 2024-05-08 PROBLEM — R29.898 WEAKNESS OF BOTH LOWER EXTREMITIES: Status: RESOLVED | Noted: 2023-11-09 | Resolved: 2024-05-08

## 2024-05-09 ENCOUNTER — PATIENT MESSAGE (OUTPATIENT)
Dept: PAIN MEDICINE | Facility: OTHER | Age: 79
End: 2024-05-09
Payer: MEDICARE

## 2024-05-13 DIAGNOSIS — M47.816 LUMBAR SPONDYLOSIS: ICD-10-CM

## 2024-05-13 RX ORDER — METHOCARBAMOL 500 MG/1
1000 TABLET, FILM COATED ORAL 3 TIMES DAILY
Qty: 90 TABLET | Refills: 0 | Status: SHIPPED | OUTPATIENT
Start: 2024-05-13 | End: 2024-05-29

## 2024-05-14 ENCOUNTER — TELEPHONE (OUTPATIENT)
Dept: PAIN MEDICINE | Facility: OTHER | Age: 79
End: 2024-05-14
Payer: MEDICARE

## 2024-05-14 NOTE — TELEPHONE ENCOUNTER
----- Message from Gaurav Allison MD sent at 5/14/2024  5:45 AM CDT -----  Regarding: RE: Request to hold Aspirin  It can be held  ----- Message -----  From: Manjula Canales LPN  Sent: 5/7/2024  10:13 AM CDT  To: Gaurav Allison MD  Subject: Request to hold Aspirin                          Good morning,    Patient will be scheduled to have a procedure with Dr. Meyers, Right L5/S1 Transforaminal Epidural Steroid Injection . Staff is requesting to hold Aspirin for 5 days prior to procedure. Please advise.    Thank you,  Manjula

## 2024-05-16 RX ORDER — ALLOPURINOL 100 MG/1
100 TABLET ORAL
Qty: 90 TABLET | Refills: 0 | Status: SHIPPED | OUTPATIENT
Start: 2024-05-16

## 2024-05-16 NOTE — TELEPHONE ENCOUNTER
Care Due:                  Date            Visit Type   Department     Provider  --------------------------------------------------------------------------------                                [facility]     [facility]  Last Visit: 10-      FOLLOW UP    CARE           Gaurav AVALOS                              FOLLOWUP/OF  [facility]  Next Visit: 07-      FICE VISIT   CARE           Gaurav Allison                                                            Last  Test          Frequency    Reason                     Performed    Due Date  --------------------------------------------------------------------------------    Uric Acid...  12 months..  allopurinoL..............  01- 12-    [facility] Embedded Care Due Messages. Reference number: [id number].   5/16/2024 10:42:14 AM CDT

## 2024-05-16 NOTE — TELEPHONE ENCOUNTER
Refill Routing Note   Medication(s) are not appropriate for processing by [facility] for the following reason(s):        Required labs abnormal    [facility] action(s):  Defer   Requires labs : Yes             Appointments  past 12m or future 3m with PCP    Date Provider   Last Visit   10/11/2023 Gaurav Allison MD   Next Visit   7/3/2024 Gaurav Allison MD   ED visits in past 90 days: 0        Note composed:12:21 PM 05/16/2024

## 2024-05-29 DIAGNOSIS — M47.816 LUMBAR SPONDYLOSIS: ICD-10-CM

## 2024-05-29 RX ORDER — METHOCARBAMOL 500 MG/1
1000 TABLET, FILM COATED ORAL 3 TIMES DAILY
Qty: 90 TABLET | Refills: 0 | Status: SHIPPED | OUTPATIENT
Start: 2024-05-29 | End: 2024-06-12

## 2024-05-31 ENCOUNTER — PATIENT MESSAGE (OUTPATIENT)
Dept: PAIN MEDICINE | Facility: OTHER | Age: 79
End: 2024-05-31
Payer: MEDICARE

## 2024-06-04 ENCOUNTER — HOSPITAL ENCOUNTER (OUTPATIENT)
Facility: OTHER | Age: 79
Discharge: HOME | End: 2024-06-04
Attending: ANESTHESIOLOGY | Admitting: ANESTHESIOLOGY
Payer: MEDICARE

## 2024-06-04 VITALS
DIASTOLIC BLOOD PRESSURE: 68 MMHG | TEMPERATURE: 98 F | WEIGHT: 185 LBS | BODY MASS INDEX: 34.93 KG/M2 | SYSTOLIC BLOOD PRESSURE: 152 MMHG | OXYGEN SATURATION: 100 % | HEIGHT: 61 IN | RESPIRATION RATE: 14 BRPM | HEART RATE: 70 BPM

## 2024-06-04 DIAGNOSIS — M54.16 LUMBAR RADICULOPATHY: Primary | ICD-10-CM

## 2024-06-04 DIAGNOSIS — G89.29 CHRONIC PAIN: ICD-10-CM

## 2024-06-04 PROCEDURE — 64483 NJX AA&/STRD TFRM EPI L/S 1: CPT | Mod: HCNC,RT,, | Performed by: ANESTHESIOLOGY

## 2024-06-04 PROCEDURE — 99152 MOD SED SAME PHYS/QHP 5/>YRS: CPT | Mod: ,,, | Performed by: ANESTHESIOLOGY

## 2024-06-04 PROCEDURE — 99152 MOD SED SAME PHYS/QHP 5/>YRS: CPT | Mod: HCNC | Performed by: ANESTHESIOLOGY

## 2024-06-04 PROCEDURE — 25000003 PHARM REV CODE 250: Mod: HCNC | Performed by: ANESTHESIOLOGY

## 2024-06-04 PROCEDURE — 63600175 PHARM REV CODE 636 W HCPCS: Mod: HCNC | Performed by: ANESTHESIOLOGY

## 2024-06-04 PROCEDURE — 25000003 PHARM REV CODE 250: Mod: HCNC | Performed by: STUDENT IN AN ORGANIZED HEALTH CARE EDUCATION/TRAINING PROGRAM

## 2024-06-04 PROCEDURE — 64483 NJX AA&/STRD TFRM EPI L/S 1: CPT | Mod: HCNC,RT | Performed by: ANESTHESIOLOGY

## 2024-06-04 PROCEDURE — 25500020 PHARM REV CODE 255: Mod: HCNC | Performed by: ANESTHESIOLOGY

## 2024-06-04 RX ORDER — DEXAMETHASONE SODIUM PHOSPHATE 10 MG/ML
INJECTION INTRAMUSCULAR; INTRAVENOUS
Status: DISCONTINUED | OUTPATIENT
Start: 2024-06-04 | End: 2024-06-04 | Stop reason: HOSPADM

## 2024-06-04 RX ORDER — FENTANYL CITRATE 50 UG/ML
INJECTION, SOLUTION INTRAMUSCULAR; INTRAVENOUS
Status: DISCONTINUED | OUTPATIENT
Start: 2024-06-04 | End: 2024-06-04 | Stop reason: HOSPADM

## 2024-06-04 RX ORDER — LIDOCAINE HYDROCHLORIDE 20 MG/ML
INJECTION, SOLUTION INFILTRATION; PERINEURAL
Status: DISCONTINUED | OUTPATIENT
Start: 2024-06-04 | End: 2024-06-04 | Stop reason: HOSPADM

## 2024-06-04 RX ORDER — SODIUM CHLORIDE 9 MG/ML
INJECTION, SOLUTION INTRAVENOUS CONTINUOUS
Status: DISCONTINUED | OUTPATIENT
Start: 2024-06-04 | End: 2024-06-04 | Stop reason: HOSPADM

## 2024-06-04 RX ORDER — MIDAZOLAM HYDROCHLORIDE 1 MG/ML
INJECTION INTRAMUSCULAR; INTRAVENOUS
Status: DISCONTINUED | OUTPATIENT
Start: 2024-06-04 | End: 2024-06-04 | Stop reason: HOSPADM

## 2024-06-04 RX ORDER — LIDOCAINE HYDROCHLORIDE 10 MG/ML
INJECTION, SOLUTION EPIDURAL; INFILTRATION; INTRACAUDAL; PERINEURAL
Status: DISCONTINUED | OUTPATIENT
Start: 2024-06-04 | End: 2024-06-04 | Stop reason: HOSPADM

## 2024-06-04 NOTE — H&P
HPI  Patient presenting for Procedure(s) (LRB):  LUMBAR TRANSFORAMINAL RIGHT L5/S1 DIRECT REFERRAL *ASPIRIN CLEARANCE IN CHART* (Right)     No health changes since previous encounter    Past Medical History:   Diagnosis Date    Abnormal fasting glucose 2015    Acute bilateral low back pain without sciatica 10/10/2017    Anemia 2018    Arthritis     Atopic dermatitis     Central stenosis of spinal canal 2022    Chronic diastolic heart failure 2022    Chronic kidney disease, stage III (moderate) 2020    Dermatitis 2023    GERD (gastroesophageal reflux disease)     Hyperlipidemia     Hypertension     Hypokalemia 2022    Joint pain     Left ventricular hypertrophy 2016    Microcytic hypochromic anemia 2018    Multifactoral. See hematology consult. Some nutritional , some kidney related    Mild aortic sclerosis 2019    Mobility poor 2022    Morbid obesity with BMI of 40.0-44.9, adult 2016    Nonintractable episodic headache 2019    Normocytic normochromic anemia 2022    MONE (obstructive sleep apnea) 2018    Prediabetes 2023    Severe obesity (BMI >= 40) 06/10/2022     Past Surgical History:   Procedure Laterality Date     SECTION      x3    CLOSURE OF WOUND  10/4/2023    Procedure: CLOSURE, WOUND;  Surgeon: Benigno Goodman DO;  Location: [facility] OR [address] [place] [address];  Service: Plastics;;    COLONOSCOPY N/A 10/04/2021    Procedure: COLONOSCOPY;  Surgeon: Taran Galvez MD;  Location: [address]);  Service: Endoscopy;  Laterality: N/A;    COLONOSCOPY N/A 2022    Procedure: COLONOSCOPY--positive fit kit;  Surgeon: Tani Lara MD;  Location: [facility] ENDO (4TH FLR);  Service: Endoscopy;  Laterality: N/A;    EPIDURAL STEROID INJECTION N/A 2023    Procedure: INJECTION, STEROID, EPIDURAL, L5-S1;  Surgeon: Majo Meyers MD;  Location: [facility] PAIN MGT;  Service: Pain Management;  Laterality: N/A;     "ESOPHAGOGASTRODUODENOSCOPY N/A 10/04/2021    Procedure: EGD (ESOPHAGOGASTRODUODENOSCOPY);  Surgeon: Taran Galvez MD;  Location: [facility] ENDO (4TH FLR);  Service: Endoscopy;  Laterality: N/A;  covid test 10/1-[facility]    10/1-LVM about COVID test-GT    ESOPHAGOGASTRODUODENOSCOPY N/A 01/31/2022    Procedure: EGD (ESOPHAGOGASTRODUODENOSCOPY);  Surgeon: Tani Lara MD;  Location: [facility] ENDO (4TH FLR);  Service: Endoscopy;  Laterality: N/A;  12/15 fully vaccinated; instructions to portal-st  1/28-covid [facility]-tb    FUSION, SPINE, LUMBAR, TLIF, POSTERIOR APPROACH, USING PEDICLE SCREW N/A 8/16/2023    Procedure: FUSION, SPINE, LUMBAR, TLIF, POSTERIOR APPROACH, USING PEDICLE SCREW L2-5 PLDF/TLIF GLOBUS ROBOT SNS:SSEP/EMG cell saver;  Surgeon: Jesus Alberto Sharp MD;  Location: [facility] OR 2ND FLR;  Service: Neurosurgery;  Laterality: N/A;    IRRIGATION AND DEBRIDEMENT N/A 10/4/2023    Procedure: IRRIGATION AND DEBRIDEMENT **CO CASE DR. JULISSA RODRIGUEZ** LUMBAR;  Surgeon: Jesus Alberto Sharp MD;  [facility];  Service: Orthopedics;  Laterality: N/A;    TRANSFORAMINAL EPIDURAL INJECTION OF STEROID N/A 03/16/2023    Procedure: b/l L4-5 TFESI;  Surgeon: Gato Delatorre DO;  Location: [facility];  Service: Pain Management;  Laterality: N/A;    TUBAL LIGATION       Review of patient's allergies indicates:  No Known Allergies   Current Facility-Administered Medications   Medication    0.9%  NaCl infusion    dexAMETHasone sodium phos (PF) injection    iohexoL (OMNIPAQUE 300) injection    LIDOcaine (PF) 10 mg/ml (1%) injection    LIDOcaine HCL 20 mg/ml (2%) injection       PMHx, PSHx, Allergies, Medications reviewed in epic    ROS negative except pain complaints in HPI    OBJECTIVE:    BP (!) 176/72 (BP Location: Right arm, Patient Position: Sitting)   Pulse 97   Temp 98.2 °F (36.8 °C) (Oral)   Resp 16   Ht 5' 1" (1.549 m)   Wt 83.9 kg (185 lb)   SpO2 98%   BMI 34.96 kg/m²     PHYSICAL EXAMINATION:    GENERAL: Well " appearing, in no acute distress, alert and oriented x3.  PSYCH:  Mood and affect appropriate.  SKIN: Skin color, texture, turgor normal, no rashes or lesions which will impact the procedure.  CV: Regular Rate  PULM: No evidence of respiratory difficulty, symmetric chest rise.   NEURO: Cranial nerves grossly intact.    Plan:    Proceed with procedure as planned Procedure(s) (LRB):  LUMBAR TRANSFORAMINAL RIGHT L5/S1 DIRECT REFERRAL *ASPIRIN CLEARANCE IN CHART* (Right)    Rusty Jacob  06/04/2024

## 2024-06-04 NOTE — BRIEF OP NOTE
Discharge Note  Short Stay      SUMMARY     Admit Date: 6/4/2024    Attending Physician: Majo Meyers MD      Discharge Physician: Rusty Jacob      Discharge Date: 6/4/2024 9:45 AM    Procedure(s) (LRB):  LUMBAR TRANSFORAMINAL RIGHT S1 DIRECT REFERRAL *ASPIRIN CLEARANCE IN CHART* (Right)    Final Diagnosis: Lumbar radiculopathy [M54.16]    Disposition: Home or self care    Patient Instructions:   Current Discharge Medication List        CONTINUE these medications which have NOT CHANGED    Details   acetaminophen (TYLENOL) 500 MG tablet TAKE 1 TABLET THREE TIMES DAILY  Qty: 90 tablet, Refills: 11    Associated Diagnoses: Lumbar spondylosis      allopurinoL (ZYLOPRIM) 100 MG tablet TAKE 1 TABLET EVERY DAY  Qty: 90 tablet, Refills: 0      amLODIPine (NORVASC) 10 MG tablet TAKE 1 TABLET ONE TIME DAILY  Qty: 90 tablet, Refills: 3    Comments: .      aspirin (ECOTRIN) 81 MG EC tablet Take 1 tablet (81 mg total) by mouth once daily.  Qty: 90 tablet, Refills: 3      betamethasone dipropionate (DIPROLENE) 0.05 % ointment Apply topically 2 (two) times daily.  Qty: 45 g, Refills: 11    Associated Diagnoses: Flexural atopic dermatitis      biotin 300 mcg Tab Take 1 tablet by mouth once daily.      carvediloL (COREG) 3.125 MG tablet TAKE 1 TABLET TWICE DAILY WITH MEALS  Qty: 180 tablet, Refills: 2    Comments: .      celecoxib (CELEBREX) 100 MG capsule Take 1 capsule (100 mg total) by mouth 2 (two) times daily.  Qty: 28 capsule, Refills: 0    Comments: Bedside delivery      ketoconazole (NIZORAL) 2 % cream Apply to affected areas of body BID prn irritation.  Qty: 15 g, Refills: 1    Associated Diagnoses: Candidal intertrigo      methocarbamoL (ROBAXIN) 500 MG Tab TAKE 2 TABLETS THREE TIMES DAILY  Qty: 90 tablet, Refills: 0    Associated Diagnoses: Lumbar spondylosis      mometasone (ELOCON) 0.1 % ointment Apply twice daily to hands prn eczema.  Qty: 45 g, Refills: 3    Associated Diagnoses: Hand eczema       MULTIVIT-MIN/FA/CA CARB/VIT K (WOMEN'S 50+ DAILY FORMULA ORAL) Take by mouth.      olmesartan (BENICAR) 40 MG tablet TAKE 1 TABLET EVERY DAY  Qty: 90 tablet, Refills: 3    Comments: .  Associated Diagnoses: Essential hypertension      omega-3 fatty acids 300 mg Cap Take by mouth.      ondansetron (ZOFRAN-ODT) 4 MG TbDL DISSOLVE 1 TABLET ON THE TONGUE EVERY 6 HOURS AS NEEDED FOR NAUSEA  Qty: 30 tablet, Refills: 0    Associated Diagnoses: Nausea      oxyCODONE (ROXICODONE) 5 MG immediate release tablet Take 1 tablet (5 mg total) by mouth every 6 (six) hours as needed for Pain (for breakthrough pain).  Qty: 21 tablet, Refills: 0    Comments: Quantity prescribed more than 7 day supply? NoBedside delivery      potassium chloride SA (K-DUR,KLOR-CON) 20 MEQ tablet TAKE 1 TABLET TWICE DAILY  Qty: 180 tablet, Refills: 3      pregabalin (LYRICA) 75 MG capsule Take 1 capsule (75 mg total) by mouth 2 (two) times daily.  Qty: 60 capsule, Refills: 5      riboflavin, vitamin B2, (VITAMIN B-2 ORAL) Take 1 capsule by mouth once daily.    Associated Diagnoses: Neurogenic claudication due to lumbar spinal stenosis      rosuvastatin (CRESTOR) 20 MG tablet Take 1 tablet (20 mg total) by mouth once daily.  Qty: 90 tablet, Refills: 3      RSVPreF3 antigen-AS01E, PF, (AREXVY) 120 mcg/0.5 mL SusR vaccine Inject 0.5mL into the muscle.  Qty: 0.5 mL, Refills: 0      triamcinolone acetonide 0.025% (KENALOG) 0.025 % cream Apply to affected areas of body BID prn irritation.  Qty: 15 g, Refills: 1    Associated Diagnoses: Candidal intertrigo      TURMERIC ORAL Take by mouth.      ubidecarenone (COENZYME Q10) 100 mg Tab Take 1 tablet by mouth once daily.                  Discharge Diagnosis: Lumbar radiculopathy [M54.16]  Condition on Discharge: Stable with no complications to procedure   Diet on Discharge: Same as before.  Activity: as per instruction sheet.  Discharge to: Home with a responsible adult.  Follow up: 2-4 weeks       Please call my  office or pager at [phone] if experienced any weakness or loss of sensation, fever > 101.5, pain uncontrolled with oral medications, persistent nausea/vomiting/or diarrhea, redness or drainage from the incisions, or any other worrisome concerns. If physician on call was not reached or could not communicate with our office for any reason please go to the nearest emergency department

## 2024-06-04 NOTE — OP NOTE
Lumbar Transforaminal Epidural Steroid Injection under Fluoroscopic Guidance    The procedure, risks, benefits, and options were discussed with the patient. There are no contraindications to the procedure. The patent expressed understanding and agreed to the procedure. Informed written consent was obtained prior to the start of the procedure and can be found in the patient's chart.    PATIENT NAME: Mrs. Nilsa Curiel   MRN: [id number]     DATE OF PROCEDURE: 06/04/2024    PROCEDURE:  Right  S1 Lumbar Transforaminal Epidural Steroid Injection under Fluoroscopic Guidance    PRE-OP DIAGNOSIS: Lumbar radiculopathy [M54.16] Lumbar radiculopathy [M54.16]    POST-OP DIAGNOSIS: Same    PHYSICIAN: Majo Meyers MD    ASSISTANTS: Rusty Jacob MD  [employer] PM&R Resident        MEDICATIONS INJECTED: Preservative-free Decadron 10mg with 5cc of Lidocaine 1% MPF     LOCAL ANESTHETIC INJECTED: Xylocaine 2%     SEDATION: Versed 1mg and Fentanyl 50mcg                                                                                                                                                                                     Conscious sedation ordered by M.D. Patient re-evaluation prior to administration of conscious sedation. No changes noted in patient's status from initial evaluation. The patient's vital signs were monitored by RN and patient remained hemodynamically stable throughout the procedure.    Event Time In   Sedation Start 0948   Sedation End 1009       ESTIMATED BLOOD LOSS: None    COMPLICATIONS: None    TECHNIQUE: Time-out was performed to identify the patient and procedure to be performed. With the patient laying in a prone position, the surgical area was prepped and draped in the usual sterile fashion using ChloraPrep and a fenestrated drape.The levels were determined under fluoroscopy guidance. Skin anesthesia was achieved by injecting Lidocaine 2% over the injection sites. The L5/S1 transforaminal spaces were then  approached with a 22 gauge, 5 inch spinal quinke needle that was introduced under fluoroscopic guidance in the AP and Lateral views. We were unable to obtain epidural spread with ominpaque 300mg/mL. The L5/S1 approach was aborted to pursue a right S1 appraoch. The right S1 transforaminal space was then approached with a 22 gauge, 3.5 inch spinal quinke needle that was introduced under fluoroscopic guidance in the AP and lateral views. Once the needle tip was in the area of the transforaminal space, and there was no blood, CSF or paraesthesias, contrast dye Omnipaque (300mg/mL) was injected to confirm placement and there was no vascular runoff. Fluoroscopic imaging in the AP and lateral views revealed a clear outline of the spinal nerve with proximal spread of agent through the neural foramen into the epidural space. 6 mL of the medication mixture listed above was injected slowly at each site. Displacement of the radio opaque contrast after injection of the medication confirmed that the medication went into the area of the transforaminal spaces. The needles were removed and bleeding was nil. A sterile dressing was applied. No specimens collected. The patient tolerated the procedure well.         The patient was monitored after the procedure in the recovery area. They were given post-procedure and discharge instructions to follow at home. The patient was discharged in a stable condition.    I reviewed and edited the fellow's note. I conducted my own interview and physical examination. I agree with the findings. I was present and supervising all critical portions of the procedure.    Rusty Jacob MD

## 2024-06-04 NOTE — DISCHARGE SUMMARY
Discharge Note  Short Stay      SUMMARY     Admit Date: 6/4/2024    Attending Physician: Majo Meyers MD      Discharge Physician: Rusty Jacob      Discharge Date: 6/4/2024   Procedure(s) (LRB):  LUMBAR TRANSFORAMINAL RIGHT S1/2 DIRECT REFERRAL *ASPIRIN CLEARANCE IN CHART* (Right)    Final Diagnosis: Lumbar radiculopathy [M54.16]    Disposition: Home or self care    Patient Instructions:   Current Discharge Medication List        CONTINUE these medications which have NOT CHANGED    Details   acetaminophen (TYLENOL) 500 MG tablet TAKE 1 TABLET THREE TIMES DAILY  Qty: 90 tablet, Refills: 11    Associated Diagnoses: Lumbar spondylosis      allopurinoL (ZYLOPRIM) 100 MG tablet TAKE 1 TABLET EVERY DAY  Qty: 90 tablet, Refills: 0      amLODIPine (NORVASC) 10 MG tablet TAKE 1 TABLET ONE TIME DAILY  Qty: 90 tablet, Refills: 3    Comments: .      aspirin (ECOTRIN) 81 MG EC tablet Take 1 tablet (81 mg total) by mouth once daily.  Qty: 90 tablet, Refills: 3      betamethasone dipropionate (DIPROLENE) 0.05 % ointment Apply topically 2 (two) times daily.  Qty: 45 g, Refills: 11    Associated Diagnoses: Flexural atopic dermatitis      biotin 300 mcg Tab Take 1 tablet by mouth once daily.      carvediloL (COREG) 3.125 MG tablet TAKE 1 TABLET TWICE DAILY WITH MEALS  Qty: 180 tablet, Refills: 2    Comments: .      celecoxib (CELEBREX) 100 MG capsule Take 1 capsule (100 mg total) by mouth 2 (two) times daily.  Qty: 28 capsule, Refills: 0    Comments: Bedside delivery      ketoconazole (NIZORAL) 2 % cream Apply to affected areas of body BID prn irritation.  Qty: 15 g, Refills: 1    Associated Diagnoses: Candidal intertrigo      methocarbamoL (ROBAXIN) 500 MG Tab TAKE 2 TABLETS THREE TIMES DAILY  Qty: 90 tablet, Refills: 0    Associated Diagnoses: Lumbar spondylosis      mometasone (ELOCON) 0.1 % ointment Apply twice daily to hands prn eczema.  Qty: 45 g, Refills: 3    Associated Diagnoses: Hand eczema      MULTIVIT-MIN/FA/CA  CARB/VIT K (WOMEN'S 50+ DAILY FORMULA ORAL) Take by mouth.      olmesartan (BENICAR) 40 MG tablet TAKE 1 TABLET EVERY DAY  Qty: 90 tablet, Refills: 3    Comments: .  Associated Diagnoses: Essential hypertension      omega-3 fatty acids 300 mg Cap Take by mouth.      ondansetron (ZOFRAN-ODT) 4 MG TbDL DISSOLVE 1 TABLET ON THE TONGUE EVERY 6 HOURS AS NEEDED FOR NAUSEA  Qty: 30 tablet, Refills: 0    Associated Diagnoses: Nausea      oxyCODONE (ROXICODONE) 5 MG immediate release tablet Take 1 tablet (5 mg total) by mouth every 6 (six) hours as needed for Pain (for breakthrough pain).  Qty: 21 tablet, Refills: 0    Comments: Quantity prescribed more than 7 day supply? NoBedside delivery      potassium chloride SA (K-DUR,KLOR-CON) 20 MEQ tablet TAKE 1 TABLET TWICE DAILY  Qty: 180 tablet, Refills: 3      pregabalin (LYRICA) 75 MG capsule Take 1 capsule (75 mg total) by mouth 2 (two) times daily.  Qty: 60 capsule, Refills: 5      riboflavin, vitamin B2, (VITAMIN B-2 ORAL) Take 1 capsule by mouth once daily.    Associated Diagnoses: Neurogenic claudication due to lumbar spinal stenosis      rosuvastatin (CRESTOR) 20 MG tablet Take 1 tablet (20 mg total) by mouth once daily.  Qty: 90 tablet, Refills: 3      RSVPreF3 antigen-AS01E, PF, (AREXVY) 120 mcg/0.5 mL SusR vaccine Inject 0.5mL into the muscle.  Qty: 0.5 mL, Refills: 0      triamcinolone acetonide 0.025% (KENALOG) 0.025 % cream Apply to affected areas of body BID prn irritation.  Qty: 15 g, Refills: 1    Associated Diagnoses: Candidal intertrigo      TURMERIC ORAL Take by mouth.      ubidecarenone (COENZYME Q10) 100 mg Tab Take 1 tablet by mouth once daily.                  Discharge Diagnosis: Lumbar radiculopathy [M54.16]  Condition on Discharge: Stable with no complications to procedure   Diet on Discharge: Same as before.  Activity: as per instruction sheet.  Discharge to: Home with a responsible adult.  Follow up: 2-4 weeks       Please call my office or pager at  [phone] if experienced any weakness or loss of sensation, fever > 101.5, pain uncontrolled with oral medications, persistent nausea/vomiting/or diarrhea, redness or drainage from the incisions, or any other worrisome concerns. If physician on call was not reached or could not communicate with our office for any reason please go to the nearest emergency department

## 2024-06-04 NOTE — DISCHARGE INSTRUCTIONS

## 2024-06-12 DIAGNOSIS — M47.816 LUMBAR SPONDYLOSIS: ICD-10-CM

## 2024-06-12 RX ORDER — METHOCARBAMOL 500 MG/1
1000 TABLET, FILM COATED ORAL 3 TIMES DAILY
Qty: 90 TABLET | Refills: 0 | Status: SHIPPED | OUTPATIENT
Start: 2024-06-12

## 2024-07-03 ENCOUNTER — OFFICE VISIT (OUTPATIENT)
Dept: PRIMARY CARE CLINIC | Facility: CLINIC | Age: 79
End: 2024-07-03
Payer: MEDICARE

## 2024-07-03 ENCOUNTER — LAB VISIT (OUTPATIENT)
Dept: LAB | Facility: HOSPITAL | Age: 79
End: 2024-07-03
Attending: FAMILY MEDICINE
Payer: MEDICARE

## 2024-07-03 VITALS
BODY MASS INDEX: 36.84 KG/M2 | HEART RATE: 86 BPM | SYSTOLIC BLOOD PRESSURE: 138 MMHG | WEIGHT: 195.13 LBS | RESPIRATION RATE: 18 BRPM | HEIGHT: 61 IN | DIASTOLIC BLOOD PRESSURE: 72 MMHG | OXYGEN SATURATION: 98 %

## 2024-07-03 DIAGNOSIS — R79.9 ABNORMAL FINDING OF BLOOD CHEMISTRY, UNSPECIFIED: ICD-10-CM

## 2024-07-03 DIAGNOSIS — E21.3 HYPERPARATHYROIDISM: ICD-10-CM

## 2024-07-03 DIAGNOSIS — R68.89 OTHER GENERAL SYMPTOMS AND SIGNS: ICD-10-CM

## 2024-07-03 DIAGNOSIS — I70.0 ATHEROSCLEROSIS OF AORTA: ICD-10-CM

## 2024-07-03 DIAGNOSIS — Z00.00 GENERAL MEDICAL EXAM: ICD-10-CM

## 2024-07-03 DIAGNOSIS — N18.31 CHRONIC KIDNEY DISEASE, STAGE 3A: ICD-10-CM

## 2024-07-03 DIAGNOSIS — I42.1 HYPERTROPHIC OBSTRUCTIVE CARDIOMYOPATHY WITH DIASTOLIC HEART FAILURE: ICD-10-CM

## 2024-07-03 DIAGNOSIS — E66.01 SEVERE OBESITY (BMI 35.0-39.9) WITH COMORBIDITY: ICD-10-CM

## 2024-07-03 DIAGNOSIS — I50.30 HYPERTROPHIC OBSTRUCTIVE CARDIOMYOPATHY WITH DIASTOLIC HEART FAILURE: ICD-10-CM

## 2024-07-03 DIAGNOSIS — N18.30 STAGE 3 CHRONIC KIDNEY DISEASE, UNSPECIFIED WHETHER STAGE 3A OR 3B CKD: ICD-10-CM

## 2024-07-03 DIAGNOSIS — I10 ESSENTIAL HYPERTENSION: Primary | ICD-10-CM

## 2024-07-03 DIAGNOSIS — E78.2 MIXED HYPERLIPIDEMIA: ICD-10-CM

## 2024-07-03 DIAGNOSIS — H53.8 BLURRED VISION, LEFT EYE: ICD-10-CM

## 2024-07-03 LAB
ALBUMIN SERPL BCP-MCNC: 3.9 G/DL (ref 3.5–5.2)
ALP SERPL-CCNC: 136 U/L (ref 55–135)
ALT SERPL W/O P-5'-P-CCNC: 20 U/L (ref 10–44)
ANION GAP SERPL CALC-SCNC: 9 MMOL/L (ref 8–16)
AST SERPL-CCNC: 18 U/L (ref 10–40)
BILIRUB SERPL-MCNC: 0.3 MG/DL (ref 0.1–1)
BUN SERPL-MCNC: 19 MG/DL (ref 8–23)
CALCIUM SERPL-MCNC: 11 MG/DL (ref 8.7–10.5)
CHLORIDE SERPL-SCNC: 112 MMOL/L (ref 95–110)
CHOLEST SERPL-MCNC: 139 MG/DL (ref 120–199)
CHOLEST/HDLC SERPL: 2.4 {RATIO} (ref 2–5)
CO2 SERPL-SCNC: 21 MMOL/L (ref 23–29)
CREAT SERPL-MCNC: 1.2 MG/DL (ref 0.5–1.4)
ERYTHROCYTE [DISTWIDTH] IN BLOOD BY AUTOMATED COUNT: 13.9 % (ref 11.5–14.5)
EST. GFR  (NO RACE VARIABLE): 46.9 ML/MIN/1.73 M^2
ESTIMATED AVG GLUCOSE: 97 MG/DL (ref 68–131)
GLUCOSE SERPL-MCNC: 98 MG/DL (ref 70–110)
HBA1C MFR BLD: 5 % (ref 4–5.6)
HCT VFR BLD AUTO: 34 % (ref 37–48.5)
HDLC SERPL-MCNC: 59 MG/DL (ref 40–75)
HDLC SERPL: 42.4 % (ref 20–50)
HGB BLD-MCNC: 10.2 G/DL (ref 12–16)
LDLC SERPL CALC-MCNC: 61 MG/DL (ref 63–159)
MCH RBC QN AUTO: 25.1 PG (ref 27–31)
MCHC RBC AUTO-ENTMCNC: 30 G/DL (ref 32–36)
MCV RBC AUTO: 84 FL (ref 82–98)
NONHDLC SERPL-MCNC: 80 MG/DL
PLATELET # BLD AUTO: 331 K/UL (ref 150–450)
PMV BLD AUTO: 10.7 FL (ref 9.2–12.9)
POTASSIUM SERPL-SCNC: 4.7 MMOL/L (ref 3.5–5.1)
PROT SERPL-MCNC: 8 G/DL (ref 6–8.4)
RBC # BLD AUTO: 4.06 M/UL (ref 4–5.4)
SODIUM SERPL-SCNC: 142 MMOL/L (ref 136–145)
TRIGL SERPL-MCNC: 95 MG/DL (ref 30–150)
TSH SERPL DL<=0.005 MIU/L-ACNC: 2.18 UIU/ML (ref 0.4–4)
WBC # BLD AUTO: 5.77 K/UL (ref 3.9–12.7)

## 2024-07-03 PROCEDURE — 3075F SYST BP GE 130 - 139MM HG: CPT | Mod: HCNC,CPTII,S$GLB, | Performed by: FAMILY MEDICINE

## 2024-07-03 PROCEDURE — 1159F MED LIST DOCD IN RCRD: CPT | Mod: HCNC,CPTII,S$GLB, | Performed by: FAMILY MEDICINE

## 2024-07-03 PROCEDURE — 84443 ASSAY THYROID STIM HORMONE: CPT | Mod: HCNC | Performed by: FAMILY MEDICINE

## 2024-07-03 PROCEDURE — 83036 HEMOGLOBIN GLYCOSYLATED A1C: CPT | Mod: HCNC | Performed by: FAMILY MEDICINE

## 2024-07-03 PROCEDURE — 80053 COMPREHEN METABOLIC PANEL: CPT | Mod: HCNC | Performed by: FAMILY MEDICINE

## 2024-07-03 PROCEDURE — 3288F FALL RISK ASSESSMENT DOCD: CPT | Mod: HCNC,CPTII,S$GLB, | Performed by: FAMILY MEDICINE

## 2024-07-03 PROCEDURE — 36415 COLL VENOUS BLD VENIPUNCTURE: CPT | Mod: HCNC,PN | Performed by: FAMILY MEDICINE

## 2024-07-03 PROCEDURE — 85027 COMPLETE CBC AUTOMATED: CPT | Mod: HCNC | Performed by: FAMILY MEDICINE

## 2024-07-03 PROCEDURE — 99999 PR PBB SHADOW E&M-EST. PATIENT-LVL V: CPT | Mod: PBBFAC,HCNC,, | Performed by: FAMILY MEDICINE

## 2024-07-03 PROCEDURE — 80061 LIPID PANEL: CPT | Mod: HCNC | Performed by: FAMILY MEDICINE

## 2024-07-03 PROCEDURE — 1101F PT FALLS ASSESS-DOCD LE1/YR: CPT | Mod: HCNC,CPTII,S$GLB, | Performed by: FAMILY MEDICINE

## 2024-07-03 PROCEDURE — 99214 OFFICE O/P EST MOD 30 MIN: CPT | Mod: HCNC,S$GLB,, | Performed by: FAMILY MEDICINE

## 2024-07-03 PROCEDURE — 3078F DIAST BP <80 MM HG: CPT | Mod: HCNC,CPTII,S$GLB, | Performed by: FAMILY MEDICINE

## 2024-07-03 PROCEDURE — 1126F AMNT PAIN NOTED NONE PRSNT: CPT | Mod: HCNC,CPTII,S$GLB, | Performed by: FAMILY MEDICINE

## 2024-07-03 PROCEDURE — G2211 COMPLEX E/M VISIT ADD ON: HCPCS | Mod: HCNC,S$GLB,, | Performed by: FAMILY MEDICINE

## 2024-07-03 PROCEDURE — 1160F RVW MEDS BY RX/DR IN RCRD: CPT | Mod: HCNC,CPTII,S$GLB, | Performed by: FAMILY MEDICINE

## 2024-07-03 RX ORDER — ROSUVASTATIN CALCIUM 20 MG/1
20 TABLET, COATED ORAL DAILY
Qty: 90 TABLET | Refills: 3 | Status: SHIPPED | OUTPATIENT
Start: 2024-07-03 | End: 2025-07-03

## 2024-07-03 RX ORDER — OLMESARTAN MEDOXOMIL 40 MG/1
40 TABLET ORAL DAILY
Qty: 90 TABLET | Refills: 3 | Status: SHIPPED | OUTPATIENT
Start: 2024-07-03

## 2024-07-03 RX ORDER — ALLOPURINOL 100 MG/1
100 TABLET ORAL DAILY
Qty: 90 TABLET | Refills: 3 | Status: SHIPPED | OUTPATIENT
Start: 2024-07-03

## 2024-07-03 RX ORDER — AMLODIPINE BESYLATE 10 MG/1
10 TABLET ORAL DAILY
Qty: 90 TABLET | Refills: 3 | Status: SHIPPED | OUTPATIENT
Start: 2024-07-03

## 2024-07-03 RX ORDER — POTASSIUM CHLORIDE 20 MEQ/1
20 TABLET, EXTENDED RELEASE ORAL 2 TIMES DAILY
Qty: 180 TABLET | Refills: 3 | Status: SHIPPED | OUTPATIENT
Start: 2024-07-03

## 2024-07-03 RX ORDER — CARVEDILOL 3.12 MG/1
3.12 TABLET ORAL 2 TIMES DAILY WITH MEALS
Qty: 180 TABLET | Refills: 3 | Status: SHIPPED | OUTPATIENT
Start: 2024-07-03

## 2024-07-06 DIAGNOSIS — M47.816 LUMBAR SPONDYLOSIS: ICD-10-CM

## 2024-07-06 PROBLEM — N18.31 CHRONIC KIDNEY DISEASE, STAGE 3A: Status: ACTIVE | Noted: 2020-08-18

## 2024-07-06 PROBLEM — E66.01 SEVERE OBESITY (BMI >= 40): Status: RESOLVED | Noted: 2022-06-10 | Resolved: 2024-07-06

## 2024-07-06 NOTE — PROGRESS NOTES
"      /72 (BP Location: Right arm, Patient Position: Sitting, BP Method: Medium (Manual))   Pulse 86   Resp 18   Ht 5' 1" (1.549 m)   Wt 88.5 kg (195 lb 1.7 oz)   SpO2 98%   BMI 36.87 kg/m²       ===========          HPI    Nilsa Cruiel is a 78 y.o. female     here for    Follow-up of multiple medical problems    Patient queried and denies any further complaints      Patient Active Problem List   Diagnosis    Mixed hyperlipidemia    GERD (gastroesophageal reflux disease)    Atopic dermatitis    Hyperparathyroidism    Decreased strength    Impaired mobility    Right-sided carotid artery disease    Neurogenic claudication due to lumbar spinal stenosis    Spondylolisthesis of lumbosacral region    MONE (obstructive sleep apnea)    Primary osteoarthritis of both shoulders    Chronic right-sided low back pain without sciatica    Headache disorder    Chronic kidney disease, stage 3a    Cervical stenosis of spine    Posture imbalance    Decreased strength of trunk and back    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    Essential hypertension    Right hip pain    Ankle swelling    Intertrigo    Muscle spasms of both lower extremities    Hyperuricemia    Chronic heart failure with preserved ejection fraction    Nonrheumatic aortic valve stenosis    Hair loss    Cervical spine arthritis with nerve pain    Chronic right hip pain    Chronic neck pain    Lumbar spondylosis    Fatigue    Atherosclerosis of aorta    Acute blood loss anemia    Surgical wound dehiscence    Acute on chronic anemia    Severe obesity (BMI 35.0-39.9) with comorbidity       SURGICAL AND MEDICAL HISTORY: updated and reviewed.  Past Surgical History:   Procedure Laterality Date     SECTION      x3    CLOSURE OF WOUND  10/4/2023    Procedure: CLOSURE, WOUND;  Surgeon: Benigno Goodman DO;  Location: [facility] OR [address] [place] [address];  Service: Plastics;;    COLONOSCOPY N/A 10/04/2021    Procedure: COLONOSCOPY;  Surgeon: " Taran Galvez MD;  Location: [facility]TH FLR);  Service: Endoscopy;  Laterality: N/A;    COLONOSCOPY N/A 01/31/2022    Procedure: COLONOSCOPY--positive fit kit;  Surgeon: Tani Lara MD;  Location: [facility] (4TH FLR);  Service: Endoscopy;  Laterality: N/A;    EPIDURAL STEROID INJECTION N/A 4/26/2023    Procedure: INJECTION, STEROID, EPIDURAL, L5-S1;  Surgeon: Majo Meyers MD;  Location: [facility] PAIN MGT;  Service: Pain Management;  Laterality: N/A;    ESOPHAGOGASTRODUODENOSCOPY N/A 10/04/2021    Procedure: EGD (ESOPHAGOGASTRODUODENOSCOPY);  Surgeon: Taran Galvez MD;  Location: [facility] (4TH FLR);  Service: Endoscopy;  Laterality: N/A;  covid test 10/1-[facility]    10/1-LVM about COVID test-GT    ESOPHAGOGASTRODUODENOSCOPY N/A 01/31/2022    Procedure: EGD (ESOPHAGOGASTRODUODENOSCOPY);  Surgeon: Tani Lara MD;  Location: [facility] (4TH FLR);  Service: Endoscopy;  Laterality: N/A;  12/15 fully vaccinated; instructions to portal-st  1/28-covid [facility]-tb    FUSION, SPINE, LUMBAR, TLIF, POSTERIOR APPROACH, USING PEDICLE SCREW N/A 8/16/2023    Procedure: FUSION, SPINE, LUMBAR, TLIF, POSTERIOR APPROACH, USING PEDICLE SCREW L2-5 PLDF/TLIF GLOBUS ROBOT SNS:SSEP/EMG cell saver;  Surgeon: Jesus Alberto Sharp MD;  Location: [address];  Service: Neurosurgery;  Laterality: N/A;    IRRIGATION AND DEBRIDEMENT N/A 10/4/2023    Procedure: IRRIGATION AND DEBRIDEMENT **CO CASE DR. JULISSA RODRIGUEZ** LUMBAR;  Surgeon: Jesus Alberto Sharp MD;  Location: [address];  Service: Orthopedics;  Laterality: N/A;    TRANSFORAMINAL EPIDURAL INJECTION OF STEROID N/A 03/16/2023    Procedure: b/l L4-5 TFESI;  Surgeon: Gato Delatorre DO;  Location: [facility] OR;  Service: Pain Management;  Laterality: N/A;    TRANSFORAMINAL EPIDURAL INJECTION OF STEROID Right 6/4/2024    Procedure: LUMBAR TRANSFORAMINAL RIGHT S1/2 DIRECT REFERRAL *ASPIRIN CLEARANCE IN CHART*;  Surgeon: Majo Meyers MD;  Location: [facility];  Service:  Pain Management;  Laterality: Right;  [phone]    TUBAL LIGATION       ALLERGIES updated and reviewed.  Review of patient's allergies indicates:  No Known Allergies    CURRENT OUTPATIENT MEDICATIONS updated and reviewed    Current Outpatient Medications:     acetaminophen (TYLENOL) 500 MG tablet, TAKE 1 TABLET THREE TIMES DAILY, Disp: 90 tablet, Rfl: 11    aspirin (ECOTRIN) 81 MG EC tablet, Take 1 tablet (81 mg total) by mouth once daily., Disp: 90 tablet, Rfl: 3    betamethasone dipropionate (DIPROLENE) 0.05 % ointment, Apply topically 2 (two) times daily., Disp: 45 g, Rfl: 11    biotin 300 mcg Tab, Take 1 tablet by mouth once daily., Disp: , Rfl:     ketoconazole (NIZORAL) 2 % cream, Apply to affected areas of body BID prn irritation., Disp: 15 g, Rfl: 1    methocarbamoL (ROBAXIN) 500 MG Tab, TAKE 2 TABLETS THREE TIMES DAILY, Disp: 90 tablet, Rfl: 0    mometasone (ELOCON) 0.1 % ointment, Apply twice daily to hands prn eczema., Disp: 45 g, Rfl: 3    MULTIVIT-MIN/FA/CA CARB/VIT K (WOMEN'S 50+ DAILY FORMULA ORAL), Take by mouth., Disp: , Rfl:     omega-3 fatty acids 300 mg Cap, Take by mouth., Disp: , Rfl:     ondansetron (ZOFRAN-ODT) 4 MG TbDL, DISSOLVE 1 TABLET ON THE TONGUE EVERY 6 HOURS AS NEEDED FOR NAUSEA, Disp: 30 tablet, Rfl: 0    oxyCODONE (ROXICODONE) 5 MG immediate release tablet, Take 1 tablet (5 mg total) by mouth every 6 (six) hours as needed for Pain (for breakthrough pain)., Disp: 21 tablet, Rfl: 0    pregabalin (LYRICA) 75 MG capsule, Take 1 capsule (75 mg total) by mouth 2 (two) times daily., Disp: 60 capsule, Rfl: 5    riboflavin, vitamin B2, (VITAMIN B-2 ORAL), Take 1 capsule by mouth once daily., Disp: , Rfl:     triamcinolone acetonide 0.025% (KENALOG) 0.025 % cream, Apply to affected areas of body BID prn irritation., Disp: 15 g, Rfl: 1    TURMERIC ORAL, Take by mouth., Disp: , Rfl:     ubidecarenone (COENZYME Q10) 100 mg Tab, Take 1 tablet by mouth once daily. , Disp: , Rfl:      allopurinoL (ZYLOPRIM) 100 MG tablet, Take 1 tablet (100 mg total) by mouth once daily., Disp: 90 tablet, Rfl: 3    amLODIPine (NORVASC) 10 MG tablet, Take 1 tablet (10 mg total) by mouth once daily., Disp: 90 tablet, Rfl: 3    carvediloL (COREG) 3.125 MG tablet, Take 1 tablet (3.125 mg total) by mouth 2 (two) times daily with meals., Disp: 180 tablet, Rfl: 3    olmesartan (BENICAR) 40 MG tablet, Take 1 tablet (40 mg total) by mouth once daily., Disp: 90 tablet, Rfl: 3    potassium chloride SA (K-DUR,KLOR-CON) 20 MEQ tablet, Take 1 tablet (20 mEq total) by mouth 2 (two) times daily., Disp: 180 tablet, Rfl: 3    rosuvastatin (CRESTOR) 20 MG tablet, Take 1 tablet (20 mg total) by mouth once daily., Disp: 90 tablet, Rfl: 3    Review of Systems   Constitutional:  Negative for activity change, appetite change, chills, diaphoresis, fatigue, fever and unexpected weight change.   HENT:  Negative for congestion, ear discharge, ear pain, facial swelling, hearing loss, nosebleeds, postnasal drip, rhinorrhea, sinus pressure, sneezing, sore throat, tinnitus, trouble swallowing and voice change.    Eyes:  Negative for photophobia, pain, discharge, redness, itching and visual disturbance.   Respiratory:  Negative for cough, chest tightness, shortness of breath and wheezing.    Cardiovascular:  Negative for chest pain, palpitations and leg swelling.   Gastrointestinal:  Negative for abdominal distention, abdominal pain, anal bleeding, blood in stool, constipation, diarrhea, nausea, rectal pain and vomiting.   Endocrine: Negative for cold intolerance, heat intolerance, polydipsia, polyphagia and polyuria.   Genitourinary:  Negative for difficulty urinating, dysuria, flank pain, hematuria and menstrual problem.   Musculoskeletal:  Negative for arthralgias, back pain, joint swelling, myalgias and neck pain.   Skin:  Negative for rash.   Neurological:  Negative for dizziness, tremors, seizures, syncope, speech difficulty, weakness,  "light-headedness, numbness and headaches.   Psychiatric/Behavioral:  Negative for behavioral problems, confusion, decreased concentration, dysphoric mood, sleep disturbance and suicidal ideas. The patient is not nervous/anxious and is not hyperactive.        /72 (BP Location: Right arm, Patient Position: Sitting, BP Method: Medium (Manual))   Pulse 86   Resp 18   Ht 5' 1" (1.549 m)   Wt 88.5 kg (195 lb 1.7 oz)   SpO2 98%   BMI 36.87 kg/m²   Physical Exam  Vitals and nursing note reviewed.   Constitutional:       General: She is not in acute distress.     Appearance: Normal appearance. She is well-developed. She is obese. She is not ill-appearing or toxic-appearing.   HENT:      Head: Normocephalic and atraumatic.      Right Ear: Tympanic membrane, ear canal and external ear normal.      Left Ear: Tympanic membrane, ear canal and external ear normal.      Nose: Nose normal.      Mouth/Throat:      Lips: Pink.      Mouth: Mucous membranes are moist.      Pharynx: No oropharyngeal exudate or posterior oropharyngeal erythema.   Eyes:      General: No scleral icterus.        Right eye: No discharge.         Left eye: No discharge.      Extraocular Movements: Extraocular movements intact.      Conjunctiva/sclera: Conjunctivae normal.   Cardiovascular:      Rate and Rhythm: Normal rate and regular rhythm.      Pulses: Normal pulses.      Heart sounds: Normal heart sounds. No murmur heard.  Pulmonary:      Effort: Pulmonary effort is normal. No respiratory distress.      Breath sounds: Normal breath sounds. No wheezing or rales.   Abdominal:      General: Bowel sounds are normal. There is no distension.      Palpations: Abdomen is soft. There is no mass.      Tenderness: There is no abdominal tenderness. There is no right CVA tenderness, left CVA tenderness, guarding or rebound.      Hernia: No hernia is present.   Musculoskeletal:      Cervical back: Normal range of motion and neck supple. No rigidity or " "tenderness.   Lymphadenopathy:      Cervical: No cervical adenopathy.   Skin:     General: Skin is warm and dry.   Neurological:      General: No focal deficit present.      Mental Status: She is alert. Mental status is at baseline.   Psychiatric:         Mood and Affect: Mood normal.         Behavior: Behavior normal. Behavior is cooperative.         ASSESSMENT/PLAN    Nilsa Ash" was seen today for annual exam.    Diagnoses and all orders for this visit:    Essential hypertension  -     olmesartan (BENICAR) 40 MG tablet; Take 1 tablet (40 mg total) by mouth once daily.    General medical exam  -     CBC Without Differential; Future  -     Comprehensive Metabolic Panel; Future  -     Lipid Panel; Future  -     TSH; Future  -     Hemoglobin A1C; Future    Other general symptoms and signs  -     TSH; Future    Abnormal finding of blood chemistry, unspecified  -     CBC Without Differential; Future  -     Lipid Panel; Future  -     Hemoglobin A1C; Future    Blurred vision, left eye  -     Ambulatory referral/consult to Optometry; Future    Mixed hyperlipidemia    Stage 3 chronic kidney disease, unspecified whether stage 3a or 3b CKD    Severe obesity (BMI 35.0-39.9) with comorbidity    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  -     amLODIPine (NORVASC) 10 MG tablet; Take 1 tablet (10 mg total) by mouth once daily.  -     carvediloL (COREG) 3.125 MG tablet; Take 1 tablet (3.125 mg total) by mouth 2 (two) times daily with meals.  -     potassium chloride SA (K-DUR,KLOR-CON) 20 MEQ tablet; Take 1 tablet (20 mEq total) by mouth 2 (two) times daily.  -     rosuvastatin (CRESTOR) 20 MG tablet; Take 1 tablet (20 mg total) by mouth once daily.            Most recent some lab results reviewed with " patient.  Any new prescription medications gone over in detail including reason for taking the medication, most common possible side effects and possible costs, etcetera.    Chronic conditions updated. Other than changes or additions as above, cont current medications and maintain follow-up with specialists if indicated.     Gaurav Allison MD  A dictation device was used to produce this document. Use of such devices sometimes results in grammatical errors or replacement of words that sound similarly.

## 2024-07-07 RX ORDER — METHOCARBAMOL 500 MG/1
1000 TABLET, FILM COATED ORAL 3 TIMES DAILY
Qty: 90 TABLET | Refills: 0 | OUTPATIENT
Start: 2024-07-07

## 2024-07-17 ENCOUNTER — LAB VISIT (OUTPATIENT)
Dept: LAB | Facility: HOSPITAL | Age: 79
End: 2024-07-17
Attending: ORTHOPAEDIC SURGERY
Payer: MEDICARE

## 2024-07-17 DIAGNOSIS — Z98.890 S/P SPINAL SURGERY: ICD-10-CM

## 2024-07-17 LAB
CREAT SERPL-MCNC: 1 MG/DL (ref 0.5–1.4)
EST. GFR  (NO RACE VARIABLE): 58.4 ML/MIN/1.73 M^2

## 2024-07-17 PROCEDURE — 82565 ASSAY OF CREATININE: CPT | Mod: HCNC | Performed by: ORTHOPAEDIC SURGERY

## 2024-07-17 PROCEDURE — 36415 COLL VENOUS BLD VENIPUNCTURE: CPT | Mod: HCNC,PN | Performed by: ORTHOPAEDIC SURGERY

## 2024-07-19 ENCOUNTER — OFFICE VISIT (OUTPATIENT)
Dept: CARDIOLOGY | Facility: CLINIC | Age: 79
End: 2024-07-19
Payer: MEDICARE

## 2024-07-19 VITALS
SYSTOLIC BLOOD PRESSURE: 118 MMHG | WEIGHT: 198 LBS | OXYGEN SATURATION: 97 % | HEART RATE: 79 BPM | BODY MASS INDEX: 37.38 KG/M2 | HEIGHT: 61 IN | DIASTOLIC BLOOD PRESSURE: 60 MMHG

## 2024-07-19 DIAGNOSIS — E78.2 MIXED HYPERLIPIDEMIA: Primary | ICD-10-CM

## 2024-07-19 DIAGNOSIS — I35.0 NONRHEUMATIC AORTIC VALVE STENOSIS: ICD-10-CM

## 2024-07-19 DIAGNOSIS — Z74.09 IMPAIRED MOBILITY: ICD-10-CM

## 2024-07-19 DIAGNOSIS — I10 ESSENTIAL HYPERTENSION: ICD-10-CM

## 2024-07-19 DIAGNOSIS — I70.0 ATHEROSCLEROSIS OF AORTA: ICD-10-CM

## 2024-07-19 PROCEDURE — 1159F MED LIST DOCD IN RCRD: CPT | Mod: HCNC,CPTII,S$GLB, | Performed by: INTERNAL MEDICINE

## 2024-07-19 PROCEDURE — 3074F SYST BP LT 130 MM HG: CPT | Mod: HCNC,CPTII,S$GLB, | Performed by: INTERNAL MEDICINE

## 2024-07-19 PROCEDURE — 3078F DIAST BP <80 MM HG: CPT | Mod: HCNC,CPTII,S$GLB, | Performed by: INTERNAL MEDICINE

## 2024-07-19 PROCEDURE — 99999 PR PBB SHADOW E&M-EST. PATIENT-LVL IV: CPT | Mod: PBBFAC,HCNC,, | Performed by: INTERNAL MEDICINE

## 2024-07-19 PROCEDURE — 99214 OFFICE O/P EST MOD 30 MIN: CPT | Mod: HCNC,S$GLB,, | Performed by: INTERNAL MEDICINE

## 2024-07-19 PROCEDURE — 1126F AMNT PAIN NOTED NONE PRSNT: CPT | Mod: HCNC,CPTII,S$GLB, | Performed by: INTERNAL MEDICINE

## 2024-07-19 NOTE — PROGRESS NOTES
Cardiology    7/19/2024  11:31 AM    Problem list  Patient Active Problem List   Diagnosis    Mixed hyperlipidemia    GERD (gastroesophageal reflux disease)    Atopic dermatitis    Hyperparathyroidism    Decreased strength    Impaired mobility    Right-sided carotid artery disease    Neurogenic claudication due to lumbar spinal stenosis    Spondylolisthesis of lumbosacral region    MONE (obstructive sleep apnea)    Primary osteoarthritis of both shoulders    Chronic right-sided low back pain without sciatica    Headache disorder    Chronic kidney disease, stage 3a    Cervical stenosis of spine    Posture imbalance    Decreased strength of trunk and back    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    Essential hypertension    Right hip pain    Ankle swelling    Intertrigo    Muscle spasms of both lower extremities    Hyperuricemia    Chronic heart failure with preserved ejection fraction    Nonrheumatic aortic valve stenosis    Hair loss    Cervical spine arthritis with nerve pain    Chronic right hip pain    Chronic neck pain    Lumbar spondylosis    Fatigue    Atherosclerosis of aorta    Acute blood loss anemia    Surgical wound dehiscence    Acute on chronic anemia    Severe obesity (BMI 35.0-39.9) with comorbidity       CC:  Follow-up    HPI:  She is here for her six-month follow-up.  She has been doing well.  She denies any chest pain, shortness breath, palpitation or syncope her blood pressure is well controlled.  She is tolerating her medications.    Medications  Current Outpatient Medications   Medication Sig Dispense Refill    acetaminophen (TYLENOL) 500 MG tablet TAKE 1 TABLET THREE TIMES DAILY 90 tablet 11    allopurinoL (ZYLOPRIM) 100 MG tablet Take 1 tablet (100 mg total) by mouth once daily. 90 tablet 3    amLODIPine (NORVASC) 10 MG tablet Take 1 tablet (10 mg total) by mouth once daily. 90 tablet 3    aspirin (ECOTRIN) 81 MG EC tablet Take 1 tablet (81 mg total) by  mouth once daily. 90 tablet 3    betamethasone dipropionate (DIPROLENE) 0.05 % ointment Apply topically 2 (two) times daily. 45 g 11    biotin 300 mcg Tab Take 1 tablet by mouth once daily.      carvediloL (COREG) 3.125 MG tablet Take 1 tablet (3.125 mg total) by mouth 2 (two) times daily with meals. 180 tablet 3    ketoconazole (NIZORAL) 2 % cream Apply to affected areas of body BID prn irritation. 15 g 1    methocarbamoL (ROBAXIN) 500 MG Tab TAKE 2 TABLETS THREE TIMES DAILY 90 tablet 0    mometasone (ELOCON) 0.1 % ointment Apply twice daily to hands prn eczema. 45 g 3    MULTIVIT-MIN/FA/CA CARB/VIT K (WOMEN'S 50+ DAILY FORMULA ORAL) Take by mouth.      olmesartan (BENICAR) 40 MG tablet Take 1 tablet (40 mg total) by mouth once daily. 90 tablet 3    omega-3 fatty acids 300 mg Cap Take by mouth.      ondansetron (ZOFRAN-ODT) 4 MG TbDL DISSOLVE 1 TABLET ON THE TONGUE EVERY 6 HOURS AS NEEDED FOR NAUSEA 30 tablet 0    oxyCODONE (ROXICODONE) 5 MG immediate release tablet Take 1 tablet (5 mg total) by mouth every 6 (six) hours as needed for Pain (for breakthrough pain). 21 tablet 0    potassium chloride SA (K-DUR,KLOR-CON) 20 MEQ tablet Take 1 tablet (20 mEq total) by mouth 2 (two) times daily. 180 tablet 3    pregabalin (LYRICA) 75 MG capsule Take 1 capsule (75 mg total) by mouth 2 (two) times daily. 60 capsule 5    riboflavin, vitamin B2, (VITAMIN B-2 ORAL) Take 1 capsule by mouth once daily.      rosuvastatin (CRESTOR) 20 MG tablet Take 1 tablet (20 mg total) by mouth once daily. 90 tablet 3    triamcinolone acetonide 0.025% (KENALOG) 0.025 % cream Apply to affected areas of body BID prn irritation. 15 g 1    TURMERIC ORAL Take by mouth.      ubidecarenone (COENZYME Q10) 100 mg Tab Take 1 tablet by mouth once daily.        No current facility-administered medications for this visit.      Prior to Admission medications    Medication Sig Start Date End Date Taking? Authorizing Provider   acetaminophen (TYLENOL) 500 MG  tablet TAKE 1 TABLET THREE TIMES DAILY 4/16/24  Yes Hali Daigle PA-C   allopurinoL (ZYLOPRIM) 100 MG tablet Take 1 tablet (100 mg total) by mouth once daily. 7/3/24  Yes Gaurav Allison MD   amLODIPine (NORVASC) 10 MG tablet Take 1 tablet (10 mg total) by mouth once daily. 7/3/24  Yes Gaurav Allison MD   aspirin (ECOTRIN) 81 MG EC tablet Take 1 tablet (81 mg total) by mouth once daily. 2/7/24 2/6/25 Yes Gaurav Allison MD   betamethasone dipropionate (DIPROLENE) 0.05 % ointment Apply topically 2 (two) times daily. 7/24/23  Yes Gaurav Allison MD   biotin 300 mcg Tab Take 1 tablet by mouth once daily.   Yes Provider, Historical   carvediloL (COREG) 3.125 MG tablet Take 1 tablet (3.125 mg total) by mouth 2 (two) times daily with meals. 7/3/24  Yes Gaurav Allison MD   ketoconazole (NIZORAL) 2 % cream Apply to affected areas of body BID prn irritation. 2/7/24  Yes Brittany Simeon MD   methocarbamoL (ROBAXIN) 500 MG Tab TAKE 2 TABLETS THREE TIMES DAILY 6/12/24  Yes SIMEON Crum, CHRISTOPHER   mometasone (ELOCON) 0.1 % ointment Apply twice daily to hands prn eczema. 2/7/24  Yes Brittany Simeon MD   MULTIVIT-MIN/FA/CA CARB/VIT K (WOMEN'S 50+ DAILY FORMULA ORAL) Take by mouth.   Yes Provider, Historical   olmesartan (BENICAR) 40 MG tablet Take 1 tablet (40 mg total) by mouth once daily. 7/3/24  Yes Gaurav Allison MD   omega-3 fatty acids 300 mg Cap Take by mouth.   Yes Provider, Historical   ondansetron (ZOFRAN-ODT) 4 MG TbDL DISSOLVE 1 TABLET ON THE TONGUE EVERY 6 HOURS AS NEEDED FOR NAUSEA 2/22/24  Yes Gaurav Allison MD   oxyCODONE (ROXICODONE) 5 MG immediate release tablet Take 1 tablet (5 mg total) by mouth every 6 (six) hours as needed for Pain (for breakthrough pain). 10/18/23  Yes Aleshia Vargas PA-C   potassium chloride SA (K-DUR,KLOR-CON) 20 MEQ tablet Take 1 tablet (20 mEq total) by mouth 2 (two) times daily. 7/3/24  Yes Gaurav Allison MD   pregabalin (LYRICA)  75 MG capsule Take 1 capsule (75 mg total) by mouth 2 (two) times daily. 24 Yes Hali Daigle PA-C   riboflavin, vitamin B2, (VITAMIN B-2 ORAL) Take 1 capsule by mouth once daily.   Yes Provider, Historical   rosuvastatin (CRESTOR) 20 MG tablet Take 1 tablet (20 mg total) by mouth once daily. 7/3/24 7/3/25 Yes Gaurav Allison MD   triamcinolone acetonide 0.025% (KENALOG) 0.025 % cream Apply to affected areas of body BID prn irritation. 24  Yes Brittany Simeon MD   TURMERIC ORAL Take by mouth.   Yes Provider, Historical   ubidecarenone (COENZYME Q10) 100 mg Tab Take 1 tablet by mouth once daily.  10/23/19  Yes Provider, Historical         History  Past Medical History:   Diagnosis Date    Abnormal fasting glucose 2015    Acute bilateral low back pain without sciatica 10/10/2017    Anemia 2018    Arthritis     Atopic dermatitis     Central stenosis of spinal canal 2022    Chronic diastolic heart failure 2022    Chronic kidney disease, stage III (moderate) 2020    Dermatitis 2023    GERD (gastroesophageal reflux disease)     Hyperlipidemia     Hypertension     Hypokalemia 2022    Joint pain     Left ventricular hypertrophy 2016    Microcytic hypochromic anemia 2018    Multifactoral. See hematology consult. Some nutritional , some kidney related    Mild aortic sclerosis 2019    Mobility poor 2022    Morbid obesity with BMI of 40.0-44.9, adult 2016    Nonintractable episodic headache 2019    Normocytic normochromic anemia 2022    MONE (obstructive sleep apnea) 2018    Prediabetes 2023    Severe obesity (BMI >= 40) 06/10/2022     Past Surgical History:   Procedure Laterality Date     SECTION      x3    CLOSURE OF WOUND  10/4/2023    Procedure: CLOSURE, WOUND;  Surgeon: Benigno Goodman DO;  Location: [facility] OR [address] [place] [address];  Service: Plastics;;    COLONOSCOPY N/A 10/04/2021    Procedure:  COLONOSCOPY;  Surgeon: Taran Galvez MD;  [address]  Service: Endoscopy;  Laterality: N/A;    COLONOSCOPY N/A 01/31/2022    Procedure: COLONOSCOPY--positive fit kit;  Surgeon: Tani Lara MD;  [address]  Service: Endoscopy;  Laterality: N/A;    EPIDURAL STEROID INJECTION N/A 4/26/2023    Procedure: INJECTION, STEROID, EPIDURAL, L5-S1;  Surgeon: Majo Meyers MD;  Location: [facility] PAIN MGT;  Service: Pain Management;  Laterality: N/A;    ESOPHAGOGASTRODUODENOSCOPY N/A 10/04/2021    Procedure: EGD (ESOPHAGOGASTRODUODENOSCOPY);  Surgeon: Taran Galvez MD;  [facility]  Service: Endoscopy;  Laterality: N/A;  covid test 10/1-[facility]    10/1-LVM about COVID test-GT    ESOPHAGOGASTRODUODENOSCOPY N/A 01/31/2022    Procedure: EGD (ESOPHAGOGASTRODUODENOSCOPY);  Surgeon: Tani Lara MD;  [facility]  Service: Endoscopy;  Laterality: N/A;  12/15 fully vaccinated; instructions to portal-st  1/28-covid [facility]-tb    FUSION, SPINE, LUMBAR, TLIF, POSTERIOR APPROACH, USING PEDICLE SCREW N/A 8/16/2023    Procedure: FUSION, SPINE, LUMBAR, TLIF, POSTERIOR APPROACH, USING PEDICLE SCREW L2-5 PLDF/TLIF GLOBUS ROBOT SNS:SSEP/EMG cell saver;  Surgeon: Jesus Alberto Sharp MD;  Location: [address];  Service: Neurosurgery;  Laterality: N/A;    IRRIGATION AND DEBRIDEMENT N/A 10/4/2023    Procedure: IRRIGATION AND DEBRIDEMENT **CO CASE DR. JULISSA RODRIGUEZ** LUMBAR;  Surgeon: Jesus Alberto Sharp MD;  Location: [address];  Service: Orthopedics;  Laterality: N/A;    TRANSFORAMINAL EPIDURAL INJECTION OF STEROID N/A 03/16/2023    Procedure: b/l L4-5 TFESI;  Surgeon: Gato Delatorre DO;  Location: [facility];  Service: Pain Management;  Laterality: N/A;    TRANSFORAMINAL EPIDURAL INJECTION OF STEROID Right 6/4/2024    Procedure: LUMBAR TRANSFORAMINAL RIGHT S1/2 DIRECT REFERRAL *ASPIRIN CLEARANCE IN CHART*;  Surgeon: Majo Meyers MD;  Location:  [facility] PAIN MGT;  Service: Pain Management;  Laterality: Right;  [phone]    TUBAL LIGATION       Social History     Socioeconomic History    Marital status:    Occupational History    Occupation: retired medical records    Tobacco Use    Smoking status: Never    Smokeless tobacco: Never   Substance and Sexual Activity    Alcohol use: Never    Drug use: No    Sexual activity: Not Currently     Partners: Male     Birth control/protection: None   Other Topics Concern    Are you pregnant or think you may be? No    Breast-feeding No   Social History Narrative    , lives with 1 daughter(Toreal), Walking some     Social Determinants of Health     Financial Resource Strain: Low Risk  (11/18/2023)    Overall Financial Resource Strain (CARDIA)     Difficulty of Paying Living Expenses: Not very hard   Food Insecurity: No Food Insecurity (10/30/2023)    Hunger Vital Sign     Worried About Running Out of Food in the Last Year: Never true     Ran Out of Food in the Last Year: Never true   Transportation Needs: Unmet Transportation Needs (11/18/2023)    PRAPARE - Transportation     Lack of Transportation (Medical): Yes     Lack of Transportation (Non-Medical): Yes   Physical Activity: Unknown (11/18/2023)    Exercise Vital Sign     Days of Exercise per Week: Patient declined     Minutes of Exercise per Session: 0 min   Recent Concern: Physical Activity - Inactive (10/5/2023)    Exercise Vital Sign     Days of Exercise per Week: 0 days     Minutes of Exercise per Session: 0 min   Stress: No Stress Concern Present (10/5/2023)    [employer] - Occupational Stress Questionnaire     Feeling of Stress : Not at all   Housing Stability: Low Risk  (11/18/2023)    Housing Stability Vital Sign     Unable to Pay for Housing in the Last Year: No     Number of Places Lived in the Last Year: 1     Unstable Housing in the Last Year: No   Recent Concern: Housing Stability - High Risk (10/17/2023)     Housing Stability Vital Sign     Unable to Pay for Housing in the Last Year: Yes     Number of Places Lived in the Last Year: 1     Unstable Housing in the Last Year: No         Allergies  Review of patient's allergies indicates:  No Known Allergies      Review of Systems   Review of Systems   Constitutional: Negative for decreased appetite, fever and weight loss.   HENT:  Negative for congestion and nosebleeds.    Eyes:  Negative for double vision, vision loss in left eye, vision loss in right eye and visual disturbance.   Cardiovascular:  Negative for chest pain, claudication, cyanosis, dyspnea on exertion, irregular heartbeat, leg swelling, near-syncope, orthopnea, palpitations, paroxysmal nocturnal dyspnea and syncope.   Respiratory:  Negative for cough, hemoptysis, shortness of breath, sleep disturbances due to breathing, snoring, sputum production and wheezing.    Endocrine: Negative for cold intolerance and heat intolerance.   Skin:  Negative for nail changes and rash.   Musculoskeletal:  Positive for back pain. Negative for joint pain, muscle cramps, muscle weakness and myalgias.   Gastrointestinal:  Negative for change in bowel habit, heartburn, hematemesis, hematochezia, hemorrhoids and melena.   Neurological:  Negative for dizziness, focal weakness and headaches.         Physical Exam  Wt Readings from Last 1 Encounters:   07/19/24 89.8 kg (198 lb)     BP Readings from Last 3 Encounters:   07/19/24 118/60   07/03/24 138/72   06/04/24 (!) 152/68     Pulse Readings from Last 1 Encounters:   07/19/24 79     Body mass index is 37.41 kg/m².    Physical Exam  Vitals reviewed.   Constitutional:       Appearance: She is obese.   Neck:      Vascular: No JVD.   Cardiovascular:      Rate and Rhythm: Normal rate and regular rhythm.      Pulses:           Carotid pulses are 2+ on the right side and 2+ on the left side.       Radial pulses are 2+ on the right side and 2+ on the left side.      Heart sounds: S1 normal  and S2 normal. Murmur heard.      Harsh midsystolic murmur is present with a grade of 2/6.   Pulmonary:      Breath sounds: Normal breath sounds and air entry.   Musculoskeletal:      Right lower leg: No edema.      Left lower leg: No edema.   Neurological:      Mental Status: She is alert.             Assessment  1. Mixed hyperlipidemia  Stable, continue current medications and monitor    2. Essential hypertension  Well controlled.  Continue current medications monitor    3. Nonrheumatic aortic valve stenosis  Stable    4. Atherosclerosis of aorta  Stable    5. Impaired mobility  Unchanged        Plan and Discussion  She is doing well from a cardiac standpoint.  Her blood pressure is well controlled.  Recommend to continue her current medications.    Follow Up  6 months      Amando Das MD, F.A.C.C, F.S.C.A.I.      Total professional time spent for the encounter: 30 minutes  Time was spent preparing to see the patient, reviewing results of prior testing, obtaining and/or reviewing separately obtained history, performing a medically appropriate examination and interview, counseling and educating the patient/family, ordering medications/tests/procedures, referring and communicating with other health care professionals, documenting clinical information in the electronic health record, and independently interpreting results.    Disclaimer: This document was created using voice recognition software (M*Modal Fluency Direct). Although it may be edited, this document may contain errors related to incorrect recognition of the spoken word. Please call the physician if clarification is needed.

## 2024-09-05 ENCOUNTER — OFFICE VISIT (OUTPATIENT)
Dept: OPTOMETRY | Facility: CLINIC | Age: 79
End: 2024-09-05
Payer: MEDICARE

## 2024-09-05 DIAGNOSIS — H53.8 BLURRED VISION, LEFT EYE: ICD-10-CM

## 2024-09-05 DIAGNOSIS — H25.813 COMBINED FORM OF SENILE CATARACT OF BOTH EYES: Primary | ICD-10-CM

## 2024-09-05 PROCEDURE — 1159F MED LIST DOCD IN RCRD: CPT | Mod: HCNC,CPTII,S$GLB, | Performed by: OPTOMETRIST

## 2024-09-05 PROCEDURE — 1126F AMNT PAIN NOTED NONE PRSNT: CPT | Mod: HCNC,CPTII,S$GLB, | Performed by: OPTOMETRIST

## 2024-09-05 PROCEDURE — 99999 PR PBB SHADOW E&M-EST. PATIENT-LVL IV: CPT | Mod: PBBFAC,HCNC,, | Performed by: OPTOMETRIST

## 2024-09-05 PROCEDURE — 99212 OFFICE O/P EST SF 10 MIN: CPT | Mod: HCNC,S$GLB,, | Performed by: OPTOMETRIST

## 2024-09-05 PROCEDURE — 1101F PT FALLS ASSESS-DOCD LE1/YR: CPT | Mod: HCNC,CPTII,S$GLB, | Performed by: OPTOMETRIST

## 2024-09-05 PROCEDURE — 3288F FALL RISK ASSESSMENT DOCD: CPT | Mod: HCNC,CPTII,S$GLB, | Performed by: OPTOMETRIST

## 2024-09-05 NOTE — PROGRESS NOTES
HPI    MS. Nilsa Cureil is a 78 y.o. female who comes in due to blurry vision in   OS.   Pt. States vision is OS gets blurry sometime, at time eye feel jumpy and   eyelid feels heavy.  It started a few months ago.  Pt. Was having double vision 3 weeks ago   thing were on top of each other.    (+)blurred vision OS sometimes   (--)Headaches  (+)diplopia on top of each other   (--)flashes  (--)floaters  (--)pain  (+)Itching  (--)tearing  (--)burning  (--)Dryness  (--) OTC Drops  (--)Photophobia      Last edited by Nenita Raya on 9/5/2024  9:04 AM.            Assessment /Plan     For exam results, see Encounter Report.    Combined form of senile cataract of both eyes    Blurred vision, left eye  -     Ambulatory referral/consult to Optometry      MONITOR. ED PT ON ALL EXAM FINDINGS  MODERATE COMBO CATS OS>OD; UV PROTECTION; MONITOR; REFER FOR CONSULT   HOLD SPECS   RTC 1 YR//PRN FOR REE/DFE

## 2024-10-01 ENCOUNTER — OFFICE VISIT (OUTPATIENT)
Dept: DERMATOLOGY | Facility: CLINIC | Age: 79
End: 2024-10-01
Payer: MEDICARE

## 2024-10-01 DIAGNOSIS — L30.9 HAND ECZEMA: ICD-10-CM

## 2024-10-01 DIAGNOSIS — B35.3 TINEA PEDIS OF BOTH FEET: ICD-10-CM

## 2024-10-01 DIAGNOSIS — L02.412 ABSCESS OF LEFT AXILLA: Primary | ICD-10-CM

## 2024-10-01 PROCEDURE — 10060 I&D ABSCESS SIMPLE/SINGLE: CPT | Mod: S$GLB,,, | Performed by: DERMATOLOGY

## 2024-10-01 PROCEDURE — 99214 OFFICE O/P EST MOD 30 MIN: CPT | Mod: 25,S$GLB,, | Performed by: DERMATOLOGY

## 2024-10-01 PROCEDURE — 3288F FALL RISK ASSESSMENT DOCD: CPT | Mod: CPTII,S$GLB,, | Performed by: DERMATOLOGY

## 2024-10-01 PROCEDURE — 87075 CULTR BACTERIA EXCEPT BLOOD: CPT | Mod: HCNC | Performed by: DERMATOLOGY

## 2024-10-01 PROCEDURE — 1101F PT FALLS ASSESS-DOCD LE1/YR: CPT | Mod: CPTII,S$GLB,, | Performed by: DERMATOLOGY

## 2024-10-01 PROCEDURE — 87070 CULTURE OTHR SPECIMN AEROBIC: CPT | Mod: HCNC | Performed by: DERMATOLOGY

## 2024-10-01 PROCEDURE — 1160F RVW MEDS BY RX/DR IN RCRD: CPT | Mod: CPTII,S$GLB,, | Performed by: DERMATOLOGY

## 2024-10-01 PROCEDURE — 87186 SC STD MICRODIL/AGAR DIL: CPT | Mod: HCNC | Performed by: DERMATOLOGY

## 2024-10-01 PROCEDURE — 87077 CULTURE AEROBIC IDENTIFY: CPT | Mod: HCNC | Performed by: DERMATOLOGY

## 2024-10-01 PROCEDURE — 1159F MED LIST DOCD IN RCRD: CPT | Mod: CPTII,S$GLB,, | Performed by: DERMATOLOGY

## 2024-10-01 PROCEDURE — 1126F AMNT PAIN NOTED NONE PRSNT: CPT | Mod: CPTII,S$GLB,, | Performed by: DERMATOLOGY

## 2024-10-01 RX ORDER — MOMETASONE FUROATE 1 MG/G
OINTMENT TOPICAL
Qty: 45 G | Refills: 3 | Status: SHIPPED | OUTPATIENT
Start: 2024-10-01

## 2024-10-01 RX ORDER — PRENATAL VIT 91/IRON/FOLIC/DHA 28-975-200
COMBINATION PACKAGE (EA) ORAL 2 TIMES DAILY
Qty: 60 G | Refills: 1 | Status: SHIPPED | OUTPATIENT
Start: 2024-10-01 | End: 2024-10-31

## 2024-10-01 RX ORDER — CEFADROXIL 500 MG/1
500 CAPSULE ORAL EVERY 12 HOURS
Qty: 20 CAPSULE | Refills: 0 | Status: SHIPPED | OUTPATIENT
Start: 2024-10-01 | End: 2024-10-11

## 2024-10-01 NOTE — PROGRESS NOTES
Patient Information  Name: Nilsa Curiel  : 1945  MRN: [id number]     Referring Physician:  No ref. provider found   Primary Care Physician:  Gaurav Allison MD   Date of Visit: 10/01/2024      Subjective:     History of Present lllness:    Nilsa Curiel is a 78 y.o. female who presents with a chief complaint of rash and boil inflamed.    Location: B/L hands and L foot (new x 3-4 wks)  Signs/Symptoms: itching  Symptom course: comes and goes, flared 3 x over the past year  Current treatment: mixes triamcinolone, ketoconazole and milk of mag, also states she mixes the elocon in with this, wears gloves when doing dishes    Location: L axilla  Duration: < 1 week  Signs/Symptoms: inflamed and tender, denies drainage  Relieving factors/Prior treatments: none    Clinical documentation obtained by nursing staff reviewed.    Review of Systems    Objective:   Physical Exam   Constitutional: She appears well-developed and well-nourished. No distress.   Neurological: She is alert and oriented to person, place, and time. She is not disoriented.   Psychiatric: She has a normal mood and affect.   Skin:   Areas Examined (abnormalities noted in diagram):   Chest / Axilla Inspection Performed  RUE Inspected  LUE Inspection Performed  RLE Inspected  LLE Inspection Performed            Diagram Legend     Erythematous scaling macule/papule c/w actinic keratosis       Vascular papule c/w angioma      Pigmented verrucoid papule/plaque c/w seborrheic keratosis      Yellow umbilicated papule c/w sebaceous hyperplasia      Irregularly shaped tan macule c/w lentigo     1-2 mm smooth white papules consistent with Milia      Movable subcutaneous cyst with punctum c/w epidermal inclusion cyst      Subcutaneous movable cyst c/w pilar cyst      Firm pink to brown papule c/w dermatofibroma      Pedunculated fleshy papule(s) c/w skin tag(s)      Evenly pigmented macule c/w junctional nevus     Mildly variegated pigmented, slightly  irregular-bordered macule c/w mildly atypical nevus      Flesh colored to evenly pigmented papule c/w intradermal nevus       Pink pearly papule/plaque c/w basal cell carcinoma      Erythematous hyperkeratotic cursted plaque c/w SCC      Surgical scar with no sign of skin cancer recurrence      Open and closed comedones      Inflammatory papules and pustules      Verrucoid papule consistent consistent with wart     Erythematous eczematous patches and plaques     Dystrophic onycholytic nail with subungual debris c/w onychomycosis     Umbilicated papule    Erythematous-base heme-crusted tan verrucoid plaque consistent with inflamed seborrheic keratosis     Erythematous Silvery Scaling Plaque c/w Psoriasis     See annotation    No images are attached to the encounter or orders placed in the encounter.      [] Data reviewed  [] Prior external notes reviewed  [] Independent review of test  [] Management discussed with another provider  [] Independent historian    Assessment / Plan:        Abscess of left axilla  -     cefadroxil (DURICEF) 500 MG Cap; Take 1 capsule (500 mg total) by mouth every 12 (twelve) hours. for 10 days  Dispense: 20 capsule; Refill: 0    Incision and drainage procedure note:  The site was cleaned with alcohol and anesthetized with 1% lidocaine with epinephrine. The site was incised with #15 blade, and the purulent material was drained along with blunt dissection of any loculated adhesions. Bacterial culture was performed on purulent drainage. The wound was dressed with sterile gauze and paper tape. The patient tolerated the procedure well without complications.  Standard post-procedure care is explained and return precautions are given.    Hand eczema  - chronic problem, with exacerbation/progression  - Discussed the importance of frequent hand moisturization every hour and after every hand washing. Wash hands with Dove Sensitive Skin Beauty Bar or other fragrance-free cleanser and moisturize with  OTC CeraVe healing ointment or Neutrogena Norwegian Formula Hand Cream.   - Minimize hand washing unless visibly dirty, and use an alcohol-based hand  that contains moisturizers and lacks common allergens, such as Hello Forbes, SupplyAID, SanitizeRx, or Hydra Miley.  - Can apply the prescribed topical steroid followed by white cotton gloves or a warm, damp towel for severe flares.     -     mometasone (ELOCON) 0.1 % ointment; Apply twice daily to hands prn eczema.  Dispense: 45 g; Refill: 3  Side effects from the overuse of topical steroids include thinning of skin, easy tearing/bruising of skin, stretch marks, spider veins, etc. Use the topical steroid no more than 2 days per week if used long-term and/or take breaks from the topical steroid, especially if any of the above side effects are noticed.    Tinea pedis of both feet  -     terbinafine HCL (LAMISIL AT) 1 % cream; Apply topically 2 (two) times daily. To feet  Dispense: 60 g; Refill: 1      Follow up in about 6 weeks (around 11/12/2024) for follow up, or sooner if symptoms worsening or not improving.      Brittany Simeon MD, FAAD  [facility]

## 2024-10-04 ENCOUNTER — TELEPHONE (OUTPATIENT)
Dept: DERMATOLOGY | Facility: CLINIC | Age: 79
End: 2024-10-04
Payer: MEDICARE

## 2024-10-04 DIAGNOSIS — L02.412 ABSCESS OF LEFT AXILLA: Primary | ICD-10-CM

## 2024-10-04 DIAGNOSIS — A49.02 MRSA INFECTION: ICD-10-CM

## 2024-10-04 LAB
BACTERIA SPEC AEROBE CULT: ABNORMAL
BACTERIA SPEC ANAEROBE CULT: NORMAL

## 2024-10-04 RX ORDER — CHLORHEXIDINE GLUCONATE 40 MG/ML
SOLUTION TOPICAL
Qty: 473 ML | Refills: 2 | Status: SHIPPED | OUTPATIENT
Start: 2024-10-04

## 2024-10-04 RX ORDER — CLINDAMYCIN HYDROCHLORIDE 300 MG/1
300 CAPSULE ORAL EVERY 8 HOURS
Qty: 30 CAPSULE | Refills: 0 | Status: SHIPPED | OUTPATIENT
Start: 2024-10-04 | End: 2024-10-14

## 2024-10-08 ENCOUNTER — TELEPHONE (OUTPATIENT)
Dept: DERMATOLOGY | Facility: CLINIC | Age: 79
End: 2024-10-08
Payer: MEDICARE

## 2024-10-08 NOTE — TELEPHONE ENCOUNTER
Pt states area has improved, and she is taking the clinda, will call if anything changes  ----- Message from Tech Jasimine sent at 10/8/2024  3:55 PM CDT -----  Regarding: Returning Rena call  Contact: [phone]  The pt was speaking to Rena and the call dropped. Please contact the pt to further discuss thank you.   Reports rash between legs for past four days after shaving and using someone else towel

## 2024-10-08 NOTE — TELEPHONE ENCOUNTER
----- Message from Brittany Simeon MD sent at 10/8/2024  9:00 AM CDT -----  She should be taking clindamycin. Please call pt and make sure she is improving.

## 2024-10-23 ENCOUNTER — HOSPITAL ENCOUNTER (OUTPATIENT)
Dept: RADIOLOGY | Facility: HOSPITAL | Age: 79
Discharge: HOME | End: 2024-10-23
Attending: ORTHOPAEDIC SURGERY
Payer: MEDICARE

## 2024-10-23 ENCOUNTER — OFFICE VISIT (OUTPATIENT)
Dept: ORTHOPEDICS | Facility: CLINIC | Age: 79
End: 2024-10-23
Payer: MEDICARE

## 2024-10-23 DIAGNOSIS — M47.816 LUMBAR SPONDYLOSIS: ICD-10-CM

## 2024-10-23 DIAGNOSIS — Z98.1 S/P LUMBAR FUSION: ICD-10-CM

## 2024-10-23 DIAGNOSIS — M47.816 LUMBAR SPONDYLOSIS: Primary | ICD-10-CM

## 2024-10-23 PROCEDURE — 1159F MED LIST DOCD IN RCRD: CPT | Mod: HCNC,CPTII,S$GLB, | Performed by: ORTHOPAEDIC SURGERY

## 2024-10-23 PROCEDURE — 99999 PR PBB SHADOW E&M-EST. PATIENT-LVL IV: CPT | Mod: PBBFAC,HCNC,, | Performed by: ORTHOPAEDIC SURGERY

## 2024-10-23 PROCEDURE — 99214 OFFICE O/P EST MOD 30 MIN: CPT | Mod: HCNC,S$GLB,, | Performed by: ORTHOPAEDIC SURGERY

## 2024-10-23 PROCEDURE — 1125F AMNT PAIN NOTED PAIN PRSNT: CPT | Mod: HCNC,CPTII,S$GLB, | Performed by: ORTHOPAEDIC SURGERY

## 2024-10-23 PROCEDURE — 72100 X-RAY EXAM L-S SPINE 2/3 VWS: CPT | Mod: TC,HCNC

## 2024-10-23 PROCEDURE — 1160F RVW MEDS BY RX/DR IN RCRD: CPT | Mod: HCNC,CPTII,S$GLB, | Performed by: ORTHOPAEDIC SURGERY

## 2024-10-23 PROCEDURE — 72100 X-RAY EXAM L-S SPINE 2/3 VWS: CPT | Mod: 26,HCNC,, | Performed by: RADIOLOGY

## 2024-10-23 RX ORDER — METHOCARBAMOL 500 MG/1
500 TABLET, FILM COATED ORAL 3 TIMES DAILY
Qty: 60 TABLET | Refills: 1 | Status: SHIPPED | OUTPATIENT
Start: 2024-10-23

## 2024-10-23 NOTE — PROGRESS NOTES
DATE: 10/23/2024  PATIENT: Nilsa Curiel    Attending Physician: Jesus Alberto Sharp M.D.    8/16/23: L3-5 PLDF/TLIF  10/4/23: lumbar I&D    HISTORY:  Nilsa Curiel is a 78 y.o. female who returns to me today for FU. Patient has been doing well. Her preop back and leg pain have significantly improved. She has occasional LBP without radiation to BLE. She would like to continue staying active.    PMH/PSH/FamHx/SocHx:  Unchanged from prior visit    ROS:  Positive for LBP and RLE pain  Denies perineal paresthesias, bowel or bladder incontinence    EXAM:  There were no vitals taken for this visit.    My physical examination was notable for the following findings: motor intact BLE; SILT    IMAGING:  Today I independently reviewed the following images and my interpretations are as follows:    L-spine XRs showed L2-5 PLDF/TLIF without hardware complications.     MRI lumbar showed L1-2 mod central stenosis.    ASSESSMENT/PLAN:  Patient has lumbar spondylosis, now 1 year s/p L3-5 PLDF/TLIF. I educated the patient on the importance of core/back strengthening, correct posture, bending/lifting ergonomics, and low-impact aerobic exercises (walking, elliptical, and aquatherapy). I prescribed robaxin. RTC PRN.    I provided the patient with a home exercise program. It is the [employer] spine conditioning program. Exercises include head rolls, kneeling back extension, sitting rotation stretch, modified seated side straddle, knee to chest, bird dog, plank, modified seated plank, hip bridges, abdominal bracing, and abdominal crunch. Pt will complete each exercise 5 times daily for 6-8 weeks.    Jesus Alberto Sharp MD  Orthopaedic Spine Surgeon  Department of Orthopaedic Surgery  [phone]

## 2024-10-24 ENCOUNTER — OFFICE VISIT (OUTPATIENT)
Dept: PRIMARY CARE CLINIC | Facility: CLINIC | Age: 79
End: 2024-10-24
Payer: MEDICARE

## 2024-10-24 ENCOUNTER — LAB VISIT (OUTPATIENT)
Dept: LAB | Facility: HOSPITAL | Age: 79
End: 2024-10-24
Attending: FAMILY MEDICINE
Payer: MEDICARE

## 2024-10-24 VITALS
SYSTOLIC BLOOD PRESSURE: 130 MMHG | RESPIRATION RATE: 18 BRPM | HEART RATE: 77 BPM | WEIGHT: 201.94 LBS | BODY MASS INDEX: 38.13 KG/M2 | OXYGEN SATURATION: 97 % | DIASTOLIC BLOOD PRESSURE: 74 MMHG | HEIGHT: 61 IN | TEMPERATURE: 98 F

## 2024-10-24 DIAGNOSIS — R10.13 EPIGASTRIC PAIN: ICD-10-CM

## 2024-10-24 DIAGNOSIS — K52.9 AGE (ACUTE GASTROENTERITIS): Primary | ICD-10-CM

## 2024-10-24 DIAGNOSIS — L30.9 HAND ECZEMA: ICD-10-CM

## 2024-10-24 DIAGNOSIS — B37.2 CANDIDAL INTERTRIGO: ICD-10-CM

## 2024-10-24 LAB
ALBUMIN SERPL BCP-MCNC: 3.9 G/DL (ref 3.5–5.2)
ALP SERPL-CCNC: 124 U/L (ref 40–150)
ALT SERPL W/O P-5'-P-CCNC: 14 U/L (ref 10–44)
ANION GAP SERPL CALC-SCNC: 9 MMOL/L (ref 8–16)
AST SERPL-CCNC: 18 U/L (ref 10–40)
BASOPHILS # BLD AUTO: 0.05 K/UL (ref 0–0.2)
BASOPHILS NFR BLD: 1 % (ref 0–1.9)
BILIRUB SERPL-MCNC: 0.5 MG/DL (ref 0.1–1)
BUN SERPL-MCNC: 12 MG/DL (ref 8–23)
CALCIUM SERPL-MCNC: 10.5 MG/DL (ref 8.7–10.5)
CHLORIDE SERPL-SCNC: 108 MMOL/L (ref 95–110)
CO2 SERPL-SCNC: 25 MMOL/L (ref 23–29)
CREAT SERPL-MCNC: 0.8 MG/DL (ref 0.5–1.4)
DIFFERENTIAL METHOD BLD: ABNORMAL
EOSINOPHIL # BLD AUTO: 0.3 K/UL (ref 0–0.5)
EOSINOPHIL NFR BLD: 5.5 % (ref 0–8)
ERYTHROCYTE [DISTWIDTH] IN BLOOD BY AUTOMATED COUNT: 14.3 % (ref 11.5–14.5)
ERYTHROCYTE [SEDIMENTATION RATE] IN BLOOD BY PHOTOMETRIC METHOD: 89 MM/HR (ref 0–36)
EST. GFR  (NO RACE VARIABLE): >60 ML/MIN/1.73 M^2
GLUCOSE SERPL-MCNC: 86 MG/DL (ref 70–110)
HCT VFR BLD AUTO: 35.8 % (ref 37–48.5)
HGB BLD-MCNC: 10.8 G/DL (ref 12–16)
IMM GRANULOCYTES # BLD AUTO: 0.01 K/UL (ref 0–0.04)
IMM GRANULOCYTES NFR BLD AUTO: 0.2 % (ref 0–0.5)
LIPASE SERPL-CCNC: 24 U/L (ref 4–60)
LYMPHOCYTES # BLD AUTO: 2 K/UL (ref 1–4.8)
LYMPHOCYTES NFR BLD: 41 % (ref 18–48)
MCH RBC QN AUTO: 24.7 PG (ref 27–31)
MCHC RBC AUTO-ENTMCNC: 30.2 G/DL (ref 32–36)
MCV RBC AUTO: 82 FL (ref 82–98)
MONOCYTES # BLD AUTO: 0.5 K/UL (ref 0.3–1)
MONOCYTES NFR BLD: 9.8 % (ref 4–15)
NEUTROPHILS # BLD AUTO: 2.1 K/UL (ref 1.8–7.7)
NEUTROPHILS NFR BLD: 42.5 % (ref 38–73)
NRBC BLD-RTO: 0 /100 WBC
PLATELET # BLD AUTO: 286 K/UL (ref 150–450)
PMV BLD AUTO: 10.7 FL (ref 9.2–12.9)
POTASSIUM SERPL-SCNC: 3.7 MMOL/L (ref 3.5–5.1)
PROT SERPL-MCNC: 8.1 G/DL (ref 6–8.4)
RBC # BLD AUTO: 4.37 M/UL (ref 4–5.4)
SODIUM SERPL-SCNC: 142 MMOL/L (ref 136–145)
WBC # BLD AUTO: 4.88 K/UL (ref 3.9–12.7)

## 2024-10-24 PROCEDURE — 3288F FALL RISK ASSESSMENT DOCD: CPT | Mod: HCNC,CPTII,S$GLB, | Performed by: FAMILY MEDICINE

## 2024-10-24 PROCEDURE — 1101F PT FALLS ASSESS-DOCD LE1/YR: CPT | Mod: HCNC,CPTII,S$GLB, | Performed by: FAMILY MEDICINE

## 2024-10-24 PROCEDURE — 1160F RVW MEDS BY RX/DR IN RCRD: CPT | Mod: HCNC,CPTII,S$GLB, | Performed by: FAMILY MEDICINE

## 2024-10-24 PROCEDURE — 85652 RBC SED RATE AUTOMATED: CPT | Mod: HCNC | Performed by: FAMILY MEDICINE

## 2024-10-24 PROCEDURE — 36415 COLL VENOUS BLD VENIPUNCTURE: CPT | Mod: HCNC,PN | Performed by: FAMILY MEDICINE

## 2024-10-24 PROCEDURE — 99999 PR PBB SHADOW E&M-EST. PATIENT-LVL V: CPT | Mod: PBBFAC,HCNC,, | Performed by: FAMILY MEDICINE

## 2024-10-24 PROCEDURE — 3078F DIAST BP <80 MM HG: CPT | Mod: HCNC,CPTII,S$GLB, | Performed by: FAMILY MEDICINE

## 2024-10-24 PROCEDURE — 1125F AMNT PAIN NOTED PAIN PRSNT: CPT | Mod: HCNC,CPTII,S$GLB, | Performed by: FAMILY MEDICINE

## 2024-10-24 PROCEDURE — 3075F SYST BP GE 130 - 139MM HG: CPT | Mod: HCNC,CPTII,S$GLB, | Performed by: FAMILY MEDICINE

## 2024-10-24 PROCEDURE — 83690 ASSAY OF LIPASE: CPT | Mod: HCNC | Performed by: FAMILY MEDICINE

## 2024-10-24 PROCEDURE — 80053 COMPREHEN METABOLIC PANEL: CPT | Mod: HCNC | Performed by: FAMILY MEDICINE

## 2024-10-24 PROCEDURE — 99214 OFFICE O/P EST MOD 30 MIN: CPT | Mod: HCNC,S$GLB,, | Performed by: FAMILY MEDICINE

## 2024-10-24 PROCEDURE — 85025 COMPLETE CBC W/AUTO DIFF WBC: CPT | Mod: HCNC | Performed by: FAMILY MEDICINE

## 2024-10-24 PROCEDURE — 1159F MED LIST DOCD IN RCRD: CPT | Mod: HCNC,CPTII,S$GLB, | Performed by: FAMILY MEDICINE

## 2024-10-24 RX ORDER — FAMOTIDINE 20 MG/1
TABLET, FILM COATED ORAL
Qty: 60 TABLET | Refills: 11 | Status: SHIPPED | OUTPATIENT
Start: 2024-10-24

## 2024-10-24 RX ORDER — TRIAMCINOLONE ACETONIDE 0.25 MG/G
CREAM TOPICAL
Qty: 15 G | Refills: 1 | Status: SHIPPED | OUTPATIENT
Start: 2024-10-24

## 2024-10-24 RX ORDER — KETOCONAZOLE 20 MG/G
CREAM TOPICAL
Qty: 15 G | Refills: 1 | Status: SHIPPED | OUTPATIENT
Start: 2024-10-24

## 2024-10-24 RX ORDER — MOMETASONE FUROATE 1 MG/G
OINTMENT TOPICAL
Qty: 45 G | Refills: 3 | Status: SHIPPED | OUTPATIENT
Start: 2024-10-24

## 2024-11-12 ENCOUNTER — OFFICE VISIT (OUTPATIENT)
Dept: DERMATOLOGY | Facility: CLINIC | Age: 79
End: 2024-11-12
Payer: MEDICARE

## 2024-11-12 ENCOUNTER — OFFICE VISIT (OUTPATIENT)
Dept: OPHTHALMOLOGY | Facility: CLINIC | Age: 79
End: 2024-11-12
Payer: MEDICARE

## 2024-11-12 ENCOUNTER — TELEPHONE (OUTPATIENT)
Dept: OPHTHALMOLOGY | Facility: CLINIC | Age: 79
End: 2024-11-12

## 2024-11-12 DIAGNOSIS — H25.12 NUCLEAR SCLEROTIC CATARACT OF LEFT EYE: Primary | ICD-10-CM

## 2024-11-12 DIAGNOSIS — L20.84 INTRINSIC ATOPIC DERMATITIS: Primary | ICD-10-CM

## 2024-11-12 DIAGNOSIS — L29.9 PRURITUS: ICD-10-CM

## 2024-11-12 DIAGNOSIS — H25.11 NUCLEAR SCLEROTIC CATARACT OF RIGHT EYE: ICD-10-CM

## 2024-11-12 DIAGNOSIS — L30.9 HAND ECZEMA: ICD-10-CM

## 2024-11-12 PROCEDURE — 99214 OFFICE O/P EST MOD 30 MIN: CPT | Mod: S$GLB,,, | Performed by: DERMATOLOGY

## 2024-11-12 PROCEDURE — 92136 OPHTHALMIC BIOMETRY: CPT | Mod: HCNC,LT,S$GLB, | Performed by: OPHTHALMOLOGY

## 2024-11-12 PROCEDURE — 99204 OFFICE O/P NEW MOD 45 MIN: CPT | Mod: HCNC,S$GLB,, | Performed by: OPHTHALMOLOGY

## 2024-11-12 PROCEDURE — 1126F AMNT PAIN NOTED NONE PRSNT: CPT | Mod: CPTII,S$GLB,, | Performed by: DERMATOLOGY

## 2024-11-12 PROCEDURE — 1126F AMNT PAIN NOTED NONE PRSNT: CPT | Mod: HCNC,CPTII,S$GLB, | Performed by: OPHTHALMOLOGY

## 2024-11-12 PROCEDURE — G2211 COMPLEX E/M VISIT ADD ON: HCPCS | Mod: S$GLB,,, | Performed by: DERMATOLOGY

## 2024-11-12 PROCEDURE — 1101F PT FALLS ASSESS-DOCD LE1/YR: CPT | Mod: CPTII,S$GLB,, | Performed by: DERMATOLOGY

## 2024-11-12 PROCEDURE — 1159F MED LIST DOCD IN RCRD: CPT | Mod: CPTII,S$GLB,, | Performed by: DERMATOLOGY

## 2024-11-12 PROCEDURE — 3288F FALL RISK ASSESSMENT DOCD: CPT | Mod: CPTII,S$GLB,, | Performed by: DERMATOLOGY

## 2024-11-12 PROCEDURE — 99999 PR PBB SHADOW E&M-EST. PATIENT-LVL I: CPT | Mod: PBBFAC,HCNC,, | Performed by: OPHTHALMOLOGY

## 2024-11-12 PROCEDURE — 3288F FALL RISK ASSESSMENT DOCD: CPT | Mod: HCNC,CPTII,S$GLB, | Performed by: OPHTHALMOLOGY

## 2024-11-12 PROCEDURE — 1160F RVW MEDS BY RX/DR IN RCRD: CPT | Mod: CPTII,S$GLB,, | Performed by: DERMATOLOGY

## 2024-11-12 PROCEDURE — 1101F PT FALLS ASSESS-DOCD LE1/YR: CPT | Mod: HCNC,CPTII,S$GLB, | Performed by: OPHTHALMOLOGY

## 2024-11-12 RX ORDER — FLUOCINONIDE 0.5 MG/G
OINTMENT TOPICAL
Qty: 120 G | Refills: 1 | Status: SHIPPED | OUTPATIENT
Start: 2024-11-12

## 2024-11-12 RX ORDER — TACROLIMUS 1 MG/G
OINTMENT TOPICAL
Qty: 100 G | Refills: 1 | Status: SHIPPED | OUTPATIENT
Start: 2024-11-12

## 2024-11-12 RX ORDER — TACROLIMUS 1 MG/G
OINTMENT TOPICAL
Qty: 30 G | Refills: 1 | Status: SHIPPED | OUTPATIENT
Start: 2024-11-12 | End: 2024-11-12

## 2024-11-12 NOTE — PROGRESS NOTES
Patient Information  Name: Nilsa Curiel  : 1945  MRN: [id number]     Referring Physician:  No ref. provider found   Primary Care Physician:  Gaurav Allison MD   Date of Visit: 2024      Subjective:     History of Present lllness:    Nilsa Curiel is a 79 y.o. female who presents with a chief complaint of rash on hands and feet.    Location: hands  Signs/Symptoms: itching and burns at times  Symptom course: unchanged  Current treatment: mometasone ointment    Location: feet  Signs/Symptoms: itching more and spreading  Symptom course: worsening  Current treatment: terbinafine cream    Patient was last seen: 10/1/2024.  Prior notes by myself reviewed.   Clinical documentation obtained by nursing staff reviewed.    Review of Systems    Objective:   Physical Exam   Constitutional: She appears well-developed and well-nourished. No distress.   Neurological: She is alert and oriented to person, place, and time. She is not disoriented.   Psychiatric: She has a normal mood and affect.   Skin:   Areas Examined (abnormalities noted in diagram):   RUE Inspected  LUE Inspection Performed  RLE Inspected  LLE Inspection Performed            Diagram Legend     Erythematous scaling macule/papule c/w actinic keratosis       Vascular papule c/w angioma      Pigmented verrucoid papule/plaque c/w seborrheic keratosis      Yellow umbilicated papule c/w sebaceous hyperplasia      Irregularly shaped tan macule c/w lentigo     1-2 mm smooth white papules consistent with Milia      Movable subcutaneous cyst with punctum c/w epidermal inclusion cyst      Subcutaneous movable cyst c/w pilar cyst      Firm pink to brown papule c/w dermatofibroma      Pedunculated fleshy papule(s) c/w skin tag(s)      Evenly pigmented macule c/w junctional nevus     Mildly variegated pigmented, slightly irregular-bordered macule c/w mildly atypical nevus      Flesh colored to evenly pigmented papule c/w intradermal nevus       Pink pearly  papule/plaque c/w basal cell carcinoma      Erythematous hyperkeratotic cursted plaque c/w SCC      Surgical scar with no sign of skin cancer recurrence      Open and closed comedones      Inflammatory papules and pustules      Verrucoid papule consistent consistent with wart     Erythematous eczematous patches and plaques     Dystrophic onycholytic nail with subungual debris c/w onychomycosis     Umbilicated papule    Erythematous-base heme-crusted tan verrucoid plaque consistent with inflamed seborrheic keratosis     Erythematous Silvery Scaling Plaque c/w Psoriasis     See annotation    No images are attached to the encounter or orders placed in the encounter.      [] Data reviewed  [] Prior external notes reviewed  [] Independent review of test  [] Management discussed with another provider  [] Independent historian    Assessment / Plan:        Intrinsic atopic dermatitis  - chronic problem, not at treatment goal  -     fluocinonide (LIDEX) 0.05 % ointment; Apply to affected areas of body bid prn eczema.  Dispense: 120 g; Refill: 1  -     tacrolimus (PROTOPIC) 0.1 % ointment; Apply to affected areas of body BID. Safe to use everyday.  Dispense: 100 g; Refill: 1    Pruritus  -     calcium acetate-aluminum sulf 51-49% 51-49 % packet; Soak feet for 15-30 minutes once or twice daily as needed for itching or irritation.  Dispense: 100 packet; Refill: 1  Recommend OTC Domeboro Medicated Soak. Soak for 15-30 minutes once or twice daily as needed for itching or irritation.    Hand eczema  - chronic problem, not at treatment goal  -     fluocinonide (LIDEX) 0.05 % ointment; Apply to affected areas of body bid prn eczema.  Dispense: 120 g; Refill: 1  -     tacrolimus (PROTOPIC) 0.1 % ointment; Apply to affected areas of body BID. Safe to use everyday.  Dispense: 100 g; Refill: 1    Discussed patch testing with patient if condition recurs or persists.  There are 3 possible outcomes:   1. Positive test, but clinically  irrelevant   2. Negative test--pt is not allergic to the most common allergens that are tested   3. Positive test--allergen is avoided, and rash resolves.   Patches will be applied on a Monday, and test will be read on a Wednesday and Friday.  Pt is unable to get the back wet for these days. Pt must not be on more than 20 mg of systemic prednisone at time of patch testing.       Follow up in about 1 month (around 12/12/2024) for follow up, or sooner if symptoms worsening or not improving.      Brittany Simeon MD, FAAD  [facility]

## 2024-11-12 NOTE — PATIENT INSTRUCTIONS
Recommend OTC Domeboro Medicated Soak. Soak feet for 15-30 minutes once or twice daily as needed for itching or irritation.

## 2024-11-12 NOTE — PROGRESS NOTES
HPI    Referred by Dr. Carson    Combined form of senile cataract of both eyes  JEANNE ruiz    Sx: None   Gtt's: None    Patient is here for cataract evaluation.  Pt. States vision in OS is blurrier than OD and most of the time she has   to close OS to read.  Pt. See random flashes of light from OS more than OD and at time seems as   she has a film over her eyes.  Pt. Denies pain or discomfort.  Last edited by Sandra Low MD on 11/12/2024 10:27 AM.            Assessment /Plan     For exam results, see Encounter Report.    Nuclear sclerotic cataract of left eye  -     IOL Master - OU - Both Eyes    Nuclear sclerotic cataract of right eye      Visually significant nuclear sclerotic cataract    - Cataracts are interfering with activities of daily living, including night time driving.  - Pt desires cataract surgery for visual rehabilitation.   - Risks / benefits/ alternatives were discussed and patient agrees to proceed with surgery.   - IOL options discussed as well, according to patient's goals and concomitant ocular pathology.  - Target: plano.      DIBOO 18.0 OS  (vs. Flacs/lri / toric vs mfiol - pt to decide and we will call prior to sx)    DIBOO 18.5 OD             JEANNE longoria wnl, suspect no/low risk of decompensation post sx.

## 2024-12-11 ENCOUNTER — OFFICE VISIT (OUTPATIENT)
Dept: DERMATOLOGY | Facility: CLINIC | Age: 79
End: 2024-12-11
Payer: MEDICARE

## 2024-12-11 DIAGNOSIS — H25.12 NUCLEAR SCLEROTIC CATARACT OF LEFT EYE: Primary | ICD-10-CM

## 2024-12-11 DIAGNOSIS — L20.84 INTRINSIC ATOPIC DERMATITIS: ICD-10-CM

## 2024-12-11 DIAGNOSIS — L23.9 ALLERGIC CONTACT DERMATITIS, UNSPECIFIED TRIGGER: Primary | ICD-10-CM

## 2024-12-11 DIAGNOSIS — L84 CLAVUS: ICD-10-CM

## 2024-12-11 DIAGNOSIS — L30.9 HAND ECZEMA: ICD-10-CM

## 2024-12-11 RX ORDER — PIMECROLIMUS 10 MG/G
CREAM TOPICAL
Qty: 100 G | Refills: 5 | Status: SHIPPED | OUTPATIENT
Start: 2024-12-11

## 2024-12-11 RX ORDER — TACROLIMUS 1 MG/G
OINTMENT TOPICAL
Qty: 100 G | Refills: 5 | Status: SHIPPED | OUTPATIENT
Start: 2024-12-11

## 2024-12-11 NOTE — PROGRESS NOTES
Patient Information  Name: Nilsa Curiel  : 1945  MRN: [id number]     Referring Physician:  No ref. provider found   Primary Care Physician:  Gaurav Allison MD   Date of Visit: 24      Subjective:     History of Present lllness:    Nilsa Curiel is a 79 y.o. female with longstanding history of atopic dermatitis who presents with a chief complaint of rash.  Location: hands and feet. Spreading to wrist and ankles.  Signs/Symptoms: hands-come and go, today is an average day, they are a little itchy. Feet-seem to be getting worse. She states that rash is spreading to her ankles.   Symptom course: worsening  Current treatment: Flucinonide twice daily since last appointment on 24. She states that tacrolimus is not covered by insurance and is too expensive.   She states that she washes with dove sensitive soap in the shower and at the sink.  She is wearing gloves when she washes dishes.     Patient was last seen: 2024.  Prior notes by myself reviewed.   Clinical documentation obtained by nursing staff reviewed.    Review of Systems    Objective:   Physical Exam   Constitutional: She appears well-developed and well-nourished. No distress.   Neurological: She is alert and oriented to person, place, and time. She is not disoriented.   Psychiatric: She has a normal mood and affect.   Skin:   Areas Examined (abnormalities noted in diagram):   RUE Inspected  LUE Inspection Performed  RLE Inspected  LLE Inspection Performed                Diagram Legend     Erythematous scaling macule/papule c/w actinic keratosis       Vascular papule c/w angioma      Pigmented verrucoid papule/plaque c/w seborrheic keratosis      Yellow umbilicated papule c/w sebaceous hyperplasia      Irregularly shaped tan macule c/w lentigo     1-2 mm smooth white papules consistent with Milia      Movable subcutaneous cyst with punctum c/w epidermal inclusion cyst      Subcutaneous movable cyst c/w pilar cyst      Firm pink to  brown papule c/w dermatofibroma      Pedunculated fleshy papule(s) c/w skin tag(s)      Evenly pigmented macule c/w junctional nevus     Mildly variegated pigmented, slightly irregular-bordered macule c/w mildly atypical nevus      Flesh colored to evenly pigmented papule c/w intradermal nevus       Pink pearly papule/plaque c/w basal cell carcinoma      Erythematous hyperkeratotic cursted plaque c/w SCC      Surgical scar with no sign of skin cancer recurrence      Open and closed comedones      Inflammatory papules and pustules      Verrucoid papule consistent consistent with wart     Erythematous eczematous patches and plaques     Dystrophic onycholytic nail with subungual debris c/w onychomycosis     Umbilicated papule    Erythematous-base heme-crusted tan verrucoid plaque consistent with inflamed seborrheic keratosis     Erythematous Silvery Scaling Plaque c/w Psoriasis     See annotation    No images are attached to the encounter or orders placed in the encounter.      [] Data reviewed  [] Prior external notes reviewed  [] Independent review of test  [] Management discussed with another provider  [] Independent historian    Assessment / Plan:        Allergic contact dermatitis, unspecified trigger  -     Patch Testing; Future    Intrinsic atopic dermatitis  Hand eczema  - chronic problem, not at treatment goal  Pt will see which TCI option is more affordable:  -     pimecrolimus (ELIDEL) 1 % cream; Apply to affected areas of body twice daily. Safe to use everyday.  Dispense: 100 g; Refill: 5  -     tacrolimus (PROTOPIC) 0.1 % ointment; Apply to affected areas of body twice daily. Safe to use everyday.  Dispense: 100 g; Refill: 5  Discussed compounded tacrolimus for $50 if not covered by insurance.    If no allergens identified by patch testing and/or if no improvement with the above, then discussed Dupixent injections as the next step.    Clavus  -     Ambulatory referral/consult to Podiatry; Future; Expected  date: 12/18/2024      Follow up for patch testing.      Brittany Simeon MD, FAAD  [facility]

## 2024-12-12 RX ORDER — KETOROLAC TROMETHAMINE 5 MG/ML
1 SOLUTION OPHTHALMIC 3 TIMES DAILY
Qty: 5 ML | Refills: 3 | Status: SHIPPED | OUTPATIENT
Start: 2024-12-12

## 2024-12-12 RX ORDER — OFLOXACIN 3 MG/ML
1 SOLUTION/ DROPS OPHTHALMIC 3 TIMES DAILY
Qty: 5 ML | Refills: 3 | Status: SHIPPED | OUTPATIENT
Start: 2024-12-12

## 2024-12-12 RX ORDER — PREDNISOLONE ACETATE 10 MG/ML
1 SUSPENSION/ DROPS OPHTHALMIC 3 TIMES DAILY
Qty: 5 ML | Refills: 3 | Status: SHIPPED | OUTPATIENT
Start: 2024-12-12

## 2024-12-13 ENCOUNTER — PATIENT MESSAGE (OUTPATIENT)
Dept: DERMATOLOGY | Facility: CLINIC | Age: 79
End: 2024-12-13
Payer: MEDICARE

## 2024-12-13 DIAGNOSIS — L20.84 INTRINSIC ATOPIC DERMATITIS: Primary | ICD-10-CM

## 2024-12-17 ENCOUNTER — TELEPHONE (OUTPATIENT)
Dept: OPHTHALMOLOGY | Facility: CLINIC | Age: 79
End: 2024-12-17
Payer: MEDICARE

## 2024-12-17 ENCOUNTER — PATIENT MESSAGE (OUTPATIENT)
Dept: OPHTHALMOLOGY | Facility: CLINIC | Age: 79
End: 2024-12-17
Payer: MEDICARE

## 2024-12-30 ENCOUNTER — OFFICE VISIT (OUTPATIENT)
Dept: URGENT CARE | Facility: CLINIC | Age: 79
End: 2024-12-30
Payer: MEDICARE

## 2024-12-30 VITALS
OXYGEN SATURATION: 98 % | HEIGHT: 61 IN | RESPIRATION RATE: 20 BRPM | WEIGHT: 201.94 LBS | BODY MASS INDEX: 38.13 KG/M2 | SYSTOLIC BLOOD PRESSURE: 146 MMHG | DIASTOLIC BLOOD PRESSURE: 74 MMHG | HEART RATE: 89 BPM | TEMPERATURE: 98 F

## 2024-12-30 DIAGNOSIS — J06.9 VIRAL URI: Primary | ICD-10-CM

## 2024-12-30 LAB
CTP QC/QA: YES
CTP QC/QA: YES
POC MOLECULAR INFLUENZA A AGN: NEGATIVE
POC MOLECULAR INFLUENZA B AGN: NEGATIVE
SARS-COV-2 AG RESP QL IA.RAPID: NEGATIVE

## 2024-12-30 PROCEDURE — 87811 SARS-COV-2 COVID19 W/OPTIC: CPT | Mod: QW,S$GLB,, | Performed by: NURSE PRACTITIONER

## 2024-12-30 PROCEDURE — 87502 INFLUENZA DNA AMP PROBE: CPT | Mod: QW,S$GLB,, | Performed by: NURSE PRACTITIONER

## 2024-12-30 PROCEDURE — 99213 OFFICE O/P EST LOW 20 MIN: CPT | Mod: S$GLB,,, | Performed by: NURSE PRACTITIONER

## 2024-12-30 RX ORDER — FLUTICASONE PROPIONATE 50 MCG
2 SPRAY, SUSPENSION (ML) NASAL DAILY
Qty: 9.9 ML | Refills: 0 | Status: SHIPPED | OUTPATIENT
Start: 2024-12-30 | End: 2025-01-03

## 2024-12-30 RX ORDER — BENZONATATE 100 MG/1
100 CAPSULE ORAL 3 TIMES DAILY PRN
Qty: 30 CAPSULE | Refills: 0 | Status: SHIPPED | OUTPATIENT
Start: 2024-12-30 | End: 2025-01-09

## 2024-12-30 NOTE — PATIENT INSTRUCTIONS
PLEASE READ YOUR DISCHARGE INSTRUCTIONS ENTIRELY AS IT CONTAINS IMPORTANT INFORMATION.    Flu and covid tests negative.       Please drink plenty of fluids.     Please get plenty of rest.     Please return here or go to the Emergency Department for any concerns or worsening of condition.    Take an over the counter antihistamine medication (allegra/Claritin/Zyrtec) of your choice as directed.     If you do have high blood pressure or cardiac history, it is safe to take Coricidin HBP for relief of sinus symptoms.     If not allergic, please take over the counter Tylenol (Acetaminophen) and/or Motrin (Ibuprofen) as directed for control of pain and/or fever.  Please follow up with your primary care doctor or specialist as needed.     Sore throat recommendations: Warm fluids, warm salt water gargles, throat lozenges, tea, honey, soup, rest, hydration.     Use over the counter flonase: one spray each nostril twice daily OR two sprays each nostril once daily.      If you smoke, please stop smoking.        Please return or see your primary care doctor if you develop new or worsening symptoms.      Please arrange follow up with your primary medical clinic as soon as possible. You must understand that you've received an Urgent Care treatment only and that you may be released before all of your medical problems are known or treated. You, the patient, will arrange for follow up as instructed. If your symptoms worsen or fail to improve you should go to the Emergency Room.

## 2024-12-30 NOTE — PROGRESS NOTES
"Subjective:      Patient ID: Nilsa Curiel is a 79 y.o. female.    Vitals:  height is 5' 0.98" (1.549 m) and weight is 91.6 kg (201 lb 15.1 oz). Her oral temperature is 98.4 °F (36.9 °C). Her blood pressure is 146/74 (abnormal) and her pulse is 89. Her respiration is 20 and oxygen saturation is 98%.     Chief Complaint: Cough    This is a 79 y.o. female who presents today with a chief complaint of coughing, sore throat, nasal congestion that began 2 days ago.   Pt has taken OTC throat lozenges, NyQuil, vitamins to help with symptoms.    Cough  This is a new problem. The current episode started in the past 7 days. The problem has been gradually worsening. The problem occurs constantly. The cough is Non-productive. Associated symptoms include nasal congestion and a sore throat. Pertinent negatives include no chest pain, chills, ear congestion, ear pain, fever, headaches, heartburn, hemoptysis, myalgias, postnasal drip, rash, rhinorrhea, shortness of breath, sweats, weight loss or wheezing. Treatments tried: OTC throat lozenges, NyQuil, vitamins. The treatment provided mild relief. There is no history of asthma, bronchiectasis, bronchitis, COPD, emphysema, environmental allergies or pneumonia.       Constitution: Negative for chills and fever.   HENT:  Positive for sore throat. Negative for ear pain and postnasal drip.    Cardiovascular:  Negative for chest pain.   Respiratory:  Positive for cough. Negative for bloody sputum, shortness of breath and wheezing.    Gastrointestinal:  Negative for heartburn.   Musculoskeletal:  Negative for muscle ache.   Skin:  Negative for rash.   Allergic/Immunologic: Negative for environmental allergies.   Neurological:  Negative for headaches.      Objective:     Physical Exam   Constitutional: She is oriented to person, place, and time. She appears well-developed. She is cooperative.  Non-toxic appearance. She does not appear ill. No distress.   HENT:   Head: Normocephalic and " atraumatic.   Ears:   Right Ear: Hearing, tympanic membrane, external ear and ear canal normal.   Left Ear: Hearing, tympanic membrane, external ear and ear canal normal.   Nose: Mucosal edema and rhinorrhea present. No nasal deformity. No epistaxis. Right sinus exhibits no maxillary sinus tenderness and no frontal sinus tenderness. Left sinus exhibits no maxillary sinus tenderness and no frontal sinus tenderness.   Mouth/Throat: Uvula is midline, oropharynx is clear and moist and mucous membranes are normal. No trismus in the jaw. Normal dentition. No uvula swelling. Cobblestoning present. No oropharyngeal exudate, posterior oropharyngeal edema or posterior oropharyngeal erythema.   Eyes: Conjunctivae and lids are normal. No scleral icterus.   Neck: Trachea normal and phonation normal. Neck supple. No edema present. No erythema present. No neck rigidity present.   Cardiovascular: Normal rate, regular rhythm, normal heart sounds and normal pulses.   Pulmonary/Chest: Effort normal and breath sounds normal. No respiratory distress. She has no decreased breath sounds. She has no rhonchi.   Abdominal: Normal appearance.   Musculoskeletal: Normal range of motion.         General: No deformity. Normal range of motion.   Neurological: She is alert and oriented to person, place, and time. She exhibits normal muscle tone. Coordination normal.   Skin: Skin is warm, dry, intact, not diaphoretic and not pale.   Psychiatric: Her speech is normal and behavior is normal. Judgment and thought content normal.   Nursing note and vitals reviewed.    Results for orders placed or performed in visit on 12/30/24   SARS Coronavirus 2 Antigen, POCT Manual Read    Collection Time: 12/30/24 11:02 AM   Result Value Ref Range    SARS Coronavirus 2 Antigen Negative Negative     Acceptable Yes    POCT Influenza A/B MOLECULAR    Collection Time: 12/30/24 11:04 AM   Result Value Ref Range    POC Molecular Influenza A Ag Negative  Negative    POC Molecular Influenza B Ag Negative Negative     Acceptable Yes      *Note: Due to a large number of results and/or encounters for the requested time period, some results have not been displayed. A complete set of results can be found in Results Review.       Assessment:     1. Viral URI        Plan:       Viral URI  -     SARS Coronavirus 2 Antigen, POCT Manual Read  -     POCT Influenza A/B MOLECULAR  -     benzonatate (TESSALON) 100 MG capsule; Take 1 capsule (100 mg total) by mouth 3 (three) times daily as needed for Cough.  Dispense: 30 capsule; Refill: 0  -     fluticasone propionate (FLONASE) 50 mcg/actuation nasal spray; 2 sprays (100 mcg total) by Each Nostril route once daily.  Dispense: 9.9 mL; Refill: 0                  Patient Instructions   PLEASE READ YOUR DISCHARGE INSTRUCTIONS ENTIRELY AS IT CONTAINS IMPORTANT INFORMATION.    Flu and covid tests negative.       Please drink plenty of fluids.     Please get plenty of rest.     Please return here or go to the Emergency Department for any concerns or worsening of condition.    Take an over the counter antihistamine medication (allegra/Claritin/Zyrtec) of your choice as directed.     If you do have high blood pressure or cardiac history, it is safe to take Coricidin HBP for relief of sinus symptoms.     If not allergic, please take over the counter Tylenol (Acetaminophen) and/or Motrin (Ibuprofen) as directed for control of pain and/or fever.  Please follow up with your primary care doctor or specialist as needed.     Sore throat recommendations: Warm fluids, warm salt water gargles, throat lozenges, tea, honey, soup, rest, hydration.     Use over the counter flonase: one spray each nostril twice daily OR two sprays each nostril once daily.      If you smoke, please stop smoking.        Please return or see your primary care doctor if you develop new or worsening symptoms.      Please arrange follow up with your primary  medical clinic as soon as possible. You must understand that you've received an Urgent Care treatment only and that you may be released before all of your medical problems are known or treated. You, the patient, will arrange for follow up as instructed. If your symptoms worsen or fail to improve you should go to the Emergency Room.

## 2024-12-31 DIAGNOSIS — J06.9 VIRAL URI: ICD-10-CM

## 2025-01-03 RX ORDER — FLUTICASONE PROPIONATE 50 MCG
SPRAY, SUSPENSION (ML) NASAL
Qty: 16 G | Refills: 1 | Status: SHIPPED | OUTPATIENT
Start: 2025-01-03

## 2025-01-07 ENCOUNTER — OFFICE VISIT (OUTPATIENT)
Dept: CARDIOLOGY | Facility: CLINIC | Age: 80
End: 2025-01-07
Payer: MEDICARE

## 2025-01-07 VITALS
SYSTOLIC BLOOD PRESSURE: 142 MMHG | DIASTOLIC BLOOD PRESSURE: 62 MMHG | WEIGHT: 200.5 LBS | BODY MASS INDEX: 37.9 KG/M2 | OXYGEN SATURATION: 98 % | HEART RATE: 90 BPM

## 2025-01-07 DIAGNOSIS — E78.2 MIXED HYPERLIPIDEMIA: ICD-10-CM

## 2025-01-07 DIAGNOSIS — I35.0 NONRHEUMATIC AORTIC VALVE STENOSIS: ICD-10-CM

## 2025-01-07 DIAGNOSIS — I42.1 HYPERTROPHIC OBSTRUCTIVE CARDIOMYOPATHY WITH DIASTOLIC HEART FAILURE: ICD-10-CM

## 2025-01-07 DIAGNOSIS — E66.01 SEVERE OBESITY (BMI 35.0-39.9) WITH COMORBIDITY: ICD-10-CM

## 2025-01-07 DIAGNOSIS — I50.30 HYPERTROPHIC OBSTRUCTIVE CARDIOMYOPATHY WITH DIASTOLIC HEART FAILURE: ICD-10-CM

## 2025-01-07 DIAGNOSIS — I70.0 ATHEROSCLEROSIS OF AORTA: ICD-10-CM

## 2025-01-07 DIAGNOSIS — I10 ESSENTIAL HYPERTENSION: Primary | ICD-10-CM

## 2025-01-07 PROCEDURE — 1125F AMNT PAIN NOTED PAIN PRSNT: CPT | Mod: HCNC,CPTII,S$GLB, | Performed by: INTERNAL MEDICINE

## 2025-01-07 PROCEDURE — 93010 ELECTROCARDIOGRAM REPORT: CPT | Mod: HCNC,S$GLB,, | Performed by: INTERNAL MEDICINE

## 2025-01-07 PROCEDURE — 3288F FALL RISK ASSESSMENT DOCD: CPT | Mod: HCNC,CPTII,S$GLB, | Performed by: INTERNAL MEDICINE

## 2025-01-07 PROCEDURE — 99214 OFFICE O/P EST MOD 30 MIN: CPT | Mod: 25,HCNC,S$GLB, | Performed by: INTERNAL MEDICINE

## 2025-01-07 PROCEDURE — 3077F SYST BP >= 140 MM HG: CPT | Mod: HCNC,CPTII,S$GLB, | Performed by: INTERNAL MEDICINE

## 2025-01-07 PROCEDURE — 1159F MED LIST DOCD IN RCRD: CPT | Mod: HCNC,CPTII,S$GLB, | Performed by: INTERNAL MEDICINE

## 2025-01-07 PROCEDURE — 3078F DIAST BP <80 MM HG: CPT | Mod: HCNC,CPTII,S$GLB, | Performed by: INTERNAL MEDICINE

## 2025-01-07 PROCEDURE — 99999 PR PBB SHADOW E&M-EST. PATIENT-LVL V: CPT | Mod: PBBFAC,HCNC,, | Performed by: INTERNAL MEDICINE

## 2025-01-07 PROCEDURE — 93005 ELECTROCARDIOGRAM TRACING: CPT | Mod: HCNC

## 2025-01-07 PROCEDURE — 1101F PT FALLS ASSESS-DOCD LE1/YR: CPT | Mod: HCNC,CPTII,S$GLB, | Performed by: INTERNAL MEDICINE

## 2025-01-07 NOTE — PROGRESS NOTES
Cardiology    1/7/2025  10:33 AM    Problem list  Patient Active Problem List   Diagnosis    Mixed hyperlipidemia    GERD (gastroesophageal reflux disease)    Atopic dermatitis    Hyperparathyroidism    Decreased strength    Impaired mobility    Right-sided carotid artery disease    Neurogenic claudication due to lumbar spinal stenosis    Spondylolisthesis of lumbosacral region    MONE (obstructive sleep apnea)    Primary osteoarthritis of both shoulders    Chronic right-sided low back pain without sciatica    Headache disorder    Chronic kidney disease, stage 3a    Cervical stenosis of spine    Posture imbalance    Decreased strength of trunk and back    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    Essential hypertension    Right hip pain    Ankle swelling    Intertrigo    Muscle spasms of both lower extremities    Hyperuricemia    Chronic heart failure with preserved ejection fraction    Nonrheumatic aortic valve stenosis    Hair loss    Cervical spine arthritis with nerve pain    Chronic right hip pain    Chronic neck pain    Lumbar spondylosis    Fatigue    Atherosclerosis of aorta    Acute blood loss anemia    Surgical wound dehiscence    Acute on chronic anemia    Severe obesity (BMI 35.0-39.9) with comorbidity       CC:  F/u    HPI:  She is here for a six-month follow-up.  She denies any angina, dyspnea exertion, palpitation or syncope.  She did not take her blood pressure medications yet this morning.    Medications  Current Outpatient Medications   Medication Sig Dispense Refill    acetaminophen (TYLENOL) 500 MG tablet TAKE 1 TABLET THREE TIMES DAILY 90 tablet 11    allopurinoL (ZYLOPRIM) 100 MG tablet Take 1 tablet (100 mg total) by mouth once daily. 90 tablet 3    amLODIPine (NORVASC) 10 MG tablet Take 1 tablet (10 mg total) by mouth once daily. 90 tablet 3    aspirin (ECOTRIN) 81 MG EC tablet Take 1 tablet (81 mg total) by mouth once daily. 90 tablet 3    benzonatate  (TESSALON) 100 MG capsule Take 1 capsule (100 mg total) by mouth 3 (three) times daily as needed for Cough. 30 capsule 0    biotin 300 mcg Tab Take 1 tablet by mouth once daily.      calcium acetate-aluminum sulf 51-49% 51-49 % packet Soak feet for 15-30 minutes once or twice daily as needed for itching or irritation. 100 packet 1    carvediloL (COREG) 3.125 MG tablet Take 1 tablet (3.125 mg total) by mouth 2 (two) times daily with meals. 180 tablet 3    chlorhexidine (HIBICLENS) 4 % external liquid Apply to body from the neck down for 5-10 minutes daily, then rinse. Use everyday until infection is improved, then decrease frequency of use to 1-2 times per week. Do not allow it to contact the eyes or ears. 473 mL 2    famotidine (PEPCID) 20 MG tablet One po bid x 7 days then one bid as needed for gastritis 60 tablet 11    fluocinonide (LIDEX) 0.05 % ointment Apply to affected areas of body bid prn eczema. 120 g 1    fluticasone propionate (FLONASE) 50 mcg/actuation nasal spray SHAKE LIQUID AND USE 2 SPRAYS(100 MCG) IN EACH NOSTRIL DAILY 16 g 1    ketoconazole (NIZORAL) 2 % cream Apply to affected areas of body BID prn irritation. 15 g 1    ketorolac 0.5% (ACULAR) 0.5 % Drop Place 1 drop into the left eye 3 (three) times daily. 5 mL 3    methocarbamoL (ROBAXIN) 500 MG Tab Take 1 tablet (500 mg total) by mouth 3 (three) times daily. 60 tablet 1    mometasone (ELOCON) 0.1 % ointment Apply twice daily to hands prn eczema. 45 g 3    MULTIVIT-MIN/FA/CA CARB/VIT K (WOMEN'S 50+ DAILY FORMULA ORAL) Take by mouth.      ofloxacin (OCUFLOX) 0.3 % ophthalmic solution Place 1 drop into the left eye 3 (three) times daily. 5 mL 3    olmesartan (BENICAR) 40 MG tablet Take 1 tablet (40 mg total) by mouth once daily. 90 tablet 3    omega-3 fatty acids 300 mg Cap Take by mouth.      ondansetron (ZOFRAN-ODT) 4 MG TbDL DISSOLVE 1 TABLET ON THE TONGUE EVERY 6 HOURS AS NEEDED FOR NAUSEA 30 tablet 0    pimecrolimus (ELIDEL) 1 % cream Apply  to affected areas of body twice daily. Safe to use everyday. 100 g 5    potassium chloride SA (K-DUR,KLOR-CON) 20 MEQ tablet Take 1 tablet (20 mEq total) by mouth 2 (two) times daily. 180 tablet 3    prednisoLONE acetate (PRED FORTE) 1 % DrpS Place 1 drop into the left eye 3 (three) times daily. 5 mL 3    riboflavin, vitamin B2, (VITAMIN B-2 ORAL) Take 1 capsule by mouth once daily.      rosuvastatin (CRESTOR) 20 MG tablet Take 1 tablet (20 mg total) by mouth once daily. 90 tablet 3    tacrolimus (PROTOPIC) 0.1 % ointment Apply to affected areas of body twice daily. Safe to use everyday. 100 g 5    triamcinolone acetonide 0.025% (KENALOG) 0.025 % cream Apply to affected areas of body BID prn irritation. 15 g 1    TURMERIC ORAL Take by mouth.      ubidecarenone (COENZYME Q10) 100 mg Tab Take 1 tablet by mouth once daily.       pregabalin (LYRICA) 75 MG capsule Take 1 capsule (75 mg total) by mouth 2 (two) times daily. 60 capsule 5    tacrolimus-niacinamide 0.1-4 % Oint Apply to affected areas of body BID prn atopic dermatitis. 30 g 5    terbinafine HCL (LAMISIL AT) 1 % cream Apply topically 2 (two) times daily. To feet 60 g 1     No current facility-administered medications for this visit.      Prior to Admission medications    Medication Sig Start Date End Date Taking? Authorizing Provider   acetaminophen (TYLENOL) 500 MG tablet TAKE 1 TABLET THREE TIMES DAILY 4/16/24  Yes Hali Daigle PA-C   allopurinoL (ZYLOPRIM) 100 MG tablet Take 1 tablet (100 mg total) by mouth once daily. 7/3/24  Yes Gaurav Allison MD   amLODIPine (NORVASC) 10 MG tablet Take 1 tablet (10 mg total) by mouth once daily. 7/3/24  Yes Gaurav Allison MD   aspirin (ECOTRIN) 81 MG EC tablet Take 1 tablet (81 mg total) by mouth once daily. 2/7/24 2/6/25 Yes Gaurav Allison MD   benzonatate (TESSALON) 100 MG capsule Take 1 capsule (100 mg total) by mouth 3 (three) times daily as needed for Cough. 12/30/24 1/9/25 Yes Lis,  Dina BOYKIN NP   biotin 300 mcg Tab Take 1 tablet by mouth once daily.   Yes Provider, Historical   calcium acetate-aluminum sulf 51-49% 51-49 % packet Soak feet for 15-30 minutes once or twice daily as needed for itching or irritation. 11/12/24  Yes Brittany Simeon MD   carvediloL (COREG) 3.125 MG tablet Take 1 tablet (3.125 mg total) by mouth 2 (two) times daily with meals. 7/3/24  Yes Gaurav Allison MD   chlorhexidine (HIBICLENS) 4 % external liquid Apply to body from the neck down for 5-10 minutes daily, then rinse. Use everyday until infection is improved, then decrease frequency of use to 1-2 times per week. Do not allow it to contact the eyes or ears. 10/4/24  Yes Brittany Simeon MD   famotidine (PEPCID) 20 MG tablet One po bid x 7 days then one bid as needed for gastritis 10/24/24  Yes Gaurav Allison MD   fluocinonide (LIDEX) 0.05 % ointment Apply to affected areas of body bid prn eczema. 11/12/24  Yes Brittany Simeon MD   fluticasone propionate (FLONASE) 50 mcg/actuation nasal spray SHAKE LIQUID AND USE 2 SPRAYS(100 MCG) IN EACH NOSTRIL DAILY 1/3/25  Yes Dina Hays NP   ketoconazole (NIZORAL) 2 % cream Apply to affected areas of body BID prn irritation. 10/24/24  Yes Gaurav Allison MD   ketorolac 0.5% (ACULAR) 0.5 % Drop Place 1 drop into the left eye 3 (three) times daily. 12/12/24  Yes Sandra Low MD   methocarbamoL (ROBAXIN) 500 MG Tab Take 1 tablet (500 mg total) by mouth 3 (three) times daily. 10/23/24  Yes Jesus Alberto Sharp MD   mometasone (ELOCON) 0.1 % ointment Apply twice daily to hands prn eczema. 10/24/24  Yes Gaurav Allison MD   MULTIVIT-MIN/FA/CA CARB/VIT K (WOMEN'S 50+ DAILY FORMULA ORAL) Take by mouth.   Yes Provider, Historical   ofloxacin (OCUFLOX) 0.3 % ophthalmic solution Place 1 drop into the left eye 3 (three) times daily. 12/12/24  Yes Sandra Low MD   olmesartan (BENICAR) 40 MG tablet Take 1 tablet (40 mg total) by mouth once daily.  7/3/24  Yes Gaurav Allison MD   omega-3 fatty acids 300 mg Cap Take by mouth.   Yes Provider, Historical   ondansetron (ZOFRAN-ODT) 4 MG TbDL DISSOLVE 1 TABLET ON THE TONGUE EVERY 6 HOURS AS NEEDED FOR NAUSEA 2/22/24  Yes Gaurav Allison MD   pimecrolimus (ELIDEL) 1 % cream Apply to affected areas of body twice daily. Safe to use everyday. 12/11/24  Yes Brittany Simeon MD   potassium chloride SA (K-DUR,KLOR-CON) 20 MEQ tablet Take 1 tablet (20 mEq total) by mouth 2 (two) times daily. 7/3/24  Yes Gaurav Allison MD   prednisoLONE acetate (PRED FORTE) 1 % DrpS Place 1 drop into the left eye 3 (three) times daily. 12/12/24  Yes Sandra Low MD   riboflavin, vitamin B2, (VITAMIN B-2 ORAL) Take 1 capsule by mouth once daily.   Yes Provider, Historical   rosuvastatin (CRESTOR) 20 MG tablet Take 1 tablet (20 mg total) by mouth once daily. 7/3/24 7/3/25 Yes Gaurav Allison MD   tacrolimus (PROTOPIC) 0.1 % ointment Apply to affected areas of body twice daily. Safe to use everyday. 12/11/24  Yes Brittany Simeon MD   triamcinolone acetonide 0.025% (KENALOG) 0.025 % cream Apply to affected areas of body BID prn irritation. 10/24/24  Yes Gaurav Allison MD   TURMERIC ORAL Take by mouth.   Yes Provider, Historical   ubidecarenone (COENZYME Q10) 100 mg Tab Take 1 tablet by mouth once daily.  10/23/19  Yes Provider, Historical   pregabalin (LYRICA) 75 MG capsule Take 1 capsule (75 mg total) by mouth 2 (two) times daily. 5/6/24 11/4/24  Hali Daigle PA-C   tacrolimus-niacinamide 0.1-4 % Oint Apply to affected areas of body BID prn atopic dermatitis. 12/13/24   Brittany Simeon MD   terbinafine HCL (LAMISIL AT) 1 % cream Apply topically 2 (two) times daily. To feet 10/1/24 10/31/24  Brittany Simeon MD         History  Past Medical History:   Diagnosis Date    Abnormal fasting glucose 09/04/2015    Acute bilateral low back pain without sciatica 10/10/2017    Anemia 04/24/2018     Arthritis     Atopic dermatitis     Central stenosis of spinal canal 2022    Chronic diastolic heart failure 2022    Chronic kidney disease, stage III (moderate) 2020    Dermatitis 2023    GERD (gastroesophageal reflux disease)     Hyperlipidemia     Hypertension     Hypokalemia 2022    Joint pain     Left ventricular hypertrophy 2016    Microcytic hypochromic anemia 2018    Multifactoral. See hematology consult. Some nutritional , some kidney related    Mild aortic sclerosis 2019    Mobility poor 2022    Morbid obesity with BMI of 40.0-44.9, adult 2016    Nonintractable episodic headache 2019    Normocytic normochromic anemia 2022    MONE (obstructive sleep apnea) 2018    Prediabetes 2023    Severe obesity (BMI >= 40) 06/10/2022     Past Surgical History:   Procedure Laterality Date     SECTION      x3    CLOSURE OF WOUND  10/4/2023    Procedure: CLOSURE, WOUND;  Surgeon: Benigno Goodman DO;  Location: [facility] OR 2ND FLR;  Service: Plastics;;    COLONOSCOPY N/A 10/04/2021    Procedure: COLONOSCOPY;  Surgeon: Taran Galvez MD;  Location: [facility] (4TH FLR);  Service: Endoscopy;  Laterality: N/A;    COLONOSCOPY N/A 2022    Procedure: COLONOSCOPY--positive fit kit;  Surgeon: Tani Lara MD;  Location: [facility] (4TH FLR);  Service: Endoscopy;  Laterality: N/A;    EPIDURAL STEROID INJECTION N/A 2023    Procedure: INJECTION, STEROID, EPIDURAL, L5-S1;  Surgeon: Mjao Meyers MD;  Location: [facility] operated [facility] PAIN MGT;  Service: Pain Management;  Laterality: N/A;    ESOPHAGOGASTRODUODENOSCOPY N/A 10/04/2021    Procedure: EGD (ESOPHAGOGASTRODUODENOSCOPY);  Surgeon: Taran Galvez MD;  Location: [facility] ENDO (4TH FLR);  Service: Endoscopy;  Laterality: N/A;  covid test 10/1-[facility]    10/1-LVM about COVID test-GT    ESOPHAGOGASTRODUODENOSCOPY N/A 2022    Procedure: EGD (ESOPHAGOGASTRODUODENOSCOPY);  Surgeon: Tani  MD Marco;  Location: [facility] ENDO (4TH FLR);  Service: Endoscopy;  Laterality: N/A;  12/15 fully vaccinated; instructions to portal-st  1/28-covid [facility]-tb    FUSION, SPINE, LUMBAR, TLIF, POSTERIOR APPROACH, USING PEDICLE SCREW N/A 8/16/2023    Procedure: FUSION, SPINE, LUMBAR, TLIF, POSTERIOR APPROACH, USING PEDICLE SCREW L2-5 PLDF/TLIF GLOBUS ROBOT SNS:SSEP/EMG cell saver;  Surgeon: Jesus Alberto Sharp MD;  Location: [facility] OR 2ND FLR;  Service: Neurosurgery;  Laterality: N/A;    IRRIGATION AND DEBRIDEMENT N/A 10/4/2023    Procedure: IRRIGATION AND DEBRIDEMENT **CO CASE DR. JULISSA RODRIGUEZ** LUMBAR;  Surgeon: Jesus Alberto Sharp MD;  [address]  Service: Orthopedics;  Laterality: N/A;    TRANSFORAMINAL EPIDURAL INJECTION OF STEROID N/A 03/16/2023    Procedure: b/l L4-5 TFESI;  Surgeon: Gato Delatorre DO;  Location: [facility] OR;  Service: Pain Management;  Laterality: N/A;    TRANSFORAMINAL EPIDURAL INJECTION OF STEROID Right 6/4/2024    Procedure: LUMBAR TRANSFORAMINAL RIGHT S1/2 DIRECT REFERRAL *ASPIRIN CLEARANCE IN CHART*;  Surgeon: Majo Meyers MD;  [facility];  Service: Pain Management;  Laterality: Right;  [phone]    TUBAL LIGATION       Social History     Socioeconomic History    Marital status:    Occupational History    Occupation: retired medical records    Tobacco Use    Smoking status: Never     Passive exposure: Never    Smokeless tobacco: Never   Substance and Sexual Activity    Alcohol use: Never    Drug use: No    Sexual activity: Not Currently     Partners: Male     Birth control/protection: None   Other Topics Concern    Are you pregnant or think you may be? No    Breast-feeding No   Social History Narrative    , lives with 1 daughter(Toreal), Walking some     Social Drivers of Health     Financial Resource Strain: Medium Risk (1/4/2025)    Overall Financial Resource Strain (CARDIA)     Difficulty of Paying Living Expenses: Somewhat hard   Food  Insecurity: No Food Insecurity (1/4/2025)    Hunger Vital Sign     Worried About Running Out of Food in the Last Year: Never true     Ran Out of Food in the Last Year: Never true   Transportation Needs: Unmet Transportation Needs (11/18/2023)    PRAPARE - Transportation     Lack of Transportation (Medical): Yes   Physical Activity: Inactive (1/4/2025)    Exercise Vital Sign     Days of Exercise per Week: 0 days     Minutes of Exercise per Session: 0 min   Stress: No Stress Concern Present (1/4/2025)    [employer] - Occupational Stress Questionnaire     Feeling of Stress : Not at all   Housing Stability: Low Risk  (11/18/2023)    Housing Stability Vital Sign     Unable to Pay for Housing in the Last Year: No     Number of Places Lived in the Last Year: 1     Unstable Housing in the Last Year: No   Recent Concern: Housing Stability - High Risk (10/17/2023)    Housing Stability Vital Sign     Unable to Pay for Housing in the Last Year: Yes     Number of Places Lived in the Last Year: 1     Unstable Housing in the Last Year: No         Allergies  Review of patient's allergies indicates:  No Known Allergies      Review of Systems   Review of Systems   Constitutional: Negative for decreased appetite, fever and weight loss.   HENT:  Negative for congestion and nosebleeds.    Eyes:  Negative for double vision, vision loss in left eye, vision loss in right eye and visual disturbance.   Cardiovascular:  Negative for chest pain, claudication, cyanosis, dyspnea on exertion, irregular heartbeat, leg swelling, near-syncope, orthopnea, palpitations, paroxysmal nocturnal dyspnea and syncope.   Respiratory:  Negative for cough, hemoptysis, shortness of breath, sleep disturbances due to breathing, snoring, sputum production and wheezing.    Endocrine: Negative for cold intolerance and heat intolerance.   Skin:  Negative for nail changes and rash.   Musculoskeletal:  Positive for back pain. Negative for  joint pain, muscle cramps, muscle weakness and myalgias.   Gastrointestinal:  Negative for change in bowel habit, heartburn, hematemesis, hematochezia, hemorrhoids and melena.   Neurological:  Negative for dizziness, focal weakness and headaches.         Physical Exam  Wt Readings from Last 1 Encounters:   01/07/25 90.9 kg (200 lb 8 oz)     BP Readings from Last 3 Encounters:   01/07/25 (!) 142/62   12/30/24 (!) 146/74   10/24/24 130/74     Pulse Readings from Last 1 Encounters:   01/07/25 90     Body mass index is 37.9 kg/m².    Physical Exam  Vitals reviewed.   Constitutional:       Appearance: She is obese.   Neck:      Vascular: No JVD.   Cardiovascular:      Rate and Rhythm: Normal rate and regular rhythm.      Pulses:           Carotid pulses are 2+ on the right side and 2+ on the left side.       Radial pulses are 2+ on the right side and 2+ on the left side.      Heart sounds: S1 normal and S2 normal. Murmur heard.      Harsh midsystolic murmur is present with a grade of 2/6.   Pulmonary:      Breath sounds: Normal breath sounds and air entry.   Musculoskeletal:      Right lower leg: No edema.      Left lower leg: No edema.   Neurological:      Mental Status: She is alert.             Assessment  1. Severe obesity (BMI 35.0-39.9) with comorbidity    Unchanged    2. Essential hypertension (Primary)   Stable.  Continue current medications and monitor    3. Nonrheumatic aortic valve stenosis   Mild on echocardiogram in 2023.  Asymptomatic.  Continue current medications    4. Mixed hyperlipidemia   On statin.  Last LDL 61.   Continue current medications and monitor    5. Atherosclerosis of aorta   Stable.  On statin.  - EKG 12-lead        Plan and Discussion    Discussed her EKG today which showed normal sinus rhythm rate of 82 with nonspecific T-wave change.  Her blood pressure is mildly elevated today but she has not taken her medications yet this morning.  Continue to monitor blood pressure at home.  Continue  current medical regimen.  Plan to repeat her echocardiogram next year to assess her aortic stenosis.    Follow Up    Six-month      Amando Das MD, F.A.C.C, F.S.C.A.I.      Total professional time spent for the encounter: 30 minutes  Time was spent preparing to see the patient, reviewing results of prior testing, obtaining and/or reviewing separately obtained history, performing a medically appropriate examination and interview, counseling and educating the patient/family, ordering medications/tests/procedures, referring and communicating with other health care professionals, documenting clinical information in the electronic health record, and independently interpreting results.    Disclaimer: This document was created using voice recognition software (M*Modal Fluency Direct). Although it may be edited, this document may contain errors related to incorrect recognition of the spoken word. Please call the physician if clarification is needed.

## 2025-01-08 LAB
OHS QRS DURATION: 70 MS
OHS QTC CALCULATION: 387 MS

## 2025-01-10 ENCOUNTER — TELEPHONE (OUTPATIENT)
Dept: OPHTHALMOLOGY | Facility: CLINIC | Age: 80
End: 2025-01-10
Payer: MEDICARE

## 2025-01-13 DIAGNOSIS — Z00.00 ENCOUNTER FOR MEDICARE ANNUAL WELLNESS EXAM: ICD-10-CM

## 2025-01-16 ENCOUNTER — TELEPHONE (OUTPATIENT)
Dept: OPHTHALMOLOGY | Facility: CLINIC | Age: 80
End: 2025-01-16
Payer: MEDICARE

## 2025-01-17 ENCOUNTER — TELEPHONE (OUTPATIENT)
Dept: OPHTHALMOLOGY | Facility: CLINIC | Age: 80
End: 2025-01-17
Payer: MEDICARE

## 2025-01-17 NOTE — TELEPHONE ENCOUNTER
Spoke to patient regarding IOL options (standard, toric, MFIOL). Patient wishes to proceed with standard IOL. Will have Dr. Low finalize IOL in patient's chart.

## 2025-01-17 NOTE — TELEPHONE ENCOUNTER
----- Message from Katiana sent at 1/17/2025  3:26 PM CST -----  Pt would like to speak with you in reference to lens.

## 2025-01-27 ENCOUNTER — TELEPHONE (OUTPATIENT)
Dept: OPTOMETRY | Facility: CLINIC | Age: 80
End: 2025-01-27
Payer: MEDICARE

## 2025-01-28 ENCOUNTER — PATIENT MESSAGE (OUTPATIENT)
Dept: OPHTHALMOLOGY | Facility: CLINIC | Age: 80
End: 2025-01-28
Payer: MEDICARE

## 2025-01-28 ENCOUNTER — TELEPHONE (OUTPATIENT)
Dept: OPHTHALMOLOGY | Facility: CLINIC | Age: 80
End: 2025-01-28
Payer: MEDICARE

## 2025-01-28 NOTE — PRE-PROCEDURE INSTRUCTIONS
Unable to reach pt via phone.  Left voicemail with arrival time also informing pt of need for responsible  accompaniment and instructing pt to follow pre-procedure instructions provided via [facility] portal.  The following message was sent to pt's portal.        Dear MS. Nilsa     Below you will find basic pre-procedure instructions in preparation for your procedure on 1/29/25 with Dr. Low        You should receive your arrival time within 24-48 hours of your scheduled procedure date from the  at your surgeon's office.     -Nothing to eat or drink after midnight the night before your procedure until after your procedure, except AM meds with small sips of water.     - HOLD all oral Diabetic medications night before and morning of procedure  - HOLD all Insulin morning of procedure  - HOLD all Fluid pills morning of procedure  - HOLD all non-insulin shots until after surgery (Ozempic, Mounjaro, Trulicity, Victoza, Byetta, Wegovy and Adlyxin) (7 days prior)  - HOLD all vitamins, minerals and herbal supplements morning of procedure   - TAKE all B/P meds, EXCEPT those that contain a fluid pill (ex. Lasix, Hydroclorothiazide/HCTZ, Spirnolactone)  - USE inhalers as needed and bring AM of surgery  - USE EYE DROPS as directed  -TAKE blood thinner meds AM of surgery unless otherwise instructed by your provider to not take     - Shower and wash face with antibacterial soap (ex. Dial) for 3 mins PM prior and AM of surgery  - No powder, lotions, creams, oils, gels, ointments, makeup,  or jewelry    - Wear comfortable clothing (button up shirt)     (Patient is required to have a responsible ride to transport home, ride may not leave while patient is in surgery)     -- [facility], 2nd floor Surgery Center, located   @ [address] [place] [address]  2nd Floor Registration        If you have any questions or concerns please feel free to contact your surgeon's office.     In the  event that you are running late or need to reschedule on the day of your procedure, please contact the pre-op desk at [phone].          Please reply to this message as receipt of delivery.     Catina, LPN [facility]  Pre-Admit - Anesthesia Dept

## 2025-01-28 NOTE — TELEPHONE ENCOUNTER
----- Message from Ta sent at 1/28/2025  1:38 PM CST -----  Regarding: Consult/Advisory  Contact: Portal message  Consult/Advisory     Name Of Caller: Pt         Contact Preference: via Portal      Nature of call: would like to go over intrusions  again for tomorrow's procedure, she states its no longer on her portal. Please message via  to advise thank you

## 2025-01-29 ENCOUNTER — HOSPITAL ENCOUNTER (OUTPATIENT)
Facility: HOSPITAL | Age: 80
Discharge: HOME | End: 2025-01-29
Attending: OPHTHALMOLOGY | Admitting: OPHTHALMOLOGY
Payer: MEDICARE

## 2025-01-29 VITALS
DIASTOLIC BLOOD PRESSURE: 72 MMHG | OXYGEN SATURATION: 97 % | HEART RATE: 84 BPM | SYSTOLIC BLOOD PRESSURE: 166 MMHG | BODY MASS INDEX: 37.57 KG/M2 | HEIGHT: 61 IN | WEIGHT: 199 LBS | TEMPERATURE: 99 F | RESPIRATION RATE: 20 BRPM

## 2025-01-29 DIAGNOSIS — H26.9 CATARACT, UNSPECIFIED CATARACT TYPE, UNSPECIFIED LATERALITY: Primary | ICD-10-CM

## 2025-01-29 PROCEDURE — 25000003 PHARM REV CODE 250: Performed by: OPHTHALMOLOGY

## 2025-01-29 PROCEDURE — 99900035 HC TECH TIME PER 15 MIN (STAT)

## 2025-01-29 PROCEDURE — 94761 N-INVAS EAR/PLS OXIMETRY MLT: CPT

## 2025-01-29 PROCEDURE — 66982 XCAPSL CTRC RMVL CPLX WO ECP: CPT | Mod: HCNC,LT,, | Performed by: OPHTHALMOLOGY

## 2025-01-29 PROCEDURE — 36000707: Performed by: OPHTHALMOLOGY

## 2025-01-29 PROCEDURE — 36000706: Performed by: OPHTHALMOLOGY

## 2025-01-29 PROCEDURE — 63600175 PHARM REV CODE 636 W HCPCS: Performed by: OPHTHALMOLOGY

## 2025-01-29 PROCEDURE — 99152 MOD SED SAME PHYS/QHP 5/>YRS: CPT | Performed by: OPHTHALMOLOGY

## 2025-01-29 PROCEDURE — 27201423 OPTIME MED/SURG SUP & DEVICES STERILE SUPPLY: Performed by: OPHTHALMOLOGY

## 2025-01-29 PROCEDURE — V2632 POST CHMBR INTRAOCULAR LENS: HCPCS | Performed by: OPHTHALMOLOGY

## 2025-01-29 PROCEDURE — 99152 MOD SED SAME PHYS/QHP 5/>YRS: CPT | Mod: HCNC,,, | Performed by: OPHTHALMOLOGY

## 2025-01-29 PROCEDURE — 71000015 HC POSTOP RECOV 1ST HR: Performed by: OPHTHALMOLOGY

## 2025-01-29 DEVICE — TECNIS SIMPLICITY TECNIS EYHANCE U 18.0D
Type: IMPLANTABLE DEVICE | Site: EYE | Status: FUNCTIONAL
Brand: TECNIS SIMPLICITY

## 2025-01-29 RX ORDER — FENTANYL CITRATE 50 UG/ML
25 INJECTION, SOLUTION INTRAMUSCULAR; INTRAVENOUS EVERY 5 MIN PRN
Status: DISCONTINUED | OUTPATIENT
Start: 2025-01-29 | End: 2025-01-29 | Stop reason: HOSPADM

## 2025-01-29 RX ORDER — LIDOCAINE HYDROCHLORIDE 40 MG/ML
INJECTION, SOLUTION RETROBULBAR
Status: DISCONTINUED | OUTPATIENT
Start: 2025-01-29 | End: 2025-01-29 | Stop reason: HOSPADM

## 2025-01-29 RX ORDER — MIDAZOLAM HYDROCHLORIDE 1 MG/ML
2 INJECTION, SOLUTION INTRAMUSCULAR; INTRAVENOUS
Status: DISCONTINUED | OUTPATIENT
Start: 2025-01-29 | End: 2025-01-29 | Stop reason: HOSPADM

## 2025-01-29 RX ORDER — CYCLOP/TROP/PROPA/PHEN/KET/WAT 1-1-0.1%
1 DROPS (EA) OPHTHALMIC (EYE)
Status: COMPLETED | OUTPATIENT
Start: 2025-01-29 | End: 2025-01-29

## 2025-01-29 RX ORDER — MOXIFLOXACIN 5 MG/ML
1 SOLUTION/ DROPS OPHTHALMIC
Status: COMPLETED | OUTPATIENT
Start: 2025-01-29 | End: 2025-01-29

## 2025-01-29 RX ORDER — PROPARACAINE HYDROCHLORIDE 5 MG/ML
1 SOLUTION/ DROPS OPHTHALMIC
Status: DISCONTINUED | OUTPATIENT
Start: 2025-01-29 | End: 2025-01-29 | Stop reason: HOSPADM

## 2025-01-29 RX ORDER — PHENYLEPHRINE HYDROCHLORIDE 100 MG/ML
1 SOLUTION/ DROPS OPHTHALMIC
Status: DISCONTINUED | OUTPATIENT
Start: 2025-01-29 | End: 2025-01-29 | Stop reason: HOSPADM

## 2025-01-29 RX ORDER — PREDNISOLONE ACETATE 10 MG/ML
SUSPENSION/ DROPS OPHTHALMIC
Status: DISCONTINUED | OUTPATIENT
Start: 2025-01-29 | End: 2025-01-29 | Stop reason: HOSPADM

## 2025-01-29 RX ORDER — LIDOCAINE HYDROCHLORIDE 10 MG/ML
INJECTION, SOLUTION EPIDURAL; INFILTRATION; INTRACAUDAL; PERINEURAL
Status: DISCONTINUED | OUTPATIENT
Start: 2025-01-29 | End: 2025-01-29 | Stop reason: HOSPADM

## 2025-01-29 RX ORDER — MOXIFLOXACIN 5 MG/ML
SOLUTION/ DROPS OPHTHALMIC
Status: DISCONTINUED | OUTPATIENT
Start: 2025-01-29 | End: 2025-01-29 | Stop reason: HOSPADM

## 2025-01-29 RX ORDER — ACETAMINOPHEN 325 MG/1
650 TABLET ORAL EVERY 4 HOURS PRN
Status: DISCONTINUED | OUTPATIENT
Start: 2025-01-29 | End: 2025-01-29 | Stop reason: HOSPADM

## 2025-01-29 RX ORDER — SODIUM CHLORIDE 0.9 % (FLUSH) 0.9 %
10 SYRINGE (ML) INJECTION
Status: DISCONTINUED | OUTPATIENT
Start: 2025-01-29 | End: 2025-01-29 | Stop reason: HOSPADM

## 2025-01-29 RX ORDER — PROPARACAINE HYDROCHLORIDE 5 MG/ML
SOLUTION/ DROPS OPHTHALMIC
Status: DISCONTINUED | OUTPATIENT
Start: 2025-01-29 | End: 2025-01-29 | Stop reason: HOSPADM

## 2025-01-29 RX ADMIN — MIDAZOLAM HYDROCHLORIDE 2 MG: 1 INJECTION, SOLUTION INTRAMUSCULAR; INTRAVENOUS at 11:01

## 2025-01-29 RX ADMIN — Medication 1 DROP: at 10:01

## 2025-01-29 RX ADMIN — MOXIFLOXACIN 1 DROP: 5 SOLUTION/ DROPS OPHTHALMIC at 11:01

## 2025-01-29 RX ADMIN — MOXIFLOXACIN OPHTHALMIC 1 DROP: 5 SOLUTION/ DROPS OPHTHALMIC at 10:01

## 2025-01-29 RX ADMIN — MOXIFLOXACIN: 5 SOLUTION/ DROPS OPHTHALMIC at 11:01

## 2025-01-29 RX ADMIN — Medication 1 DROP: at 11:01

## 2025-01-29 RX ADMIN — MOXIFLOXACIN 1 DROP: 5 SOLUTION/ DROPS OPHTHALMIC at 10:01

## 2025-01-29 NOTE — DISCHARGE SUMMARY
[facility]  Discharge Note  Short Stay    Procedure(s) (LRB):  EXTRACTION, CATARACT, WITH IOL INSERTION (Left)    BRIEF DISCHARGE NOTE:    Date of discharge: 01/29/2025    Reason for hospitalization -  Cataract surgery     Final Diagnosis - Visually significant Cataract    Procedures and treatment provided - Status post phacoemulsification with placement of intraocular lens     Diet - Advance to regular as tolerated    Activity - as tolerated    Disposition at the end of the case - Good.    Discharge: to home    The patient tolerated the procedure well and knows to follow up with me tomorrow in the eye clinic, sooner if needed.    Patient and family instructions (as appropriate) - Given to patient on discharge    Sandra Low MD

## 2025-01-29 NOTE — DISCHARGE INSTRUCTIONS
Dr. Low     Cataract Post-Operative Instructions       Day of surgery:     -Resume drops THREE times daily into the operative eye.     -Do not rub your eye     -Wear protective sunglasses during the day.     -Resume moderate activity.     -Bathe/shower/wash face normally     -Do not apply makeup around the operative eye for 1 week.     -You should expect:     Blurry vision and halos for 24-48 hours     Dilated pupil for 24-48 hours     Scratchy feeling in the eye for 1-2 days     Curved shadow in your peripheral vision for 2-3 weeks     Occasional flickering of lights for up to 1 week     -If you experience severe pain or nausea, call Dr. Low or the on-call doctor at [phone].       Plan to see Dr. Low at:     [facility] [address]    [address] [place]  [address]    **Most patients can drive the next morning.  If you do not feel comfortable driving, please arrange for transportation. **

## 2025-01-29 NOTE — OP NOTE
DATE OF PROCEDURE: 01/29/2025    SURGEON: DIANELYS HERNANDEZ MD    PREOPERATIVE DIAGNOSIS:  1. Senile nuclear sclerotic cataract left eye. 2. Poor mydriasis secondary to floppy iris syndrome left eye    POSTOPERATIVE DIAGNOSIS: 1.Senile nuclear sclerotic cataract left eye. 2. Poor mydriasis secondary to floppy iris syndrome left eye    PROCEDURE PERFORMED:  Complex phacoemulsification with placement of intraocular lens, left eye, with placement of a Malyugin ring.    IMPLANT:  DIB00 18.0    Anesthesia: Moderate sedation with topical Lidocaine.     Patient ID confirmed and re-evaluated the patient and anesthesia plan confirming it is suitable for the patient's condition and procedure.     COMPLICATIONS: none    ESTIMATED BLOOD LOSS: <1cc    SPECIMENS: none    INDICATIONS FOR PROCEDURE:   The patient has a history of painless progressive vision loss.  The patient has described difficulties with activities of daily living, which specifically include driving, which is secondary to cataract formation and progression. After we had a thorough discussion about risks, benefits, and alternatives to cataract surgery, the patient agreed to proceed with phacoemulsification and implantation of a lens in the left eye.  These risks include, but are not limited to, hemorrhage, pain, infection, need for additional surgery, need for glasses or contacts, loss of vision, or even loss of the eye.      PROCEDURE IN DETAIL:  The patient was met in the preop holding area.  Consent was confirmed to be signed.  The operative site was marked.  The patient was brought into the operating room by the anesthesia team and placed under monitored anesthesia care.  The left eye was prepped and draped in a sterile ophthalmic fashion.  A Madai speculum was placed into the left eye.   A paracentesis site was made and 1% preservative-free lidocaine was injected into the anterior chamber.  Viscoelastic  material was injected into the anterior chamber.  A  keratome blade was used to make a clear corneal incision. The malyugin ring was inserted and positioned into place, assisting with pupillary dilation.  A cystotome was used to initiate the continuous curvilinear capsulorrhexis which was completed with Utrata forceps.  BSS on a choi cannula was used to perform hydrodissection.  The phacoemulsification tip was introduced into the eye and the nucleus was removed in a standard divide-and-conquer fashion.  Remaining cortical material was removed from the eye using irrigation-aspiration.  The capsular bag was filled with viscoelastic material and the intraocular lens was injected and positioned into place. The Malyugin ring was removed from the eye. Remaining viscoelastic material was removed from the eye using irrigation and aspiration.  The corneal wounds were hydrated.  The eye was filled to physiologic pressure. The wounds were found to be watertight. Drops of Vigamox and prednisilone were placed into the eye.  The eye was washed, dried, and shielded.  The patient tolerated the procedure well and knows to follow up with me tomorrow morning, sooner if needed.      Under my direct supervision, intravenous moderate sedation was administered during the course of this procedure, with continuous monitoring of hemodynamic parameters.  Monitoring of the patient's vital signs and respiratory status was provided by trained nursing staff during the entire course of the procedure and under my supervision and recorded in the patient's medical record.  The total time for sedation was 11 minutes.

## 2025-01-29 NOTE — H&P
History    Chief complaint:  Painless progressive vision loss    Present Ilness/Diagnosis: Nuclear sclerotic Cataract    Past Medical History:  has a past medical history of Abnormal fasting glucose (09/04/2015), Acute bilateral low back pain without sciatica (10/10/2017), Anemia (04/24/2018), Arthritis, Atopic dermatitis, Central stenosis of spinal canal (12/23/2022), Chronic diastolic heart failure (04/06/2022), Chronic kidney disease, stage III (moderate) (08/18/2020), Dermatitis (01/04/2023), GERD (gastroesophageal reflux disease), Hyperlipidemia, Hypertension, Hypokalemia (08/18/2022), Joint pain, Left ventricular hypertrophy (08/16/2016), Microcytic hypochromic anemia (04/24/2018), Mild aortic sclerosis (03/11/2019), Mobility poor (03/18/2022), Morbid obesity with BMI of 40.0-44.9, adult (04/01/2016), Nonintractable episodic headache (11/06/2019), Normocytic normochromic anemia (12/23/2022), MONE (obstructive sleep apnea) (07/25/2018), Prediabetes (01/04/2023), and Severe obesity (BMI >= 40) (06/10/2022).    Family History/Social History: refer to chart    Allergies: Review of patient's allergies indicates:  No Known Allergies    Current Medications: No current facility-administered medications for this encounter.    ASA Score: II     Mallampati Score: II     Plan for Sedation: Moderate Sedation     Patient or Family History of Anesthesia problems : No    Physical Exam    BP: Vital signs stable  General: No apparent distress  HEENT: nuclear sclerotic cataract  Lungs: adequate respirations  Heart: + pulses  Abdomen: soft  Rectal/pelvic: deferred    Impression: Visually significant Cataract.    See previous clinic notes for surgical indications.    Plan: Phacoemulsification with implantation of Intraocular lens

## 2025-01-30 ENCOUNTER — OFFICE VISIT (OUTPATIENT)
Dept: OPHTHALMOLOGY | Facility: CLINIC | Age: 80
End: 2025-01-30
Payer: MEDICARE

## 2025-01-30 DIAGNOSIS — H25.11 NUCLEAR SCLEROSIS OF RIGHT EYE: ICD-10-CM

## 2025-01-30 DIAGNOSIS — Z98.42 STATUS POST CATARACT EXTRACTION AND INSERTION OF INTRAOCULAR LENS, LEFT: Primary | ICD-10-CM

## 2025-01-30 DIAGNOSIS — Z96.1 STATUS POST CATARACT EXTRACTION AND INSERTION OF INTRAOCULAR LENS, LEFT: Primary | ICD-10-CM

## 2025-01-30 PROCEDURE — 1126F AMNT PAIN NOTED NONE PRSNT: CPT | Mod: HCNC,CPTII,S$GLB, | Performed by: OPHTHALMOLOGY

## 2025-01-30 PROCEDURE — 1101F PT FALLS ASSESS-DOCD LE1/YR: CPT | Mod: HCNC,CPTII,S$GLB, | Performed by: OPHTHALMOLOGY

## 2025-01-30 PROCEDURE — 99024 POSTOP FOLLOW-UP VISIT: CPT | Mod: HCNC,S$GLB,, | Performed by: OPHTHALMOLOGY

## 2025-01-30 PROCEDURE — 99999 PR PBB SHADOW E&M-EST. PATIENT-LVL III: CPT | Mod: PBBFAC,HCNC,, | Performed by: OPHTHALMOLOGY

## 2025-01-30 PROCEDURE — 3288F FALL RISK ASSESSMENT DOCD: CPT | Mod: HCNC,CPTII,S$GLB, | Performed by: OPHTHALMOLOGY

## 2025-01-30 PROCEDURE — 1159F MED LIST DOCD IN RCRD: CPT | Mod: HCNC,CPTII,S$GLB, | Performed by: OPHTHALMOLOGY

## 2025-01-30 NOTE — PROGRESS NOTES
HPI    Referred by Dr. Carson    S/p: Complex phacoemulsification with placement of intraocular lens, left   eye, with placement of a Malyugin ring. 01/29/2025  Combined form of senile cataract of right eye  K jinnyfausto    Gtt's:   Ofloxacin TID OS  Pred forte TID OS  Ofloxacin TID OS      Patient is here for 1 day post op of left eye.  Pt. Denies pain or discomfort.    Last edited by Nenita Raya on 1/30/2025  1:18 PM.            Assessment /Plan     For exam results, see Encounter Report.    Status post cataract extraction and insertion of intraocular lens, left    Nuclear sclerosis of right eye      Slit Lamp Exam  L/L - normal  C/s - quiet  Cornea - clear  A/C - 1+ cell  Lens - PCIOL    POD #1 s/p phaco/IOL  - doing well  - continue the following drops:    vigamox or ocuflox TID x 1 wk then stop  Pred forte TID x  4 wks  Ketorolac TID until runs out    Versus:    Combination drop - 1 drop TID x total of 1 month    Appropriate precautions and post op medications reviewed.  Patient instructed to call or come in if symptoms of redness, decreased vision, or pain are experienced.    -f/up 1-2 wks, sooner PRN. Or 4 wks with optom for mrx if needed.

## 2025-02-03 ENCOUNTER — TELEPHONE (OUTPATIENT)
Dept: OPHTHALMOLOGY | Facility: CLINIC | Age: 80
End: 2025-02-03
Payer: MEDICARE

## 2025-02-03 NOTE — TELEPHONE ENCOUNTER
----- Message from Tiara sent at 2/3/2025  8:21 AM CST -----  Contact: pt @ [phone]  Nilsa Cruiel calling regarding Appointment Access  (message) for #pt is calling to reschedule post-op appt 2/10, asking for call back

## 2025-02-15 NOTE — PROGRESS NOTES
Problem: Discharge Planning  Goal: Discharge to home or other facility with appropriate resources  Outcome: Progressing  Flowsheets (Taken 2/15/2025 1035)  Discharge to home or other facility with appropriate resources:   Identify barriers to discharge with patient and caregiver   Arrange for needed discharge resources and transportation as appropriate     Problem: Pain  Goal: Verbalizes/displays adequate comfort level or baseline comfort level  Outcome: Progressing  Flowsheets (Taken 2/15/2025 1035)  Verbalizes/displays adequate comfort level or baseline comfort level:   Encourage patient to monitor pain and request assistance   Administer analgesics based on type and severity of pain and evaluate response   Implement non-pharmacological measures as appropriate and evaluate response   Assess pain using appropriate pain scale     Problem: Safety - Adult  Goal: Free from fall injury  Outcome: Progressing  Flowsheets (Taken 2/15/2025 1035)  Free From Fall Injury: Instruct family/caregiver on patient safety     Problem: Neurosensory - Adult  Goal: Achieves stable or improved neurological status  Outcome: Progressing  Flowsheets (Taken 2/15/2025 1035)  Achieves stable or improved neurological status: Assess for and report changes in neurological status  Goal: Achieves maximal functionality and self care  Outcome: Progressing  Flowsheets (Taken 2/15/2025 1035)  Achieves maximal functionality and self care: Monitor swallowing and airway patency with patient fatigue and changes in neurological status     Problem: Skin/Tissue Integrity  Goal: Skin integrity remains intact  Description: 1.  Monitor for areas of redness and/or skin breakdown  2.  Assess vascular access sites hourly  3.  Every 4-6 hours minimum:  Change oxygen saturation probe site  4.  Every 4-6 hours:  If on nasal continuous positive airway pressure, respiratory therapy assess nares and determine need for appliance change or resting period  Outcome:    Two patient identifiers verified.  Allergies reviewed.   Flu vaccine administered to right deltoid per MD order.  Patient tolerated injection well; no redness, bleeding, or bruising noted to injection site.  Patient instructed to remain in clinic setting for 15 minutes.  Verbalizes understanding.

## 2025-02-18 ENCOUNTER — OFFICE VISIT (OUTPATIENT)
Dept: OPHTHALMOLOGY | Facility: CLINIC | Age: 80
End: 2025-02-18
Payer: MEDICARE

## 2025-02-18 DIAGNOSIS — Z96.1 STATUS POST CATARACT EXTRACTION AND INSERTION OF INTRAOCULAR LENS, LEFT: Primary | ICD-10-CM

## 2025-02-18 DIAGNOSIS — H25.11 NUCLEAR SCLEROTIC CATARACT OF RIGHT EYE: ICD-10-CM

## 2025-02-18 DIAGNOSIS — Z98.42 STATUS POST CATARACT EXTRACTION AND INSERTION OF INTRAOCULAR LENS, LEFT: Primary | ICD-10-CM

## 2025-02-18 NOTE — PROGRESS NOTES
HPI    Referred by Dr. Carson    S/p: Complex phacoemulsification with placement of intraocular lens, left   eye, with placement of a Malyugin ring. 01/29/2025  Combined form of senile cataract of right eye  K joseph    Gtt's:   Ketorolac TID OS  Pred forte TID OS    Patient is here is for 3 wk post op of left eye.   Pt. States vision is doing well.  Pt. states that she had a little pain in OS in the AM, but subsided after   a little while.  Still taking eye drops.  Last edited by Nenita Raya on 2/18/2025  8:37 AM.            Assessment /Plan     For exam results, see Encounter Report.    Status post cataract extraction and insertion of intraocular lens, left    Nuclear sclerotic cataract of right eye  -     IOL Master - OD - Right Eye      PO week #1 s/p phaco/IOL -    - doing well, no issues    Continue combo drops for a total of 1 month versus: d/c abx gtt, continue PF/ketorolocTID for total of 1 month      Visually significant nuclear sclerotic cataract    - Cataracts are interfering with activities of daily living, including night time driving.  - Pt desires cataract surgery for visual rehabilitation.   - Risks / benefits/ alternatives were discussed and patient agrees to proceed with surgery.   - IOL options discussed as well, according to patient's goals and concomitant ocular pathology.  - Target: plano.       Patient has corneal astigmatism and has decided to not opt for the TORIC lens.  Patient understands prescription glasses will be needed to correct the uncorrected astigmatism and help improve vision for distance intermediate and near post operatively.      DIBOO 18.5 OD             K joseph  - pachy wnl, suspect no/low risk of decompensation post sx.

## 2025-03-19 DIAGNOSIS — H25.11 NUCLEAR SCLEROTIC CATARACT OF RIGHT EYE: Primary | ICD-10-CM

## 2025-03-20 RX ORDER — OFLOXACIN 3 MG/ML
1 SOLUTION/ DROPS OPHTHALMIC EVERY 4 HOURS
Qty: 5 ML | Refills: 0 | Status: SHIPPED | OUTPATIENT
Start: 2025-03-20 | End: 2025-03-30

## 2025-03-20 RX ORDER — KETOROLAC TROMETHAMINE 5 MG/ML
1 SOLUTION OPHTHALMIC 3 TIMES DAILY
Qty: 5 ML | Refills: 3 | Status: SHIPPED | OUTPATIENT
Start: 2025-03-20

## 2025-03-20 RX ORDER — PREDNISOLONE ACETATE 10 MG/ML
1 SUSPENSION/ DROPS OPHTHALMIC 3 TIMES DAILY
Qty: 5 ML | Refills: 3 | Status: SHIPPED | OUTPATIENT
Start: 2025-03-20

## 2025-03-21 DIAGNOSIS — M54.16 LUMBAR RADICULOPATHY: ICD-10-CM

## 2025-03-21 DIAGNOSIS — M47.816 LUMBAR SPONDYLOSIS: Primary | ICD-10-CM

## 2025-03-24 RX ORDER — PREGABALIN 75 MG/1
75 CAPSULE ORAL 2 TIMES DAILY
Qty: 180 CAPSULE | Refills: 0 | Status: SHIPPED | OUTPATIENT
Start: 2025-03-24

## 2025-03-25 RX ORDER — OFLOXACIN 3 MG/ML
1 SOLUTION/ DROPS OPHTHALMIC 3 TIMES DAILY
Qty: 5 ML | Refills: 0 | Status: SHIPPED | OUTPATIENT
Start: 2025-03-25

## 2025-04-07 ENCOUNTER — TELEPHONE (OUTPATIENT)
Dept: OPHTHALMOLOGY | Facility: CLINIC | Age: 80
End: 2025-04-07
Payer: MEDICARE

## 2025-04-07 NOTE — TELEPHONE ENCOUNTER
Spoke with pt provided arrival time of 8:30 for sx on 4/9/25 with Dr. Low @ [place]. Pt has eyedrops and packet.

## 2025-04-08 NOTE — H&P
History    Chief complaint:  Painless progressive vision loss    Present Ilness/Diagnosis: Nuclear sclerotic Cataract    Past Medical History:  has a past medical history of Abnormal fasting glucose (09/04/2015), Acute bilateral low back pain without sciatica (10/10/2017), Anemia (04/24/2018), Arthritis, Atopic dermatitis, Central stenosis of spinal canal (12/23/2022), Chronic diastolic heart failure (04/06/2022), Chronic kidney disease, stage III (moderate) (08/18/2020), Dermatitis (01/04/2023), GERD (gastroesophageal reflux disease), Hyperlipidemia, Hypertension, Hypokalemia (08/18/2022), Joint pain, Left ventricular hypertrophy (08/16/2016), Microcytic hypochromic anemia (04/24/2018), Mild aortic sclerosis (03/11/2019), Mobility poor (03/18/2022), Morbid obesity with BMI of 40.0-44.9, adult (04/01/2016), Nonintractable episodic headache (11/06/2019), Normocytic normochromic anemia (12/23/2022), MONE (obstructive sleep apnea) (07/25/2018), Prediabetes (01/04/2023), and Severe obesity (BMI >= 40) (06/10/2022).    Family History/Social History: refer to chart    Allergies: Review of patient's allergies indicates:  No Known Allergies    Current Medications: Current Medications[1]    ASA Score: II     Mallampati Score: II     Plan for Sedation: Moderate Sedation     Patient or Family History of Anesthesia problems : No    Physical Exam    BP: Vital signs stable  General: No apparent distress  HEENT: nuclear sclerotic cataract  Lungs: adequate respirations  Heart: + pulses  Abdomen: soft  Rectal/pelvic: deferred    Impression: Visually significant Cataract.    See previous clinic notes for surgical indications.    Plan: Phacoemulsification with implantation of Intraocular lens               [1] No current facility-administered medications for this encounter.    Current Outpatient Medications:     acetaminophen (TYLENOL) 500 MG tablet, TAKE 1 TABLET THREE TIMES DAILY, Disp: 90 tablet, Rfl: 11    allopurinoL (ZYLOPRIM)  100 MG tablet, Take 1 tablet (100 mg total) by mouth once daily., Disp: 90 tablet, Rfl: 3    amLODIPine (NORVASC) 10 MG tablet, Take 1 tablet (10 mg total) by mouth once daily., Disp: 90 tablet, Rfl: 3    aspirin (ECOTRIN) 81 MG EC tablet, Take 1 tablet (81 mg total) by mouth once daily., Disp: 90 tablet, Rfl: 3    biotin 300 mcg Tab, Take 1 tablet by mouth once daily., Disp: , Rfl:     calcium acetate-aluminum sulf 51-49% 51-49 % packet, Soak feet for 15-30 minutes once or twice daily as needed for itching or irritation., Disp: 100 packet, Rfl: 1    carvediloL (COREG) 3.125 MG tablet, Take 1 tablet (3.125 mg total) by mouth 2 (two) times daily with meals., Disp: 180 tablet, Rfl: 3    chlorhexidine (HIBICLENS) 4 % external liquid, Apply to body from the neck down for 5-10 minutes daily, then rinse. Use everyday until infection is improved, then decrease frequency of use to 1-2 times per week. Do not allow it to contact the eyes or ears., Disp: 473 mL, Rfl: 2    famotidine (PEPCID) 20 MG tablet, One po bid x 7 days then one bid as needed for gastritis, Disp: 60 tablet, Rfl: 11    fluocinonide (LIDEX) 0.05 % ointment, Apply to affected areas of body bid prn eczema., Disp: 120 g, Rfl: 1    fluticasone propionate (FLONASE) 50 mcg/actuation nasal spray, SHAKE LIQUID AND USE 2 SPRAYS(100 MCG) IN EACH NOSTRIL DAILY, Disp: 16 g, Rfl: 1    ketoconazole (NIZORAL) 2 % cream, Apply to affected areas of body BID prn irritation., Disp: 15 g, Rfl: 1    ketorolac 0.5% (ACULAR) 0.5 % Drop, Place 1 drop into the left eye 3 (three) times daily., Disp: 5 mL, Rfl: 3    ketorolac 0.5% (ACULAR) 0.5 % Drop, Place 1 drop into the right eye 3 (three) times daily., Disp: 5 mL, Rfl: 3    methocarbamoL (ROBAXIN) 500 MG Tab, Take 1 tablet (500 mg total) by mouth 3 (three) times daily., Disp: 60 tablet, Rfl: 1    mometasone (ELOCON) 0.1 % ointment, Apply twice daily to hands prn eczema., Disp: 45 g, Rfl: 3    MULTIVIT-MIN/FA/CA CARB/VIT K  (WOMEN'S 50+ DAILY FORMULA ORAL), Take by mouth., Disp: , Rfl:     ofloxacin (OCUFLOX) 0.3 % ophthalmic solution, Place 1 drop into the left eye 3 (three) times daily., Disp: 5 mL, Rfl: 3    ofloxacin (OCUFLOX) 0.3 % ophthalmic solution, Place 1 drop into the right eye 3 (three) times daily., Disp: 5 mL, Rfl: 0    olmesartan (BENICAR) 40 MG tablet, Take 1 tablet (40 mg total) by mouth once daily., Disp: 90 tablet, Rfl: 3    omega-3 fatty acids 300 mg Cap, Take by mouth., Disp: , Rfl:     ondansetron (ZOFRAN-ODT) 4 MG TbDL, DISSOLVE 1 TABLET ON THE TONGUE EVERY 6 HOURS AS NEEDED FOR NAUSEA, Disp: 30 tablet, Rfl: 0    pimecrolimus (ELIDEL) 1 % cream, Apply to affected areas of body twice daily. Safe to use everyday., Disp: 100 g, Rfl: 5    potassium chloride SA (K-DUR,KLOR-CON) 20 MEQ tablet, Take 1 tablet (20 mEq total) by mouth 2 (two) times daily., Disp: 180 tablet, Rfl: 3    prednisoLONE acetate (PRED FORTE) 1 % DrpS, Place 1 drop into the left eye 3 (three) times daily., Disp: 5 mL, Rfl: 3    prednisoLONE acetate (PRED FORTE) 1 % DrpS, Place 1 drop into the right eye 3 (three) times daily., Disp: 5 mL, Rfl: 3    pregabalin (LYRICA) 75 MG capsule, Take 1 capsule (75 mg total) by mouth 2 (two) times daily., Disp: 180 capsule, Rfl: 0    riboflavin, vitamin B2, (VITAMIN B-2 ORAL), Take 1 capsule by mouth once daily., Disp: , Rfl:     rosuvastatin (CRESTOR) 20 MG tablet, Take 1 tablet (20 mg total) by mouth once daily., Disp: 90 tablet, Rfl: 3    tacrolimus (PROTOPIC) 0.1 % ointment, Apply to affected areas of body twice daily. Safe to use everyday., Disp: 100 g, Rfl: 5    tacrolimus-niacinamide 0.1-4 % Oint, Apply to affected areas of body BID prn atopic dermatitis., Disp: 30 g, Rfl: 5    terbinafine HCL (LAMISIL AT) 1 % cream, Apply topically 2 (two) times daily. To feet, Disp: 60 g, Rfl: 1    triamcinolone acetonide 0.025% (KENALOG) 0.025 % cream, Apply to affected areas of body BID prn irritation., Disp: 15 g,  Rfl: 1    TURMERIC ORAL, Take by mouth., Disp: , Rfl:     ubidecarenone (COENZYME Q10) 100 mg Tab, Take 1 tablet by mouth once daily. , Disp: , Rfl:

## 2025-04-08 NOTE — PRE-PROCEDURE INSTRUCTIONS
Unable to reach pt via phone.  Left voicemail with arrival time also informing pt of need for responsible  accompaniment and instructing pt to follow pre-procedure instructions provided via [facility] portal.  The following message was sent to pt's portal.      Dear MS. Kolbis     Below you will find basic pre-procedure instructions in preparation for your procedure on 4/9/25 with Dr. Low     You should receive your arrival time within 24-48 hours of your scheduled procedure date from the  at your surgeon's office.     Nothing to eat after midnight the night before your surgery. STOP drinking clear liquids 2 hours prior to your arrival time. Anesthesia encourages this to ensure that you are adequately hydrated. Clear liquids includes water, clear juices, Gatorade, black coffee (no milk, no cream), or soda.  Clear liquids do NOT include anything with pulp or food particles (chicken broth, ice cream, yogurt, Jello, etc.)      - HOLD all oral Diabetic medications night before and morning of procedure  - HOLD all Insulin morning of procedure  - HOLD all Fluid pills morning of procedure  - HOLD all non-insulin shots until after surgery (Ozempic, Mounjaro, Trulicity, Victoza, Byetta, Wegovy and Adlyxin) (7 days prior)  - HOLD all vitamins, minerals and herbal supplements morning of procedure   - TAKE all B/P meds, EXCEPT those that contain a fluid pill (ex. Lasix, Hydroclorothiazide/HCTZ, Spirnolactone)  - USE inhalers as needed and bring AM of surgery  - USE EYE DROPS as directed  -TAKE blood thinner meds AM of surgery unless otherwise instructed by your provider to not take     - Shower and wash face with antibacterial soap (ex. Dial) for 3 mins PM prior and AM of surgery  - No powder, lotions, creams, oils, gels, ointments, makeup,  or jewelry    - Wear comfortable clothing (button up shirt)     (Patient is required to have a responsible ride to transport home, ride may not leave while patient is in  surgery)     -- [facility], 2nd floor Surgery Center, located   @ [address] [place] [address], [place] [id number]  2nd Floor Registration        If you have any questions or concerns please feel free to contact your surgeon's office.     In the event that you are running late or need to reschedule on the day of your procedure, please contact the pre-op desk at [phone].          Please reply to this message as receipt of delivery.     [place], LPN [facility]  Pre-Admit - Anesthesia Dept

## 2025-04-09 ENCOUNTER — HOSPITAL ENCOUNTER (OUTPATIENT)
Facility: HOSPITAL | Age: 80
Discharge: HOME | End: 2025-04-09
Attending: OPHTHALMOLOGY | Admitting: OPHTHALMOLOGY
Payer: MEDICARE

## 2025-04-09 VITALS
DIASTOLIC BLOOD PRESSURE: 63 MMHG | HEART RATE: 73 BPM | WEIGHT: 189 LBS | HEIGHT: 60 IN | RESPIRATION RATE: 20 BRPM | SYSTOLIC BLOOD PRESSURE: 129 MMHG | OXYGEN SATURATION: 96 % | BODY MASS INDEX: 37.11 KG/M2 | TEMPERATURE: 98 F

## 2025-04-09 DIAGNOSIS — H25.13 NUCLEAR SCLEROTIC CATARACT OF BOTH EYES: Primary | ICD-10-CM

## 2025-04-09 DIAGNOSIS — H25.10 AGE-RELATED NUCLEAR CATARACT: ICD-10-CM

## 2025-04-09 PROCEDURE — 99900035 HC TECH TIME PER 15 MIN (STAT)

## 2025-04-09 PROCEDURE — V2632 POST CHMBR INTRAOCULAR LENS: HCPCS | Performed by: OPHTHALMOLOGY

## 2025-04-09 PROCEDURE — 66982 XCAPSL CTRC RMVL CPLX WO ECP: CPT | Mod: 79,HCNC,RT, | Performed by: OPHTHALMOLOGY

## 2025-04-09 PROCEDURE — 36000707: Performed by: OPHTHALMOLOGY

## 2025-04-09 PROCEDURE — 99152 MOD SED SAME PHYS/QHP 5/>YRS: CPT | Mod: HCNC,,, | Performed by: OPHTHALMOLOGY

## 2025-04-09 PROCEDURE — 94761 N-INVAS EAR/PLS OXIMETRY MLT: CPT

## 2025-04-09 PROCEDURE — 25000003 PHARM REV CODE 250: Performed by: OPHTHALMOLOGY

## 2025-04-09 PROCEDURE — 36000706: Performed by: OPHTHALMOLOGY

## 2025-04-09 PROCEDURE — 27201423 OPTIME MED/SURG SUP & DEVICES STERILE SUPPLY: Performed by: OPHTHALMOLOGY

## 2025-04-09 PROCEDURE — 99152 MOD SED SAME PHYS/QHP 5/>YRS: CPT | Performed by: OPHTHALMOLOGY

## 2025-04-09 PROCEDURE — 71000015 HC POSTOP RECOV 1ST HR: Performed by: OPHTHALMOLOGY

## 2025-04-09 PROCEDURE — 63600175 PHARM REV CODE 636 W HCPCS: Performed by: OPHTHALMOLOGY

## 2025-04-09 DEVICE — LENS EYHANCE +18.5D: Type: IMPLANTABLE DEVICE | Site: EYE | Status: FUNCTIONAL

## 2025-04-09 RX ORDER — PROPARACAINE HYDROCHLORIDE 5 MG/ML
SOLUTION/ DROPS OPHTHALMIC
Status: DISCONTINUED | OUTPATIENT
Start: 2025-04-09 | End: 2025-04-09 | Stop reason: HOSPADM

## 2025-04-09 RX ORDER — MOXIFLOXACIN 5 MG/ML
SOLUTION/ DROPS OPHTHALMIC
Status: DISCONTINUED | OUTPATIENT
Start: 2025-04-09 | End: 2025-04-09 | Stop reason: HOSPADM

## 2025-04-09 RX ORDER — TETRACAINE HYDROCHLORIDE 5 MG/ML
1 SOLUTION OPHTHALMIC EVERY 5 MIN PRN
Status: DISCONTINUED | OUTPATIENT
Start: 2025-04-09 | End: 2025-04-09

## 2025-04-09 RX ORDER — MOXIFLOXACIN 5 MG/ML
1 SOLUTION/ DROPS OPHTHALMIC
Status: COMPLETED | OUTPATIENT
Start: 2025-04-09 | End: 2025-04-09

## 2025-04-09 RX ORDER — LIDOCAINE HYDROCHLORIDE 40 MG/ML
INJECTION, SOLUTION RETROBULBAR
Status: DISCONTINUED | OUTPATIENT
Start: 2025-04-09 | End: 2025-04-09 | Stop reason: HOSPADM

## 2025-04-09 RX ORDER — CYCLOP/TROP/PROPA/PHEN/KET/WAT 1-1-0.1%
1 DROPS (EA) OPHTHALMIC (EYE)
Status: COMPLETED | OUTPATIENT
Start: 2025-04-09 | End: 2025-04-09

## 2025-04-09 RX ORDER — FENTANYL CITRATE 50 UG/ML
25 INJECTION, SOLUTION INTRAMUSCULAR; INTRAVENOUS EVERY 5 MIN PRN
Status: DISCONTINUED | OUTPATIENT
Start: 2025-04-09 | End: 2025-04-09 | Stop reason: HOSPADM

## 2025-04-09 RX ORDER — PHENYLEPHRINE HYDROCHLORIDE 100 MG/ML
1 SOLUTION/ DROPS OPHTHALMIC
Status: DISCONTINUED | OUTPATIENT
Start: 2025-04-09 | End: 2025-04-09 | Stop reason: HOSPADM

## 2025-04-09 RX ORDER — ACETAMINOPHEN 325 MG/1
650 TABLET ORAL EVERY 4 HOURS PRN
Status: DISCONTINUED | OUTPATIENT
Start: 2025-04-09 | End: 2025-04-09 | Stop reason: HOSPADM

## 2025-04-09 RX ORDER — MIDAZOLAM HYDROCHLORIDE 1 MG/ML
2 INJECTION, SOLUTION INTRAMUSCULAR; INTRAVENOUS
Status: DISCONTINUED | OUTPATIENT
Start: 2025-04-09 | End: 2025-04-09 | Stop reason: HOSPADM

## 2025-04-09 RX ORDER — LIDOCAINE HYDROCHLORIDE 10 MG/ML
INJECTION, SOLUTION EPIDURAL; INFILTRATION; INTRACAUDAL; PERINEURAL
Status: DISCONTINUED | OUTPATIENT
Start: 2025-04-09 | End: 2025-04-09 | Stop reason: HOSPADM

## 2025-04-09 RX ORDER — PROPARACAINE HYDROCHLORIDE 5 MG/ML
1 SOLUTION/ DROPS OPHTHALMIC
Status: DISCONTINUED | OUTPATIENT
Start: 2025-04-09 | End: 2025-04-09 | Stop reason: HOSPADM

## 2025-04-09 RX ORDER — PREDNISOLONE ACETATE 10 MG/ML
SUSPENSION/ DROPS OPHTHALMIC
Status: DISCONTINUED | OUTPATIENT
Start: 2025-04-09 | End: 2025-04-09 | Stop reason: HOSPADM

## 2025-04-09 RX ADMIN — MOXIFLOXACIN OPHTHALMIC 1 DROP: 5 SOLUTION/ DROPS OPHTHALMIC at 10:04

## 2025-04-09 RX ADMIN — MOXIFLOXACIN 1 DROP: 5 SOLUTION/ DROPS OPHTHALMIC at 11:04

## 2025-04-09 RX ADMIN — Medication 1 DROP: at 10:04

## 2025-04-09 RX ADMIN — MIDAZOLAM HYDROCHLORIDE 2 MG: 1 INJECTION, SOLUTION INTRAMUSCULAR; INTRAVENOUS at 11:04

## 2025-04-09 RX ADMIN — FENTANYL CITRATE 25 MCG: 50 INJECTION, SOLUTION INTRAMUSCULAR; INTRAVENOUS at 11:04

## 2025-04-09 NOTE — OP NOTE
DATE OF PROCEDURE: 04/09/2025    SURGEON: DIANELYS HERNANDEZ MD    PREOPERATIVE DIAGNOSIS:  1. Senile nuclear sclerotic cataract right eye. 2. Poor mydriasis secondary to floppy iris syndrome right eye    POSTOPERATIVE DIAGNOSIS: 1.Senile nuclear sclerotic cataract right eye. 2. Poor mydriasis secondary to floppy iris syndrome right eye    PROCEDURE PERFORMED:  Complex phacoemulsification with placement of intraocular lens, right eye, with placement of a Malyugin ring. (37782)  With moderate sedation >10min (98721)    IMPLANT:  DIBOO  18.5    ANESTHESIA:  Moderate sedation with topical Lidocaine. Under my direct supervision, intravenous moderate sedation was administered during the course of this procedure, with continuous monitoring of hemodynamic parameters. Total time of sedation and amount of sedatives are documented in the nursing logs. Patient ID confirmed and re-evaluated the patient and anesthesia plan confirming it is suitable for the patient's condition and procedure.     COMPLICATIONS: none    ESTIMATED BLOOD LOSS: <1cc    SPECIMENS: none    INDICATIONS FOR PROCEDURE:   The patient has a history of painless progressive vision loss.  The patient has described difficulties with activities of daily living, which specifically include driving, which is secondary to cataract formation and progression. After we had a thorough discussion about risks, benefits, and alternatives to cataract surgery, the patient agreed to proceed with phacoemulsification and implantation of a lens in the right eye.  These risks include, but are not limited to, hemorrhage, pain, infection, need for additional surgery, need for glasses or contacts, loss of vision, or even loss of the eye.       PROCEDURE IN DETAIL:  The patient was met in the preop holding area.  Consent was confirmed to be signed.  The operative site was marked.  The patient was brought into the operating room by the anesthesia team and placed under monitored anesthesia  care.  The right eye was prepped and draped in a sterile ophthalmic fashion.  A Madai speculum was placed into the right eye.   A paracentesis site was made and 1% preservative-free lidocaine was injected into the anterior chamber.  Viscoelastic  material was injected into the anterior chamber.  A keratome blade was used to make a clear corneal incision. The malyugin ring was inserted and positioned into place, assisting with pupillary dilation.  A cystotome was used to initiate the continuous curvilinear capsulorrhexis which was completed with Utrata forceps.  BSS on a choi cannula was used to perform hydrodissection.  The phacoemulsification tip was introduced into the eye and the nucleus was removed in a standard divide-and-conquer fashion.  Remaining cortical material was removed from the eye using irrigation-aspiration.  The capsular bag was filled with viscoelastic material and the intraocular lens was injected and positioned into place. The Malyugin ring was removed from the eye. Remaining viscoelastic material was removed from the eye using irrigation and aspiration.  The corneal wounds were hydrated.  The eye was filled to physiologic pressure. The wounds were found to be watertight. Drops of Vigamox and prednisilone were placed into the eye.  The eye was washed, dried, and shielded.  The patient tolerated the procedure well and knows to follow up with me tomorrow morning, sooner if needed.

## 2025-04-09 NOTE — DISCHARGE INSTRUCTIONS
Dr. Low     Cataract Post-Operative Instructions       Day of surgery:     -Resume drops THREE times daily into the operative eye.     -Do not rub your eye     -Wear protective sunglasses during the day.     -Resume moderate activity.     -Bathe/shower/wash face normally     -Do not apply makeup around the operative eye for 1 week.     -You should expect:     Blurry vision and halos for 24-48 hours     Dilated pupil for 24-48 hours     Scratchy feeling in the eye for 1-2 days     Curved shadow in your peripheral vision for 2-3 weeks     Occasional flickering of lights for up to 1 week     -If you experience severe pain or nausea, call Dr. Low or the on-call doctor at [phone].       Plan to see Dr. Low at:     [facility] [address]    [address] [place]  [address]    **Most patients can drive the next morning.  If you do not feel comfortable driving, please arrange for transportation. **

## 2025-04-09 NOTE — PLAN OF CARE
Discharge instructions provided to the patient. Patient verbalized understanding. IV removed, cath tip intact. VSS. No concerned voiced. Escorted patient via wheelchair to family member in private vehicle.

## 2025-04-09 NOTE — DISCHARGE SUMMARY
[facility]  Discharge Note  Short Stay    Procedure(s) (LRB):  PHACOEMULSIFICATION, CATARACT, WITH IOL INSERTION (Right)  BRIEF DISCHARGE NOTE:    Date of discharge: 04/09/2025    Reason for hospitalization -  Cataract surgery     Final Diagnosis - Visually significant Cataract    Procedures and treatment provided - Status post phacoemulsification with placement of intraocular lens     Diet - Advance to regular as tolerated    Activity - as tolerated    Disposition at the end of the case - Good.    Discharge: to home    The patient tolerated the procedure well and knows to follow up with me tomorrow in the eye clinic, sooner if needed.    Patient and family instructions (as appropriate) - Given to patient on discharge    Sandra Low MD

## 2025-04-10 ENCOUNTER — OFFICE VISIT (OUTPATIENT)
Dept: OPHTHALMOLOGY | Facility: CLINIC | Age: 80
End: 2025-04-10
Payer: MEDICARE

## 2025-04-10 DIAGNOSIS — Z96.1 STATUS POST CATARACT EXTRACTION AND INSERTION OF INTRAOCULAR LENS, RIGHT: Primary | ICD-10-CM

## 2025-04-10 DIAGNOSIS — Z98.41 STATUS POST CATARACT EXTRACTION AND INSERTION OF INTRAOCULAR LENS, RIGHT: Primary | ICD-10-CM

## 2025-04-10 PROCEDURE — 3288F FALL RISK ASSESSMENT DOCD: CPT | Mod: HCNC,CPTII,S$GLB, | Performed by: OPHTHALMOLOGY

## 2025-04-10 PROCEDURE — 99024 POSTOP FOLLOW-UP VISIT: CPT | Mod: HCNC,S$GLB,, | Performed by: OPHTHALMOLOGY

## 2025-04-10 PROCEDURE — 1101F PT FALLS ASSESS-DOCD LE1/YR: CPT | Mod: HCNC,CPTII,S$GLB, | Performed by: OPHTHALMOLOGY

## 2025-04-10 PROCEDURE — 1126F AMNT PAIN NOTED NONE PRSNT: CPT | Mod: HCNC,CPTII,S$GLB, | Performed by: OPHTHALMOLOGY

## 2025-04-10 PROCEDURE — 1159F MED LIST DOCD IN RCRD: CPT | Mod: HCNC,CPTII,S$GLB, | Performed by: OPHTHALMOLOGY

## 2025-04-10 PROCEDURE — 99999 PR PBB SHADOW E&M-EST. PATIENT-LVL III: CPT | Mod: PBBFAC,HCNC,, | Performed by: OPHTHALMOLOGY

## 2025-04-10 NOTE — PROGRESS NOTES
HPI    Referred by Dr. Carson    S/p  Complex phacoemulsification with placement of intraocular lens, right   eye, with placement of a Malyugin ring. 04/09/2025  S/p: Complex phacoemulsification with placement of intraocular lens, left   eye, with placement of a Malyugin ring. 01/29/2025  JEANNE ruiz    Gtt's:   Ketorolac TID OD  Pred forte TID OD  Ofloxacin TID OD      Patient is here for 1 day post op of right eye.  Pt. States vision is not as bright as OS.  Pt. States eye feel gritty.  Pt. Denies pain today.  Last edited by Nenita Raya on 4/10/2025  1:08 PM.            Assessment /Plan     For exam results, see Encounter Report.    Status post cataract extraction and insertion of intraocular lens, right      Slit Lamp Exam  L/L - normal  C/s - quiet  Cornea - clear  A/C - 1+ cell  Lens - PCIOL    POD #1 s/p phaco/IOL  - doing well  - continue the following drops:    vigamox or ocuflox TID x 1 wk then stop  Pred forte TID x  4 wks  Ketorolac TID until runs out    Versus:    Combination drop - 1 drop TID x total of 1 month    Appropriate precautions and post op medications reviewed.  Patient instructed to call or come in if symptoms of redness, decreased vision, or pain are experienced.    -f/up 1-2 wks, sooner PRN. Or 4 wks with [facility] for mrx if needed.

## 2025-05-01 ENCOUNTER — OFFICE VISIT (OUTPATIENT)
Dept: PODIATRY | Facility: CLINIC | Age: 80
End: 2025-05-01
Payer: MEDICARE

## 2025-05-01 VITALS
HEART RATE: 81 BPM | HEIGHT: 60 IN | DIASTOLIC BLOOD PRESSURE: 70 MMHG | RESPIRATION RATE: 18 BRPM | SYSTOLIC BLOOD PRESSURE: 141 MMHG | WEIGHT: 187.38 LBS | BODY MASS INDEX: 36.79 KG/M2

## 2025-05-01 DIAGNOSIS — L84 CORN OR CALLUS: ICD-10-CM

## 2025-05-01 DIAGNOSIS — L60.9 DISEASE OF NAIL: ICD-10-CM

## 2025-05-01 DIAGNOSIS — L20.9 ATOPIC DERMATITIS, UNSPECIFIED TYPE: Primary | ICD-10-CM

## 2025-05-01 DIAGNOSIS — L84 CLAVUS: ICD-10-CM

## 2025-05-01 DIAGNOSIS — M20.42 HAMMER TOES OF BOTH FEET: ICD-10-CM

## 2025-05-01 DIAGNOSIS — M20.41 HAMMER TOES OF BOTH FEET: ICD-10-CM

## 2025-05-01 PROCEDURE — 1126F AMNT PAIN NOTED NONE PRSNT: CPT | Mod: CPTII,HCNC,S$GLB, | Performed by: PODIATRIST

## 2025-05-01 PROCEDURE — 1160F RVW MEDS BY RX/DR IN RCRD: CPT | Mod: CPTII,HCNC,S$GLB, | Performed by: PODIATRIST

## 2025-05-01 PROCEDURE — 99204 OFFICE O/P NEW MOD 45 MIN: CPT | Mod: HCNC,S$GLB,, | Performed by: PODIATRIST

## 2025-05-01 PROCEDURE — 99999 PR PBB SHADOW E&M-EST. PATIENT-LVL V: CPT | Mod: PBBFAC,HCNC,, | Performed by: PODIATRIST

## 2025-05-01 PROCEDURE — 3078F DIAST BP <80 MM HG: CPT | Mod: CPTII,HCNC,S$GLB, | Performed by: PODIATRIST

## 2025-05-01 PROCEDURE — 1159F MED LIST DOCD IN RCRD: CPT | Mod: CPTII,HCNC,S$GLB, | Performed by: PODIATRIST

## 2025-05-01 PROCEDURE — 3077F SYST BP >= 140 MM HG: CPT | Mod: CPTII,HCNC,S$GLB, | Performed by: PODIATRIST

## 2025-05-01 NOTE — PROGRESS NOTES
Subjective:      Patient ID: Nilsa Curiel is a 79 y.o. female.    Chief Complaint:   Callouses (Corn 5th toes, left is painful ) and Nail Problem (Thick dark nails fungus )    Nilsa is a 79 y.o. female who presents to the podiatry clinic  with complaint of  bilateral foot pain./callus toe pain    Patient referred by her dermatologist  Patient relates that she has never seen a podiatrist  Self for her feet  She is very cautious what topicals she uses due to her dermatitis    She has difficulty wearing tennis shoes because the dermatitis flares up  Wearing slip-on shoes today  Uses a cane for stabilization secondary to back surgery      Feet do often itch when she gets the rashes denies diabetes  Denies numbness or tingling    Uses Eucerin and other gentle products  Past Medical History:   Diagnosis Date    Abnormal fasting glucose 09/04/2015    Acute bilateral low back pain without sciatica 10/10/2017    Anemia 04/24/2018    Arthritis     Atopic dermatitis     Central stenosis of spinal canal 12/23/2022    Chronic diastolic heart failure 04/06/2022    Chronic kidney disease, stage III (moderate) 08/18/2020    Dermatitis 01/04/2023    GERD (gastroesophageal reflux disease)     Hyperlipidemia     Hypertension     Hypokalemia 08/18/2022    Joint pain     Left ventricular hypertrophy 08/16/2016    Microcytic hypochromic anemia 04/24/2018    Multifactoral. See hematology consult. Some nutritional , some kidney related    Mild aortic sclerosis 03/11/2019    Mobility poor 03/18/2022    Morbid obesity with BMI of 40.0-44.9, adult 04/01/2016    Nonintractable episodic headache 11/06/2019    Normocytic normochromic anemia 12/23/2022    MONE (obstructive sleep apnea) 07/25/2018    Prediabetes 01/04/2023    Severe obesity (BMI >= 40) 06/10/2022     Past Surgical History:   Procedure Laterality Date    CATARACT EXTRACTION W/  INTRAOCULAR LENS IMPLANT Left 1/29/2025    Procedure: EXTRACTION, CATARACT, WITH IOL INSERTION;  Surgeon:  Sandra Low MD;  Location: [facility] OR;  Service: Ophthalmology;  Laterality: Left;     SECTION      x3    CLOSURE OF WOUND  10/4/2023    Procedure: CLOSURE, WOUND;  Surgeon: Benigno Goodman DO;  Location: [facility] OR 2ND FLR;  Service: Plastics;;    COLONOSCOPY N/A 10/04/2021    Procedure: COLONOSCOPY;  Surgeon: Taran Galvez MD;  Location: [facility] (4TH FLR);  Service: Endoscopy;  Laterality: N/A;    COLONOSCOPY N/A 2022    Procedure: COLONOSCOPY--positive fit kit;  Surgeon: Tani Lara MD;  Location: [facility] (4TH FLR);  Service: Endoscopy;  Laterality: N/A;    EPIDURAL STEROID INJECTION N/A 2023    Procedure: INJECTION, STEROID, EPIDURAL, L5-S1;  Surgeon: Majo Meyers MD;  Location: [facility] PAIN MGT;  Service: Pain Management;  Laterality: N/A;    ESOPHAGOGASTRODUODENOSCOPY N/A 10/04/2021    Procedure: EGD (ESOPHAGOGASTRODUODENOSCOPY);  Surgeon: Taran Galvez MD;  Location: [facility] (4TH FLR);  Service: Endoscopy;  Laterality: N/A;  covid test 10/1-[facility]    10/1-LVM about COVID test-GT    ESOPHAGOGASTRODUODENOSCOPY N/A 2022    Procedure: EGD (ESOPHAGOGASTRODUODENOSCOPY);  Surgeon: Tani Lara MD;  Location: [facility] (4TH FLR);  Service: Endoscopy;  Laterality: N/A;  12/15 fully vaccinated; instructions to portal-st  -covid [facility]-tb    FUSION, SPINE, LUMBAR, TLIF, POSTERIOR APPROACH, USING PEDICLE SCREW N/A 2023    Procedure: FUSION, SPINE, LUMBAR, TLIF, POSTERIOR APPROACH, USING PEDICLE SCREW L2-5 PLDF/TLIF GLOBUS ROBOT SNS:SSEP/EMG cell saver;  Surgeon: Jesus Alberto Sharp MD;  Location: [facility] OR 2ND FLR;  Service: Neurosurgery;  Laterality: N/A;    IRRIGATION AND DEBRIDEMENT N/A 10/4/2023    Procedure: IRRIGATION AND DEBRIDEMENT **CO CASE DR. JULISSA GOODMAN** LUMBAR;  Surgeon: Jesus Alberto Sharp MD;  Location: [facility] OR [address] [place] [address];  Service: Orthopedics;  Laterality: N/A;    PHACOEMULSIFICATION, CATARACT, WITH IOL INSERTION Right 2025    Procedure:  PHACOEMULSIFICATION, CATARACT, WITH IOL INSERTION;  Surgeon: Sandra Low MD;  Location: [facility] OR;  Service: Ophthalmology;  Laterality: Right;    TRANSFORAMINAL EPIDURAL INJECTION OF STEROID N/A 03/16/2023    Procedure: b/l L4-5 TFESI;  Surgeon: Gato Delatorre DO;  Location: [facility] OR;  Service: Pain Management;  Laterality: N/A;    TRANSFORAMINAL EPIDURAL INJECTION OF STEROID Right 6/4/2024    Procedure: LUMBAR TRANSFORAMINAL RIGHT S1/2 DIRECT REFERRAL *ASPIRIN CLEARANCE IN CHART*;  Surgeon: Majo Meyers MD;  [facility];  Service: Pain Management;  Laterality: Right;  [phone]    TUBAL LIGATION       Medications Ordered Prior to Encounter[1]  Review of patient's allergies indicates:  No Known Allergies    Review of Systems   Constitutional: Negative for chills, decreased appetite, fever, malaise/fatigue, night sweats, weight gain and weight loss.   Cardiovascular:  Negative for chest pain, claudication, dyspnea on exertion, leg swelling, palpitations and syncope.   Respiratory:  Negative for cough and shortness of breath.    Endocrine: Negative for cold intolerance and heat intolerance.   Hematologic/Lymphatic: Negative for bleeding problem. Does not bruise/bleed easily.   Skin:  Positive for color change, dry skin and nail changes. Negative for flushing, itching, poor wound healing, rash, skin cancer, suspicious lesions and unusual hair distribution.   Musculoskeletal:  Positive for arthritis. Negative for back pain, falls, gout, joint pain, joint swelling, muscle cramps, muscle weakness, myalgias, neck pain and stiffness.   Gastrointestinal:  Negative for diarrhea, nausea and vomiting.   Neurological:  Negative for dizziness, focal weakness, light-headedness, numbness, paresthesias, tremors, vertigo and weakness.   Psychiatric/Behavioral:  Negative for altered mental status and depression. The patient does not have insomnia.    Allergic/Immunologic: Negative.             Objective:       Vitals:    25 0735   BP: (!) 141/70   Pulse: 81   Resp: 18   Weight: 85 kg (187 lb 6.3 oz)   Height: 5' (1.524 m)   PainSc: 0-No pain   85 kg (187 lb 6.3 oz)     Physical Exam  Vitals reviewed.   Constitutional:       General: She is not in acute distress.     Appearance: She is well-developed. She is not ill-appearing, toxic-appearing or diaphoretic.   Cardiovascular:      Pulses:           Dorsalis pedis pulses are 2+ on the right side and 2+ on the left side.        Posterior tibial pulses are 1+ on the right side and 1+ on the left side.   Musculoskeletal:         General: No tenderness.      Right lower le+ Edema present.      Left lower le+ Edema present.      Right ankle: Normal.      Right Achilles Tendon: Normal. No tenderness.      Left ankle: Normal.      Left Achilles Tendon: Normal. No tenderness.      Right foot: Decreased range of motion. Deformity and prominent metatarsal heads present. No tenderness or bony tenderness.      Left foot: Decreased range of motion. Deformity and prominent metatarsal heads present. No tenderness or bony tenderness.      Comments: Flexible pes planus foot type w/ medial arch collapse and mild gastroc equinus       Reducible extensor and flexor contractures at the MTPJ and PIPJ of toes 2-5, bilat.          Feet:      Right foot:      Protective Sensation: 10 sites tested.  10 sites sensed.      Skin integrity: Dry skin present. No ulcer, blister, skin breakdown, erythema, warmth or callus.      Toenail Condition: Right toenails are abnormally thick. Fungal disease present.     Left foot:      Protective Sensation: 10 sites tested.  10 sites sensed.      Skin integrity: Dry skin present. No ulcer, blister, skin breakdown, erythema, warmth or callus.      Toenail Condition: Left toenails are abnormally thick. Fungal disease present.     Comments: Plaque patch dorsal left foot and ankle no signs of infection or weeping    Focal hyperkeratotic  "lesion consisting entirely of hyperkeratotic tissue without open skin, drainage, pus, fluctuance, malodor, or signs of infection bilateral 5th digit  Plantar peeling    No interspace maceration  Nails short thick colored  Skin:     Capillary Refill: Capillary refill takes 2 to 3 seconds.      Coloration: Skin is not pale.      Findings: No erythema or rash.      Comments: Waxy   Neurological:      Mental Status: She is alert and oriented to person, place, and time.      Sensory: No sensory deficit.      Gait: Gait abnormal.   Psychiatric:         Attention and Perception: Attention normal.         Mood and Affect: Mood normal.         Speech: Speech normal.         Behavior: Behavior normal.         Thought Content: Thought content normal.         Cognition and Memory: Cognition normal.         Judgment: Judgment normal.               Assessment:       Encounter Diagnoses   Name Primary?    Clavus     Atopic dermatitis, unspecified type Yes    Hammer toes of both feet     Corn or callus     Disease of nail          Plan:       Nilsa "MS. Ash" was seen today for callouses and nail problem.    Diagnoses and all orders for this visit:    Atopic dermatitis, unspecified type    Clavus  -     Ambulatory referral/consult to Podiatry    Hammer toes of both feet    Corn or callus    Disease of nail      I counseled the patient on her conditions, their implications and medical management.    Discussed shoe gear    Consider Penlac however patient's relates she is cautious with topicals, can discussed with Dermatology at upcoming visit in August    Recommend oatmeal packet soaps for feet in warm water with Aquaphor to callus areas    - With patient's permission, Utilizing a #15 scalpel, I trimmed the corns and calluses at the above mentioned location.      The patient will continue to monitor the areas daily, inspect the feet, wear protective shoe gear when ambulatory, and moisturizer to maintain skin integrity.    Follow up " after Dermatology revisit  4 months          [1]   Current Outpatient Medications on File Prior to Visit   Medication Sig Dispense Refill    allopurinoL (ZYLOPRIM) 100 MG tablet Take 1 tablet (100 mg total) by mouth once daily. 90 tablet 3    amLODIPine (NORVASC) 10 MG tablet Take 1 tablet (10 mg total) by mouth once daily. 90 tablet 3    biotin 300 mcg Tab Take 1 tablet by mouth once daily.      carvediloL (COREG) 3.125 MG tablet Take 1 tablet (3.125 mg total) by mouth 2 (two) times daily with meals. 180 tablet 3    chlorhexidine (HIBICLENS) 4 % external liquid Apply to body from the neck down for 5-10 minutes daily, then rinse. Use everyday until infection is improved, then decrease frequency of use to 1-2 times per week. Do not allow it to contact the eyes or ears. 473 mL 2    famotidine (PEPCID) 20 MG tablet One po bid x 7 days then one bid as needed for gastritis 60 tablet 11    fluocinonide (LIDEX) 0.05 % ointment Apply to affected areas of body bid prn eczema. 120 g 1    fluticasone propionate (FLONASE) 50 mcg/actuation nasal spray SHAKE LIQUID AND USE 2 SPRAYS(100 MCG) IN EACH NOSTRIL DAILY 16 g 1    ketoconazole (NIZORAL) 2 % cream Apply to affected areas of body BID prn irritation. 15 g 1    ketorolac 0.5% (ACULAR) 0.5 % Drop Place 1 drop into the left eye 3 (three) times daily. 5 mL 3    ketorolac 0.5% (ACULAR) 0.5 % Drop Place 1 drop into the right eye 3 (three) times daily. 5 mL 3    methocarbamoL (ROBAXIN) 500 MG Tab Take 1 tablet (500 mg total) by mouth 3 (three) times daily. 60 tablet 1    mometasone (ELOCON) 0.1 % ointment Apply twice daily to hands prn eczema. 45 g 3    MULTIVIT-MIN/FA/CA CARB/VIT K (WOMEN'S 50+ DAILY FORMULA ORAL) Take by mouth.      ofloxacin (OCUFLOX) 0.3 % ophthalmic solution Place 1 drop into the left eye 3 (three) times daily. 5 mL 3    olmesartan (BENICAR) 40 MG tablet Take 1 tablet (40 mg total) by mouth once daily. 90 tablet 3    omega-3 fatty acids 300 mg Cap Take by  mouth.      pimecrolimus (ELIDEL) 1 % cream Apply to affected areas of body twice daily. Safe to use everyday. 100 g 5    potassium chloride SA (K-DUR,KLOR-CON) 20 MEQ tablet Take 1 tablet (20 mEq total) by mouth 2 (two) times daily. 180 tablet 3    prednisoLONE acetate (PRED FORTE) 1 % DrpS Place 1 drop into the left eye 3 (three) times daily. 5 mL 3    prednisoLONE acetate (PRED FORTE) 1 % DrpS Place 1 drop into the right eye 3 (three) times daily. 5 mL 3    pregabalin (LYRICA) 75 MG capsule Take 1 capsule (75 mg total) by mouth 2 (two) times daily. 180 capsule 0    riboflavin, vitamin B2, (VITAMIN B-2 ORAL) Take 1 capsule by mouth once daily.      rosuvastatin (CRESTOR) 20 MG tablet Take 1 tablet (20 mg total) by mouth once daily. 90 tablet 3    tacrolimus (PROTOPIC) 0.1 % ointment Apply to affected areas of body twice daily. Safe to use everyday. 100 g 5    triamcinolone acetonide 0.025% (KENALOG) 0.025 % cream Apply to affected areas of body BID prn irritation. 15 g 1    TURMERIC ORAL Take by mouth.      ubidecarenone (COENZYME Q10) 100 mg Tab Take 1 tablet by mouth once daily.       acetaminophen (TYLENOL) 500 MG tablet TAKE 1 TABLET THREE TIMES DAILY (Patient not taking: Reported on 5/1/2025) 90 tablet 11    aspirin (ECOTRIN) 81 MG EC tablet Take 1 tablet (81 mg total) by mouth once daily. 90 tablet 3    calcium acetate-aluminum sulf 51-49% 51-49 % packet Soak feet for 15-30 minutes once or twice daily as needed for itching or irritation. 100 packet 1    ofloxacin (OCUFLOX) 0.3 % ophthalmic solution Place 1 drop into the right eye 3 (three) times daily. (Patient not taking: Reported on 5/1/2025) 5 mL 0    ondansetron (ZOFRAN-ODT) 4 MG TbDL DISSOLVE 1 TABLET ON THE TONGUE EVERY 6 HOURS AS NEEDED FOR NAUSEA (Patient not taking: Reported on 5/1/2025) 30 tablet 0    tacrolimus-niacinamide 0.1-4 % Oint Apply to affected areas of body BID prn atopic dermatitis. 30 g 5    terbinafine HCL (LAMISIL AT) 1 % cream Apply  topically 2 (two) times daily. To feet 60 g 1     No current facility-administered medications on file prior to visit.

## 2025-05-09 ENCOUNTER — OFFICE VISIT (OUTPATIENT)
Dept: PRIMARY CARE CLINIC | Facility: CLINIC | Age: 80
End: 2025-05-09
Payer: MEDICARE

## 2025-05-09 ENCOUNTER — LAB VISIT (OUTPATIENT)
Dept: LAB | Facility: HOSPITAL | Age: 80
End: 2025-05-09
Attending: FAMILY MEDICINE
Payer: MEDICARE

## 2025-05-09 VITALS
HEIGHT: 60 IN | WEIGHT: 196.44 LBS | OXYGEN SATURATION: 96 % | SYSTOLIC BLOOD PRESSURE: 128 MMHG | HEART RATE: 84 BPM | BODY MASS INDEX: 38.56 KG/M2 | DIASTOLIC BLOOD PRESSURE: 50 MMHG

## 2025-05-09 DIAGNOSIS — R73.03 PRE-DIABETES: ICD-10-CM

## 2025-05-09 DIAGNOSIS — Z00.00 GENERAL MEDICAL EXAM: Primary | ICD-10-CM

## 2025-05-09 DIAGNOSIS — N76.0 ACUTE VAGINITIS: ICD-10-CM

## 2025-05-09 DIAGNOSIS — I70.0 ATHEROSCLEROSIS OF AORTA: ICD-10-CM

## 2025-05-09 DIAGNOSIS — E78.2 MIXED HYPERLIPIDEMIA: ICD-10-CM

## 2025-05-09 DIAGNOSIS — M54.16 LUMBAR RADICULOPATHY: ICD-10-CM

## 2025-05-09 DIAGNOSIS — M47.816 LUMBAR SPONDYLOSIS: ICD-10-CM

## 2025-05-09 DIAGNOSIS — N18.31 CHRONIC KIDNEY DISEASE, STAGE 3A: ICD-10-CM

## 2025-05-09 DIAGNOSIS — Z80.0 FAMILY HISTORY OF PANCREATIC CANCER: ICD-10-CM

## 2025-05-09 DIAGNOSIS — Z12.31 BREAST CANCER SCREENING BY MAMMOGRAM: ICD-10-CM

## 2025-05-09 DIAGNOSIS — K21.9 GASTRIC REFLUX: ICD-10-CM

## 2025-05-09 LAB
ABSOLUTE EOSINOPHIL (OHS): 0.24 K/UL
ABSOLUTE MONOCYTE (OHS): 0.63 K/UL (ref 0.3–1)
ABSOLUTE NEUTROPHIL COUNT (OHS): 3.84 K/UL (ref 1.8–7.7)
ALBUMIN SERPL BCP-MCNC: 3.9 G/DL (ref 3.5–5.2)
ALP SERPL-CCNC: 126 UNIT/L (ref 40–150)
ALT SERPL W/O P-5'-P-CCNC: 11 UNIT/L (ref 10–44)
ANION GAP (OHS): 7 MMOL/L (ref 8–16)
AST SERPL-CCNC: 17 UNIT/L (ref 11–45)
BASOPHILS # BLD AUTO: 0.05 K/UL
BASOPHILS NFR BLD AUTO: 0.7 %
BILIRUB SERPL-MCNC: 0.4 MG/DL (ref 0.1–1)
BUN SERPL-MCNC: 16 MG/DL (ref 8–23)
CALCIUM SERPL-MCNC: 10.8 MG/DL (ref 8.7–10.5)
CHLORIDE SERPL-SCNC: 110 MMOL/L (ref 95–110)
CHOLEST SERPL-MCNC: 114 MG/DL (ref 120–199)
CHOLEST/HDLC SERPL: 2.6 {RATIO} (ref 2–5)
CO2 SERPL-SCNC: 25 MMOL/L (ref 23–29)
CREAT SERPL-MCNC: 1.2 MG/DL (ref 0.5–1.4)
EAG (OHS): 100 MG/DL (ref 68–131)
ERYTHROCYTE [DISTWIDTH] IN BLOOD BY AUTOMATED COUNT: 13.9 % (ref 11.5–14.5)
GFR SERPLBLD CREATININE-BSD FMLA CKD-EPI: 47 ML/MIN/1.73/M2
GLUCOSE SERPL-MCNC: 95 MG/DL (ref 70–110)
HBA1C MFR BLD: 5.1 % (ref 4–5.6)
HCT VFR BLD AUTO: 36 % (ref 37–48.5)
HDLC SERPL-MCNC: 44 MG/DL (ref 40–75)
HDLC SERPL: 38.6 % (ref 20–50)
HGB BLD-MCNC: 10.7 GM/DL (ref 12–16)
IMM GRANULOCYTES # BLD AUTO: 0.01 K/UL (ref 0–0.04)
IMM GRANULOCYTES NFR BLD AUTO: 0.1 % (ref 0–0.5)
LDLC SERPL CALC-MCNC: 54.4 MG/DL (ref 63–159)
LYMPHOCYTES # BLD AUTO: 2.39 K/UL (ref 1–4.8)
MCH RBC QN AUTO: 24.4 PG (ref 27–31)
MCHC RBC AUTO-ENTMCNC: 29.7 G/DL (ref 32–36)
MCV RBC AUTO: 82 FL (ref 82–98)
NONHDLC SERPL-MCNC: 70 MG/DL
NUCLEATED RBC (/100WBC) (OHS): 0 /100 WBC
PLATELET # BLD AUTO: 313 K/UL (ref 150–450)
PMV BLD AUTO: 10.4 FL (ref 9.2–12.9)
POTASSIUM SERPL-SCNC: 4.2 MMOL/L (ref 3.5–5.1)
PROT SERPL-MCNC: 8.2 GM/DL (ref 6–8.4)
RBC # BLD AUTO: 4.39 M/UL (ref 4–5.4)
RELATIVE EOSINOPHIL (OHS): 3.4 %
RELATIVE LYMPHOCYTE (OHS): 33.4 % (ref 18–48)
RELATIVE MONOCYTE (OHS): 8.8 % (ref 4–15)
RELATIVE NEUTROPHIL (OHS): 53.6 % (ref 38–73)
SODIUM SERPL-SCNC: 142 MMOL/L (ref 136–145)
TRIGL SERPL-MCNC: 78 MG/DL (ref 30–150)
WBC # BLD AUTO: 7.16 K/UL (ref 3.9–12.7)

## 2025-05-09 PROCEDURE — 80053 COMPREHEN METABOLIC PANEL: CPT | Mod: HCNC

## 2025-05-09 PROCEDURE — 99999 PR PBB SHADOW E&M-EST. PATIENT-LVL V: CPT | Mod: PBBFAC,HCNC,, | Performed by: NURSE PRACTITIONER

## 2025-05-09 PROCEDURE — 83036 HEMOGLOBIN GLYCOSYLATED A1C: CPT | Mod: HCNC

## 2025-05-09 PROCEDURE — 80061 LIPID PANEL: CPT | Mod: HCNC

## 2025-05-09 PROCEDURE — 36415 COLL VENOUS BLD VENIPUNCTURE: CPT | Mod: HCNC,PN

## 2025-05-09 PROCEDURE — 85025 COMPLETE CBC W/AUTO DIFF WBC: CPT | Mod: HCNC

## 2025-05-09 RX ORDER — PREGABALIN 75 MG/1
75 CAPSULE ORAL 2 TIMES DAILY
Qty: 180 CAPSULE | Refills: 0 | Status: SHIPPED | OUTPATIENT
Start: 2025-05-09

## 2025-05-09 RX ORDER — FAMOTIDINE 20 MG/1
TABLET, FILM COATED ORAL
Qty: 60 TABLET | Refills: 11 | Status: SHIPPED | OUTPATIENT
Start: 2025-05-09

## 2025-05-09 RX ORDER — FLUCONAZOLE 150 MG/1
150 TABLET ORAL DAILY
Qty: 1 TABLET | Refills: 0 | Status: SHIPPED | OUTPATIENT
Start: 2025-05-09 | End: 2025-05-10

## 2025-05-09 NOTE — PROGRESS NOTES
[facility] Note    Chief Complaint      Chief Complaint   Patient presents with    Annual Exam       History of Present Illness      Nilsa Curiel is a 79 y.o. female who presents today for   Chief Complaint   Patient presents with    Annual Exam         CHIEF COMPLAINT:  Patient presents for a routine follow-up visit and to have lab work done.    HPI:  Patient reports itching sensation in her genital area after consuming lemonade containing grapefruit. She is concerned about the possibility of a yeast infection. Patient denies any other new or concerning symptoms.      ROS:  General: -fever, -chills, -fatigue, -weight gain, -weight loss  Eyes: -vision changes, -redness, -discharge  ENT: -ear pain, -nasal congestion, -sore throat  Cardiovascular: -chest pain, -palpitations, -lower extremity edema  Respiratory: -cough, -shortness of breath  Gastrointestinal: -abdominal pain, -nausea, -vomiting, -diarrhea, -constipation, -blood in stool  Genitourinary: -dysuria, -hematuria, -frequency  Musculoskeletal: -joint pain, -muscle pain  Skin: -rash, -lesion  Neurological: -headache, -dizziness, -numbness, -tingling  Psychiatric: -anxiety, -depression, -sleep difficulty  Female Genitourinary: +vaginal itching or burning               Family History:  family history includes Breast cancer (age of onset: 91) in her mother; Cancer in her sister; Cancer (age of onset: 91) in her mother; Cataracts in her brother; Dementia in her mother; Diabetes in her brother, mother, and sister; Gout in her sister; Heart disease in her sister; Hyperlipidemia in her sister; Hypertension in her mother, sister, sister, and son; Kidney disease in her sister and sister; Multiple sclerosis in her daughter; No Known Problems in her brother, daughter, maternal grandfather, maternal grandmother, paternal grandfather, and paternal grandmother; Other in her brother and father; Pancreatic cancer in her sister; Stroke in her brother.   Family  history was reviewed with patient.     Medications:  Encounter Medications[1]    Allergies:  Review of patient's allergies indicates:  No Known Allergies    Health Maintenance:  Health Maintenance   Topic Date Due    COVID-19 Vaccine (4 - 2024-25 season) 09/01/2024    Mammogram  04/16/2025    Hemoglobin A1c (Prediabetes)  07/03/2025    Influenza Vaccine (Season Ended) 09/01/2025    Aspirin/Antiplatelet Therapy  05/09/2026    DEXA Scan  03/24/2027    Lipid Panel  07/03/2029    TETANUS VACCINE  06/08/2032    Hepatitis C Screening  Completed    Shingles Vaccine  Completed    RSV Vaccine (Age 60+ and Pregnant patients)  Completed    Pneumococcal Vaccines (Age 50+)  Completed     Health Maintenance Topics with due status: Not Due       Topic Last Completion Date    TETANUS VACCINE 06/08/2022    DEXA Scan 03/24/2023    Influenza Vaccine 10/11/2023    Hemoglobin A1c (Prediabetes) 07/03/2024    Lipid Panel 07/03/2024    Aspirin/Antiplatelet Therapy 05/09/2025       Physical Exam      Vital Signs  Pulse: 84  SpO2: 96 %  BP: (!) 128/50  BP Location: Right arm  Patient Position: Sitting  Pain Score:   5  Pain Loc: Back  Height and Weight  Height: 5' (152.4 cm)  Weight: 89.1 kg (196 lb 6.9 oz)  BSA (Calculated - sq m): 1.94 sq meters  BMI (Calculated): 38.4  Weight in (lb) to have BMI = 25: 127.7]    Physical Exam  Vitals reviewed.   Constitutional:       Appearance: Normal appearance. She is obese.   HENT:      Head: Normocephalic and atraumatic.      Right Ear: Tympanic membrane, ear canal and external ear normal.      Left Ear: Tympanic membrane, ear canal and external ear normal.      Nose: Nose normal.      Mouth/Throat:      Mouth: Mucous membranes are moist.      Pharynx: Oropharynx is clear.   Eyes:      Extraocular Movements: Extraocular movements intact.      Conjunctiva/sclera: Conjunctivae normal.      Pupils: Pupils are equal, round, and reactive to light.   Cardiovascular:      Rate and Rhythm: Normal rate and  regular rhythm.      Pulses: Normal pulses.      Heart sounds: Normal heart sounds.   Pulmonary:      Effort: Pulmonary effort is normal.      Breath sounds: Normal breath sounds.   Abdominal:      General: Abdomen is flat. Bowel sounds are normal.      Palpations: Abdomen is soft.   Musculoskeletal:         General: Normal range of motion.      Cervical back: Normal range of motion and neck supple.      Comments: + ambulates with assistance of a cane   Skin:     General: Skin is warm and dry.      Capillary Refill: Capillary refill takes less than 2 seconds.   Neurological:      General: No focal deficit present.      Mental Status: She is alert and oriented to person, place, and time. Mental status is at baseline.   Psychiatric:         Mood and Affect: Mood normal.         Behavior: Behavior normal.         Thought Content: Thought content normal.         Judgment: Judgment normal.            Assessment/Plan     Nilsa Curiel is a 79 y.o.female with:    General medical exam    Chronic kidney disease, stage 3a  -     CBC Auto Differential; Future; Expected date: 05/09/2025  -     Comprehensive Metabolic Panel; Future; Expected date: 05/09/2025    Mixed hyperlipidemia    Atherosclerosis of aorta  -     Lipid Panel; Future; Expected date: 05/09/2025    Pre-diabetes  -     Hemoglobin A1C; Future; Expected date: 05/09/2025    Breast cancer screening by mammogram  -     Mammo Digital Screening Bilat w/ Marlon (XPD); Future; Expected date: 05/09/2025    Gastric reflux  -     famotidine (PEPCID) 20 MG tablet; One po bid x 7 days then one bid as needed for gastritis  Dispense: 60 tablet; Refill: 11    Lumbar spondylosis  -     pregabalin (LYRICA) 75 MG capsule; Take 1 capsule (75 mg total) by mouth 2 (two) times daily.  Dispense: 180 capsule; Refill: 0    Lumbar radiculopathy  -     pregabalin (LYRICA) 75 MG capsule; Take 1 capsule (75 mg total) by mouth 2 (two) times daily.  Dispense: 180 capsule; Refill: 0    Acute  vaginitis  -     fluconazole (DIFLUCAN) 150 MG Tab; Take 1 tablet (150 mg total) by mouth once daily. for 1 day  Dispense: 1 tablet; Refill: 0        As above, continue current medications and maintain follow up with specialists.  Return to clinic as needed.    Greater than 50% of visit was spent face to face with patient.  All questions were answered to patient's satisfaction.          Karen L Spencer, NP-C [facility]                Answers submitted by the patient for this visit:  Review of Systems Questionnaire (Submitted on 5/6/2025)  activity change: No  unexpected weight change: No  neck pain: No  hearing loss: No  rhinorrhea: No  trouble swallowing: No  eye discharge: No  visual disturbance: No  chest tightness: No  wheezing: No  chest pain: No  palpitations: No  blood in stool: No  constipation: No  vomiting: No  diarrhea: No  polydipsia: No  difficulty urinating: No  hematuria: No  menstrual problem: No  dysuria: No  joint swelling: No  headaches: No  weakness: No  confusion: No  dysphoric mood: No         [1]   Outpatient Encounter Medications as of 5/9/2025   Medication Sig Dispense Refill    acetaminophen (TYLENOL) 500 MG tablet TAKE 1 TABLET THREE TIMES DAILY 90 tablet 11    allopurinoL (ZYLOPRIM) 100 MG tablet Take 1 tablet (100 mg total) by mouth once daily. 90 tablet 3    amLODIPine (NORVASC) 10 MG tablet Take 1 tablet (10 mg total) by mouth once daily. 90 tablet 3    aspirin (ECOTRIN) 81 MG EC tablet Take 1 tablet (81 mg total) by mouth once daily. 90 tablet 3    carvediloL (COREG) 3.125 MG tablet Take 1 tablet (3.125 mg total) by mouth 2 (two) times daily with meals. 180 tablet 3    fluocinonide (LIDEX) 0.05 % ointment Apply to affected areas of body bid prn eczema. 120 g 1    fluticasone propionate (FLONASE) 50 mcg/actuation nasal spray SHAKE LIQUID AND USE 2 SPRAYS(100 MCG) IN EACH NOSTRIL DAILY 16 g 1    ketoconazole (NIZORAL) 2 % cream Apply to affected areas of body BID prn  irritation. 15 g 1    ketorolac 0.5% (ACULAR) 0.5 % Drop Place 1 drop into the left eye 3 (three) times daily. 5 mL 3    methocarbamoL (ROBAXIN) 500 MG Tab Take 1 tablet (500 mg total) by mouth 3 (three) times daily. 60 tablet 1    MULTIVIT-MIN/FA/CA CARB/VIT K (WOMEN'S 50+ DAILY FORMULA ORAL) Take by mouth.      ofloxacin (OCUFLOX) 0.3 % ophthalmic solution Place 1 drop into the left eye 3 (three) times daily. 5 mL 3    olmesartan (BENICAR) 40 MG tablet Take 1 tablet (40 mg total) by mouth once daily. 90 tablet 3    omega-3 fatty acids 300 mg Cap Take by mouth.      potassium chloride SA (K-DUR,KLOR-CON) 20 MEQ tablet Take 1 tablet (20 mEq total) by mouth 2 (two) times daily. 180 tablet 3    prednisoLONE acetate (PRED FORTE) 1 % DrpS Place 1 drop into the left eye 3 (three) times daily. 5 mL 3    rosuvastatin (CRESTOR) 20 MG tablet Take 1 tablet (20 mg total) by mouth once daily. 90 tablet 3    terbinafine HCL (LAMISIL AT) 1 % cream Apply topically 2 (two) times daily. To feet 60 g 1    triamcinolone acetonide 0.025% (KENALOG) 0.025 % cream Apply to affected areas of body BID prn irritation. 15 g 1    TURMERIC ORAL Take by mouth.      ubidecarenone (COENZYME Q10) 100 mg Tab Take 1 tablet by mouth once daily.       [DISCONTINUED] famotidine (PEPCID) 20 MG tablet One po bid x 7 days then one bid as needed for gastritis 60 tablet 11    [DISCONTINUED] pregabalin (LYRICA) 75 MG capsule Take 1 capsule (75 mg total) by mouth 2 (two) times daily. 180 capsule 0    famotidine (PEPCID) 20 MG tablet One po bid x 7 days then one bid as needed for gastritis 60 tablet 11    fluconazole (DIFLUCAN) 150 MG Tab Take 1 tablet (150 mg total) by mouth once daily. for 1 day 1 tablet 0    pregabalin (LYRICA) 75 MG capsule Take 1 capsule (75 mg total) by mouth 2 (two) times daily. 180 capsule 0    [DISCONTINUED] biotin 300 mcg Tab Take 1 tablet by mouth once daily. (Patient not taking: Reported on 5/9/2025)      [DISCONTINUED] calcium  acetate-aluminum sulf 51-49% 51-49 % packet Soak feet for 15-30 minutes once or twice daily as needed for itching or irritation. 100 packet 1    [DISCONTINUED] chlorhexidine (HIBICLENS) 4 % external liquid Apply to body from the neck down for 5-10 minutes daily, then rinse. Use everyday until infection is improved, then decrease frequency of use to 1-2 times per week. Do not allow it to contact the eyes or ears. (Patient not taking: Reported on 5/9/2025) 473 mL 2    [DISCONTINUED] ketorolac 0.5% (ACULAR) 0.5 % Drop Place 1 drop into the right eye 3 (three) times daily. (Patient not taking: Reported on 5/9/2025) 5 mL 3    [DISCONTINUED] mometasone (ELOCON) 0.1 % ointment Apply twice daily to hands prn eczema. (Patient not taking: Reported on 5/9/2025) 45 g 3    [DISCONTINUED] ofloxacin (OCUFLOX) 0.3 % ophthalmic solution Place 1 drop into the right eye 3 (three) times daily. (Patient not taking: Reported on 5/9/2025) 5 mL 0    [DISCONTINUED] ondansetron (ZOFRAN-ODT) 4 MG TbDL DISSOLVE 1 TABLET ON THE TONGUE EVERY 6 HOURS AS NEEDED FOR NAUSEA (Patient not taking: Reported on 5/9/2025) 30 tablet 0    [DISCONTINUED] pimecrolimus (ELIDEL) 1 % cream Apply to affected areas of body twice daily. Safe to use everyday. (Patient not taking: Reported on 5/9/2025) 100 g 5    [DISCONTINUED] prednisoLONE acetate (PRED FORTE) 1 % DrpS Place 1 drop into the right eye 3 (three) times daily. (Patient not taking: Reported on 5/9/2025) 5 mL 3    [DISCONTINUED] riboflavin, vitamin B2, (VITAMIN B-2 ORAL) Take 1 capsule by mouth once daily. (Patient not taking: Reported on 5/9/2025)      [DISCONTINUED] tacrolimus (PROTOPIC) 0.1 % ointment Apply to affected areas of body twice daily. Safe to use everyday. (Patient not taking: Reported on 5/9/2025) 100 g 5    [DISCONTINUED] tacrolimus-niacinamide 0.1-4 % Oint Apply to affected areas of body BID prn atopic dermatitis. 30 g 5     No facility-administered encounter medications on file as of  5/9/2025.

## 2025-05-09 NOTE — PROGRESS NOTES
Subjective     Patient ID: Nilsa Curiel is a 79 y.o. female.    Chief Complaint: Annual Exam    CHIEF COMPLAINT:  Patient presents for a routine medical exam and to report vaginal itiching.    HPI:  Patient reports itching sensation in her genital area after consuming lemonade containing grapefruit. She is concerned about the possibility of a yeast infection. Patient denies any other new or concerning symptoms.      ROS:  General: -fever, -chills, -fatigue, -weight gain, -weight loss  Eyes: -vision changes, -redness, -discharge  ENT: -ear pain, -nasal congestion, -sore throat  Cardiovascular: -chest pain, -palpitations, -lower extremity edema  Respiratory: -cough, -shortness of breath  Gastrointestinal: -abdominal pain, -nausea, -vomiting, -diarrhea, -constipation, -blood in stool  Genitourinary: -dysuria, -hematuria, -frequency  Musculoskeletal: -joint pain, -muscle pain  Skin: -rash, -lesion  Neurological: -headache, -dizziness, -numbness, -tingling  Psychiatric: -anxiety, -depression, -sleep difficulty  Female Genitourinary: +vaginal itching or burning      Review of Systems   Constitutional:  Negative for activity change and unexpected weight change.   HENT:  Negative for hearing loss, rhinorrhea and trouble swallowing.    Eyes:  Negative for discharge and visual disturbance.   Respiratory:  Negative for chest tightness and wheezing.    Cardiovascular:  Negative for chest pain and palpitations.   Gastrointestinal:  Negative for blood in stool, constipation, diarrhea and vomiting.   Endocrine: Negative for polydipsia.   Genitourinary:  Negative for difficulty urinating, dysuria, hematuria and menstrual problem.        + vaginal itching   Musculoskeletal:  Negative for joint swelling and neck pain.   Neurological:  Negative for weakness and headaches.   Psychiatric/Behavioral:  Negative for confusion and dysphoric mood.    All other systems reviewed and are negative.         Objective     Physical Exam  Vitals  reviewed.   Constitutional:       Appearance: Normal appearance. She is obese.   HENT:      Head: Normocephalic and atraumatic.      Right Ear: Tympanic membrane, ear canal and external ear normal.      Left Ear: Tympanic membrane, ear canal and external ear normal.      Nose: Nose normal.      Mouth/Throat:      Mouth: Mucous membranes are moist.      Pharynx: Oropharynx is clear.   Eyes:      Extraocular Movements: Extraocular movements intact.      Conjunctiva/sclera: Conjunctivae normal.      Pupils: Pupils are equal, round, and reactive to light.   Cardiovascular:      Rate and Rhythm: Normal rate and regular rhythm.      Pulses: Normal pulses.      Heart sounds: Normal heart sounds.   Pulmonary:      Effort: Pulmonary effort is normal.      Breath sounds: Normal breath sounds.   Abdominal:      General: Abdomen is flat. Bowel sounds are normal.      Palpations: Abdomen is soft.   Musculoskeletal:         General: Normal range of motion.      Cervical back: Normal range of motion and neck supple.   Skin:     General: Skin is warm and dry.      Capillary Refill: Capillary refill takes less than 2 seconds.   Neurological:      General: No focal deficit present.      Mental Status: She is alert and oriented to person, place, and time. Mental status is at baseline.   Psychiatric:         Mood and Affect: Mood normal.         Behavior: Behavior normal.         Thought Content: Thought content normal.         Judgment: Judgment normal.            Assessment and Plan     1. General medical exam    2. Chronic kidney disease, stage 3a  -     CBC Auto Differential; Future; Expected date: 05/09/2025  -     Comprehensive Metabolic Panel; Future; Expected date: 05/09/2025    3. Mixed hyperlipidemia    4. Atherosclerosis of aorta  -     Lipid Panel; Future; Expected date: 05/09/2025    5. Pre-diabetes  -     Hemoglobin A1C; Future; Expected date: 05/09/2025    6. Breast cancer screening by mammogram  -     Mammo Digital  Screening Peter Mason (XPD); Future; Expected date: 05/09/2025    7. Gastric reflux  -     famotidine (PEPCID) 20 MG tablet; One po bid x 7 days then one bid as needed for gastritis  Dispense: 60 tablet; Refill: 11    8. Lumbar spondylosis  -     pregabalin (LYRICA) 75 MG capsule; Take 1 capsule (75 mg total) by mouth 2 (two) times daily.  Dispense: 180 capsule; Refill: 0    9. Lumbar radiculopathy  -     pregabalin (LYRICA) 75 MG capsule; Take 1 capsule (75 mg total) by mouth 2 (two) times daily.  Dispense: 180 capsule; Refill: 0    10. Acute vaginitis  -     fluconazole (DIFLUCAN) 150 MG Tab; Take 1 tablet (150 mg total) by mouth once daily. for 1 day  Dispense: 1 tablet; Refill: 0                 No follow-ups on file.

## 2025-05-13 ENCOUNTER — OFFICE VISIT (OUTPATIENT)
Dept: OPTOMETRY | Facility: CLINIC | Age: 80
End: 2025-05-13
Payer: MEDICARE

## 2025-05-13 ENCOUNTER — RESULTS FOLLOW-UP (OUTPATIENT)
Dept: PRIMARY CARE CLINIC | Facility: CLINIC | Age: 80
End: 2025-05-13

## 2025-05-13 DIAGNOSIS — H52.03 HYPEROPIA WITH ASTIGMATISM AND PRESBYOPIA, BILATERAL: ICD-10-CM

## 2025-05-13 DIAGNOSIS — H52.4 HYPEROPIA WITH ASTIGMATISM AND PRESBYOPIA, BILATERAL: ICD-10-CM

## 2025-05-13 DIAGNOSIS — Z96.1 PRESENCE OF INTRAOCULAR LENS: Primary | ICD-10-CM

## 2025-05-13 DIAGNOSIS — H52.203 HYPEROPIA WITH ASTIGMATISM AND PRESBYOPIA, BILATERAL: ICD-10-CM

## 2025-05-13 PROCEDURE — 99024 POSTOP FOLLOW-UP VISIT: CPT | Mod: HCNC,S$GLB,, | Performed by: OPTOMETRIST

## 2025-05-13 PROCEDURE — 1101F PT FALLS ASSESS-DOCD LE1/YR: CPT | Mod: CPTII,HCNC,S$GLB, | Performed by: OPTOMETRIST

## 2025-05-13 PROCEDURE — 3288F FALL RISK ASSESSMENT DOCD: CPT | Mod: CPTII,HCNC,S$GLB, | Performed by: OPTOMETRIST

## 2025-05-13 PROCEDURE — 1125F AMNT PAIN NOTED PAIN PRSNT: CPT | Mod: CPTII,HCNC,S$GLB, | Performed by: OPTOMETRIST

## 2025-05-13 PROCEDURE — 99999 PR PBB SHADOW E&M-EST. PATIENT-LVL III: CPT | Mod: PBBFAC,HCNC,, | Performed by: OPTOMETRIST

## 2025-05-15 ENCOUNTER — OFFICE VISIT (OUTPATIENT)
Dept: DERMATOLOGY | Facility: CLINIC | Age: 80
End: 2025-05-15
Payer: MEDICARE

## 2025-05-15 DIAGNOSIS — L20.84 INTRINSIC ATOPIC DERMATITIS: ICD-10-CM

## 2025-05-15 DIAGNOSIS — L23.9 ALLERGIC CONTACT DERMATITIS, UNSPECIFIED TRIGGER: Primary | ICD-10-CM

## 2025-05-15 DIAGNOSIS — L30.9 HAND ECZEMA: ICD-10-CM

## 2025-05-15 RX ORDER — FLUOCINONIDE 0.05 MG/G
OINTMENT TOPICAL
Qty: 120 G | Refills: 1 | Status: SHIPPED | OUTPATIENT
Start: 2025-05-15

## 2025-05-15 NOTE — PROGRESS NOTES
Patient Information  Name: Nilsa Curiel  : 1945  MRN: [id number]     Referring Physician:  No ref. provider found   Primary Care Physician:  Gaurav Allison MD   Date of Visit: 5/15/25      Subjective:     History of Present lllness:    Nilsa Curiel is a 79 y.o. female who presents with a chief complaint of eczema.    Location: arms, hands and feet  Signs/Symptoms: very itchy, hands sting when wash, putting cream on them stings  Symptom course: worsening x 1 week  Current treatment: tacrolimus BID but within this last week 4-5 times a day    Bathe with Dove body wash unscented, aveeno & Eucerin & CeraVe moisturizer, no perfume or body sprays, no med change     Patient was last seen: 2024.  Prior notes by myself reviewed.   Clinical documentation obtained by nursing staff reviewed.    Review of Systems    Objective:   Physical Exam   Constitutional: She appears well-developed and well-nourished. No distress.   Neurological: She is alert and oriented to person, place, and time. She is not disoriented.   Psychiatric: She has a normal mood and affect.   Skin:   Areas Examined (abnormalities noted in diagram):   RUE Inspected  LUE Inspection Performed  RLE Inspected  LLE Inspection Performed            Diagram Legend     Erythematous scaling macule/papule c/w actinic keratosis       Vascular papule c/w angioma      Pigmented verrucoid papule/plaque c/w seborrheic keratosis      Yellow umbilicated papule c/w sebaceous hyperplasia      Irregularly shaped tan macule c/w lentigo     1-2 mm smooth white papules consistent with Milia      Movable subcutaneous cyst with punctum c/w epidermal inclusion cyst      Subcutaneous movable cyst c/w pilar cyst      Firm pink to brown papule c/w dermatofibroma      Pedunculated fleshy papule(s) c/w skin tag(s)      Evenly pigmented macule c/w junctional nevus     Mildly variegated pigmented, slightly irregular-bordered macule c/w mildly atypical nevus      Flesh colored  to evenly pigmented papule c/w intradermal nevus       Pink pearly papule/plaque c/w basal cell carcinoma      Erythematous hyperkeratotic cursted plaque c/w SCC      Surgical scar with no sign of skin cancer recurrence      Open and closed comedones      Inflammatory papules and pustules      Verrucoid papule consistent consistent with wart     Erythematous eczematous patches and plaques     Dystrophic onycholytic nail with subungual debris c/w onychomycosis     Umbilicated papule    Erythematous-base heme-crusted tan verrucoid plaque consistent with inflamed seborrheic keratosis     Erythematous Silvery Scaling Plaque c/w Psoriasis     See annotation    No images are attached to the encounter or orders placed in the encounter.      [] Data reviewed  [] Prior external notes reviewed  [] Independent review of test  [] Management discussed with another provider  [] Independent historian    Assessment / Plan:        Allergic contact dermatitis, unspecified trigger  - chronic problem with exacerbation/progression  Will schedule patch testing today.    Hand eczema  Intrinsic atopic dermatitis  - chronic problem with exacerbation/progression  -     fluocinonide (LIDEX) 0.05 % ointment; Apply to affected areas of hands bid prn eczema flares.  Dispense: 120 g; Refill: 1  Continue Rx tacrolimus ointment BID.         Follow up for patch testing.      Brittany Simeon MD, FAAD  [facility]

## 2025-05-16 NOTE — PROGRESS NOTES
HPI    79 yr old female in today for 1 month PO. Status post cataract extraction   and insertion of intraocular lens, right and Status post cataract   extraction and insertion of intraocular lens, left//Nuclear sclerotic   cataract of right eye. Pt states she's been having black floaters and   eyelids have been feeling heavy about a week.   Ketorolac TID OS  Pred forte TID OS          Last edited by Nati Goldsmith on 5/13/2025 10:11 AM.            Assessment /Plan     For exam results, see Encounter Report.    Presence of intraocular lens    Hyperopia with astigmatism and presbyopia, bilateral      MONITOR. ED PT ON ALL EXAM FINDINGS  CONTINUE W/ HABITUAL SPECS; READERS   S/P PCIOL X 1 MONTH  OU; GOOD VISION; CENTERED IOL/CLEAR; DOING WELL. ASYMPTOMATIC; NO NEW CHANGES REPORTED; D/C ALL MEDS   OCULAR HEALTH STABLE OD, OS   RTC 1 YR//PRN FOR REE/DFE

## 2025-06-02 ENCOUNTER — CLINICAL SUPPORT (OUTPATIENT)
Dept: DERMATOLOGY | Facility: CLINIC | Age: 80
End: 2025-06-02
Payer: MEDICARE

## 2025-06-02 DIAGNOSIS — L23.9 ALLERGIC CONTACT DERMATITIS, UNSPECIFIED TRIGGER: ICD-10-CM

## 2025-06-02 PROCEDURE — 95044 PATCH/APPLICATION TESTS: CPT | Mod: S$GLB,,, | Performed by: DERMATOLOGY

## 2025-06-04 ENCOUNTER — OFFICE VISIT (OUTPATIENT)
Dept: DERMATOLOGY | Facility: CLINIC | Age: 80
End: 2025-06-04
Payer: MEDICARE

## 2025-06-04 DIAGNOSIS — L23.5 ALLERGIC DERMATITIS DUE TO OTHER CHEMICAL PRODUCT: ICD-10-CM

## 2025-06-04 DIAGNOSIS — L23.2 ALLERGIC CONTACT DERMATITIS DUE TO COSMETICS: Primary | ICD-10-CM

## 2025-06-04 PROCEDURE — 1159F MED LIST DOCD IN RCRD: CPT | Mod: CPTII,S$GLB,, | Performed by: DERMATOLOGY

## 2025-06-04 PROCEDURE — 1160F RVW MEDS BY RX/DR IN RCRD: CPT | Mod: CPTII,S$GLB,, | Performed by: DERMATOLOGY

## 2025-06-04 PROCEDURE — 99212 OFFICE O/P EST SF 10 MIN: CPT | Mod: S$GLB,,, | Performed by: DERMATOLOGY

## 2025-06-06 ENCOUNTER — OFFICE VISIT (OUTPATIENT)
Dept: DERMATOLOGY | Facility: CLINIC | Age: 80
End: 2025-06-06
Payer: MEDICARE

## 2025-06-06 DIAGNOSIS — L30.9 HAND ECZEMA: ICD-10-CM

## 2025-06-06 DIAGNOSIS — L23.5 ALLERGIC DERMATITIS DUE TO OTHER CHEMICAL PRODUCT: ICD-10-CM

## 2025-06-06 DIAGNOSIS — L23.2 ALLERGIC CONTACT DERMATITIS DUE TO COSMETICS: Primary | ICD-10-CM

## 2025-07-01 ENCOUNTER — OFFICE VISIT (OUTPATIENT)
Dept: CARDIOLOGY | Facility: CLINIC | Age: 80
End: 2025-07-01
Payer: COMMERCIAL

## 2025-07-01 VITALS
SYSTOLIC BLOOD PRESSURE: 144 MMHG | HEART RATE: 83 BPM | OXYGEN SATURATION: 98 % | WEIGHT: 197.06 LBS | BODY MASS INDEX: 38.49 KG/M2 | DIASTOLIC BLOOD PRESSURE: 70 MMHG

## 2025-07-01 DIAGNOSIS — I10 ESSENTIAL HYPERTENSION: ICD-10-CM

## 2025-07-01 DIAGNOSIS — I35.0 NONRHEUMATIC AORTIC VALVE STENOSIS: Primary | ICD-10-CM

## 2025-07-01 DIAGNOSIS — E78.2 MIXED HYPERLIPIDEMIA: ICD-10-CM

## 2025-07-01 DIAGNOSIS — I70.0 ATHEROSCLEROSIS OF AORTA: ICD-10-CM

## 2025-07-01 PROCEDURE — 3077F SYST BP >= 140 MM HG: CPT | Mod: CPTII,HCNC,S$GLB, | Performed by: INTERNAL MEDICINE

## 2025-07-01 PROCEDURE — 1101F PT FALLS ASSESS-DOCD LE1/YR: CPT | Mod: CPTII,HCNC,S$GLB, | Performed by: INTERNAL MEDICINE

## 2025-07-01 PROCEDURE — 99999 PR PBB SHADOW E&M-EST. PATIENT-LVL IV: CPT | Mod: PBBFAC,HCNC,, | Performed by: INTERNAL MEDICINE

## 2025-07-01 PROCEDURE — 3078F DIAST BP <80 MM HG: CPT | Mod: CPTII,HCNC,S$GLB, | Performed by: INTERNAL MEDICINE

## 2025-07-01 PROCEDURE — 1159F MED LIST DOCD IN RCRD: CPT | Mod: CPTII,HCNC,S$GLB, | Performed by: INTERNAL MEDICINE

## 2025-07-01 PROCEDURE — 3288F FALL RISK ASSESSMENT DOCD: CPT | Mod: CPTII,HCNC,S$GLB, | Performed by: INTERNAL MEDICINE

## 2025-07-01 PROCEDURE — 99214 OFFICE O/P EST MOD 30 MIN: CPT | Mod: HCNC,S$GLB,, | Performed by: INTERNAL MEDICINE

## 2025-07-01 PROCEDURE — 1125F AMNT PAIN NOTED PAIN PRSNT: CPT | Mod: CPTII,HCNC,S$GLB, | Performed by: INTERNAL MEDICINE

## 2025-07-01 NOTE — PROGRESS NOTES
"        Cardiology    7/1/2025  11:46 AM    Problem list  Problem List[1]    History of Present Illness    Ms. Curiel presents today for follow up She reports feeling "pretty good" overall and denies chest pain, dyspnea, and syncope. She reports BP elevates with physical activity, including when entering the clinic. Home BP readings are consistently WNL. She reports good medication adherence without any issues.           Medications  Current Medications[2]   Prior to Admission medications    Medication Sig Start Date End Date Taking? Authorizing Provider   acetaminophen (TYLENOL) 500 MG tablet TAKE 1 TABLET THREE TIMES DAILY 4/16/24  Yes Hali Daigle PA-C   allopurinoL (ZYLOPRIM) 100 MG tablet Take 1 tablet (100 mg total) by mouth once daily. 7/3/24  Yes Gaurav Allison MD   amLODIPine (NORVASC) 10 MG tablet Take 1 tablet (10 mg total) by mouth once daily. 7/3/24  Yes Gaurav Allison MD   carvediloL (COREG) 3.125 MG tablet Take 1 tablet (3.125 mg total) by mouth 2 (two) times daily with meals. 7/3/24  Yes Gaurav Allison MD   famotidine (PEPCID) 20 MG tablet One po bid x 7 days then one bid as needed for gastritis 5/9/25  Yes Masha Veronica NP   fluocinonide (LIDEX) 0.05 % ointment Apply to affected areas of hands bid prn eczema flares. 5/15/25  Yes Brittany Simeon MD   fluticasone propionate (FLONASE) 50 mcg/actuation nasal spray SHAKE LIQUID AND USE 2 SPRAYS(100 MCG) IN EACH NOSTRIL DAILY 1/3/25  Yes Dina Hays NP   ketoconazole (NIZORAL) 2 % cream Apply to affected areas of body BID prn irritation. 10/24/24  Yes Gaurav Allison MD   ketorolac 0.5% (ACULAR) 0.5 % Drop Place 1 drop into the left eye 3 (three) times daily. 12/12/24  Yes Sandra Low MD   methocarbamoL (ROBAXIN) 500 MG Tab Take 1 tablet (500 mg total) by mouth 3 (three) times daily. 10/23/24  Yes Jesus Alberto Sharp MD   MULTIVIT-MIN/FA/CA CARB/VIT K (WOMEN'S 50+ DAILY FORMULA ORAL) Take by mouth.   Yes " Provider, Historical   ofloxacin (OCUFLOX) 0.3 % ophthalmic solution Place 1 drop into the left eye 3 (three) times daily. 12/12/24  Yes Sandra Low MD   olmesartan (BENICAR) 40 MG tablet Take 1 tablet (40 mg total) by mouth once daily. 7/3/24  Yes Gaurav Allison MD   omega-3 fatty acids 300 mg Cap Take by mouth.   Yes Provider, Historical   potassium chloride SA (K-DUR,KLOR-CON) 20 MEQ tablet Take 1 tablet (20 mEq total) by mouth 2 (two) times daily.  Patient taking differently: Take 20 mEq by mouth once daily. 7/3/24  Yes Gaurav Allison MD   pregabalin (LYRICA) 75 MG capsule Take 1 capsule (75 mg total) by mouth 2 (two) times daily. 5/9/25  Yes Masha Veronica, NP   rosuvastatin (CRESTOR) 20 MG tablet Take 1 tablet (20 mg total) by mouth once daily. 7/3/24 7/3/25 Yes Gaurav Allison MD   triamcinolone acetonide 0.025% (KENALOG) 0.025 % cream Apply to affected areas of body BID prn irritation. 10/24/24  Yes Gaurav Allison MD   TURMERIC ORAL Take by mouth.   Yes Provider, Historical   ubidecarenone (COENZYME Q10) 100 mg Tab Take 1 tablet by mouth once daily.  10/23/19  Yes Provider, Historical   aspirin (ECOTRIN) 81 MG EC tablet Take 1 tablet (81 mg total) by mouth once daily. 2/7/24 5/9/25  Gaurav Allison MD   prednisoLONE acetate (PRED FORTE) 1 % DrpS Place 1 drop into the left eye 3 (three) times daily.  Patient not taking: Reported on 7/1/2025 12/12/24   Sandra Low MD   terbinafine HCL (LAMISIL AT) 1 % cream Apply topically 2 (two) times daily. To feet 10/1/24 5/9/25  Brittany Simeon MD         History  Past Medical History:   Diagnosis Date    Abnormal fasting glucose 09/04/2015    Acute bilateral low back pain without sciatica 10/10/2017    Anemia 04/24/2018    Arthritis     Atopic dermatitis     Central stenosis of spinal canal 12/23/2022    Chronic diastolic heart failure 04/06/2022    Chronic kidney disease, stage III (moderate) 08/18/2020    Dermatitis 01/04/2023     GERD (gastroesophageal reflux disease)     Hyperlipidemia     Hypertension     Hypokalemia 2022    Joint pain     Left ventricular hypertrophy 2016    Microcytic hypochromic anemia 2018    Multifactoral. See hematology consult. Some nutritional , some kidney related    Mild aortic sclerosis 2019    Mobility poor 2022    Morbid obesity with BMI of 40.0-44.9, adult 2016    Nonintractable episodic headache 2019    Normocytic normochromic anemia 2022    MONE (obstructive sleep apnea) 2018    Prediabetes 2023    Severe obesity (BMI >= 40) 06/10/2022     Past Surgical History:   Procedure Laterality Date    CATARACT EXTRACTION W/  INTRAOCULAR LENS IMPLANT Left 2025    Procedure: EXTRACTION, CATARACT, WITH IOL INSERTION;  Surgeon: Sandra Low MD;  Location: [facility] OR;  Service: Ophthalmology;  Laterality: Left;     SECTION      x3    CLOSURE OF WOUND  10/4/2023    Procedure: CLOSURE, WOUND;  Surgeon: Benigno Goodman DO;  Location: [facility] OR 2ND FLR;  Service: Plastics;;    COLONOSCOPY N/A 10/04/2021    Procedure: COLONOSCOPY;  Surgeon: Taran Galvez MD;  Location: [facility] (4TH FLR);  Service: Endoscopy;  Laterality: N/A;    COLONOSCOPY N/A 2022    Procedure: COLONOSCOPY--positive fit kit;  Surgeon: Tani Lara MD;  Location: [facility] (4TH FLR);  Service: Endoscopy;  Laterality: N/A;    EPIDURAL STEROID INJECTION N/A 2023    Procedure: INJECTION, STEROID, EPIDURAL, L5-S1;  Surgeon: Majo Meyers MD;  Location: [facility] PAIN MGT;  Service: Pain Management;  Laterality: N/A;    ESOPHAGOGASTRODUODENOSCOPY N/A 10/04/2021    Procedure: EGD (ESOPHAGOGASTRODUODENOSCOPY);  Surgeon: Taran Galvez MD;  [facility]: [facility] F[facility]);  Service: Endoscopy;  Laterality: N/A;  covid test 10/1-[facility]    10/1-LVM about COVID test-GT    ESOPHAGOGASTRODUODENOSCOPY N/A 2022    Procedure: EGD (ESOPHAGOGASTRODUODENOSCOPY);  Surgeon:  Tani Lara MD;  Location: [facility] ENDO (4TH FLR);  Service: Endoscopy;  Laterality: N/A;  12/15 fully vaccinated; instructions to portal-st  1/28-covid [facility]-tb    FUSION, SPINE, LUMBAR, TLIF, POSTERIOR APPROACH, USING PEDICLE SCREW N/A 8/16/2023    Procedure: FUSION, SPINE, LUMBAR, TLIF, POSTERIOR APPROACH, USING PEDICLE SCREW L2-5 PLDF/TLIF GLOBUS ROBOT SNS:SSEP/EMG cell saver;  Surgeon: Jesus Alberto Sharp MD;  Location: [facility] OR 2ND FLR;  Service: Neurosurgery;  Laterality: N/A;    IRRIGATION AND DEBRIDEMENT N/A 10/4/2023    Procedure: IRRIGATION AND DEBRIDEMENT **CO CASE DR. JULISSA RODRIGUEZ** LUMBAR;  Surgeon: Jesus Alberto Sharp MD;  [address]  Service: Orthopedics;  Laterality: N/A;    PHACOEMULSIFICATION, CATARACT, WITH IOL INSERTION Right 4/9/2025    Procedure: PHACOEMULSIFICATION, CATARACT, WITH IOL INSERTION;  Surgeon: Sandra Low MD;  Location: [facility] OR;  Service: Ophthalmology;  Laterality: Right;    TRANSFORAMINAL EPIDURAL INJECTION OF STEROID N/A 03/16/2023    Procedure: b/l L4-5 TFESI;  Surgeon: Gato Delatorre DO;  Location: [facility] OR;  Service: Pain Management;  Laterality: N/A;    TRANSFORAMINAL EPIDURAL INJECTION OF STEROID Right 6/4/2024    Procedure: LUMBAR TRANSFORAMINAL RIGHT S1/2 DIRECT REFERRAL *ASPIRIN CLEARANCE IN CHART*;  Surgeon: Majo Meyers MD;  [facility]  Service: Pain Management;  Laterality: Right;  [phone]    TUBAL LIGATION       Social History[3]      Allergies  Review of patient's allergies indicates:   Allergen Reactions    Balsam peru (bulk) Dermatitis     allergic contact dermatitis      Haptens - fragrance series Dermatitis     allergic contact dermatitis      Isothiazolinones Dermatitis     allergic contact dermatitis to benzisothiazolinone    Methylchloroisothiazolinone Dermatitis and Blisters     allergic contact dermatitis           Review of Systems   Review of Systems   Constitutional: Negative for decreased appetite, fever  and weight loss.   HENT:  Negative for congestion and nosebleeds.    Eyes:  Negative for double vision, vision loss in left eye, vision loss in right eye and visual disturbance.   Cardiovascular:  Negative for chest pain, claudication, cyanosis, dyspnea on exertion, irregular heartbeat, leg swelling, near-syncope, orthopnea, palpitations, paroxysmal nocturnal dyspnea and syncope.   Respiratory:  Negative for cough, hemoptysis, shortness of breath, sleep disturbances due to breathing, snoring, sputum production and wheezing.    Endocrine: Negative for cold intolerance and heat intolerance.   Skin:  Negative for nail changes and rash.   Musculoskeletal:  Positive for back pain. Negative for joint pain, muscle cramps, muscle weakness and myalgias.   Gastrointestinal:  Negative for change in bowel habit, heartburn, hematemesis, hematochezia, hemorrhoids and melena.   Neurological:  Negative for dizziness, focal weakness and headaches.         Physical Exam  Wt Readings from Last 1 Encounters:   07/01/25 89.4 kg (197 lb 1.5 oz)     BP Readings from Last 3 Encounters:   07/01/25 (!) 144/70   05/09/25 (!) 128/50   05/01/25 (!) 141/70     Pulse Readings from Last 1 Encounters:   07/01/25 83     Body mass index is 38.49 kg/m².    Physical Exam  Vitals reviewed.   Constitutional:       Appearance: She is obese.   Neck:      Vascular: No JVD.   Cardiovascular:      Rate and Rhythm: Normal rate and regular rhythm.      Pulses:           Carotid pulses are 2+ on the right side and 2+ on the left side.       Radial pulses are 2+ on the right side and 2+ on the left side.      Heart sounds: S1 normal and S2 normal. Murmur heard.      Harsh midsystolic murmur is present with a grade of 2/6.   Pulmonary:      Breath sounds: Normal breath sounds and air entry.   Musculoskeletal:      Right lower leg: No edema.      Left lower leg: No edema.   Neurological:      Mental Status: She is alert.             Assessment  1. Nonrheumatic  aortic valve stenosis (Primary)  Unchanged  - Echo; Future    2. Atherosclerosis of aorta  Stable    3. Mixed hyperlipidemia  Improving, continue medications and monitor    4. Essential hypertension  Controlled, continue current medications and monitor        Plan and Discussion  Discussed her lipid profile has improved.  She is tolerating her medications.  Her blood pressure is well controlled.  Her last echocardiogram was in 2023.  Get an echocardiogram next year to assess her aortic stenosis.    Follow Up  6 months        Amando Das MD, F.A.C.C, F.S.C.A.I.      Total professional time spent for the encounter: 30 minutes  Time was spent preparing to see the patient, reviewing results of prior testing, obtaining and/or reviewing separately obtained history, performing a medically appropriate examination and interview, counseling and educating the patient/family, ordering medications/tests/procedures, referring and communicating with other health care professionals, documenting clinical information in the electronic health record, and independently interpreting results.    This note was generated with the assistance of ambient listening technology. Verbal consent was obtained by the patient and accompanying visitor(s) for the recording of patient appointment to facilitate this note. I attest to having reviewed and edited the generated note for accuracy, though some syntax or spelling errors may persist. Please contact the author of this note for any clarification.           [1]   Patient Active Problem List  Diagnosis    Mixed hyperlipidemia    GERD (gastroesophageal reflux disease)    Atopic dermatitis    Hyperparathyroidism    Decreased strength    Impaired mobility    Right-sided carotid artery disease    Neurogenic claudication due to lumbar spinal stenosis    Spondylolisthesis of lumbosacral region    MONE (obstructive sleep apnea)    Primary osteoarthritis of both shoulders    Chronic right-sided low back  pain without sciatica    Headache disorder    Chronic kidney disease, stage 3a    Cervical stenosis of spine    Posture imbalance    Decreased strength of trunk and back    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    Essential hypertension    Right hip pain    Ankle swelling    Intertrigo    Muscle spasms of both lower extremities    Hyperuricemia    Chronic heart failure with preserved ejection fraction    Nonrheumatic aortic valve stenosis    Hair loss    Cervical spine arthritis with nerve pain    Chronic right hip pain    Chronic neck pain    Lumbar spondylosis    Fatigue    Atherosclerosis of aorta    Acute blood loss anemia    Surgical wound dehiscence    Acute on chronic anemia    Severe obesity (BMI 35.0-39.9) with comorbidity   [2]   Current Outpatient Medications   Medication Sig Dispense Refill    acetaminophen (TYLENOL) 500 MG tablet TAKE 1 TABLET THREE TIMES DAILY 90 tablet 11    allopurinoL (ZYLOPRIM) 100 MG tablet Take 1 tablet (100 mg total) by mouth once daily. 90 tablet 3    amLODIPine (NORVASC) 10 MG tablet Take 1 tablet (10 mg total) by mouth once daily. 90 tablet 3    carvediloL (COREG) 3.125 MG tablet Take 1 tablet (3.125 mg total) by mouth 2 (two) times daily with meals. 180 tablet 3    famotidine (PEPCID) 20 MG tablet One po bid x 7 days then one bid as needed for gastritis 60 tablet 11    fluocinonide (LIDEX) 0.05 % ointment Apply to affected areas of hands bid prn eczema flares. 120 g 1    fluticasone propionate (FLONASE) 50 mcg/actuation nasal spray SHAKE LIQUID AND USE 2 SPRAYS(100 MCG) IN EACH NOSTRIL DAILY 16 g 1    ketoconazole (NIZORAL) 2 % cream Apply to affected areas of body BID prn irritation. 15 g 1    ketorolac 0.5% (ACULAR) 0.5 % Drop Place 1 drop into the left eye 3 (three) times daily. 5 mL 3    methocarbamoL (ROBAXIN) 500 MG Tab Take 1 tablet (500 mg total) by mouth 3 (three) times daily. 60 tablet 1    MULTIVIT-MIN/FA/CA CARB/VIT K (WOMEN'S 50+  DAILY FORMULA ORAL) Take by mouth.      ofloxacin (OCUFLOX) 0.3 % ophthalmic solution Place 1 drop into the left eye 3 (three) times daily. 5 mL 3    olmesartan (BENICAR) 40 MG tablet Take 1 tablet (40 mg total) by mouth once daily. 90 tablet 3    omega-3 fatty acids 300 mg Cap Take by mouth.      potassium chloride SA (K-DUR,KLOR-CON) 20 MEQ tablet Take 1 tablet (20 mEq total) by mouth 2 (two) times daily. (Patient taking differently: Take 20 mEq by mouth once daily.) 180 tablet 3    pregabalin (LYRICA) 75 MG capsule Take 1 capsule (75 mg total) by mouth 2 (two) times daily. 180 capsule 0    rosuvastatin (CRESTOR) 20 MG tablet Take 1 tablet (20 mg total) by mouth once daily. 90 tablet 3    triamcinolone acetonide 0.025% (KENALOG) 0.025 % cream Apply to affected areas of body BID prn irritation. 15 g 1    TURMERIC ORAL Take by mouth.      ubidecarenone (COENZYME Q10) 100 mg Tab Take 1 tablet by mouth once daily.       aspirin (ECOTRIN) 81 MG EC tablet Take 1 tablet (81 mg total) by mouth once daily. 90 tablet 3    prednisoLONE acetate (PRED FORTE) 1 % DrpS Place 1 drop into the left eye 3 (three) times daily. (Patient not taking: Reported on 7/1/2025) 5 mL 3    terbinafine HCL (LAMISIL AT) 1 % cream Apply topically 2 (two) times daily. To feet 60 g 1     No current facility-administered medications for this visit.   [3]   Social History  Socioeconomic History    Marital status:    Occupational History    Occupation: retired medical records    Tobacco Use    Smoking status: Never     Passive exposure: Never    Smokeless tobacco: Never   Substance and Sexual Activity    Alcohol use: Never    Drug use: No    Sexual activity: Not Currently     Partners: Male     Birth control/protection: None   Other Topics Concern    Are you pregnant or think you may be? No    Breast-feeding No   Social History Narrative    , lives with 1 daughter(Fortunato), Walking some     Social Drivers of Health     Financial Resource  Strain: Medium Risk (1/4/2025)    Overall Financial Resource Strain (CARDIA)     Difficulty of Paying Living Expenses: Somewhat hard   Food Insecurity: No Food Insecurity (1/4/2025)    Hunger Vital Sign     Worried About Running Out of Food in the Last Year: Never true     Ran Out of Food in the Last Year: Never true   Transportation Needs: Unmet Transportation Needs (11/18/2023)    PRAPARE - Transportation     Lack of Transportation (Medical): Yes   Physical Activity: Inactive (1/4/2025)    Exercise Vital Sign     Days of Exercise per Week: 0 days     Minutes of Exercise per Session: 0 min   Stress: No Stress Concern Present (1/4/2025)    [employer] [place] [employer] - Occupational Stress Questionnaire     Feeling of Stress : Not at all   Housing Stability: Low Risk  (11/18/2023)    Housing Stability Vital Sign     Unable to Pay for Housing in the Last Year: No     Number of Places Lived in the Last Year: 1     Unstable Housing in the Last Year: No   Recent Concern: Housing Stability - High Risk (10/17/2023)    Housing Stability Vital Sign     Unable to Pay for Housing in the Last Year: Yes     Number of Places Lived in the Last Year: 1     Unstable Housing in the Last Year: No

## 2025-07-03 RX ORDER — CARVEDILOL 3.12 MG/1
3.12 TABLET ORAL 2 TIMES DAILY WITH MEALS
Qty: 180 TABLET | Refills: 1 | Status: SHIPPED | OUTPATIENT
Start: 2025-07-03

## 2025-07-03 NOTE — TELEPHONE ENCOUNTER
Refill Routing Note   Medication(s) are not appropriate for processing by [facility] for the following reason(s):        Required vitals abnormal    [facility] action(s):  Defer   Requires labs : Yes             Appointments  past 12m or future 3m with PCP    Date Provider   Last Visit   10/24/2024 Gaurav Allison MD   Next Visit   7/22/2025 Gaurav Allison MD   ED visits in past 90 days: 0        Note composed:8:03 AM 07/03/2025

## 2025-07-03 NOTE — TELEPHONE ENCOUNTER
Care Due:                  Date            Visit Type   Department     Provider  --------------------------------------------------------------------------------                                MYCHART                              FOLLOWUP/OF  [facility] PRIMARY  Last Visit: 10-      [facility] VISIT   CARE           Gaurav Allison                              EP -                              PRIMARY      [facility] PRIMARY  Next Visit: 07-      CARE ([facility])   CARE           Gaurav Allison                                                            Last  Test          Frequency    Reason                     Performed    Due Date  --------------------------------------------------------------------------------    Uric Acid...  12 months..  allopurinoL..............  Not Found    Overdue    [employer] Embedded Care Due Messages. Reference number: [id number].   7/03/2025 7:29:35 AM CDT

## 2025-07-22 ENCOUNTER — LAB VISIT (OUTPATIENT)
Dept: LAB | Facility: HOSPITAL | Age: 80
End: 2025-07-22
Attending: FAMILY MEDICINE
Payer: MEDICARE

## 2025-07-22 ENCOUNTER — OFFICE VISIT (OUTPATIENT)
Dept: PRIMARY CARE CLINIC | Facility: CLINIC | Age: 80
End: 2025-07-22
Payer: MEDICARE

## 2025-07-22 VITALS
WEIGHT: 199.31 LBS | HEART RATE: 91 BPM | DIASTOLIC BLOOD PRESSURE: 72 MMHG | RESPIRATION RATE: 18 BRPM | OXYGEN SATURATION: 99 % | SYSTOLIC BLOOD PRESSURE: 138 MMHG | BODY MASS INDEX: 39.13 KG/M2 | HEIGHT: 60 IN

## 2025-07-22 DIAGNOSIS — Z12.31 ENCOUNTER FOR SCREENING MAMMOGRAM FOR MALIGNANT NEOPLASM OF BREAST: ICD-10-CM

## 2025-07-22 DIAGNOSIS — E78.2 MIXED HYPERLIPIDEMIA: ICD-10-CM

## 2025-07-22 DIAGNOSIS — Z00.00 GENERAL MEDICAL EXAM: Primary | ICD-10-CM

## 2025-07-22 DIAGNOSIS — E79.0 HYPERURICEMIA: ICD-10-CM

## 2025-07-22 DIAGNOSIS — I10 ESSENTIAL HYPERTENSION: ICD-10-CM

## 2025-07-22 DIAGNOSIS — N18.31 CHRONIC KIDNEY DISEASE, STAGE 3A: ICD-10-CM

## 2025-07-22 DIAGNOSIS — Z00.00 GENERAL MEDICAL EXAM: ICD-10-CM

## 2025-07-22 DIAGNOSIS — M54.16 LUMBAR RADICULOPATHY: ICD-10-CM

## 2025-07-22 LAB
ALBUMIN SERPL BCP-MCNC: 4 G/DL (ref 3.5–5.2)
ALP SERPL-CCNC: 116 UNIT/L (ref 40–150)
ALT SERPL W/O P-5'-P-CCNC: 14 UNIT/L (ref 10–44)
ANION GAP (OHS): 8 MMOL/L (ref 8–16)
AST SERPL-CCNC: 18 UNIT/L (ref 11–45)
BILIRUB SERPL-MCNC: 0.4 MG/DL (ref 0.1–1)
BUN SERPL-MCNC: 14 MG/DL (ref 8–23)
CALCIUM SERPL-MCNC: 10.4 MG/DL (ref 8.7–10.5)
CHLORIDE SERPL-SCNC: 110 MMOL/L (ref 95–110)
CO2 SERPL-SCNC: 24 MMOL/L (ref 23–29)
CREAT SERPL-MCNC: 1 MG/DL (ref 0.5–1.4)
ERYTHROCYTE [DISTWIDTH] IN BLOOD BY AUTOMATED COUNT: 14.3 % (ref 11.5–14.5)
GFR SERPLBLD CREATININE-BSD FMLA CKD-EPI: 58 ML/MIN/1.73/M2
GLUCOSE SERPL-MCNC: 92 MG/DL (ref 70–110)
HCT VFR BLD AUTO: 34.4 % (ref 37–48.5)
HGB BLD-MCNC: 10.3 GM/DL (ref 12–16)
MCH RBC QN AUTO: 24.4 PG (ref 27–31)
MCHC RBC AUTO-ENTMCNC: 29.9 G/DL (ref 32–36)
MCV RBC AUTO: 82 FL (ref 82–98)
PLATELET # BLD AUTO: 337 K/UL (ref 150–450)
PMV BLD AUTO: 10.4 FL (ref 9.2–12.9)
POTASSIUM SERPL-SCNC: 4.1 MMOL/L (ref 3.5–5.1)
PROT SERPL-MCNC: 7.9 GM/DL (ref 6–8.4)
RBC # BLD AUTO: 4.22 M/UL (ref 4–5.4)
SODIUM SERPL-SCNC: 142 MMOL/L (ref 136–145)
URATE SERPL-MCNC: 4.5 MG/DL (ref 2.4–5.7)
WBC # BLD AUTO: 6.14 K/UL (ref 3.9–12.7)

## 2025-07-22 PROCEDURE — 84550 ASSAY OF BLOOD/URIC ACID: CPT | Mod: HCNC

## 2025-07-22 PROCEDURE — 84132 ASSAY OF SERUM POTASSIUM: CPT | Mod: HCNC

## 2025-07-22 PROCEDURE — 36415 COLL VENOUS BLD VENIPUNCTURE: CPT | Mod: HCNC,PN

## 2025-07-22 PROCEDURE — 85027 COMPLETE CBC AUTOMATED: CPT | Mod: HCNC

## 2025-07-22 PROCEDURE — 99999 PR PBB SHADOW E&M-EST. PATIENT-LVL V: CPT | Mod: PBBFAC,HCNC,, | Performed by: FAMILY MEDICINE

## 2025-07-22 RX ORDER — OLMESARTAN MEDOXOMIL 40 MG/1
40 TABLET ORAL DAILY
Qty: 90 TABLET | Refills: 3 | Status: SHIPPED | OUTPATIENT
Start: 2025-07-22

## 2025-07-22 RX ORDER — CARVEDILOL 3.12 MG/1
3.12 TABLET ORAL 2 TIMES DAILY WITH MEALS
Qty: 180 TABLET | Refills: 3 | Status: SHIPPED | OUTPATIENT
Start: 2025-07-22

## 2025-07-22 RX ORDER — POTASSIUM CHLORIDE 20 MEQ/1
20 TABLET, EXTENDED RELEASE ORAL 2 TIMES DAILY
Qty: 180 TABLET | Refills: 3 | Status: SHIPPED | OUTPATIENT
Start: 2025-07-22

## 2025-07-22 RX ORDER — AMLODIPINE BESYLATE 10 MG/1
10 TABLET ORAL DAILY
Qty: 90 TABLET | Refills: 3 | Status: SHIPPED | OUTPATIENT
Start: 2025-07-22

## 2025-07-22 RX ORDER — METHYLPREDNISOLONE 4 MG/1
TABLET ORAL
Qty: 1 EACH | Refills: 0 | Status: SHIPPED | OUTPATIENT
Start: 2025-07-22 | End: 2025-08-12

## 2025-07-22 RX ORDER — ROSUVASTATIN CALCIUM 20 MG/1
20 TABLET, COATED ORAL DAILY
Qty: 90 TABLET | Refills: 3 | Status: SHIPPED | OUTPATIENT
Start: 2025-07-22 | End: 2026-07-22

## 2025-07-22 RX ORDER — PREGABALIN 75 MG/1
75 CAPSULE ORAL 2 TIMES DAILY
Qty: 180 CAPSULE | Refills: 3 | Status: SHIPPED | OUTPATIENT
Start: 2025-07-22

## 2025-07-22 RX ORDER — ALLOPURINOL 100 MG/1
100 TABLET ORAL DAILY
Qty: 90 TABLET | Refills: 3 | Status: SHIPPED | OUTPATIENT
Start: 2025-07-22

## 2025-07-24 PROBLEM — M54.16 LUMBAR RADICULOPATHY: Status: ACTIVE | Noted: 2025-07-24

## 2025-07-24 NOTE — PROGRESS NOTES
/72 (BP Location: Left arm)   Pulse 91   Resp 18   Ht 5' (1.524 m)   Wt 90.4 kg (199 lb 4.7 oz)   SpO2 99%   BMI 38.92 kg/m²       ===========              Nilsa Curiel is a 79 y.o. female     here for    Annual exam.    Health maintenance reviewed with patient in detail inc any recent labs and studies and needs for future screening labs.  Age-appropriate vaccines and other age-appropriate screening studies reviewed with patient in detail.  Sleep health reviewed with patient.  Skin health regarding possible skin cancer screening reviewed with patient.  General regularity of bowel movements and urinations reviewed with patient including any possibility of urine leakage.  Vision screening reviewed with patient.      Patient queried and denies any further complaints      Problem List[1]    SURGICAL AND MEDICAL HISTORY: updated and reviewed.  Past Surgical History:   Procedure Laterality Date    CATARACT EXTRACTION W/  INTRAOCULAR LENS IMPLANT Left 2025    Procedure: EXTRACTION, CATARACT, WITH IOL INSERTION;  Surgeon: Sandra Low MD;  Location: [facility] OR;  Service: Ophthalmology;  Laterality: Left;     SECTION      x3    CLOSURE OF WOUND  10/4/2023    Procedure: CLOSURE, WOUND;  Surgeon: Benigno Goodman DO;  Location: [facility] OR 2ND FLR;  Service: Plastics;;    COLONOSCOPY N/A 10/04/2021    Procedure: COLONOSCOPY;  Surgeon: Taran Galvez MD;  Location: [facility] (4TH FLR);  Service: Endoscopy;  Laterality: N/A;    COLONOSCOPY N/A 2022    Procedure: COLONOSCOPY--positive fit kit;  Surgeon: Tani Lara MD;  Location: [facility] ENDO (4TH FLR);  Service: Endoscopy;  Laterality: N/A;    EPIDURAL STEROID INJECTION N/A 2023    Procedure: INJECTION, STEROID, EPIDURAL, L5-S1;  Surgeon: Majo Meyers MD;  Location: [facility] PAIN MGT;  Service: Pain Management;  Laterality: N/A;    ESOPHAGOGASTRODUODENOSCOPY N/A 10/04/2021    Procedure: EGD (ESOPHAGOGASTRODUODENOSCOPY);  Surgeon:  Taran Galvez MD;  Location: [facility] (4TH FLR);  Service: Endoscopy;  Laterality: N/A;  covid test 10/1-[facility]    10/1-LVM about COVID test-GT    ESOPHAGOGASTRODUODENOSCOPY N/A 01/31/2022    Procedure: EGD (ESOPHAGOGASTRODUODENOSCOPY);  Surgeon: Tani Lara MD;  Location: [facility] ENDO (4TH FLR);  Service: Endoscopy;  Laterality: N/A;  12/15 fully vaccinated; instructions to portal-st  1/28-covid [facility]-tb    FUSION, SPINE, LUMBAR, TLIF, POSTERIOR APPROACH, USING PEDICLE SCREW N/A 8/16/2023    Procedure: FUSION, SPINE, LUMBAR, TLIF, POSTERIOR APPROACH, USING PEDICLE SCREW L2-5 PLDF/TLIF GLOBUS ROBOT SNS:SSEP/EMG cell saver;  Surgeon: Jesus Alberto Sharp MD;  [facility];  Service: Neurosurgery;  Laterality: N/A;    IRRIGATION AND DEBRIDEMENT N/A 10/4/2023    Procedure: IRRIGATION AND DEBRIDEMENT **CO CASE DR. JULISSA RODRIGUEZ** LUMBAR;  Surgeon: Jesus Alberto Sharp MD;  Location: [facility] OR [address] [place] [address];  Service: Orthopedics;  Laterality: N/A;    PHACOEMULSIFICATION, CATARACT, WITH IOL INSERTION Right 4/9/2025    Procedure: PHACOEMULSIFICATION, CATARACT, WITH IOL INSERTION;  Surgeon: Sandra Low MD;  Location: [facility] OR;  Service: Ophthalmology;  Laterality: Right;    TRANSFORAMINAL EPIDURAL INJECTION OF STEROID N/A 03/16/2023    Procedure: b/l L4-5 TFESI;  Surgeon: Gato Delatorre DO;  Location: [facility] OR;  Service: Pain Management;  Laterality: N/A;    TRANSFORAMINAL EPIDURAL INJECTION OF STEROID Right 6/4/2024    Procedure: LUMBAR TRANSFORAMINAL RIGHT S1/2 DIRECT REFERRAL *ASPIRIN CLEARANCE IN CHART*;  Surgeon: Majo Meyers MD;  Location: [facility] PAIN MGT;  Service: Pain Management;  Laterality: Right;  [phone]    TUBAL LIGATION       ALLERGIES updated and reviewed.  Review of patient's allergies indicates:   Allergen Reactions    Balsam peru (bulk) Dermatitis     allergic contact dermatitis      Haptens - fragrance series Dermatitis     allergic contact dermatitis      Isothiazolinones  Dermatitis     allergic contact dermatitis to benzisothiazolinone    Methylchloroisothiazolinone Dermatitis and Blisters     allergic contact dermatitis         CURRENT OUTPATIENT MEDICATIONS updated and reviewed  Current Medications[2]    Review of Systems   Constitutional:  Negative for activity change, appetite change, chills, diaphoresis, fatigue, fever and unexpected weight change.   HENT:  Negative for congestion, ear discharge, ear pain, facial swelling, hearing loss, nosebleeds, postnasal drip, rhinorrhea, sinus pressure, sneezing, sore throat, tinnitus, trouble swallowing and voice change.    Eyes:  Negative for photophobia, pain, discharge, redness, itching and visual disturbance.   Respiratory:  Negative for cough, chest tightness, shortness of breath and wheezing.    Cardiovascular:  Negative for chest pain, palpitations and leg swelling.   Gastrointestinal:  Negative for abdominal distention, abdominal pain, anal bleeding, blood in stool, constipation, diarrhea, nausea, rectal pain and vomiting.   Endocrine: Negative for cold intolerance, heat intolerance, polydipsia, polyphagia and polyuria.   Genitourinary:  Negative for difficulty urinating, dysuria and flank pain.   Musculoskeletal:  Negative for arthralgias, back pain, joint swelling, myalgias and neck pain.   Skin:  Negative for rash.   Neurological:  Negative for dizziness, tremors, seizures, syncope, speech difficulty, weakness, light-headedness, numbness and headaches.   Psychiatric/Behavioral:  Negative for behavioral problems, confusion, decreased concentration, dysphoric mood, sleep disturbance and suicidal ideas. The patient is not nervous/anxious and is not hyperactive.        /72 (BP Location: Left arm)   Pulse 91   Resp 18   Ht 5' (1.524 m)   Wt 90.4 kg (199 lb 4.7 oz)   SpO2 99%   BMI 38.92 kg/m²   Physical Exam  Vitals and nursing note reviewed.   Constitutional:       General: She is not in acute distress.     Appearance:  "Normal appearance. She is well-developed. She is not ill-appearing or toxic-appearing.   HENT:      Head: Normocephalic and atraumatic.      Right Ear: Tympanic membrane, ear canal and external ear normal.      Left Ear: Tympanic membrane, ear canal and external ear normal.      Nose: Nose normal.      Mouth/Throat:      Lips: Pink.      Mouth: Mucous membranes are moist.      Pharynx: No oropharyngeal exudate or posterior oropharyngeal erythema.   Eyes:      General: No scleral icterus.        Right eye: No discharge.         Left eye: No discharge.      Extraocular Movements: Extraocular movements intact.      Conjunctiva/sclera: Conjunctivae normal.   Cardiovascular:      Rate and Rhythm: Normal rate and regular rhythm.      Pulses: Normal pulses.      Heart sounds: Normal heart sounds. No murmur heard.  Pulmonary:      Effort: Pulmonary effort is normal. No respiratory distress.      Breath sounds: Normal breath sounds. No wheezing or rales.   Abdominal:      General: Bowel sounds are normal. There is no distension.      Palpations: Abdomen is soft. There is no mass.      Tenderness: There is no abdominal tenderness. There is no right CVA tenderness, left CVA tenderness, guarding or rebound.      Hernia: No hernia is present.   Musculoskeletal:      Cervical back: Normal range of motion and neck supple. No rigidity or tenderness.   Lymphadenopathy:      Cervical: No cervical adenopathy.   Skin:     General: Skin is warm and dry.   Neurological:      General: No focal deficit present.      Mental Status: She is alert. Mental status is at baseline.   Psychiatric:         Mood and Affect: Mood normal.         Behavior: Behavior normal. Behavior is cooperative.         ASSESSMENT/PLAN    Nilsa "MS. Ash" was seen today for annual exam.    Diagnoses and all orders for this visit:    General medical exam  -     CBC Without Differential; Future  -     Comprehensive Metabolic Panel; Future    Mixed " hyperlipidemia  Low-fat diet.  Continue statin.  Continue to monitor.  Chronic kidney disease, stage 3a  Stay hydrated.  Avoid NSAIDs.  Keep blood pressure under good control.  Continue to monitor it least Q 6 months.  Essential hypertension  -     olmesartan (BENICAR) 40 MG tablet; Take 1 tablet (40 mg total) by mouth once daily.  Fairly good control.  Continue current management.  Continue low-sodium diet.  Monitor.  Lumbar radiculopathy  -     pregabalin (LYRICA) 75 MG capsule; Take 1 capsule (75 mg total) by mouth 2 (two) times daily.  Stable.  Continue Lyrica.  Monitor.  Encounter for screening mammogram for malignant neoplasm of breast  -     Mammo Digital Screening Bilat w/ Marlon (XPD); Future    Hyperuricemia  -     Uric Acid; Future  Low purine diet.  Check uric acid.  Monitor.                  Most recent some lab results reviewed with patient.  Any new prescription medications gone over in detail including reason for taking the medication, most common possible side effects and possible costs, etcetera.    Chronic conditions updated. Other than changes or additions as above, cont current medications and maintain follow-up with specialists if indicated.     Gaurav Allison MD  A dictation device was used to produce this document. Use of such devices sometimes results in grammatical errors or replacement of words that sound similarly.                         [1]   Patient Active Problem List  Diagnosis    Mixed hyperlipidemia    GERD (gastroesophageal reflux disease)    Atopic dermatitis    Hyperparathyroidism    Decreased strength    Impaired mobility    Right-sided carotid artery disease    Neurogenic claudication due to lumbar spinal stenosis    Spondylolisthesis of lumbosacral region    MONE (obstructive sleep apnea)    Primary osteoarthritis of both shoulders    Chronic right-sided low back pain without sciatica    Headache disorder    Chronic kidney disease, stage 3a    Cervical stenosis of spine     Posture imbalance    Decreased strength of trunk and back    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    Essential hypertension    Right hip pain    Ankle swelling    Intertrigo    Muscle spasms of both lower extremities    Hyperuricemia    Chronic heart failure with preserved ejection fraction    Nonrheumatic aortic valve stenosis    Hair loss    Cervical spine arthritis with nerve pain    Chronic right hip pain    Chronic neck pain    Lumbar spondylosis    Fatigue    Atherosclerosis of aorta    Acute blood loss anemia    Surgical wound dehiscence    Acute on chronic anemia    Severe obesity (BMI 35.0-39.9) with comorbidity    Lumbar radiculopathy   [2]   Current Outpatient Medications:     acetaminophen (TYLENOL) 500 MG tablet, TAKE 1 TABLET THREE TIMES DAILY, Disp: 90 tablet, Rfl: 11    famotidine (PEPCID) 20 MG tablet, One po bid x 7 days then one bid as needed for gastritis, Disp: 60 tablet, Rfl: 11    fluocinonide (LIDEX) 0.05 % ointment, Apply to affected areas of hands bid prn eczema flares., Disp: 120 g, Rfl: 1    fluticasone propionate (FLONASE) 50 mcg/actuation nasal spray, SHAKE LIQUID AND USE 2 SPRAYS(100 MCG) IN EACH NOSTRIL DAILY, Disp: 16 g, Rfl: 1    ketoconazole (NIZORAL) 2 % cream, Apply to affected areas of body BID prn irritation., Disp: 15 g, Rfl: 1    methocarbamoL (ROBAXIN) 500 MG Tab, Take 1 tablet (500 mg total) by mouth 3 (three) times daily., Disp: 60 tablet, Rfl: 1    MULTIVIT-MIN/FA/CA CARB/VIT K (WOMEN'S 50+ DAILY FORMULA ORAL), Take by mouth., Disp: , Rfl:     omega-3 fatty acids 300 mg Cap, Take by mouth., Disp: , Rfl:     terbinafine HCL (LAMISIL AT) 1 % cream, Apply topically 2 (two) times daily. To feet, Disp: 60 g, Rfl: 1    triamcinolone acetonide 0.025% (KENALOG) 0.025 % cream, Apply to affected areas of body BID prn irritation., Disp: 15 g, Rfl: 1    TURMERIC ORAL, Take by mouth., Disp: , Rfl:     ubidecarenone (COENZYME Q10) 100 mg Tab, Take 1  tablet by mouth once daily. , Disp: , Rfl:     allopurinoL (ZYLOPRIM) 100 MG tablet, Take 1 tablet (100 mg total) by mouth once daily., Disp: 90 tablet, Rfl: 3    amLODIPine (NORVASC) 10 MG tablet, Take 1 tablet (10 mg total) by mouth once daily., Disp: 90 tablet, Rfl: 3    aspirin (ECOTRIN) 81 MG EC tablet, Take 1 tablet (81 mg total) by mouth once daily., Disp: 90 tablet, Rfl: 3    carvediloL (COREG) 3.125 MG tablet, Take 1 tablet (3.125 mg total) by mouth 2 (two) times daily with meals., Disp: 180 tablet, Rfl: 3    methylPREDNISolone (MEDROL DOSEPACK) 4 mg tablet, use as directed, Disp: 1 each, Rfl: 0    olmesartan (BENICAR) 40 MG tablet, Take 1 tablet (40 mg total) by mouth once daily., Disp: 90 tablet, Rfl: 3    potassium chloride SA (K-DUR,KLOR-CON) 20 MEQ tablet, Take 1 tablet (20 mEq total) by mouth 2 (two) times daily., Disp: 180 tablet, Rfl: 3    pregabalin (LYRICA) 75 MG capsule, Take 1 capsule (75 mg total) by mouth 2 (two) times daily., Disp: 180 capsule, Rfl: 3    rosuvastatin (CRESTOR) 20 MG tablet, Take 1 tablet (20 mg total) by mouth once daily., Disp: 90 tablet, Rfl: 3

## 2025-07-31 ENCOUNTER — TELEPHONE (OUTPATIENT)
Dept: PODIATRY | Facility: CLINIC | Age: 80
End: 2025-07-31
Payer: COMMERCIAL

## 2025-07-31 NOTE — TELEPHONE ENCOUNTER
Left vm for pt with callback number of [phone] in regards to 9/4 appt being canceled due to provider changing location. Portal message sent.

## 2025-08-13 ENCOUNTER — OFFICE VISIT (OUTPATIENT)
Dept: OBSTETRICS AND GYNECOLOGY | Facility: CLINIC | Age: 80
End: 2025-08-13
Payer: MEDICARE

## 2025-08-13 VITALS
WEIGHT: 199.5 LBS | BODY MASS INDEX: 39.17 KG/M2 | HEIGHT: 60 IN | SYSTOLIC BLOOD PRESSURE: 141 MMHG | DIASTOLIC BLOOD PRESSURE: 63 MMHG

## 2025-08-13 DIAGNOSIS — B37.9 CANDIDA INFECTION: ICD-10-CM

## 2025-08-13 DIAGNOSIS — N84.1 POLYP AT CERVICAL OS: ICD-10-CM

## 2025-08-13 DIAGNOSIS — Z01.419 WELL WOMAN EXAM WITH ROUTINE GYNECOLOGICAL EXAM: Primary | ICD-10-CM

## 2025-08-13 PROCEDURE — 88305 TISSUE EXAM BY PATHOLOGIST: CPT | Mod: TC,HCNC

## 2025-08-13 PROCEDURE — 99999 PR PBB SHADOW E&M-EST. PATIENT-LVL III: CPT | Mod: PBBFAC,HCNC,,

## 2025-08-13 PROCEDURE — 3078F DIAST BP <80 MM HG: CPT | Mod: CPTII,HCNC,S$GLB,

## 2025-08-13 PROCEDURE — 1159F MED LIST DOCD IN RCRD: CPT | Mod: CPTII,HCNC,S$GLB,

## 2025-08-13 PROCEDURE — 1126F AMNT PAIN NOTED NONE PRSNT: CPT | Mod: CPTII,HCNC,S$GLB,

## 2025-08-13 PROCEDURE — 3077F SYST BP >= 140 MM HG: CPT | Mod: CPTII,HCNC,S$GLB,

## 2025-08-13 PROCEDURE — G0101 CA SCREEN;PELVIC/BREAST EXAM: HCPCS | Mod: HCNC,S$GLB,,

## 2025-08-13 PROCEDURE — 57500 BIOPSY OF CERVIX: CPT | Mod: HCNC,S$GLB,,

## 2025-08-13 RX ORDER — NYSTATIN 100000 U/G
OINTMENT TOPICAL 2 TIMES DAILY PRN
Qty: 30 G | Refills: 2 | Status: SHIPPED | OUTPATIENT
Start: 2025-08-13 | End: 2025-08-27

## 2025-08-15 LAB
ESTROGEN SERPL-MCNC: NORMAL PG/ML
INSULIN SERPL-ACNC: NORMAL U[IU]/ML
LAB AP CLINICAL INFORMATION: NORMAL
LAB AP GROSS DESCRIPTION: NORMAL
LAB AP PERFORMING LOCATION(S): NORMAL
LAB AP REPORT FOOTNOTES: NORMAL

## 2025-08-21 ENCOUNTER — PATIENT MESSAGE (OUTPATIENT)
Dept: OBSTETRICS AND GYNECOLOGY | Facility: CLINIC | Age: 80
End: 2025-08-21
Payer: MEDICARE

## 2025-08-21 RX ORDER — FLUCONAZOLE 150 MG/1
150 TABLET ORAL ONCE
Qty: 1 TABLET | Refills: 0 | Status: SHIPPED | OUTPATIENT
Start: 2025-08-21 | End: 2025-08-21

## 2025-08-28 ENCOUNTER — OFFICE VISIT (OUTPATIENT)
Dept: DERMATOLOGY | Facility: CLINIC | Age: 80
End: 2025-08-28
Payer: MEDICARE

## 2025-08-28 DIAGNOSIS — L23.2 ALLERGIC CONTACT DERMATITIS DUE TO COSMETICS: ICD-10-CM

## 2025-08-28 DIAGNOSIS — Z79.899 LONG-TERM USE OF HIGH-RISK MEDICATION: ICD-10-CM

## 2025-08-28 DIAGNOSIS — L23.5 ALLERGIC DERMATITIS DUE TO OTHER CHEMICAL PRODUCT: ICD-10-CM

## 2025-08-28 DIAGNOSIS — L20.84 INTRINSIC ATOPIC DERMATITIS: Primary | ICD-10-CM

## 2025-08-28 DIAGNOSIS — B00.1 HERPES SIMPLEX LABIALIS: ICD-10-CM

## 2025-08-28 RX ORDER — TACROLIMUS 1 MG/G
OINTMENT TOPICAL
Qty: 30 G | Refills: 1 | Status: SHIPPED | OUTPATIENT
Start: 2025-08-28

## 2025-08-28 RX ORDER — FAMCICLOVIR 500 MG/1
TABLET, FILM COATED ORAL
Qty: 30 TABLET | Refills: 2 | Status: SHIPPED | OUTPATIENT
Start: 2025-08-28

## 2025-09-05 ENCOUNTER — OFFICE VISIT (OUTPATIENT)
Dept: URGENT CARE | Facility: CLINIC | Age: 80
End: 2025-09-05
Payer: MEDICARE

## 2025-09-05 VITALS
DIASTOLIC BLOOD PRESSURE: 75 MMHG | OXYGEN SATURATION: 97 % | TEMPERATURE: 98 F | SYSTOLIC BLOOD PRESSURE: 138 MMHG | HEART RATE: 69 BPM | WEIGHT: 199 LBS | RESPIRATION RATE: 16 BRPM | BODY MASS INDEX: 38.86 KG/M2

## 2025-09-05 DIAGNOSIS — L02.411 ABSCESS OF RIGHT AXILLA: Primary | ICD-10-CM

## 2025-09-05 DIAGNOSIS — L02.422 FURUNCLE OF LEFT AXILLA: ICD-10-CM

## 2025-09-05 RX ORDER — MUPIROCIN 20 MG/G
OINTMENT TOPICAL 3 TIMES DAILY
Qty: 30 G | Refills: 0 | Status: SHIPPED | OUTPATIENT
Start: 2025-09-05 | End: 2025-09-12

## 2025-09-05 RX ORDER — SULFAMETHOXAZOLE AND TRIMETHOPRIM 800; 160 MG/1; MG/1
1 TABLET ORAL 2 TIMES DAILY
Qty: 10 TABLET | Refills: 0 | Status: SHIPPED | OUTPATIENT
Start: 2025-09-05 | End: 2025-09-10

## (undated) DEVICE — SPONGE GAUZE 16PLY 4X4

## (undated) DEVICE — KIT SPINAL PATIENT CARE JACK

## (undated) DEVICE — DRESSING TEGADERM CHG 3.5X4.5

## (undated) DEVICE — SYR LUER LOCK 1CC

## (undated) DEVICE — MARKER SKIN STND TIP BLUE BARR

## (undated) DEVICE — STAPLER SKIN PROXIMATE WIDE

## (undated) DEVICE — BUR BONE CUT MICRO TPS 3X3.8MM

## (undated) DEVICE — TUBE FRAZIER 5MM 2FT SOFT TIP

## (undated) DEVICE — CHLORAPREP 10.5 ML APPLICATOR

## (undated) DEVICE — KIT SURGIFLO HEMOSTATIC MATRIX

## (undated) DEVICE — APPLICATOR CHLORAPREP ORN 26ML

## (undated) DEVICE — COVER PROXIMA MAYO STAND

## (undated) DEVICE — TIP YANKAUERS BULB NO VENT

## (undated) DEVICE — KIT EVACUATOR FLAT DRAIN 100CC

## (undated) DEVICE — DRESSING AQUACEL SACRAL 9 X 9

## (undated) DEVICE — PENCIL ROCKER SWITCH 10FT CORD

## (undated) DEVICE — BIT DRILL REAM GPS 3.5MM

## (undated) DEVICE — SYR IRRIGATION BULB STER 60ML

## (undated) DEVICE — CARTRIDGE OIL

## (undated) DEVICE — CONTAINER SPECIMEN STRL 3OZ

## (undated) DEVICE — DUOVISC

## (undated) DEVICE — GUIDE POST LP QUATTRO MEDIUM

## (undated) DEVICE — DRESSING TRANS 4X4 TEGADERM

## (undated) DEVICE — DRAPE CORETEMP FLD WRM 56X62IN

## (undated) DEVICE — GLOVE BIOGEL ECLIPSE SZ 6.5

## (undated) DEVICE — TOWEL OR DISP STRL BLUE 4/PK

## (undated) DEVICE — DRAPE C-ARMOR EQUIPMENT COVER

## (undated) DEVICE — DRESSING MEPORE ADH 3.5X12

## (undated) DEVICE — DRAPE ABDOMINAL TIBURON 14X11

## (undated) DEVICE — TRAY CATH FOL SIL URIMTR 16FR

## (undated) DEVICE — SPONGE COTTON TRAY 4X4IN

## (undated) DEVICE — DRAPE STERI-DRAPE 1000 17X11IN

## (undated) DEVICE — DRAPE THREE-QTR REINF 53X77IN

## (undated) DEVICE — DRAPE OPHTHALMIC 48X62 FEN

## (undated) DEVICE — DRESSING AQUACEL FOAM 3 X 3

## (undated) DEVICE — SUT VICRYL PLUS 2-0 CT1 18

## (undated) DEVICE — SUT 0 VICRYL / UR6 (J603)

## (undated) DEVICE — SWAB BBL CULTURETTE

## (undated) DEVICE — DRESSING AQUACEL AG ADV 3.5X6

## (undated) DEVICE — KIT NERVE BLOCK PREP BAPTIST

## (undated) DEVICE — SUT 2-0 SILK 30IN BLK BRAID

## (undated) DEVICE — SUT VICRYL+ 1 CT1 18IN

## (undated) DEVICE — BLADE 4IN EDGE INSULATED

## (undated) DEVICE — DRAPE INCISE IOBAN 2 23X17IN

## (undated) DEVICE — DRAIN CHANNEL ROUND 10FR

## (undated) DEVICE — DRESSING MEPILEX BORDER 4 X 4

## (undated) DEVICE — Device

## (undated) DEVICE — ADHESIVE DERMABOND ADVANCED

## (undated) DEVICE — SPHERE NDI PASSIVE

## (undated) DEVICE — STRIP MEDI WND CLSR 1X5IN

## (undated) DEVICE — NDL HYPO 27G X 1 1/2

## (undated) DEVICE — DRESSING LEUKOPLAST FLEX 1X3IN

## (undated) DEVICE — GLOVE BIOGEL SKINSENSE PI 7.5

## (undated) DEVICE — SOL BETADINE 5%

## (undated) DEVICE — DIFFUSER

## (undated) DEVICE — DRESSING TRANS 2X2 TEGADERM

## (undated) DEVICE — DRESSING AQUACEL FOAM 5 X 5

## (undated) DEVICE — NDL 20GX1-1/2IN IB

## (undated) DEVICE — DRAPE TOP 53X102IN

## (undated) DEVICE — DRAPE STERI INSTRUMENT 1018

## (undated) DEVICE — BLADE MILL BONE MEDIUM

## (undated) DEVICE — TRAY NEURO OMC

## (undated) DEVICE — NDL SPINAL 18GX3.5 SPINOCAN

## (undated) DEVICE — KIT EVACUATOR 3-SPRING 1/8 DRN

## (undated) DEVICE — DRILL BIT SURG GPS HI SPD 4MM

## (undated) DEVICE — DRAPE C-ARM ELAS CLIP 42X120IN

## (undated) DEVICE — PAD CURAD NONADH 3X4IN

## (undated) DEVICE — COVER BACK TBL HD 2-TIER 72IN

## (undated) DEVICE — SUTURE STRATAFIX PGA PCL 3-0

## (undated) DEVICE — SUT SILK 2-0 PS 18IN BLACK

## (undated) DEVICE — ELECTRODE REM PLYHSV RETURN 9

## (undated) DEVICE — SPONGE LAP 4X18 PREWASHED

## (undated) DEVICE — CORD BIPOLAR 12 FOOT

## (undated) DEVICE — SUT VICRYL PLUS 0 CT1 18IN